# Patient Record
Sex: MALE | Race: WHITE | NOT HISPANIC OR LATINO
[De-identification: names, ages, dates, MRNs, and addresses within clinical notes are randomized per-mention and may not be internally consistent; named-entity substitution may affect disease eponyms.]

---

## 2017-03-08 ENCOUNTER — APPOINTMENT (OUTPATIENT)
Dept: COLORECTAL SURGERY | Facility: CLINIC | Age: 82
End: 2017-03-08

## 2017-03-08 VITALS
BODY MASS INDEX: 34.86 KG/M2 | SYSTOLIC BLOOD PRESSURE: 140 MMHG | HEART RATE: 68 BPM | HEIGHT: 68 IN | WEIGHT: 230 LBS | DIASTOLIC BLOOD PRESSURE: 80 MMHG | RESPIRATION RATE: 14 BRPM

## 2017-03-08 DIAGNOSIS — Z87.891 PERSONAL HISTORY OF NICOTINE DEPENDENCE: ICD-10-CM

## 2017-03-08 DIAGNOSIS — Z87.19 PERSONAL HISTORY OF OTHER DISEASES OF THE DIGESTIVE SYSTEM: ICD-10-CM

## 2017-03-08 DIAGNOSIS — Z86.79 PERSONAL HISTORY OF OTHER DISEASES OF THE CIRCULATORY SYSTEM: ICD-10-CM

## 2017-07-20 ENCOUNTER — APPOINTMENT (OUTPATIENT)
Dept: PULMONOLOGY | Facility: CLINIC | Age: 82
End: 2017-07-20

## 2017-07-20 VITALS
HEIGHT: 67 IN | SYSTOLIC BLOOD PRESSURE: 145 MMHG | BODY MASS INDEX: 36.1 KG/M2 | DIASTOLIC BLOOD PRESSURE: 80 MMHG | WEIGHT: 230 LBS | OXYGEN SATURATION: 97 % | HEART RATE: 65 BPM | RESPIRATION RATE: 16 BRPM

## 2017-07-20 DIAGNOSIS — N20.0 CALCULUS OF KIDNEY: ICD-10-CM

## 2017-07-20 DIAGNOSIS — Z63.4 DISAPPEARANCE AND DEATH OF FAMILY MEMBER: ICD-10-CM

## 2017-07-20 SDOH — SOCIAL STABILITY - SOCIAL INSECURITY: DISSAPEARANCE AND DEATH OF FAMILY MEMBER: Z63.4

## 2017-08-02 ENCOUNTER — MESSAGE (OUTPATIENT)
Age: 82
End: 2017-08-02

## 2017-08-03 ENCOUNTER — RX RENEWAL (OUTPATIENT)
Age: 82
End: 2017-08-03

## 2017-08-03 ENCOUNTER — RESULT REVIEW (OUTPATIENT)
Age: 82
End: 2017-08-03

## 2017-08-11 ENCOUNTER — APPOINTMENT (OUTPATIENT)
Dept: PULMONOLOGY | Facility: CLINIC | Age: 82
End: 2017-08-11
Payer: MEDICARE

## 2017-08-11 VITALS
HEIGHT: 67 IN | DIASTOLIC BLOOD PRESSURE: 80 MMHG | WEIGHT: 229 LBS | SYSTOLIC BLOOD PRESSURE: 150 MMHG | OXYGEN SATURATION: 98 % | HEART RATE: 68 BPM | BODY MASS INDEX: 35.94 KG/M2 | RESPIRATION RATE: 17 BRPM

## 2017-08-11 PROCEDURE — 99214 OFFICE O/P EST MOD 30 MIN: CPT | Mod: 25

## 2017-08-11 PROCEDURE — 94010 BREATHING CAPACITY TEST: CPT

## 2017-10-11 ENCOUNTER — APPOINTMENT (OUTPATIENT)
Dept: PULMONOLOGY | Facility: CLINIC | Age: 82
End: 2017-10-11

## 2017-12-26 ENCOUNTER — INPATIENT (INPATIENT)
Facility: HOSPITAL | Age: 82
LOS: 0 days | Discharge: HOME | DRG: 303 | End: 2017-12-27
Attending: INTERNAL MEDICINE | Admitting: INTERNAL MEDICINE
Payer: MEDICARE

## 2017-12-26 VITALS
OXYGEN SATURATION: 98 % | RESPIRATION RATE: 18 BRPM | HEART RATE: 68 BPM | TEMPERATURE: 98 F | SYSTOLIC BLOOD PRESSURE: 123 MMHG | DIASTOLIC BLOOD PRESSURE: 68 MMHG

## 2017-12-26 DIAGNOSIS — I20.8 OTHER FORMS OF ANGINA PECTORIS: ICD-10-CM

## 2017-12-26 DIAGNOSIS — J45.909 UNSPECIFIED ASTHMA, UNCOMPLICATED: ICD-10-CM

## 2017-12-26 DIAGNOSIS — I25.118 ATHEROSCLEROTIC HEART DISEASE OF NATIVE CORONARY ARTERY WITH OTHER FORMS OF ANGINA PECTORIS: ICD-10-CM

## 2017-12-26 DIAGNOSIS — R06.02 SHORTNESS OF BREATH: ICD-10-CM

## 2017-12-26 DIAGNOSIS — I48.0 PAROXYSMAL ATRIAL FIBRILLATION: ICD-10-CM

## 2017-12-26 DIAGNOSIS — I50.32 CHRONIC DIASTOLIC (CONGESTIVE) HEART FAILURE: ICD-10-CM

## 2017-12-26 DIAGNOSIS — N18.2 CHRONIC KIDNEY DISEASE, STAGE 2 (MILD): ICD-10-CM

## 2017-12-26 LAB
ALBUMIN SERPL ELPH-MCNC: 3.8 G/DL — SIGNIFICANT CHANGE UP (ref 3.3–5)
ALP SERPL-CCNC: 87 U/L — SIGNIFICANT CHANGE UP (ref 40–120)
ALT FLD-CCNC: 28 U/L RC — SIGNIFICANT CHANGE UP (ref 10–45)
ANION GAP SERPL CALC-SCNC: 12 MMOL/L — SIGNIFICANT CHANGE UP (ref 5–17)
APTT BLD: 29.4 SEC — SIGNIFICANT CHANGE UP (ref 27.5–37.4)
AST SERPL-CCNC: 27 U/L — SIGNIFICANT CHANGE UP (ref 10–40)
BASOPHILS # BLD AUTO: 0 K/UL — SIGNIFICANT CHANGE UP (ref 0–0.2)
BASOPHILS NFR BLD AUTO: 0.5 % — SIGNIFICANT CHANGE UP (ref 0–2)
BILIRUB SERPL-MCNC: 0.5 MG/DL — SIGNIFICANT CHANGE UP (ref 0.2–1.2)
BUN SERPL-MCNC: 29 MG/DL — HIGH (ref 7–23)
CALCIUM SERPL-MCNC: 9.7 MG/DL — SIGNIFICANT CHANGE UP (ref 8.4–10.5)
CHLORIDE SERPL-SCNC: 103 MMOL/L — SIGNIFICANT CHANGE UP (ref 96–108)
CK MB BLD-MCNC: 4.6 % — HIGH (ref 0–3.5)
CK MB CFR SERPL CALC: 7.3 NG/ML — HIGH (ref 0–6.7)
CK SERPL-CCNC: 158 U/L — SIGNIFICANT CHANGE UP (ref 30–200)
CO2 SERPL-SCNC: 26 MMOL/L — SIGNIFICANT CHANGE UP (ref 22–31)
CREAT SERPL-MCNC: 1.27 MG/DL — SIGNIFICANT CHANGE UP (ref 0.5–1.3)
EOSINOPHIL # BLD AUTO: 0.1 K/UL — SIGNIFICANT CHANGE UP (ref 0–0.5)
EOSINOPHIL NFR BLD AUTO: 1 % — SIGNIFICANT CHANGE UP (ref 0–6)
GLUCOSE BLDC GLUCOMTR-MCNC: 90 MG/DL — SIGNIFICANT CHANGE UP (ref 70–99)
GLUCOSE SERPL-MCNC: 105 MG/DL — HIGH (ref 70–99)
HCT VFR BLD CALC: 39.4 % — SIGNIFICANT CHANGE UP (ref 39–50)
HGB BLD-MCNC: 13.9 G/DL — SIGNIFICANT CHANGE UP (ref 13–17)
INR BLD: 1.01 RATIO — SIGNIFICANT CHANGE UP (ref 0.88–1.16)
LYMPHOCYTES # BLD AUTO: 1.3 K/UL — SIGNIFICANT CHANGE UP (ref 1–3.3)
LYMPHOCYTES # BLD AUTO: 14.6 % — SIGNIFICANT CHANGE UP (ref 13–44)
MCHC RBC-ENTMCNC: 32.7 PG — SIGNIFICANT CHANGE UP (ref 27–34)
MCHC RBC-ENTMCNC: 35.4 GM/DL — SIGNIFICANT CHANGE UP (ref 32–36)
MCV RBC AUTO: 92.5 FL — SIGNIFICANT CHANGE UP (ref 80–100)
MONOCYTES # BLD AUTO: 0.9 K/UL — SIGNIFICANT CHANGE UP (ref 0–0.9)
MONOCYTES NFR BLD AUTO: 10.7 % — SIGNIFICANT CHANGE UP (ref 2–14)
NEUTROPHILS # BLD AUTO: 6.3 K/UL — SIGNIFICANT CHANGE UP (ref 1.8–7.4)
NEUTROPHILS NFR BLD AUTO: 73.1 % — SIGNIFICANT CHANGE UP (ref 43–77)
NT-PROBNP SERPL-SCNC: 2422 PG/ML — HIGH (ref 0–300)
PLATELET # BLD AUTO: 150 K/UL — SIGNIFICANT CHANGE UP (ref 150–400)
POTASSIUM SERPL-MCNC: 4.1 MMOL/L — SIGNIFICANT CHANGE UP (ref 3.5–5.3)
POTASSIUM SERPL-SCNC: 4.1 MMOL/L — SIGNIFICANT CHANGE UP (ref 3.5–5.3)
PROT SERPL-MCNC: 6.4 G/DL — SIGNIFICANT CHANGE UP (ref 6–8.3)
PROTHROM AB SERPL-ACNC: 11 SEC — SIGNIFICANT CHANGE UP (ref 9.8–12.7)
RBC # BLD: 4.26 M/UL — SIGNIFICANT CHANGE UP (ref 4.2–5.8)
RBC # FLD: 12.9 % — SIGNIFICANT CHANGE UP (ref 10.3–14.5)
SODIUM SERPL-SCNC: 141 MMOL/L — SIGNIFICANT CHANGE UP (ref 135–145)
TROPONIN T SERPL-MCNC: 0.02 NG/ML — SIGNIFICANT CHANGE UP (ref 0–0.06)
WBC # BLD: 8.6 K/UL — SIGNIFICANT CHANGE UP (ref 3.8–10.5)
WBC # FLD AUTO: 8.6 K/UL — SIGNIFICANT CHANGE UP (ref 3.8–10.5)

## 2017-12-26 PROCEDURE — 99284 EMERGENCY DEPT VISIT MOD MDM: CPT | Mod: 25,GC

## 2017-12-26 PROCEDURE — 93010 ELECTROCARDIOGRAM REPORT: CPT

## 2017-12-26 PROCEDURE — 71020: CPT | Mod: 26

## 2017-12-26 PROCEDURE — 99223 1ST HOSP IP/OBS HIGH 75: CPT

## 2017-12-26 RX ORDER — FINASTERIDE 5 MG/1
5 TABLET, FILM COATED ORAL DAILY
Qty: 0 | Refills: 0 | Status: DISCONTINUED | OUTPATIENT
Start: 2017-12-26 | End: 2017-12-27

## 2017-12-26 RX ORDER — ALBUTEROL 90 UG/1
2 AEROSOL, METERED ORAL EVERY 6 HOURS
Qty: 0 | Refills: 0 | Status: DISCONTINUED | OUTPATIENT
Start: 2017-12-26 | End: 2017-12-27

## 2017-12-26 RX ORDER — PANTOPRAZOLE SODIUM 20 MG/1
40 TABLET, DELAYED RELEASE ORAL
Qty: 0 | Refills: 0 | Status: DISCONTINUED | OUTPATIENT
Start: 2017-12-26 | End: 2017-12-27

## 2017-12-26 RX ORDER — METOPROLOL TARTRATE 50 MG
50 TABLET ORAL DAILY
Qty: 0 | Refills: 0 | Status: DISCONTINUED | OUTPATIENT
Start: 2017-12-26 | End: 2017-12-27

## 2017-12-26 RX ORDER — ACETAMINOPHEN 500 MG
2 TABLET ORAL
Qty: 0 | Refills: 0 | COMMUNITY

## 2017-12-26 RX ORDER — ASPIRIN/CALCIUM CARB/MAGNESIUM 324 MG
81 TABLET ORAL DAILY
Qty: 0 | Refills: 0 | Status: DISCONTINUED | OUTPATIENT
Start: 2017-12-26 | End: 2017-12-27

## 2017-12-26 RX ORDER — IPRATROPIUM/ALBUTEROL SULFATE 18-103MCG
3 AEROSOL WITH ADAPTER (GRAM) INHALATION ONCE
Qty: 0 | Refills: 0 | Status: COMPLETED | OUTPATIENT
Start: 2017-12-26 | End: 2017-12-26

## 2017-12-26 RX ORDER — CLOPIDOGREL BISULFATE 75 MG/1
75 TABLET, FILM COATED ORAL DAILY
Qty: 0 | Refills: 0 | Status: DISCONTINUED | OUTPATIENT
Start: 2017-12-26 | End: 2017-12-27

## 2017-12-26 RX ORDER — AMIODARONE HYDROCHLORIDE 400 MG/1
200 TABLET ORAL
Qty: 0 | Refills: 0 | Status: DISCONTINUED | OUTPATIENT
Start: 2017-12-26 | End: 2017-12-27

## 2017-12-26 RX ORDER — SPIRONOLACTONE 25 MG/1
25 TABLET, FILM COATED ORAL DAILY
Qty: 0 | Refills: 0 | Status: DISCONTINUED | OUTPATIENT
Start: 2017-12-26 | End: 2017-12-27

## 2017-12-26 RX ORDER — VALSARTAN 80 MG/1
320 TABLET ORAL DAILY
Qty: 0 | Refills: 0 | Status: DISCONTINUED | OUTPATIENT
Start: 2017-12-26 | End: 2017-12-27

## 2017-12-26 RX ORDER — TAMSULOSIN HYDROCHLORIDE 0.4 MG/1
0.4 CAPSULE ORAL AT BEDTIME
Qty: 0 | Refills: 0 | Status: DISCONTINUED | OUTPATIENT
Start: 2017-12-26 | End: 2017-12-27

## 2017-12-26 RX ORDER — FUROSEMIDE 40 MG
20 TABLET ORAL ONCE
Qty: 0 | Refills: 0 | Status: COMPLETED | OUTPATIENT
Start: 2017-12-26 | End: 2017-12-26

## 2017-12-26 RX ORDER — METOPROLOL TARTRATE 50 MG
1 TABLET ORAL
Qty: 0 | Refills: 0 | COMMUNITY

## 2017-12-26 RX ORDER — VALSARTAN 80 MG/1
1 TABLET ORAL
Qty: 0 | Refills: 0 | COMMUNITY

## 2017-12-26 RX ORDER — AMIODARONE HYDROCHLORIDE 400 MG/1
1 TABLET ORAL
Qty: 0 | Refills: 0 | COMMUNITY

## 2017-12-26 RX ORDER — APIXABAN 2.5 MG/1
5 TABLET, FILM COATED ORAL EVERY 12 HOURS
Qty: 0 | Refills: 0 | Status: DISCONTINUED | OUTPATIENT
Start: 2017-12-26 | End: 2017-12-27

## 2017-12-26 RX ORDER — ATORVASTATIN CALCIUM 80 MG/1
40 TABLET, FILM COATED ORAL AT BEDTIME
Qty: 0 | Refills: 0 | Status: DISCONTINUED | OUTPATIENT
Start: 2017-12-26 | End: 2017-12-27

## 2017-12-26 RX ORDER — FUROSEMIDE 40 MG
40 TABLET ORAL DAILY
Qty: 0 | Refills: 0 | Status: DISCONTINUED | OUTPATIENT
Start: 2017-12-26 | End: 2017-12-27

## 2017-12-26 RX ORDER — VERAPAMIL HCL 240 MG
1 CAPSULE, EXTENDED RELEASE PELLETS 24 HR ORAL
Qty: 0 | Refills: 0 | COMMUNITY

## 2017-12-26 RX ADMIN — Medication 3 MILLILITER(S): at 17:30

## 2017-12-26 RX ADMIN — Medication 20 MILLIGRAM(S): at 19:03

## 2017-12-26 NOTE — H&P ADULT - HISTORY OF PRESENT ILLNESS
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 Pt reports he always has SOB but during the last three days it was more pronounced. This morning he had dyspnea at both rest and w/ exertion, prompting him to visit his cardiologist who referred him to hospital. At the cardiologist office, patient reports having an EKG that was reportedly normal. He never experienced chest pain/palpitation, back pain, syncope, URI sx or dizziness. He reports medication adherences with plavix, aspirin, Eliquis, and furosemide. He reports the shortness of breath can be best described as "just hard to breathe." He used his albuterol inhaler but it did not help. He cannot specifically say if there is chest tightness but no overt chest pain or pressure. Of note patient was hospitalized in [place] about 2-mo ago for shortness of breath but less severe. His breathing difficulty was attributed to "bad mitral valve that needed replacement." He had angiogram performed in anticipation for replacement but for unclear reasons it was never replaced. Instead he was told to undergo medical management for his valve problem. 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 Pt reports he always has SOB but during the last three days it was more pronounced. This morning he had dyspnea at both rest and w/ exertion, prompting him to visit his cardiologist who referred him to hospital. At the cardiologist office, patient reports having an EKG that was reportedly normal. He never experienced chest pain/palpitation, back pain, syncope, URI sx or dizziness, orthopnea, leg swelling, or PND. He reports medication adherences with plavix, aspirin, Eliquis, and furosemide. He reports the shortness of breath can be best described as "just hard to breathe." He used his albuterol inhaler but it did not help. He cannot specifically say if there is chest tightness but no overt chest pain or pressure. Of note patient was hospitalized in [place] about 2-mo ago for shortness of breath but less severe. His breathing difficulty was attributed to "bad mitral valve that needed replacement." He had angiogram performed in anticipation for replacement but for unclear reasons it was never replaced. Instead he was told to undergo medical management for his valve problem.

## 2017-12-26 NOTE — H&P ADULT - NSHPSOCIALHISTORY_GEN_ALL_CORE
Pt is a former smoker, stopped > 40 years ago.  Reports 1-2 shots of liquor 2-3x weekly but no alcohol abuse hx  No past or current use of recreational drugs  worked as a construction

## 2017-12-26 NOTE — PROCEDURE NOTE - ADDITIONAL PROCEDURE DETAILS
Called for PPM interrogation, pt presented with ARMSTRONG.  currently in Afib. on Eliquis for anticoagulation.  While pt's in Afib average ventricular rate is 100-110's bpm.  No setting change made.

## 2017-12-26 NOTE — H&P ADULT - PROBLEM SELECTOR PLAN 2
-Less likely this is pulmonary embolism given adherence to Eliquis therapy.  -Less likely this is asthma exacerbation given no wheezing.   -Less likely this is acute CHF given patient has no crackles, no JVD, etc...and was taking his diuretic. Will c/w home PO furosemide.  -Currently breathing improved but suspect this is more atypical angina with high risk features given recent stent placement.

## 2017-12-26 NOTE — H&P ADULT - NSHPLABSRESULTS_GEN_ALL_CORE
13.9   8.6   )-----------( 150      ( 26 Dec 2017 17:14 )             39.4       12-26    141  |  103  |  29<H>  ----------------------------<  105<H>  4.1   |  26  |  1.27    Ca    9.7      26 Dec 2017 17:14    TPro  6.4  /  Alb  3.8  /  TBili  0.5  /  DBili  x   /  AST  27  /  ALT  28  /  AlkPhos  87  12-26        PT/INR - ( 26 Dec 2017 17:14 )   PT: 11.0 sec;   INR: 1.01 ratio         PTT - ( 26 Dec 2017 17:14 )  PTT:29.4 sec    CARDIAC MARKERS ( 26 Dec 2017 17:14 )  x     / 0.02 ng/mL / 158 U/L / x     / 7.3 ng/mL    I personally reviewed & interpreted the lab findings above; CBC unremarkable and CMP c/w CKD II  I personally reviewed & interpreted the radiographic findings; CXR shows no focal consolidation   I personally reviewed & interpreted the EKG findings; Paced rhythm. No active ischemia

## 2017-12-26 NOTE — H&P ADULT - NSHPPHYSICALEXAM_GEN_ALL_CORE
PHYSICAL EXAM:  Vital Signs Last 24 Hrs  T(C): 36.8 (12-26-17 @ 21:42)  T(F): 98.2 (12-26-17 @ 21:42), Max: 98.7 (12-26-17 @ 17:21)  HR: 76 (12-26-17 @ 21:42) (68 - 76)  BP: 151/79 (12-26-17 @ 21:42)  BP(mean): --  RR: 18 (12-26-17 @ 21:42) (17 - 18)  SpO2: 96% (12-26-17 @ 21:42) (96% - 99%)  Wt(kg): --    Constitutional: NAD, awake and alert  EYES: EOMI  ENT:  Normal Hearing, no tonsillar exudates   Neck: Soft and supple , no thyromegaly   Respiratory: Breath sounds are clear bilaterally, No wheezing, rales or rhonchi  Cardiovascular: S1 and S2, regular rate and rhythm, 2/6 systolic apical murmur, gallops or rubs, no JVD,    Gastrointestinal: Bowel Sounds present, soft, nontender, nondistended, no guarding, no rebound  Extremities: No cyanosis or clubbing; warm to touch  Vascular: 2+ peripheral pulses lower ex  Neurological: No focal deficits, CN II-XII intact bilaterally, sensation to light touch intact in all extremities, gait deferred  Musculoskeletal: 5/5 strength b/l upper and lower extremities; no joint swelling.  Skin: No rashes  Psych: no depression or anhedonia, AAOx3  HEME: no bruises, no nose bleeds

## 2017-12-26 NOTE — ED ADULT NURSE NOTE - OBJECTIVE STATEMENT
86 yr old male with asthma and a fib with a pacemaker came in with sob going on and off for a few days. on assessment a and o x 3 lungs clear abd soft non tedner no swelling in extremities no n/v/d no fevers. no other complaints. vitals stable

## 2017-12-26 NOTE — H&P ADULT - PROBLEM SELECTOR PLAN 5
-c.w home Eliquis.   -c/w metoprolol. -Patient euvolemic on examination  -c/w furosemide and spironolactone  -c/w betablocker, ARB therapy, aspirin, and statin therapy -Patient euvolemic on examination  -c/w furosemide and spironolactone  -c/w betablocker, ARB therapy, aspirin, and statin therapy  -check TTE; evaluate mitral valve integrity

## 2017-12-26 NOTE — PROCEDURE NOTE - INTERROGATION NOTE: COMMENTS
Normal functioning dual chamber PPM. Pt's not PM dependent. PAFib 27.6 % since last interrogation (Oct, 2017).

## 2017-12-26 NOTE — H&P ADULT - ASSESSMENT
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

## 2017-12-26 NOTE — ED PROVIDER NOTE - ATTENDING CONTRIBUTION TO CARE
Patient presenting complaining of shortness of breath and dizziness which occurred on awakening this morning.  Reporting symptoms severe on awakening, without associated chest pains, was considering calling EMS at that time but dizziness began resolving - drove himself to his cardiologist's office where told to go to the ED.  History of CAD with stents.  Now reporting feeling back to baseline.    On exam patient vital signs within normal limits, non toxic appearing, RRR S1/S2, lungs clear, abdomen obese, non tender, strong and equal radial pulses, no pitting edema.    Patient with episode of resolve dizziness and dyspnea, given history of cardiac disease concerned for possible ischemic episode or pacemaker malfunction - plan for labs, CXR, CE, pacemaker interrogation, discussion with cardiologist, possible admission for further monitoring/workup.

## 2017-12-26 NOTE — H&P ADULT - NSHPREVIEWOFSYSTEMS_GEN_ALL_CORE
REVIEW OF SYSTEMS:    CONSTITUTIONAL: No weakness, fevers or chills  ENMT:  No ear pain, No vertigo or throat pain  EYES: No visual changes  or photophobia  NECK: No pain or stiffness  RESPIRATORY: see HPI; no coughs.   CARDIOVASCULAR: No chest pain or palpitations  GASTROINTESTINAL: No abdominal or epigastric pain. No nausea, vomiting, or hematemesis; No diarrhea or constipation. No melena or hematochezia.  MUSCULOSKELETAL: No joint swelling  or warmth.  GENITOURINARY: No dysuria, frequency or hematuria  NEUROLOGICAL: No numbness or weakness  PSYCHIATRIC: No depression or anhedonia.  ENDOCRINE: No sx hypoglycemic episodes.  SKIN: No itching, burning, rashes, or lesions

## 2017-12-26 NOTE — H&P ADULT - PROBLEM SELECTOR PROBLEM 6
Asthma, unspecified asthma severity, unspecified whether complicated, unspecified whether persistent Paroxysmal atrial fibrillation

## 2017-12-26 NOTE — H&P ADULT - PROBLEM SELECTOR PLAN 1
-Suspect shortness of breath is 2/2 to atypical angina given unclear if there is chest tightness and patient recently had stent placed about 6-mo ago in [place]. He reports medical adherence to plavix therapy and so less likely this is stent thrombosis. Will admit to telemetry, send CEx3, c/w beta-blocker, aspirin, plavix, ARB therapy.  -Would recommend cards consultation in AM.

## 2017-12-26 NOTE — ED PROVIDER NOTE - OBJECTIVE STATEMENT
86 M w CAD s/p stent, Thalassemia, s/p Pacemaker, HLD, Asthma, Paroxysmal Afib, previous smoker presents w dyspnea which has been intermittent for the past 2 days. He went to Dr Don Morgan office and reported the complaint and was sent to ED. He also reports an episode of dizziness this morning.

## 2017-12-27 ENCOUNTER — TRANSCRIPTION ENCOUNTER (OUTPATIENT)
Age: 82
End: 2017-12-27

## 2017-12-27 VITALS
HEART RATE: 64 BPM | OXYGEN SATURATION: 98 % | SYSTOLIC BLOOD PRESSURE: 117 MMHG | RESPIRATION RATE: 17 BRPM | DIASTOLIC BLOOD PRESSURE: 63 MMHG | TEMPERATURE: 98 F

## 2017-12-27 LAB
ANION GAP SERPL CALC-SCNC: 13 MMOL/L — SIGNIFICANT CHANGE UP (ref 5–17)
BUN SERPL-MCNC: 26 MG/DL — HIGH (ref 7–23)
CALCIUM SERPL-MCNC: 9.5 MG/DL — SIGNIFICANT CHANGE UP (ref 8.4–10.5)
CHLORIDE SERPL-SCNC: 105 MMOL/L — SIGNIFICANT CHANGE UP (ref 96–108)
CK MB BLD-MCNC: 4.8 % — HIGH (ref 0–3.5)
CK MB CFR SERPL CALC: 6 NG/ML — SIGNIFICANT CHANGE UP (ref 0–6.7)
CK MB CFR SERPL CALC: 6.2 NG/ML — SIGNIFICANT CHANGE UP (ref 0–6.7)
CK SERPL-CCNC: 125 U/L — SIGNIFICANT CHANGE UP (ref 30–200)
CK SERPL-CCNC: 129 U/L — SIGNIFICANT CHANGE UP (ref 30–200)
CO2 SERPL-SCNC: 26 MMOL/L — SIGNIFICANT CHANGE UP (ref 22–31)
CREAT SERPL-MCNC: 1.31 MG/DL — HIGH (ref 0.5–1.3)
GLUCOSE SERPL-MCNC: 94 MG/DL — SIGNIFICANT CHANGE UP (ref 70–99)
HCT VFR BLD CALC: 39.5 % — SIGNIFICANT CHANGE UP (ref 39–50)
HGB BLD-MCNC: 13 G/DL — SIGNIFICANT CHANGE UP (ref 13–17)
MAGNESIUM SERPL-MCNC: 2.1 MG/DL — SIGNIFICANT CHANGE UP (ref 1.6–2.6)
MCHC RBC-ENTMCNC: 29.3 PG — SIGNIFICANT CHANGE UP (ref 27–34)
MCHC RBC-ENTMCNC: 32.9 GM/DL — SIGNIFICANT CHANGE UP (ref 32–36)
MCV RBC AUTO: 89.2 FL — SIGNIFICANT CHANGE UP (ref 80–100)
PHOSPHATE SERPL-MCNC: 3.2 MG/DL — SIGNIFICANT CHANGE UP (ref 2.5–4.5)
PLATELET # BLD AUTO: 173 K/UL — SIGNIFICANT CHANGE UP (ref 150–400)
POTASSIUM SERPL-MCNC: 3.7 MMOL/L — SIGNIFICANT CHANGE UP (ref 3.5–5.3)
POTASSIUM SERPL-SCNC: 3.7 MMOL/L — SIGNIFICANT CHANGE UP (ref 3.5–5.3)
RBC # BLD: 4.43 M/UL — SIGNIFICANT CHANGE UP (ref 4.2–5.8)
RBC # FLD: 14.3 % — SIGNIFICANT CHANGE UP (ref 10.3–14.5)
SODIUM SERPL-SCNC: 144 MMOL/L — SIGNIFICANT CHANGE UP (ref 135–145)
TROPONIN T SERPL-MCNC: 0.02 NG/ML — SIGNIFICANT CHANGE UP (ref 0–0.06)
TROPONIN T SERPL-MCNC: 0.02 NG/ML — SIGNIFICANT CHANGE UP (ref 0–0.06)
WBC # BLD: 7.17 K/UL — SIGNIFICANT CHANGE UP (ref 3.8–10.5)
WBC # FLD AUTO: 7.17 K/UL — SIGNIFICANT CHANGE UP (ref 3.8–10.5)

## 2017-12-27 PROCEDURE — 85610 PROTHROMBIN TIME: CPT

## 2017-12-27 PROCEDURE — 99285 EMERGENCY DEPT VISIT HI MDM: CPT | Mod: 25

## 2017-12-27 PROCEDURE — 94640 AIRWAY INHALATION TREATMENT: CPT

## 2017-12-27 PROCEDURE — 84100 ASSAY OF PHOSPHORUS: CPT

## 2017-12-27 PROCEDURE — 82962 GLUCOSE BLOOD TEST: CPT

## 2017-12-27 PROCEDURE — 83880 ASSAY OF NATRIURETIC PEPTIDE: CPT

## 2017-12-27 PROCEDURE — 71046 X-RAY EXAM CHEST 2 VIEWS: CPT

## 2017-12-27 PROCEDURE — 85027 COMPLETE CBC AUTOMATED: CPT

## 2017-12-27 PROCEDURE — 82550 ASSAY OF CK (CPK): CPT

## 2017-12-27 PROCEDURE — 80048 BASIC METABOLIC PNL TOTAL CA: CPT

## 2017-12-27 PROCEDURE — 85730 THROMBOPLASTIN TIME PARTIAL: CPT

## 2017-12-27 PROCEDURE — 82553 CREATINE MB FRACTION: CPT

## 2017-12-27 PROCEDURE — 83735 ASSAY OF MAGNESIUM: CPT

## 2017-12-27 PROCEDURE — 93005 ELECTROCARDIOGRAM TRACING: CPT

## 2017-12-27 PROCEDURE — 84484 ASSAY OF TROPONIN QUANT: CPT

## 2017-12-27 PROCEDURE — 80053 COMPREHEN METABOLIC PANEL: CPT

## 2017-12-27 RX ORDER — METOPROLOL TARTRATE 50 MG
0.5 TABLET ORAL
Qty: 0 | Refills: 0 | COMMUNITY

## 2017-12-27 RX ORDER — APIXABAN 2.5 MG/1
1 TABLET, FILM COATED ORAL
Qty: 60 | Refills: 0 | OUTPATIENT
Start: 2017-12-27

## 2017-12-27 RX ORDER — BUDESONIDE, MICRONIZED 100 %
1 POWDER (GRAM) MISCELLANEOUS
Qty: 0 | Refills: 0 | COMMUNITY

## 2017-12-27 RX ORDER — ASPIRIN/CALCIUM CARB/MAGNESIUM 324 MG
1 TABLET ORAL
Qty: 30 | Refills: 0 | OUTPATIENT
Start: 2017-12-27 | End: 2018-01-25

## 2017-12-27 RX ORDER — VALSARTAN 80 MG/1
2 TABLET ORAL
Qty: 0 | Refills: 0 | COMMUNITY

## 2017-12-27 RX ORDER — PANTOPRAZOLE SODIUM 20 MG/1
1 TABLET, DELAYED RELEASE ORAL
Qty: 0 | Refills: 0 | COMMUNITY

## 2017-12-27 RX ORDER — ALBUTEROL 90 UG/1
2 AEROSOL, METERED ORAL
Qty: 0 | Refills: 0 | COMMUNITY

## 2017-12-27 RX ADMIN — FINASTERIDE 5 MILLIGRAM(S): 5 TABLET, FILM COATED ORAL at 12:30

## 2017-12-27 RX ADMIN — Medication 40 MILLIGRAM(S): at 06:59

## 2017-12-27 RX ADMIN — PANTOPRAZOLE SODIUM 40 MILLIGRAM(S): 20 TABLET, DELAYED RELEASE ORAL at 06:59

## 2017-12-27 RX ADMIN — AMIODARONE HYDROCHLORIDE 200 MILLIGRAM(S): 400 TABLET ORAL at 06:59

## 2017-12-27 RX ADMIN — SPIRONOLACTONE 25 MILLIGRAM(S): 25 TABLET, FILM COATED ORAL at 07:00

## 2017-12-27 RX ADMIN — Medication 50 MILLIGRAM(S): at 07:00

## 2017-12-27 RX ADMIN — APIXABAN 5 MILLIGRAM(S): 2.5 TABLET, FILM COATED ORAL at 06:59

## 2017-12-27 RX ADMIN — CLOPIDOGREL BISULFATE 75 MILLIGRAM(S): 75 TABLET, FILM COATED ORAL at 12:30

## 2017-12-27 NOTE — DISCHARGE NOTE ADULT - MEDICATION SUMMARY - MEDICATIONS TO STOP TAKING
I will STOP taking the medications listed below when I get home from the hospital:    Pulmicort Flexhaler 180 mcg/inh inhalation powder  -- 1 puff(s) inhaled once a day, As Needed

## 2017-12-27 NOTE — PROGRESS NOTE ADULT - SUBJECTIVE AND OBJECTIVE BOX
Patient is a 86y old  Male who presents with a chief complaint of shortness of breath (26 Dec 2017   Admitted overnight by hospitalist service   SUBJECTIVE / OVERNIGHT EVENTS:  Review of Systems  chest pain no  palpitations no  sob improving   nausea no  headache no    MEDICATIONS  (STANDING):  amiodarone    Tablet 200 milliGRAM(s) Oral two times a day  apixaban 5 milliGRAM(s) Oral every 12 hours  aspirin enteric coated 81 milliGRAM(s) Oral daily  atorvastatin 40 milliGRAM(s) Oral at bedtime  clopidogrel Tablet 75 milliGRAM(s) Oral daily  finasteride 5 milliGRAM(s) Oral daily  furosemide    Tablet 40 milliGRAM(s) Oral daily  metoprolol succinate ER 50 milliGRAM(s) Oral daily  pantoprazole    Tablet 40 milliGRAM(s) Oral before breakfast  spironolactone 25 milliGRAM(s) Oral daily  tamsulosin 0.4 milliGRAM(s) Oral at bedtime  valsartan 320 milliGRAM(s) Oral daily    MEDICATIONS  (PRN):  ALBUTerol    90 MICROgram(s) HFA Inhaler 2 Puff(s) Inhalation every 6 hours PRN Shortness of Breath and/or Wheezing          PHYSICAL EXAM:  GENERAL: NAD, well-developed  HEAD:  Atraumatic, Normocephalic  EYES: EOMI, PERRLA, conjunctiva and sclera clear  NECK: Supple, No JVD  CHEST/LUNG: Clear to auscultation bilaterally; No wheeze  HEART: Regular rate and rhythm; No murmurs, rubs, or gallops  ABDOMEN: Soft, Nontender, Nondistended; Bowel sounds present  EXTREMITIES:  2+ Peripheral Pulses, No clubbing, cyanosis, 1+ edema  PSYCH: AAOx3  NEUROLOGY: non-focal  SKIN: No rashes or lesions    LABS:                        13.0   7.17  )-----------( 173      ( 27 Dec 2017 07:24 )             39.5     12-27    144  |  105  |  26<H>  ----------------------------<  94  3.7   |  26  |  1.31<H>    Ca    9.5      27 Dec 2017 07:26  Phos  3.2     12-27  Mg     2.1     12-27    TPro  6.4  /  Alb  3.8  /  TBili  0.5  /  DBili  x   /  AST  27  /  ALT  28  /  AlkPhos  87  12-26    PT/INR - ( 26 Dec 2017 17:14 )   PT: 11.0 sec;   INR: 1.01 ratio         PTT - ( 26 Dec 2017 17:14 )  PTT:29.4 sec  CARDIAC MARKERS ( 27 Dec 2017 07:26 )  x     / x     / 125 U/L / x     / x      CARDIAC MARKERS ( 27 Dec 2017 05:48 )  x     / 0.02 ng/mL / x     / x     / 6.0 ng/mL  CARDIAC MARKERS ( 27 Dec 2017 02:30 )  x     / 0.02 ng/mL / 129 U/L / x     / 6.2 ng/mL  CARDIAC MARKERS ( 26 Dec 2017 17:14 )  x     / 0.02 ng/mL / 158 U/L / x     / 7.3 ng/mL          RADIOLOGY & ADDITIONAL TESTS:    Imaging Personally Reviewed:    Consultant(s) Notes Reviewed:      Care Discussed with Consultants/Other Providers:

## 2017-12-27 NOTE — DISCHARGE NOTE ADULT - CARE PLAN
Principal Discharge DX:	Shortness of breath  Goal:	patient signing out against medical advice  Instructions for follow-up, activity and diet:	follow up with primary care physician and cardiology as direacted  cardiac enzymes and EKG negative - needs echocardiogram  Secondary Diagnosis:	Asthma, unspecified asthma severity, unspecified whether complicated, unspecified whether persistent  Instructions for follow-up, activity and diet:	take inhaler as directed  Secondary Diagnosis:	Chronic diastolic congestive heart failure  Instructions for follow-up, activity and diet:	Weigh yourself daily.  If you gain 3lbs in 3 days, or 5lbs in a week call your Health Care Provider.  Do not eat or drink foods containing more than 2000mg of salt (sodium) in your diet every day.  Call your Health Care Provider if you have any swelling or increased swelling in your feet, ankles, and/or stomach.  Take all of your medication as directed.  If you become dizzy call your Health Care Provider.  Secondary Diagnosis:	Paroxysmal atrial fibrillation  Instructions for follow-up, activity and diet:	Atrial fibrillation is the most common heart rhythm problem & has the risk of stroke & heart attack  It helps if you control your blood pressure, not drink more than 1-2 alcohol drinks per day, cut down on caffeine, getting treatment for over active thyroid gland, & getting exercise  Call your doctor if you feel your heart racing or beating unusually, chest tightness or pain, lightheaded, faint, shortness of breath especially with exercise  It is important to take your heart medication as prescribed  You are on Eliquis for anticoagulation and stroke prevention  Eliquis/Apixaban is used to thin the blood so clots will not form and to keep existing ones from getting bigger.  Take this medication daily as prescribed by your health care provider.  Take this medication with food to prevent upset stomach.  If you miss a dose call your health care provider or pharmacist right away.  Tell your doctor you use this drug before you have a spinal or epidural procedure  Tell dentists, surgeon, and other doctors that you use this drug.  You may bleed more easily.  Be careful and avoid injury.  Use a soft toothbrush and an electric razor.  Secondary Diagnosis:	CKD (chronic kidney disease), stage II  Instructions for follow-up, activity and diet:	Avoid taking (NSAIDs) - (ex: Ibuprofen, Advil, Celebrex, Naprosyn)  Avoid taking any nephrotoxic agents (can harm kidneys) - Intravenous contrast for diagnostic testing, combination cold medications.  Have all medications adjusted for your renal function by your Health Care Provider.  Blood pressure control is important.  Take all medication as prescribed.

## 2017-12-27 NOTE — DISCHARGE NOTE ADULT - PATIENT PORTAL LINK FT
“You can access the [facility] Patient Portal, offered by [facility], by registering with the following website: [email or web address]”

## 2017-12-27 NOTE — DISCHARGE NOTE ADULT - PLAN OF CARE
patient signing out against medical advice follow up with primary care physician and cardiology as direacted  cardiac enzymes and EKG negative - needs echocardiogram take inhaler as directed Weigh yourself daily.  If you gain 3lbs in 3 days, or 5lbs in a week call your Health Care Provider.  Do not eat or drink foods containing more than 2000mg of salt (sodium) in your diet every day.  Call your Health Care Provider if you have any swelling or increased swelling in your feet, ankles, and/or stomach.  Take all of your medication as directed.  If you become dizzy call your Health Care Provider. Atrial fibrillation is the most common heart rhythm problem & has the risk of stroke & heart attack  It helps if you control your blood pressure, not drink more than 1-2 alcohol drinks per day, cut down on caffeine, getting treatment for over active thyroid gland, & getting exercise  Call your doctor if you feel your heart racing or beating unusually, chest tightness or pain, lightheaded, faint, shortness of breath especially with exercise  It is important to take your heart medication as prescribed  You are on Eliquis for anticoagulation and stroke prevention  Eliquis/Apixaban is used to thin the blood so clots will not form and to keep existing ones from getting bigger.  Take this medication daily as prescribed by your health care provider.  Take this medication with food to prevent upset stomach.  If you miss a dose call your health care provider or pharmacist right away.  Tell your doctor you use this drug before you have a spinal or epidural procedure  Tell dentists, surgeon, and other doctors that you use this drug.  You may bleed more easily.  Be careful and avoid injury.  Use a soft toothbrush and an electric razor. Avoid taking (NSAIDs) - (ex: Ibuprofen, Advil, Celebrex, Naprosyn)  Avoid taking any nephrotoxic agents (can harm kidneys) - Intravenous contrast for diagnostic testing, combination cold medications.  Have all medications adjusted for your renal function by your Health Care Provider.  Blood pressure control is important.  Take all medication as prescribed.

## 2017-12-27 NOTE — DISCHARGE NOTE ADULT - MEDICATION SUMMARY - MEDICATIONS TO TAKE
I will START or STAY ON the medications listed below when I get home from the hospital:    finasteride 5 mg oral tablet  -- 1 tab(s) by mouth once a day  -- Indication: For enlarged prostate    spironolactone 25 mg oral tablet  -- 1 tab(s) by mouth once a day  -- Indication: For Chronic diastolic congestive heart failure    aspirin 81 mg oral delayed release tablet  -- 1 tab(s) by mouth once a day  -- Indication: For Cardiac stent protection    valsartan 160 mg oral tablet  -- 2 tab(s) = 320 mg by mouth once a day  -- Indication: For Chronic diastolic congestive heart failure    Flomax 0.4 mg oral capsule  -- 1 cap(s) by mouth once a day  -- Indication: For enlarged prostate    amiodarone 200 mg oral tablet  -- 1 tab(s) by mouth 2 times a day  -- Indication: For Atrial fibrillation    apixaban 5 mg oral tablet  -- 1 tab(s) by mouth every 12 hours  -- Indication: For Atrial fibrillation & stroke prevention    Crestor 10 mg oral tablet  -- 1 tab(s) by mouth once a day (at bedtime)  -- Indication: For hyperlipidemia    Plavix 75 mg oral tablet  -- 1 tab(s) by mouth once a day  -- Indication: For Coronary stent protection    metoprolol succinate 100 mg oral tablet, extended release  -- 0.5 tab(s)= 50 mg by mouth once a day  -- Indication: For Coronary artery disease of native artery of native heart with stable angina pectoris    albuterol 90 mcg/inh inhalation aerosol  -- 2 puff(s) inhaled 4 times a day, As Needed - for shortness of breath and/or wheezing  -- Indication: For bronchodilator    Lasix 40 mg oral tablet  -- 1 tab(s) by mouth once a day  -- Indication: For Chronic diastolic congestive heart failure    Protonix 40 mg oral delayed release tablet  -- 1 tab(s) by mouth once a day 1/2 hour before breakfast  -- Indication: For Stomach protection

## 2017-12-27 NOTE — CONSULT NOTE ADULT - SUBJECTIVE AND OBJECTIVE BOX
CARDIOLOGY CONSULT - Dr. 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 Pt reports he always has SOB but during the last three days it was more pronounced. This morning he had dyspnea at both rest and w/ exertion, prompting him to visit his cardiologist who referred him to hospital. At the cardiologist office, patient reports having an EKG that was reportedly normal. Denies chest pain/palpitation, back pain, syncope, URI sx or dizziness, orthopnea, leg swelling, or PND. He reports medication adherences with plavix, aspirin, Eliquis, and furosemide.  He used his albuterol inhaler but it did not help.  Of note patient was hospitalized in [place] about 2-mo ago for shortness of breath but less severe. His breathing difficulty was attributed to "bad mitral valve that needed replacement." He had angiogram performed in anticipation for replacement but for unclear reasons it was never replaced. Instead he was told to undergo medical management for his valve problem.       PAST MEDICAL & SURGICAL HISTORY:  Pacemaker  Hypertension  H/O thalassemia  CAD (coronary artery disease): s/p stent 2010  S/P primary angioplasty with coronary stent          PREVIOUS DIAGNOSTIC TESTING:    [ ] Echocardiogram:    [ ]  Catheterization:    [ ] Stress Test:  	    Home Medications:   * Patient Currently Takes Medications as of 26-Dec-2017 23:17 documented in Structured Notes  Plavix 75 mg oral tablet: 1 tab(s) orally once a day, Last Dose Taken:    Lasix 40 mg oral tablet: 1 tab(s) orally once a day, Last Dose Taken:    spironolactone 25 mg oral tablet: 1 tab(s) orally once a day, Last Dose Taken:    Crestor 10 mg oral tablet: 1 tab(s) orally once a day (at bedtime), Last Dose Taken:    finasteride 5 mg oral tablet: 1 tab(s) orally once a day, Last Dose Taken:    Flomax 0.4 mg oral capsule: 1 cap(s) orally once a day, Last Dose Taken:    Protonix 40 mg oral delayed release tablet: 1 tab(s) orally once a day, Last Dose Taken:    Pulmicort Flexhaler 180 mcg/inh inhalation powder: 1 puff(s) inhaled once a day, As Needed, Last Dose Taken:    amiodarone 200 mg oral tablet: 1 tab(s) orally 2 times a day, Last Dose Taken:    metoprolol succinate 100 mg oral tablet, extended release: 0.5 tab(s) orally once a day, Last Dose Taken:    valsartan 160 mg oral tablet: 2 tab(s) orally once a day  albuterol 90 mcg/inh inhalation aerosol: 2 puff(s) inhaled 4 times a day, As Needed, Last Dose Taken:        MEDICATIONS:  MEDICATIONS  (STANDING):  amiodarone    Tablet 200 milliGRAM(s) Oral two times a day  apixaban 5 milliGRAM(s) Oral every 12 hours  aspirin enteric coated 81 milliGRAM(s) Oral daily  atorvastatin 40 milliGRAM(s) Oral at bedtime  clopidogrel Tablet 75 milliGRAM(s) Oral daily  finasteride 5 milliGRAM(s) Oral daily  furosemide    Tablet 40 milliGRAM(s) Oral daily  metoprolol succinate ER 50 milliGRAM(s) Oral daily  pantoprazole    Tablet 40 milliGRAM(s) Oral before breakfast  spironolactone 25 milliGRAM(s) Oral daily  tamsulosin 0.4 milliGRAM(s) Oral at bedtime  valsartan 320 milliGRAM(s) Oral daily      FAMILY HISTORY:  No pertinent family history in first degree relatives      SOCIAL HISTORY:    [ ] Non-smoker  [ ] Smoker: FORMER  [ ] Alcohol    Allergies    Keppra (Rash)  penicillin (Unknown)    Intolerances    	    REVIEW OF SYSTEMS:  CONSTITUTIONAL: No fever, weight loss, or fatigue  EYES: No eye pain, visual disturbances, or discharge  ENMT:  No difficulty hearing, tinnitus, vertigo; No sinus or throat pain  NECK: No pain or stiffness  RESPIRATORY: No cough, wheezing, chills or hemoptysis; No Shortness of Breath  CARDIOVASCULAR: No chest pain, palpitations, passing out, dizziness, or leg swelling  GASTROINTESTINAL: No abdominal or epigastric pain. No nausea, vomiting, or hematemesis; No diarrhea or constipation. No melena or hematochezia.  GENITOURINARY: No dysuria, frequency, hematuria, or incontinence  NEUROLOGICAL: No headaches, memory loss, loss of strength, numbness, or tremors  SKIN: No itching, burning, rashes, or lesions   	    [ ] All others negative	  [ ] Unable to obtain    PHYSICAL EXAM:  T(C): 36.8 (12-27-17 @ 04:43), Max: 37.1 (12-26-17 @ 17:21)  HR: 76 (12-27-17 @ 05:54) (56 - 76)  BP: 116/52 (12-27-17 @ 04:43) (110/66 - 151/79)  RR: 18 (12-27-17 @ 04:43) (17 - 18)  SpO2: 97% (12-27-17 @ 04:43) (96% - 99%)  Wt(kg): --  I&O's Summary    26 Dec 2017 07:01  -  27 Dec 2017 07:00  --------------------------------------------------------  IN: 0 mL / OUT: 450 mL / NET: -450 mL    27 Dec 2017 07:01  -  27 Dec 2017 09:21  --------------------------------------------------------  IN: 360 mL / OUT: 0 mL / NET: 360 mL        Appearance: Normal	  Psychiatry: A & O x 3, Mood & affect appropriate  HEENT:   Normal oral mucosa, PERRL, EOMI	  Lymphatic: No lymphadenopathy  Cardiovascular: Normal S1 S2,RRR, No JVD, No murmurs  Respiratory: Lungs clear to auscultation	  Gastrointestinal:  Soft, Non-tender, + BS	  Skin: No rashes, No ecchymoses, No cyanosis	  Neurologic: Non-focal  Extremities: Normal range of motion, No clubbing, cyanosis or edema  Vascular: Peripheral pulses palpable 2+ bilaterally    TELEMETRY: Afib/ Vpaced 	    ECG:  	V paced   RADIOLOGY:  OTHER: 	  < from: Xray Chest 2 Views PA/Lat (12.26.17 @ 18:11) >    FINDINGS:    The heart is mildly enlarged.  Interval placement of a left-sided biventricular pacemaker.  Calcifications of the aortic knob are present.  There are no focal lung consolidations.  There is no pleural effusion.  There is no pneumothorax.  Degenerative changes of the thoracic spine are present.    IMPRESSION:  Cardiomegaly, stable  No focal consolidations or edema          	  LABS:	 	    CARDIAC MARKERS:                                  13.0   7.17  )-----------( 173      ( 27 Dec 2017 07:24 )             39.5     12-27    144  |  105  |  26<H>  ----------------------------<  94  3.7   |  26  |  1.31<H>    Ca    9.5      27 Dec 2017 07:26  Phos  3.2     12-27  Mg     2.1     12-27    TPro  6.4  /  Alb  3.8  /  TBili  0.5  /  DBili  x   /  AST  27  /  ALT  28  /  AlkPhos  87  12-26    PT/INR - ( 26 Dec 2017 17:14 )   PT: 11.0 sec;   INR: 1.01 ratio         PTT - ( 26 Dec 2017 17:14 )  PTT:29.4 sec  proBNP: Serum Pro-Brain Natriuretic Peptide: 2422 pg/mL (12-26 @ 17:14)    Lipid Profile:   HgA1c:   TSH: CARDIOLOGY CONSULT - Dr. 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 Pt reports he always has SOB but during the last three days it was more pronounced. This morning he had dyspnea at both rest and w/ exertion, prompting him to visit his cardiologist who referred him to hospital. At the cardiologist office, patient reports having an EKG that was reportedly normal. Denies chest pain/palpitation, back pain, syncope, URI sx or dizziness, orthopnea, leg swelling, or PND. He reports medication adherences with plavix, aspirin, Eliquis, and furosemide.  He used his albuterol inhaler but it did not help.  Of note patient was hospitalized in [place] about 2-mo ago for shortness of breath but less severe. His breathing difficulty was attributed to "bad mitral valve that needed replacement." He was told to undergo medical management for his valve problem.    Reports symptoms are not similar to when he underwent stent placement. upon examination patient also reports chest pain, described as very minimal pain, twitching pain to left side, non- radiating. + cough. Reports overall feeling better  . ROS otherwise negative.        PAST MEDICAL & SURGICAL HISTORY:  Pacemaker  Hypertension  H/O thalassemia  CAD (coronary artery disease): s/p stent 2010/ 2017  S/P primary angioplasty with coronary stent          PREVIOUS DIAGNOSTIC TESTING:    [ ] Echocardiogram:    [ ]  Catheterization:    [ ] Stress Test:  	    Home Medications:   * Patient Currently Takes Medications as of 26-Dec-2017 23:17 documented in Structured Notes  Plavix 75 mg oral tablet: 1 tab(s) orally once a day, Last Dose Taken:    Lasix 40 mg oral tablet: 1 tab(s) orally once a day, Last Dose Taken:    spironolactone 25 mg oral tablet: 1 tab(s) orally once a day, Last Dose Taken:    Crestor 10 mg oral tablet: 1 tab(s) orally once a day (at bedtime), Last Dose Taken:    finasteride 5 mg oral tablet: 1 tab(s) orally once a day, Last Dose Taken:    Flomax 0.4 mg oral capsule: 1 cap(s) orally once a day, Last Dose Taken:    Protonix 40 mg oral delayed release tablet: 1 tab(s) orally once a day, Last Dose Taken:    Pulmicort Flexhaler 180 mcg/inh inhalation powder: 1 puff(s) inhaled once a day, As Needed, Last Dose Taken:    amiodarone 200 mg oral tablet: 1 tab(s) orally 2 times a day, Last Dose Taken:    metoprolol succinate 100 mg oral tablet, extended release: 0.5 tab(s) orally once a day, Last Dose Taken:    valsartan 160 mg oral tablet: 2 tab(s) orally once a day  albuterol 90 mcg/inh inhalation aerosol: 2 puff(s) inhaled 4 times a day, As Needed, Last Dose Taken:        MEDICATIONS:  MEDICATIONS  (STANDING):  amiodarone    Tablet 200 milliGRAM(s) Oral two times a day  apixaban 5 milliGRAM(s) Oral every 12 hours  aspirin enteric coated 81 milliGRAM(s) Oral daily  atorvastatin 40 milliGRAM(s) Oral at bedtime  clopidogrel Tablet 75 milliGRAM(s) Oral daily  finasteride 5 milliGRAM(s) Oral daily  furosemide    Tablet 40 milliGRAM(s) Oral daily  metoprolol succinate ER 50 milliGRAM(s) Oral daily  pantoprazole    Tablet 40 milliGRAM(s) Oral before breakfast  spironolactone 25 milliGRAM(s) Oral daily  tamsulosin 0.4 milliGRAM(s) Oral at bedtime  valsartan 320 milliGRAM(s) Oral daily      FAMILY HISTORY:  No pertinent family history in first degree relatives      SOCIAL HISTORY:    [ ] Non-smoker  [ ] Smoker: FORMER  [ ] Alcohol    Allergies    Keppra (Rash)  penicillin (Unknown)    Intolerances    	    REVIEW OF SYSTEMS:  CONSTITUTIONAL: No fever, weight loss, or fatigue  EYES: No eye pain, visual disturbances, or discharge  ENMT:  No difficulty hearing, tinnitus, vertigo; No sinus or throat pain  NECK: No pain or stiffness  RESPIRATORY: No , wheezing, chills or hemoptysis; Reports Shortness of Breath, cough  CARDIOVASCULAR: No palpitations, passing out, dizziness, or leg swelling. Reports   chest pain,  GASTROINTESTINAL: No abdominal or epigastric pain. No nausea, vomiting, or hematemesis; No diarrhea or constipation. No melena or hematochezia.  GENITOURINARY: No dysuria, frequency, hematuria, or incontinence  NEUROLOGICAL: No headaches, memory loss, loss of strength, numbness, or tremors  SKIN: No itching, burning, rashes, or lesions   	    [x ] All others negative	  [ ] Unable to obtain    PHYSICAL EXAM:  T(C): 36.8 (12-27-17 @ 04:43), Max: 37.1 (12-26-17 @ 17:21)  HR: 76 (12-27-17 @ 05:54) (56 - 76)  BP: 116/52 (12-27-17 @ 04:43) (110/66 - 151/79)  RR: 18 (12-27-17 @ 04:43) (17 - 18)  SpO2: 97% (12-27-17 @ 04:43) (96% - 99%)  Wt(kg): --  I&O's Summary    26 Dec 2017 07:01  -  27 Dec 2017 07:00  --------------------------------------------------------  IN: 0 mL / OUT: 450 mL / NET: -450 mL    27 Dec 2017 07:01  -  27 Dec 2017 09:21  --------------------------------------------------------  IN: 360 mL / OUT: 0 mL / NET: 360 mL        Appearance: Normal	  Psychiatry: A & O x 3, Mood & affect appropriate  HEENT:   Normal oral mucosa, PERRL, EOMI	  Lymphatic: No lymphadenopathy  Cardiovascular: Normal S1 S2,RR, + sys murmur   Respiratory: Lungs clear to auscultation	  Gastrointestinal:  Soft, Non-tender, + BS	  Skin: No rashes, No ecchymoses, No cyanosis	  Neurologic: Non-focal  Extremities: Normal range of motion, No clubbing, cyanosis or edema  Vascular: Peripheral pulses palpable 2+ bilaterally    TELEMETRY: Afib/ Vpaced 	    ECG:  	V paced   RADIOLOGY:  OTHER: 	  < from: Xray Chest 2 Views PA/Lat (12.26.17 @ 18:11) >    FINDINGS:    The heart is mildly enlarged.  Interval placement of a left-sided biventricular pacemaker.  Calcifications of the aortic knob are present.  There are no focal lung consolidations.  There is no pleural effusion.  There is no pneumothorax.  Degenerative changes of the thoracic spine are present.    IMPRESSION:  Cardiomegaly, stable  No focal consolidations or edema          	  LABS:	 	    CARDIAC MARKERS:                                  13.0   7.17  )-----------( 173      ( 27 Dec 2017 07:24 )             39.5     12-27    144  |  105  |  26<H>  ----------------------------<  94  3.7   |  26  |  1.31<H>    Ca    9.5      27 Dec 2017 07:26  Phos  3.2     12-27  Mg     2.1     12-27    TPro  6.4  /  Alb  3.8  /  TBili  0.5  /  DBili  x   /  AST  27  /  ALT  28  /  AlkPhos  87  12-26    PT/INR - ( 26 Dec 2017 17:14 )   PT: 11.0 sec;   INR: 1.01 ratio         PTT - ( 26 Dec 2017 17:14 )  PTT:29.4 sec  proBNP: Serum Pro-Brain Natriuretic Peptide: 2422 pg/mL (12-26 @ 17:14)    Lipid Profile:   HgA1c:   TSH:

## 2017-12-27 NOTE — DISCHARGE NOTE ADULT - HOSPITAL COURSE
·  Problem: Atypical angina.  Plan: -Suspect shortness of breath is 2/2 to atypical angina given unclear if there is chest tightness and patient recently had stent placed about 6-mo ago in [place]. He reports medical adherence to plavix therapy and so less likely this is stent thrombosis. Will admit to telemetry, send CEx3, c/w beta-blocker, aspirin, plavix, ARB therapy.  Cardiology evaluation noted.  ·  Problem: Shortness of breath.  Plan: -Less likely this is pulmonary embolism given adherence to Eliquis therapy.  -Less likely this is asthma exacerbation given no wheezing.   -Less likely this is acute CHF given patient has no crackles, no JVD, etc...and was taking his diuretic. Will c/w home PO furosemide.  -Currently breathing improved but suspect this is more atypical angina with high risk features given recent stent placement.     ·  Problem: Coronary artery disease of native artery of native heart with stable angina pectoris.  Plan: -c/w beta-blocker, aspirin, plavix  -C/w Valsartan therapy  -Trend CEx3  -cards consultation in AM.   ·  Problem: CKD (chronic kidney disease), stage II.  Plan: -Currently at baseline; will monitor renal function & electrolytes  -Avoid NSAIDs and nephrotoxins.     ·  Problem: Chronic diastolic congestive heart failure.  Plan: -Patient euvolemic on examination  -c/w furosemide and spironolactone  -c/w betablocker, ARB therapy, aspirin, and statin therapy  -check TTE; evaluate mitral valve integrity.Problem: Paroxysmal atrial fibrillation. Plan: -c.w home Eliquis.   -c/w metoprolol.  -c/w amiodarone.    ·  Problem: Asthma, unspecified asthma severity, unspecified whether complicated, unspecified whether persistent.  Plan: -No wheezing on exam; c/w albuterol prn.    Patient with son-in-law agree to sign out patient against medical advice

## 2017-12-27 NOTE — DISCHARGE NOTE ADULT - CARE PROVIDER_API CALL
Jose Nixon), Internal Medicine  [address] [place] [address]  Phone: [phone]  Fax: [phone]    Don Vivas), Cardiovascular Disease  [address] [place] [address]  Phone: [phone]  Fax: [phone]

## 2017-12-27 NOTE — CONSULT NOTE ADULT - ATTENDING COMMENTS
Patient seen and examined, agree with the above assessment and plan by SERAFIN Laws.  patient with prior history of CAD s/p PCI, PAF s/p PPM, CHF presenting with few days of worsening dyspnea and atypical chest discomfort now resolved after the administration of IV lasix in ED. Pt feels better claims to be at his baseline.  Labs negative for MI  Exam appears normal  ECG reveals V paced rhythm  Pt is currently euvolemic with no complaints  No objection to DC from CV perspective to follow up with Dr. Vivas as an outpt Patient seen and examined, agree with the above assessment and plan by SERAFIN Laws.  patient with prior history of CAD s/p PCI, PAF s/p PPM, CHF presenting with few days of worsening dyspnea and atypical chest discomfort now resolved after the administration of IV lasix in ED. Pt feels better claims to be at his baseline.  Labs negative for MI  Exam appears normal  ECG reveals V paced rhythm  Pt is currently euvolemic with no complaints  Obtain an ECHO priot to discharge  No objection to DC from CV perspective after ECHO to follow up with Dr. Vivas as an outpt

## 2017-12-27 NOTE — DISCHARGE NOTE ADULT - REASON FOR ADMISSION
shortness of breath - cardiac enzymes and EKG negative, son-in-law and patient want against medical advice discharge

## 2017-12-27 NOTE — CONSULT NOTE ADULT - ASSESSMENT
85 yo M w/ hx of CAD w/ stents x2 (last ~6mo ago & 2010), pAfb (on Eliquis), diastolic CHF, asthma, PPM (placed ~7r ago), former smoker presents with SOB 87 yo M w/ hx of CAD w/ stents x2 (last ~6mo ago & 2010), pAfb (on Eliquis), diastolic CHF, asthma, PPM (placed ~7r ago), former smoker presents with SOB     1. chest pain , with Atypical features   can likely be musculoskeletal however can not rule out cardiac etiology given recent stent placement and significant cardiac history   EKG revealing no evidence of ACS   chest xray clear   no events on tele  check Echo eval MR / lv function    2. SOB   symptoms improved   unclear etiology, pulm disease vs cardiac   check RVP  no evidence of decomp CHF on exam   f.u echo to eval lv fx      3. D CHF  euvolemic on exam   f/u echo   continue with current diuretics   continue with BB      3. CAD/PCI   continue with BB/  plavix/ statin     4. Afib/ PPM   chronic   continue with BB /amio   a/c with eliquis

## 2017-12-27 NOTE — PROGRESS NOTE ADULT - ASSESSMENT
·  Problem: Atypical angina.  Plan: -Suspect shortness of breath is 2/2 to atypical angina given unclear if there is chest tightness and patient recently had stent placed about 6-mo ago in [place]. He reports medical adherence to plavix therapy and so less likely this is stent thrombosis. Will admit to telemetry, send CEx3, c/w beta-blocker, aspirin, plavix, ARB therapy.  Cardiology evaluation noted.  ·  Problem: Shortness of breath.  Plan: -Less likely this is pulmonary embolism given adherence to Eliquis therapy.  -Less likely this is asthma exacerbation given no wheezing.   -Less likely this is acute CHF given patient has no crackles, no JVD, etc...and was taking his diuretic. Will c/w home PO furosemide.  -Currently breathing improved but suspect this is more atypical angina with high risk features given recent stent placement.     ·  Problem: Coronary artery disease of native artery of native heart with stable angina pectoris.  Plan: -c/w beta-blocker, aspirin, plavix  -C/w Valsartan therapy  -Trend CEx3  -cards consultation in AM.   ·  Problem: CKD (chronic kidney disease), stage II.  Plan: -Currently at baseline; will monitor renal function & electrolytes  -Avoid NSAIDs and nephrotoxins.     ·  Problem: Chronic diastolic congestive heart failure.  Plan: -Patient euvolemic on examination  -c/w furosemide and spironolactone  -c/w betablocker, ARB therapy, aspirin, and statin therapy  -check TTE; evaluate mitral valve integrity.Problem: Paroxysmal atrial fibrillation. Plan: -c.w home Eliquis.   -c/w metoprolol.  -c/w amiodarone.    ·  Problem: Asthma, unspecified asthma severity, unspecified whether complicated, unspecified whether persistent.  Plan: -No wheezing on exam; c/w albuterol prn.  Jose Nixon MD pager [phone]

## 2017-12-27 NOTE — DISCHARGE NOTE ADULT - SECONDARY DIAGNOSIS.
Asthma, unspecified asthma severity, unspecified whether complicated, unspecified whether persistent Chronic diastolic congestive heart failure Paroxysmal atrial fibrillation CKD (chronic kidney disease), stage II

## 2018-01-04 ENCOUNTER — EMERGENCY (EMERGENCY)
Facility: HOSPITAL | Age: 83
LOS: 1 days | Discharge: HOME | End: 2018-01-04
Attending: EMERGENCY MEDICINE | Admitting: PSYCHIATRY & NEUROLOGY
Payer: MEDICARE

## 2018-01-04 VITALS
OXYGEN SATURATION: 96 % | SYSTOLIC BLOOD PRESSURE: 180 MMHG | DIASTOLIC BLOOD PRESSURE: 113 MMHG | TEMPERATURE: 97 F | HEART RATE: 98 BPM | RESPIRATION RATE: 20 BRPM

## 2018-01-04 LAB — GAS PNL BLDV: SIGNIFICANT CHANGE UP

## 2018-01-04 PROCEDURE — 93010 ELECTROCARDIOGRAM REPORT: CPT

## 2018-01-04 PROCEDURE — 99291 CRITICAL CARE FIRST HOUR: CPT | Mod: 25

## 2018-01-04 NOTE — ED PROVIDER NOTE - CRITICAL CARE PROVIDED
consult w/ pt's family directly relating to pts condition/interpretation of diagnostic studies/documentation/direct patient care (not related to procedure)/consultation with other physicians/additional history taking

## 2018-01-04 NOTE — ED PROVIDER NOTE - OBJECTIVE STATEMENT
86M PMH   Patient on apixaban, aspirin, and plavix. 86M PMH CAD, HTN, pacemaker p/w 10 minute episode of dysarthria 15 minutes prior to arrival. No difficulty walking or imbalance, no syncope. Never had this before.   Patient on apixaban, aspirin, and plavix. 86M PMH CAD, HTN, pacemaker p/w 10 minute episode of dysarthria 15 minutes prior to arrival. No difficulty walking or imbalance, no syncope. Never had this before. Resolved spontaneously. No nausea or vomiting. No difficulty writing. No hx of strokes in the past. Patient is not fully compliant with his medications.   Patient on apixaban, aspirin, and plavix. 86M PMH CAD, HTN, pacemaker p/w 10 minute episode of dysarthria 15 minutes prior to arrival. No difficulty walking or imbalance, no syncope. Never had this before. Resolved spontaneously. No nausea or vomiting. No difficulty writing. No hx of strokes in the past. Patient is not fully compliant with his medications.   Patient on apixaban, aspirin, and plavix. Denies CP, SOB.

## 2018-01-04 NOTE — ED PROVIDER NOTE - NEUROLOGICAL, MLM
Alert and oriented, no focal deficits, no motor or sensory deficits. CN II-XII intact. Normal gait. Strength equal in BUE and BLE.

## 2018-01-04 NOTE — ED PROVIDER NOTE - CARE PLAN
Principal Discharge DX:	Dysarthria  Goal:	Concerns for TIA Principal Discharge DX:	Dysarthria  Secondary Diagnosis:	TIA (transient ischemic attack)

## 2018-01-04 NOTE — ED ADULT NURSE NOTE - OBJECTIVE STATEMENT
87 y/o M presents to the ED c/o stroke like symptoms.   Pt states prior to coming to ED he had a 10 minute span where he wasn't able to produce words.  Pt states he was babbling, but was unable to make words.  Grandson at bedside states the pt was babbling for about 10 minutes and took place at 2330.  Pt arrived in ED at 2247 and code stroke called at 2248.  fingerstick 87.  In Ed symptoms resolved, NIH 0.  Pt to CT at 2356.  Pt has no complaints in ED.  Patient is A&Ox4. Face is symmetrical. PERRL 3mmB. Speech is clear. Patient is moving all extremities with 5/5 strength.  No chest pain, shortness of breath, diaphoresis, palpitations, left arm pain, excessive fatigue, or nausea/ vomiting, f/c

## 2018-01-04 NOTE — ED PROVIDER NOTE - ATTENDING CONTRIBUTION TO CARE
------------ATTENDING NOTE------------  87 yo RHD male w/ family c/o difficulty speaking 20 min prior to arrival, describes having difficulty getting words out w/ slurred speech, was able to write w/o difficulty, no additional numbness or weakness or confusion or loss of function, speech slowly returning to baseline, CODE STROKE on ED arrival, awaiting labs/imaging and Neuro consult -->  - Ish Pang MD   ---------------------------------------------- ------------ATTENDING NOTE------------  87 yo RHD male w/ family c/o difficulty speaking 20 min prior to arrival, describes having difficulty getting words out w/ slurred speech, was able to write w/o difficulty, no additional numbness or weakness or confusion or loss of function, speech slowly returning to baseline, CODE STROKE on ED arrival, awaiting labs/imaging and Neuro consult --> resolving, cleared by Neuro for CDU, obtain CTA Head/Neck, optimize medical management, eval by Neuro in mid morning.  - Ish Pang MD   ----------------------------------------------

## 2018-01-04 NOTE — ED ADULT NURSE NOTE - NS ED NURSE DC INFO COMPLEXITY
Patient asked questions/Straightforward: Basic instructions, no meds, no home treatment/Simple: Patient demonstrates quick and easy understanding

## 2018-01-05 ENCOUNTER — TRANSCRIPTION ENCOUNTER (OUTPATIENT)
Age: 83
End: 2018-01-05

## 2018-01-05 VITALS
HEART RATE: 79 BPM | OXYGEN SATURATION: 95 % | DIASTOLIC BLOOD PRESSURE: 82 MMHG | SYSTOLIC BLOOD PRESSURE: 154 MMHG | RESPIRATION RATE: 20 BRPM | TEMPERATURE: 98 F

## 2018-01-05 DIAGNOSIS — I65.22 OCCLUSION AND STENOSIS OF LEFT CAROTID ARTERY: ICD-10-CM

## 2018-01-05 LAB
ALBUMIN SERPL ELPH-MCNC: 4.3 G/DL — SIGNIFICANT CHANGE UP (ref 3.3–5)
ALP SERPL-CCNC: 91 U/L — SIGNIFICANT CHANGE UP (ref 40–120)
ALT FLD-CCNC: 18 U/L RC — SIGNIFICANT CHANGE UP (ref 10–45)
ANION GAP SERPL CALC-SCNC: 14 MMOL/L — SIGNIFICANT CHANGE UP (ref 5–17)
APTT BLD: 33.3 SEC — SIGNIFICANT CHANGE UP (ref 27.5–37.4)
AST SERPL-CCNC: 18 U/L — SIGNIFICANT CHANGE UP (ref 10–40)
BASE EXCESS BLDV CALC-SCNC: 3.3 MMOL/L — HIGH (ref -2–2)
BASE EXCESS BLDV CALC-SCNC: 3.7 MMOL/L — HIGH (ref -2–2)
BASOPHILS # BLD AUTO: 0 K/UL — SIGNIFICANT CHANGE UP (ref 0–0.2)
BASOPHILS NFR BLD AUTO: 0 % — SIGNIFICANT CHANGE UP (ref 0–2)
BILIRUB SERPL-MCNC: 0.6 MG/DL — SIGNIFICANT CHANGE UP (ref 0.2–1.2)
BUN SERPL-MCNC: 25 MG/DL — HIGH (ref 7–23)
CA-I SERPL-SCNC: 1.25 MMOL/L — SIGNIFICANT CHANGE UP (ref 1.12–1.3)
CA-I SERPL-SCNC: 1.27 MMOL/L — SIGNIFICANT CHANGE UP (ref 1.12–1.3)
CALCIUM SERPL-MCNC: 10.1 MG/DL — SIGNIFICANT CHANGE UP (ref 8.4–10.5)
CHLORIDE BLDV-SCNC: 108 MMOL/L — SIGNIFICANT CHANGE UP (ref 96–108)
CHLORIDE BLDV-SCNC: 109 MMOL/L — HIGH (ref 96–108)
CHLORIDE SERPL-SCNC: 102 MMOL/L — SIGNIFICANT CHANGE UP (ref 96–108)
CHOLEST SERPL-MCNC: 115 MG/DL — SIGNIFICANT CHANGE UP (ref 10–199)
CK MB BLD-MCNC: 5 % — HIGH (ref 0–3.5)
CK MB CFR SERPL CALC: 6.3 NG/ML — SIGNIFICANT CHANGE UP (ref 0–6.7)
CK SERPL-CCNC: 126 U/L — SIGNIFICANT CHANGE UP (ref 30–200)
CO2 BLDV-SCNC: 28 MMOL/L — SIGNIFICANT CHANGE UP (ref 22–30)
CO2 BLDV-SCNC: 32 MMOL/L — HIGH (ref 22–30)
CO2 SERPL-SCNC: 26 MMOL/L — SIGNIFICANT CHANGE UP (ref 22–31)
CREAT SERPL-MCNC: 1.67 MG/DL — HIGH (ref 0.5–1.3)
EOSINOPHIL # BLD AUTO: 0.1 K/UL — SIGNIFICANT CHANGE UP (ref 0–0.5)
EOSINOPHIL NFR BLD AUTO: 1.4 % — SIGNIFICANT CHANGE UP (ref 0–6)
GAS PNL BLDV: 138 MMOL/L — SIGNIFICANT CHANGE UP (ref 136–145)
GAS PNL BLDV: 139 MMOL/L — SIGNIFICANT CHANGE UP (ref 136–145)
GAS PNL BLDV: SIGNIFICANT CHANGE UP
GLUCOSE BLDV-MCNC: 75 MG/DL — SIGNIFICANT CHANGE UP (ref 70–99)
GLUCOSE BLDV-MCNC: 95 MG/DL — SIGNIFICANT CHANGE UP (ref 70–99)
GLUCOSE SERPL-MCNC: 82 MG/DL — SIGNIFICANT CHANGE UP (ref 70–99)
HBA1C BLD-MCNC: 5.6 % — SIGNIFICANT CHANGE UP (ref 4–5.6)
HCO3 BLDV-SCNC: 27 MMOL/L — SIGNIFICANT CHANGE UP (ref 21–29)
HCO3 BLDV-SCNC: 30 MMOL/L — HIGH (ref 21–29)
HCT VFR BLD CALC: 45.3 % — SIGNIFICANT CHANGE UP (ref 39–50)
HCT VFR BLDA CALC: 42 % — SIGNIFICANT CHANGE UP (ref 39–50)
HCT VFR BLDA CALC: 46 % — SIGNIFICANT CHANGE UP (ref 39–50)
HDLC SERPL-MCNC: 44 MG/DL — SIGNIFICANT CHANGE UP (ref 40–125)
HGB BLD CALC-MCNC: 13.5 G/DL — SIGNIFICANT CHANGE UP (ref 13–17)
HGB BLD CALC-MCNC: 15.1 G/DL — SIGNIFICANT CHANGE UP (ref 13–17)
HGB BLD-MCNC: 15.8 G/DL — SIGNIFICANT CHANGE UP (ref 13–17)
HOROWITZ INDEX BLDV+IHG-RTO: SIGNIFICANT CHANGE UP
INR BLD: 1.07 RATIO — SIGNIFICANT CHANGE UP (ref 0.88–1.16)
LACTATE BLDV-MCNC: 1.3 MMOL/L — SIGNIFICANT CHANGE UP (ref 0.7–2)
LACTATE BLDV-MCNC: 2.6 MMOL/L — HIGH (ref 0.7–2)
LIPID PNL WITH DIRECT LDL SERPL: 54 MG/DL — SIGNIFICANT CHANGE UP
LYMPHOCYTES # BLD AUTO: 1.9 K/UL — SIGNIFICANT CHANGE UP (ref 1–3.3)
LYMPHOCYTES # BLD AUTO: 20.6 % — SIGNIFICANT CHANGE UP (ref 13–44)
MCHC RBC-ENTMCNC: 32.4 PG — SIGNIFICANT CHANGE UP (ref 27–34)
MCHC RBC-ENTMCNC: 34.8 GM/DL — SIGNIFICANT CHANGE UP (ref 32–36)
MCV RBC AUTO: 93 FL — SIGNIFICANT CHANGE UP (ref 80–100)
MONOCYTES # BLD AUTO: 1 K/UL — HIGH (ref 0–0.9)
MONOCYTES NFR BLD AUTO: 11.2 % — SIGNIFICANT CHANGE UP (ref 2–14)
NEUTROPHILS # BLD AUTO: 6.1 K/UL — SIGNIFICANT CHANGE UP (ref 1.8–7.4)
NEUTROPHILS NFR BLD AUTO: 66.8 % — SIGNIFICANT CHANGE UP (ref 43–77)
NT-PROBNP SERPL-SCNC: 2309 PG/ML — HIGH (ref 0–300)
PCO2 BLDV: 40 MMHG — SIGNIFICANT CHANGE UP (ref 35–50)
PCO2 BLDV: 53 MMHG — HIGH (ref 35–50)
PH BLDV: 7.37 — SIGNIFICANT CHANGE UP (ref 7.35–7.45)
PH BLDV: 7.44 — SIGNIFICANT CHANGE UP (ref 7.35–7.45)
PLATELET # BLD AUTO: 171 K/UL — SIGNIFICANT CHANGE UP (ref 150–400)
PO2 BLDV: 29 MMHG — SIGNIFICANT CHANGE UP (ref 25–45)
PO2 BLDV: 53 MMHG — HIGH (ref 25–45)
POTASSIUM BLDV-SCNC: 3.5 MMOL/L — SIGNIFICANT CHANGE UP (ref 3.5–5)
POTASSIUM BLDV-SCNC: 3.8 MMOL/L — SIGNIFICANT CHANGE UP (ref 3.5–5)
POTASSIUM SERPL-MCNC: 4 MMOL/L — SIGNIFICANT CHANGE UP (ref 3.5–5.3)
POTASSIUM SERPL-SCNC: 4 MMOL/L — SIGNIFICANT CHANGE UP (ref 3.5–5.3)
PROT SERPL-MCNC: 7.1 G/DL — SIGNIFICANT CHANGE UP (ref 6–8.3)
PROTHROM AB SERPL-ACNC: 11.7 SEC — SIGNIFICANT CHANGE UP (ref 9.8–12.7)
RBC # BLD: 4.87 M/UL — SIGNIFICANT CHANGE UP (ref 4.2–5.8)
RBC # FLD: 12.8 % — SIGNIFICANT CHANGE UP (ref 10.3–14.5)
SAO2 % BLDV: 46 % — LOW (ref 67–88)
SAO2 % BLDV: 88 % — SIGNIFICANT CHANGE UP (ref 67–88)
SODIUM SERPL-SCNC: 142 MMOL/L — SIGNIFICANT CHANGE UP (ref 135–145)
TOTAL CHOLESTEROL/HDL RATIO MEASUREMENT: 2.6 RATIO — LOW (ref 3.4–9.6)
TRIGL SERPL-MCNC: 86 MG/DL — SIGNIFICANT CHANGE UP (ref 10–149)
TROPONIN T SERPL-MCNC: 0.03 NG/ML — SIGNIFICANT CHANGE UP (ref 0–0.06)
TROPONIN T SERPL-MCNC: 0.03 NG/ML — SIGNIFICANT CHANGE UP (ref 0–0.06)
WBC # BLD: 9.2 K/UL — SIGNIFICANT CHANGE UP (ref 3.8–10.5)
WBC # FLD AUTO: 9.2 K/UL — SIGNIFICANT CHANGE UP (ref 3.8–10.5)

## 2018-01-05 PROCEDURE — 83605 ASSAY OF LACTIC ACID: CPT

## 2018-01-05 PROCEDURE — 80061 LIPID PANEL: CPT

## 2018-01-05 PROCEDURE — 99220: CPT

## 2018-01-05 PROCEDURE — 83880 ASSAY OF NATRIURETIC PEPTIDE: CPT

## 2018-01-05 PROCEDURE — 82553 CREATINE MB FRACTION: CPT

## 2018-01-05 PROCEDURE — 84132 ASSAY OF SERUM POTASSIUM: CPT

## 2018-01-05 PROCEDURE — 84484 ASSAY OF TROPONIN QUANT: CPT

## 2018-01-05 PROCEDURE — 85014 HEMATOCRIT: CPT

## 2018-01-05 PROCEDURE — G0378: CPT

## 2018-01-05 PROCEDURE — 82947 ASSAY GLUCOSE BLOOD QUANT: CPT

## 2018-01-05 PROCEDURE — 93880 EXTRACRANIAL BILAT STUDY: CPT

## 2018-01-05 PROCEDURE — 93005 ELECTROCARDIOGRAM TRACING: CPT

## 2018-01-05 PROCEDURE — 80048 BASIC METABOLIC PNL TOTAL CA: CPT

## 2018-01-05 PROCEDURE — 84295 ASSAY OF SERUM SODIUM: CPT

## 2018-01-05 PROCEDURE — 99285 EMERGENCY DEPT VISIT HI MDM: CPT | Mod: 25

## 2018-01-05 PROCEDURE — 82550 ASSAY OF CK (CPK): CPT

## 2018-01-05 PROCEDURE — 82330 ASSAY OF CALCIUM: CPT

## 2018-01-05 PROCEDURE — 83036 HEMOGLOBIN GLYCOSYLATED A1C: CPT

## 2018-01-05 PROCEDURE — 85027 COMPLETE CBC AUTOMATED: CPT

## 2018-01-05 PROCEDURE — 80053 COMPREHEN METABOLIC PANEL: CPT

## 2018-01-05 PROCEDURE — 70450 CT HEAD/BRAIN W/O DYE: CPT

## 2018-01-05 PROCEDURE — 82803 BLOOD GASES ANY COMBINATION: CPT

## 2018-01-05 PROCEDURE — 70498 CT ANGIOGRAPHY NECK: CPT

## 2018-01-05 PROCEDURE — 85610 PROTHROMBIN TIME: CPT

## 2018-01-05 PROCEDURE — 82962 GLUCOSE BLOOD TEST: CPT

## 2018-01-05 PROCEDURE — 82435 ASSAY OF BLOOD CHLORIDE: CPT

## 2018-01-05 PROCEDURE — 70496 CT ANGIOGRAPHY HEAD: CPT

## 2018-01-05 PROCEDURE — 85730 THROMBOPLASTIN TIME PARTIAL: CPT

## 2018-01-05 RX ORDER — SODIUM CHLORIDE 9 MG/ML
500 INJECTION INTRAMUSCULAR; INTRAVENOUS; SUBCUTANEOUS
Qty: 0 | Refills: 0 | Status: COMPLETED | OUTPATIENT
Start: 2018-01-05 | End: 2018-01-05

## 2018-01-05 RX ORDER — ASPIRIN/CALCIUM CARB/MAGNESIUM 324 MG
325 TABLET ORAL ONCE
Qty: 0 | Refills: 0 | Status: COMPLETED | OUTPATIENT
Start: 2018-01-05 | End: 2018-01-05

## 2018-01-05 RX ORDER — SODIUM CHLORIDE 9 MG/ML
3 INJECTION INTRAMUSCULAR; INTRAVENOUS; SUBCUTANEOUS EVERY 8 HOURS
Qty: 0 | Refills: 0 | Status: DISCONTINUED | OUTPATIENT
Start: 2018-01-05 | End: 2018-01-05

## 2018-01-05 RX ORDER — OXYCODONE HYDROCHLORIDE 5 MG/1
10 TABLET ORAL ONCE
Qty: 0 | Refills: 0 | Status: DISCONTINUED | OUTPATIENT
Start: 2018-01-05 | End: 2018-01-05

## 2018-01-05 RX ADMIN — SODIUM CHLORIDE 3 MILLILITER(S): 9 INJECTION INTRAMUSCULAR; INTRAVENOUS; SUBCUTANEOUS at 06:09

## 2018-01-05 RX ADMIN — SODIUM CHLORIDE 80 MILLILITER(S): 9 INJECTION INTRAMUSCULAR; INTRAVENOUS; SUBCUTANEOUS at 03:09

## 2018-01-05 NOTE — DISCHARGE NOTE ADULT - HOSPITAL COURSE
HPI:  Pt is an 85 y/o RH  man w/ PMH CAD w/ stents x2 (last ~6mo ago & 2010), pAfib (on Eliquis), diastolic CHF, PPM (placed ~7r ago), former smoker p/w inability to speak x 10 mins. Symptoms were witnessed by son and grandson, FATIMAH 11:30 PM. Pt was trying to speak, but was instead mumbling incoherently. Pt's grandson is a medical student, and tested his ability to write/follow commands during the episode (pt was able to do both). Pt also had a facial droop, possibly on the left though family is not clear which side. No weakness or numbness in any other extremities. Symptoms lasted 10 mins and resolved by the time stroke code was called. Pt's family noticed that he is not taking multiple of his medications regularly and may likely have missed doses of Eliquis. NIHSS 0 MRS 1 ABCD2 4    CT Head w/o exhibited no acute intracranial hemorrhage, mass effect or evidence for acute territorial infarct. Mildly increased moderate chronic microvascular change.    CTA Head/Neck exhibited no significant carotid or vertebral stenosis in the neck. Carotid and vertebral calcifications. No evidence of intraluminal thrombus.    TTE deferred for outpatient.    Labs show an HbA1c of 5.6 and an LDL of 54.    Pt discharged from CDU for outpatient follow up, for likely TIA likely 2/2 A-Fib after possible missed doses of Eliquis.

## 2018-01-05 NOTE — CONSULT NOTE ADULT - SUBJECTIVE AND OBJECTIVE BOX
Neurology Consult Note    Name  RUMA BANEGAS    HPI: 85 y/o RH  man w/ PMH CAD w/ stents x2 (last ~6mo ago & 2010), pAfb (on Eliquis), diastolic CHF, PPM (placed ~7r ago), former smoker p/w inability to speak x 10 mins. Symptoms were witnessed by son and grandson, FATIMAH 11:30 PM. Pt was trying to speak, but was instead mumbling incoherently. Pt's grandson is a medical student, and tested his ability to write/follow commands during the episode (pt was able to do both). Pt also had a facial droop, possibly on the left though family is not clear which side. No weakness or numbness in any other extremities. Symptoms lasted 10 mins and resolved by the time stroke code was called. Pt's family noticed that he is not taking multiple of his medications regularly and may likely have missed doses of eliquis.     NIHSS 0 MRS 1 ABCD2 4    Review of Systems:  Constitutional: No fever, chills, fatigue, weakness  Eyes: no eye pain, visual disturbances, or discharge  ENT:  No difficulty hearing, tinnitus, vertigo; No sinus or throat pain  Neck: No pain or stiffness  Respiratory: No cough, dyspnea, wheezing   Cardiovascular: No chest pain, palpitations  Gastrointestinal: No abdominal or epigastric pain. No nausea, vomiting  No diarrhea or constipation.   Genitourinary: No dysuria, frequency, hematuria or incontinence  Neurological: As above  Psychiatric: No depression, anxiety, mood swings or difficulty sleeping  Musculoskeletal: No joint pain or swelling; No muscle, back or extremity pain  Skin: No itching, burning, rashes or lesions   Allergy and Immunologic: No hives or eczema    MEDICATIONS  (STANDING):  · 	aspirin 81 mg oral delayed release tablet: 1 tab(s) orally once a day  · 	apixaban 5 mg oral tablet: 1 tab(s) orally every 12 hours  · 	metoprolol succinate 100 mg oral tablet, extended release: 0.5 tab(s)= 50 mg orally once a day  · 	Protonix 40 mg oral delayed release tablet: 1 tab(s) orally once a day 1/2 hour before breakfast  · 	albuterol 90 mcg/inh inhalation aerosol: 2 puff(s) inhaled 4 times a day, As Needed - for shortness of breath and/or wheezing  · 	Plavix 75 mg oral tablet: 1 tab(s) orally once a day  · 	Lasix 40 mg oral tablet: 1 tab(s) orally once a day  · 	spironolactone 25 mg oral tablet: 1 tab(s) orally once a day  · 	Crestor 10 mg oral tablet: 1 tab(s) orally once a day (at bedtime)  · 	finasteride 5 mg oral tablet: 1 tab(s) orally once a day  · 	Flomax 0.4 mg oral capsule: 1 cap(s) orally once a day  · 	amiodarone 200 mg oral tablet: 1 tab(s) orally 2 times a day  · 	valsartan 160 mg oral tablet: 2 tab(s) = 320 mg orally once a day    Allergies    Keppra (Rash)  penicillin (Unknown)    Intolerances    PAST MEDICAL & SURGICAL HISTORY:  Pacemaker  Hypertension  H/O thalassemia  CAD (coronary artery disease): s/p stent 2010  S/P primary angioplasty with coronary stent    FH: NC    SH:  Pt is a former smoker, stopped > 40 years ago.  	Reports 1-2 shots of liquor 2-3x weekly but no alcohol abuse hx  	No past or current use of recreational drugs  worked as a construction    Objective:   Vital Signs Last 24 Hrs  T(C): 36.4 (05 Jan 2018 00:09), Max: 36.4 (05 Jan 2018 00:09)  T(F): 97.6 (05 Jan 2018 00:09), Max: 97.6 (05 Jan 2018 00:09)  HR: 88 (05 Jan 2018 00:09) (88 - 98)  BP: 151/100 (05 Jan 2018 00:09) (151/100 - 180/113)  BP(mean): --  RR: 20 (05 Jan 2018 00:09) (20 - 20)  SpO2: 97% (05 Jan 2018 00:09) (96% - 97%)    General Adult Exam  GENERAL APPEARANCE: Well developed, well nourished, alert and cooperative, and appears to be in no acute distress.    Neurological Exam:  Mental Status: AAOx3, speech fluent, follows commands across midline, naming intact to high and low frequency objects, repetition intact    Cranial Nerves: PERRL, EOMI, VFF, no facial asymmetry. Tongue, uvula and palate midline.  Sternocleidomastoid and Trapezius intact bilaterally.    Motor:   Tone: normal.                  Strength:  No drift x 4 extremities  Tremor: No resting, postural or action tremor.  No myoclonus.    Sensation: intact to light touch x 4 extremities. No extinction on double simultaneous stimulation    Coord: No ataxia/dysmetria on FTN b/l      Other:    < from: CT Brain Stroke Protocol (01.05.18 @ 00:06) >  FINDINGS:    There is no acute intracranial hemorrhage or mass effect. There is no   evidence of acute territorial infarct. Moderate areas of decreased   attenuation within the white matter appear mild increased from the prior   study, nonspecific most likely represent chronic microvascular change.   There is a new from the prior study but chronic in appearance small right   cerebellar infarct.     The ventricles, sulci and cisternal spaces are mildly diffusely prominent   although in proportion compatible with cerebral volume loss.    There is no displaced calvarial fracture.     The visualized portions of the paranasal sinuses and mastoid air cells   are clear.    IMPRESSION:   No acute intracranial hemorrhage, mass effect or evidence for acute   territorial infarct. Mildly increased moderate chronic microvascular   change.    < end of copied text >

## 2018-01-05 NOTE — ED CDU PROVIDER INITIAL DAY NOTE - DETAILS
vital signs q4h, neuro checks q4h, CTA head/neck, neuro consult, frequent reevaluation  case d/w Dr. Pang

## 2018-01-05 NOTE — CONSULT NOTE ADULT - ASSESSMENT
85 y/o RH  man w/ PMH CAD w/ stents x2 (last ~6mo ago & 2010), pAfb (on Eliquis), diastolic CHF, PPM (placed ~7r ago), former smoker p/w likely anarthria vs expressive aphasia x 10 mins, now resolved. Symptoms occurred in the setting of possible AC noncompliance. Exam is non-focal- pt's speech is fluent, comprehension intact, and no dysarthria is present. CTH shows no acute pathology. Symptoms were most likely secondary to TIA, possibly related to missed dose(s) of AC, though etiology is unclear. No indication for tPA as symptoms have resolved.     Recommend:  - CTA head/neck w/ contrast if renal function is not significantly impaired, otherwise carotid dopplers  - Continue apixaban for A fib, ASA given hx of CAD (as well as plavix if medically necessary from cardiology standpoint)  - C/w crestor 10 mg qhs for now  - Check HbA1c and lipid profile  - BP goal gradual normotension  - TTE w/ bubble study if possible   - Telemetry monitoring

## 2018-01-05 NOTE — DISCHARGE NOTE ADULT - PLAN OF CARE
Secondary Stroke Prevention Please follow up with your primary medical doctor within one to two weeks of discharge from the hospital.  Please follow up with your heart doctor within one to two weeks of discharge from the hospital, you might need an echocardiogram.  Please follow up with your stroke neurologist (Dr. Walker) within one to two weeks of discharge from the hospital.  Please continue to take your medications as prescribed by your doctor.

## 2018-01-05 NOTE — DISCHARGE NOTE ADULT - MEDICATION SUMMARY - MEDICATIONS TO TAKE
I will START or STAY ON the medications listed below when I get home from the hospital:    finasteride 5 mg oral tablet  -- 1 tab(s) by mouth once a day  -- Indication: For BPH    spironolactone 25 mg oral tablet  -- 1 tab(s) by mouth once a day  -- Indication: For CHF    aspirin 81 mg oral delayed release tablet  -- 1 tab(s) by mouth once a day  -- Indication: For CAD    valsartan 160 mg oral tablet  -- 2 tab(s) = 320 mg by mouth once a day  -- Indication: For HTN    Flomax 0.4 mg oral capsule  -- 1 cap(s) by mouth once a day  -- Indication: For BPH    amiodarone 200 mg oral tablet  -- 1 tab(s) by mouth 2 times a day  -- Indication: For A-Fib    apixaban 5 mg oral tablet  -- 1 tab(s) by mouth every 12 hours  -- Indication: For A-Fib    Crestor 10 mg oral tablet  -- 1 tab(s) by mouth once a day (at bedtime)  -- Indication: For CAD    Plavix 75 mg oral tablet  -- 1 tab(s) by mouth once a day  -- Indication: For CAD    metoprolol succinate 100 mg oral tablet, extended release  -- 0.5 tab(s)= 50 mg by mouth once a day  -- Indication: For HTN    albuterol 90 mcg/inh inhalation aerosol  -- 2 puff(s) inhaled 4 times a day, As Needed - for shortness of breath and/or wheezing  -- Indication: For Asthma    Lasix 40 mg oral tablet  -- 1 tab(s) by mouth once a day  -- Indication: For CHF    Protonix 40 mg oral delayed release tablet  -- 1 tab(s) by mouth once a day 1/2 hour before breakfast  -- Indication: For GERD

## 2018-01-05 NOTE — DISCHARGE NOTE ADULT - CARE PROVIDER_API CALL
Tom Walker ([facility]), Neurology; Vascular Neurology  [address] [address]  Phone: [phone]  Fax: [phone]

## 2018-01-05 NOTE — ED CDU PROVIDER DISPOSITION NOTE - PLAN OF CARE
1. stay hydrated.  2. continue current home medications  3. Follow up with your PCP in1 -2 days.  4. Follow up with Neurology #[phone] in 2-3 days.  5. Stroke Education given to you.  6. Return if symptoms worsen, fever, weakness, numbness, dizziness, vision change, slurred speech and all other concerns

## 2018-01-05 NOTE — H&P ADULT - NSHPLABSRESULTS_GEN_ALL_CORE
Vital Signs Last 24 Hrs  T(C): 36.6 (05 Jan 2018 12:29), Max: 36.7 (05 Jan 2018 00:51)  T(F): 97.9 (05 Jan 2018 12:29), Max: 98 (05 Jan 2018 00:51)  HR: 60 (05 Jan 2018 12:29) (60 - 98)  BP: 129/80 (05 Jan 2018 12:29) (129/80 - 180/113)  BP(mean): --  RR: 20 (05 Jan 2018 12:29) (16 - 20)  SpO2: 97% (05 Jan 2018 12:29) (95% - 97%)    01-05    143  |  104  |  26<H>  ----------------------------<  107<H>  3.6   |  26  |  1.46<H>    Ca    9.8      05 Jan 2018 07:58    TPro  7.1  /  Alb  4.3  /  TBili  0.6  /  DBili  x   /  AST  18  /  ALT  18  /  AlkPhos  91  01-04 01-05    143  |  104  |  26<H>  ----------------------------<  107<H>  3.6   |  26  |  1.46<H>    Ca    9.8      05 Jan 2018 07:58    TPro  7.1  /  Alb  4.3  /  TBili  0.6  /  DBili  x   /  AST  18  /  ALT  18  /  AlkPhos  91  01-04    LIVER FUNCTIONS - ( 04 Jan 2018 23:59 )  Alb: 4.3 g/dL / Pro: 7.1 g/dL / ALK PHOS: 91 U/L / ALT: 18 U/L RC / AST: 18 U/L / GGT: x                                 15.8   9.2   )-----------( 171      ( 04 Jan 2018 23:59 )             45.3     Radiology:  < from: CT Angio Neck w/ IV Cont (01.05.18 @ 14:19) >    No significant carotid or vertebral stenosis in the neck.   Carotid and vertebral calcifications. No evidence of intraluminal   thrombus.    < from: CT Brain Stroke Protocol (01.05.18 @ 00:06) >    No acute intracranial hemorrhage, mass effect or evidence for acute   territorial infarct. Mildly increased moderate chronic microvascular   change.    < from: VA Duplex Carotid, Bilat (01.05.18 @ 10:48) >    Bilateral calcified plaques. Focal echogenic plaque or   thrombus is seen in the proximal left common carotid artery. This may be   a source of emboli.    There is no evidence of significant stenosis of either carotid artery.

## 2018-01-05 NOTE — ED CDU PROVIDER DISPOSITION NOTE - CLINICAL COURSE
86M PMH AFIB, CAD, HTN, pacemaker p/w 10 minute episode of dysarthria 15 minutes prior to arrival. No difficulty walking or imbalance, no syncope. Never had this before. Resolved spontaneously. No nausea or vomiting. No difficulty writing. No hx of strokes in the past. Patient is not fully compliant with his medications. Patient on apixaban, and plavix.  In the ED VSS.  Code stroke called.  Dysarthria rapidly resolved.  Neuro consult recommending CTA and neuro checks overnight.  Patient was placed in the CDU.  Creatinine found to be elevated.  CTA cancelled.  Patient went for BL carotid doppler................... Neuro consult recommending.......................... 86M PMH AFIB, CAD, HTN, pacemaker p/w 10 minute episode of dysarthria 15 minutes prior to arrival. No difficulty walking or imbalance, no syncope. Never had this before. Resolved spontaneously. No nausea or vomiting. No difficulty writing. No hx of strokes in the past. Patient is not fully compliant with his medications. Patient on apixaban, and plavix. carotid duplex showing thrombus as possible source of emboli. stroke reevaled patient and requesting cta head and neck. will contact patients cardiologist to decide on appropriate AC following cta. patient tba to neuro  In the ED VSS.  Code stroke called.  Dysarthria rapidly resolved.  Neuro consult recommending CTA and neuro checks overnight.  Patient was placed in the CDU.  Creatinine found to be elevated.  CTA cancelled.  Patient went for BL carotid doppler................... Neuro consult recommending.......................... 86M PMH AFIB, CAD, HTN, pacemaker p/w 10 minute episode of dysarthria 15 minutes prior to arrival. No difficulty walking or imbalance, no syncope. Never had this before. Resolved spontaneously. No nausea or vomiting. No difficulty writing. No hx of strokes in the past. Patient is not fully compliant with his medications. Patient on apixaban, and plavix. carotid duplex showing thrombus as possible source of emboli. stroke reevaled patient and requesting cta head and neck. will contact patients cardiologist to decide on appropriate AC following cta. patient tba to neuro  In the ED VSS.  Code stroke called.  Dysarthria rapidly resolved.  Neuro consult recommending CTA and neuro checks overnight.  Patient was placed in the CDU.  Creatinine found to be elevated.  CTA cancelled.  Patient went for BL carotid doppler with fidnings of focal echogenic plaque or   thrombus is seen in the proximal left common carotid artery. Neuro consult recommending admission at the time of results form doppler and patient admitted.

## 2018-01-05 NOTE — H&P ADULT - NSHPREVIEWOFSYSTEMS_GEN_ALL_CORE
Constitutional: No fever, chills, fatigue, weakness  Eyes: no eye pain, visual disturbances, or discharge  ENT:  No difficulty hearing, tinnitus, vertigo; No sinus or throat pain  Neck: No pain or stiffness  Respiratory: No cough, dyspnea, wheezing   Cardiovascular: No chest pain, palpitations  Gastrointestinal: No abdominal or epigastric pain. No nausea, vomiting  No diarrhea or constipation.   Genitourinary: No dysuria, frequency, hematuria or incontinence  Neurological: As above  Psychiatric: No depression, anxiety, mood swings or difficulty sleeping  Musculoskeletal: No joint pain or swelling; No muscle, back or extremity pain  Skin: No itching, burning, rashes or lesions   Allergy and Immunologic: No hives or eczema

## 2018-01-05 NOTE — H&P ADULT - ASSESSMENT
Pt is an 87 y/o RH  man w/ PMH CAD w/ stents x2 (last ~6mo ago & 2010), pAfb (on Eliquis), diastolic CHF, PPM (placed ~7r ago), former smoker p/w likely anarthria vs expressive aphasia x 10 mins, now resolved. Symptoms occurred in the setting of possible AC noncompliance. Exam is non-focal- pt's speech is fluent, comprehension intact, and no dysarthria is present. CTH shows no acute pathology. CTA shows only diffuse calcifications. Symptoms were most likely secondary to TIA, possibly related to missed dose(s) of AC, though etiology is unclear. No indication for tPA as symptoms have resolved.     Plan:  - Continue Eliquis and all other home medications  - F/U Vascular Neurology Dr. Walker at [address] [place] [address]  - Recommend f/u with outpatient cardiologist considering effort dependant SOB

## 2018-01-05 NOTE — ED CDU PROVIDER INITIAL DAY NOTE - ATTENDING CONTRIBUTION TO CARE
------------ATTENDING NOTE------------   see my ED note -- CDU for TIA, close neuro monitoring, CTA Head/Neck, neurology reevaluation in morning -- agree w/ PA's documentation, medical decision making, assessment and plans.  - Ish Pang MD   --------------------------------------------------------------------

## 2018-01-05 NOTE — ED CDU PROVIDER INITIAL DAY NOTE - OBJECTIVE STATEMENT
86M PMH AFIB, CAD, HTN, pacemaker p/w 10 minute episode of dysarthria 15 minutes prior to arrival. No difficulty walking or imbalance, no syncope. Never had this before. Resolved spontaneously. No nausea or vomiting. No difficulty writing. No hx of strokes in the past. Patient is not fully compliant with his medications. Patient on apixaban, and plavix.    In the ED VSS.  Code stroke called.  Dysarthria rapidly resolved.  Neuro consult recommending CTA and neuro checks overnight.

## 2018-01-05 NOTE — H&P ADULT - NSHPPHYSICALEXAM_GEN_ALL_CORE
GENERAL APPEARANCE: Well developed, well nourished, alert and cooperative, and appears to be in no acute distress.    Neurological Exam:  Mental Status: AAOx3, speech fluent, follows commands across midline, naming intact to high and low frequency objects, repetition intact    Cranial Nerves: PERRL, EOMI, VFF, no facial asymmetry. Tongue, uvula and palate midline.  Sternocleidomastoid and Trapezius intact bilaterally.    Motor:   Tone: normal.                  Strength:  No drift x 4 extremities  Tremor: No resting, postural or action tremor.  No myoclonus.    Sensation: intact to light touch x 4 extremities. No extinction on double simultaneous stimulation    Coord: No ataxia/dysmetria on FTN b/l

## 2018-01-05 NOTE — ED CDU PROVIDER INITIAL DAY NOTE - PROGRESS NOTE DETAILS
Patient seen at bedside in NAD.  VSS.  Patient resting comfortably without complaints. No focal neurological deficits on exam.  -Thoams Bean PA-C patient complaining of some SOB. no chest pain. reports this happens to him frequently. no different than usual. will obtain ekg, cardiac enzymes. patient was at no point hooked up to fluids. at this time will not start them as patient with SOB. - Renetta Vitale PA-C discussed doppler results with neuro team. will hold the heparin for now as patient on apixiban. still awaiting stroke team rosa Vitale PA-C stroke attending at bedside requesting cta head and neck and adm to neuro for possible bridge therapy they will contact patients cardiologist to discuss ac. - Renetta Vitale PA-C

## 2018-01-05 NOTE — DISCHARGE NOTE ADULT - CARE PLAN
Principal Discharge DX:	TIA (transient ischemic attack)  Goal:	Secondary Stroke Prevention  Instructions for follow-up, activity and diet:	Please follow up with your primary medical doctor within one to two weeks of discharge from the hospital.  Please follow up with your heart doctor within one to two weeks of discharge from the hospital, you might need an echocardiogram.  Please follow up with your stroke neurologist (Dr. Walker) within one to two weeks of discharge from the hospital.  Please continue to take your medications as prescribed by your doctor.

## 2018-01-05 NOTE — ED CDU PROVIDER DISPOSITION NOTE - ATTENDING CONTRIBUTION TO CARE
***Aren Cash MD***  I have personally performed a face to face diagnostic evaluation on this patient.  I have reviewed the ACP note and agree with the history, exam, and plan of care, except as noted. After discussion with neurology patient will be admitted to neurology.

## 2018-01-05 NOTE — DISCHARGE NOTE ADULT - PATIENT PORTAL LINK FT
“You can access the Follow[employer] Patient Portal, offered by [facility], by registering with the following website: [email or web address]”

## 2018-01-05 NOTE — H&P ADULT - HISTORY OF PRESENT ILLNESS
Pt is an 85 y/o RH  man w/ PMH CAD w/ stents x2 (last ~6mo ago & 2010), pAfib (on Eliquis), diastolic CHF, PPM (placed ~7r ago), former smoker p/w inability to speak x 10 mins. Symptoms were witnessed by son and grandson, FATIMAH 11:30 PM. Pt was trying to speak, but was instead mumbling incoherently. Pt's grandson is a medical student, and tested his ability to write/follow commands during the episode (pt was able to do both). Pt also had a facial droop, possibly on the left though family is not clear which side. No weakness or numbness in any other extremities. Symptoms lasted 10 mins and resolved by the time stroke code was called. Pt's family noticed that he is not taking multiple of his medications regularly and may likely have missed doses of Eliquis. NIHSS 0 MRS 1 ABCD2 4

## 2018-01-05 NOTE — CONSULT NOTE ADULT - ATTENDING COMMENTS
I spoke with the patient in primary team in detail regarding obtaining CT angiogram  of neck to rule out cardiac thrombus.  On further review of CT angiogram but neuroradiology it appears there is bilateral calcification and extracranial carotid, however no reported luminal thrombus. Hence I agree with continuing novel anticoagulation for stroke prevention.  patient is unable to obtain an MRI due to pacemaker.

## 2018-01-05 NOTE — DISCHARGE NOTE ADULT - NS AS DC STROKE ED MATERIALS
Prescribed Medications/Call 911 for Stroke/Risk Factors for Stroke/Stroke Warning Signs and Symptoms/Need for Followup After Discharge/Stroke Education Booklet

## 2018-01-05 NOTE — ED ADULT NURSE REASSESSMENT NOTE - NS ED NURSE REASSESS COMMENT FT1
Pt AO4. Neuro check intact. No deficits noted. Safety maintained. Will continue to monitor.
Pt received from LISA Cartagena. Pt oriented to CDU & plan of care was discussed. Pt AO4. Neuro check intact. No deficits noted. Pt states he feels much better. Pt and family agree that dysarthria has resolved. Safety & comfort measures maintained. Call bell in reach. Will continue to monitor.

## 2018-01-12 ENCOUNTER — APPOINTMENT (OUTPATIENT)
Dept: PULMONOLOGY | Facility: CLINIC | Age: 83
End: 2018-01-12
Payer: MEDICARE

## 2018-01-12 VITALS
WEIGHT: 229 LBS | HEIGHT: 67 IN | SYSTOLIC BLOOD PRESSURE: 110 MMHG | HEART RATE: 87 BPM | RESPIRATION RATE: 17 BRPM | BODY MASS INDEX: 35.94 KG/M2 | DIASTOLIC BLOOD PRESSURE: 80 MMHG | OXYGEN SATURATION: 98 %

## 2018-01-12 PROCEDURE — 94010 BREATHING CAPACITY TEST: CPT | Mod: 59

## 2018-01-12 PROCEDURE — 94618 PULMONARY STRESS TESTING: CPT

## 2018-01-12 PROCEDURE — 99214 OFFICE O/P EST MOD 30 MIN: CPT | Mod: 25

## 2018-01-12 RX ORDER — FLUTICASONE FUROATE AND VILANTEROL TRIFENATATE 100; 25 UG/1; UG/1
100-25 POWDER RESPIRATORY (INHALATION)
Qty: 60 | Refills: 0 | Status: DISCONTINUED | COMMUNITY
Start: 2017-07-07 | End: 2018-01-12

## 2018-01-17 ENCOUNTER — APPOINTMENT (OUTPATIENT)
Dept: NEUROLOGY | Facility: CLINIC | Age: 83
End: 2018-01-17
Payer: MEDICARE

## 2018-01-17 VITALS
WEIGHT: 230 LBS | HEIGHT: 67 IN | DIASTOLIC BLOOD PRESSURE: 68 MMHG | HEART RATE: 80 BPM | SYSTOLIC BLOOD PRESSURE: 119 MMHG | BODY MASS INDEX: 36.1 KG/M2

## 2018-01-17 DIAGNOSIS — Z79.01 LONG TERM (CURRENT) USE OF ANTICOAGULANTS: ICD-10-CM

## 2018-01-17 PROCEDURE — 99215 OFFICE O/P EST HI 40 MIN: CPT

## 2018-02-13 ENCOUNTER — APPOINTMENT (OUTPATIENT)
Dept: GERIATRICS | Facility: CLINIC | Age: 83
End: 2018-02-13
Payer: MEDICARE

## 2018-02-13 VITALS
RESPIRATION RATE: 17 BRPM | TEMPERATURE: 97.6 F | OXYGEN SATURATION: 97 % | WEIGHT: 233 LBS | HEART RATE: 65 BPM | SYSTOLIC BLOOD PRESSURE: 140 MMHG | DIASTOLIC BLOOD PRESSURE: 70 MMHG | HEIGHT: 67 IN | BODY MASS INDEX: 36.57 KG/M2

## 2018-02-13 DIAGNOSIS — Z86.59 PERSONAL HISTORY OF OTHER MENTAL AND BEHAVIORAL DISORDERS: ICD-10-CM

## 2018-02-13 DIAGNOSIS — Z81.1 FAMILY HISTORY OF ALCOHOL ABUSE AND DEPENDENCE: ICD-10-CM

## 2018-02-13 DIAGNOSIS — Z60.2 PROBLEMS RELATED TO LIVING ALONE: ICD-10-CM

## 2018-02-13 DIAGNOSIS — N43.3 HYDROCELE, UNSPECIFIED: ICD-10-CM

## 2018-02-13 DIAGNOSIS — Z78.9 OTHER SPECIFIED HEALTH STATUS: ICD-10-CM

## 2018-02-13 DIAGNOSIS — Z87.442 PERSONAL HISTORY OF URINARY CALCULI: ICD-10-CM

## 2018-02-13 DIAGNOSIS — K76.9 LIVER DISEASE, UNSPECIFIED: ICD-10-CM

## 2018-02-13 DIAGNOSIS — Z82.49 FAMILY HISTORY OF ISCHEMIC HEART DISEASE AND OTHER DISEASES OF THE CIRCULATORY SYSTEM: ICD-10-CM

## 2018-02-13 DIAGNOSIS — S92.001A UNSPECIFIED FRACTURE OF RIGHT CALCANEUS, INITIAL ENCOUNTER FOR CLOSED FRACTURE: ICD-10-CM

## 2018-02-13 PROCEDURE — 99204 OFFICE O/P NEW MOD 45 MIN: CPT

## 2018-02-13 RX ORDER — FLUTICASONE PROPIONATE AND SALMETEROL 50; 250 UG/1; UG/1
250-50 POWDER RESPIRATORY (INHALATION)
Qty: 1 | Refills: 3 | Status: DISCONTINUED | OUTPATIENT
Start: 2017-08-03 | End: 2018-02-13

## 2018-02-13 RX ORDER — FINASTERIDE 5 MG/1
5 TABLET, FILM COATED ORAL
Refills: 0 | Status: DISCONTINUED | COMMUNITY
End: 2018-02-13

## 2018-02-13 RX ORDER — AMIODARONE HYDROCHLORIDE 200 MG/1
200 TABLET ORAL
Qty: 30 | Refills: 0 | Status: DISCONTINUED | COMMUNITY
Start: 2017-10-05 | End: 2018-02-13

## 2018-02-13 SDOH — SOCIAL STABILITY - SOCIAL INSECURITY: PROBLEMS RELATED TO LIVING ALONE: Z60.2

## 2018-02-21 ENCOUNTER — APPOINTMENT (OUTPATIENT)
Dept: NEUROLOGY | Facility: CLINIC | Age: 83
End: 2018-02-21
Payer: MEDICARE

## 2018-02-21 PROCEDURE — 93888 INTRACRANIAL LIMITED STUDY: CPT

## 2018-02-21 PROCEDURE — 93880 EXTRACRANIAL BILAT STUDY: CPT

## 2018-02-21 RX ORDER — CHOLECALCIFEROL (VITAMIN D3) 50 MCG
50 MCG TABLET ORAL
Qty: 90 | Refills: 0 | Status: DISCONTINUED | COMMUNITY
Start: 2017-01-11 | End: 2018-02-21

## 2018-02-21 RX ORDER — CLOPIDOGREL BISULFATE 75 MG/1
75 TABLET, FILM COATED ORAL
Qty: 30 | Refills: 0 | Status: DISCONTINUED | COMMUNITY
Start: 2017-07-07 | End: 2018-02-21

## 2018-02-21 RX ORDER — FUROSEMIDE 40 MG/1
40 TABLET ORAL
Qty: 60 | Refills: 0 | Status: DISCONTINUED | COMMUNITY
Start: 2017-11-06 | End: 2018-02-21

## 2018-02-21 RX ORDER — AMIODARONE HYDROCHLORIDE 200 MG/1
200 TABLET ORAL DAILY
Refills: 0 | Status: ACTIVE | COMMUNITY
Start: 2018-02-21

## 2018-02-21 RX ORDER — SPIRONOLACTONE 25 MG/1
25 TABLET ORAL
Qty: 30 | Refills: 0 | Status: DISCONTINUED | COMMUNITY
Start: 2017-11-09 | End: 2018-02-21

## 2018-02-21 RX ORDER — LEVOFLOXACIN 500 MG/1
500 TABLET, FILM COATED ORAL
Qty: 7 | Refills: 0 | Status: DISCONTINUED | COMMUNITY
Start: 2017-05-25 | End: 2018-02-21

## 2018-02-21 RX ORDER — METOPROLOL SUCCINATE 50 MG/1
50 TABLET, EXTENDED RELEASE ORAL
Qty: 30 | Refills: 0 | Status: DISCONTINUED | COMMUNITY
Start: 2017-07-07 | End: 2018-02-21

## 2018-02-26 ENCOUNTER — APPOINTMENT (OUTPATIENT)
Dept: GERIATRICS | Facility: CLINIC | Age: 83
End: 2018-02-26
Payer: MEDICARE

## 2018-02-26 VITALS
WEIGHT: 238.38 LBS | RESPIRATION RATE: 17 BRPM | OXYGEN SATURATION: 97 % | BODY MASS INDEX: 37.42 KG/M2 | DIASTOLIC BLOOD PRESSURE: 60 MMHG | SYSTOLIC BLOOD PRESSURE: 100 MMHG | TEMPERATURE: 97.6 F | HEIGHT: 67 IN | HEART RATE: 69 BPM

## 2018-02-26 VITALS — SYSTOLIC BLOOD PRESSURE: 130 MMHG | HEART RATE: 68 BPM | DIASTOLIC BLOOD PRESSURE: 68 MMHG | OXYGEN SATURATION: 94 %

## 2018-02-26 VITALS — OXYGEN SATURATION: 97 % | DIASTOLIC BLOOD PRESSURE: 72 MMHG | SYSTOLIC BLOOD PRESSURE: 122 MMHG | HEART RATE: 60 BPM

## 2018-02-26 VITALS — OXYGEN SATURATION: 97 % | SYSTOLIC BLOOD PRESSURE: 130 MMHG | DIASTOLIC BLOOD PRESSURE: 72 MMHG | HEART RATE: 66 BPM

## 2018-02-26 PROCEDURE — 99214 OFFICE O/P EST MOD 30 MIN: CPT

## 2018-02-26 RX ORDER — PANTOPRAZOLE 40 MG/1
40 TABLET, DELAYED RELEASE ORAL
Refills: 0 | Status: DISCONTINUED | COMMUNITY
End: 2018-02-26

## 2018-02-26 RX ORDER — FOLIC ACID 1 MG/1
1 TABLET ORAL
Refills: 0 | Status: DISCONTINUED | COMMUNITY
End: 2018-02-26

## 2018-02-26 RX ORDER — DIGOXIN 125 UG/1
125 TABLET ORAL
Refills: 0 | Status: DISCONTINUED | COMMUNITY
End: 2018-02-26

## 2018-02-26 RX ORDER — RANITIDINE 150 MG/1
150 TABLET ORAL DAILY
Refills: 0 | Status: DISCONTINUED | COMMUNITY
Start: 2018-02-13 | End: 2018-02-26

## 2018-02-26 RX ORDER — ISOSORBIDE MONONITRATE 60 MG/1
60 TABLET, EXTENDED RELEASE ORAL
Refills: 0 | Status: DISCONTINUED | COMMUNITY
End: 2018-02-26

## 2018-02-26 RX ORDER — RANOLAZINE 500 MG/1
500 TABLET, FILM COATED, EXTENDED RELEASE ORAL
Refills: 0 | Status: DISCONTINUED | COMMUNITY
End: 2018-02-26

## 2018-02-26 RX ORDER — ASPIRIN 81 MG
81 TABLET, DELAYED RELEASE (ENTERIC COATED) ORAL
Refills: 0 | Status: DISCONTINUED | COMMUNITY
End: 2018-02-26

## 2018-02-26 RX ORDER — DOFETILIDE 0.12 MG/1
125 CAPSULE ORAL
Refills: 0 | Status: DISCONTINUED | COMMUNITY
End: 2018-02-26

## 2018-02-26 RX ORDER — VERAPAMIL HYDROCHLORIDE 180 MG/1
180 TABLET ORAL
Refills: 0 | Status: DISCONTINUED | COMMUNITY
End: 2018-02-26

## 2018-03-13 ENCOUNTER — APPOINTMENT (OUTPATIENT)
Dept: GERIATRICS | Facility: CLINIC | Age: 83
End: 2018-03-13
Payer: MEDICARE

## 2018-03-13 VITALS
TEMPERATURE: 97.4 F | HEIGHT: 67 IN | HEART RATE: 68 BPM | DIASTOLIC BLOOD PRESSURE: 80 MMHG | WEIGHT: 232 LBS | OXYGEN SATURATION: 98 % | SYSTOLIC BLOOD PRESSURE: 120 MMHG | RESPIRATION RATE: 16 BRPM | BODY MASS INDEX: 36.41 KG/M2

## 2018-03-13 DIAGNOSIS — N40.0 BENIGN PROSTATIC HYPERPLASIA WITHOUT LOWER URINARY TRACT SYMPMS: ICD-10-CM

## 2018-03-13 DIAGNOSIS — M19.90 UNSPECIFIED OSTEOARTHRITIS, UNSPECIFIED SITE: ICD-10-CM

## 2018-03-13 DIAGNOSIS — E78.00 PURE HYPERCHOLESTEROLEMIA, UNSPECIFIED: ICD-10-CM

## 2018-03-13 PROCEDURE — 99215 OFFICE O/P EST HI 40 MIN: CPT

## 2018-03-13 RX ORDER — ROSUVASTATIN CALCIUM 10 MG/1
10 TABLET, FILM COATED ORAL DAILY
Refills: 0 | Status: ACTIVE | COMMUNITY

## 2018-03-13 RX ORDER — TAMSULOSIN HYDROCHLORIDE 0.4 MG/1
0.4 CAPSULE ORAL DAILY
Refills: 0 | Status: ACTIVE | COMMUNITY

## 2018-03-13 RX ORDER — RANITIDINE 150 MG/1
150 TABLET ORAL DAILY
Refills: 0 | Status: DISCONTINUED | COMMUNITY
Start: 2018-03-13 | End: 2018-03-13

## 2018-03-13 RX ORDER — PANTOPRAZOLE 40 MG/1
40 TABLET, DELAYED RELEASE ORAL DAILY
Refills: 0 | Status: DISCONTINUED | COMMUNITY
Start: 2018-03-13 | End: 2018-03-13

## 2018-03-14 PROBLEM — E78.00 HYPERCHOLESTEROLEMIA: Status: ACTIVE | Noted: 2017-03-08

## 2018-03-14 PROBLEM — N40.0 BPH (BENIGN PROSTATIC HYPERPLASIA): Status: ACTIVE | Noted: 2018-02-26

## 2018-03-14 PROBLEM — M19.90 OSTEOARTHRITIS: Status: ACTIVE | Noted: 2018-02-13

## 2018-03-28 ENCOUNTER — APPOINTMENT (OUTPATIENT)
Dept: GERIATRICS | Facility: CLINIC | Age: 83
End: 2018-03-28
Payer: MEDICARE

## 2018-03-28 VITALS
RESPIRATION RATE: 16 BRPM | HEART RATE: 80 BPM | BODY MASS INDEX: 36.89 KG/M2 | SYSTOLIC BLOOD PRESSURE: 140 MMHG | HEIGHT: 67 IN | WEIGHT: 235.05 LBS | OXYGEN SATURATION: 97 % | TEMPERATURE: 97.4 F | DIASTOLIC BLOOD PRESSURE: 86 MMHG

## 2018-03-28 PROCEDURE — 99215 OFFICE O/P EST HI 40 MIN: CPT

## 2018-03-28 RX ORDER — UMECLIDINIUM 62.5 UG/1
62.5 AEROSOL, POWDER ORAL
Qty: 1 | Refills: 3 | Status: ACTIVE | COMMUNITY
Start: 2017-07-20 | End: 1900-01-01

## 2018-03-28 RX ORDER — ALBUTEROL SULFATE 90 UG/1
108 (90 BASE) AEROSOL, METERED RESPIRATORY (INHALATION) EVERY 6 HOURS
Qty: 3 | Refills: 1 | Status: ACTIVE | OUTPATIENT
Start: 2017-08-11

## 2018-03-28 RX ORDER — HYDROCHLOROTHIAZIDE 12.5 MG/1
12.5 TABLET ORAL DAILY
Refills: 0 | Status: DISCONTINUED | COMMUNITY
End: 2018-03-28

## 2018-03-28 RX ORDER — BUDESONIDE AND FORMOTEROL FUMARATE DIHYDRATE 160; 4.5 UG/1; UG/1
160-4.5 AEROSOL RESPIRATORY (INHALATION) TWICE DAILY
Qty: 1 | Refills: 3 | Status: ACTIVE | COMMUNITY
Start: 1900-01-01 | End: 1900-01-01

## 2018-03-28 RX ORDER — CLOPIDOGREL 75 MG/1
75 TABLET, FILM COATED ORAL DAILY
Refills: 0 | Status: ACTIVE | COMMUNITY
Start: 2018-02-13

## 2018-04-05 DIAGNOSIS — G40.909 EPILEPSY, UNSPECIFIED, NOT INTRACTABLE, W/OUT STATUS EPILEPTICUS: ICD-10-CM

## 2018-04-05 DIAGNOSIS — Z86.73 PERSONAL HISTORY OF TRANSIENT ISCHEMIC ATTACK (TIA), AND CEREBRAL INFARCTION W/OUT RESIDUAL DEFICITS: ICD-10-CM

## 2018-04-05 DIAGNOSIS — I49.5 SICK SINUS SYNDROME: ICD-10-CM

## 2018-04-10 RX ORDER — DILTIAZEM HYDROCHLORIDE 120 MG/1
120 CAPSULE, EXTENDED RELEASE ORAL
Refills: 0 | Status: DISCONTINUED | COMMUNITY
Start: 2018-03-13 | End: 2018-04-10

## 2018-04-19 ENCOUNTER — APPOINTMENT (OUTPATIENT)
Dept: NEUROLOGY | Facility: CLINIC | Age: 83
End: 2018-04-19
Payer: MEDICARE

## 2018-04-19 VITALS
WEIGHT: 230 LBS | BODY MASS INDEX: 36.1 KG/M2 | HEART RATE: 96 BPM | HEIGHT: 67 IN | DIASTOLIC BLOOD PRESSURE: 78 MMHG | SYSTOLIC BLOOD PRESSURE: 140 MMHG

## 2018-04-19 DIAGNOSIS — Z86.73 PERSONAL HISTORY OF TRANSIENT ISCHEMIC ATTACK (TIA), AND CEREBRAL INFARCTION W/OUT RESIDUAL DEFICITS: ICD-10-CM

## 2018-04-19 PROCEDURE — 99215 OFFICE O/P EST HI 40 MIN: CPT

## 2018-04-25 ENCOUNTER — APPOINTMENT (OUTPATIENT)
Dept: PULMONOLOGY | Facility: CLINIC | Age: 83
End: 2018-04-25
Payer: MEDICARE

## 2018-04-25 VITALS
OXYGEN SATURATION: 95 % | SYSTOLIC BLOOD PRESSURE: 150 MMHG | WEIGHT: 229 LBS | DIASTOLIC BLOOD PRESSURE: 80 MMHG | BODY MASS INDEX: 35.94 KG/M2 | HEART RATE: 121 BPM | HEIGHT: 67 IN | RESPIRATION RATE: 20 BRPM

## 2018-04-25 PROCEDURE — 94010 BREATHING CAPACITY TEST: CPT | Mod: 59

## 2018-04-25 PROCEDURE — 99214 OFFICE O/P EST MOD 30 MIN: CPT | Mod: 25

## 2018-04-25 PROCEDURE — 94727 GAS DIL/WSHOT DETER LNG VOL: CPT

## 2018-04-25 PROCEDURE — 94618 PULMONARY STRESS TESTING: CPT

## 2018-04-25 PROCEDURE — 94729 DIFFUSING CAPACITY: CPT

## 2018-04-26 ENCOUNTER — APPOINTMENT (OUTPATIENT)
Dept: GERIATRICS | Facility: CLINIC | Age: 83
End: 2018-04-26
Payer: MEDICARE

## 2018-04-26 VITALS
WEIGHT: 229 LBS | DIASTOLIC BLOOD PRESSURE: 80 MMHG | OXYGEN SATURATION: 97 % | TEMPERATURE: 97.7 F | SYSTOLIC BLOOD PRESSURE: 118 MMHG | HEIGHT: 67 IN | BODY MASS INDEX: 35.94 KG/M2 | HEART RATE: 96 BPM | RESPIRATION RATE: 20 BRPM

## 2018-04-26 PROCEDURE — 99215 OFFICE O/P EST HI 40 MIN: CPT

## 2018-04-26 RX ORDER — HYDROCHLOROTHIAZIDE 12.5 MG/1
12.5 TABLET ORAL DAILY
Refills: 0 | Status: DISCONTINUED | COMMUNITY
End: 2018-04-26

## 2018-04-26 RX ORDER — DILTIAZEM HYDROCHLORIDE 120 MG/1
120 CAPSULE, EXTENDED RELEASE ORAL DAILY
Refills: 0 | Status: DISCONTINUED | COMMUNITY
End: 2018-04-26

## 2018-05-04 ENCOUNTER — APPOINTMENT (OUTPATIENT)
Dept: PULMONOLOGY | Facility: CLINIC | Age: 83
End: 2018-05-04
Payer: MEDICARE

## 2018-05-04 VITALS
OXYGEN SATURATION: 98 % | RESPIRATION RATE: 17 BRPM | BODY MASS INDEX: 35.94 KG/M2 | HEIGHT: 67 IN | DIASTOLIC BLOOD PRESSURE: 80 MMHG | SYSTOLIC BLOOD PRESSURE: 120 MMHG | WEIGHT: 229 LBS | HEART RATE: 110 BPM

## 2018-05-04 PROCEDURE — 99214 OFFICE O/P EST MOD 30 MIN: CPT

## 2018-05-04 RX ORDER — VALSARTAN 320 MG/1
320 TABLET, COATED ORAL
Qty: 30 | Refills: 0 | Status: DISCONTINUED | COMMUNITY
Start: 2017-11-06

## 2018-05-04 RX ORDER — LIDOCAINE 5% 700 MG/1
5 PATCH TOPICAL
Qty: 30 | Refills: 0 | Status: DISCONTINUED | COMMUNITY
Start: 2018-04-20

## 2018-06-06 ENCOUNTER — APPOINTMENT (OUTPATIENT)
Dept: GERIATRICS | Facility: CLINIC | Age: 83
End: 2018-06-06
Payer: MEDICARE

## 2018-06-06 VITALS
BODY MASS INDEX: 36.31 KG/M2 | HEIGHT: 67 IN | DIASTOLIC BLOOD PRESSURE: 70 MMHG | RESPIRATION RATE: 17 BRPM | SYSTOLIC BLOOD PRESSURE: 110 MMHG | WEIGHT: 231.38 LBS | OXYGEN SATURATION: 97 % | TEMPERATURE: 97.8 F | HEART RATE: 87 BPM

## 2018-06-06 DIAGNOSIS — F51.04 PSYCHOPHYSIOLOGIC INSOMNIA: ICD-10-CM

## 2018-06-06 DIAGNOSIS — Z72.821 INADEQUATE SLEEP HYGIENE: ICD-10-CM

## 2018-06-06 DIAGNOSIS — Z86.73 PERSONAL HISTORY OF TRANSIENT ISCHEMIC ATTACK (TIA), AND CEREBRAL INFARCTION W/OUT RESIDUAL DEFICITS: ICD-10-CM

## 2018-06-06 DIAGNOSIS — Z87.19 PERSONAL HISTORY OF OTHER DISEASES OF THE DIGESTIVE SYSTEM: ICD-10-CM

## 2018-06-06 DIAGNOSIS — Z13.1 ENCOUNTER FOR SCREENING FOR DIABETES MELLITUS: ICD-10-CM

## 2018-06-06 PROCEDURE — 99215 OFFICE O/P EST HI 40 MIN: CPT

## 2018-06-06 RX ORDER — VALSARTAN 80 MG/1
80 TABLET, COATED ORAL
Qty: 30 | Refills: 0 | Status: DISCONTINUED | COMMUNITY
Start: 2018-02-15 | End: 2018-06-06

## 2018-06-06 RX ORDER — ALBUTEROL SULFATE 2.5 MG/3ML
(2.5 MG/3ML) SOLUTION RESPIRATORY (INHALATION)
Qty: 120 | Refills: 5 | Status: ACTIVE | OUTPATIENT
Start: 2018-05-01

## 2018-06-06 RX ORDER — DILTIAZEM HYDROCHLORIDE 120 MG/1
120 CAPSULE, EXTENDED RELEASE ORAL
Qty: 30 | Refills: 0 | Status: DISCONTINUED | COMMUNITY
Start: 2018-03-05 | End: 2018-06-06

## 2018-06-07 PROBLEM — F51.04 CHRONIC INSOMNIA: Status: ACTIVE | Noted: 2018-04-26

## 2018-06-07 PROBLEM — Z13.1 SCREENING FOR DIABETES MELLITUS: Status: ACTIVE | Noted: 2018-06-06

## 2018-06-07 LAB
25(OH)D3 SERPL-MCNC: 20.6 NG/ML
ANION GAP SERPL CALC-SCNC: 16 MMOL/L
BUN SERPL-MCNC: 20 MG/DL
CALCIUM SERPL-MCNC: 10.1 MG/DL
CHLORIDE SERPL-SCNC: 104 MMOL/L
CHOLEST SERPL-MCNC: 118 MG/DL
CHOLEST/HDLC SERPL: 2.7 RATIO
CO2 SERPL-SCNC: 22 MMOL/L
CREAT SERPL-MCNC: 1.22 MG/DL
FOLATE SERPL-MCNC: >20 NG/ML
GLUCOSE SERPL-MCNC: 99 MG/DL
HBA1C MFR BLD HPLC: 5.8 %
HDLC SERPL-MCNC: 44 MG/DL
LDLC SERPL CALC-MCNC: 55 MG/DL
POTASSIUM SERPL-SCNC: 4.2 MMOL/L
SODIUM SERPL-SCNC: 142 MMOL/L
TRIGL SERPL-MCNC: 93 MG/DL
VIT B12 SERPL-MCNC: 559 PG/ML

## 2018-06-07 RX ORDER — FOLIC ACID 1 MG/1
1 TABLET ORAL DAILY
Refills: 0 | Status: DISCONTINUED | COMMUNITY
Start: 2018-03-13 | End: 2018-06-07

## 2018-06-14 ENCOUNTER — APPOINTMENT (OUTPATIENT)
Dept: PULMONOLOGY | Facility: CLINIC | Age: 83
End: 2018-06-14
Payer: MEDICARE

## 2018-06-14 VITALS
OXYGEN SATURATION: 97 % | BODY MASS INDEX: 37.28 KG/M2 | HEIGHT: 66 IN | WEIGHT: 232 LBS | RESPIRATION RATE: 17 BRPM | SYSTOLIC BLOOD PRESSURE: 150 MMHG | DIASTOLIC BLOOD PRESSURE: 75 MMHG | HEART RATE: 87 BPM

## 2018-06-14 DIAGNOSIS — R93.8 ABNORMAL FINDINGS ON DIAGNOSTIC IMAGING OF OTHER SPECIFIED BODY STRUCTURES: ICD-10-CM

## 2018-06-14 DIAGNOSIS — J30.9 ALLERGIC RHINITIS, UNSPECIFIED: ICD-10-CM

## 2018-06-14 PROCEDURE — 99214 OFFICE O/P EST MOD 30 MIN: CPT | Mod: 25

## 2018-06-14 PROCEDURE — 71046 X-RAY EXAM CHEST 2 VIEWS: CPT

## 2018-06-20 ENCOUNTER — APPOINTMENT (OUTPATIENT)
Dept: CARDIOLOGY | Facility: CLINIC | Age: 83
End: 2018-06-20
Payer: MEDICARE

## 2018-06-20 ENCOUNTER — NON-APPOINTMENT (OUTPATIENT)
Age: 83
End: 2018-06-20

## 2018-06-20 VITALS
OXYGEN SATURATION: 95 % | SYSTOLIC BLOOD PRESSURE: 170 MMHG | HEART RATE: 88 BPM | DIASTOLIC BLOOD PRESSURE: 74 MMHG | RESPIRATION RATE: 16 BRPM | HEIGHT: 66 IN | BODY MASS INDEX: 36.96 KG/M2 | WEIGHT: 230 LBS

## 2018-06-20 PROCEDURE — 99205 OFFICE O/P NEW HI 60 MIN: CPT

## 2018-06-27 LAB
ANION GAP SERPL CALC-SCNC: 15 MMOL/L
BUN SERPL-MCNC: 28 MG/DL
CALCIUM SERPL-MCNC: 10.4 MG/DL
CHLORIDE SERPL-SCNC: 102 MMOL/L
CO2 SERPL-SCNC: 23 MMOL/L
CREAT SERPL-MCNC: 1.34 MG/DL
GLUCOSE SERPL-MCNC: 87 MG/DL
POTASSIUM SERPL-SCNC: 3.8 MMOL/L
SODIUM SERPL-SCNC: 140 MMOL/L

## 2018-07-03 ENCOUNTER — APPOINTMENT (OUTPATIENT)
Dept: CARDIOLOGY | Facility: CLINIC | Age: 83
End: 2018-07-03
Payer: MEDICARE

## 2018-07-03 VITALS
DIASTOLIC BLOOD PRESSURE: 77 MMHG | BODY MASS INDEX: 35.68 KG/M2 | WEIGHT: 222 LBS | SYSTOLIC BLOOD PRESSURE: 131 MMHG | HEIGHT: 66 IN | HEART RATE: 78 BPM | OXYGEN SATURATION: 95 % | RESPIRATION RATE: 16 BRPM

## 2018-07-03 PROCEDURE — 99214 OFFICE O/P EST MOD 30 MIN: CPT

## 2018-07-18 ENCOUNTER — APPOINTMENT (OUTPATIENT)
Dept: CARDIOLOGY | Facility: CLINIC | Age: 83
End: 2018-07-18
Payer: MEDICARE

## 2018-07-18 VITALS
HEIGHT: 66 IN | SYSTOLIC BLOOD PRESSURE: 138 MMHG | WEIGHT: 219 LBS | OXYGEN SATURATION: 98 % | HEART RATE: 96 BPM | BODY MASS INDEX: 35.2 KG/M2 | DIASTOLIC BLOOD PRESSURE: 85 MMHG

## 2018-07-18 PROCEDURE — 99214 OFFICE O/P EST MOD 30 MIN: CPT

## 2018-07-18 RX ORDER — APIXABAN 5 MG/1
5 TABLET, FILM COATED ORAL
Qty: 14 | Refills: 0 | Status: ACTIVE | COMMUNITY
Start: 1900-01-01 | End: 1900-01-01

## 2018-07-19 LAB
ANION GAP SERPL CALC-SCNC: 12 MMOL/L
BUN SERPL-MCNC: 24 MG/DL
CALCIUM SERPL-MCNC: 10.1 MG/DL
CHLORIDE SERPL-SCNC: 105 MMOL/L
CO2 SERPL-SCNC: 26 MMOL/L
CREAT SERPL-MCNC: 1.18 MG/DL
GLUCOSE SERPL-MCNC: 100 MG/DL
POTASSIUM SERPL-SCNC: 4.2 MMOL/L
SODIUM SERPL-SCNC: 143 MMOL/L

## 2018-07-20 LAB — NT-PROBNP SERPL-MCNC: 3116 PG/ML

## 2018-07-22 PROBLEM — Z78.9 ALCOHOL USE: Status: ACTIVE | Noted: 2017-03-08

## 2018-07-24 ENCOUNTER — OUTPATIENT (OUTPATIENT)
Dept: OUTPATIENT SERVICES | Facility: HOSPITAL | Age: 83
LOS: 1 days | End: 2018-07-24
Payer: MEDICARE

## 2018-07-24 ENCOUNTER — APPOINTMENT (OUTPATIENT)
Dept: SLEEP CENTER | Facility: CLINIC | Age: 83
End: 2018-07-24
Payer: MEDICARE

## 2018-07-24 PROCEDURE — 95811 POLYSOM 6/>YRS CPAP 4/> PARM: CPT

## 2018-07-24 PROCEDURE — 95811 POLYSOM 6/>YRS CPAP 4/> PARM: CPT | Mod: 26

## 2018-07-25 DIAGNOSIS — G47.33 OBSTRUCTIVE SLEEP APNEA (ADULT) (PEDIATRIC): ICD-10-CM

## 2018-07-26 ENCOUNTER — APPOINTMENT (OUTPATIENT)
Dept: VASCULAR SURGERY | Facility: CLINIC | Age: 83
End: 2018-07-26
Payer: MEDICARE

## 2018-07-26 PROCEDURE — 99203 OFFICE O/P NEW LOW 30 MIN: CPT

## 2018-07-30 DIAGNOSIS — L81.9 DISORDER OF PIGMENTATION, UNSPECIFIED: ICD-10-CM

## 2018-08-01 ENCOUNTER — NON-APPOINTMENT (OUTPATIENT)
Age: 83
End: 2018-08-01

## 2018-08-01 ENCOUNTER — APPOINTMENT (OUTPATIENT)
Dept: CARDIOLOGY | Facility: CLINIC | Age: 83
End: 2018-08-01
Payer: MEDICARE

## 2018-08-01 VITALS
RESPIRATION RATE: 16 BRPM | WEIGHT: 219 LBS | SYSTOLIC BLOOD PRESSURE: 115 MMHG | BODY MASS INDEX: 35.35 KG/M2 | DIASTOLIC BLOOD PRESSURE: 63 MMHG | HEART RATE: 83 BPM | OXYGEN SATURATION: 97 %

## 2018-08-01 DIAGNOSIS — I25.10 ATHEROSCLEROTIC HEART DISEASE OF NATIVE CORONARY ARTERY W/OUT ANGINA PECTORIS: ICD-10-CM

## 2018-08-01 PROCEDURE — 99214 OFFICE O/P EST MOD 30 MIN: CPT

## 2018-08-01 PROCEDURE — 93000 ELECTROCARDIOGRAM COMPLETE: CPT

## 2018-08-01 RX ORDER — METOPROLOL SUCCINATE 100 MG/1
100 TABLET, EXTENDED RELEASE ORAL
Refills: 0 | Status: DISCONTINUED | COMMUNITY
End: 2018-08-01

## 2018-08-09 ENCOUNTER — RESULT REVIEW (OUTPATIENT)
Age: 83
End: 2018-08-09

## 2018-08-10 ENCOUNTER — APPOINTMENT (OUTPATIENT)
Dept: PULMONOLOGY | Facility: CLINIC | Age: 83
End: 2018-08-10
Payer: MEDICARE

## 2018-08-10 VITALS
HEART RATE: 86 BPM | OXYGEN SATURATION: 97 % | WEIGHT: 220 LBS | DIASTOLIC BLOOD PRESSURE: 75 MMHG | RESPIRATION RATE: 15 BRPM | SYSTOLIC BLOOD PRESSURE: 120 MMHG | HEIGHT: 66 IN | BODY MASS INDEX: 35.36 KG/M2

## 2018-08-10 DIAGNOSIS — E66.9 OBESITY, UNSPECIFIED: ICD-10-CM

## 2018-08-10 DIAGNOSIS — J45.909 UNSPECIFIED ASTHMA, UNCOMPLICATED: ICD-10-CM

## 2018-08-10 DIAGNOSIS — E55.9 VITAMIN D DEFICIENCY, UNSPECIFIED: ICD-10-CM

## 2018-08-10 DIAGNOSIS — G47.33 OBSTRUCTIVE SLEEP APNEA (ADULT) (PEDIATRIC): ICD-10-CM

## 2018-08-10 DIAGNOSIS — R93.8 ABNORMAL FINDINGS ON DIAGNOSTIC IMAGING OF OTHER SPECIFIED BODY STRUCTURES: ICD-10-CM

## 2018-08-10 DIAGNOSIS — K21.9 GASTRO-ESOPHAGEAL REFLUX DISEASE W/OUT ESOPHAGITIS: ICD-10-CM

## 2018-08-10 PROCEDURE — 99214 OFFICE O/P EST MOD 30 MIN: CPT

## 2018-08-13 ENCOUNTER — APPOINTMENT (OUTPATIENT)
Dept: CARDIOLOGY | Facility: CLINIC | Age: 83
End: 2018-08-13
Payer: MEDICARE

## 2018-08-13 VITALS
HEIGHT: 66 IN | DIASTOLIC BLOOD PRESSURE: 72 MMHG | BODY MASS INDEX: 35.36 KG/M2 | SYSTOLIC BLOOD PRESSURE: 113 MMHG | HEART RATE: 73 BPM | OXYGEN SATURATION: 98 % | WEIGHT: 220 LBS

## 2018-08-13 DIAGNOSIS — I10 ESSENTIAL (PRIMARY) HYPERTENSION: ICD-10-CM

## 2018-08-13 PROCEDURE — 99214 OFFICE O/P EST MOD 30 MIN: CPT

## 2018-09-13 ENCOUNTER — APPOINTMENT (OUTPATIENT)
Dept: SLEEP CENTER | Facility: CLINIC | Age: 83
End: 2018-09-13

## 2018-10-22 ENCOUNTER — APPOINTMENT (OUTPATIENT)
Dept: NEUROLOGY | Facility: CLINIC | Age: 83
End: 2018-10-22

## 2018-11-06 ENCOUNTER — NON-APPOINTMENT (OUTPATIENT)
Age: 83
End: 2018-11-06

## 2018-11-06 ENCOUNTER — APPOINTMENT (OUTPATIENT)
Dept: GERIATRICS | Facility: CLINIC | Age: 83
End: 2018-11-06
Payer: MEDICARE

## 2018-11-06 ENCOUNTER — INPATIENT (INPATIENT)
Facility: HOSPITAL | Age: 83
LOS: 3 days | Discharge: HOME | DRG: 306 | End: 2018-11-10
Attending: INTERNAL MEDICINE | Admitting: INTERNAL MEDICINE
Payer: MEDICARE

## 2018-11-06 VITALS
TEMPERATURE: 97.7 F | RESPIRATION RATE: 17 BRPM | BODY MASS INDEX: 35.84 KG/M2 | SYSTOLIC BLOOD PRESSURE: 140 MMHG | OXYGEN SATURATION: 97 % | WEIGHT: 223 LBS | DIASTOLIC BLOOD PRESSURE: 80 MMHG | HEIGHT: 66 IN | HEART RATE: 68 BPM

## 2018-11-06 VITALS
RESPIRATION RATE: 16 BRPM | OXYGEN SATURATION: 98 % | HEART RATE: 78 BPM | DIASTOLIC BLOOD PRESSURE: 80 MMHG | SYSTOLIC BLOOD PRESSURE: 130 MMHG

## 2018-11-06 DIAGNOSIS — R06.00 DYSPNEA, UNSPECIFIED: ICD-10-CM

## 2018-11-06 DIAGNOSIS — R07.9 CHEST PAIN, UNSPECIFIED: ICD-10-CM

## 2018-11-06 DIAGNOSIS — G47.33 OBSTRUCTIVE SLEEP APNEA (ADULT) (PEDIATRIC): ICD-10-CM

## 2018-11-06 DIAGNOSIS — J44.9 CHRONIC OBSTRUCTIVE PULMONARY DISEASE, UNSPECIFIED: ICD-10-CM

## 2018-11-06 DIAGNOSIS — I50.9 HEART FAILURE, UNSPECIFIED: ICD-10-CM

## 2018-11-06 LAB
ALBUMIN SERPL ELPH-MCNC: 4.1 G/DL — SIGNIFICANT CHANGE UP (ref 3.3–5)
ALP SERPL-CCNC: 105 U/L — SIGNIFICANT CHANGE UP (ref 40–120)
ALT FLD-CCNC: 38 U/L — SIGNIFICANT CHANGE UP (ref 10–45)
ANION GAP SERPL CALC-SCNC: 11 MMOL/L — SIGNIFICANT CHANGE UP (ref 5–17)
AST SERPL-CCNC: 31 U/L — SIGNIFICANT CHANGE UP (ref 10–40)
BASE EXCESS BLDV CALC-SCNC: 2 MMOL/L — SIGNIFICANT CHANGE UP (ref -2–2)
BASOPHILS # BLD AUTO: 0.1 K/UL — SIGNIFICANT CHANGE UP (ref 0–0.2)
BASOPHILS NFR BLD AUTO: 0.8 % — SIGNIFICANT CHANGE UP (ref 0–2)
BILIRUB SERPL-MCNC: 0.7 MG/DL — SIGNIFICANT CHANGE UP (ref 0.2–1.2)
BUN SERPL-MCNC: 27 MG/DL — HIGH (ref 7–23)
CA-I SERPL-SCNC: 1.36 MMOL/L — HIGH (ref 1.12–1.3)
CALCIUM SERPL-MCNC: 10 MG/DL — SIGNIFICANT CHANGE UP (ref 8.4–10.5)
CHLORIDE BLDV-SCNC: 108 MMOL/L — SIGNIFICANT CHANGE UP (ref 96–108)
CHLORIDE SERPL-SCNC: 108 MMOL/L — SIGNIFICANT CHANGE UP (ref 96–108)
CK MB BLD-MCNC: 4.7 % — HIGH (ref 0–3.5)
CK MB CFR SERPL CALC: 5.3 NG/ML — SIGNIFICANT CHANGE UP (ref 0–6.7)
CK SERPL-CCNC: 112 U/L — SIGNIFICANT CHANGE UP (ref 30–200)
CO2 BLDV-SCNC: 28 MMOL/L — SIGNIFICANT CHANGE UP (ref 22–30)
CO2 SERPL-SCNC: 26 MMOL/L — SIGNIFICANT CHANGE UP (ref 22–31)
CREAT SERPL-MCNC: 1.26 MG/DL — SIGNIFICANT CHANGE UP (ref 0.5–1.3)
EOSINOPHIL # BLD AUTO: 0.1 K/UL — SIGNIFICANT CHANGE UP (ref 0–0.5)
EOSINOPHIL NFR BLD AUTO: 0.9 % — SIGNIFICANT CHANGE UP (ref 0–6)
GAS PNL BLDV: 143 MMOL/L — SIGNIFICANT CHANGE UP (ref 136–145)
GAS PNL BLDV: SIGNIFICANT CHANGE UP
GAS PNL BLDV: SIGNIFICANT CHANGE UP
GLUCOSE BLDV-MCNC: 94 MG/DL — SIGNIFICANT CHANGE UP (ref 70–99)
GLUCOSE SERPL-MCNC: 97 MG/DL — SIGNIFICANT CHANGE UP (ref 70–99)
HCO3 BLDV-SCNC: 27 MMOL/L — SIGNIFICANT CHANGE UP (ref 21–29)
HCT VFR BLD CALC: 38.6 % — LOW (ref 39–50)
HCT VFR BLDA CALC: 42 % — SIGNIFICANT CHANGE UP (ref 39–50)
HGB BLD CALC-MCNC: 13.6 G/DL — SIGNIFICANT CHANGE UP (ref 13–17)
HGB BLD-MCNC: 13.4 G/DL — SIGNIFICANT CHANGE UP (ref 13–17)
LACTATE BLDV-MCNC: 1.7 MMOL/L — SIGNIFICANT CHANGE UP (ref 0.7–2)
LYMPHOCYTES # BLD AUTO: 1.1 K/UL — SIGNIFICANT CHANGE UP (ref 1–3.3)
LYMPHOCYTES # BLD AUTO: 10.8 % — LOW (ref 13–44)
MCHC RBC-ENTMCNC: 31 PG — SIGNIFICANT CHANGE UP (ref 27–34)
MCHC RBC-ENTMCNC: 34.8 GM/DL — SIGNIFICANT CHANGE UP (ref 32–36)
MCV RBC AUTO: 89 FL — SIGNIFICANT CHANGE UP (ref 80–100)
MONOCYTES # BLD AUTO: 0.7 K/UL — SIGNIFICANT CHANGE UP (ref 0–0.9)
MONOCYTES NFR BLD AUTO: 7.2 % — SIGNIFICANT CHANGE UP (ref 2–14)
NEUTROPHILS # BLD AUTO: 8.3 K/UL — HIGH (ref 1.8–7.4)
NEUTROPHILS NFR BLD AUTO: 80.3 % — HIGH (ref 43–77)
NT-PROBNP SERPL-SCNC: 4129 PG/ML — HIGH (ref 0–300)
PCO2 BLDV: 46 MMHG — SIGNIFICANT CHANGE UP (ref 35–50)
PH BLDV: 7.39 — SIGNIFICANT CHANGE UP (ref 7.35–7.45)
PLATELET # BLD AUTO: 159 K/UL — SIGNIFICANT CHANGE UP (ref 150–400)
PO2 BLDV: <50 MMHG — SIGNIFICANT CHANGE UP (ref 25–45)
POTASSIUM BLDV-SCNC: 4.2 MMOL/L — SIGNIFICANT CHANGE UP (ref 3.5–5.3)
POTASSIUM SERPL-MCNC: 4.4 MMOL/L — SIGNIFICANT CHANGE UP (ref 3.5–5.3)
POTASSIUM SERPL-SCNC: 4.4 MMOL/L — SIGNIFICANT CHANGE UP (ref 3.5–5.3)
PROT SERPL-MCNC: 6.4 G/DL — SIGNIFICANT CHANGE UP (ref 6–8.3)
RAPID RVP RESULT: SIGNIFICANT CHANGE UP
RBC # BLD: 4.34 M/UL — SIGNIFICANT CHANGE UP (ref 4.2–5.8)
RBC # FLD: 13.5 % — SIGNIFICANT CHANGE UP (ref 10.3–14.5)
SAO2 % BLDV: 74 % — SIGNIFICANT CHANGE UP (ref 67–88)
SODIUM SERPL-SCNC: 145 MMOL/L — SIGNIFICANT CHANGE UP (ref 135–145)
TROPONIN T, HIGH SENSITIVITY RESULT: 68 NG/L — HIGH (ref 0–51)
TROPONIN T, HIGH SENSITIVITY RESULT: 70 NG/L — HIGH (ref 0–51)
TROPONIN T, HIGH SENSITIVITY RESULT: 70 NG/L — HIGH (ref 0–51)
WBC # BLD: 10.3 K/UL — SIGNIFICANT CHANGE UP (ref 3.8–10.5)
WBC # FLD AUTO: 10.3 K/UL — SIGNIFICANT CHANGE UP (ref 3.8–10.5)

## 2018-11-06 PROCEDURE — 99285 EMERGENCY DEPT VISIT HI MDM: CPT | Mod: GC

## 2018-11-06 PROCEDURE — 71045 X-RAY EXAM CHEST 1 VIEW: CPT | Mod: 26

## 2018-11-06 PROCEDURE — 99215 OFFICE O/P EST HI 40 MIN: CPT | Mod: PD

## 2018-11-06 PROCEDURE — 99223 1ST HOSP IP/OBS HIGH 75: CPT | Mod: GC

## 2018-11-06 RX ORDER — SPIRONOLACTONE 25 MG/1
1 TABLET, FILM COATED ORAL
Qty: 0 | Refills: 0 | COMMUNITY

## 2018-11-06 RX ORDER — ASPIRIN/CALCIUM CARB/MAGNESIUM 324 MG
162 TABLET ORAL ONCE
Qty: 0 | Refills: 0 | Status: COMPLETED | OUTPATIENT
Start: 2018-11-06 | End: 2018-11-06

## 2018-11-06 RX ORDER — METOPROLOL TARTRATE 50 MG
0.5 TABLET ORAL
Qty: 0 | Refills: 0 | COMMUNITY

## 2018-11-06 RX ORDER — TAMSULOSIN HYDROCHLORIDE 0.4 MG/1
1 CAPSULE ORAL
Qty: 0 | Refills: 0 | COMMUNITY

## 2018-11-06 RX ORDER — SPIRONOLACTONE 25 MG/1
25 TABLET, FILM COATED ORAL DAILY
Qty: 0 | Refills: 0 | Status: DISCONTINUED | OUTPATIENT
Start: 2018-11-06 | End: 2018-11-10

## 2018-11-06 RX ORDER — VALSARTAN 80 MG/1
2 TABLET ORAL
Qty: 0 | Refills: 0 | COMMUNITY

## 2018-11-06 RX ORDER — PANTOPRAZOLE SODIUM 20 MG/1
40 TABLET, DELAYED RELEASE ORAL
Qty: 0 | Refills: 0 | Status: DISCONTINUED | OUTPATIENT
Start: 2018-11-06 | End: 2018-11-10

## 2018-11-06 RX ORDER — TAMSULOSIN HYDROCHLORIDE 0.4 MG/1
0.4 CAPSULE ORAL AT BEDTIME
Qty: 0 | Refills: 0 | Status: DISCONTINUED | OUTPATIENT
Start: 2018-11-06 | End: 2018-11-10

## 2018-11-06 RX ORDER — ASPIRIN/CALCIUM CARB/MAGNESIUM 324 MG
81 TABLET ORAL DAILY
Qty: 0 | Refills: 0 | Status: DISCONTINUED | OUTPATIENT
Start: 2018-11-06 | End: 2018-11-08

## 2018-11-06 RX ORDER — APIXABAN 2.5 MG/1
5 TABLET, FILM COATED ORAL EVERY 12 HOURS
Qty: 0 | Refills: 0 | Status: DISCONTINUED | OUTPATIENT
Start: 2018-11-06 | End: 2018-11-10

## 2018-11-06 RX ORDER — FUROSEMIDE 40 MG
40 TABLET ORAL DAILY
Qty: 0 | Refills: 0 | Status: DISCONTINUED | OUTPATIENT
Start: 2018-11-06 | End: 2018-11-08

## 2018-11-06 RX ORDER — AMIODARONE HYDROCHLORIDE 400 MG/1
200 TABLET ORAL
Qty: 0 | Refills: 0 | Status: DISCONTINUED | OUTPATIENT
Start: 2018-11-06 | End: 2018-11-09

## 2018-11-06 RX ORDER — IPRATROPIUM/ALBUTEROL SULFATE 18-103MCG
3 AEROSOL WITH ADAPTER (GRAM) INHALATION EVERY 6 HOURS
Qty: 0 | Refills: 0 | Status: DISCONTINUED | OUTPATIENT
Start: 2018-11-06 | End: 2018-11-10

## 2018-11-06 RX ORDER — CLOPIDOGREL BISULFATE 75 MG/1
75 TABLET, FILM COATED ORAL DAILY
Qty: 0 | Refills: 0 | Status: DISCONTINUED | OUTPATIENT
Start: 2018-11-06 | End: 2018-11-10

## 2018-11-06 RX ORDER — ALBUTEROL 90 UG/1
2 AEROSOL, METERED ORAL
Qty: 0 | Refills: 0 | COMMUNITY

## 2018-11-06 RX ORDER — METOPROLOL TARTRATE 50 MG
50 TABLET ORAL DAILY
Qty: 0 | Refills: 0 | Status: DISCONTINUED | OUTPATIENT
Start: 2018-11-06 | End: 2018-11-10

## 2018-11-06 RX ORDER — FINASTERIDE 5 MG/1
5 TABLET, FILM COATED ORAL DAILY
Qty: 0 | Refills: 0 | Status: DISCONTINUED | OUTPATIENT
Start: 2018-11-06 | End: 2018-11-10

## 2018-11-06 RX ORDER — ATORVASTATIN CALCIUM 80 MG/1
40 TABLET, FILM COATED ORAL AT BEDTIME
Qty: 0 | Refills: 0 | Status: DISCONTINUED | OUTPATIENT
Start: 2018-11-06 | End: 2018-11-10

## 2018-11-06 RX ORDER — PANTOPRAZOLE SODIUM 20 MG/1
1 TABLET, DELAYED RELEASE ORAL
Qty: 0 | Refills: 0 | COMMUNITY

## 2018-11-06 RX ADMIN — Medication 3 MILLILITER(S): at 23:01

## 2018-11-06 RX ADMIN — Medication 3 MILLILITER(S): at 19:34

## 2018-11-06 RX ADMIN — TAMSULOSIN HYDROCHLORIDE 0.4 MILLIGRAM(S): 0.4 CAPSULE ORAL at 22:39

## 2018-11-06 RX ADMIN — AMIODARONE HYDROCHLORIDE 200 MILLIGRAM(S): 400 TABLET ORAL at 19:34

## 2018-11-06 RX ADMIN — APIXABAN 5 MILLIGRAM(S): 2.5 TABLET, FILM COATED ORAL at 19:34

## 2018-11-06 RX ADMIN — Medication 162 MILLIGRAM(S): at 14:51

## 2018-11-06 NOTE — CONSULT NOTE ADULT - ASSESSMENT
87 M with history of CAD s/p PCI (2018 - approx 6 months ago and 2010) on Eliquis, HFpEF, COPD (former smoker) on home O2, and DIEGO presented with chest pain and dyspnea starting this morning now symptoms resolved. From a respiratory standpoint his COPD is under control. Suspect symptoms are cardiac related. Mild CHF exacerbation given elevated pBNP / troponins. BP also elevated, particularly diastolic.

## 2018-11-06 NOTE — CONSULT NOTE ADULT - ASSESSMENT
SOB  acute CHF unspecified   rule out viral syndrome as trigger factor given cough with phlegm  obtain echo to eval LV function   CHF consult Dr Kendrick is called    HTN  cont current meds for now     afib  HR stable  intermittently paced  cont a/c   pt on amio  consider EP eval  check am tsh     cad  history of stent  DAPT with eliquis baed on recent trial may impose higher bleed risk   consider DC asa  cont statin   pt with recent stress test with primary cardiology dr devine   please obtain results

## 2018-11-06 NOTE — H&P ADULT - NSHPSOCIALHISTORY_GEN_ALL_CORE
Social History:    Marital Status:  (   )    (   ) Single    (   )    ( x )   Occupation:   Lives with: ( x ) alone  (  ) children   (  ) spouse   (  ) parents  (  ) other    Substance Use (street drugs): ( x ) never used  (  ) other:  Tobacco Usage:  ( x  ) never smoked   (   ) former smoker   (   ) current smoker  (     ) pack years  (        ) last cigarette date  Alcohol Usage: denies    (     ) Advanced Directives: (     ) None    (      ) DNR    (     ) DNI    (     ) Health Care Proxy:

## 2018-11-06 NOTE — H&P ADULT - ASSESSMENT
87 m with  Acute on Chronic Systolic Congestive Heart Failure - diurese, telemetry, cardiac enzymes,  Cardiology evaluation, Heart failure team evaluation  Echocardiogram  PPM interrogation  COPD- nebs, Pulmonary evaluation called Dr. Mack  CAD (coronary artery disease)  s/p stent 2010- stable  Hypertension - control  Further action as per clinical course  Jose Nixon MD pager [phone]

## 2018-11-06 NOTE — CONSULT NOTE ADULT - PROBLEM SELECTOR RECOMMENDATION 9
Symbicort BID  Can use Spiriva while inpatient (uses Incruse at home)  Duonebs PRN  Supplemental O2 to maintain SpO2 >88%

## 2018-11-06 NOTE — ED PROVIDER NOTE - MEDICAL DECISION MAKING DETAILS
87M CHF vs COPD exacerbation, assess trops given worsened dyspnea on exertion, cardiology w.u, dispo likely stay

## 2018-11-06 NOTE — ED PROVIDER NOTE - OBJECTIVE STATEMENT
Pt is an 87 y/o RH  man w/ PMH CAD w/ stents x2 (last ~6mo ago & 2010), pAfib (on Eliquis), diastolic CHF, PPM (placed ~7r ago), former smoker p/w chest pain s/p nitro 87M hx PMH CAD w/ stents x2 (last ~6mo ago & 2010), pAfib (on Eliquis), diastolic CHF, PPM (placed ~7r ago), former smoker w/ COPD (inhalers- w/ home oxygen) p/w chest pain s/p nitro, normally can ambulate a few blocks, now fatigues even with going down stairs, has been feeling well x 2 weeks, with asssociated light headedness, had normal nuclear stress test 3 months ago     PCP Kathleen Vivas   Cardiologist: Don Vivas  Pulmonologist Dr Matson 87M hx PMH CAD w/ stents x2 (last ~6mo ago & 2010), pAfib (on Eliquis), diastolic CHF, PPM (placed ~7r ago), former smoker w/ COPD (inhalers- w/ home oxygen) p/w chest pain s/p nitro, normally can ambulate a few blocks, now fatigues even with going down stairs, has been feeling well x 2 weeks, with asssociated light headedness, had normal nuclear stress test 3 months ago. endorses worsened diet w/ a lot of dining out     PCP Kathleen Vivas   Cardiologist: Don Vivas  Pulmonologist Dr Matson 87M hx PMH CAD w/ stents x2 (last ~6mo ago & 2010), pAfib (on Eliquis), diastolic CHF, PPM (placed ~7r ago), former smoker w/ COPD (inhalers- w/ home oxygen) p/w chest pain s/p nitro, normally can ambulate a few blocks, now fatigues even with going down stairs, has been feeling well x 2 weeks, with asssociated light headedness, had normal nuclear stress test 3 months ago. endorses worsened diet w/ a lot of dining out     PCP Kathleen Vivas   Cardiologist: Don Kendrick for HF   Santosh [phone]  Pulmonologist Dr Matson 87M hx PMH CAD w/ stents x2 (last ~6mo ago & 2010), pAfib (on Eliquis), diastolic CHF, PPM (placed ~7r ago), former smoker w/ COPD (inhalers- w/ home oxygen) p/w chest pain s/p nitro, normally can ambulate a few blocks, now fatigues even with going down stairs, has been feeling well x 2 weeks, with asssociated light headedness, had normal nuclear stress test 3 months ago. endorses worsened diet w/ a lot of dining out. Received ASA 162mg by EMS, Per pt was in such resp distress at PCP's office that it was deemed likely he may require intubation in the ED but now pt feels improved     PCP Kathleen Vivas   Cardiologist: Don Kendrick for HF   Santosh [phone]  Pulmonologist Dr Matson 87M hx PMH CAD w/ stents x2 (last ~6mo ago & 2010), pAfib (on Eliquis), diastolic CHF, PPM (placed ~7r ago), former smoker w/ COPD (inhalers- w/ home oxygen) p/w chest pain s/p nitro, normally can ambulate a few blocks, now fatigues even with going down stairs, has been feeling well x 2 weeks, with asssociated light headedness, had normal nuclear stress test 3 months ago. endorses worsened diet w/ a lot of dining out. Received ASA 162mg by EMS, Per pt was in such resp distress at PCP's office that it was deemed likely he may require intubation in the ED but now pt feels improved     PCP Kathleen Vivas / Dr Dickerson at [facility]   Cardiologist: Don Vivas / Dr Kendrick for HF     Santosh [phone]  Pulmonologist Dr Matson 87M hx PMH CAD w/ stents x2 (last ~6mo ago & 2010), pAfib (on Eliquis), diastolic CHF, AS, PPM (placed ~7r ago), former smoker w/ COPD (inhalers- w/ home oxygen) p/w chest pain s/p nitro, normally can ambulate a few blocks, now fatigues even with going down stairs, has been feeling well x 2 weeks, but now has significant sob with asssociated light headedness, had normal nuclear stress test 3 months ago. endorses worsened diet w/ a lot of dining out. Received ASA 162mg by EMS, Per pt, he was in such resp distress at PCP's office that it was deemed likely he may require intubation in the ED but now pt feels improved     PCP Kathleen Vivas / Dr Dickerson at [facility]   Cardiologist: Don Vivas / Dr Kendrick for HF     Santosh [phone]  Pulmonologist Dr Matson

## 2018-11-06 NOTE — CONSULT NOTE ADULT - PROBLEM SELECTOR RECOMMENDATION 2
Severe DIEGO does not use CPAP/BiPAP at home  If symptomatic can use BiPAP as needed given heart failure

## 2018-11-06 NOTE — H&P ADULT - NSHPPHYSICALEXAM_GEN_ALL_CORE
PHYSICAL EXAMINATION:  Vital Signs Last 24 Hrs  T(C): 36.7 (06 Nov 2018 15:03), Max: 37.2 (06 Nov 2018 12:02)  T(F): 98.1 (06 Nov 2018 15:03), Max: 98.9 (06 Nov 2018 12:02)  HR: 66 (06 Nov 2018 15:03) (66 - 78)  BP: 147/88 (06 Nov 2018 15:03) (130/80 - 147/88)  BP(mean): --  RR: 20 (06 Nov 2018 15:03) (16 - 20)  SpO2: 97% (06 Nov 2018 15:03) (97% - 99%)  CAPILLARY BLOOD GLUCOSE          GENERAL: NAD  HEAD:  atraumatic, normocephalic  EYES: sclera anicteric  ENMT: mucous membranes moist  NECK: supple, No JVD  CHEST/LUNG: few basilar rales to auscultation bilaterally;   HEART: normal S1, S2  ABDOMEN: BS+, soft, ND, NT   EXTREMITIES:  2+ edema b/l LEs  NEURO: awake, alert, interactive; moves all extremities  SKIN: no rashes or lesions

## 2018-11-06 NOTE — CONSULT NOTE ADULT - PROBLEM SELECTOR RECOMMENDATION 3
Cardiology following  Diuresis and blood pressure control per primary team Now resolved  Trend troponin levels

## 2018-11-06 NOTE — ED ADULT NURSE NOTE - OBJECTIVE STATEMENT
87 year old male presents to the ED complaining of worsening SOB and dyspnea on minimal exertion. As per patient for the last week he's having difficulty walking down steps, at baseline able to walk a few blocks. Pt has known COPD and diastolic CHF with home O2. Pt speaking in full sentences at rest, sleeps on 3 pillows (this is his baseline). Pt is A&O x 4, VSS, afebrile, ambulating independently. Pt denies fever, chills, NVD, SOB, or chest pain. As per EMS the Pt had CP while at his MDs office today and they gave him one does of sublingual Nitro enroute, Pt pain at 1 on arrival in ED. Pt has 2 stents, last one placed x 6months ago. Pt placed on CM, EKG completed and given to attending, rhythm shows cardiac pacemaker. son at bedside, Pt sating 99% at rest on RA. Pt is one 40mg oral lasix at home, states he's been eating out a lot more recently.

## 2018-11-06 NOTE — ED PROVIDER NOTE - PHYSICAL EXAMINATION
Gen: AAOx3, NAD, on RA   Head: NCAT  ENT: Airway patent, moist mucous membranes, nasal passageways clear  Cardiac: Normal rate, normal rhythm, + systolic murmur  Respiratory: left sided crackles   Gastrointestinal: +BS, Abdomen soft, nontender, nondistended, no rebound, no guarding, no organomegaly   MSK: No gross abnormalities, FROM of all four extremities, 1+ pitting edema b/l LE   HEME: Extremities warm, pulses intact and symmetrical in all four extremities  Skin: No rashes, no lesions  Neuro: No gross neurologic deficits,

## 2018-11-06 NOTE — H&P ADULT - HISTORY OF PRESENT ILLNESS
87M hx PMH CAD w/ stents x2 (last ~6mo ago & 2010), pAfib (on Eliquis), diastolic CHF, PPM (placed ~7r ago), former smoker w/ COPD (inhalers- w/ home oxygen) p/w chest pain s/p nitro, normally can ambulate a few blocks, now fatigues even with going down stairs, has been feeling well x 2 weeks, with asssociated light headedness, had normal nuclear stress test 3 months ago. endorses worsened diet w/ a lot of dining out. Received ASA 162mg by EMS, Per pt was in such resp distress at PCP's office that it was deemed likely he may require intubation in the ED but now pt feels improved

## 2018-11-06 NOTE — H&P ADULT - NSHPLABSRESULTS_GEN_ALL_CORE
13.4   10.3  )-----------( 159      ( 06 Nov 2018 12:11 )             38.6       11-06    145  |  108  |  27<H>  ----------------------------<  97  4.4   |  26  |  1.26    Ca    10.0      06 Nov 2018 12:11    TPro  6.4  /  Alb  4.1  /  TBili  0.7  /  DBili  x   /  AST  31  /  ALT  38  /  AlkPhos  105  11-06              < from: Xray Chest 1 View AP/PA (11.06.18 @ 14:07) >    Impression:    The heart is slightly enlarged. The lungs appear to be clear. No   radiographic evidence for congestive heart appear. A pacer is in good   position. No pneumothorax.    < end of copied text >            Lactate Trend            CAPILLARY BLOOD GLUCOSE

## 2018-11-06 NOTE — ED PROVIDER NOTE - NS ED ROS FT
CONST: no fevers, no chills, +weight carol, + gen weakness, no appetite changes  EYES: no pain, vision loss/dipoplia/decreased vision  ENT: no sore throat, no cough  CV: no chest pain, no palpitations  RESP: + shortness of breath  ABD: no abdominal pain, no nausea, no vomiting  : no dysuria, increased frequency, or hematuria  MSK: no back pain  NEURO: no headache or additional neurologic complaints  HEME: no easy bleeding  SKIN:  no rash

## 2018-11-07 ENCOUNTER — APPOINTMENT (OUTPATIENT)
Dept: CARDIOLOGY | Facility: CLINIC | Age: 83
End: 2018-11-07

## 2018-11-07 ENCOUNTER — TRANSCRIPTION ENCOUNTER (OUTPATIENT)
Age: 83
End: 2018-11-07

## 2018-11-07 DIAGNOSIS — Z29.9 ENCOUNTER FOR PROPHYLACTIC MEASURES, UNSPECIFIED: ICD-10-CM

## 2018-11-07 DIAGNOSIS — E66.9 OBESITY, UNSPECIFIED: ICD-10-CM

## 2018-11-07 DIAGNOSIS — K21.9 GASTRO-ESOPHAGEAL REFLUX DISEASE WITHOUT ESOPHAGITIS: ICD-10-CM

## 2018-11-07 LAB
ANION GAP SERPL CALC-SCNC: 15 MMOL/L — SIGNIFICANT CHANGE UP (ref 5–17)
BUN SERPL-MCNC: 26 MG/DL — HIGH (ref 7–23)
CALCIUM SERPL-MCNC: 10.3 MG/DL — SIGNIFICANT CHANGE UP (ref 8.4–10.5)
CHLORIDE SERPL-SCNC: 105 MMOL/L — SIGNIFICANT CHANGE UP (ref 96–108)
CO2 SERPL-SCNC: 24 MMOL/L — SIGNIFICANT CHANGE UP (ref 22–31)
CREAT SERPL-MCNC: 1.23 MG/DL — SIGNIFICANT CHANGE UP (ref 0.5–1.3)
GLUCOSE SERPL-MCNC: 96 MG/DL — SIGNIFICANT CHANGE UP (ref 70–99)
HCT VFR BLD CALC: 39.9 % — SIGNIFICANT CHANGE UP (ref 39–50)
HGB BLD-MCNC: 13.5 G/DL — SIGNIFICANT CHANGE UP (ref 13–17)
MCHC RBC-ENTMCNC: 30.3 PG — SIGNIFICANT CHANGE UP (ref 27–34)
MCHC RBC-ENTMCNC: 33.8 GM/DL — SIGNIFICANT CHANGE UP (ref 32–36)
MCV RBC AUTO: 89.5 FL — SIGNIFICANT CHANGE UP (ref 80–100)
PLATELET # BLD AUTO: 150 K/UL — SIGNIFICANT CHANGE UP (ref 150–400)
POTASSIUM SERPL-MCNC: 4.5 MMOL/L — SIGNIFICANT CHANGE UP (ref 3.5–5.3)
POTASSIUM SERPL-SCNC: 4.5 MMOL/L — SIGNIFICANT CHANGE UP (ref 3.5–5.3)
RBC # BLD: 4.46 M/UL — SIGNIFICANT CHANGE UP (ref 4.2–5.8)
RBC # FLD: 15.1 % — HIGH (ref 10.3–14.5)
SODIUM SERPL-SCNC: 144 MMOL/L — SIGNIFICANT CHANGE UP (ref 135–145)
TROPONIN T, HIGH SENSITIVITY RESULT: 68 NG/L — HIGH (ref 0–51)
TSH SERPL-MCNC: 2.51 UIU/ML — SIGNIFICANT CHANGE UP (ref 0.27–4.2)
WBC # BLD: 10.46 K/UL — SIGNIFICANT CHANGE UP (ref 3.8–10.5)
WBC # FLD AUTO: 10.46 K/UL — SIGNIFICANT CHANGE UP (ref 3.8–10.5)

## 2018-11-07 PROCEDURE — 99232 SBSQ HOSP IP/OBS MODERATE 35: CPT

## 2018-11-07 PROCEDURE — 93306 TTE W/DOPPLER COMPLETE: CPT | Mod: 26

## 2018-11-07 RX ORDER — TIOTROPIUM BROMIDE 18 UG/1
1 CAPSULE ORAL; RESPIRATORY (INHALATION) DAILY
Qty: 0 | Refills: 0 | Status: DISCONTINUED | OUTPATIENT
Start: 2018-11-07 | End: 2018-11-10

## 2018-11-07 RX ORDER — BUDESONIDE AND FORMOTEROL FUMARATE DIHYDRATE 160; 4.5 UG/1; UG/1
2 AEROSOL RESPIRATORY (INHALATION)
Qty: 0 | Refills: 0 | Status: DISCONTINUED | OUTPATIENT
Start: 2018-11-07 | End: 2018-11-10

## 2018-11-07 RX ORDER — DOCUSATE SODIUM 100 MG
100 CAPSULE ORAL DAILY
Qty: 0 | Refills: 0 | Status: DISCONTINUED | OUTPATIENT
Start: 2018-11-07 | End: 2018-11-10

## 2018-11-07 RX ORDER — BUDESONIDE AND FORMOTEROL FUMARATE DIHYDRATE 160; 4.5 UG/1; UG/1
2 AEROSOL RESPIRATORY (INHALATION)
Qty: 0 | Refills: 0 | Status: DISCONTINUED | OUTPATIENT
Start: 2018-11-07 | End: 2018-11-07

## 2018-11-07 RX ADMIN — AMIODARONE HYDROCHLORIDE 200 MILLIGRAM(S): 400 TABLET ORAL at 05:13

## 2018-11-07 RX ADMIN — TAMSULOSIN HYDROCHLORIDE 0.4 MILLIGRAM(S): 0.4 CAPSULE ORAL at 21:35

## 2018-11-07 RX ADMIN — FINASTERIDE 5 MILLIGRAM(S): 5 TABLET, FILM COATED ORAL at 11:32

## 2018-11-07 RX ADMIN — Medication 81 MILLIGRAM(S): at 11:33

## 2018-11-07 RX ADMIN — Medication 3 MILLILITER(S): at 11:32

## 2018-11-07 RX ADMIN — APIXABAN 5 MILLIGRAM(S): 2.5 TABLET, FILM COATED ORAL at 17:47

## 2018-11-07 RX ADMIN — Medication 40 MILLIGRAM(S): at 05:13

## 2018-11-07 RX ADMIN — BUDESONIDE AND FORMOTEROL FUMARATE DIHYDRATE 2 PUFF(S): 160; 4.5 AEROSOL RESPIRATORY (INHALATION) at 19:25

## 2018-11-07 RX ADMIN — Medication 100 MILLIGRAM(S): at 22:09

## 2018-11-07 RX ADMIN — Medication 3 MILLILITER(S): at 05:18

## 2018-11-07 RX ADMIN — Medication 50 MILLIGRAM(S): at 05:13

## 2018-11-07 RX ADMIN — SPIRONOLACTONE 25 MILLIGRAM(S): 25 TABLET, FILM COATED ORAL at 05:13

## 2018-11-07 RX ADMIN — Medication 3 MILLILITER(S): at 17:47

## 2018-11-07 RX ADMIN — TIOTROPIUM BROMIDE 1 CAPSULE(S): 18 CAPSULE ORAL; RESPIRATORY (INHALATION) at 11:33

## 2018-11-07 RX ADMIN — AMIODARONE HYDROCHLORIDE 200 MILLIGRAM(S): 400 TABLET ORAL at 17:47

## 2018-11-07 RX ADMIN — ATORVASTATIN CALCIUM 40 MILLIGRAM(S): 80 TABLET, FILM COATED ORAL at 21:35

## 2018-11-07 RX ADMIN — PANTOPRAZOLE SODIUM 40 MILLIGRAM(S): 20 TABLET, DELAYED RELEASE ORAL at 05:13

## 2018-11-07 RX ADMIN — CLOPIDOGREL BISULFATE 75 MILLIGRAM(S): 75 TABLET, FILM COATED ORAL at 11:33

## 2018-11-07 RX ADMIN — APIXABAN 5 MILLIGRAM(S): 2.5 TABLET, FILM COATED ORAL at 05:14

## 2018-11-07 NOTE — PROGRESS NOTE ADULT - SUBJECTIVE AND OBJECTIVE BOX
Patient is a 87y old  Male who presents with a chief complaint of sob (07 Nov 2018 17:16)      SUBJECTIVE / OVERNIGHT EVENTS: No new complaints.   Review of Systems  chest pain no  palpitations no  sob yes  nausea no  headache no    MEDICATIONS  (STANDING):  ALBUTerol/ipratropium for Nebulization 3 milliLiter(s) Nebulizer every 6 hours  amiodarone    Tablet 200 milliGRAM(s) Oral two times a day  apixaban 5 milliGRAM(s) Oral every 12 hours  aspirin enteric coated 81 milliGRAM(s) Oral daily  atorvastatin 40 milliGRAM(s) Oral at bedtime  buDESOnide 160 MICROgram(s)/formoterol 4.5 MICROgram(s) Inhaler 2 Puff(s) Inhalation two times a day  clopidogrel Tablet 75 milliGRAM(s) Oral daily  finasteride 5 milliGRAM(s) Oral daily  furosemide   Injectable 40 milliGRAM(s) IV Push daily  metoprolol succinate ER 50 milliGRAM(s) Oral daily  pantoprazole    Tablet 40 milliGRAM(s) Oral before breakfast  spironolactone 25 milliGRAM(s) Oral daily  tamsulosin 0.4 milliGRAM(s) Oral at bedtime  tiotropium 18 MICROgram(s) Capsule 1 Capsule(s) Inhalation daily    MEDICATIONS  (PRN):      Vital Signs Last 24 Hrs  T(C): 37.1 (07 Nov 2018 12:27), Max: 37.1 (07 Nov 2018 12:27)  T(F): 98.7 (07 Nov 2018 12:27), Max: 98.7 (07 Nov 2018 12:27)  HR: 71 (07 Nov 2018 12:27) (55 - 71)  BP: 135/81 (07 Nov 2018 12:27) (127/75 - 157/63)  BP(mean): --  RR: 18 (07 Nov 2018 12:27) (18 - 18)  SpO2: 95% (07 Nov 2018 12:27) (93% - 96%)    PHYSICAL EXAM:  GENERAL: NAD, well-developed  HEAD:  Atraumatic, Normocephalic  EYES: EOMI, PERRLA, conjunctiva and sclera clear  NECK: Supple, No JVD  CHEST/LUNG: Clear to auscultation bilaterally; No wheeze  HEART: Regular rate and rhythm; No murmurs, rubs, or gallops  ABDOMEN: Soft, Nontender, Nondistended; Bowel sounds present  EXTREMITIES:  2+ Peripheral Pulses, No clubbing, cyanosis, or edema  PSYCH: AAOx3  NEUROLOGY: non-focal  SKIN: No rashes or lesions    LABS:                        13.5   10.46 )-----------( 150      ( 07 Nov 2018 08:24 )             39.9     11-07    144  |  105  |  26<H>  ----------------------------<  96  4.5   |  24  |  1.23    Ca    10.3      07 Nov 2018 06:09    TPro  6.4  /  Alb  4.1  /  TBili  0.7  /  DBili  x   /  AST  31  /  ALT  38  /  AlkPhos  105  11-06      CARDIAC MARKERS ( 06 Nov 2018 19:45 )  x     / x     / 112 U/L / x     / 5.3 ng/mL      < from: Transthoracic Echocardiogram (11.07.18 @ 14:58) >  Conclusions:  1. Calcified aortic valve with decreased opening. Peak  transaortic valve gradient equals 55 mm Hg, mean  transaortic valve gradient equals 31 mm Hg, estimated  aortic valve area equals 0.7 sqcm (by continuity equation),  aortic valve velocity time integral equals 76 cm,  consistent with severe aortic stenosis.  2. Severely dilated left atrium.  LA volume index = 73  cc/m2.  3. Severe concentric left ventricular hypertrophy.  4. Moderate global left ventricular systolic dysfunction.  5. Estimated pulmonary artery systolic pressure equals 54  mm Hg, assuming right atrial pressure equals 8 mm Hg,  consistent with moderate pulmonary pressures.  *** No previous Echo exam.    < end of copied text >        RADIOLOGY & ADDITIONAL TESTS:    Imaging Personally Reviewed:    Consultant(s) Notes Reviewed:      Care Discussed with Consultants/Other Providers:

## 2018-11-07 NOTE — DISCHARGE NOTE ADULT - SECONDARY DIAGNOSIS.
Aortic stenosis, severe Hypertension, unspecified type Gastroesophageal reflux disease without esophagitis

## 2018-11-07 NOTE — PROGRESS NOTE ADULT - ASSESSMENT
87 m with  Severe AS- evaluation for TAVR. Hold diuretics.  Acute on Chronic Systolic Congestive Heart Failure - telemetry, Cardiology evaluation noted, Heart failure team evaluation  PPM interrogation  COPD- nebs, Pulmonary follow  CAD (coronary artery disease)  s/p stent 2010- stable  Hypertension - control  Jose Nixon MD pager [phone]

## 2018-11-07 NOTE — DISCHARGE NOTE ADULT - HOSPITAL COURSE
87 m with  Severe AS- evaluation for TAVR noted. diuretics.  Acute on Chronic Systolic Congestive Heart Failure - telemetry, Cardiology evaluation noted, Heart failure team evaluation  COPD- nebs, Pulmonary follow  Abnormal Cxr- CT chest to evaluate nodule.  CAD (coronary artery disease)  s/p stent 2010- stable  Hypertension - control  DC home. Follow with Structural Heart Disease team next week for TAVR. QA

## 2018-11-07 NOTE — DISCHARGE NOTE ADULT - CARE PLAN
Principal Discharge DX:	Acute on chronic right-sided congestive heart failure  Goal:	Symptoms improved  Assessment and plan of treatment:	Weigh yourself daily.  If you gain 3lbs in 3 days, or 5lbs in a week call your Health Care Provider.  Do not eat or drink foods containing more than 2000mg of salt (sodium) in your diet every day.  Call your Health Care Provider if you have any swelling or increased swelling in your feet, ankles, and/or stomach.  Take all of your medication as directed.  If you become dizzy call your Health Care Provider.  Secondary Diagnosis:	Aortic stenosis, severe  Assessment and plan of treatment:	Follow up with Interventional cardiology : call  to make appt on Monday  Secondary Diagnosis:	Hypertension, unspecified type  Assessment and plan of treatment:	Low salt diet  Activity as tolerated.  Take all medication as prescribed.  Follow up with your medical doctor for routine blood pressure monitoring at your next visit.  Notify your doctor if you have any of the following symptoms:   Dizziness, Lightheadedness, Blurry vision, Headache, Chest pain, Shortness of breath  Secondary Diagnosis:	Gastroesophageal reflux disease without esophagitis

## 2018-11-07 NOTE — DISCHARGE NOTE ADULT - CARE PROVIDER_API CALL
Timothy Paredes (DO), Cardiovascular Disease; Internal Medicine; Nuclear Cardiology  [address] [facility]  [address]  [place] [address]  Phone: [phone]  Fax: [phone]    Diego Merritt (MD), Cardiovascular Disease  [address] [place] [address]  Phone: [phone]  Fax: [phone]    Don Vivas (MD), Cardiovascular Disease  [address] [place] [address] [address]  [place] [address]  Phone: [phone]  Fax: [phone]    Arjun Mack (MD), Internal Medicine; Pulmonary Disease  [address] [facility] [address]  [place] [address]  Phone: [phone]  Fax: [phone]

## 2018-11-07 NOTE — PROGRESS NOTE ADULT - SUBJECTIVE AND OBJECTIVE BOX
Subjective: Patient seen and examined. No new events except as noted.     SUBJECTIVE/ROS:  still with SOB       MEDICATIONS:  MEDICATIONS  (STANDING):  ALBUTerol/ipratropium for Nebulization 3 milliLiter(s) Nebulizer every 6 hours  amiodarone    Tablet 200 milliGRAM(s) Oral two times a day  apixaban 5 milliGRAM(s) Oral every 12 hours  aspirin enteric coated 81 milliGRAM(s) Oral daily  atorvastatin 40 milliGRAM(s) Oral at bedtime  buDESOnide 160 MICROgram(s)/formoterol 4.5 MICROgram(s) Inhaler 2 Puff(s) Inhalation two times a day  clopidogrel Tablet 75 milliGRAM(s) Oral daily  finasteride 5 milliGRAM(s) Oral daily  furosemide   Injectable 40 milliGRAM(s) IV Push daily  metoprolol succinate ER 50 milliGRAM(s) Oral daily  pantoprazole    Tablet 40 milliGRAM(s) Oral before breakfast  spironolactone 25 milliGRAM(s) Oral daily  tamsulosin 0.4 milliGRAM(s) Oral at bedtime  tiotropium 18 MICROgram(s) Capsule 1 Capsule(s) Inhalation daily      PHYSICAL EXAM:  T(C): 37.1 (11-07-18 @ 12:27), Max: 37.1 (11-07-18 @ 12:27)  HR: 71 (11-07-18 @ 12:27) (55 - 71)  BP: 135/81 (11-07-18 @ 12:27) (127/75 - 157/81)  RR: 18 (11-07-18 @ 12:27) (18 - 18)  SpO2: 95% (11-07-18 @ 12:27) (93% - 96%)  Wt(kg): --  I&O's Summary    06 Nov 2018 07:01  -  07 Nov 2018 07:00  --------------------------------------------------------  IN: 300 mL / OUT: 350 mL / NET: -50 mL    07 Nov 2018 07:01  -  07 Nov 2018 17:27  --------------------------------------------------------  IN: 620 mL / OUT: 600 mL / NET: 20 mL      Height (cm): 170.18 (11-07 @ 04:39)  Weight (kg): 102.4 (11-07 @ 04:39)  BMI (kg/m2): 35.4 (11-07 @ 04:39)  BSA (m2): 2.13 (11-07 @ 04:39)    JVP: Normal  Neck: supple  Lung: clear   CV: S1 S2 , Murmur:  Abd: soft  Ext: No edema  neuro: Awake / alert  Psych: flat affect  Skin: normal        LABS/DATA:    CARDIAC MARKERS:  CARDIAC MARKERS ( 06 Nov 2018 19:45 )  x     / x     / 112 U/L / x     / 5.3 ng/mL                                13.5   10.46 )-----------( 150      ( 07 Nov 2018 08:24 )             39.9     11-07    144  |  105  |  26<H>  ----------------------------<  96  4.5   |  24  |  1.23    Ca    10.3      07 Nov 2018 06:09    TPro  6.4  /  Alb  4.1  /  TBili  0.7  /  DBili  x   /  AST  31  /  ALT  38  /  AlkPhos  105  11-06    proBNP:   Lipid Profile:   HgA1c:   TSH: Thyroid Stimulating Hormone, Serum: 2.51 uIU/mL (11-07 @ 08:28)      TELE:  EKG:      < from: Transthoracic Echocardiogram (11.07.18 @ 14:58) >  ------------------------------------------------------------------------  Conclusions:  1. Calcified aortic valve with decreased opening. Peak  transaortic valve gradient equals 55 mm Hg, mean  transaortic valve gradient equals 31 mm Hg, estimated  aortic valve area equals 0.7 sqcm (by continuity equation),  aortic valve velocity time integral equals 76 cm,  consistent with severe aortic stenosis.  2. Severely dilated left atrium.  LA volume index = 73  cc/m2.  3. Severe concentric left ventricular hypertrophy.  4. Moderate global left ventricular systolic dysfunction.  5. Estimated pulmonary artery systolic pressure equals 54  mm Hg, assuming right atrial pressure equals 8 mm Hg,  consistent with moderate pulmonary pressures.  *** No previous Echo exam.    < end of copied text >

## 2018-11-07 NOTE — PROGRESS NOTE ADULT - SUBJECTIVE AND OBJECTIVE BOX
CHIEF COMPLAINT: f/up for sob, copd, osas, obesity, CHF--    Interval Events:    REVIEW OF SYSTEMS:  Constitutional: No fevers or chills. No weight loss. No fatigue or generalized malaise.  Eyes: No itching or discharge from the eyes  ENT: No ear pain. No ear discharge. No nasal congestion. No post nasal drip. No epistaxis. No throat pain. No sore throat. No difficulty swallowing.   CV: No chest pain. No palpitations. No lightheadedness or dizziness.   Resp: No dyspnea at rest. No dyspnea on exertion. No orthopnea. No wheezing. No cough. No stridor. No sputum production. No chest pain with respiration.  GI: No nausea. No vomiting. No diarrhea.  MSK: No joint pain or pain in any extremities  Integumentary: No skin lesions. No pedal edema.  Neurological: No gross motor weakness. No sensory changes.  [ ] All other systems negative  [ ] Unable to assess ROS because ________    OBJECTIVE:  ICU Vital Signs Last 24 Hrs  T(C): 36.4 (07 Nov 2018 05:04), Max: 37.2 (06 Nov 2018 12:02)  T(F): 97.6 (07 Nov 2018 05:04), Max: 98.9 (06 Nov 2018 12:02)  HR: 55 (07 Nov 2018 05:04) (55 - 78)  BP: 147/71 (07 Nov 2018 05:04) (127/75 - 157/81)  BP(mean): --  ABP: --  ABP(mean): --  RR: 18 (07 Nov 2018 05:04) (16 - 20)  SpO2: 93% (07 Nov 2018 05:04) (93% - 99%)        11-06 @ 07:01  -  11-07 @ 06:03  --------------------------------------------------------  IN: 300 mL / OUT: 350 mL / NET: -50 mL      CAPILLARY BLOOD GLUCOSE          PHYSICAL EXAM:  General: Awake, alert, oriented X 3.   HEENT: Atraumatic, normocephalic.                 Mallampatti Grade                 No nasal congestion.                No tonsillar or pharyngeal exudates.  Lymph Nodes: No palpable lymphadenopathy  Neck: No JVD. No carotid bruit.   Respiratory: Normal chest expansion                         Normal percussion                         Normal and equal air entry                         No wheeze, rhonchi or rales.  Cardiovascular: S1 S2 normal. No murmurs, rubs or gallops.   Abdomen: Soft, non-tender, non-distended. No organomegaly.  Extremities: Warm to touch. Peripheral pulse palpable. No pedal edema.   Skin: No rashes or skin lesions  Neurological: Motor and sensory examination equal and normal in all four extremities.  Psychiatry: Appropriate mood and affect.    HOSPITAL MEDICATIONS:  MEDICATIONS  (STANDING):  ALBUTerol/ipratropium for Nebulization 3 milliLiter(s) Nebulizer every 6 hours  amiodarone    Tablet 200 milliGRAM(s) Oral two times a day  apixaban 5 milliGRAM(s) Oral every 12 hours  aspirin enteric coated 81 milliGRAM(s) Oral daily  atorvastatin 40 milliGRAM(s) Oral at bedtime  clopidogrel Tablet 75 milliGRAM(s) Oral daily  finasteride 5 milliGRAM(s) Oral daily  furosemide   Injectable 40 milliGRAM(s) IV Push daily  metoprolol succinate ER 50 milliGRAM(s) Oral daily  pantoprazole    Tablet 40 milliGRAM(s) Oral before breakfast  spironolactone 25 milliGRAM(s) Oral daily  tamsulosin 0.4 milliGRAM(s) Oral at bedtime    MEDICATIONS  (PRN):      LABS:                        13.4   10.3  )-----------( 159      ( 06 Nov 2018 12:11 )             38.6     11-06    145  |  108  |  27<H>  ----------------------------<  97  4.4   |  26  |  1.26    Ca    10.0      06 Nov 2018 12:11    TPro  6.4  /  Alb  4.1  /  TBili  0.7  /  DBili  x   /  AST  31  /  ALT  38  /  AlkPhos  105  11-06          Venous Blood Gas:  11-06 @ 12:11  7.39/46/<50/27/74  VBG Lactate: 1.7      MICROBIOLOGY:     RADIOLOGY:  [ ] Reviewed and interpreted by me    Point of Care Ultrasound Findings:    PFT:    EKG: CHIEF COMPLAINT: f/up for sob, copd, osas, obesity, CHF--better--rare cough--less sob    Interval Events: monitored bed    REVIEW OF SYSTEMS:  Constitutional: No fevers or chills. No weight loss. + fatigue or generalized malaise.  Eyes: No itching or discharge from the eyes  ENT: No ear pain. No ear discharge. No nasal congestion. No post nasal drip. No epistaxis. No throat pain. No sore throat. No difficulty swallowing.   CV: No chest pain. No palpitations. No lightheadedness or dizziness.   Resp: No dyspnea at rest. No dyspnea on exertion. No orthopnea. + wheezing. No cough. No stridor. No sputum production. No chest pain with respiration.  GI: No nausea. No vomiting. No diarrhea.  MSK: No joint pain or pain in any extremities  Integumentary: No skin lesions. No pedal edema.  Neurological: No gross motor weakness. No sensory changes.  [+ ] All other systems negative  [ ] Unable to assess ROS because ________    OBJECTIVE:  ICU Vital Signs Last 24 Hrs  T(C): 36.4 (07 Nov 2018 05:04), Max: 37.2 (06 Nov 2018 12:02)  T(F): 97.6 (07 Nov 2018 05:04), Max: 98.9 (06 Nov 2018 12:02)  HR: 55 (07 Nov 2018 05:04) (55 - 78)  BP: 147/71 (07 Nov 2018 05:04) (127/75 - 157/81)  BP(mean): --  ABP: --  ABP(mean): --  RR: 18 (07 Nov 2018 05:04) (16 - 20)  SpO2: 93% (07 Nov 2018 05:04) (93% - 99%)        11-06 @ 07:01  -  11-07 @ 06:03  --------------------------------------------------------  IN: 300 mL / OUT: 350 mL / NET: -50 mL      CAPILLARY BLOOD GLUCOSE          PHYSICAL EXAM: NAD in bed on RA  General: Awake, alert, oriented X 3.   HEENT: Atraumatic, normocephalic.                 Mallampatti Grade 3                No nasal congestion.                No tonsillar or pharyngeal exudates.  Lymph Nodes: No palpable lymphadenopathy  Neck: No JVD. No carotid bruit.   Respiratory: Normal chest expansion                         Normal percussion                         Normal and equal air entry                         No wheeze, rhonchi or rales.  Cardiovascular: S1 S2 normal. + murmurs, no rubs or gallops.   Abdomen: Soft, non-tender, non-distended. No organomegaly. NABS  Extremities: Warm to touch. Peripheral pulse palpable. + pedal edema.   Skin: No rashes or skin lesions  Neurological: Motor and sensory examination equal and normal in all four extremities.  Psychiatry: Appropriate mood and affect.    HOSPITAL MEDICATIONS:  MEDICATIONS  (STANDING):  ALBUTerol/ipratropium for Nebulization 3 milliLiter(s) Nebulizer every 6 hours  amiodarone    Tablet 200 milliGRAM(s) Oral two times a day  apixaban 5 milliGRAM(s) Oral every 12 hours  aspirin enteric coated 81 milliGRAM(s) Oral daily  atorvastatin 40 milliGRAM(s) Oral at bedtime  clopidogrel Tablet 75 milliGRAM(s) Oral daily  finasteride 5 milliGRAM(s) Oral daily  furosemide   Injectable 40 milliGRAM(s) IV Push daily  metoprolol succinate ER 50 milliGRAM(s) Oral daily  pantoprazole    Tablet 40 milliGRAM(s) Oral before breakfast  spironolactone 25 milliGRAM(s) Oral daily  tamsulosin 0.4 milliGRAM(s) Oral at bedtime    MEDICATIONS  (PRN):      LABS:                        13.4   10.3  )-----------( 159      ( 06 Nov 2018 12:11 )             38.6     11-06    145  |  108  |  27<H>  ----------------------------<  97  4.4   |  26  |  1.26    Ca    10.0      06 Nov 2018 12:11    TPro  6.4  /  Alb  4.1  /  TBili  0.7  /  DBili  x   /  AST  31  /  ALT  38  /  AlkPhos  105  11-06          Venous Blood Gas:  11-06 @ 12:11  7.39/46/<50/27/74  VBG Lactate: 1.7      MICROBIOLOGY:     RADIOLOGY:  < from: Xray Chest 1 View AP/PA (11.06.18 @ 14:07) >  INTERPRETATION:  A single chest x-ray was obtained on November 6, 2018.    Indication: Shortness of breath.    Impression:    The heart is slightly enlarged. The lungs appear to be clear. No   radiographic evidence for congestive heart appear. A pacer is in good   position. No pneumothorax.      < end of copied text >    [ ] Reviewed and interpreted by me    Point of Care Ultrasound Findings:    PFT:    EKG:

## 2018-11-07 NOTE — DISCHARGE NOTE ADULT - MEDICATION SUMMARY - MEDICATIONS TO TAKE
I will START or STAY ON the medications listed below when I get home from the hospital:    finasteride 5 mg oral tablet  -- 1 tab(s) by mouth once a day  -- Indication: For bph    spironolactone 25 mg oral tablet  -- 1 tab(s) by mouth once a day  -- Indication: For Cad    acetaminophen 500 mg oral tablet  -- 2 tab(s) by mouth once a day (in the morning)  -- Indication: For Pain    valsartan 160 mg oral tablet  -- 1 tab(s) by mouth once a day  -- Indication: For HTN    tamsulosin 0.4 mg oral capsule  -- 1 cap(s) by mouth once a day  -- Indication: For BPH    amiodarone 200 mg oral tablet  -- 1 tab(s) by mouth once a day  -- Indication: For CAD    apixaban 5 mg oral tablet  -- 1 tab(s) by mouth every 12 hours  -- Indication: For Afib     traZODone 50 mg oral tablet  -- 0.5 tab(s) by mouth once a day (at bedtime)  -- Indication: For Depression    Crestor 10 mg oral tablet  -- 1 tab(s) by mouth once a day (at bedtime)  -- Indication: For HLD    Plavix 75 mg oral tablet  -- 1 tab(s) by mouth once a day  -- Indication: For CAD    Metoprolol Succinate ER 50 mg oral tablet, extended release  -- 1 tab(s) by mouth once a day  -- Indication: For HTN    Incruse Ellipta 62.5 mcg/inh inhalation powder  -- 1 puff(s) inhaled once a day, As Needed  -- Indication: For COPD (chronic obstructive pulmonary disease)    albuterol 90 mcg/inh inhalation aerosol  -- 1 to 2 puff(s) inhaled every 6 hours, As Needed - for shortness of breath and/or wheezing  -- Indication: For COPD (chronic obstructive pulmonary disease)    albuterol 2.5 mg/3 mL (0.083%) inhalation solution  -- 3 milliliter(s) inhaled 3 times a day, As Needed  -- Indication: For COPD (chronic obstructive pulmonary disease)    Symbicort 80 mcg-4.5 mcg/inh inhalation aerosol  -- 2 puff(s) inhaled 2 times a day, As Needed  -- Indication: For COPD (chronic obstructive pulmonary disease)    furosemide 40 mg oral tablet  -- 1 tab(s) by mouth 2 times a day  -- Indication: For CHF exacerbation    raNITIdine 150 mg oral tablet  -- 1 tab(s) by mouth 2 times a day  -- Indication: For GERD (gastroesophageal reflux disease)    MiraLax oral powder for reconstitution  -- 17 gram(s) by mouth every other day  -- Indication: For CONSTIPATION    ocular lubricant ophthalmic solution  -- 1 drop(s) to each affected eye , As Needed  -- Indication: For DRY EYES     Vitamin D3 1000 intl units oral tablet  -- 1 tab(s) by mouth once a day  -- Indication: For SUPPLEMENTS

## 2018-11-07 NOTE — PROGRESS NOTE ADULT - ASSESSMENT
87 M with history of CAD s/p PCI (2018 - approx 6 months ago and 2010) on Eliquis, HFpEF, COPD (former smoker) on home O2, and DIEGO presented with chest pain and dyspnea starting this morning now symptoms resolved. From a respiratory standpoint his COPD is under control. Suspect symptoms are cardiac related. Mild CHF exacerbation given elevated pBNP / troponins. BP also elevated, particularly diastolic. 87 M with history of CAD s/p PCI (2018 - approx 6 months ago and 2010) on Eliquis, HFpEF, COPD (former smoker) on home O2, and DIEGO presented with chest pain and dyspnea starting this morning now symptoms resolved. From a respiratory standpoint his COPD is under control. Suspect symptoms are cardiac related. Mild CHF exacerbation given elevated pBNP / troponins. BP also elevated, particularly diastolic.    11/7 better overall-admitting to dietary indiscretion

## 2018-11-07 NOTE — DISCHARGE NOTE ADULT - PATIENT PORTAL LINK FT
You can access the [facility], offered by [facility], by registering with the following website: [email or web address]

## 2018-11-07 NOTE — PROGRESS NOTE ADULT - ATTENDING COMMENTS
as above--  multifactorial sob--cardiac #1 (CHF/dietary indiscretion), copd, obesity  Cards-CHF evaluation                            [facility]io--out pt PFTs/tfts/lfts  Obesity--nutrition evaluation  COPD--symbicort/spiriva/acapella/incentive spirometry  OSAS-out pt rx    Arjun Mack MD-Pulmonary   [phone]

## 2018-11-07 NOTE — DISCHARGE NOTE ADULT - ADDITIONAL INSTRUCTIONS
Follow up with Interventional cardiology : call  to make appt on Monday  office   Follow up with cardiology/ pmd / pulmonology in 1 week

## 2018-11-07 NOTE — DISCHARGE NOTE ADULT - MEDICATION SUMMARY - MEDICATIONS TO CHANGE
I will SWITCH the dose or number of times a day I take the medications listed below when I get home from the hospital:    amiodarone 200 mg oral tablet  -- 1 tab(s) by mouth 2 times a day    valsartan 160 mg oral tablet  -- 2 tab(s) = 320 mg by mouth once a day

## 2018-11-07 NOTE — DISCHARGE NOTE ADULT - HOME CARE AGENCY
[facility]- visiting nurse [phone]. They will call you to set up 1st appointment, requested for 11/10.

## 2018-11-07 NOTE — PROGRESS NOTE ADULT - ASSESSMENT
SOB  acute CHF   Echo shows moderately reduced LV function with Severe AS   DR Merritt is notified for evaluation of TAVR    HTN  cont current meds for now     afib  HR stable  intermittently paced  cont a/c   pt on amio  consider EP eval  check am tsh     cad  history of stent  DAPT with eliquis baed on recent trial may impose higher bleed risk   consider DC asa  cont statin

## 2018-11-07 NOTE — PROGRESS NOTE ADULT - PROBLEM SELECTOR PLAN 4
chf evaluation-- diurese as able, keep k above 4 and mg above 2  daily weights; keep input equal to or less than output

## 2018-11-07 NOTE — DISCHARGE NOTE ADULT - PLAN OF CARE
Symptoms improved Weigh yourself daily.  If you gain 3lbs in 3 days, or 5lbs in a week call your Health Care Provider.  Do not eat or drink foods containing more than 2000mg of salt (sodium) in your diet every day.  Call your Health Care Provider if you have any swelling or increased swelling in your feet, ankles, and/or stomach.  Take all of your medication as directed.  If you become dizzy call your Health Care Provider. Follow up with Interventional cardiology : call  to make appt on Monday Low salt diet  Activity as tolerated.  Take all medication as prescribed.  Follow up with your medical doctor for routine blood pressure monitoring at your next visit.  Notify your doctor if you have any of the following symptoms:   Dizziness, Lightheadedness, Blurry vision, Headache, Chest pain, Shortness of breath

## 2018-11-07 NOTE — DISCHARGE NOTE ADULT - CARE PROVIDERS DIRECT ADDRESSES
,DirectAddress_Unknown,[email or web address],DirectAddress_Unknown,[email or web address]

## 2018-11-08 DIAGNOSIS — I35.0 NONRHEUMATIC AORTIC (VALVE) STENOSIS: ICD-10-CM

## 2018-11-08 LAB
ANION GAP SERPL CALC-SCNC: 11 MMOL/L — SIGNIFICANT CHANGE UP (ref 5–17)
BUN SERPL-MCNC: 25 MG/DL — HIGH (ref 7–23)
CALCIUM SERPL-MCNC: 10 MG/DL — SIGNIFICANT CHANGE UP (ref 8.4–10.5)
CHLORIDE SERPL-SCNC: 102 MMOL/L — SIGNIFICANT CHANGE UP (ref 96–108)
CK MB CFR SERPL CALC: 4.1 NG/ML — SIGNIFICANT CHANGE UP (ref 0–6.7)
CO2 SERPL-SCNC: 26 MMOL/L — SIGNIFICANT CHANGE UP (ref 22–31)
CREAT SERPL-MCNC: 1.25 MG/DL — SIGNIFICANT CHANGE UP (ref 0.5–1.3)
GLUCOSE SERPL-MCNC: 117 MG/DL — HIGH (ref 70–99)
HCT VFR BLD CALC: 40.2 % — SIGNIFICANT CHANGE UP (ref 39–50)
HGB BLD-MCNC: 13.1 G/DL — SIGNIFICANT CHANGE UP (ref 13–17)
MCHC RBC-ENTMCNC: 29.5 PG — SIGNIFICANT CHANGE UP (ref 27–34)
MCHC RBC-ENTMCNC: 32.6 GM/DL — SIGNIFICANT CHANGE UP (ref 32–36)
MCV RBC AUTO: 90.5 FL — SIGNIFICANT CHANGE UP (ref 80–100)
NT-PROBNP SERPL-SCNC: 5648 PG/ML — HIGH (ref 0–300)
PLATELET # BLD AUTO: 178 K/UL — SIGNIFICANT CHANGE UP (ref 150–400)
POTASSIUM SERPL-MCNC: 4.1 MMOL/L — SIGNIFICANT CHANGE UP (ref 3.5–5.3)
POTASSIUM SERPL-SCNC: 4.1 MMOL/L — SIGNIFICANT CHANGE UP (ref 3.5–5.3)
RBC # BLD: 4.44 M/UL — SIGNIFICANT CHANGE UP (ref 4.2–5.8)
RBC # FLD: 14.7 % — HIGH (ref 10.3–14.5)
SODIUM SERPL-SCNC: 139 MMOL/L — SIGNIFICANT CHANGE UP (ref 135–145)
TROPONIN T, HIGH SENSITIVITY RESULT: 70 NG/L — HIGH (ref 0–51)
WBC # BLD: 12.38 K/UL — HIGH (ref 3.8–10.5)
WBC # FLD AUTO: 12.38 K/UL — HIGH (ref 3.8–10.5)

## 2018-11-08 PROCEDURE — 71045 X-RAY EXAM CHEST 1 VIEW: CPT | Mod: 26

## 2018-11-08 PROCEDURE — 93880 EXTRACRANIAL BILAT STUDY: CPT | Mod: 26

## 2018-11-08 PROCEDURE — 71250 CT THORAX DX C-: CPT | Mod: 26,59

## 2018-11-08 PROCEDURE — 99232 SBSQ HOSP IP/OBS MODERATE 35: CPT

## 2018-11-08 PROCEDURE — 93010 ELECTROCARDIOGRAM REPORT: CPT

## 2018-11-08 PROCEDURE — 75572 CT HRT W/3D IMAGE: CPT | Mod: 26

## 2018-11-08 RX ORDER — LOSARTAN POTASSIUM 100 MG/1
25 TABLET, FILM COATED ORAL DAILY
Qty: 0 | Refills: 0 | Status: DISCONTINUED | OUTPATIENT
Start: 2018-11-08 | End: 2018-11-10

## 2018-11-08 RX ORDER — FUROSEMIDE 40 MG
40 TABLET ORAL
Qty: 0 | Refills: 0 | Status: DISCONTINUED | OUTPATIENT
Start: 2018-11-09 | End: 2018-11-10

## 2018-11-08 RX ADMIN — Medication 40 MILLIGRAM(S): at 05:15

## 2018-11-08 RX ADMIN — LOSARTAN POTASSIUM 25 MILLIGRAM(S): 100 TABLET, FILM COATED ORAL at 18:31

## 2018-11-08 RX ADMIN — Medication 3 MILLILITER(S): at 05:19

## 2018-11-08 RX ADMIN — BUDESONIDE AND FORMOTEROL FUMARATE DIHYDRATE 2 PUFF(S): 160; 4.5 AEROSOL RESPIRATORY (INHALATION) at 05:21

## 2018-11-08 RX ADMIN — APIXABAN 5 MILLIGRAM(S): 2.5 TABLET, FILM COATED ORAL at 05:15

## 2018-11-08 RX ADMIN — SPIRONOLACTONE 25 MILLIGRAM(S): 25 TABLET, FILM COATED ORAL at 05:15

## 2018-11-08 RX ADMIN — TAMSULOSIN HYDROCHLORIDE 0.4 MILLIGRAM(S): 0.4 CAPSULE ORAL at 22:34

## 2018-11-08 RX ADMIN — BUDESONIDE AND FORMOTEROL FUMARATE DIHYDRATE 2 PUFF(S): 160; 4.5 AEROSOL RESPIRATORY (INHALATION) at 18:32

## 2018-11-08 RX ADMIN — Medication 100 MILLIGRAM(S): at 12:49

## 2018-11-08 RX ADMIN — TIOTROPIUM BROMIDE 1 CAPSULE(S): 18 CAPSULE ORAL; RESPIRATORY (INHALATION) at 12:48

## 2018-11-08 RX ADMIN — Medication 3 MILLILITER(S): at 12:48

## 2018-11-08 RX ADMIN — Medication 3 MILLILITER(S): at 22:34

## 2018-11-08 RX ADMIN — ATORVASTATIN CALCIUM 40 MILLIGRAM(S): 80 TABLET, FILM COATED ORAL at 22:33

## 2018-11-08 RX ADMIN — PANTOPRAZOLE SODIUM 40 MILLIGRAM(S): 20 TABLET, DELAYED RELEASE ORAL at 05:15

## 2018-11-08 RX ADMIN — APIXABAN 5 MILLIGRAM(S): 2.5 TABLET, FILM COATED ORAL at 18:32

## 2018-11-08 RX ADMIN — CLOPIDOGREL BISULFATE 75 MILLIGRAM(S): 75 TABLET, FILM COATED ORAL at 12:49

## 2018-11-08 RX ADMIN — Medication 3 MILLILITER(S): at 18:31

## 2018-11-08 RX ADMIN — AMIODARONE HYDROCHLORIDE 200 MILLIGRAM(S): 400 TABLET ORAL at 18:32

## 2018-11-08 RX ADMIN — FINASTERIDE 5 MILLIGRAM(S): 5 TABLET, FILM COATED ORAL at 12:48

## 2018-11-08 RX ADMIN — AMIODARONE HYDROCHLORIDE 200 MILLIGRAM(S): 400 TABLET ORAL at 05:15

## 2018-11-08 RX ADMIN — Medication 3 MILLILITER(S): at 01:52

## 2018-11-08 RX ADMIN — Medication 50 MILLIGRAM(S): at 05:15

## 2018-11-08 RX ADMIN — Medication 81 MILLIGRAM(S): at 07:43

## 2018-11-08 NOTE — CONSULT NOTE ADULT - SUBJECTIVE AND OBJECTIVE BOX
CHIEF COMPLAINT:    HISTORY OF PRESENT ILLNESS:      Allergies    Keppra (Rash)  penicillin (Unknown)    Intolerances    	    MEDICATIONS:    Outpatient Medications per [employer]:    Acetaminophen 500 MG Oral Tablet; TAKE 1 TABLET EVERY 4 TO 6 HOURS AS NEEDED  Albuterol Sulfate (2.5 MG/3ML) 0.083% Inhalation Nebulization Solution; ONE UNITDOSE-NEBULIZER-3TIMES DAILY  Amiodarone HCl - 200 MG Oral Tablet; TAKE 1 TABLET DAILY  Crestor 10 MG Oral Tablet; TAKE 1 TABLET DAILY  Eliquis 5 MG Oral Tablet; TAKE 1 TABLET  TWICE DAILY  Furosemide 40 MG Oral Tablet; TAKE 1 TABLET TWICE DAILY  Incruse Ellipta 62.5 MCG/INH Inhalation Aerosol Powder Breath Activated; USE 1 INHALATION ONCE DAILY. GARGLE AND RINSE AFTER USE  Metoprolol Succinate ER 25 MG Oral Tablet Extended Release 24 Hour; Take 3  tablets (75 MG) daily  Plavix 75 MG Oral Tablet; TAKE 1 TABLET DAILY  Symbicort 160-4.5 MCG/ACT Inhalation Aerosol; INHALE 2 PUFFS TWICE DAILY. RINSE MOUTH AFTER USE  Tamsulosin HCl - 0.4 MG Oral Capsule; TAKE 1 CAPSULE Daily  Valsartan 80 MG Oral Tablet; take 1 tablet BID  Ventolin  (90 Base) MCG/ACT Inhalation Aerosol Solution; INHALE 1 TO 2 PUFFS EVERY 6 HOURS AS NEEDED  Vitamin D3 1000 UNIT Oral Tablet; TAKE 1 TABLET DAILY        PAST MEDICAL & SURGICAL HISTORY:  Pacemaker  Hypertension  H/O thalassemia  CAD (coronary artery disease): s/p stent 2010  S/P primary angioplasty with coronary stent      FAMILY HISTORY:  No pertinent family history in first degree relatives      SOCIAL HISTORY:    [ ] Non-smoker  [ ] Smoker  [ ] Alcohol      REVIEW OF SYSTEMS:  General: no fatigue/malaise, weight loss/gain.  Skin: no rashes.  Ophthalmologic: no blurred vision, no loss of vision. 	  ENT: no sore throat, rhinorrhea, sinus congestion.  Respiratory: no SOB, cough or wheeze.  Gastrointestinal:  no N/V/D, no melena/hematemesis/hematochezia.  Genitourinary: no dysuria/hesitancy or hematuria.  Musculoskeletal: no myalgias or arthralgias.  Neurological: no changes in vision or hearing, no lightheadedness/dizziness, no syncope/near syncope	  Psychiatric: no unusual stress/anxiety.   Hematology/Lymphatics: no unusual bleeding, bruising and no lymphadenopathy.  Endocrine: no unusual thirst.   All others negative except as stated above and in HPI.    PHYSICAL EXAM:  T(C): 37.2 (11-06-18 @ 12:02), Max: 37.2 (11-06-18 @ 12:02)  HR: 67 (11-06-18 @ 12:02) (67 - 78)  BP: 138/79 (11-06-18 @ 12:02) (130/80 - 138/79)  RR: 16 (11-06-18 @ 12:02) (16 - 16)  SpO2: 99% (11-06-18 @ 12:02) (98% - 99%)  Wt(kg): --  I&O's Summary      Appearance: Normal	  HEENT:   Normal oral mucosa  Cardiovascular: normal rate, regular rhythm, audible S1 and S2, no murmurs, rubs, or gallops, no JVD, no edema  Respiratory: Lungs clear to auscultation	  Psychiatry: Mood & affect appropriate  Gastrointestinal:  Soft  Skin: No rashes, No ecchymoses, No cyanosis	  Neurologic: Non-focal  Extremities: No clubbing, cyanosis or edema  Vascular: Peripheral pulses palpable         LABS:	 	    CBC Full  -  ( 06 Nov 2018 12:11 )  WBC Count : 10.3 K/uL  Hemoglobin : 13.4 g/dL  Hematocrit : 38.6 %  Platelet Count - Automated : 159 K/uL  Mean Cell Volume : 89.0 fl  Mean Cell Hemoglobin : 31.0 pg  Mean Cell Hemoglobin Concentration : 34.8 gm/dL  Auto Neutrophil # : 8.3 K/uL  Auto Lymphocyte # : 1.1 K/uL  Auto Monocyte # : 0.7 K/uL  Auto Eosinophil # : 0.1 K/uL  Auto Basophil # : 0.1 K/uL  Auto Neutrophil % : 80.3 %  Auto Lymphocyte % : 10.8 %  Auto Monocyte % : 7.2 %  Auto Eosinophil % : 0.9 %  Auto Basophil % : 0.8 %    11-06    145  |  108  |  27<H>  ----------------------------<  97  4.4   |  26  |  1.26    Ca    10.0      06 Nov 2018 12:11    TPro  6.4  /  Alb  4.1  /  TBili  0.7  /  DBili  x   /  AST  31  /  ALT  38  /  AlkPhos  105  11-06      proBNP: Serum Pro-Brain Natriuretic Peptide: 4129 pg/mL (11-06 @ 12:11)      CARDIAC MARKERS:    Troponin T, High Sensitivity Result: 70 (11.06.18 @ 13:50)  Troponin T, High Sensitivity Result: 68 (11.06.18 @ 12:11)    TELEMETRY: 	    ECG:  	  RADIOLOGY:  OTHER: 	    PREVIOUS DIAGNOSTIC TESTING:      [ ] Echocardiogram:    OS TTE 12/26/2017:	  Mild LVH, EF 50 - 60%, RV Size and Function Normal, LA is mildly dilated, Mild AI, Moderate AS (peak/mean gradient 32/22 mm Hg, NABEEL by continuity 1.3 cm, maximum AV velocity 2.84 m/s, Mild MR, Mild TR, Trace PI     [ ]  Catheterization:      [ ] Stress Test:
CHIEF COMPLAINT:Patient is a 87y old  Male who presents with a chief complaint of sob (06 Nov 2018 16:04)      HISTORY OF PRESENT ILLNESS:    This is a 87M hx PMH CAD w/ stents x2  pAfib (on Eliquis), CHF, PPM (placed ~7r ago), former smoker w/ COPD (inhalers- w/ home oxygen) p/w chest pain s/p nitro, sob, normally can ambulate a few blocks, now fatigues even with going down stairs, has been feeling well x 2 weeks, with asssociated light headedness, had normal nuclear stress test 3 months ago. endorses worsened diet w/ a lot of dining out. Received ASA 162mg by EMS, Per pt was in such resp distress at PCP's office admitted now for further eval  ROS as above        PAST MEDICAL & SURGICAL HISTORY:  Pacemaker  Hypertension  H/O thalassemia  CAD (coronary artery disease): s/p stent 2010  S/P primary angioplasty with coronary stent          MEDICATIONS:  amiodarone    Tablet 200 milliGRAM(s) Oral two times a day  apixaban 5 milliGRAM(s) Oral every 12 hours  aspirin enteric coated 81 milliGRAM(s) Oral daily  clopidogrel Tablet 75 milliGRAM(s) Oral daily  furosemide   Injectable 40 milliGRAM(s) IV Push daily  metoprolol succinate ER 50 milliGRAM(s) Oral daily  spironolactone 25 milliGRAM(s) Oral daily  tamsulosin 0.4 milliGRAM(s) Oral at bedtime      ALBUTerol/ipratropium for Nebulization 3 milliLiter(s) Nebulizer every 6 hours      pantoprazole    Tablet 40 milliGRAM(s) Oral before breakfast    atorvastatin 40 milliGRAM(s) Oral at bedtime  finasteride 5 milliGRAM(s) Oral daily        FAMILY HISTORY:  No pertinent family history in first degree relatives      Non-contributory    SOCIAL HISTORY:    No tobacco, drugs or etoh    Allergies    Keppra (Rash)  penicillin (Unknown)    Intolerances    	    REVIEW OF SYSTEMS:  as above  The rest of the 14 points ROS reviewed and except above they are unremarkable.        PHYSICAL EXAM:  T(C): 36.7 (11-06-18 @ 15:03), Max: 37.2 (11-06-18 @ 12:02)  HR: 66 (11-06-18 @ 15:03) (66 - 78)  BP: 147/88 (11-06-18 @ 15:03) (130/80 - 147/88)  RR: 20 (11-06-18 @ 15:03) (16 - 20)  SpO2: 97% (11-06-18 @ 15:03) (97% - 99%)  Wt(kg): --  I&O's Summary      JVP: elevated   Neck: supple  Lung: crackles   CV: S1 S2 , Murmur:  Abd: soft  Ext: No edema  neuro: Awake / alert  Psych: flat affect  Skin: cool       LABS/DATA:    TELEMETRY: 	    ECG:  	   	  CARDIAC MARKERS:                        70 <<== 11-06-18 @ 13:50                68 <<== 11-06-18 @ 12:11                              13.4   10.3  )-----------( 159      ( 06 Nov 2018 12:11 )             38.6     11-06    145  |  108  |  27<H>  ----------------------------<  97  4.4   |  26  |  1.26    Ca    10.0      06 Nov 2018 12:11    TPro  6.4  /  Alb  4.1  /  TBili  0.7  /  DBili  x   /  AST  31  /  ALT  38  /  AlkPhos  105  11-06    proBNP: Serum Pro-Brain Natriuretic Peptide: 4129 pg/mL (11-06 @ 12:11)    Lipid Profile:   HgA1c:   TSH:
HPI:  87M hx PMH CAD w/ stents x2 (last ~6mo ago & 2010), pAfib (on Eliquis), diastolic CHF, PPM (placed ~7r ago), former smoker w/ COPD (inhalers- w/ home oxygen) p/w chest pain s/p nitro, normally can ambulate a few blocks, now fatigues even with going down stairs, has been feeling well x 2 weeks, with asssociated light headedness, had normal nuclear stress test 3 months ago. endorses worsened diet w/ a lot of dining out. Received ASA 162mg by EMS, Per pt was in such resp distress at PCP's office that it was deemed likely he may require intubation in the ED but now pt feels improved (06 Nov 2018 16:04)    Consult requested by Dr. Domingo for evaluation of severe symptomatic aortic stenosis. History reviewed with patient and son in law via phone. They report known history of aortic stenosis with several hospitalizations over the past year for CHF exacerbations. He was evaluated for TAVR at [place] several months ago, but per family the physician "painted a bad picture" and they opted for medical management. He is admitted now with several days of progressively worsening shortness of breath with minimal exertion, lightheadedness, and LE edema. He reports compliance with his medication regimen, but notes he is not adherent to a low salt diet and eats frequently at restaurants. Denies chest pain or pressure, PND, orthopnea, or syncope. Pt. and family are amenable to continuing with TAVR evaluation.      PAST MEDICAL & SURGICAL HISTORY:  Pacemaker  Hypertension  H/O thalassemia  CAD (coronary artery disease): s/p stent 2010  S/P primary angioplasty with coronary stent      REVIEW OF SYSTEMS:    CONSTITUTIONAL: No weakness, fevers or chills, (+) fatigue  EYES/ENT: No visual changes;  No vertigo or throat pain   NECK: No pain or stiffness  RESPIRATORY: No cough, wheezing, hemoptysis; No shortness of breath at rest  CARDIOVASCULAR: No chest pain or palpitations, PND, orthopnea, or syncope, (+) exertional dyspnea, (+) lightheadedness, (+) LE edema  GASTROINTESTINAL: No abdominal or epigastric pain. No nausea, vomiting, or hematemesis; No diarrhea or constipation. No melena or hematochezia.  GENITOURINARY: No dysuria, frequency or hematuria  NEUROLOGICAL: No numbness or weakness  SKIN: No itching, rashes      MEDICATIONS  (STANDING):  ALBUTerol/ipratropium for Nebulization 3 milliLiter(s) Nebulizer every 6 hours  amiodarone    Tablet 200 milliGRAM(s) Oral two times a day  apixaban 5 milliGRAM(s) Oral every 12 hours  aspirin enteric coated 81 milliGRAM(s) Oral daily  atorvastatin 40 milliGRAM(s) Oral at bedtime  buDESOnide 160 MICROgram(s)/formoterol 4.5 MICROgram(s) Inhaler 2 Puff(s) Inhalation two times a day  clopidogrel Tablet 75 milliGRAM(s) Oral daily  docusate sodium 100 milliGRAM(s) Oral daily  finasteride 5 milliGRAM(s) Oral daily  furosemide   Injectable 40 milliGRAM(s) IV Push daily  metoprolol succinate ER 50 milliGRAM(s) Oral daily  pantoprazole    Tablet 40 milliGRAM(s) Oral before breakfast  spironolactone 25 milliGRAM(s) Oral daily  tamsulosin 0.4 milliGRAM(s) Oral at bedtime  tiotropium 18 MICROgram(s) Capsule 1 Capsule(s) Inhalation daily    MEDICATIONS  (PRN):      Allergies    Keppra (Rash)  penicillin (Unknown)    Intolerances        SOCIAL HISTORY: , lives alone and is independent in all ADLs. Has two daughters that live locally and are involved in his care.    FAMILY HISTORY:  No pertinent family history in first degree relatives      Vital Signs Last 24 Hrs  T(C): 36.3 (08 Nov 2018 06:35), Max: 37.1 (07 Nov 2018 12:27)  T(F): 97.3 (08 Nov 2018 06:35), Max: 98.7 (07 Nov 2018 12:27)  HR: 90 (08 Nov 2018 06:35) (71 - 91)  BP: 136/76 (08 Nov 2018 06:35) (128/73 - 144/87)  BP(mean): --  RR: 18 (08 Nov 2018 06:35) (18 - 18)  SpO2: 94% (08 Nov 2018 06:35) (94% - 95%)    Physical Exam  General: A/ox 3, No acute Distress  Neck: Supple, NO JVD  Cardiac: S1 S2, II/VI RUSB murmur  Pulmonary: CTAB, Breathing unlabored,  fine bibasilar crackles  Abdomen: Soft, Non -tender, +BS x 4 quads  Extremities: No Rashes, (+) BLE edema  Neuro: A/o x 3, No focal deficits    LABS:                        13.1   12.38 )-----------( 178      ( 08 Nov 2018 08:15 )             40.2     11-08    139  |  102  |  25<H>  ----------------------------<  117<H>  4.1   |  26  |  1.25    Ca    10.0      08 Nov 2018 06:03    TPro  6.4  /  Alb  4.1  /  TBili  0.7  /  DBili  x   /  AST  31  /  ALT  38  /  AlkPhos  105  11-06          RADIOLOGY & ADDITIONAL STUDIES:  < from: Transthoracic Echocardiogram (11.07.18 @ 14:58) >  Patient name: RUMA BANEGAS  YOB: 1931   Age: 87 (M)   MR#: [id number]  Study Date: 11/7/2018  [facility]  Study quality: Technically good  Referring Physician: Jose Nixon MD  Blood Pressure: 135/81 mmHg  Height: 170 cm  Weight: 106 kg  BSA: 2.2 m2  ------------------------------------------------------------------------  PROCEDURE: Transthoracic echocardiogram with 2-D, M-Mode  and complete spectral and color flow Doppler.  INDICATION: Nonrheumatic aortic (valve) stenosis (I35.0),  Unspecified atrial fibrillation (I48.91)  ------------------------------------------------------------------------  Dimensions:    Normal Values:  LA:     6.0    2.0 - 4.0 cm  Ao:     4.0 2.0 - 3.8 cm  SEPTUM: 1.4    0.6 - 1.2 cm  PWT:    1.6    0.6 - 1.1 cm  LVIDd:  5.3    3.0 - 5.6 cm  LVIDs:  4.7    1.8 - 4.0 cm  Derived variables:  LVMI: 163 g/m2  RWT: 0.60  Fractional short: 11 %  EF (Johnson Method): 43 %Doppler Peak Velocity (m/sec):  AoV=3.7  ------------------------------------------------------------------------  Observations:  Mitral Valve: Normal mitral valve. Mild mitral  regurgitation.  Aortic Valve/Aorta: Calcified aortic valve with decreased  opening. Peak transaortic valve gradient equals 55 mm Hg,  mean transaortic valve gradient equals 31 mm Hg, estimated  aortic valve area equals 0.7 sqcm (by continuity equation),  aortic valve velocity time integral equals 76 cm,  consistent with severe aortic stenosis. Mild aortic  regurgitation.  Peak left ventricular outflow tract  gradient equals 1 mm Hg, mean gradient is equal to 1 mm Hg,  LVOT velocity time integral equals 14 cm.  Aortic Root: 4 cm.  LVOT diameter: 2.2 cm.  Left Atrium: Severely dilated left atrium.  LA volume index  = 73 cc/m2.  Left Ventricle: Moderate global left ventricular systolic  dysfunction. Severe concentric left ventricular  hypertrophy.  Right Heart: Mild right atrial enlargement.   A device wire  is noted in the right heart. Right ventricular enlargement  with normal right ventricular systolic function. Normal  tricuspid valve. Mild tricuspid regurgitation. Normal  pulmonic valve.  Pericardium/Pleura: Normal pericardium with no pericardial  effusion.  Hemodynamic: Estimated right atrial pressure is 8 mm Hg.  Estimated right ventricular systolic pressure equals 54 mm  Hg, assuming right atrial pressure equals 8 mm Hg,  consistent with moderate pulmonary hypertension.    < end of copied text >
PULM CONSULT ON BEHALF OF DR. AMY FONSECA    CHIEF COMPLAINT:  Chest pain, dyspnea    HPI:  87 M with history of CAD s/p PCI (2018 - approx 6 months ago and 2010) on Eliquis, HFpEF, COPD (former smoker) on home O2, and DIEGO presented with chest pain and dyspnea starting this morning. Has been feeling a bit more SOB over past week associated with fatigue. Denies fevers, syncope, palpitations. Has had this happen in past intermittently and symptoms usually resolve on its own. Son at bedside states he ate a salty meal the night before symptoms began. He was reportedly given nitro and asa and now feels better. He is sitting comfortably in bed saturating well on 2 L. Regarding his COPD he takes Incruse, Symbicort, and albuterol as needed.    PAST MEDICAL & SURGICAL HISTORY:  Pacemaker  Hypertension  H/O thalassemia  CAD (coronary artery disease): s/p stent 2010  S/P primary angioplasty with coronary stent      FAMILY HISTORY:  No pertinent family history in first degree relatives      SOCIAL HISTORY:  Smoking: [ ] Never Smoked [x] Former Smoker (__ packs x ___ years) [ ] Current Smoker  (__ packs x ___ years)  Substance Use: [x] Never Used [ ] Used ____  EtOH Use: Denies  Marital Status: [ ] Single [ ]  [ ]  [ ]   Sexual History:   Occupation:  Recent Travel:  Country of Birth:  Advance Directives:    Allergies    Keppra (Rash)  penicillin (Unknown)    Intolerances        HOME MEDICATIONS:    REVIEW OF SYSTEMS:  Constitutional: [ ] negative [-] fevers [-] chills [ ] weight loss [ ] weight gain  HEENT: [ ] negative [ ] dry eyes [ ] eye irritation [ ] postnasal drip [ ] nasal congestion  CV: [ ] negative  [+] chest pain [-] orthopnea [-] palpitations [ ] murmur  Resp: [ ] negative [-] cough [+] shortness of breath [-] wheezing [-] sputum [-] hemoptysis  GI: [ ] negative [-] nausea [-] vomiting [-] diarrhea [-] constipation [-] abd pain [ ] dysphagia   : [ ] negative [-] dysuria [ ] nocturia [ ] hematuria [ ] increased urinary frequency  Musculoskeletal: [ ] negative [ ] back pain [-] myalgias [-] arthralgias [ ] fracture  Skin: [ ] negative [-] rash [ ] itch  Neurological: [ ] negative [-] headache [-] dizziness [ ] syncope [ ] weakness [ ] numbness  Psychiatric: [ ] negative [ ] anxiety [ ] depression  Endocrine: [ ] negative [ ] diabetes [ ] thyroid problem  Hematologic/Lymphatic: [ ] negative [ ] anemia [ ] bleeding problem  Allergic/Immunologic: [ ] negative [ ] itchy eyes [ ] nasal discharge [ ] hives [ ] angioedema  [ ] All other systems negative  [ ] Unable to assess ROS because ________    OBJECTIVE:  ICU Vital Signs Last 24 Hrs  T(C): 36.7 (06 Nov 2018 15:03), Max: 37.2 (06 Nov 2018 12:02)  T(F): 98.1 (06 Nov 2018 15:03), Max: 98.9 (06 Nov 2018 12:02)  HR: 66 (06 Nov 2018 15:03) (66 - 78)  BP: 147/88 (06 Nov 2018 15:03) (130/80 - 147/88)  BP(mean): --  ABP: --  ABP(mean): --  RR: 20 (06 Nov 2018 15:03) (16 - 20)  SpO2: 97% (06 Nov 2018 15:03) (97% - 99%)        CAPILLARY BLOOD GLUCOSE          PHYSICAL EXAM:  General: No apparent distress  HEENT: NC/AT  Neck: Supple  Respiratory: CTAB, no wheezing or crackles, good air entry  Cardiovascular: RRR, no murmur, 1-2+ bilateral LE edema  Abdomen: Soft, nontender, nondistended  Extremities: Warm  Skin: Intact, LE chronic venous stasis changes  Neurological: A&Ox3, no focal deficits  Psychiatry: Normal mood and affect    HOSPITAL MEDICATIONS:  apixaban 5 milliGRAM(s) Oral every 12 hours  aspirin enteric coated 81 milliGRAM(s) Oral daily  clopidogrel Tablet 75 milliGRAM(s) Oral daily      amiodarone    Tablet 200 milliGRAM(s) Oral two times a day  furosemide   Injectable 40 milliGRAM(s) IV Push daily  metoprolol succinate ER 50 milliGRAM(s) Oral daily  spironolactone 25 milliGRAM(s) Oral daily  tamsulosin 0.4 milliGRAM(s) Oral at bedtime    atorvastatin 40 milliGRAM(s) Oral at bedtime  finasteride 5 milliGRAM(s) Oral daily    ALBUTerol/ipratropium for Nebulization 3 milliLiter(s) Nebulizer every 6 hours      pantoprazole    Tablet 40 milliGRAM(s) Oral before breakfast                  LABS:                        13.4   10.3  )-----------( 159      ( 06 Nov 2018 12:11 )             38.6     Hgb Trend: 13.4<--  11-06    145  |  108  |  27<H>  ----------------------------<  97  4.4   |  26  |  1.26    Ca    10.0      06 Nov 2018 12:11    TPro  6.4  /  Alb  4.1  /  TBili  0.7  /  DBili  x   /  AST  31  /  ALT  38  /  AlkPhos  105  11-06    Creatinine Trend: 1.26<--        Venous Blood Gas:  11-06 @ 12:11  7.39/46/<50/27/74  VBG Lactate: 1.7      MICROBIOLOGY:     RADIOLOGY:  [x] Reviewed and interpreted by me    Bedside US Findings  A-line predominant pattern with lung sliding anteriorly with few B-lines  Scattered B-lines posteriorly with no pleural effusions      ASSESSMENT AND RECOMMENDATION:

## 2018-11-08 NOTE — DIETITIAN INITIAL EVALUATION ADULT. - OTHER INFO
Pt. seen for nutrition consult for CHF nutrition therapy education. Pt. reports good appetite and PO intake, finishing % of meals as per flow sheet. Pt. states eating breakfast and dinner daily. Typically consumes coffee and muffin for breakfast and different kinds of pasta dishes for dinner. Pt. reports eating out 1x a week and with his sister the other days. No GI distress reported, last bowel movement (11/6). No chewing or swallowing difficulty reported. Pt. states UBW is 230 lbs. Reported desire to lose wt. down to recommended wt. Current pt. wt. is (11/8) 221.1 lbs. and (11/7) 225.7 lbs. Pt. reports no food allergies. Pt. seen for nutrition consult for CHF nutrition therapy education. Pt. reports good appetite and PO intake, finishing % of meals as per flow sheet. Pt. states eating breakfast and dinner daily. Typically consumes coffee and muffin for breakfast and different kinds of pasta dishes for dinner. Pt. says snacking has caused an increase in his sodium intake such as pretzels. Pt. reports eating out 1x a week and with his sister the other days. No GI distress reported, last bowel movement (11/6). No chewing or swallowing difficulty reported. Pt. states UBW is 230 lbs. Reported desire to lose wt. down to recommended wt. Current pt. wt. is (11/8) 221.1 lbs. and (11/7) 225.7 lbs. Pt. reports no food allergies.

## 2018-11-08 NOTE — DIETITIAN INITIAL EVALUATION ADULT. - ENERGY NEEDS
Ht: 67 inches Wt: 221.1 pounds BMI: 34 kg/m2 IBW: 148 (+/-10%) 149.3 %IBW  Pertinent information - 86 yo. male with a PMH of CAD w/ stents (6 months ago + 2010), pAfib, diastolic CHF, COPD, HTN. Admitted for SOB, GERD, noted as per chart pt. undergoing TAVR evaluation. Noted +1 edema in right and left leg as per flow sheet. No pressure ulcers noted as per flow sheet.

## 2018-11-08 NOTE — PROGRESS NOTE ADULT - SUBJECTIVE AND OBJECTIVE BOX
Subjective: Patient seen and examined. No new events except as noted.     SUBJECTIVE/ROS:        MEDICATIONS:  MEDICATIONS  (STANDING):  ALBUTerol/ipratropium for Nebulization 3 milliLiter(s) Nebulizer every 6 hours  amiodarone    Tablet 200 milliGRAM(s) Oral two times a day  apixaban 5 milliGRAM(s) Oral every 12 hours  atorvastatin 40 milliGRAM(s) Oral at bedtime  buDESOnide 160 MICROgram(s)/formoterol 4.5 MICROgram(s) Inhaler 2 Puff(s) Inhalation two times a day  clopidogrel Tablet 75 milliGRAM(s) Oral daily  docusate sodium 100 milliGRAM(s) Oral daily  finasteride 5 milliGRAM(s) Oral daily  losartan 25 milliGRAM(s) Oral daily  metoprolol succinate ER 50 milliGRAM(s) Oral daily  pantoprazole    Tablet 40 milliGRAM(s) Oral before breakfast  spironolactone 25 milliGRAM(s) Oral daily  tamsulosin 0.4 milliGRAM(s) Oral at bedtime  tiotropium 18 MICROgram(s) Capsule 1 Capsule(s) Inhalation daily      PHYSICAL EXAM:  T(C): 36.3 (11-08-18 @ 13:50), Max: 36.7 (11-08-18 @ 04:47)  HR: 83 (11-08-18 @ 13:50) (83 - 91)  BP: 121/73 (11-08-18 @ 13:50) (121/73 - 144/87)  RR: 18 (11-08-18 @ 13:50) (18 - 18)  SpO2: 98% (11-08-18 @ 13:50) (94% - 98%)  Wt(kg): --  I&O's Summary    07 Nov 2018 07:01  -  08 Nov 2018 07:00  --------------------------------------------------------  IN: 960 mL / OUT: 1750 mL / NET: -790 mL    08 Nov 2018 07:01  -  08 Nov 2018 17:15  --------------------------------------------------------  IN: 620 mL / OUT: 680 mL / NET: -60 mL          Appearance: Normal	  HEENT:   no gross abnormality   Cardiovascular: Normal S1 S2,    Murmur:   Neck: JVP normal  Respiratory: Lungs clear   Gastrointestinal:  Soft, Non-tender  Skin: normal   Neuro: No gross deficits.   Psychiatry:  Mood & affect flat  Ext: No edema      LABS/DATA:    CARDIAC MARKERS:  CARDIAC MARKERS ( 08 Nov 2018 06:03 )  x     / x     / x     / x     / 4.1 ng/mL  CARDIAC MARKERS ( 06 Nov 2018 19:45 )  x     / x     / 112 U/L / x     / 5.3 ng/mL                                13.1   12.38 )-----------( 178      ( 08 Nov 2018 08:15 )             40.2     11-08    139  |  102  |  25<H>  ----------------------------<  117<H>  4.1   |  26  |  1.25    Ca    10.0      08 Nov 2018 06:03      proBNP: Serum Pro-Brain Natriuretic Peptide: 5648 pg/mL (11-08 @ 06:03)    Lipid Profile:   HgA1c:   TSH:     TELE:  EKG:

## 2018-11-08 NOTE — PROGRESS NOTE ADULT - ASSESSMENT
87 m with  Severe AS- evaluation for TAVR. Hold diuretics.  Acute on Chronic Systolic Congestive Heart Failure - telemetry, Cardiology evaluation noted, Heart failure team evaluation  PPM interrogation  COPD- nebs, Pulmonary follow  Abnormal Cxr- CT chest to evaluate nodule.  CAD (coronary artery disease)  s/p stent 2010- stable  Hypertension - control  Jose Nixon MD pager [phone]

## 2018-11-08 NOTE — PROGRESS NOTE ADULT - SUBJECTIVE AND OBJECTIVE BOX
CHIEF COMPLAINT: f/up for sob- copd, chf, osas-    Interval Events:    REVIEW OF SYSTEMS:  Constitutional: No fevers or chills. No weight loss. No fatigue or generalized malaise.  Eyes: No itching or discharge from the eyes  ENT: No ear pain. No ear discharge. No nasal congestion. No post nasal drip. No epistaxis. No throat pain. No sore throat. No difficulty swallowing.   CV: No chest pain. No palpitations. No lightheadedness or dizziness.   Resp: No dyspnea at rest. No dyspnea on exertion. No orthopnea. No wheezing. No cough. No stridor. No sputum production. No chest pain with respiration.  GI: No nausea. No vomiting. No diarrhea.  MSK: No joint pain or pain in any extremities  Integumentary: No skin lesions. No pedal edema.  Neurological: No gross motor weakness. No sensory changes.  [ ] All other systems negative  [ ] Unable to assess ROS because ________    OBJECTIVE:  ICU Vital Signs Last 24 Hrs  T(C): 36.6 (07 Nov 2018 21:45), Max: 37.1 (07 Nov 2018 12:27)  T(F): 97.8 (07 Nov 2018 21:45), Max: 98.7 (07 Nov 2018 12:27)  HR: 89 (07 Nov 2018 21:45) (55 - 89)  BP: 144/87 (07 Nov 2018 21:45) (135/81 - 147/71)  BP(mean): --  ABP: --  ABP(mean): --  RR: 18 (07 Nov 2018 21:45) (18 - 18)  SpO2: 94% (07 Nov 2018 21:45) (93% - 95%)        11-06 @ 07:01 - 11-07 @ 07:00  --------------------------------------------------------  IN: 300 mL / OUT: 350 mL / NET: -50 mL    11-07 @ 07:01 - 11-08 @ 04:43  --------------------------------------------------------  IN: 960 mL / OUT: 900 mL / NET: 60 mL      CAPILLARY BLOOD GLUCOSE          PHYSICAL EXAM:  General: Awake, alert, oriented X 3.   HEENT: Atraumatic, normocephalic.                 Mallampatti Grade                 No nasal congestion.                No tonsillar or pharyngeal exudates.  Lymph Nodes: No palpable lymphadenopathy  Neck: No JVD. No carotid bruit.   Respiratory: Normal chest expansion                         Normal percussion                         Normal and equal air entry                         No wheeze, rhonchi or rales.  Cardiovascular: S1 S2 normal. No murmurs, rubs or gallops.   Abdomen: Soft, non-tender, non-distended. No organomegaly.  Extremities: Warm to touch. Peripheral pulse palpable. No pedal edema.   Skin: No rashes or skin lesions  Neurological: Motor and sensory examination equal and normal in all four extremities.  Psychiatry: Appropriate mood and affect.    HOSPITAL MEDICATIONS:  MEDICATIONS  (STANDING):  ALBUTerol/ipratropium for Nebulization 3 milliLiter(s) Nebulizer every 6 hours  amiodarone    Tablet 200 milliGRAM(s) Oral two times a day  apixaban 5 milliGRAM(s) Oral every 12 hours  aspirin enteric coated 81 milliGRAM(s) Oral daily  atorvastatin 40 milliGRAM(s) Oral at bedtime  buDESOnide 160 MICROgram(s)/formoterol 4.5 MICROgram(s) Inhaler 2 Puff(s) Inhalation two times a day  clopidogrel Tablet 75 milliGRAM(s) Oral daily  docusate sodium 100 milliGRAM(s) Oral daily  finasteride 5 milliGRAM(s) Oral daily  furosemide   Injectable 40 milliGRAM(s) IV Push daily  metoprolol succinate ER 50 milliGRAM(s) Oral daily  pantoprazole    Tablet 40 milliGRAM(s) Oral before breakfast  spironolactone 25 milliGRAM(s) Oral daily  tamsulosin 0.4 milliGRAM(s) Oral at bedtime  tiotropium 18 MICROgram(s) Capsule 1 Capsule(s) Inhalation daily    MEDICATIONS  (PRN):      LABS:                        13.5   10.46 )-----------( 150      ( 07 Nov 2018 08:24 )             39.9     11-07    144  |  105  |  26<H>  ----------------------------<  96  4.5   |  24  |  1.23    Ca    10.3      07 Nov 2018 06:09    TPro  6.4  /  Alb  4.1  /  TBili  0.7  /  DBili  x   /  AST  31  /  ALT  38  /  AlkPhos  105  11-06          Venous Blood Gas:  11-06 @ 12:11  7.39/46/<50/27/74  VBG Lactate: 1.7      MICROBIOLOGY:     RADIOLOGY:  [ ] Reviewed and interpreted by me    Point of Care Ultrasound Findings:    PFT:    EKG: CHIEF COMPLAINT: f/up for sob- copd, chf, osas- no better than days before--still     Interval Events: valve evaluation in progree    REVIEW OF SYSTEMS:  Constitutional: No fevers or chills. No weight loss. + fatigue or generalized malaise.  Eyes: No itching or discharge from the eyes  ENT: No ear pain. No ear discharge. No nasal congestion. No post nasal drip. No epistaxis. No throat pain. No sore throat. No difficulty swallowing.   CV: No chest pain. No palpitations. No lightheadedness or dizziness.   Resp: No dyspnea at rest. + dyspnea on exertion. No orthopnea. No wheezing. No cough. No stridor. No sputum production. No chest pain with respiration.  GI: No nausea. No vomiting. No diarrhea.  MSK: No joint pain or pain in any extremities  Integumentary: No skin lesions. + pedal edema.  Neurological: No gross motor weakness. No sensory changes.  [+ ] All other systems negative  [ ] Unable to assess ROS because ________    OBJECTIVE:  ICU Vital Signs Last 24 Hrs  T(C): 36.6 (07 Nov 2018 21:45), Max: 37.1 (07 Nov 2018 12:27)  T(F): 97.8 (07 Nov 2018 21:45), Max: 98.7 (07 Nov 2018 12:27)  HR: 89 (07 Nov 2018 21:45) (55 - 89)  BP: 144/87 (07 Nov 2018 21:45) (135/81 - 147/71)  BP(mean): --  ABP: --  ABP(mean): --  RR: 18 (07 Nov 2018 21:45) (18 - 18)  SpO2: 94% (07 Nov 2018 21:45) (93% - 95%)        11-06 @ 07:01  -  11-07 @ 07:00  --------------------------------------------------------  IN: 300 mL / OUT: 350 mL / NET: -50 mL    11-07 @ 07:01  -  11-08 @ 04:43  --------------------------------------------------------  IN: 960 mL / OUT: 900 mL / NET: 60 mL      CAPILLARY BLOOD GLUCOSE          PHYSICAL EXAM: NAD in bed  General: Awake, alert, oriented X 3.   HEENT: Atraumatic, normocephalic.                 Mallampatti Grade 3                No nasal congestion.                No tonsillar or pharyngeal exudates.  Lymph Nodes: No palpable lymphadenopathy  Neck: No JVD. No carotid bruit.   Respiratory: Normal chest expansion                         Normal percussion                         Normal and equal air entry                         No wheeze, rhonchi or rales.  Cardiovascular: S1 S2 normal. + murmurs, rubs or gallops.   Abdomen: Soft, non-tender, non-distended. No organomegaly. NABS  Extremities: Warm to touch. Peripheral pulse palpable. No pedal edema.   Skin: No rashes or skin lesions  Neurological: Motor and sensory examination equal and normal in all four extremities.  Psychiatry: Appropriate mood and affect.    HOSPITAL MEDICATIONS:  MEDICATIONS  (STANDING):  ALBUTerol/ipratropium for Nebulization 3 milliLiter(s) Nebulizer every 6 hours  amiodarone    Tablet 200 milliGRAM(s) Oral two times a day  apixaban 5 milliGRAM(s) Oral every 12 hours  aspirin enteric coated 81 milliGRAM(s) Oral daily  atorvastatin 40 milliGRAM(s) Oral at bedtime  buDESOnide 160 MICROgram(s)/formoterol 4.5 MICROgram(s) Inhaler 2 Puff(s) Inhalation two times a day  clopidogrel Tablet 75 milliGRAM(s) Oral daily  docusate sodium 100 milliGRAM(s) Oral daily  finasteride 5 milliGRAM(s) Oral daily  furosemide   Injectable 40 milliGRAM(s) IV Push daily  metoprolol succinate ER 50 milliGRAM(s) Oral daily  pantoprazole    Tablet 40 milliGRAM(s) Oral before breakfast  spironolactone 25 milliGRAM(s) Oral daily  tamsulosin 0.4 milliGRAM(s) Oral at bedtime  tiotropium 18 MICROgram(s) Capsule 1 Capsule(s) Inhalation daily    MEDICATIONS  (PRN):      LABS:                        13.5   10.46 )-----------( 150      ( 07 Nov 2018 08:24 )             39.9     11-07    144  |  105  |  26<H>  ----------------------------<  96  4.5   |  24  |  1.23    Ca    10.3      07 Nov 2018 06:09    TPro  6.4  /  Alb  4.1  /  TBili  0.7  /  DBili  x   /  AST  31  /  ALT  38  /  AlkPhos  105  11-06          Venous Blood Gas:  11-06 @ 12:11  7.39/46/<50/27/74  VBG Lactate: 1.7      MICROBIOLOGY:     RADIOLOGY:  [ ] Reviewed and interpreted by me    Point of Care Ultrasound Findings:    PFT:    EKG:

## 2018-11-08 NOTE — PROVIDER CONTACT NOTE (OTHER) - ASSESSMENT
Patient is now resting in bed comfortably. Vital signs stable: /76, HR 90, RR 18 spO2 94% on RA, Temp 97.3 F. Complains of SOB at times.

## 2018-11-08 NOTE — DIETITIAN INITIAL EVALUATION ADULT. - SIGNS/SYMPTOMS
Pt. stating knowledge of knowing what to do, but not applying recommendations regularly Pt. stating knowledge of knowing what to do, but disinterested in applying recommendations regularly

## 2018-11-08 NOTE — PROGRESS NOTE ADULT - PROBLEM SELECTOR PLAN 4
chf evaluation-- diurese as able, keep k above 4 and mg above 2  daily weights; keep input equal to or less than output chf evaluation-- diurese as able, keep k above 4 and mg above 2  daily weights; keep input equal to or less than output  valve surgery evaluation

## 2018-11-08 NOTE — PROVIDER CONTACT NOTE (OTHER) - SITUATION
Patient complained of chest pain post ambulation in his left upper chest. Patient states "pain is aching and non-radiating"

## 2018-11-08 NOTE — CONSULT NOTE ADULT - PROBLEM SELECTOR RECOMMENDATION 9
Pt. agreeable to proceed with TAVR evaluation  Will request records from [place], per patient he has had an angiogram within the past year. Will review with team.  TTE reviewed  Will order carotid dopplers and PFTs  Cardiac CT tomorrow to complete evaluation.  Likely discharge home when optimized, and return for TAVR as outpt.   Discussed with primary team NP and Dr. Domingo.    [id number]

## 2018-11-08 NOTE — DIETITIAN INITIAL EVALUATION ADULT. - NS AS NUTRI INTERV MEALS SNACK
Fluid - modified diet/[place] - modified diet/Recommend continue low sodium, heart healthy diet. Monitor PO intake./General/healthful diet

## 2018-11-08 NOTE — PROGRESS NOTE ADULT - ASSESSMENT
87 M with history of CAD s/p PCI (2018 - approx 6 months ago and 2010) on Eliquis, HFpEF, COPD (former smoker) on home O2, and DIEGO presented with chest pain and dyspnea starting this morning now symptoms resolved. From a respiratory standpoint his COPD is under control. Suspect symptoms are cardiac related. Mild CHF exacerbation given elevated pBNP / troponins. BP also elevated, particularly diastolic.    11/7 better overall-admitting to dietary indiscretion 87 M with history of CAD s/p PCI (2018 - approx 6 months ago and 2010) on Eliquis, HFpEF, COPD (former smoker) on home O2, and DIEGO presented with chest pain and dyspnea starting this morning now symptoms resolved. From a respiratory standpoint his COPD is under control. Suspect symptoms are cardiac related. Mild CHF exacerbation given elevated pBNP / troponins. BP also elevated, particularly diastolic.    11/7 better overall-admitting to dietary indiscretion  11/8-valve surgery being contemplated

## 2018-11-08 NOTE — PROGRESS NOTE ADULT - ATTENDING COMMENTS
as above--  multifactorial sob--cardiac #1 (CHF/dietary indiscretion), copd, obesity  Cards-CHF evaluation                            Amio--out pt PFTs/tfts/lfts  Obesity--nutrition evaluation  COPD--symbicort/spiriva/acapella/incentive spirometry  OSAS-out pt rx    Arjun Mack MD-Pulmonary   [phone] as above--valve surgery evalauation in progress  multifactorial sob--cardiac #1 (CHF/dietary indiscretion), copd, obesity  Cards-CHF evaluation                            Amio--out pt PFTs/tfts/lfts  Obesity--nutrition evaluation  COPD--symbicort/spiriva/acapella/incentive spirometry  OSAS-out pt rx    Arjun Mack MD-Pulmonary   [phone]

## 2018-11-08 NOTE — DIETITIAN INITIAL EVALUATION ADULT. - NS AS NUTRI INTERV ED CONTENT
Purpose of the nutrition education/Nutrition relationship to health/disease/Reviewed with pt. CHF nutrition therapy education. Reviewed importance of low sodium diet, and what foods are high in salt as well as alternative options. Encouraged pt. to consume more home cooked meals to decrease salt intake from being out. Reviewed importance of fluid restriction and checking wt. daily. Provided pt. handout on CHF nutrition therapy./Other (specify)

## 2018-11-08 NOTE — PHARMACOTHERAPY INTERVENTION NOTE - COMMENTS
Patient with heart failure, EF 43% with moderate global LV systolic dysfunction on TTE. Per outpatient medication list patient was on valsartan 160 mg daily, blood pressure stable. Recommended consider re-initiate ARB.

## 2018-11-08 NOTE — CONSULT NOTE ADULT - ASSESSMENT
87 year old male w/ PMH CAD with PCI, pAF on Eliquis, CHF, PPM, COPD with prn home O2 (former smoker) and severe aortic stenosis admitted with CHF exacerbation. Seen and evaluated for consideration of TAVR.

## 2018-11-08 NOTE — PROGRESS NOTE ADULT - ASSESSMENT
SOB  due to acute systolic chf and symptomatic severe AS  cont current meds  add low dose arb  Lasix 40 po bid  appreciate struct. heart eval plan for TAVR in near future     HTN  cont current meds for now     afib  HR stable  intermittently paced  cont a/c   pt on amio      cad  history of stent  DAPT with eliquis baed on recent trial may impose higher bleed risk   DC asa  cont statin

## 2018-11-08 NOTE — PROGRESS NOTE ADULT - SUBJECTIVE AND OBJECTIVE BOX
Patient is a 87y old  Male who presents with a chief complaint of sob (08 Nov 2018 10:35)      SUBJECTIVE / OVERNIGHT EVENTS: Comfortable without new complaints.   Review of Systems  chest pain no  palpitations no  sob no  nausea no  headache no    MEDICATIONS  (STANDING):  ALBUTerol/ipratropium for Nebulization 3 milliLiter(s) Nebulizer every 6 hours  amiodarone    Tablet 200 milliGRAM(s) Oral two times a day  apixaban 5 milliGRAM(s) Oral every 12 hours  atorvastatin 40 milliGRAM(s) Oral at bedtime  buDESOnide 160 MICROgram(s)/formoterol 4.5 MICROgram(s) Inhaler 2 Puff(s) Inhalation two times a day  clopidogrel Tablet 75 milliGRAM(s) Oral daily  docusate sodium 100 milliGRAM(s) Oral daily  finasteride 5 milliGRAM(s) Oral daily  furosemide   Injectable 40 milliGRAM(s) IV Push daily  losartan 25 milliGRAM(s) Oral daily  metoprolol succinate ER 50 milliGRAM(s) Oral daily  pantoprazole    Tablet 40 milliGRAM(s) Oral before breakfast  spironolactone 25 milliGRAM(s) Oral daily  tamsulosin 0.4 milliGRAM(s) Oral at bedtime  tiotropium 18 MICROgram(s) Capsule 1 Capsule(s) Inhalation daily    MEDICATIONS  (PRN):      Vital Signs Last 24 Hrs  T(C): 36.3 (08 Nov 2018 13:50), Max: 36.7 (08 Nov 2018 04:47)  T(F): 97.4 (08 Nov 2018 13:50), Max: 98.1 (08 Nov 2018 04:47)  HR: 83 (08 Nov 2018 13:50) (83 - 91)  BP: 121/73 (08 Nov 2018 13:50) (121/73 - 144/87)  BP(mean): --  RR: 18 (08 Nov 2018 13:50) (18 - 18)  SpO2: 98% (08 Nov 2018 13:50) (94% - 98%)    PHYSICAL EXAM:  GENERAL: NAD, well-developed  HEAD:  Atraumatic, Normocephalic  EYES: EOMI, PERRLA, conjunctiva and sclera clear  NECK: Supple, No JVD  CHEST/LUNG: Clear to auscultation bilaterally; No wheeze  HEART: Regular rate and rhythm; No murmurs, rubs, or gallops  ABDOMEN: Soft, Nontender, Nondistended; Bowel sounds present  EXTREMITIES:  2+ Peripheral Pulses, No clubbing, cyanosis, or edema  PSYCH: AAOx3  NEUROLOGY: non-focal  SKIN: No rashes or lesions    LABS:                        13.1   12.38 )-----------( 178      ( 08 Nov 2018 08:15 )             40.2     11-08    139  |  102  |  25<H>  ----------------------------<  117<H>  4.1   |  26  |  1.25    Ca    10.0      08 Nov 2018 06:03        CARDIAC MARKERS ( 08 Nov 2018 06:03 )  x     / x     / x     / x     / 4.1 ng/mL  CARDIAC MARKERS ( 06 Nov 2018 19:45 )  x     / x     / 112 U/L / x     / 5.3 ng/mL            RADIOLOGY & ADDITIONAL TESTS:    Imaging Personally Reviewed:    < from: Xray Chest 1 View- PORTABLE-Urgent (11.08.18 @ 09:11) >  IMPRESSION:   Indistinct 1.6 cm nodular density projecting over the posterior right   sixth rib. Recommend further evaluation with chest CT.      < end of copied text >  Consultant(s) Notes Reviewed:      Care Discussed with Consultants/Other Providers:

## 2018-11-09 LAB
ANION GAP SERPL CALC-SCNC: 14 MMOL/L — SIGNIFICANT CHANGE UP (ref 5–17)
BUN SERPL-MCNC: 27 MG/DL — HIGH (ref 7–23)
CALCIUM SERPL-MCNC: 9.4 MG/DL — SIGNIFICANT CHANGE UP (ref 8.4–10.5)
CHLORIDE SERPL-SCNC: 102 MMOL/L — SIGNIFICANT CHANGE UP (ref 96–108)
CO2 SERPL-SCNC: 24 MMOL/L — SIGNIFICANT CHANGE UP (ref 22–31)
CREAT SERPL-MCNC: 1.28 MG/DL — SIGNIFICANT CHANGE UP (ref 0.5–1.3)
GLUCOSE SERPL-MCNC: 146 MG/DL — HIGH (ref 70–99)
HCT VFR BLD CALC: 37.8 % — LOW (ref 39–50)
HGB BLD-MCNC: 12.3 G/DL — LOW (ref 13–17)
MAGNESIUM SERPL-MCNC: 2.1 MG/DL — SIGNIFICANT CHANGE UP (ref 1.6–2.6)
MCHC RBC-ENTMCNC: 29.4 PG — SIGNIFICANT CHANGE UP (ref 27–34)
MCHC RBC-ENTMCNC: 32.5 GM/DL — SIGNIFICANT CHANGE UP (ref 32–36)
MCV RBC AUTO: 90.4 FL — SIGNIFICANT CHANGE UP (ref 80–100)
PLATELET # BLD AUTO: 161 K/UL — SIGNIFICANT CHANGE UP (ref 150–400)
POTASSIUM SERPL-MCNC: 3.8 MMOL/L — SIGNIFICANT CHANGE UP (ref 3.5–5.3)
POTASSIUM SERPL-SCNC: 3.8 MMOL/L — SIGNIFICANT CHANGE UP (ref 3.5–5.3)
RBC # BLD: 4.18 M/UL — LOW (ref 4.2–5.8)
RBC # FLD: 14.9 % — HIGH (ref 10.3–14.5)
SODIUM SERPL-SCNC: 140 MMOL/L — SIGNIFICANT CHANGE UP (ref 135–145)
WBC # BLD: 11.07 K/UL — HIGH (ref 3.8–10.5)
WBC # FLD AUTO: 11.07 K/UL — HIGH (ref 3.8–10.5)

## 2018-11-09 PROCEDURE — 99232 SBSQ HOSP IP/OBS MODERATE 35: CPT

## 2018-11-09 RX ORDER — FUROSEMIDE 40 MG
1 TABLET ORAL
Qty: 60 | Refills: 0 | OUTPATIENT
Start: 2018-11-09 | End: 2018-12-08

## 2018-11-09 RX ORDER — AMIODARONE HYDROCHLORIDE 400 MG/1
200 TABLET ORAL DAILY
Qty: 0 | Refills: 0 | Status: DISCONTINUED | OUTPATIENT
Start: 2018-11-10 | End: 2018-11-10

## 2018-11-09 RX ORDER — FUROSEMIDE 40 MG
1 TABLET ORAL
Qty: 0 | Refills: 0 | COMMUNITY

## 2018-11-09 RX ORDER — POLYETHYLENE GLYCOL 3350 17 G/17G
17 POWDER, FOR SOLUTION ORAL DAILY
Qty: 0 | Refills: 0 | Status: DISCONTINUED | OUTPATIENT
Start: 2018-11-09 | End: 2018-11-10

## 2018-11-09 RX ORDER — FINASTERIDE 5 MG/1
1 TABLET, FILM COATED ORAL
Qty: 30 | Refills: 0 | OUTPATIENT
Start: 2018-11-09 | End: 2018-12-08

## 2018-11-09 RX ORDER — MUPIROCIN 20 MG/G
1 OINTMENT TOPICAL
Qty: 0 | Refills: 0 | COMMUNITY

## 2018-11-09 RX ORDER — AMIODARONE HYDROCHLORIDE 400 MG/1
1 TABLET ORAL
Qty: 0 | Refills: 0 | COMMUNITY

## 2018-11-09 RX ORDER — SPIRONOLACTONE 25 MG/1
1 TABLET, FILM COATED ORAL
Qty: 30 | Refills: 0 | OUTPATIENT
Start: 2018-11-09 | End: 2018-12-08

## 2018-11-09 RX ORDER — AMIODARONE HYDROCHLORIDE 400 MG/1
1 TABLET ORAL
Qty: 30 | Refills: 0 | OUTPATIENT
Start: 2018-11-09 | End: 2018-12-08

## 2018-11-09 RX ADMIN — BUDESONIDE AND FORMOTEROL FUMARATE DIHYDRATE 2 PUFF(S): 160; 4.5 AEROSOL RESPIRATORY (INHALATION) at 17:51

## 2018-11-09 RX ADMIN — BUDESONIDE AND FORMOTEROL FUMARATE DIHYDRATE 2 PUFF(S): 160; 4.5 AEROSOL RESPIRATORY (INHALATION) at 05:34

## 2018-11-09 RX ADMIN — APIXABAN 5 MILLIGRAM(S): 2.5 TABLET, FILM COATED ORAL at 05:33

## 2018-11-09 RX ADMIN — POLYETHYLENE GLYCOL 3350 17 GRAM(S): 17 POWDER, FOR SOLUTION ORAL at 12:31

## 2018-11-09 RX ADMIN — TAMSULOSIN HYDROCHLORIDE 0.4 MILLIGRAM(S): 0.4 CAPSULE ORAL at 21:56

## 2018-11-09 RX ADMIN — TIOTROPIUM BROMIDE 1 CAPSULE(S): 18 CAPSULE ORAL; RESPIRATORY (INHALATION) at 12:23

## 2018-11-09 RX ADMIN — LOSARTAN POTASSIUM 25 MILLIGRAM(S): 100 TABLET, FILM COATED ORAL at 05:33

## 2018-11-09 RX ADMIN — Medication 3 MILLILITER(S): at 21:56

## 2018-11-09 RX ADMIN — Medication 100 MILLIGRAM(S): at 12:22

## 2018-11-09 RX ADMIN — SPIRONOLACTONE 25 MILLIGRAM(S): 25 TABLET, FILM COATED ORAL at 05:33

## 2018-11-09 RX ADMIN — FINASTERIDE 5 MILLIGRAM(S): 5 TABLET, FILM COATED ORAL at 12:22

## 2018-11-09 RX ADMIN — Medication 3 MILLILITER(S): at 17:51

## 2018-11-09 RX ADMIN — APIXABAN 5 MILLIGRAM(S): 2.5 TABLET, FILM COATED ORAL at 17:51

## 2018-11-09 RX ADMIN — Medication 40 MILLIGRAM(S): at 17:51

## 2018-11-09 RX ADMIN — Medication 50 MILLIGRAM(S): at 05:33

## 2018-11-09 RX ADMIN — Medication 3 MILLILITER(S): at 05:34

## 2018-11-09 RX ADMIN — Medication 3 MILLILITER(S): at 12:21

## 2018-11-09 RX ADMIN — PANTOPRAZOLE SODIUM 40 MILLIGRAM(S): 20 TABLET, DELAYED RELEASE ORAL at 05:33

## 2018-11-09 RX ADMIN — ATORVASTATIN CALCIUM 40 MILLIGRAM(S): 80 TABLET, FILM COATED ORAL at 21:56

## 2018-11-09 RX ADMIN — CLOPIDOGREL BISULFATE 75 MILLIGRAM(S): 75 TABLET, FILM COATED ORAL at 12:22

## 2018-11-09 RX ADMIN — Medication 40 MILLIGRAM(S): at 05:33

## 2018-11-09 RX ADMIN — AMIODARONE HYDROCHLORIDE 200 MILLIGRAM(S): 400 TABLET ORAL at 05:34

## 2018-11-09 NOTE — PROGRESS NOTE ADULT - SUBJECTIVE AND OBJECTIVE BOX
Patient is a 87y old  Male who presents with a chief complaint of sob (09 Nov 2018 10:51)      SUBJECTIVE / OVERNIGHT EVENTS: No new complaints.   Review of Systems  chest pain no  palpitations no  sob no  nausea no  headache no    MEDICATIONS  (STANDING):  ALBUTerol/ipratropium for Nebulization 3 milliLiter(s) Nebulizer every 6 hours  apixaban 5 milliGRAM(s) Oral every 12 hours  atorvastatin 40 milliGRAM(s) Oral at bedtime  buDESOnide 160 MICROgram(s)/formoterol 4.5 MICROgram(s) Inhaler 2 Puff(s) Inhalation two times a day  clopidogrel Tablet 75 milliGRAM(s) Oral daily  docusate sodium 100 milliGRAM(s) Oral daily  finasteride 5 milliGRAM(s) Oral daily  furosemide    Tablet 40 milliGRAM(s) Oral two times a day  losartan 25 milliGRAM(s) Oral daily  metoprolol succinate ER 50 milliGRAM(s) Oral daily  pantoprazole    Tablet 40 milliGRAM(s) Oral before breakfast  polyethylene glycol 3350 17 Gram(s) Oral daily  spironolactone 25 milliGRAM(s) Oral daily  tamsulosin 0.4 milliGRAM(s) Oral at bedtime  tiotropium 18 MICROgram(s) Capsule 1 Capsule(s) Inhalation daily    MEDICATIONS  (PRN):      Vital Signs Last 24 Hrs  T(C): 36.7 (09 Nov 2018 04:57), Max: 36.7 (09 Nov 2018 04:57)  T(F): 98.1 (09 Nov 2018 04:57), Max: 98.1 (09 Nov 2018 04:57)  HR: 82 (09 Nov 2018 04:57) (74 - 85)  BP: 113/64 (09 Nov 2018 04:57) (112/65 - 149/79)  BP(mean): --  RR: 18 (09 Nov 2018 04:57) (18 - 18)  SpO2: 94% (09 Nov 2018 04:57) (94% - 98%)    PHYSICAL EXAM:  GENERAL: NAD, well-developed  HEAD:  Atraumatic, Normocephalic  EYES: EOMI, PERRLA, conjunctiva and sclera clear  NECK: Supple, No JVD  CHEST/LUNG: Clear to auscultation bilaterally; No wheeze  HEART: Regular rate and rhythm; No murmurs, rubs, or gallops  ABDOMEN: Soft, Nontender, Nondistended; Bowel sounds present  EXTREMITIES:  2+ Peripheral Pulses, No clubbing, cyanosis, or edema  PSYCH: AAOx3  NEUROLOGY: non-focal  SKIN: No rashes or lesions    LABS:                        12.3   11.07 )-----------( 161      ( 09 Nov 2018 09:25 )             37.8     11-09    140  |  102  |  27<H>  ----------------------------<  146<H>  3.8   |  24  |  1.28    Ca    9.4      09 Nov 2018 06:11  Mg     2.1     11-09        CARDIAC MARKERS ( 08 Nov 2018 06:03 )  x     / x     / x     / x     / 4.1 ng/mL            RADIOLOGY & ADDITIONAL TESTS:    Imaging Personally Reviewed:  < from: CT Chest No Cont (11.08.18 @ 17:57) >  IMPRESSION:     Perihilar predominant groundglass opacities, right greater than left most   likely represents fluid overload. Infection is less likely.        < end of copied text >  < from: VA Duplex Carotid, Bilat (11.08.18 @ 16:26) >    IMPRESSION: Bilateral carotid plaques.    No hemodynamically significant stenosis identified in either carotid   artery.    < end of copied text >    Consultant(s) Notes Reviewed:      Care Discussed with Consultants/Other Providers:

## 2018-11-09 NOTE — PROGRESS NOTE ADULT - ASSESSMENT
87 M with history of CAD s/p PCI (2018 - approx 6 months ago and 2010) on Eliquis, HFpEF, COPD (former smoker) on home O2, and DIEGO presented with chest pain and dyspnea starting this morning now symptoms resolved. From a respiratory standpoint his COPD is under control. Suspect symptoms are cardiac related. Mild CHF exacerbation given elevated pBNP / troponins. BP also elevated, particularly diastolic.    11/7 better overall-admitting to dietary indiscretion  11/8-valve surgery being contemplated 87 M with history of CAD s/p PCI (2018 - approx 6 months ago and 2010) on Eliquis, HFpEF, COPD (former smoker) on home O2, and DIEGO presented with chest pain and dyspnea starting this morning now symptoms resolved. From a respiratory standpoint his COPD is under control. Suspect symptoms are cardiac related. Mild CHF exacerbation given elevated pBNP / troponins. BP also elevated, particularly diastolic.    11/7 better overall-admitting to dietary indiscretion  11/8-valve surgery being contemplated  11/9-echo valve area .7

## 2018-11-09 NOTE — PROGRESS NOTE ADULT - PROBLEM SELECTOR PLAN 4
chf evaluation-- diurese as able, keep k above 4 and mg above 2  daily weights; keep input equal to or less than output  valve surgery evaluation

## 2018-11-09 NOTE — PROGRESS NOTE ADULT - ASSESSMENT
SOB  due to acute systolic chf and symptomatic severe AS  cont current meds  cont BB and Arb  Lasix 40 po bid  appreciate struct. heart eval plan for TAVR in near future     HTN  cont current meds for now     afib  HR stable  intermittently paced  cont a/c   pt on amio      cad  history of stent  DAPT with eliquis baed on recent trial may impose higher bleed risk   DC asa  cont statin

## 2018-11-09 NOTE — PROGRESS NOTE ADULT - SUBJECTIVE AND OBJECTIVE BOX
CHIEF COMPLAINT: f/up sob, copd, osas, chf--    Interval Events:    REVIEW OF SYSTEMS:  Constitutional: No fevers or chills. No weight loss. No fatigue or generalized malaise.  Eyes: No itching or discharge from the eyes  ENT: No ear pain. No ear discharge. No nasal congestion. No post nasal drip. No epistaxis. No throat pain. No sore throat. No difficulty swallowing.   CV: No chest pain. No palpitations. No lightheadedness or dizziness.   Resp: No dyspnea at rest. No dyspnea on exertion. No orthopnea. No wheezing. No cough. No stridor. No sputum production. No chest pain with respiration.  GI: No nausea. No vomiting. No diarrhea.  MSK: No joint pain or pain in any extremities  Integumentary: No skin lesions. No pedal edema.  Neurological: No gross motor weakness. No sensory changes.  [ ] All other systems negative  [ ] Unable to assess ROS because ________    OBJECTIVE:  ICU Vital Signs Last 24 Hrs  T(C): 36.7 (09 Nov 2018 04:57), Max: 36.7 (09 Nov 2018 04:57)  T(F): 98.1 (09 Nov 2018 04:57), Max: 98.1 (09 Nov 2018 04:57)  HR: 82 (09 Nov 2018 04:57) (74 - 90)  BP: 113/64 (09 Nov 2018 04:57) (112/65 - 149/79)  BP(mean): --  ABP: --  ABP(mean): --  RR: 18 (09 Nov 2018 04:57) (18 - 18)  SpO2: 94% (09 Nov 2018 04:57) (94% - 98%)        11-07 @ 07:01  -  11-08 @ 07:00  --------------------------------------------------------  IN: 960 mL / OUT: 1750 mL / NET: -790 mL    11-08 @ 07:01 - 11-09 @ 05:13  --------------------------------------------------------  IN: 980 mL / OUT: 1130 mL / NET: -150 mL      CAPILLARY BLOOD GLUCOSE          PHYSICAL EXAM:  General: Awake, alert, oriented X 3.   HEENT: Atraumatic, normocephalic.                 Mallampatti Grade                 No nasal congestion.                No tonsillar or pharyngeal exudates.  Lymph Nodes: No palpable lymphadenopathy  Neck: No JVD. No carotid bruit.   Respiratory: Normal chest expansion                         Normal percussion                         Normal and equal air entry                         No wheeze, rhonchi or rales.  Cardiovascular: S1 S2 normal. No murmurs, rubs or gallops.   Abdomen: Soft, non-tender, non-distended. No organomegaly.  Extremities: Warm to touch. Peripheral pulse palpable. No pedal edema.   Skin: No rashes or skin lesions  Neurological: Motor and sensory examination equal and normal in all four extremities.  Psychiatry: Appropriate mood and affect.    HOSPITAL MEDICATIONS:  MEDICATIONS  (STANDING):  ALBUTerol/ipratropium for Nebulization 3 milliLiter(s) Nebulizer every 6 hours  amiodarone    Tablet 200 milliGRAM(s) Oral two times a day  apixaban 5 milliGRAM(s) Oral every 12 hours  atorvastatin 40 milliGRAM(s) Oral at bedtime  buDESOnide 160 MICROgram(s)/formoterol 4.5 MICROgram(s) Inhaler 2 Puff(s) Inhalation two times a day  clopidogrel Tablet 75 milliGRAM(s) Oral daily  docusate sodium 100 milliGRAM(s) Oral daily  finasteride 5 milliGRAM(s) Oral daily  furosemide    Tablet 40 milliGRAM(s) Oral two times a day  losartan 25 milliGRAM(s) Oral daily  metoprolol succinate ER 50 milliGRAM(s) Oral daily  pantoprazole    Tablet 40 milliGRAM(s) Oral before breakfast  spironolactone 25 milliGRAM(s) Oral daily  tamsulosin 0.4 milliGRAM(s) Oral at bedtime  tiotropium 18 MICROgram(s) Capsule 1 Capsule(s) Inhalation daily    MEDICATIONS  (PRN):      LABS:                        13.1   12.38 )-----------( 178      ( 08 Nov 2018 08:15 )             40.2     11-08    139  |  102  |  25<H>  ----------------------------<  117<H>  4.1   |  26  |  1.25    Ca    10.0      08 Nov 2018 06:03                MICROBIOLOGY:     RADIOLOGY:  [ ] Reviewed and interpreted by me    Point of Care Ultrasound Findings:    PFT:    EKG: CHIEF COMPLAINT: f/up sob, copd, osas, chf--issues with dyspnea even to bathroom-? hemoptysis scant    Interval Events: cards    REVIEW OF SYSTEMS:  Constitutional: No fevers or chills. No weight loss. + fatigue or generalized malaise.  Eyes: No itching or discharge from the eyes  ENT: No ear pain. No ear discharge. No nasal congestion. No post nasal drip. No epistaxis. No throat pain. No sore throat. No difficulty swallowing.   CV: No chest pain. No palpitations. No lightheadedness or dizziness.   Resp: No dyspnea at rest. + dyspnea on exertion. No orthopnea. No wheezing. + cough. No stridor. + red sputum production. No chest pain with respiration.  GI: No nausea. No vomiting. No diarrhea.  MSK: No joint pain or pain in any extremities  Integumentary: No skin lesions. No pedal edema.  Neurological: No gross motor weakness. No sensory changes.  [+ ] All other systems negative  [ ] Unable to assess ROS because ________    OBJECTIVE:  ICU Vital Signs Last 24 Hrs  T(C): 36.7 (09 Nov 2018 04:57), Max: 36.7 (09 Nov 2018 04:57)  T(F): 98.1 (09 Nov 2018 04:57), Max: 98.1 (09 Nov 2018 04:57)  HR: 82 (09 Nov 2018 04:57) (74 - 90)  BP: 113/64 (09 Nov 2018 04:57) (112/65 - 149/79)  BP(mean): --  ABP: --  ABP(mean): --  RR: 18 (09 Nov 2018 04:57) (18 - 18)  SpO2: 94% (09 Nov 2018 04:57) (94% - 98%)        11-07 @ 07:01  -  11-08 @ 07:00  --------------------------------------------------------  IN: 960 mL / OUT: 1750 mL / NET: -790 mL    11-08 @ 07:01  -  11-09 @ 05:13  --------------------------------------------------------  IN: 980 mL / OUT: 1130 mL / NET: -150 mL      CAPILLARY BLOOD GLUCOSE          PHYSICAL EXAM: NAD in bed  General: Awake, alert, oriented X 3.   HEENT: Atraumatic, normocephalic.                 Mallampatti Grade 3                No nasal congestion.                No tonsillar or pharyngeal exudates.  Lymph Nodes: No palpable lymphadenopathy  Neck: No JVD. No carotid bruit.   Respiratory: Normal chest expansion                         Normal percussion                         Normal and equal air entry                         No wheeze, rhonchi or rales.  Cardiovascular: S1 S2 normal. + murmurs, no rubs or gallops.   Abdomen: Soft, non-tender, non-distended. No organomegaly. NABS  Extremities: Warm to touch. Peripheral pulse palpable. No pedal edema.   Skin: No rashes or skin lesions  Neurological: Motor and sensory examination equal and normal in all four extremities.  Psychiatry: Appropriate mood and affect.    HOSPITAL MEDICATIONS:  MEDICATIONS  (STANDING):  ALBUTerol/ipratropium for Nebulization 3 milliLiter(s) Nebulizer every 6 hours  amiodarone    Tablet 200 milliGRAM(s) Oral two times a day  apixaban 5 milliGRAM(s) Oral every 12 hours  atorvastatin 40 milliGRAM(s) Oral at bedtime  buDESOnide 160 MICROgram(s)/formoterol 4.5 MICROgram(s) Inhaler 2 Puff(s) Inhalation two times a day  clopidogrel Tablet 75 milliGRAM(s) Oral daily  docusate sodium 100 milliGRAM(s) Oral daily  finasteride 5 milliGRAM(s) Oral daily  furosemide    Tablet 40 milliGRAM(s) Oral two times a day  losartan 25 milliGRAM(s) Oral daily  metoprolol succinate ER 50 milliGRAM(s) Oral daily  pantoprazole    Tablet 40 milliGRAM(s) Oral before breakfast  spironolactone 25 milliGRAM(s) Oral daily  tamsulosin 0.4 milliGRAM(s) Oral at bedtime  tiotropium 18 MICROgram(s) Capsule 1 Capsule(s) Inhalation daily    MEDICATIONS  (PRN):      LABS:                        13.1   12.38 )-----------( 178      ( 08 Nov 2018 08:15 )             40.2     11-08    139  |  102  |  25<H>  ----------------------------<  117<H>  4.1   |  26  |  1.25    Ca    10.0      08 Nov 2018 06:03                MICROBIOLOGY:     RADIOLOGY:  < from: Transthoracic Echocardiogram (11.07.18 @ 14:58) >  ------------------------------------------------------------------------  Conclusions:  1. Calcified aortic valve with decreased opening. Peak  transaortic valve gradient equals 55 mm Hg, mean  transaortic valve gradient equals 31 mm Hg, estimated  aortic valve area equals 0.7 sqcm (by continuity equation),  aortic valve velocity time integral equals 76 cm,  consistent with severe aortic stenosis.  2. Severely dilated left atrium.  LA volume index = 73  cc/m2.  3. Severe concentric left ventricular hypertrophy.  4. Moderate global left ventricular systolic dysfunction.  5. Estimated pulmonary artery systolic pressure equals 54  mm Hg, assuming right atrial pressure equals 8 mm Hg,  consistent with moderate pulmonary pressures.  *** No previous Echo exam.  ------------------------------------------------------------------------    < end of copied text >    [ ] Reviewed and interpreted by me    Point of Care Ultrasound Findings:    PFT:    EKG:

## 2018-11-09 NOTE — PROGRESS NOTE ADULT - PROBLEM SELECTOR PLAN 7
PT evaluation  early ambulation  incentive spirometry  DVT prophlaxis:  subcutaneous lovenox or heparin as per primary team  sequential teds stockings  early ambulation

## 2018-11-09 NOTE — PROGRESS NOTE ADULT - ATTENDING COMMENTS
as above--valve surgery evalauation in progress  multifactorial sob--cardiac #1 (CHF/dietary indiscretion), copd, obesity  Cards-CHF evaluation                            Amio--out pt PFTs/tfts/lfts  Obesity--nutrition evaluation  COPD--symbicort/spiriva/acapella/incentive spirometry  OSAS-out pt rx    Arjun Mack MD-Pulmonary   [phone] as above--valve surgery evaluation in progress (s/p echo AV .7)  multifactorial sob--cardiac #1 (CHF/dietary indiscretion), copd, obesity  Cards-CHF evaluation                            Amio--out pt PFTs/tfts/lfts  Obesity--nutrition evaluation  COPD--symbicort/spiriva/acapella/incentive spirometry  OSAS-out pt rx    Arjun Mack MD-Pulmonary   [phone]

## 2018-11-09 NOTE — PHARMACOTHERAPY INTERVENTION NOTE - COMMENTS
Patient currently on amiodarone 200 mg BID, per admission medication reconciliation dose unable to be verified with pharmacy. However per [facility] records patient is on 200 mg daily and patient believes he takes once a day at home. Recommended reduce dose to once daily.

## 2018-11-09 NOTE — PROGRESS NOTE ADULT - PROBLEM SELECTOR PLAN 1
symbicort 160 2 bid  spiriva 1 q day  incentive spirometry/acapella

## 2018-11-09 NOTE — PROGRESS NOTE ADULT - ASSESSMENT
87 m with  Severe AS- evaluation for TAVR noted. Hold diuretics.  Acute on Chronic Systolic Congestive Heart Failure - telemetry, Cardiology evaluation noted, Heart failure team evaluation  COPD- nebs, Pulmonary follow  Abnormal Cxr- CT chest to evaluate nodule.  CAD (coronary artery disease)  s/p stent 2010- stable  Hypertension - control  DC home. Follow with Structural Heart Disease team next week for TAVR. QA  Jose Nixon MD pager [phone]

## 2018-11-09 NOTE — PROGRESS NOTE ADULT - SUBJECTIVE AND OBJECTIVE BOX
Subjective: Patient seen and examined. No new events except as noted.     SUBJECTIVE/ROS:  pt seen earlier this am  feels ok        MEDICATIONS:  MEDICATIONS  (STANDING):  ALBUTerol/ipratropium for Nebulization 3 milliLiter(s) Nebulizer every 6 hours  apixaban 5 milliGRAM(s) Oral every 12 hours  atorvastatin 40 milliGRAM(s) Oral at bedtime  buDESOnide 160 MICROgram(s)/formoterol 4.5 MICROgram(s) Inhaler 2 Puff(s) Inhalation two times a day  clopidogrel Tablet 75 milliGRAM(s) Oral daily  docusate sodium 100 milliGRAM(s) Oral daily  finasteride 5 milliGRAM(s) Oral daily  furosemide    Tablet 40 milliGRAM(s) Oral two times a day  losartan 25 milliGRAM(s) Oral daily  metoprolol succinate ER 50 milliGRAM(s) Oral daily  pantoprazole    Tablet 40 milliGRAM(s) Oral before breakfast  polyethylene glycol 3350 17 Gram(s) Oral daily  spironolactone 25 milliGRAM(s) Oral daily  tamsulosin 0.4 milliGRAM(s) Oral at bedtime  tiotropium 18 MICROgram(s) Capsule 1 Capsule(s) Inhalation daily      PHYSICAL EXAM:  T(C): 36.7 (11-09-18 @ 04:57), Max: 36.7 (11-09-18 @ 04:57)  HR: 82 (11-09-18 @ 04:57) (74 - 85)  BP: 113/64 (11-09-18 @ 04:57) (112/65 - 149/79)  RR: 18 (11-09-18 @ 04:57) (18 - 18)  SpO2: 94% (11-09-18 @ 04:57) (94% - 98%)  Wt(kg): --  I&O's Summary    08 Nov 2018 07:01  -  09 Nov 2018 07:00  --------------------------------------------------------  IN: 980 mL / OUT: 1405 mL / NET: -425 mL    09 Nov 2018 07:01  -  09 Nov 2018 10:51  --------------------------------------------------------  IN: 360 mL / OUT: 300 mL / NET: 60 mL          JVP: Normal  Neck: supple  Lung: clear   CV: S1 S2 , Murmur: razia   Abd: soft  Ext: No edema  neuro: Awake / alert  Psych: flat affect  Skin: normal        LABS/DATA:    CARDIAC MARKERS:  CARDIAC MARKERS ( 08 Nov 2018 06:03 )  x     / x     / x     / x     / 4.1 ng/mL  CARDIAC MARKERS ( 06 Nov 2018 19:45 )  x     / x     / 112 U/L / x     / 5.3 ng/mL                                13.1   12.38 )-----------( 178      ( 08 Nov 2018 08:15 )             40.2     11-09    140  |  102  |  27<H>  ----------------------------<  146<H>  3.8   |  24  |  1.28    Ca    9.4      09 Nov 2018 06:11  Mg     2.1     11-09      proBNP:   Lipid Profile:   HgA1c:   TSH:     TELE:  EKG:

## 2018-11-10 VITALS
DIASTOLIC BLOOD PRESSURE: 71 MMHG | TEMPERATURE: 98 F | HEART RATE: 79 BPM | RESPIRATION RATE: 18 BRPM | SYSTOLIC BLOOD PRESSURE: 122 MMHG | OXYGEN SATURATION: 95 %

## 2018-11-10 PROCEDURE — 93005 ELECTROCARDIOGRAM TRACING: CPT

## 2018-11-10 PROCEDURE — 87581 M.PNEUMON DNA AMP PROBE: CPT

## 2018-11-10 PROCEDURE — 85014 HEMATOCRIT: CPT

## 2018-11-10 PROCEDURE — 84295 ASSAY OF SERUM SODIUM: CPT

## 2018-11-10 PROCEDURE — 93880 EXTRACRANIAL BILAT STUDY: CPT

## 2018-11-10 PROCEDURE — 82330 ASSAY OF CALCIUM: CPT

## 2018-11-10 PROCEDURE — 82947 ASSAY GLUCOSE BLOOD QUANT: CPT

## 2018-11-10 PROCEDURE — 75574 CT ANGIO HRT W/3D IMAGE: CPT

## 2018-11-10 PROCEDURE — 83880 ASSAY OF NATRIURETIC PEPTIDE: CPT

## 2018-11-10 PROCEDURE — 80048 BASIC METABOLIC PNL TOTAL CA: CPT

## 2018-11-10 PROCEDURE — 71045 X-RAY EXAM CHEST 1 VIEW: CPT

## 2018-11-10 PROCEDURE — 87486 CHLMYD PNEUM DNA AMP PROBE: CPT

## 2018-11-10 PROCEDURE — 85027 COMPLETE CBC AUTOMATED: CPT

## 2018-11-10 PROCEDURE — 84484 ASSAY OF TROPONIN QUANT: CPT

## 2018-11-10 PROCEDURE — 87633 RESP VIRUS 12-25 TARGETS: CPT

## 2018-11-10 PROCEDURE — 94640 AIRWAY INHALATION TREATMENT: CPT

## 2018-11-10 PROCEDURE — 80053 COMPREHEN METABOLIC PANEL: CPT

## 2018-11-10 PROCEDURE — 94010 BREATHING CAPACITY TEST: CPT

## 2018-11-10 PROCEDURE — 82435 ASSAY OF BLOOD CHLORIDE: CPT

## 2018-11-10 PROCEDURE — 99285 EMERGENCY DEPT VISIT HI MDM: CPT | Mod: 25

## 2018-11-10 PROCEDURE — 82550 ASSAY OF CK (CPK): CPT

## 2018-11-10 PROCEDURE — 71250 CT THORAX DX C-: CPT

## 2018-11-10 PROCEDURE — 93306 TTE W/DOPPLER COMPLETE: CPT

## 2018-11-10 PROCEDURE — 84443 ASSAY THYROID STIM HORMONE: CPT

## 2018-11-10 PROCEDURE — 82803 BLOOD GASES ANY COMBINATION: CPT

## 2018-11-10 PROCEDURE — 75572 CT HRT W/3D IMAGE: CPT

## 2018-11-10 PROCEDURE — 83605 ASSAY OF LACTIC ACID: CPT

## 2018-11-10 PROCEDURE — 82553 CREATINE MB FRACTION: CPT

## 2018-11-10 PROCEDURE — 83735 ASSAY OF MAGNESIUM: CPT

## 2018-11-10 PROCEDURE — 87798 DETECT AGENT NOS DNA AMP: CPT

## 2018-11-10 PROCEDURE — 84132 ASSAY OF SERUM POTASSIUM: CPT

## 2018-11-10 RX ADMIN — Medication 40 MILLIGRAM(S): at 05:14

## 2018-11-10 RX ADMIN — AMIODARONE HYDROCHLORIDE 200 MILLIGRAM(S): 400 TABLET ORAL at 05:14

## 2018-11-10 RX ADMIN — CLOPIDOGREL BISULFATE 75 MILLIGRAM(S): 75 TABLET, FILM COATED ORAL at 11:13

## 2018-11-10 RX ADMIN — Medication 3 MILLILITER(S): at 05:15

## 2018-11-10 RX ADMIN — Medication 100 MILLIGRAM(S): at 11:13

## 2018-11-10 RX ADMIN — PANTOPRAZOLE SODIUM 40 MILLIGRAM(S): 20 TABLET, DELAYED RELEASE ORAL at 05:14

## 2018-11-10 RX ADMIN — Medication 50 MILLIGRAM(S): at 05:14

## 2018-11-10 RX ADMIN — SPIRONOLACTONE 25 MILLIGRAM(S): 25 TABLET, FILM COATED ORAL at 05:14

## 2018-11-10 RX ADMIN — FINASTERIDE 5 MILLIGRAM(S): 5 TABLET, FILM COATED ORAL at 11:23

## 2018-11-10 RX ADMIN — LOSARTAN POTASSIUM 25 MILLIGRAM(S): 100 TABLET, FILM COATED ORAL at 05:14

## 2018-11-10 RX ADMIN — Medication 3 MILLILITER(S): at 11:13

## 2018-11-10 RX ADMIN — APIXABAN 5 MILLIGRAM(S): 2.5 TABLET, FILM COATED ORAL at 05:14

## 2018-11-10 RX ADMIN — BUDESONIDE AND FORMOTEROL FUMARATE DIHYDRATE 2 PUFF(S): 160; 4.5 AEROSOL RESPIRATORY (INHALATION) at 05:14

## 2018-11-10 NOTE — PROGRESS NOTE ADULT - PROVIDER SPECIALTY LIST ADULT
Cardiology
Internal Medicine
Pulmonology

## 2018-11-10 NOTE — PROGRESS NOTE ADULT - ASSESSMENT
87 m with  Severe AS- evaluation for TAVR noted. Hold diuretics.  Acute on Chronic Systolic Congestive Heart Failure - telemetry, Cardiology evaluation noted, Heart failure team evaluation  COPD- nebs, Pulmonary follow noted  Abnormal Cxr- CT chest to evaluate nodule.  CAD (coronary artery disease)  s/p stent 2010- stable  Hypertension - control  DC home. Follow with Structural Heart Disease team next week for TAVR. QA  Jose Nixon MD pager [phone]

## 2018-11-10 NOTE — PROGRESS NOTE ADULT - SUBJECTIVE AND OBJECTIVE BOX
Subjective: Patient seen and examined. No new events except as noted.     SUBJECTIVE/ROS:  No chest pain, dyspnea, palpitation, or dizziness.       MEDICATIONS:  MEDICATIONS  (STANDING):  ALBUTerol/ipratropium for Nebulization 3 milliLiter(s) Nebulizer every 6 hours  amiodarone    Tablet 200 milliGRAM(s) Oral daily  apixaban 5 milliGRAM(s) Oral every 12 hours  atorvastatin 40 milliGRAM(s) Oral at bedtime  buDESOnide 160 MICROgram(s)/formoterol 4.5 MICROgram(s) Inhaler 2 Puff(s) Inhalation two times a day  clopidogrel Tablet 75 milliGRAM(s) Oral daily  docusate sodium 100 milliGRAM(s) Oral daily  finasteride 5 milliGRAM(s) Oral daily  furosemide    Tablet 40 milliGRAM(s) Oral two times a day  losartan 25 milliGRAM(s) Oral daily  metoprolol succinate ER 50 milliGRAM(s) Oral daily  pantoprazole    Tablet 40 milliGRAM(s) Oral before breakfast  polyethylene glycol 3350 17 Gram(s) Oral daily  spironolactone 25 milliGRAM(s) Oral daily  tamsulosin 0.4 milliGRAM(s) Oral at bedtime  tiotropium 18 MICROgram(s) Capsule 1 Capsule(s) Inhalation daily      PHYSICAL EXAM:  T(C): 36.6 (11-10-18 @ 05:03), Max: 36.8 (11-09-18 @ 20:00)  HR: 79 (11-10-18 @ 05:03) (78 - 89)  BP: 122/71 (11-10-18 @ 05:03) (118/73 - 129/80)  RR: 18 (11-10-18 @ 05:03) (18 - 18)  SpO2: 95% (11-10-18 @ 05:03) (95% - 99%)  Wt(kg): --  I&O's Summary    08 Nov 2018 07:01  -  09 Nov 2018 07:00  --------------------------------------------------------  IN: 980 mL / OUT: 1405 mL / NET: -425 mL    09 Nov 2018 07:01  -  10 Nov 2018 06:57  --------------------------------------------------------  IN: 660 mL / OUT: 1575 mL / NET: -915 mL      JVP: Normal  Neck: supple  Lung: clear   CV: S1 S2 , Murmur: pos razia   Abd: soft  Ext: No edema  neuro: Awake / alert  Psych: flat affect  Skin: normal      LABS/DATA:    CARDIAC MARKERS:  CARDIAC MARKERS ( 08 Nov 2018 06:03 )  x     / x     / x     / x     / 4.1 ng/mL                                12.3   11.07 )-----------( 161      ( 09 Nov 2018 09:25 )             37.8     11-09    140  |  102  |  27<H>  ----------------------------<  146<H>  3.8   |  24  |  1.28    Ca    9.4      09 Nov 2018 06:11  Mg     2.1     11-09      proBNP:   Lipid Profile:   HgA1c:   TSH:     TELE:  EKG:

## 2018-11-10 NOTE — PROVIDER CONTACT NOTE (OTHER) - ACTION/TREATMENT ORDERED:
No need for tele or IV access as per PA. Discharge is on hold. Possible discharge tomorrow
PA aware, continue to monitor pt
STAT cardiac enzymes drawn, EKG completed, 2L NC applied for SOB. Patient is stable at this time - will give an additional nebulizer treatment. Will follow up with any further chest pain events.

## 2018-11-10 NOTE — PROGRESS NOTE ADULT - SUBJECTIVE AND OBJECTIVE BOX
Patient is a 87y old  Male who presents with a chief complaint of sob (10 Nov 2018 06:57)      SUBJECTIVE / OVERNIGHT EVENTS: Comfortable without new complaints.   Review of Systems  chest pain no  palpitations no  sob no  nausea no  headache no    MEDICATIONS  (STANDING):  ALBUTerol/ipratropium for Nebulization 3 milliLiter(s) Nebulizer every 6 hours  amiodarone    Tablet 200 milliGRAM(s) Oral daily  apixaban 5 milliGRAM(s) Oral every 12 hours  atorvastatin 40 milliGRAM(s) Oral at bedtime  buDESOnide 160 MICROgram(s)/formoterol 4.5 MICROgram(s) Inhaler 2 Puff(s) Inhalation two times a day  clopidogrel Tablet 75 milliGRAM(s) Oral daily  docusate sodium 100 milliGRAM(s) Oral daily  finasteride 5 milliGRAM(s) Oral daily  furosemide    Tablet 40 milliGRAM(s) Oral two times a day  losartan 25 milliGRAM(s) Oral daily  metoprolol succinate ER 50 milliGRAM(s) Oral daily  pantoprazole    Tablet 40 milliGRAM(s) Oral before breakfast  polyethylene glycol 3350 17 Gram(s) Oral daily  spironolactone 25 milliGRAM(s) Oral daily  tamsulosin 0.4 milliGRAM(s) Oral at bedtime  tiotropium 18 MICROgram(s) Capsule 1 Capsule(s) Inhalation daily    MEDICATIONS  (PRN):      Vital Signs Last 24 Hrs  T(C): 36.6 (10 Nov 2018 05:03), Max: 36.8 (09 Nov 2018 20:00)  T(F): 97.9 (10 Nov 2018 05:03), Max: 98.3 (09 Nov 2018 20:00)  HR: 79 (10 Nov 2018 05:03) (78 - 89)  BP: 122/71 (10 Nov 2018 05:03) (118/73 - 129/80)  BP(mean): --  RR: 18 (10 Nov 2018 05:03) (18 - 18)  SpO2: 95% (10 Nov 2018 05:03) (95% - 99%)    PHYSICAL EXAM:  GENERAL: NAD, well-developed  HEAD:  Atraumatic, Normocephalic  EYES: EOMI, PERRLA, conjunctiva and sclera clear  NECK: Supple, No JVD  CHEST/LUNG: Clear to auscultation bilaterally; No wheeze  HEART: Regular rate and rhythm; No murmurs, rubs, or gallops  ABDOMEN: Soft, Nontender, Nondistended; Bowel sounds present  EXTREMITIES:  2+ Peripheral Pulses, No clubbing, cyanosis, or edema  PSYCH: AAOx3  NEUROLOGY: non-focal  SKIN: No rashes or lesions    LABS:                        12.3   11.07 )-----------( 161      ( 09 Nov 2018 09:25 )             37.8     11-09    140  |  102  |  27<H>  ----------------------------<  146<H>  3.8   |  24  |  1.28    Ca    9.4      09 Nov 2018 06:11  Mg     2.1     11-09                  RADIOLOGY & ADDITIONAL TESTS:    Imaging Personally Reviewed:    Consultant(s) Notes Reviewed:      Care Discussed with Consultants/Other Providers:

## 2018-11-10 NOTE — CHART NOTE - NSCHARTNOTEFT_GEN_A_CORE
Seen at bedside this morning. Issue with discharge yesterday. Question of whether he needs to go home on continuous oxygen. He has an oxygen concentrator at home, which is uses intermittently. This morning he was out of bed, sitting up, and eating breakfast. Appears comfortable, breathing well. Oxygen saturation on room air 95%. OK from pulmonary perspective to send home without continuous oxygen. Will follow up with Dr. Mack as outpatient.
patient family do not have the portable home oxygen to bring to hospital   - D/W  (discharge cancelled)   - D/w Covering MD from  office, ( md will update  in am ) she is unclear about patient need for o2  - Will cont to follow
MEDICINE PA     Event Summary: Notified by RN that patient complaining of chest pain. Pt examined at bedside by me. Endorses a 1/10 intermittent CP/soreness that started hours ago. Was laying in bed when it started. Endorses mild shortness of breath and mildly productive cough. Also has soreness in both arms. No identifiable aggravating/alleviating factors. No change in chest pain with position. Denies sharp/stabbing pain, radiation to back, arms/legs, diaphoresis, jaw pain, dizziness, vision changes.     Vital Signs Last 24 Hrs  T(C): 36.3 (08 Nov 2018 06:35), Max: 37.1 (07 Nov 2018 12:27)  T(F): 97.3 (08 Nov 2018 06:35), Max: 98.7 (07 Nov 2018 12:27)  HR: 90 (08 Nov 2018 06:35) (71 - 91)  BP: 136/76 (08 Nov 2018 06:35) (128/73 - 144/87)  RR: 18 (08 Nov 2018 06:35) (18 - 18)  SpO2: 94% (08 Nov 2018 06:35) (94% - 95%)    Physical Assessment:  General: Awake, No acute distress.   Neurology: A&Ox3, nonfocal  CV: +S1S2, irregular.  b/l LE edema   Respiratory: Even, unlabored.  +wheeze audible without auscultation   Abdomen:  +BS.  Soft, NT, ND.  No palpable mass. Mildly distended       A/P:  HPI: 87M hx PMH CAD w/ stents x2 (last ~6mo ago & 2010), pAfib (on Eliquis), diastolic CHF, PPM (placed ~7r ago), former smoker w/ COPD (inhalers- w/ home oxygen) who presented with CP. Acutely presenting with recurrent CP and dyspnea.     1. CP   - Pt does not want/morphine or Tylenol for pain    - Oxygen for comfort   - Duoneb x1 STAT   - troponin, ckmb, ck, probnp ordered STAT. Please follow up   - EKG with A fib at 86 bpm, T wave inversion in lead III not present on previous EKG. No acute YASH, or depressions   - Cardiology follow up      2. SOB likely 2/2- AS/COPD/CHF  - Duonebs x1   - IV lasix given   - Portable urgent CXR ordered. Please follow up official read.   - Cardio/pulm follow up     Endorsed to primary team in AM. Attending to follow.     Jesusita Ochoa PA-C   Department of Medicine   Spectra [phone]

## 2018-11-10 NOTE — PROGRESS NOTE ADULT - ASSESSMENT
SOB  due to acute systolic chf and symptomatic severe AS  cont current meds  cont BB and Arb  Lasix 40 po bid  appreciate struct. heart eval plan for TAVR in near future     HTN  cont current meds for now     afib  HR stable  intermittently paced  cont a/c   pt on amio      cad  history of stent  DAPT with eliquis baed on recent trial may impose higher bleed risk   DC asa  cont statin     DC planning as per primary team

## 2018-11-13 ENCOUNTER — OTHER (OUTPATIENT)
Age: 83
End: 2018-11-13

## 2018-11-13 ENCOUNTER — APPOINTMENT (OUTPATIENT)
Dept: PULMONOLOGY | Facility: CLINIC | Age: 83
End: 2018-11-13

## 2018-11-16 ENCOUNTER — OUTPATIENT (OUTPATIENT)
Dept: OUTPATIENT SERVICES | Facility: HOSPITAL | Age: 83
LOS: 1 days | End: 2018-11-16
Payer: MEDICARE

## 2018-11-16 VITALS
SYSTOLIC BLOOD PRESSURE: 149 MMHG | HEART RATE: 79 BPM | WEIGHT: 220.02 LBS | OXYGEN SATURATION: 97 % | TEMPERATURE: 98 F | DIASTOLIC BLOOD PRESSURE: 72 MMHG | HEIGHT: 67 IN | RESPIRATION RATE: 16 BRPM

## 2018-11-16 DIAGNOSIS — Z95.0 PRESENCE OF CARDIAC PACEMAKER: Chronic | ICD-10-CM

## 2018-11-16 DIAGNOSIS — I35.0 NONRHEUMATIC AORTIC (VALVE) STENOSIS: ICD-10-CM

## 2018-11-16 LAB
ALBUMIN SERPL ELPH-MCNC: 4.2 G/DL — SIGNIFICANT CHANGE UP (ref 3.3–5)
ALP SERPL-CCNC: 125 U/L — HIGH (ref 40–120)
ALT FLD-CCNC: 28 U/L — SIGNIFICANT CHANGE UP (ref 10–45)
ANION GAP SERPL CALC-SCNC: 15 MMOL/L — SIGNIFICANT CHANGE UP (ref 5–17)
APTT BLD: 32.4 SEC — SIGNIFICANT CHANGE UP (ref 27.5–36.3)
AST SERPL-CCNC: 25 U/L — SIGNIFICANT CHANGE UP (ref 10–40)
BILIRUB SERPL-MCNC: 0.5 MG/DL — SIGNIFICANT CHANGE UP (ref 0.2–1.2)
BUN SERPL-MCNC: 21 MG/DL — SIGNIFICANT CHANGE UP (ref 7–23)
CALCIUM SERPL-MCNC: 9 MG/DL — SIGNIFICANT CHANGE UP (ref 8.4–10.5)
CHLORIDE SERPL-SCNC: 104 MMOL/L — SIGNIFICANT CHANGE UP (ref 96–108)
CO2 SERPL-SCNC: 24 MMOL/L — SIGNIFICANT CHANGE UP (ref 22–31)
CREAT SERPL-MCNC: 1.19 MG/DL — SIGNIFICANT CHANGE UP (ref 0.5–1.3)
GLUCOSE SERPL-MCNC: 97 MG/DL — SIGNIFICANT CHANGE UP (ref 70–99)
HCT VFR BLD CALC: 38.2 % — LOW (ref 39–50)
HGB BLD-MCNC: 13.7 G/DL — SIGNIFICANT CHANGE UP (ref 13–17)
INR BLD: 1.1 RATIO — SIGNIFICANT CHANGE UP (ref 0.88–1.16)
MCHC RBC-ENTMCNC: 31.8 PG — SIGNIFICANT CHANGE UP (ref 27–34)
MCHC RBC-ENTMCNC: 36 GM/DL — SIGNIFICANT CHANGE UP (ref 32–36)
MCV RBC AUTO: 88.5 FL — SIGNIFICANT CHANGE UP (ref 80–100)
PLATELET # BLD AUTO: 247 K/UL — SIGNIFICANT CHANGE UP (ref 150–400)
POTASSIUM SERPL-MCNC: 4.5 MMOL/L — SIGNIFICANT CHANGE UP (ref 3.5–5.3)
POTASSIUM SERPL-SCNC: 4.5 MMOL/L — SIGNIFICANT CHANGE UP (ref 3.5–5.3)
PROT SERPL-MCNC: 6.9 G/DL — SIGNIFICANT CHANGE UP (ref 6–8.3)
PROTHROM AB SERPL-ACNC: 12.7 SEC — SIGNIFICANT CHANGE UP (ref 10–12.9)
RBC # BLD: 4.32 M/UL — SIGNIFICANT CHANGE UP (ref 4.2–5.8)
RBC # FLD: 12.7 % — SIGNIFICANT CHANGE UP (ref 10.3–14.5)
SODIUM SERPL-SCNC: 143 MMOL/L — SIGNIFICANT CHANGE UP (ref 135–145)
WBC # BLD: 6.8 K/UL — SIGNIFICANT CHANGE UP (ref 3.8–10.5)
WBC # FLD AUTO: 6.8 K/UL — SIGNIFICANT CHANGE UP (ref 3.8–10.5)

## 2018-11-16 PROCEDURE — 99152 MOD SED SAME PHYS/QHP 5/>YRS: CPT | Mod: GC

## 2018-11-16 PROCEDURE — 85610 PROTHROMBIN TIME: CPT

## 2018-11-16 PROCEDURE — 93010 ELECTROCARDIOGRAM REPORT: CPT

## 2018-11-16 PROCEDURE — 85730 THROMBOPLASTIN TIME PARTIAL: CPT

## 2018-11-16 PROCEDURE — 99223 1ST HOSP IP/OBS HIGH 75: CPT

## 2018-11-16 PROCEDURE — 80053 COMPREHEN METABOLIC PANEL: CPT

## 2018-11-16 PROCEDURE — 93456 R HRT CORONARY ARTERY ANGIO: CPT | Mod: 26,GC

## 2018-11-16 PROCEDURE — 85027 COMPLETE CBC AUTOMATED: CPT

## 2018-11-16 PROCEDURE — 76937 US GUIDE VASCULAR ACCESS: CPT

## 2018-11-16 PROCEDURE — C1887: CPT

## 2018-11-16 PROCEDURE — C1769: CPT

## 2018-11-16 PROCEDURE — 93456 R HRT CORONARY ARTERY ANGIO: CPT

## 2018-11-16 PROCEDURE — 99152 MOD SED SAME PHYS/QHP 5/>YRS: CPT

## 2018-11-16 PROCEDURE — 76937 US GUIDE VASCULAR ACCESS: CPT | Mod: 26,GC

## 2018-11-16 PROCEDURE — 93005 ELECTROCARDIOGRAM TRACING: CPT

## 2018-11-16 PROCEDURE — C1894: CPT

## 2018-11-16 PROCEDURE — 99153 MOD SED SAME PHYS/QHP EA: CPT

## 2018-11-16 RX ORDER — SODIUM CHLORIDE 9 MG/ML
3 INJECTION INTRAMUSCULAR; INTRAVENOUS; SUBCUTANEOUS EVERY 8 HOURS
Qty: 0 | Refills: 0 | Status: DISCONTINUED | OUTPATIENT
Start: 2018-11-16 | End: 2018-12-01

## 2018-11-16 NOTE — H&P CARDIOLOGY - PMH
Aortic stenosis    Asthma    Atrial fibrillation    BPH (benign prostatic hyperplasia)    CAD (coronary artery disease)  s/p stent 2010  CHF (congestive heart failure)    Chronic diastolic HF (heart failure)    COPD (chronic obstructive pulmonary disease)    CVA (cerebral vascular accident)    Gait instability    H/O thalassemia    Hepatitis B    Hypertension    DIEGO (obstructive sleep apnea)    Pacemaker    PTSD (post-traumatic stress disorder)    Seizure    Tachy-idris syndrome    TIA (transient ischemic attack) Aortic stenosis    Asthma    Atrial fibrillation    BPH (benign prostatic hyperplasia)    CAD (coronary artery disease)  s/p stent 2010  CHF (congestive heart failure)    Chronic diastolic HF (heart failure)    CKD (chronic kidney disease) stage 3, GFR 30-59 ml/min    COPD (chronic obstructive pulmonary disease)    CVA (cerebral vascular accident)    Gait instability    H/O thalassemia    Hepatitis B    Hypertension    DIEGO (obstructive sleep apnea)    Pacemaker    PTSD (post-traumatic stress disorder)    Seizure    Tachy-idris syndrome    TIA (transient ischemic attack)

## 2018-11-16 NOTE — H&P CARDIOLOGY - HISTORY OF PRESENT ILLNESS
87 year old  male with PMH of PTSD, CVA 7 years- no deficits, gait instability- uses cane, seizure-last 7 years ago, asthma, COPD, former smoker, DIEGO- no CPAP,  htn, hld, CAD with prior stent 2016 + 2012, AS, chronic diastolic HF, tachybrady syndrome, A Fib - on Eliquis (last dose Tuesday 11/13), PPM, kidney stones, Hepatitis B, BPH, thalassemia, presents today for cardiac catheterization. Patient reports ARMSTRONG and lightheadedness for past year. Evaluated by Dr Merritt and 87 year old  male with PMH of PTSD, CVA 7 years- no deficits, gait instability- uses cane, seizure-last 7 years ago, asthma, COPD, former smoker, DIEGO- no CPAP,  htn, hld, CAD with prior stent 2016 + 2012, AS, chronic diastolic HF, tachybrady syndrome, A Fib - on Eliquis (last dose Tuesday 11/13), PPM, kidney stones, stage 3 CKD,  Hepatitis B, BPH, thalassemia, presents today for cardiac catheterization. Patient reports ARMSTRONG and lightheadedness for past year. Evaluated by Dr Merritt and ECHO performed revealing   1. Calcified aortic valve with decreased opening. Peak  transaortic valve gradient equals 55 mm Hg, mean  transaortic valve gradient equals 31 mm Hg, estimated  aortic valve area equals 0.7 sqcm (by continuity equation),  aortic valve velocity time integral equals 76 cm,  consistent with severe aortic stenosis.  2. Severely dilated left atrium.  LA volume index = 73  cc/m2.  3. Severe concentric left ventricular hypertrophy.  4. Moderate global left ventricular systolic dysfunction.  5. Estimated pulmonary artery systolic pressure equals 54  mm Hg, assuming right atrial pressure equals 8 mm Hg,  consistent with moderate pulmonary pressures.  Referred today for angiogram. 87 year old  male with PMH of PTSD, CVA 7 years- no deficits, gait instability- uses cane, seizure-last 7 years ago, asthma, COPD, former smoker, DIEGO- no CPAP,  htn, hld, CAD with prior stent 2016 + 2012, AS, chronic systolic/diastolic HF Ef 43%, tachybrady syndrome, A Fib - on Eliquis (last dose Tuesday 11/13), PPM, kidney stones, stage 3 CKD,  Hepatitis B, BPH, thalassemia, presents today for cardiac catheterization. Patient reports ARMSTRONG and lightheadedness for past year. Evaluated by Dr Merritt and ECHO performed revealing   1. Calcified aortic valve with decreased opening. Peak  transaortic valve gradient equals 55 mm Hg, mean  transaortic valve gradient equals 31 mm Hg, estimated  aortic valve area equals 0.7 sqcm (by continuity equation),  aortic valve velocity time integral equals 76 cm,  consistent with severe aortic stenosis.  2. Severely dilated left atrium.  LA volume index = 73  cc/m2.  3. Severe concentric left ventricular hypertrophy.  4. Moderate global left ventricular systolic dysfunction.  5. Estimated pulmonary artery systolic pressure equals 54  mm Hg, assuming right atrial pressure equals 8 mm Hg,  consistent with moderate pulmonary pressures.  Referred today for angiogram.

## 2018-11-19 ENCOUNTER — OUTPATIENT (OUTPATIENT)
Dept: OUTPATIENT SERVICES | Facility: HOSPITAL | Age: 83
LOS: 1 days | End: 2018-11-19
Payer: MEDICARE

## 2018-11-19 VITALS
DIASTOLIC BLOOD PRESSURE: 73 MMHG | WEIGHT: 220.02 LBS | HEART RATE: 70 BPM | OXYGEN SATURATION: 99 % | RESPIRATION RATE: 18 BRPM | TEMPERATURE: 98 F | SYSTOLIC BLOOD PRESSURE: 116 MMHG | HEIGHT: 67 IN

## 2018-11-19 DIAGNOSIS — Z95.0 PRESENCE OF CARDIAC PACEMAKER: Chronic | ICD-10-CM

## 2018-11-19 DIAGNOSIS — I10 ESSENTIAL (PRIMARY) HYPERTENSION: ICD-10-CM

## 2018-11-19 DIAGNOSIS — Z29.9 ENCOUNTER FOR PROPHYLACTIC MEASURES, UNSPECIFIED: ICD-10-CM

## 2018-11-19 DIAGNOSIS — Z01.818 ENCOUNTER FOR OTHER PREPROCEDURAL EXAMINATION: ICD-10-CM

## 2018-11-19 DIAGNOSIS — I35.0 NONRHEUMATIC AORTIC (VALVE) STENOSIS: ICD-10-CM

## 2018-11-19 LAB
BLD GP AB SCN SERPL QL: NEGATIVE — SIGNIFICANT CHANGE UP
HBA1C BLD-MCNC: 5.5 % — SIGNIFICANT CHANGE UP (ref 4–5.6)
MRSA PCR RESULT.: SIGNIFICANT CHANGE UP
RH IG SCN BLD-IMP: POSITIVE — SIGNIFICANT CHANGE UP
S AUREUS DNA NOSE QL NAA+PROBE: SIGNIFICANT CHANGE UP

## 2018-11-19 PROCEDURE — 71046 X-RAY EXAM CHEST 2 VIEWS: CPT

## 2018-11-19 PROCEDURE — 86850 RBC ANTIBODY SCREEN: CPT

## 2018-11-19 PROCEDURE — 87640 STAPH A DNA AMP PROBE: CPT

## 2018-11-19 PROCEDURE — 86901 BLOOD TYPING SEROLOGIC RH(D): CPT

## 2018-11-19 PROCEDURE — 86900 BLOOD TYPING SEROLOGIC ABO: CPT

## 2018-11-19 PROCEDURE — G0463: CPT

## 2018-11-19 PROCEDURE — 87641 MR-STAPH DNA AMP PROBE: CPT

## 2018-11-19 PROCEDURE — 71046 X-RAY EXAM CHEST 2 VIEWS: CPT | Mod: 26

## 2018-11-19 PROCEDURE — 83036 HEMOGLOBIN GLYCOSYLATED A1C: CPT

## 2018-11-19 RX ORDER — TAMSULOSIN HYDROCHLORIDE 0.4 MG/1
1 CAPSULE ORAL
Qty: 0 | Refills: 0 | COMMUNITY

## 2018-11-19 RX ORDER — TRAZODONE HCL 50 MG
0.5 TABLET ORAL
Qty: 0 | Refills: 0 | COMMUNITY

## 2018-11-19 RX ORDER — SODIUM CHLORIDE 9 MG/ML
3 INJECTION INTRAMUSCULAR; INTRAVENOUS; SUBCUTANEOUS EVERY 8 HOURS
Qty: 0 | Refills: 0 | Status: DISCONTINUED | OUTPATIENT
Start: 2018-11-27 | End: 2018-11-29

## 2018-11-19 RX ORDER — LIDOCAINE HCL 20 MG/ML
0.2 VIAL (ML) INJECTION ONCE
Qty: 0 | Refills: 0 | Status: DISCONTINUED | OUTPATIENT
Start: 2018-11-27 | End: 2018-11-29

## 2018-11-19 NOTE — H&P PST ADULT - ASSESSMENT
CAPRINI SCORE [CLOT]    AGE RELATED RISK FACTORS                                                       MOBILITY RELATED FACTORS  [ ] Age 41-60 years                                            (1 Point)                  [ ] Bed rest                                                        (1 Point)  [ ] Age: 61-74 years                                           (2 Points)                 [ ] Plaster cast                                                   (2 Points)  [ x] Age= 75 years                                              (3 Points)                 [ ] Bed bound for more than 72 hours                 (2 Points)    DISEASE RELATED RISK FACTORS                                               GENDER SPECIFIC FACTORS  [ ] Edema in the lower extremities                       (1 Point)                  [ ] Pregnancy                                                     (1 Point)  [ ] Varicose veins                                               (1 Point)                  [ ] Post-partum < 6 weeks                                   (1 Point)             [ x] BMI > 25 Kg/m2                                            (1 Point)                  [ ] Hormonal therapy  or oral contraception          (1 Point)                 [ ] Sepsis (in the previous month)                        (1 Point)                  [ ] History of pregnancy complications                 (1 point)  [ ] Pneumonia or serious lung disease                                               [ ] Unexplained or recurrent                     (1 Point)           (in the previous month)                               (1 Point)  [ ] Abnormal pulmonary function test                     (1 Point)                 SURGERY RELATED RISK FACTORS  [ ] Acute myocardial infarction                              (1 Point)                 [ ]  Section                                             (1 Point)  [ ] Congestive heart failure (in the previous month)  (1 Point)               [ ] Minor surgery                                                  (1 Point)   [ ] Inflammatory bowel disease                             (1 Point)                 [ ] Arthroscopic surgery                                        (2 Points)  [ ] Central venous access                                      (2 Points)                [ x] General surgery lasting more than 45 minutes   (2 Points)       [ ] Stroke (in the previous month)                          (5 Points)               [ ] Elective arthroplasty                                         (5 Points)                                                                                                                                               HEMATOLOGY RELATED FACTORS                                                 TRAUMA RELATED RISK FACTORS  [ ] Prior episodes of VTE                                     (3 Points)                 [ ] Fracture of the hip, pelvis, or leg                       (5 Points)  [ ] Positive family history for VTE                         (3 Points)                 [ ] Acute spinal cord injury (in the previous month)  (5 Points)  [ ] Prothrombin 49260 A                                     (3 Points)                 [ ] Paralysis  (less than 1 month)                             (5 Points)  [ ] Factor V Leiden                                             (3 Points)                  [ ] Multiple Trauma within 1 month                        (5 Points)  [ ] Lupus anticoagulants                                     (3 Points)                                                           [ ] Anticardiolipin antibodies                               (3 Points)                                                       [ ] High homocysteine in the blood                      (3 Points)                                             [ ] Other congenital or acquired thrombophilia      (3 Points)                                                [ ] Heparin induced thrombocytopenia                  (3 Points)                                          Total Score [      6    ]

## 2018-11-19 NOTE — H&P PST ADULT - PROBLEM SELECTOR PLAN 1
TAVR  Transfemoral 11/26/18  Pt was instructed by Dr. Moreno office ([facility]) to stop Eliquis 11/22/18 and continue Plavix,  pt verbalized understanding

## 2018-11-19 NOTE — H&P PST ADULT - OTHER CARE PROVIDERS
Dr. Mack [phone] pulmonologist, Dr. Kendrick [phone] last appointment 10/18 Dr. Mack [phone] pulmonologist, Dr. Kendrick [phone] last appointment 10/18. Dr. Turk  Electrophysiologist

## 2018-11-19 NOTE — H&P PST ADULT - PMH
Aortic stenosis    Asthma    Atrial fibrillation    BPH (benign prostatic hyperplasia)    CAD (coronary artery disease)  s/p stent 2010  CHF (congestive heart failure)    Chronic diastolic HF (heart failure)    CKD (chronic kidney disease) stage 3, GFR 30-59 ml/min    COPD (chronic obstructive pulmonary disease)    CVA (cerebral vascular accident)    Gait instability    H/O thalassemia    Hepatitis B    Hypertension    DIEGO (obstructive sleep apnea)    Pacemaker  2015 Med[employer]onic [id number]  [id number]  PTSD (post-traumatic stress disorder)    Seizure    Tachy-idris syndrome    TIA (transient ischemic attack)  2010

## 2018-11-19 NOTE — H&P PST ADULT - PSH
Artificial pacemaker    S/P primary angioplasty with coronary stent  2012 AND 2018 Artificial pacemaker    S/P primary angioplasty with coronary stent  2012 AND 2017

## 2018-11-19 NOTE — H&P PST ADULT - HISTORY OF PRESENT ILLNESS
87 year old  male with PMH of PTSD, CVA 7 years- no deficits, gait instability- uses cane, seizure-last 7 years ago, asthma (well controlled, on meds), COPD, former smoker, DIEGO- no CPAP,  htn, hld, CAD with prior stent 2017 + 2012, AS, chronic systolic/diastolic HF Ef 43%, tachybrady syndrome, A Fib s/p Pacemaker (Eliquis and Plavix), PPM, kidney stones, stage 3 CKD,  Hepatitis B, BPH, thalassemia, presents today for cardiac catheterization. Patient reports ARMSTRONG and lightheadedness for past year. Evaluated by Dr Merritt and ECHO performed revealing       1. Calcified aortic valve with decreased opening. Peak  transaortic valve gradient equals 55 mm Hg, mean  transaortic valve gradient equals 31 mm Hg, estimated  aortic valve area equals 0.7 sqcm (by continuity equation),  aortic valve velocity time integral equals 76 cm,  consistent with severe aortic stenosis.  2. Severely dilated left atrium.  LA volume index = 73  cc/m2.  3. Severe concentric left ventricular hypertrophy.  4. Moderate global left ventricular systolic dysfunction.  5. Estimated pulmonary artery systolic pressure equals 54  mm Hg, assuming right atrial pressure equals 8 mm Hg,  consistent with moderate pulmonary pressures.  Referred today for angiogram. 87 year old  male with H/O PTSD, CVA 7 years- no deficits, gait instability- uses cane, seizure-last 7 years ago, asthma (well controlled on meds), COPD, former smoker, DIEGO- no CPAP,  HTN, HLD, CAD with prior stent 2012 and 2017, AS( severe) - EF 43%, tachybrady syndrome, A Fib (Eliquis and Plavix) s/p Pacemaker (last interrogated 3/2018), kidney stones, stage 3 CKD,  Hepatitis B, BPH, thalassemia, C/O Dyspnea on exertion and lightheadedness x 12 month  now scheduled for TAVR on 11/26/18. 87 year old  male with H/O PTSD, CVA 7 years- no deficits, gait instability- uses cane, seizure-last 7 years ago, asthma (well controlled on meds), COPD, former smoker, DIEGO- no CPAP,  HTN, HLD, CAD with prior stent 2012 and 2017, AS( severe) - EF 43%, tachybrady syndrome, A Fib (Eliquis and Plavix) s/p Pacemaker, kidney stones, stage 3 CKD,  Hepatitis B, BPH, thalassemia, C/O Dyspnea on exertion and lightheadedness x 12 month  now scheduled for TAVR on 11/26/18. 87 year old  male with H/O PTSD, CVA 7 years- no deficits, gait instability- uses cane, seizure-last 7 years ago, asthma (well controlled on meds), COPD, former smoker, DIEGO- no CPAP,  HTN, HLD, CAD with prior stent 2012 and 2017, AS( severe) - EF 43%, tachybrady syndrome, A Fib (Eliquis and Plavix) s/p Pacemaker, kidney stones, stage 3 CKD,  Hepatitis B, BPH, thalassemia, C/O Dyspnea on exertion and lightheadedness x 12 month  now scheduled for TAVR on 11/26/18.  Spoke with CTS office regarding patient's pacemaker interrogation they were informed that the pacemaker has not been interrogated since 2015. Spoke with Arnoldo CHAUDHRY from the TAVR team who stated that the patient was instructed to have the pacemaker interrogated prior to surgery. As of now we do not have the report. Arnoldo stated that it will be taken care of the morning of surgery if we do not have the report.

## 2018-11-19 NOTE — H&P PST ADULT - RS GEN PE MLT RESP DETAILS PC
no rhonchi/respirations non-labored/clear to auscultation bilaterally/no rales/no wheezes/airway patent/breath sounds equal/good air movement

## 2018-11-20 PROBLEM — Z95.0 PRESENCE OF CARDIAC PACEMAKER: Chronic | Status: ACTIVE | Noted: 2017-12-26

## 2018-11-20 PROBLEM — G45.9 TRANSIENT CEREBRAL ISCHEMIC ATTACK, UNSPECIFIED: Chronic | Status: ACTIVE | Noted: 2018-11-16

## 2018-11-23 PROBLEM — I63.9 CEREBRAL INFARCTION, UNSPECIFIED: Chronic | Status: ACTIVE | Noted: 2018-11-16

## 2018-11-23 PROBLEM — R56.9 UNSPECIFIED CONVULSIONS: Chronic | Status: ACTIVE | Noted: 2018-11-16

## 2018-11-23 PROBLEM — I50.9 HEART FAILURE, UNSPECIFIED: Chronic | Status: ACTIVE | Noted: 2018-11-16

## 2018-11-23 PROBLEM — I49.5 SICK SINUS SYNDROME: Chronic | Status: ACTIVE | Noted: 2018-11-16

## 2018-11-23 PROBLEM — I35.0 NONRHEUMATIC AORTIC (VALVE) STENOSIS: Chronic | Status: ACTIVE | Noted: 2018-11-16

## 2018-11-23 PROBLEM — N18.3 CHRONIC KIDNEY DISEASE, STAGE 3 (MODERATE): Chronic | Status: ACTIVE | Noted: 2018-11-16

## 2018-11-23 PROBLEM — R26.81 UNSTEADINESS ON FEET: Chronic | Status: ACTIVE | Noted: 2018-11-16

## 2018-11-23 PROBLEM — J45.909 UNSPECIFIED ASTHMA, UNCOMPLICATED: Chronic | Status: ACTIVE | Noted: 2018-11-16

## 2018-11-23 PROBLEM — N40.0 BENIGN PROSTATIC HYPERPLASIA WITHOUT LOWER URINARY TRACT SYMPTOMS: Chronic | Status: ACTIVE | Noted: 2018-11-16

## 2018-11-23 PROBLEM — G47.33 OBSTRUCTIVE SLEEP APNEA (ADULT) (PEDIATRIC): Chronic | Status: ACTIVE | Noted: 2018-11-16

## 2018-11-23 PROBLEM — F43.10 POST-TRAUMATIC STRESS DISORDER, UNSPECIFIED: Chronic | Status: ACTIVE | Noted: 2018-11-16

## 2018-11-23 PROBLEM — I48.91 UNSPECIFIED ATRIAL FIBRILLATION: Chronic | Status: ACTIVE | Noted: 2018-11-16

## 2018-11-23 PROBLEM — B19.10 UNSPECIFIED VIRAL HEPATITIS B WITHOUT HEPATIC COMA: Chronic | Status: ACTIVE | Noted: 2018-11-16

## 2018-11-23 PROBLEM — I50.32 CHRONIC DIASTOLIC (CONGESTIVE) HEART FAILURE: Chronic | Status: ACTIVE | Noted: 2018-11-16

## 2018-11-23 PROBLEM — J44.9 CHRONIC OBSTRUCTIVE PULMONARY DISEASE, UNSPECIFIED: Chronic | Status: ACTIVE | Noted: 2018-11-16

## 2018-11-25 ENCOUNTER — OUTPATIENT (OUTPATIENT)
Dept: OUTPATIENT SERVICES | Facility: HOSPITAL | Age: 83
LOS: 1 days | End: 2018-11-25

## 2018-11-25 DIAGNOSIS — Z95.0 PRESENCE OF CARDIAC PACEMAKER: Chronic | ICD-10-CM

## 2018-11-26 ENCOUNTER — OUTPATIENT (OUTPATIENT)
Dept: OUTPATIENT SERVICES | Facility: HOSPITAL | Age: 83
LOS: 1 days | End: 2018-11-26

## 2018-11-26 ENCOUNTER — APPOINTMENT (OUTPATIENT)
Dept: CARDIOTHORACIC SURGERY | Facility: HOSPITAL | Age: 83
End: 2018-11-26

## 2018-11-26 DIAGNOSIS — Z95.0 PRESENCE OF CARDIAC PACEMAKER: Chronic | ICD-10-CM

## 2018-11-27 ENCOUNTER — APPOINTMENT (OUTPATIENT)
Dept: CARDIOTHORACIC SURGERY | Facility: HOSPITAL | Age: 83
End: 2018-11-27

## 2018-11-27 ENCOUNTER — INPATIENT (INPATIENT)
Facility: HOSPITAL | Age: 83
LOS: 1 days | Discharge: HOME | DRG: 267 | End: 2018-11-29
Attending: THORACIC SURGERY (CARDIOTHORACIC VASCULAR SURGERY) | Admitting: THORACIC SURGERY (CARDIOTHORACIC VASCULAR SURGERY)
Payer: MEDICARE

## 2018-11-27 VITALS
WEIGHT: 218.26 LBS | RESPIRATION RATE: 20 BRPM | HEART RATE: 80 BPM | OXYGEN SATURATION: 99 % | DIASTOLIC BLOOD PRESSURE: 80 MMHG | TEMPERATURE: 98 F | SYSTOLIC BLOOD PRESSURE: 144 MMHG

## 2018-11-27 DIAGNOSIS — I35.0 NONRHEUMATIC AORTIC (VALVE) STENOSIS: ICD-10-CM

## 2018-11-27 DIAGNOSIS — Z95.0 PRESENCE OF CARDIAC PACEMAKER: Chronic | ICD-10-CM

## 2018-11-27 LAB
GAS PNL BLDA: SIGNIFICANT CHANGE UP
GLUCOSE BLDC GLUCOMTR-MCNC: 93 MG/DL — SIGNIFICANT CHANGE UP (ref 70–99)

## 2018-11-27 PROCEDURE — 33361 REPLACE AORTIC VALVE PERQ: CPT | Mod: 62,Q0

## 2018-11-27 PROCEDURE — 93010 ELECTROCARDIOGRAM REPORT: CPT

## 2018-11-27 PROCEDURE — 75716 ARTERY X-RAYS ARMS/LEGS: CPT | Mod: 26,XU

## 2018-11-27 RX ORDER — ONDANSETRON 8 MG/1
4 TABLET, FILM COATED ORAL ONCE
Qty: 0 | Refills: 0 | Status: DISCONTINUED | OUTPATIENT
Start: 2018-11-27 | End: 2018-11-27

## 2018-11-27 RX ORDER — ALBUTEROL 90 UG/1
0 AEROSOL, METERED ORAL
Qty: 0 | Refills: 0 | COMMUNITY

## 2018-11-27 RX ORDER — SPIRONOLACTONE 25 MG/1
25 TABLET, FILM COATED ORAL DAILY
Qty: 0 | Refills: 0 | Status: DISCONTINUED | OUTPATIENT
Start: 2018-11-27 | End: 2018-11-29

## 2018-11-27 RX ORDER — METOPROLOL TARTRATE 50 MG
50 TABLET ORAL DAILY
Qty: 0 | Refills: 0 | Status: DISCONTINUED | OUTPATIENT
Start: 2018-11-27 | End: 2018-11-29

## 2018-11-27 RX ORDER — FINASTERIDE 5 MG/1
1 TABLET, FILM COATED ORAL
Qty: 0 | Refills: 0 | COMMUNITY

## 2018-11-27 RX ORDER — FINASTERIDE 5 MG/1
5 TABLET, FILM COATED ORAL DAILY
Qty: 0 | Refills: 0 | Status: DISCONTINUED | OUTPATIENT
Start: 2018-11-27 | End: 2018-11-29

## 2018-11-27 RX ORDER — ASPIRIN/CALCIUM CARB/MAGNESIUM 324 MG
325 TABLET ORAL DAILY
Qty: 0 | Refills: 0 | Status: DISCONTINUED | OUTPATIENT
Start: 2018-11-28 | End: 2018-11-28

## 2018-11-27 RX ORDER — ALBUTEROL 90 UG/1
2 AEROSOL, METERED ORAL EVERY 6 HOURS
Qty: 0 | Refills: 0 | Status: DISCONTINUED | OUTPATIENT
Start: 2018-11-27 | End: 2018-11-29

## 2018-11-27 RX ORDER — APIXABAN 2.5 MG/1
5 TABLET, FILM COATED ORAL EVERY 12 HOURS
Qty: 0 | Refills: 0 | Status: DISCONTINUED | OUTPATIENT
Start: 2018-11-28 | End: 2018-11-29

## 2018-11-27 RX ORDER — VANCOMYCIN HCL 1 G
1000 VIAL (EA) INTRAVENOUS EVERY 12 HOURS
Qty: 0 | Refills: 0 | Status: COMPLETED | OUTPATIENT
Start: 2018-11-28 | End: 2018-11-28

## 2018-11-27 RX ORDER — AMIODARONE HYDROCHLORIDE 400 MG/1
200 TABLET ORAL DAILY
Qty: 0 | Refills: 0 | Status: DISCONTINUED | OUTPATIENT
Start: 2018-11-27 | End: 2018-11-29

## 2018-11-27 RX ORDER — DIGOXIN 250 MCG
1 TABLET ORAL
Qty: 0 | Refills: 0 | COMMUNITY

## 2018-11-27 RX ORDER — ATORVASTATIN CALCIUM 80 MG/1
40 TABLET, FILM COATED ORAL AT BEDTIME
Qty: 0 | Refills: 0 | Status: DISCONTINUED | OUTPATIENT
Start: 2018-11-27 | End: 2018-11-29

## 2018-11-27 RX ADMIN — SODIUM CHLORIDE 3 MILLILITER(S): 9 INJECTION INTRAMUSCULAR; INTRAVENOUS; SUBCUTANEOUS at 22:42

## 2018-11-27 RX ADMIN — ATORVASTATIN CALCIUM 40 MILLIGRAM(S): 80 TABLET, FILM COATED ORAL at 22:44

## 2018-11-27 NOTE — PROCEDURE NOTE - ADDITIONAL PROCEDURE DETAILS
1. Normal PPM function with pacing/sensing thresholds and impedances WNL  2. Battery longevity is 4.5 years  3. Patient is not pacemaker dependent  4. Mode changed from AAIR<-->DDDR 60bpm to VVI 60bpm. Please call cardiology fellow on call post TAVR to reprogram mode back to original settings 1. Normal PPM function with pacing/sensing thresholds and impedances WNL  2. Battery longevity is 4.5 years  3. Patient is not pacemaker dependent  4. Mode changed from AAIR<-->DDDR 60bpm to VVI 60bpm. Please call cardiology fellow on call post TAVR at [phone] to reprogram mode back to original settings

## 2018-11-27 NOTE — BRIEF OPERATIVE NOTE - PROCEDURE
<<-----Click on this checkbox to enter Procedure TAVR, femoral approach  11/27/2018  #34 CoreValve  Active  CHART1

## 2018-11-28 DIAGNOSIS — R06.02 SHORTNESS OF BREATH: ICD-10-CM

## 2018-11-28 DIAGNOSIS — Z29.9 ENCOUNTER FOR PROPHYLACTIC MEASURES, UNSPECIFIED: ICD-10-CM

## 2018-11-28 DIAGNOSIS — J44.9 CHRONIC OBSTRUCTIVE PULMONARY DISEASE, UNSPECIFIED: ICD-10-CM

## 2018-11-28 DIAGNOSIS — G47.33 OBSTRUCTIVE SLEEP APNEA (ADULT) (PEDIATRIC): ICD-10-CM

## 2018-11-28 DIAGNOSIS — Z77.090 CONTACT WITH AND (SUSPECTED) EXPOSURE TO ASBESTOS: ICD-10-CM

## 2018-11-28 DIAGNOSIS — Z95.2 PRESENCE OF PROSTHETIC HEART VALVE: ICD-10-CM

## 2018-11-28 LAB
ANION GAP SERPL CALC-SCNC: 12 MMOL/L — SIGNIFICANT CHANGE UP (ref 5–17)
BUN SERPL-MCNC: 22 MG/DL — SIGNIFICANT CHANGE UP (ref 7–23)
CALCIUM SERPL-MCNC: 9.2 MG/DL — SIGNIFICANT CHANGE UP (ref 8.4–10.5)
CHLORIDE SERPL-SCNC: 103 MMOL/L — SIGNIFICANT CHANGE UP (ref 96–108)
CO2 SERPL-SCNC: 24 MMOL/L — SIGNIFICANT CHANGE UP (ref 22–31)
CREAT SERPL-MCNC: 1.25 MG/DL — SIGNIFICANT CHANGE UP (ref 0.5–1.3)
GLUCOSE SERPL-MCNC: 88 MG/DL — SIGNIFICANT CHANGE UP (ref 70–99)
HCT VFR BLD CALC: 39.3 % — SIGNIFICANT CHANGE UP (ref 39–50)
HGB BLD-MCNC: 13.5 G/DL — SIGNIFICANT CHANGE UP (ref 13–17)
MCHC RBC-ENTMCNC: 30.5 PG — SIGNIFICANT CHANGE UP (ref 27–34)
MCHC RBC-ENTMCNC: 34.3 GM/DL — SIGNIFICANT CHANGE UP (ref 32–36)
MCV RBC AUTO: 88.9 FL — SIGNIFICANT CHANGE UP (ref 80–100)
PLATELET # BLD AUTO: 206 K/UL — SIGNIFICANT CHANGE UP (ref 150–400)
POTASSIUM SERPL-MCNC: 4.4 MMOL/L — SIGNIFICANT CHANGE UP (ref 3.5–5.3)
POTASSIUM SERPL-SCNC: 4.4 MMOL/L — SIGNIFICANT CHANGE UP (ref 3.5–5.3)
RBC # BLD: 4.42 M/UL — SIGNIFICANT CHANGE UP (ref 4.2–5.8)
RBC # FLD: 13.4 % — SIGNIFICANT CHANGE UP (ref 10.3–14.5)
SODIUM SERPL-SCNC: 139 MMOL/L — SIGNIFICANT CHANGE UP (ref 135–145)
WBC # BLD: 9.4 K/UL — SIGNIFICANT CHANGE UP (ref 3.8–10.5)
WBC # FLD AUTO: 9.4 K/UL — SIGNIFICANT CHANGE UP (ref 3.8–10.5)

## 2018-11-28 PROCEDURE — 99222 1ST HOSP IP/OBS MODERATE 55: CPT

## 2018-11-28 PROCEDURE — 93306 TTE W/DOPPLER COMPLETE: CPT | Mod: 26

## 2018-11-28 PROCEDURE — 93010 ELECTROCARDIOGRAM REPORT: CPT

## 2018-11-28 RX ORDER — ASPIRIN/CALCIUM CARB/MAGNESIUM 324 MG
325 TABLET ORAL DAILY
Qty: 0 | Refills: 0 | Status: DISCONTINUED | OUTPATIENT
Start: 2018-11-28 | End: 2018-11-29

## 2018-11-28 RX ADMIN — APIXABAN 5 MILLIGRAM(S): 2.5 TABLET, FILM COATED ORAL at 06:04

## 2018-11-28 RX ADMIN — SODIUM CHLORIDE 3 MILLILITER(S): 9 INJECTION INTRAMUSCULAR; INTRAVENOUS; SUBCUTANEOUS at 05:02

## 2018-11-28 RX ADMIN — FINASTERIDE 5 MILLIGRAM(S): 5 TABLET, FILM COATED ORAL at 13:15

## 2018-11-28 RX ADMIN — SPIRONOLACTONE 25 MILLIGRAM(S): 25 TABLET, FILM COATED ORAL at 06:03

## 2018-11-28 RX ADMIN — APIXABAN 5 MILLIGRAM(S): 2.5 TABLET, FILM COATED ORAL at 17:22

## 2018-11-28 RX ADMIN — ATORVASTATIN CALCIUM 40 MILLIGRAM(S): 80 TABLET, FILM COATED ORAL at 22:38

## 2018-11-28 RX ADMIN — Medication 250 MILLIGRAM(S): at 17:23

## 2018-11-28 RX ADMIN — Medication 250 MILLIGRAM(S): at 06:05

## 2018-11-28 RX ADMIN — Medication 325 MILLIGRAM(S): at 01:12

## 2018-11-28 RX ADMIN — AMIODARONE HYDROCHLORIDE 200 MILLIGRAM(S): 400 TABLET ORAL at 06:03

## 2018-11-28 RX ADMIN — SODIUM CHLORIDE 3 MILLILITER(S): 9 INJECTION INTRAMUSCULAR; INTRAVENOUS; SUBCUTANEOUS at 20:31

## 2018-11-28 RX ADMIN — Medication 50 MILLIGRAM(S): at 06:03

## 2018-11-28 RX ADMIN — SODIUM CHLORIDE 3 MILLILITER(S): 9 INJECTION INTRAMUSCULAR; INTRAVENOUS; SUBCUTANEOUS at 13:13

## 2018-11-28 NOTE — PROGRESS NOTE ADULT - ASSESSMENT
87 year old  male with H/O PTSD, CVA 7 years- no deficits, gait instability- uses cane, seizure-last 7 years ago, asthma (well controlled on meds), COPD, former smoker, DIEGO- no CPAP,  HTN, HLD, CAD with prior stent 2012 and 2017, AS( severe) - EF 43%, tachybrady syndrome, A Fib (Eliquis and Plavix) s/p Pacemaker, kidney stones, stage 3 CKD,  Hepatitis B, BPH, thalassemia.    11/27 S/P TAVR. Transferred to floor. No EKG changes. TTE in AM. ASA tonight; starting Eliquis tomorrow 11/28. D/C planning  11/28 HD stable.  Eliquis resumed.  TTE done -- to be reviewed by Dr Zuluaga.  DC planning for 11/29

## 2018-11-28 NOTE — PROGRESS NOTE ADULT - SUBJECTIVE AND OBJECTIVE BOX
S:  feels ok    Telemetry:  Afib/     Vital Signs Last 24 Hrs  T(C): 37.1 (18 @ 13:45), Max: 37.1 (18 @ 13:45)  T(F): 98.8 (18 @ 13:45), Max: 98.8 (18 @ 13:45)  HR: 69 (18 @ 13:45) (62 - 80)  BP: 137/79 (18 @ 13:45) (120/40 - 148/78)  RR: 18 (18 @ 13:45) (14 - 68)  SpO2: 98% (18 @ 13:45) (97% - 99%)                    @ 07:01  -   @ 07:00  --------------------------------------------------------  IN: 430 mL / OUT: 325 mL / NET: 105 mL     @ 07:01  -   @ 14:43  --------------------------------------------------------  IN: 440 mL / OUT: 300 mL / NET: 140 mL      Daily Weight in k.6 (2018 08:14)      POCT Blood Glucose.: 93 mg/dL (2018 15:42)          Drains:     MS         [  ] Drainage:                 L Pleural  [  ]  Drainage:                R Pleural  [  ]  Drainage:    Pacing Wires        [  ]   Settings:                                  Isolated  [  ]    Coumadin    [ ] YES          [ x ]      NO         Reason:                                 13.5   9.4   )-----------( 206      ( 2018 06:44 )             39.3           139  |  103  |  22  ----------------------------<  88  4.4   |  24  |  1.25    Ca    9.2      2018 06:42            PHYSICAL EXAM:    Neurology: alert and oriented x 3, nonfocal, no gross deficits    CV : Irreg irreg    Lungs:    Abdomen: soft, nontender, nondistended, positive bowel sounds,       Extremities: no edema    no hematoma pulses 2+          ALBUTerol    90 MICROgram(s) HFA Inhaler 2 Puff(s) Inhalation every 6 hours PRN  amiodarone    Tablet 200 milliGRAM(s) Oral daily  apixaban 5 milliGRAM(s) Oral every 12 hours  aspirin enteric coated 325 milliGRAM(s) Oral daily  atorvastatin 40 milliGRAM(s) Oral at bedtime  clindamycin IVPB 900 milliGRAM(s) IV Intermittent once  finasteride 5 milliGRAM(s) Oral daily  lidocaine 1% Injectable 0.2 milliLiter(s) Local Injection once  metoprolol succinate ER 50 milliGRAM(s) Oral daily  sodium chloride 0.9% lock flush 3 milliLiter(s) IV Push every 8 hours  spironolactone 25 milliGRAM(s) Oral daily  vancomycin  IVPB 1000 milliGRAM(s) IV Intermittent every 12 hours                              Physical Therapy Rec:   Home  [ x ]   Home w/ PT  [  ]  Rehab  [  ]    Discussed with Cardiothoracic Team at AM rounds.

## 2018-11-28 NOTE — PROGRESS NOTE ADULT - SUBJECTIVE AND OBJECTIVE BOX
*****Structural Heart Team*****    Subjective: Pt has no complaints this morning.  Ambulating.  Reports minor groin pain R>L.    T(C): 36.4 (11-28-18 @ 04:30), Max: 36.6 (11-27-18 @ 15:56)  HR: 66 (11-28-18 @ 04:30) (62 - 80)  BP: 138/82 (11-28-18 @ 04:30) (120/40 - 148/78)  RR: 68 (11-28-18 @ 04:30) (14 - 68)  SpO2: 99% (11-28-18 @ 04:30) (97% - 99%)  Wt(kg): --  11-27 @ 07:01  -  11-28 @ 07:00  --------------------------------------------------------  IN: 430 mL / OUT: 325 mL / NET: 105 mL      MEDICATIONS  (STANDING):  amiodarone    Tablet 200 milliGRAM(s) Oral daily  apixaban 5 milliGRAM(s) Oral every 12 hours  aspirin enteric coated 325 milliGRAM(s) Oral daily  atorvastatin 40 milliGRAM(s) Oral at bedtime  clindamycin IVPB 900 milliGRAM(s) IV Intermittent once  finasteride 5 milliGRAM(s) Oral daily  lidocaine 1% Injectable 0.2 milliLiter(s) Local Injection once  metoprolol succinate ER 50 milliGRAM(s) Oral daily  sodium chloride 0.9% lock flush 3 milliLiter(s) IV Push every 8 hours  spironolactone 25 milliGRAM(s) Oral daily  vancomycin  IVPB 1000 milliGRAM(s) IV Intermittent every 12 hours    MEDICATIONS  (PRN):  ALBUTerol    90 MICROgram(s) HFA Inhaler 2 Puff(s) Inhalation every 6 hours PRN Shortness of Breath and/or Wheezing      REVIEW OF SYSTEMS:    CONSTITUTIONAL: No weakness, fevers or chills  EYES/ENT: No visual changes;  No vertigo or throat pain   NECK: No pain or stiffness  RESPIRATORY: No cough, wheezing, hemoptysis; No shortness of breath  CARDIOVASCULAR: No chest pain or palpitations  GASTROINTESTINAL: No abdominal or epigastric pain. No nausea, vomiting, or hematemesis; No diarrhea or constipation. No melena or hematochezia.  GENITOURINARY: No dysuria, frequency or hematuria  NEUROLOGICAL: No numbness or weakness  SKIN: No itching, rashes      Exam:  General: NAD  Pulmonary: cta b/l  Cor: s1s2 no murmurs  Groin/Wound: dressings intact, dry, no tenderness, soft  Abdomen: soft, nt/nd, +bs  Neuro: A&Ox3, non focal  Extremeties: no edema, good pulses, skin discoloration from vascular insufficiency                          13.5   9.4   )-----------( 206      ( 28 Nov 2018 06:44 )             39.3   11-28    139  |  103  |  22  ----------------------------<  88  4.4   |  24  |  1.25    Ca    9.2      28 Nov 2018 06:42        Assesment/Plan:  88 y/o male POD 1 s/p tF TAVR (#34 CV)  - resume lopressor  - ASA, Eliquis  - cont to ambulate  - TTE  - Discharge Plan: Patient is to follow up with  in 1 week post discharge. HE/She should then follow up with structural heart team in 30 days, with a repeat Transthoracic Echo to be done at that visit.

## 2018-11-28 NOTE — CONSULT NOTE ADULT - SUBJECTIVE AND OBJECTIVE BOX
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 Pt reports he always has SOB but during the last three days it was more pronounced. This morning he had dyspnea at both rest and w/ exertion, prompting him to visit his cardiologist who referred him to hospital. At the cardiologist office, patient reports having an EKG that was reportedly normal. He never experienced chest pain/palpitation, back pain, syncope, URI sx or dizziness, orthopnea, leg swelling, or PND. He reports medication adherences with plavix, aspirin, Eliquis, and furosemide. He reports the shortness of breath can be best described as "just hard to breathe." He used his albuterol inhaler but it did not help. He cannot specifically say if there is chest tightness but no overt chest pain or pressure. Of note patient was hospitalized in [place] about 2-mo ago for shortness of breath but less severe. His breathing difficulty was attributed to "bad mitral valve that needed replacement." He had angiogram performed in anticipation for replacement but for unclear reasons it was never replaced. Instead he was told to undergo medical management for his valve problem.     Admit  for TAVR--tolerated well         PAST MEDICAL & SURGICAL HISTORY:  CKD (chronic kidney disease) stage 3, GFR 30-59 ml/min  Hepatitis B  PTSD (post-traumatic stress disorder)  Tachy-idris syndrome  TIA (transient ischemic attack):   Seizure  DIEGO (obstructive sleep apnea)  Chronic diastolic HF (heart failure)  Aortic stenosis  CVA (cerebral vascular accident)  Gait instability  COPD (chronic obstructive pulmonary disease)  BPH (benign prostatic hyperplasia)  Asthma  CHF (congestive heart failure)  Atrial fibrillation  Pacemaker:  Medtronic [id number]  [id number]  Hypertension  H/O thalassemia  CAD (coronary artery disease): s/p stent   Artificial pacemaker  S/P primary angioplasty with coronary stent:  AND       FAMILY HISTORY:  No pertinent family history in first degree relatives      SOCIAL HISTORY:  Smokin__ packs x 40___ years  EtOH Use: soc  Marital Status: w  Occupation: constuction  Exposures: neg  Recent Travel: neg    Allergies    Keppra (Rash)  penicillin (Unknown)    Intolerances        HOME MEDICATIONS:    REVIEW OF SYSTEMS:  Constitutional: No fevers or chills. No weight loss. No fatigue or generalised malaise.  Eyes: No itching or discharge from the eyes  ENT: No ear pain. No ear discharge. No nasal congestion. No post nasal drip. No epistaxis. No throat pain. No sore throat. No difficulty swallowing.   CV: No chest pain. No palpitations. No lightheadedness or dizziness.   Resp: No dyspnea at rest. No dyspnea on exertion. No orthopnea. No wheezing. No cough. No stridor. No sputum production. No chest pain with respiration.  GI: No nausea. No vomiting. No diarrhea.  MSK: No joint pain or pain in any extremities  Integumentary: No skin lesions. No pedal edema.  Neurological: No gross motor weakness. No sensory changes.    [ ] All other systems negative  [ ] Unable to assess ROS because ________    OBJECTIVE:  ICU Vital Signs Last 24 Hrs  T(C): 36.4 (2018 04:30), Max: 36.6 (2018 15:56)  T(F): 97.6 (2018 04:30), Max: 97.9 (2018 15:56)  HR: 66 (2018 04:30) (62 - 80)  BP: 138/82 (2018 04:30) (120/40 - 148/78)  BP(mean): 105 (2018 23:00) (92 - 105)  ABP: 154/68 (2018 23:00) (109/44 - 154/68)  ABP(mean): 99 (2018 23:00) (63 - 99)  RR: 68 (2018 04:30) (14 - 68)  SpO2: 99% (2018 04:30) (97% - 99%)         @ 07:  -   @ 05:49  --------------------------------------------------------  IN: 370 mL / OUT: 325 mL / NET: 45 mL      CAPILLARY BLOOD GLUCOSE      POCT Blood Glucose.: 93 mg/dL (2018 15:42)      PHYSICAL EXAM:  General: Awake, alert, oriented X 3.   HEENT: Atraumatic, normocephalic.                 Mallampatti Grade                 No nasal congestion.                No tonsillar or pharyngeal exudates.  Lymph Nodes: No palpable lymphadenopathy  Neck: No JVD. No carotid bruit.   Respiratory: Normal chest expansion                         Normal percussion                         Normal and equal air entry                         No wheeze, rhonchi or rales.  Cardiovascular: S1 S2 normal. No murmurs, rubs or gallops.   Abdomen: Soft, non-tender, non-distended. No organomegaly.  Extremities: Warm to touch. Peripheral pulse palpable. No pedal edema.   Skin: No rashes or skin lesions  Neurological: Motor and sensory examination equal and normal in all four extremities.  Psychiatry: Appropriate mood and affect.    HOSPITAL MEDICATIONS:  MEDICATIONS  (STANDING):  amiodarone    Tablet 200 milliGRAM(s) Oral daily  apixaban 5 milliGRAM(s) Oral every 12 hours  aspirin enteric coated 325 milliGRAM(s) Oral daily  atorvastatin 40 milliGRAM(s) Oral at bedtime  clindamycin IVPB 900 milliGRAM(s) IV Intermittent once  finasteride 5 milliGRAM(s) Oral daily  lidocaine 1% Injectable 0.2 milliLiter(s) Local Injection once  metoprolol succinate ER 50 milliGRAM(s) Oral daily  sodium chloride 0.9% lock flush 3 milliLiter(s) IV Push every 8 hours  spironolactone 25 milliGRAM(s) Oral daily  vancomycin  IVPB 1000 milliGRAM(s) IV Intermittent every 12 hours    MEDICATIONS  (PRN):  ALBUTerol    90 MICROgram(s) HFA Inhaler 2 Puff(s) Inhalation every 6 hours PRN Shortness of Breath and/or Wheezing      LABS:              Arterial Blood Gas:   @ 21:07  7.44/37/84/25/97/1.4  ABG lactate: --        MICROBIOLOGY:     RADIOLOGY:  [ ] Reviewed and interpreted by me    Point of Care Ultrasound Findings;    PFT:    EKG: HPIc86 yo M w/ hx of CAD w/ stents x2 (last ~6mo ago & 2010), pAfb (on Eliquis), diastolic CHF, asthma, PPM (placed ~7r ago), former smoker presents with shortness of breath for 3-days. Pt reports he always has SOB but during the last three days it was more pronounced. This morning he had dyspnea at both rest and w/ exertion, prompting him to visit his cardiologist who referred him to hospital. At the cardiologist office, patient reports having an EKG that was reportedly normal. He never experienced chest pain/palpitation, back pain, syncope, URI sx or dizziness, orthopnea, leg swelling, or PND. He reports medication adherences with plavix, aspirin, Eliquis, and furosemide. He reports the shortness of breath can be best described as "just hard to breathe." He used his albuterol inhaler but it did not help. He cannot specifically say if there is chest tightness but no overt chest pain or pressure. Of note patient was hospitalized in [place] about 2-mo ago for shortness of breath but less severe. His breathing difficulty was attributed to "bad mitral valve that needed replacement." He had angiogram performed in anticipation for replacement but for unclear reasons it was never replaced. Instead he was told to undergo medical management for his valve problem.     Admit  for TAVR--tolerated well --main sx now is dryness at present--no pain anywhere or current sob at rest.        PAST MEDICAL & SURGICAL HISTORY:  CKD (chronic kidney disease) stage 3, GFR 30-59 ml/min  Hepatitis B  PTSD (post-traumatic stress disorder)  Tachy-idris syndrome  TIA (transient ischemic attack):   Seizure  DIEGO (obstructive sleep apnea)  Chronic diastolic HF (heart failure)  Aortic stenosis  CVA (cerebral vascular accident)  Gait instability  COPD (chronic obstructive pulmonary disease)  BPH (benign prostatic hyperplasia)  Asthma  CHF (congestive heart failure)  Atrial fibrillation  Pacemaker:  Medtronic [id number]  [id number]  Hypertension  H/O thalassemia  CAD (coronary artery disease): s/p stent   Artificial pacemaker  S/P primary angioplasty with coronary stent:  AND       FAMILY HISTORY:  No pertinent family history in first degree relatives      SOCIAL HISTORY:  Smokin__ packs x 40___ years  EtOH Use: soc  Marital Status: w  Occupation: constuction  Exposures: neg  Recent Travel: neg    Allergies    Keppra (Rash)  penicillin (Unknown)    Intolerances        HOME MEDICATIONS:    REVIEW OF SYSTEMS:  Constitutional: No fevers or chills. No weight loss. No fatigue or generalised malaise.  Eyes: No itching or discharge from the eyes  ENT: No ear pain. No ear discharge. No nasal congestion. No post nasal drip. No epistaxis. No throat pain. No sore throat. No difficulty swallowing.   CV: No chest pain. No palpitations. No lightheadedness or dizziness.   Resp: No dyspnea at rest. No dyspnea on exertion. No orthopnea. No wheezing. No cough. No stridor. No sputum production. No chest pain with respiration.  GI: No nausea. No vomiting. No diarrhea.  MSK: No joint pain or pain in any extremities  Integumentary: No skin lesions. No pedal edema.  Neurological: No gross motor weakness. No sensory changes.    [+ ] All other systems negative  [ ] Unable to assess ROS because ________    OBJECTIVE:  ICU Vital Signs Last 24 Hrs  T(C): 36.4 (2018 04:30), Max: 36.6 (2018 15:56)  T(F): 97.6 (2018 04:30), Max: 97.9 (2018 15:56)  HR: 66 (2018 04:30) (62 - 80)  BP: 138/82 (2018 04:30) (120/40 - 148/78)  BP(mean): 105 (2018 23:00) (92 - 105)  ABP: 154/68 (2018 23:00) (109/44 - 154/68)  ABP(mean): 99 (2018 23:00) (63 - 99)  RR: 68 (2018 04:30) (14 - 68)  SpO2: 99% (2018 04:30) (97% - 99%)         @ 07:01  -   @ 05:49  --------------------------------------------------------  IN: 370 mL / OUT: 325 mL / NET: 45 mL      CAPILLARY BLOOD GLUCOSE      POCT Blood Glucose.: 93 mg/dL (2018 15:42)      PHYSICAL EXAM: NAD in bed  General: Awake, alert, oriented X 3.   HEENT: Atraumatic, normocephalic.                 Mallampatti Grade 2                No nasal congestion.                No tonsillar or pharyngeal exudates.  Lymph Nodes: No palpable lymphadenopathy  Neck: No JVD. No carotid bruit.   Respiratory: Normal chest expansion                         Normal percussion                         Normal and equal air entry                         No wheeze, rhonchi or rales.  Cardiovascular: S1 S2 normal. + murmurs, no rubs or gallops.   Abdomen: Soft, non-tender, non-distended. No organomegaly. NABS  Extremities: Warm to touch. Peripheral pulse palpable. No pedal edema.   Skin: No rashes or skin lesions  Neurological: Motor and sensory examination equal and normal in all four extremities.  Psychiatry: Appropriate mood and affect.    HOSPITAL MEDICATIONS:  MEDICATIONS  (STANDING):  amiodarone    Tablet 200 milliGRAM(s) Oral daily  apixaban 5 milliGRAM(s) Oral every 12 hours  aspirin enteric coated 325 milliGRAM(s) Oral daily  atorvastatin 40 milliGRAM(s) Oral at bedtime  clindamycin IVPB 900 milliGRAM(s) IV Intermittent once  finasteride 5 milliGRAM(s) Oral daily  lidocaine 1% Injectable 0.2 milliLiter(s) Local Injection once  metoprolol succinate ER 50 milliGRAM(s) Oral daily  sodium chloride 0.9% lock flush 3 milliLiter(s) IV Push every 8 hours  spironolactone 25 milliGRAM(s) Oral daily  vancomycin  IVPB 1000 milliGRAM(s) IV Intermittent every 12 hours    MEDICATIONS  (PRN):  ALBUTerol    90 MICROgram(s) HFA Inhaler 2 Puff(s) Inhalation every 6 hours PRN Shortness of Breath and/or Wheezing      LABS:              Arterial Blood Gas:  - @ 21:07  7.44/37/84/25/97/1.4  ABG lactate: --        MICROBIOLOGY:     RADIOLOGY:  [ ] Reviewed and interpreted by me    Point of Care Ultrasound Findings;    PFT:    EKG:

## 2018-11-28 NOTE — CHART NOTE - NSCHARTNOTEFT_GEN_A_CORE
Brief Cardiology Fellow Note    Called by primary post-TAVR to reprogram patient's PPM back to his home setting.     Medtronic Advisa DR ROJO [id number]    Mode for TAVR was VVI at 60 bpm. Mode was switched back to AAIR <->DDDR at 60 bpm.    Session summary printed and placed in paper chart.     SANTOS Pickering

## 2018-11-28 NOTE — PROGRESS NOTE ADULT - ASSESSMENT
87 year old  male with H/O PTSD, CVA 7 years- no deficits, gait instability- uses cane, seizure-last 7 years ago, asthma (well controlled on meds), COPD, former smoker, DIEGO- no CPAP,  HTN, HLD, CAD with prior stent 2012 and 2017, AS( severe) - EF 43%, tachybrady syndrome, A Fib (Eliquis and Plavix) s/p Pacemaker, kidney stones, stage 3 CKD,  Hepatitis B, BPH, thalassemia.    11/27 S/P TAVR. Transferred to floor. No EKG changes. TTE in AM. ASA tonight; starting Eliquis tomorrow 11/28. D/C planning

## 2018-11-28 NOTE — PROGRESS NOTE ADULT - SUBJECTIVE AND OBJECTIVE BOX
This patient has been implanted with a Transcatheter Aortic Valve Implant under the following NCT/HILARIA:    TAVR:    Commercial Implant NCT [id number], HILARIA N/A and will be placed into the TVT Registry.        RICHA Green  [id number]

## 2018-11-28 NOTE — PROGRESS NOTE ADULT - SUBJECTIVE AND OBJECTIVE BOX
VITAL SIGNS    Telemetry:  AF  60-70  Vital Signs Last 24 Hrs  T(C): 36.4 (11-27-18 @ 23:54), Max: 36.6 (11-27-18 @ 15:56)  T(F): 97.5 (11-27-18 @ 23:54), Max: 97.9 (11-27-18 @ 15:56)  HR: 62 (11-27-18 @ 23:54) (62 - 80)  BP: 143/72 (11-27-18 @ 23:54) (120/40 - 145/74)  RR: 18 (11-27-18 @ 23:54) (14 - 20)  SpO2: 97% (11-27-18 @ 23:54) (97% - 99%)            11-27 @ 07:01  -  11-28 @ 00:39  --------------------------------------------------------  IN: 250 mL / OUT: 0 mL / NET: 250 mL       Daily     Daily   Admit Wt: Drug Dosing Weight  Height (cm): 170.18 (19 Nov 2018 13:31)  Weight (kg): 99 (27 Nov 2018 19:40)  BMI (kg/m2): 34.2 (27 Nov 2018 19:40)  BSA (m2): 2.1 (27 Nov 2018 19:40)      CAPILLARY BLOOD GLUCOSE      POCT Blood Glucose.: 93 mg/dL (27 Nov 2018 15:42)          MEDICATIONS  ALBUTerol    90 MICROgram(s) HFA Inhaler 2 Puff(s) Inhalation every 6 hours PRN  amiodarone    Tablet 200 milliGRAM(s) Oral daily  apixaban 5 milliGRAM(s) Oral every 12 hours  aspirin enteric coated 325 milliGRAM(s) Oral daily  atorvastatin 40 milliGRAM(s) Oral at bedtime  clindamycin IVPB 900 milliGRAM(s) IV Intermittent once  finasteride 5 milliGRAM(s) Oral daily  lidocaine 1% Injectable 0.2 milliLiter(s) Local Injection once  metoprolol succinate ER 50 milliGRAM(s) Oral daily  sodium chloride 0.9% lock flush 3 milliLiter(s) IV Push every 8 hours  spironolactone 25 milliGRAM(s) Oral daily  vancomycin  IVPB 1000 milliGRAM(s) IV Intermittent every 12 hours      PHYSICAL EXAM  Subjective: Pt states he feels great  Neurology: alert and oriented x 3, nonfocal, no gross deficits  CV : s1, s2 irregular  Lungs: CTA B/L  Abdomen: soft, NT,ND, (+) Bowel sounds  :  voiding  Extremities:  Groins dressing C/D/I B/L    LABS                             PAST MEDICAL & SURGICAL HISTORY:  CKD (chronic kidney disease) stage 3, GFR 30-59 ml/min  Hepatitis B  PTSD (post-traumatic stress disorder)  Tachy-idris syndrome  TIA (transient ischemic attack): 2010  Seizure  DIEGO (obstructive sleep apnea)  Chronic diastolic HF (heart failure)  Aortic stenosis  CVA (cerebral vascular accident)  Gait instability  COPD (chronic obstructive pulmonary disease)  BPH (benign prostatic hyperplasia)  Asthma  CHF (congestive heart failure)  Atrial fibrillation  Pacemaker: 2015 Medtronic [id number]  [id number]  Hypertension  H/O thalassemia  CAD (coronary artery disease): s/p stent 2010  Artificial pacemaker  S/P primary angioplasty with coronary stent: 2012 AND 2017

## 2018-11-29 ENCOUNTER — TRANSCRIPTION ENCOUNTER (OUTPATIENT)
Age: 83
End: 2018-11-29

## 2018-11-29 VITALS — WEIGHT: 219.14 LBS

## 2018-11-29 LAB
ANION GAP SERPL CALC-SCNC: 10 MMOL/L — SIGNIFICANT CHANGE UP (ref 5–17)
BUN SERPL-MCNC: 21 MG/DL — SIGNIFICANT CHANGE UP (ref 7–23)
CALCIUM SERPL-MCNC: 9.1 MG/DL — SIGNIFICANT CHANGE UP (ref 8.4–10.5)
CHLORIDE SERPL-SCNC: 105 MMOL/L — SIGNIFICANT CHANGE UP (ref 96–108)
CO2 SERPL-SCNC: 23 MMOL/L — SIGNIFICANT CHANGE UP (ref 22–31)
CREAT SERPL-MCNC: 1.23 MG/DL — SIGNIFICANT CHANGE UP (ref 0.5–1.3)
GLUCOSE SERPL-MCNC: 94 MG/DL — SIGNIFICANT CHANGE UP (ref 70–99)
HCT VFR BLD CALC: 38.4 % — LOW (ref 39–50)
HGB BLD-MCNC: 12.9 G/DL — LOW (ref 13–17)
MCHC RBC-ENTMCNC: 30.2 PG — SIGNIFICANT CHANGE UP (ref 27–34)
MCHC RBC-ENTMCNC: 33.6 GM/DL — SIGNIFICANT CHANGE UP (ref 32–36)
MCV RBC AUTO: 89.9 FL — SIGNIFICANT CHANGE UP (ref 80–100)
PLATELET # BLD AUTO: 185 K/UL — SIGNIFICANT CHANGE UP (ref 150–400)
POTASSIUM SERPL-MCNC: 4.3 MMOL/L — SIGNIFICANT CHANGE UP (ref 3.5–5.3)
POTASSIUM SERPL-SCNC: 4.3 MMOL/L — SIGNIFICANT CHANGE UP (ref 3.5–5.3)
RBC # BLD: 4.27 M/UL — SIGNIFICANT CHANGE UP (ref 4.2–5.8)
RBC # FLD: 13.1 % — SIGNIFICANT CHANGE UP (ref 10.3–14.5)
SODIUM SERPL-SCNC: 138 MMOL/L — SIGNIFICANT CHANGE UP (ref 135–145)
WBC # BLD: 9.2 K/UL — SIGNIFICANT CHANGE UP (ref 3.8–10.5)
WBC # FLD AUTO: 9.2 K/UL — SIGNIFICANT CHANGE UP (ref 3.8–10.5)

## 2018-11-29 PROCEDURE — 93010 ELECTROCARDIOGRAM REPORT: CPT

## 2018-11-29 PROCEDURE — 99232 SBSQ HOSP IP/OBS MODERATE 35: CPT

## 2018-11-29 RX ORDER — VALSARTAN 80 MG/1
1 TABLET ORAL
Qty: 0 | Refills: 0 | COMMUNITY

## 2018-11-29 RX ORDER — FUROSEMIDE 40 MG
1 TABLET ORAL
Qty: 30 | Refills: 0 | OUTPATIENT
Start: 2018-11-29 | End: 2018-12-28

## 2018-11-29 RX ORDER — CLOPIDOGREL BISULFATE 75 MG/1
75 TABLET, FILM COATED ORAL DAILY
Qty: 0 | Refills: 0 | Status: DISCONTINUED | OUTPATIENT
Start: 2018-11-29 | End: 2018-11-29

## 2018-11-29 RX ORDER — FUROSEMIDE 40 MG
40 TABLET ORAL DAILY
Qty: 0 | Refills: 0 | Status: DISCONTINUED | OUTPATIENT
Start: 2018-11-29 | End: 2018-11-29

## 2018-11-29 RX ADMIN — FINASTERIDE 5 MILLIGRAM(S): 5 TABLET, FILM COATED ORAL at 09:48

## 2018-11-29 RX ADMIN — Medication 50 MILLIGRAM(S): at 05:55

## 2018-11-29 RX ADMIN — SODIUM CHLORIDE 3 MILLILITER(S): 9 INJECTION INTRAMUSCULAR; INTRAVENOUS; SUBCUTANEOUS at 05:54

## 2018-11-29 RX ADMIN — SPIRONOLACTONE 25 MILLIGRAM(S): 25 TABLET, FILM COATED ORAL at 05:55

## 2018-11-29 RX ADMIN — APIXABAN 5 MILLIGRAM(S): 2.5 TABLET, FILM COATED ORAL at 05:55

## 2018-11-29 RX ADMIN — CLOPIDOGREL BISULFATE 75 MILLIGRAM(S): 75 TABLET, FILM COATED ORAL at 09:55

## 2018-11-29 RX ADMIN — Medication 40 MILLIGRAM(S): at 09:47

## 2018-11-29 RX ADMIN — AMIODARONE HYDROCHLORIDE 200 MILLIGRAM(S): 400 TABLET ORAL at 05:55

## 2018-11-29 NOTE — DISCHARGE NOTE ADULT - CARE PLAN
Principal Discharge DX:	S/P TAVR (transcatheter aortic valve replacement)  Goal:	full recovery  Assessment and plan of treatment:	follow up appt with dR. Zuluaga, cardiac surgeon on Monday, December 10 @ 10:30am  follow up appt with your Primary Care MD and your Cardiologist in 2-3 weeks  ambulate 4-5 times a day

## 2018-11-29 NOTE — PROGRESS NOTE ADULT - ATTENDING COMMENTS
as above--  Multifactorial dyspnea--CAD, AV dz, COPD, asbestosis  CAD/ s/p TAVR--as per CTS et al  copd-symbicort 160/spiriva/ duoneb via hhn q 6  osas-out pt rx  asbestosis--yrly CT  DC as per CTS--ambulation    Arjun Mack MD-Pulmonary   [phone] as above--  Multifactorial dyspnea--CAD, AV dz, COPD, asbestosis  CAD/ s/p TAVR--as per CTS et al                                             ?PPM interrogation pre DC  copd-symbicort 160/spiriva/ duoneb via hhn q 6  osas-out pt rx  asbestosis--yrly CT  DC as per CTS--ambulation    Arjun Mack MD-Pulmonary   [phone]

## 2018-11-29 NOTE — DISCHARGE NOTE ADULT - CARE PROVIDERS DIRECT ADDRESSES
[email or web address]

## 2018-11-29 NOTE — DISCHARGE NOTE ADULT - MEDICATION SUMMARY - MEDICATIONS TO TAKE
I will START or STAY ON the medications listed below when I get home from the hospital:    finasteride 5 mg oral tablet  -- 1 tab(s) by mouth once a day  -- Indication: For Prostate    spironolactone 25 mg oral tablet  -- 1 tab(s) by mouth once a day  -- Indication: For Potassium sparing water pill    acetaminophen 500 mg oral tablet  -- 2 tab(s) by mouth once a day (in the morning)  -- Indication: For mild pain    tamsulosin 0.4 mg oral capsule  -- 1 cap(s) by mouth once a day (at bedtime)  -- Indication: For bph    amiodarone 200 mg oral tablet  -- 1 tab(s) by mouth once a day  -- Indication: For Arrythmia    apixaban 5 mg oral tablet  -- 1 tab(s) by mouth every 12 hours  -- Indication: For blood thinner    Crestor 10 mg oral tablet  -- 1 tab(s) by mouth once a day (at bedtime)  -- Indication: For hi cholesterol    Plavix 75 mg oral tablet  -- 1 tab(s) by mouth once a day  -- Indication: For Anti-platelet    Metoprolol Succinate ER 50 mg oral tablet, extended release  -- 1 tab(s) by mouth once a day  -- Indication: For Cardiac med    Incruse Ellipta 62.5 mcg/inh inhalation powder  -- 1 puff(s) inhaled once a day, As Needed  -- Indication: For lungs    albuterol 90 mcg/inh inhalation aerosol  -- 1 to 2 puff(s) inhaled every 6 hours, As Needed - for shortness of breath and/or wheezing  -- Indication: For lungs    albuterol 2.5 mg/3 mL (0.083%) inhalation solution  -- 3 milliliter(s) inhaled 3 times a day, As Needed  -- Indication: For lungs    Symbicort 80 mcg-4.5 mcg/inh inhalation aerosol  -- 2 puff(s) inhaled 2 times a day, As Needed  -- Indication: For lungs    furosemide 40 mg oral tablet  -- 1 tab(s) by mouth once a day   -- Indication: For water pill    raNITIdine 150 mg oral tablet  -- 1 tab(s) by mouth 2 times a day, As Needed  -- Indication: For Stomach    MiraLax oral powder for reconstitution  -- 17 gram(s) by mouth every other day  -- Indication: For laxative    ocular lubricant ophthalmic solution  -- 1 drop(s) to each affected eye , As Needed  -- Indication: For eyes    Vitamin D3 1000 intl units oral tablet  -- 1 tab(s) by mouth once a day  -- Indication: For vitamin deficiency

## 2018-11-29 NOTE — PROGRESS NOTE ADULT - ASSESSMENT
2 87 yo copd, osas, allergy, asbestosis, AF, CAD, AV dz s/p AVR--now comfortable 85 yo copd, osas, allergy, asbestosis, AF, CAD, AV dz s/p AVR--now comfortable    Ambulating well 11/29

## 2018-11-29 NOTE — PROGRESS NOTE ADULT - PROBLEM SELECTOR PLAN 1
ASA/Eliquis  Continue BB, aldactone, amio  TTE in AM  Daily EKG
ASA/Eliquis  Continue BB, aldactone, amio  Daily EKG  DC planning for 11/29
s/p TAVR

## 2018-11-29 NOTE — PROGRESS NOTE ADULT - SUBJECTIVE AND OBJECTIVE BOX
CHIEF COMPLAINT: f/up for sob, chf-TAVR, copd, asbestosis, osas--    Interval Events:    REVIEW OF SYSTEMS:  Constitutional: No fevers or chills. No weight loss. No fatigue or generalized malaise.  Eyes: No itching or discharge from the eyes  ENT: No ear pain. No ear discharge. No nasal congestion. No post nasal drip. No epistaxis. No throat pain. No sore throat. No difficulty swallowing.   CV: No chest pain. No palpitations. No lightheadedness or dizziness.   Resp: No dyspnea at rest. No dyspnea on exertion. No orthopnea. No wheezing. No cough. No stridor. No sputum production. No chest pain with respiration.  GI: No nausea. No vomiting. No diarrhea.  MSK: No joint pain or pain in any extremities  Integumentary: No skin lesions. No pedal edema.  Neurological: No gross motor weakness. No sensory changes.  [ ] All other systems negative  [ ] Unable to assess ROS because ________    OBJECTIVE:  ICU Vital Signs Last 24 Hrs  T(C): 36.7 (29 Nov 2018 04:36), Max: 37.1 (28 Nov 2018 13:45)  T(F): 98 (29 Nov 2018 04:36), Max: 98.8 (28 Nov 2018 13:45)  HR: 66 (29 Nov 2018 04:36) (66 - 70)  BP: 136/78 (29 Nov 2018 04:36) (136/73 - 137/79)  BP(mean): --  ABP: --  ABP(mean): --  RR: 16 (29 Nov 2018 04:36) (16 - 18)  SpO2: 95% (29 Nov 2018 04:36) (95% - 98%)        11-27 @ 07:01 - 11-28 @ 07:00  --------------------------------------------------------  IN: 430 mL / OUT: 325 mL / NET: 105 mL    11-28 @ 07:01 - 11-29 @ 05:18  --------------------------------------------------------  IN: 600 mL / OUT: 300 mL / NET: 300 mL      CAPILLARY BLOOD GLUCOSE      POCT Blood Glucose.: 93 mg/dL (27 Nov 2018 15:42)      PHYSICAL EXAM:  General: Awake, alert, oriented X 3.   HEENT: Atraumatic, normocephalic.                 Mallampatti Grade                 No nasal congestion.                No tonsillar or pharyngeal exudates.  Lymph Nodes: No palpable lymphadenopathy  Neck: No JVD. No carotid bruit.   Respiratory: Normal chest expansion                         Normal percussion                         Normal and equal air entry                         No wheeze, rhonchi or rales.  Cardiovascular: S1 S2 normal. No murmurs, rubs or gallops.   Abdomen: Soft, non-tender, non-distended. No organomegaly.  Extremities: Warm to touch. Peripheral pulse palpable. No pedal edema.   Skin: No rashes or skin lesions  Neurological: Motor and sensory examination equal and normal in all four extremities.  Psychiatry: Appropriate mood and affect.    HOSPITAL MEDICATIONS:  MEDICATIONS  (STANDING):  amiodarone    Tablet 200 milliGRAM(s) Oral daily  apixaban 5 milliGRAM(s) Oral every 12 hours  aspirin enteric coated 325 milliGRAM(s) Oral daily  atorvastatin 40 milliGRAM(s) Oral at bedtime  clindamycin IVPB 900 milliGRAM(s) IV Intermittent once  finasteride 5 milliGRAM(s) Oral daily  lidocaine 1% Injectable 0.2 milliLiter(s) Local Injection once  metoprolol succinate ER 50 milliGRAM(s) Oral daily  sodium chloride 0.9% lock flush 3 milliLiter(s) IV Push every 8 hours  spironolactone 25 milliGRAM(s) Oral daily    MEDICATIONS  (PRN):  ALBUTerol    90 MICROgram(s) HFA Inhaler 2 Puff(s) Inhalation every 6 hours PRN Shortness of Breath and/or Wheezing      LABS:                        13.5   9.4   )-----------( 206      ( 28 Nov 2018 06:44 )             39.3     11-28    139  |  103  |  22  ----------------------------<  88  4.4   |  24  |  1.25    Ca    9.2      28 Nov 2018 06:42          Arterial Blood Gas:  11-27 @ 21:07  7.44/37/84/25/97/1.4  ABG lactate: --        MICROBIOLOGY:     RADIOLOGY:  [ ] Reviewed and interpreted by me    Point of Care Ultrasound Findings:    PFT:    EKG: CHIEF COMPLAINT: f/up for sob, chf-TAVR, copd, asbestosis, osas--weak but better--some sob      Interval Events: ambulation    REVIEW OF SYSTEMS:  Constitutional: No fevers or chills. No weight loss. +fatigue or generalized malaise.  Eyes: No itching or discharge from the eyes  ENT: No ear pain. No ear discharge. No nasal congestion. No post nasal drip. No epistaxis. No throat pain. No sore throat. No difficulty swallowing.   CV: No chest pain. No palpitations. No lightheadedness or dizziness.   Resp: No dyspnea at rest. + dyspnea on exertion. No orthopnea. No wheezing. No cough. No stridor. No sputum production. No chest pain with respiration.  GI: No nausea. No vomiting. No diarrhea.  MSK: No joint pain or pain in any extremities  Integumentary: No skin lesions. No pedal edema.  Neurological: No gross motor weakness. No sensory changes.  [+ ] All other systems negative  [ ] Unable to assess ROS because ________    OBJECTIVE:  ICU Vital Signs Last 24 Hrs  T(C): 36.7 (29 Nov 2018 04:36), Max: 37.1 (28 Nov 2018 13:45)  T(F): 98 (29 Nov 2018 04:36), Max: 98.8 (28 Nov 2018 13:45)  HR: 66 (29 Nov 2018 04:36) (66 - 70)  BP: 136/78 (29 Nov 2018 04:36) (136/73 - 137/79)  BP(mean): --  ABP: --  ABP(mean): --  RR: 16 (29 Nov 2018 04:36) (16 - 18)  SpO2: 95% (29 Nov 2018 04:36) (95% - 98%)        11-27 @ 07:01  -  11-28 @ 07:00  --------------------------------------------------------  IN: 430 mL / OUT: 325 mL / NET: 105 mL    11-28 @ 07:01  -  11-29 @ 05:18  --------------------------------------------------------  IN: 600 mL / OUT: 300 mL / NET: 300 mL      CAPILLARY BLOOD GLUCOSE      POCT Blood Glucose.: 93 mg/dL (27 Nov 2018 15:42)      PHYSICAL EXAM: NAD  General: Awake, alert, oriented X 3.   HEENT: Atraumatic, normocephalic.                 Mallampatti Grade 2                No nasal congestion.                No tonsillar or pharyngeal exudates.  Lymph Nodes: No palpable lymphadenopathy  Neck: No JVD. No carotid bruit.   Respiratory: Normal chest expansion                         Normal percussion                         Normal and equal air entry                         No wheeze, rhonchi or rales.  Cardiovascular: S1 S2 normal. No murmurs, rubs or gallops.   Abdomen: Soft, non-tender, non-distended. No organomegaly. NABS  Extremities: Warm to touch. Peripheral pulse palpable. + pedal edema.   Skin: No rashes or skin lesions  Neurological: Motor and sensory examination equal and normal in all four extremities.  Psychiatry: Appropriate mood and affect.    HOSPITAL MEDICATIONS:  MEDICATIONS  (STANDING):  amiodarone    Tablet 200 milliGRAM(s) Oral daily  apixaban 5 milliGRAM(s) Oral every 12 hours  aspirin enteric coated 325 milliGRAM(s) Oral daily  atorvastatin 40 milliGRAM(s) Oral at bedtime  clindamycin IVPB 900 milliGRAM(s) IV Intermittent once  finasteride 5 milliGRAM(s) Oral daily  lidocaine 1% Injectable 0.2 milliLiter(s) Local Injection once  metoprolol succinate ER 50 milliGRAM(s) Oral daily  sodium chloride 0.9% lock flush 3 milliLiter(s) IV Push every 8 hours  spironolactone 25 milliGRAM(s) Oral daily    MEDICATIONS  (PRN):  ALBUTerol    90 MICROgram(s) HFA Inhaler 2 Puff(s) Inhalation every 6 hours PRN Shortness of Breath and/or Wheezing      LABS:                        13.5   9.4   )-----------( 206      ( 28 Nov 2018 06:44 )             39.3     11-28    139  |  103  |  22  ----------------------------<  88  4.4   |  24  |  1.25    Ca    9.2      28 Nov 2018 06:42          Arterial Blood Gas:  11-27 @ 21:07  7.44/37/84/25/97/1.4  ABG lactate: --        MICROBIOLOGY:     RADIOLOGY:  [ ] Reviewed and interpreted by me    Point of Care Ultrasound Findings:    PFT:    EKG:

## 2018-11-29 NOTE — DISCHARGE NOTE ADULT - HOSPITAL COURSE
87 year old  male with H/O PTSD, CVA 7 years- no deficits, gait instability- uses cane, seizure-last 7 years ago, asthma (well controlled on meds), COPD, former smoker, DIEGO- no CPAP,  HTN, HLD, CAD with prior stent 2012 and 2017, AS( severe) - EF 43%, tachybrady syndrome, A Fib (Eliquis and Plavix) s/p Pacemaker, kidney stones, stage 3 CKD,  Hepatitis B, BPH, thalassemia.    11/27 S/P TAVR. Transferred to floor. No EKG changes. TTE in AM. ASA tonight; starting Eliquis tomorrow 11/28. D/C planning  11/28 HD stable.  Eliquis resumed.  TTE done --reviewed by Dr Zuluaga.   11/29 VSS rounds made w/ Dr. Zuluaga - EKG reviewed by Dr. Moreno in am rounds  d/c home today on plavix & eliquis as before - will d/c asa- resume lasix 40 qd -f/u appt 12/10

## 2018-11-29 NOTE — PROGRESS NOTE ADULT - SUBJECTIVE AND OBJECTIVE BOX
*****Structural Heart Team*****    Subjective: Pt has complaints of some sob after ambulating.  Otherwise feels well.    T(C): 36.7 (11-29-18 @ 04:36), Max: 37.1 (11-28-18 @ 13:45)  HR: 66 (11-29-18 @ 04:36) (66 - 70)  BP: 136/78 (11-29-18 @ 04:36) (136/73 - 137/79)  RR: 16 (11-29-18 @ 04:36) (16 - 18)  SpO2: 95% (11-29-18 @ 04:36) (95% - 98%)  Wt(kg): --  11-28 @ 07:01  -  11-29 @ 07:00  --------------------------------------------------------  IN: 680 mL / OUT: 625 mL / NET: 55 mL      MEDICATIONS  (STANDING):  amiodarone    Tablet 200 milliGRAM(s) Oral daily  apixaban 5 milliGRAM(s) Oral every 12 hours  atorvastatin 40 milliGRAM(s) Oral at bedtime  clindamycin IVPB 900 milliGRAM(s) IV Intermittent once  clopidogrel Tablet 75 milliGRAM(s) Oral daily  finasteride 5 milliGRAM(s) Oral daily  furosemide    Tablet 40 milliGRAM(s) Oral daily  lidocaine 1% Injectable 0.2 milliLiter(s) Local Injection once  metoprolol succinate ER 50 milliGRAM(s) Oral daily  sodium chloride 0.9% lock flush 3 milliLiter(s) IV Push every 8 hours  spironolactone 25 milliGRAM(s) Oral daily    MEDICATIONS  (PRN):  ALBUTerol    90 MICROgram(s) HFA Inhaler 2 Puff(s) Inhalation every 6 hours PRN Shortness of Breath and/or Wheezing          Exam:  General: NAD  Pulmonary: clear b/l  Cor: s1s2 no murmur  Groin/Wound: dressings removed, groins clean and dry, not tender, soft  Abdomen: soft, nt/nd, +bs  Neuro: A&Ox3  Extremeties: le with some edema, skin discoloration due to vascular insufficiency                          12.9   9.2   )-----------( 185      ( 29 Nov 2018 06:48 )             38.4   11-29    138  |  105  |  21  ----------------------------<  94  4.3   |  23  |  1.23    Ca    9.1      29 Nov 2018 06:47        Assesment/Plan:    Doing well POD 2 s/p tF TAVR (#34 CV)  - resume preop meds incl lasix  - will cont Eliquis and Plavix  - D/C home today and f/u with Dr Zuluaga in 1 week and structural heart team in 1 month  - plan discussed with pt.

## 2018-11-29 NOTE — DISCHARGE NOTE ADULT - MEDICATION SUMMARY - MEDICATIONS TO STOP TAKING
I will STOP taking the medications listed below when I get home from the hospital:    valsartan 160 mg oral tablet  -- 1 tab(s) by mouth once a day    furosemide 40 mg oral tablet  -- 1 tab(s) by mouth 2 times a day

## 2018-11-29 NOTE — DISCHARGE NOTE ADULT - PLAN OF CARE
full recovery follow up appt with dR. Zuluaga, cardiac surgeon on Monday, December 10 @ 10:30am  follow up appt with your Primary Care MD and your Cardiologist in 2-3 weeks  ambulate 4-5 times a day

## 2018-11-29 NOTE — PROGRESS NOTE ADULT - PROBLEM SELECTOR PROBLEM 1
S/P TAVR (transcatheter aortic valve replacement)
Aortic stenosis
S/P TAVR (transcatheter aortic valve replacement)

## 2018-11-29 NOTE — DISCHARGE NOTE ADULT - CARE PROVIDER_API CALL
Dhara Zuluaga), Surgery; Thoracic and Cardiac Surgery  [address] [place] [address]  Phone: [phone]  Fax: [phone]    Arjun Mack), Internal Medicine; Pulmonary Disease  [address]  [place] [address]  Phone: [phone]  Fax: [phone]

## 2018-11-29 NOTE — DISCHARGE NOTE ADULT - OTHER SIGNIFICANT FINDINGS
> 30 minutes spent on this discharge  Tele Peace/  60-80 (PPM)    PHYSICAL EXAM:  Neurology: alert and oriented x 3, nonfocal, no gross deficits  CV : Irreg irreg  Lungs: CTA  Abdomen: soft, nontender, nondistended, positive bowel sounds,     Extremities: no edema    no hematoma pulses 2+ L Groin CDI - LE w/ hyperpigmentation & venous stasis

## 2018-11-29 NOTE — DISCHARGE NOTE ADULT - PATIENT PORTAL LINK FT
You can access the [email or web address], offered by [facility], by registering with the following website: [email or web address]

## 2018-11-29 NOTE — DISCHARGE NOTE ADULT - ADDITIONAL INSTRUCTIONS
refer to your TAVR post-op instructions sheet  weigh yourself daily  follow up appt with dr. kumar on dec. 10 @ 10:30am

## 2018-12-05 RX ORDER — METOPROLOL SUCCINATE 25 MG/1
25 TABLET, EXTENDED RELEASE ORAL
Qty: 9 | Refills: 3 | Status: DISCONTINUED | COMMUNITY
Start: 2018-08-01 | End: 2018-12-05

## 2018-12-05 RX ORDER — VALSARTAN 80 MG/1
80 TABLET, COATED ORAL
Qty: 90 | Refills: 3 | Status: DISCONTINUED | COMMUNITY
End: 2018-12-05

## 2018-12-10 ENCOUNTER — APPOINTMENT (OUTPATIENT)
Dept: CARDIOTHORACIC SURGERY | Facility: CLINIC | Age: 83
End: 2018-12-10
Payer: MEDICARE

## 2018-12-10 ENCOUNTER — NON-APPOINTMENT (OUTPATIENT)
Age: 83
End: 2018-12-10

## 2018-12-10 ENCOUNTER — CLINICAL ADVICE (OUTPATIENT)
Age: 83
End: 2018-12-10

## 2018-12-10 VITALS
TEMPERATURE: 98.4 F | HEART RATE: 77 BPM | WEIGHT: 215 LBS | HEIGHT: 66 IN | RESPIRATION RATE: 14 BRPM | DIASTOLIC BLOOD PRESSURE: 77 MMHG | BODY MASS INDEX: 34.55 KG/M2 | OXYGEN SATURATION: 98 % | SYSTOLIC BLOOD PRESSURE: 136 MMHG

## 2018-12-10 PROCEDURE — 99213 OFFICE O/P EST LOW 20 MIN: CPT

## 2018-12-19 ENCOUNTER — APPOINTMENT (OUTPATIENT)
Dept: GERIATRICS | Facility: CLINIC | Age: 83
End: 2018-12-19
Payer: MEDICARE

## 2018-12-19 VITALS
OXYGEN SATURATION: 98 % | DIASTOLIC BLOOD PRESSURE: 70 MMHG | TEMPERATURE: 97.4 F | SYSTOLIC BLOOD PRESSURE: 116 MMHG | WEIGHT: 215 LBS | HEART RATE: 87 BPM | BODY MASS INDEX: 34.7 KG/M2

## 2018-12-19 DIAGNOSIS — I48.0 PAROXYSMAL ATRIAL FIBRILLATION: ICD-10-CM

## 2018-12-19 DIAGNOSIS — Z87.898 PERSONAL HISTORY OF OTHER SPECIFIED CONDITIONS: ICD-10-CM

## 2018-12-19 DIAGNOSIS — R06.02 SHORTNESS OF BREATH: ICD-10-CM

## 2018-12-19 PROCEDURE — 99215 OFFICE O/P EST HI 40 MIN: CPT

## 2018-12-19 RX ORDER — FINASTERIDE 5 MG/1
5 TABLET, FILM COATED ORAL DAILY
Refills: 0 | Status: ACTIVE | COMMUNITY
Start: 2018-12-05

## 2018-12-19 RX ORDER — FUROSEMIDE 40 MG/1
40 TABLET ORAL DAILY
Qty: 90 | Refills: 3 | Status: ACTIVE | COMMUNITY
Start: 2018-03-13

## 2018-12-19 RX ORDER — SPIRONOLACTONE 25 MG/1
25 TABLET ORAL DAILY
Refills: 0 | Status: ACTIVE | COMMUNITY
Start: 2018-12-05

## 2019-01-01 ENCOUNTER — INPATIENT (INPATIENT)
Facility: HOSPITAL | Age: 84
LOS: 6 days | Discharge: HOME | DRG: 690 | End: 2019-01-08
Attending: THORACIC SURGERY (CARDIOTHORACIC VASCULAR SURGERY) | Admitting: THORACIC SURGERY (CARDIOTHORACIC VASCULAR SURGERY)
Payer: MEDICARE

## 2019-01-01 VITALS
SYSTOLIC BLOOD PRESSURE: 165 MMHG | OXYGEN SATURATION: 94 % | HEART RATE: 108 BPM | HEIGHT: 67 IN | DIASTOLIC BLOOD PRESSURE: 90 MMHG | RESPIRATION RATE: 18 BRPM | TEMPERATURE: 98 F | WEIGHT: 216.93 LBS

## 2019-01-01 DIAGNOSIS — N39.0 URINARY TRACT INFECTION, SITE NOT SPECIFIED: ICD-10-CM

## 2019-01-01 DIAGNOSIS — T82.897A OTHER SPECIFIED COMPLICATION OF CARDIAC PROSTHETIC DEVICES, IMPLANTS AND GRAFTS, INITIAL ENCOUNTER: ICD-10-CM

## 2019-01-01 DIAGNOSIS — Z95.2 PRESENCE OF PROSTHETIC HEART VALVE: Chronic | ICD-10-CM

## 2019-01-01 DIAGNOSIS — Z95.0 PRESENCE OF CARDIAC PACEMAKER: Chronic | ICD-10-CM

## 2019-01-01 LAB
ALBUMIN SERPL ELPH-MCNC: 3.8 G/DL — SIGNIFICANT CHANGE UP (ref 3.3–5)
ALP SERPL-CCNC: 124 U/L — HIGH (ref 40–120)
ALT FLD-CCNC: 18 U/L — SIGNIFICANT CHANGE UP (ref 10–45)
ANION GAP SERPL CALC-SCNC: 16 MMOL/L — SIGNIFICANT CHANGE UP (ref 5–17)
APPEARANCE UR: ABNORMAL
APTT BLD: 27.4 SEC — LOW (ref 27.5–36.3)
AST SERPL-CCNC: 26 U/L — SIGNIFICANT CHANGE UP (ref 10–40)
BACTERIA # UR AUTO: ABNORMAL
BILIRUB SERPL-MCNC: 0.7 MG/DL — SIGNIFICANT CHANGE UP (ref 0.2–1.2)
BILIRUB UR-MCNC: NEGATIVE — SIGNIFICANT CHANGE UP
BUN SERPL-MCNC: 17 MG/DL — SIGNIFICANT CHANGE UP (ref 7–23)
CALCIUM SERPL-MCNC: 9.6 MG/DL — SIGNIFICANT CHANGE UP (ref 8.4–10.5)
CHLORIDE SERPL-SCNC: 104 MMOL/L — SIGNIFICANT CHANGE UP (ref 96–108)
CO2 SERPL-SCNC: 22 MMOL/L — SIGNIFICANT CHANGE UP (ref 22–31)
COLOR SPEC: ABNORMAL
CREAT SERPL-MCNC: 1.07 MG/DL — SIGNIFICANT CHANGE UP (ref 0.5–1.3)
DIFF PNL FLD: ABNORMAL
EPI CELLS # UR: 2 — SIGNIFICANT CHANGE UP
GLUCOSE SERPL-MCNC: 100 MG/DL — HIGH (ref 70–99)
GLUCOSE UR QL: NEGATIVE — SIGNIFICANT CHANGE UP
HCT VFR BLD CALC: 42.8 % — SIGNIFICANT CHANGE UP (ref 39–50)
HGB BLD-MCNC: 14.6 G/DL — SIGNIFICANT CHANGE UP (ref 13–17)
HYALINE CASTS # UR AUTO: 4 /LPF — SIGNIFICANT CHANGE UP (ref 0–7)
INR BLD: 1.46 RATIO — HIGH (ref 0.88–1.16)
KETONES UR-MCNC: NEGATIVE — SIGNIFICANT CHANGE UP
LEUKOCYTE ESTERASE UR-ACNC: ABNORMAL
MCHC RBC-ENTMCNC: 30.4 PG — SIGNIFICANT CHANGE UP (ref 27–34)
MCHC RBC-ENTMCNC: 34 GM/DL — SIGNIFICANT CHANGE UP (ref 32–36)
MCV RBC AUTO: 89.2 FL — SIGNIFICANT CHANGE UP (ref 80–100)
NITRITE UR-MCNC: POSITIVE
NT-PROBNP SERPL-SCNC: 4073 PG/ML — HIGH (ref 0–300)
PH UR: 6 — SIGNIFICANT CHANGE UP (ref 5–8)
PLATELET # BLD AUTO: 174 K/UL — SIGNIFICANT CHANGE UP (ref 150–400)
POTASSIUM SERPL-MCNC: 4.2 MMOL/L — SIGNIFICANT CHANGE UP (ref 3.5–5.3)
POTASSIUM SERPL-SCNC: 4.2 MMOL/L — SIGNIFICANT CHANGE UP (ref 3.5–5.3)
PROT SERPL-MCNC: 6.9 G/DL — SIGNIFICANT CHANGE UP (ref 6–8.3)
PROT UR-MCNC: ABNORMAL
PROTHROM AB SERPL-ACNC: 16.8 SEC — HIGH (ref 10–12.9)
RBC # BLD: 4.8 M/UL — SIGNIFICANT CHANGE UP (ref 4.2–5.8)
RBC # FLD: 12.4 % — SIGNIFICANT CHANGE UP (ref 10.3–14.5)
RBC CASTS # UR COMP ASSIST: 199 /HPF — HIGH (ref 0–4)
SODIUM SERPL-SCNC: 142 MMOL/L — SIGNIFICANT CHANGE UP (ref 135–145)
SP GR SPEC: 1.02 — SIGNIFICANT CHANGE UP (ref 1.01–1.02)
TROPONIN T, HIGH SENSITIVITY RESULT: 80 NG/L — HIGH (ref 0–51)
UROBILINOGEN FLD QL: NEGATIVE — SIGNIFICANT CHANGE UP
WBC # BLD: 10.6 K/UL — HIGH (ref 3.8–10.5)
WBC # FLD AUTO: 10.6 K/UL — HIGH (ref 3.8–10.5)
WBC UR QL: 1051 /HPF — HIGH (ref 0–5)

## 2019-01-01 PROCEDURE — 99285 EMERGENCY DEPT VISIT HI MDM: CPT | Mod: 25

## 2019-01-01 PROCEDURE — 93306 TTE W/DOPPLER COMPLETE: CPT | Mod: 26

## 2019-01-01 PROCEDURE — 71046 X-RAY EXAM CHEST 2 VIEWS: CPT | Mod: 26

## 2019-01-01 PROCEDURE — 99222 1ST HOSP IP/OBS MODERATE 55: CPT | Mod: 24

## 2019-01-01 PROCEDURE — 93010 ELECTROCARDIOGRAM REPORT: CPT

## 2019-01-01 RX ORDER — CEFTRIAXONE 500 MG/1
1 INJECTION, POWDER, FOR SOLUTION INTRAMUSCULAR; INTRAVENOUS EVERY 24 HOURS
Qty: 0 | Refills: 0 | Status: DISCONTINUED | OUTPATIENT
Start: 2019-01-02 | End: 2019-01-03

## 2019-01-01 RX ORDER — PHENAZOPYRIDINE HCL 100 MG
100 TABLET ORAL EVERY 8 HOURS
Qty: 0 | Refills: 0 | Status: COMPLETED | OUTPATIENT
Start: 2019-01-01 | End: 2019-01-03

## 2019-01-01 RX ORDER — METOPROLOL TARTRATE 50 MG
50 TABLET ORAL DAILY
Qty: 0 | Refills: 0 | Status: DISCONTINUED | OUTPATIENT
Start: 2019-01-01 | End: 2019-01-08

## 2019-01-01 RX ORDER — APIXABAN 2.5 MG/1
5 TABLET, FILM COATED ORAL EVERY 12 HOURS
Qty: 0 | Refills: 0 | Status: DISCONTINUED | OUTPATIENT
Start: 2019-01-01 | End: 2019-01-04

## 2019-01-01 RX ORDER — ATORVASTATIN CALCIUM 80 MG/1
40 TABLET, FILM COATED ORAL AT BEDTIME
Qty: 0 | Refills: 0 | Status: DISCONTINUED | OUTPATIENT
Start: 2019-01-01 | End: 2019-01-08

## 2019-01-01 RX ORDER — ALBUTEROL 90 UG/1
2 AEROSOL, METERED ORAL EVERY 6 HOURS
Qty: 0 | Refills: 0 | Status: DISCONTINUED | OUTPATIENT
Start: 2019-01-01 | End: 2019-01-08

## 2019-01-01 RX ORDER — AMIODARONE HYDROCHLORIDE 400 MG/1
200 TABLET ORAL ONCE
Qty: 0 | Refills: 0 | Status: COMPLETED | OUTPATIENT
Start: 2019-01-01 | End: 2019-01-01

## 2019-01-01 RX ORDER — CLOPIDOGREL BISULFATE 75 MG/1
75 TABLET, FILM COATED ORAL DAILY
Qty: 0 | Refills: 0 | Status: DISCONTINUED | OUTPATIENT
Start: 2019-01-01 | End: 2019-01-08

## 2019-01-01 RX ORDER — TAMSULOSIN HYDROCHLORIDE 0.4 MG/1
0.4 CAPSULE ORAL AT BEDTIME
Qty: 0 | Refills: 0 | Status: DISCONTINUED | OUTPATIENT
Start: 2019-01-01 | End: 2019-01-08

## 2019-01-01 RX ORDER — ALBUTEROL 90 UG/1
2.5 AEROSOL, METERED ORAL ONCE
Qty: 0 | Refills: 0 | Status: COMPLETED | OUTPATIENT
Start: 2019-01-01 | End: 2019-01-01

## 2019-01-01 RX ORDER — AMIODARONE HYDROCHLORIDE 400 MG/1
200 TABLET ORAL DAILY
Qty: 0 | Refills: 0 | Status: DISCONTINUED | OUTPATIENT
Start: 2019-01-01 | End: 2019-01-08

## 2019-01-01 RX ORDER — FINASTERIDE 5 MG/1
5 TABLET, FILM COATED ORAL DAILY
Qty: 0 | Refills: 0 | Status: DISCONTINUED | OUTPATIENT
Start: 2019-01-01 | End: 2019-01-08

## 2019-01-01 RX ORDER — CLOPIDOGREL BISULFATE 75 MG/1
75 TABLET, FILM COATED ORAL ONCE
Qty: 0 | Refills: 0 | Status: COMPLETED | OUTPATIENT
Start: 2019-01-01 | End: 2019-01-01

## 2019-01-01 RX ORDER — BUDESONIDE AND FORMOTEROL FUMARATE DIHYDRATE 160; 4.5 UG/1; UG/1
2 AEROSOL RESPIRATORY (INHALATION)
Qty: 0 | Refills: 0 | Status: DISCONTINUED | OUTPATIENT
Start: 2019-01-01 | End: 2019-01-08

## 2019-01-01 RX ORDER — METOPROLOL TARTRATE 50 MG
50 TABLET ORAL ONCE
Qty: 0 | Refills: 0 | Status: COMPLETED | OUTPATIENT
Start: 2019-01-01 | End: 2019-01-01

## 2019-01-01 RX ORDER — POLYETHYLENE GLYCOL 3350 17 G/17G
17 POWDER, FOR SOLUTION ORAL DAILY
Qty: 0 | Refills: 0 | Status: DISCONTINUED | OUTPATIENT
Start: 2019-01-01 | End: 2019-01-08

## 2019-01-01 RX ORDER — ACETAMINOPHEN 500 MG
500 TABLET ORAL DAILY
Qty: 0 | Refills: 0 | Status: DISCONTINUED | OUTPATIENT
Start: 2019-01-01 | End: 2019-01-08

## 2019-01-01 RX ORDER — FAMOTIDINE 10 MG/ML
20 INJECTION INTRAVENOUS
Qty: 0 | Refills: 0 | Status: DISCONTINUED | OUTPATIENT
Start: 2019-01-01 | End: 2019-01-08

## 2019-01-01 RX ORDER — SPIRONOLACTONE 25 MG/1
25 TABLET, FILM COATED ORAL DAILY
Qty: 0 | Refills: 0 | Status: DISCONTINUED | OUTPATIENT
Start: 2019-01-01 | End: 2019-01-04

## 2019-01-01 RX ORDER — CHOLECALCIFEROL (VITAMIN D3) 125 MCG
1000 CAPSULE ORAL DAILY
Qty: 0 | Refills: 0 | Status: DISCONTINUED | OUTPATIENT
Start: 2019-01-01 | End: 2019-01-08

## 2019-01-01 RX ORDER — CEFTRIAXONE 500 MG/1
1 INJECTION, POWDER, FOR SOLUTION INTRAMUSCULAR; INTRAVENOUS ONCE
Qty: 0 | Refills: 0 | Status: COMPLETED | OUTPATIENT
Start: 2019-01-01 | End: 2019-01-01

## 2019-01-01 RX ORDER — FUROSEMIDE 40 MG
40 TABLET ORAL ONCE
Qty: 0 | Refills: 0 | Status: COMPLETED | OUTPATIENT
Start: 2019-01-01 | End: 2019-01-01

## 2019-01-01 RX ORDER — PHENAZOPYRIDINE HCL 100 MG
100 TABLET ORAL ONCE
Qty: 0 | Refills: 0 | Status: COMPLETED | OUTPATIENT
Start: 2019-01-01 | End: 2019-01-01

## 2019-01-01 RX ORDER — FUROSEMIDE 40 MG
40 TABLET ORAL
Qty: 0 | Refills: 0 | Status: DISCONTINUED | OUTPATIENT
Start: 2019-01-01 | End: 2019-01-04

## 2019-01-01 RX ORDER — SPIRONOLACTONE 25 MG/1
25 TABLET, FILM COATED ORAL ONCE
Qty: 0 | Refills: 0 | Status: COMPLETED | OUTPATIENT
Start: 2019-01-01 | End: 2019-01-01

## 2019-01-01 RX ADMIN — Medication 1000 UNIT(S): at 17:51

## 2019-01-01 RX ADMIN — ALBUTEROL 2.5 MILLIGRAM(S): 90 AEROSOL, METERED ORAL at 17:54

## 2019-01-01 RX ADMIN — Medication 50 MILLIGRAM(S): at 15:53

## 2019-01-01 RX ADMIN — CEFTRIAXONE 100 GRAM(S): 500 INJECTION, POWDER, FOR SOLUTION INTRAMUSCULAR; INTRAVENOUS at 11:28

## 2019-01-01 RX ADMIN — CEFTRIAXONE 1 GRAM(S): 500 INJECTION, POWDER, FOR SOLUTION INTRAMUSCULAR; INTRAVENOUS at 14:14

## 2019-01-01 RX ADMIN — Medication 100 MILLIGRAM(S): at 22:54

## 2019-01-01 RX ADMIN — FINASTERIDE 5 MILLIGRAM(S): 5 TABLET, FILM COATED ORAL at 17:51

## 2019-01-01 RX ADMIN — CLOPIDOGREL BISULFATE 75 MILLIGRAM(S): 75 TABLET, FILM COATED ORAL at 15:53

## 2019-01-01 RX ADMIN — ALBUTEROL 2 PUFF(S): 90 AEROSOL, METERED ORAL at 23:12

## 2019-01-01 RX ADMIN — FAMOTIDINE 20 MILLIGRAM(S): 10 INJECTION INTRAVENOUS at 17:51

## 2019-01-01 RX ADMIN — SPIRONOLACTONE 25 MILLIGRAM(S): 25 TABLET, FILM COATED ORAL at 15:53

## 2019-01-01 RX ADMIN — AMIODARONE HYDROCHLORIDE 200 MILLIGRAM(S): 400 TABLET ORAL at 15:53

## 2019-01-01 RX ADMIN — POLYETHYLENE GLYCOL 3350 17 GRAM(S): 17 POWDER, FOR SOLUTION ORAL at 17:52

## 2019-01-01 RX ADMIN — ATORVASTATIN CALCIUM 40 MILLIGRAM(S): 80 TABLET, FILM COATED ORAL at 22:54

## 2019-01-01 RX ADMIN — Medication 100 MILLIGRAM(S): at 15:39

## 2019-01-01 RX ADMIN — Medication 40 MILLIGRAM(S): at 15:53

## 2019-01-01 RX ADMIN — TAMSULOSIN HYDROCHLORIDE 0.4 MILLIGRAM(S): 0.4 CAPSULE ORAL at 22:54

## 2019-01-01 RX ADMIN — APIXABAN 5 MILLIGRAM(S): 2.5 TABLET, FILM COATED ORAL at 17:54

## 2019-01-01 NOTE — ED ADULT NURSE NOTE - NSIMPLEMENTINTERV_GEN_ALL_ED
Implemented All Fall with Harm Risk Interventions:  [place] to call system. Call bell, personal items and telephone within reach. Instruct patient to call for assistance. Room bathroom lighting operational. Non-slip footwear when patient is off stretcher. Physically safe environment: no spills, clutter or unnecessary equipment. Stretcher in lowest position, wheels locked, appropriate side rails in place. Provide visual cue, wrist band, yellow gown, etc. Monitor gait and stability. Monitor for mental status changes and reorient to person, place, and time. Review medications for side effects contributing to fall risk. Reinforce activity limits and safety measures with patient and family. Provide visual clues: red socks.

## 2019-01-01 NOTE — H&P ADULT - NSHPREVIEWOFSYSTEMS_GEN_ALL_CORE
GENERAL SYMPTOMS:  weakness, fevers or chills  ENT: No visual changes;  No vertigo or throat pain   SKIN/BREAST:  Negative  NECK: No pain or stiffness  RESPIRATORY/THORAX:  SOB cough,  CARDIOVASCULAR: No chest pain or palpitations  GASTROINTESTINAL: No abdominal or epigastric pain. No nausea, vomiting, or hematemesis; No diarrhea or constipation.   GENITOURINARY: dysuria, frequency malodorous   NEUROLOGICAL: No numbness or weakness  MUSCULOSKELETAL:  equal strength throughout   SKIN: No itching, burning, rashes, or lesions   All other review of systems is negative unless indicated above.  HEMATOLOGY/LYMPHATICS: Negative  ENDOCRINE: Negative GENERAL SYMPTOMS:   reports weakness,   ENT: No visual changes;  No vertigo or throat pain   SKIN/BREAST:  Negative  NECK: No pain or stiffness  RESPIRATORY/THORAX:   endorses SOB cough,  CARDIOVASCULAR: No chest pain or palpitations  GASTROINTESTINAL: No abdominal or epigastric pain. No nausea, vomiting, or hematemesis; No diarrhea or constipation.   GENITOURINARY: dysuria, frequency malodorous   NEUROLOGICAL: No numbness or weakness  MUSCULOSKELETAL:  equal strength throughout   SKIN: No itching, burning, rashes, or lesions   All other review of systems is negative unless indicated above.  HEMATOLOGY/LYMPHATICS: Negative  ENDOCRINE: Negative

## 2019-01-01 NOTE — ED PROVIDER NOTE - OBJECTIVE STATEMENT
87 year old male w/ pmhx, CVA 7 years,  COPD,  HTN, HLD, CAD,  CHF, A Fib s/p Pacemaker, Severe aortic stenosis with recent TAVR 11/27/18 presents to ED c/o sob and decreased urinary output with dysuria. Patient states that for the past few days he has noticed that when he urinate he feels like he is not having complete voiding and feels that he has to urinate more frequently. He reports that when he does urinate there is burning as well. Also reports increased sob. Denies fevers, chills, cough, chest pain, abdominal pain, diarrhea

## 2019-01-01 NOTE — H&P ADULT - HISTORY OF PRESENT ILLNESS
This is a an 88 y/o male PMH HTN, HLD, remote CVA, chronic lung disease, PCI with Stent, chronic afib on  eliquis chronic systolic heart failure s/p TAVR  - # 34  Medtronic Core Valve on 11/27/18.- post op course uneventful   On 1/1/18  Presents to [facility] ED with c/o increasing SOB and dysuria. TTE reveal mobile echodensity  Pan cultured  ceftriaxone initiated.  Admitted to Dr Downey for further management. This is a an 88 y/o male PMH HTN, HLD, remote CVA, chronic lung disease, PCI with Stent, chronic afib on  eliquis chronic systolic heart failure s/p TAVR  - # 34  Medtronic Core Valve on 11/27/18.-  with Dr Zuluaga- post op course uneventful   On 1/1/18  Presents to [facility] ED with c/o increasing SOB and dysuria. TTE reveal mobile echodensity  Pan cultured  ceftriaxone initiated.  Admitted to Dr Downey for further management.

## 2019-01-01 NOTE — H&P ADULT - NSHPLABSRESULTS_GEN_ALL_CORE
14.6   10.6  )-----------( 174      ( 01 Jan 2019 09:52 )               42.8   01-01    142  |  104  |  17  ----------------------------<  100<H>  4.2   |  22  |  1.07    Ca    9.6      01 Jan 2019 09:52    TPro  6.9  /  Alb  3.8  /  TBili  0.7  /  DBili  x   /  AST  26  /  ALT  18  /  AlkPhos  124<H>  01-01  PT/INR - ( 01 Jan 2019 09:52 )   PT: 16.8 sec;   INR: 1.46 ratio       PTT - ( 01 Jan 2019 09:52 )  PTT:27.4 sec  < from: Transthoracic Echocardiogram (01.01.19 @ 11:08) >    Conclusions:  1. Mitral annular calcification and calcified mitral  leaflets with normal diastolic opening.  2. Transcatheter aortic valve replacement.  Small  independently mobile echodensity noted in LVOT associated  with the ventricular aspect of the TAVR valve (image 11).  This may represent vegetation vs thrombus, clinical  correlation indicated. Peak transaortic valve gradient  equals 7 mm Hg, meantransaortic valve gradient equals 4 mm  Hg, which is probably normal in the presence of a  transcatheter aortic valve replacement. Mild paravalvular  aortic regurgitation. Consider MARIA LUISA for further evaluation.  3. Severely dilated left atrium.  LA volume index = 62  cc/m2.  4.  Endocardium not well visualized; Mild segmental left  ventricular systolic dysfunction. The inferolateral wall  appears  hypokinetic.  5. A device wire is noted in the right heart.  6. Right ventricular enlargement with borderline right  ventricular systolic function.  Discussed with  ER and CTS NP

## 2019-01-01 NOTE — ED ADULT NURSE NOTE - OBJECTIVE STATEMENT
88 y/o male pmhx, CVA 7 years,  COPD,  HTN, HLD, CAD,  CHF, A Fib s/p Pacemaker, Severe aortic stenosis with recent TAVR 11/27/18 presenting to ED c/o SOB and difficulties with urination. Pt. states he has difficulty passing urine, more frequent urination, and "it burns" when urinating. Pt denies headache, dizziness, chest pain, palpitations, cough, abdominal pain, n/v/d, fevers, chills, weakness at this time. A&Ox4 gross neuro intact, lungs cta bilaterally, no difficulty speaking in complete sentences, s1s2 heart sounds heard, pulses x 4, stinson x4, abdomen soft nontender nondistended, skin intact. Safety and comfort measures maintained. Continuous cardiac monitoring maintained. Patient undressed and placed into gown, call bell in hand and side rails up for safety. warm blanket provided, vital signs stable, pt in no acute distress.

## 2019-01-01 NOTE — ED PROVIDER NOTE - CARE PLAN
Principal Discharge DX:	UTI (urinary tract infection) Principal Discharge DX:	UTI (urinary tract infection)  Secondary Diagnosis:	Aortic valve prosthesis present

## 2019-01-01 NOTE — ED CLERICAL - NS ED CLERK NOTE PRE-ARRIVAL INFORMATION; ADDITIONAL PRE-ARRIVAL INFORMATION
This patient is enrolled in the BP-A and has undergone a cardiac surgery procedure within the last 30 days and has active care navigation. This patient can be followed up by the care navigation team within 24 hours. To arrange close follow-up or to obtain additional clinical information about this patient, please call the contact number above. Please call the cardiac surgery team once patient is registered at [phone] for consultation PRIOR to disposition decision.  The patient recently underwent a cardiac surgery procedure and the team can assist in acute medical management.

## 2019-01-01 NOTE — ED ADULT NURSE NOTE - PMH
Aortic stenosis    Asthma    Atrial fibrillation    BPH (benign prostatic hyperplasia)    CAD (coronary artery disease)  s/p stent 2010  CHF (congestive heart failure)    Chronic diastolic HF (heart failure)    CKD (chronic kidney disease) stage 3, GFR 30-59 ml/min    COPD (chronic obstructive pulmonary disease)    CVA (cerebral vascular accident)    Gait instability    H/O thalassemia    Hepatitis B    Hypertension    DIEGO (obstructive sleep apnea)    Pacemaker  2015 Medtronic [id number]  [id number]  PTSD (post-traumatic stress disorder)    Seizure    Tachy-idris syndrome    TIA (transient ischemic attack)  2010

## 2019-01-01 NOTE — ED PROVIDER NOTE - PHYSICAL EXAMINATION
CONSTITUTIONAL: Patient is awake, alert and oriented x 3. Patient is well appearing and in no acute distress.  HEAD: NCAT,   EYES: PERRL b/l, EOMI,   NECK: supple, FROM,  LUNGS: CTA B/L,  HEART: RRR.+S1S2   ABDOMEN: Soft nd/nt+bs no rebound or guarding.   EXTREMITY: no edema or calf tenderness b/l, FROM upper and lower ext b/l  SKIN: chronic vascular changes to le b/l    NEURO: No focal deficits

## 2019-01-01 NOTE — H&P ADULT - PSH
Artificial pacemaker    S/P primary angioplasty with coronary stent  2012 AND 2017  S/P TAVR (transcatheter aortic valve replacement)  11/27/18

## 2019-01-01 NOTE — H&P ADULT - ASSESSMENT
This is a an 88 y/o male PMH HTN, HLD, remote CVA, chronic lung disease, PCI with Stent, chronic afib on  eliquis chronic systolic heart failure s/p TAVR  - # 34  Medtronic Core Valve on 11/27/18.- post op course uneventful   On 1/1/18  Presents to [facility] ED with c/o increasing SOB and dysuria. TTE reveal mobile echodensity  Pan cultured  ceftriaxone initiated.  Admitted to Dr Downey for further management. This is a an 86 y/o male PMH HTN, HLD, remote CVA, chronic lung disease, PCI with Stent, chronic afib on  eliquis chronic systolic heart failure s/p TAVR  - # 34  Medtronic Core Valve on 11/27/18.- with Dr Zuluaga- post op course uneventful   On 1/1/18  Presents to [facility] ED with c/o increasing SOB and dysuria. TTE reveal mobile echodensity  Pan cultured  ceftriaxone initiated.  Admitted to Dr Downey for further management.

## 2019-01-01 NOTE — CONSULT NOTE ADULT - SUBJECTIVE AND OBJECTIVE BOX
CHIEF COMPLAINT:Patient is a 87y old  Male who presents with a chief complaint of SOB abd dysuria (01 Jan 2019 16:47)      HISTORY OF PRESENT ILLNESS:HPI:  This is a an 86 y/o male PMH HTN, HLD, remote CVA, chronic lung disease, PCI with Stent, chronic afib on  eliquis chronic systolic heart failure s/p TAVR  - # 34  Medtronic Core Valve on 11/27/18.-  with Dr Zuluaga- post op course uneventful   On 1/1/18  Presents to [facility] ED with c/o increasing SOB and dysuria. TTE reveal mobile echodensity  Pan cultured  ceftriaxone initiated.  Admitted to Dr Downey for further management. (01 Jan 2019 16:47)  I saw pt in office about 1-2 weeks ago post TAVR and he was doing well    PAST MEDICAL & SURGICAL HISTORY:  CKD (chronic kidney disease) stage 3, GFR 30-59 ml/min  Hepatitis B  PTSD (post-traumatic stress disorder)  Tachy-idris syndrome  TIA (transient ischemic attack): 2010  Seizure  DIEGO (obstructive sleep apnea)  Chronic diastolic HF (heart failure)  Aortic stenosis  CVA (cerebral vascular accident)  Gait instability  COPD (chronic obstructive pulmonary disease)  BPH (benign prostatic hyperplasia)  Asthma  CHF (congestive heart failure)  Atrial fibrillation  Pacemaker: 2015 Medtronic [id number]  A2DR01  Hypertension  H/O thalassemia  CAD (coronary artery disease): s/p stent 2010  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  Artificial pacemaker  S/P primary angioplasty with coronary stent: 2012 AND 2017          MEDICATIONS:  amiodarone    Tablet 200 milliGRAM(s) Oral daily  apixaban 5 milliGRAM(s) Oral every 12 hours  clopidogrel Tablet 75 milliGRAM(s) Oral daily  furosemide    Tablet 40 milliGRAM(s) Oral two times a day  metoprolol succinate ER 50 milliGRAM(s) Oral daily  spironolactone 25 milliGRAM(s) Oral daily  tamsulosin 0.4 milliGRAM(s) Oral at bedtime      ALBUTerol    90 MICROgram(s) HFA Inhaler 2 Puff(s) Inhalation every 6 hours  buDESOnide  80 MICROgram(s)/formoterol 4.5 MICROgram(s) Inhaler 2 Puff(s) Inhalation two times a day    acetaminophen   Tablet .. 500 milliGRAM(s) Oral daily PRN    famotidine    Tablet 20 milliGRAM(s) Oral two times a day  polyethylene glycol 3350 17 Gram(s) Oral daily    atorvastatin 40 milliGRAM(s) Oral at bedtime  finasteride 5 milliGRAM(s) Oral daily    artificial  tears Solution 1 Drop(s) Both EYES two times a day PRN  cholecalciferol 1000 Unit(s) Oral daily  phenazopyridine 100 milliGRAM(s) Oral every 8 hours      FAMILY HISTORY:  No pertinent family history in first degree relatives      Non-contributory    SOCIAL HISTORY:    former smoker    Allergies    Keppra (Rash)  penicillin (Unknown)    Intolerances    	    REVIEW OF SYSTEMS:  CONSTITUTIONAL: No fever  EYES: No eye pain, visual disturbances, or discharge  ENMT:  No difficulty hearing, tinnitus  NECK: No pain or stiffness  RESPIRATORY: No cough, wheezing,  CARDIOVASCULAR: No chest pain, palpitations, passing out, dizziness, + chronic leg swelling  GASTROINTESTINAL:  No nausea, vomiting, diarrhea or constipation. No melena.  GENITOURINARY: + dysuria, hematuria  NEUROLOGICAL: No stroke like symptoms  SKIN: No burning or lesions   LYMPH Nodes: No enlarged glands  ENDOCRINE: No heat or cold intolerance  MUSCULOSKELETAL: No joint pain or swelling  PSYCHIATRIC: No  anxiety, mood swings  HEME/LYMPH: No bleeding gums  ALLERY AND IMMUNOLOGIC: No hives or eczema	    All other ROS negative    PHYSICAL EXAM:  T(C): 36.7 (01-01-19 @ 19:57), Max: 37.1 (01-01-19 @ 16:42)  HR: 105 (01-01-19 @ 19:57) (92 - 108)  BP: 152/87 (01-01-19 @ 19:57) (133/93 - 165/90)  RR: 19 (01-01-19 @ 19:57) (18 - 20)  SpO2: 96% (01-01-19 @ 19:57) (94% - 98%)  Wt(kg): --  I&O's Summary    01 Jan 2019 07:01  -  01 Jan 2019 23:27  --------------------------------------------------------  IN: 240 mL / OUT: 550 mL / NET: -310 mL        Appearance: Normal	  HEENT:   Normal oral mucosa, EOMI	  Cardiovascular: Normal S1 S2, No JVD, No murmurs  Respiratory: Lungs clear to auscultation	  Psychiatry: Alert, Mood & affect appropriate  Gastrointestinal:  Soft, Non-tender, + BS	  Skin: No rashes, No ecchymoses, No cyanosis	  Neurologic: Non-focal  Extremities:  No clubbing, cyanosis or edema  Vascular: Peripheral pulses palpable  bilaterally  	    	  	  CARDIAC MARKERS:                                  14.6   10.6  )-----------( 174      ( 01 Jan 2019 09:52 )             42.8     01-01    142  |  104  |  17  ----------------------------<  100<H>  4.2   |  22  |  1.07    Ca    9.6      01 Jan 2019 09:52    TPro  6.9  /  Alb  3.8  /  TBili  0.7  /  DBili  x   /  AST  26  /  ALT  18  /  AlkPhos  124<H>  01-01          EKG: Afl with variable block  Radiology:  < from: Xray Chest 2 Views PA/Lat (01.01.19 @ 10:00) >  IMPRESSION:    The lungs are clear. There are no pleural effusions or pneumothorax.     The cardiomediastinal silhouette is within normal limits. The patient is   status post TAVR. Redemonstration of left-sided dual-lead cardiac pacer.     < end of copied text >    < from: Transthoracic Echocardiogram (01.01.19 @ 11:08) >  Conclusions:  1. Mitral annular calcification and calcified mitral  leaflets with normal diastolic opening.  2. Transcatheter aortic valve replacement.  Small  independently mobile echodensity noted in LVOT associated  with the ventricular aspect of the TAVR valve (image 11).  This may represent vegetation vs thrombus, clinical  correlation indicated. Peak transaortic valve gradient  equals 7 mm Hg, meantransaortic valve gradient equals 4 mm  Hg, which is probably normal in the presence of a  transcatheter aortic valve replacement. Mild paravalvular  aortic regurgitation. Consider MARIA LUISA for further evaluation.  3. Severely dilated left atrium.  LA volume index = 62  cc/m2.  4.  Endocardium not well visualized; Mild segmental left  ventricular systolic dysfunction. The inferolateral wall  appears  hypokinetic.  5. A device wire is noted in the right heart.  6. Right ventricular enlargement with borderline right  ventricular systolic function.    < end of copied text >      Assessment and Recommendation:   · Assessment		  1. Chronic systolic Heart Failure  TTE reviewed with EF 45%  no sign of acute decompensation but chronic ARMSTRONG/SOB which I am titrating for as outpt   Lasix 40mg daily alternating with 40mg BID as outpt    2. HTN  cont home meds    3. afib/afl  Rate controlled  cont a/c   pt on amio 200mg as outpt    4. cad, recent cath with patent stent  history of stent  On plavix  cont statin     5. TAVR valve echodensity - unlikley to be thrombus as on AC, ?veg, MARIA LUISA pending        Timothy Paredes DO [facility]  [phone]

## 2019-01-01 NOTE — ED PROVIDER NOTE - PROGRESS NOTE DETAILS
Patient evaluated by CT surgery team, will review echo results with cardiology, if no acute findings will recommend DC with antibiotics.  Benjamin Read M.D. Echo resulted with a small mobile mass on the artificial valve of unclear etiology per discussion with the cardiologist reading the study.  CT surgery to admit for further workup, they are requesting blood cultures at this time, understand rocephin already administered in Emergency Department.  Benjamin Read M.D.

## 2019-01-01 NOTE — ED PROVIDER NOTE - ATTENDING CONTRIBUTION TO CARE
Patient presenting with two complaints.  First complaint is shortness of breath - has been having some dyspnea after TAVR 3 weeks ago but last 2 days has significantly worsened.  Reporting compliance with medications and diet, denying chest pains, no significant weight gain or new extremity swelling.  No reported associated fevers/chills/cough/malaise.  Second complaint is burning with urination, suprapubic dyscomfort and difficulty urinating - reporting feels like he has to go but only a dribble comes out.  No history of prostate disease, does have prior history of renal stones which this does not feel like.    A 14 point review of systems is negative except as in HPI or otherwise documented.    Exam:  General: Patient well appearing, mildly tachycardic and hypoxic in triage otherwise vital signs within normal limits  HEENT: airway patent with moist mucous membranes  Cardiac: irregular rhythm, intermittently tachycardic in exam room, with strong peripheral pulses  Respiratory: lungs clear without respiratory distress  GI: abdomen soft, non tender, non distended  :  no CVA tenderness, no suprapubic fullness  Neuro: no gross neurologic deficits  Skin: warm, well perfused  Psych: normal mood and affect    Patient presenting with dyspnea after TAVR, ? slight fluid overload - no evidence clinically of infection/PNA - concerned for cardiac etiology - will obtain EKG, labs, CXR, transthoracic echo, consult CT surgery given recent procedure.  In regards to urinary complaints presentation seems most consistent with UTI, bedside bladder scan by nurse without evidence of retention, does not seem to be in significant dyscomfort to suggest renal colic - will obtain UA/UC.

## 2019-01-01 NOTE — H&P ADULT - PMH
Aortic stenosis    Asthma    Atrial fibrillation    BPH (benign prostatic hyperplasia)    CAD (coronary artery disease)  s/p stent 2010  CHF (congestive heart failure)    Chronic diastolic HF (heart failure)    CKD (chronic kidney disease) stage 3, GFR 30-59 ml/min    COPD (chronic obstructive pulmonary disease)    CVA (cerebral vascular accident)    Gait instability    H/O thalassemia    Hepatitis B    Hypertension    DIEGO (obstructive sleep apnea)    Pacemaker  2015 Medtronic [id number]  A2DR01  PTSD (post-traumatic stress disorder)    Seizure    Tachy-idris syndrome    TIA (transient ischemic attack)  2010

## 2019-01-02 DIAGNOSIS — Z95.2 PRESENCE OF PROSTHETIC HEART VALVE: ICD-10-CM

## 2019-01-02 LAB
ANION GAP SERPL CALC-SCNC: 13 MMOL/L — SIGNIFICANT CHANGE UP (ref 5–17)
BASOPHILS # BLD AUTO: 0 K/UL — SIGNIFICANT CHANGE UP (ref 0–0.2)
BASOPHILS NFR BLD AUTO: 0.3 % — SIGNIFICANT CHANGE UP (ref 0–2)
BUN SERPL-MCNC: 18 MG/DL — SIGNIFICANT CHANGE UP (ref 7–23)
CALCIUM SERPL-MCNC: 9.1 MG/DL — SIGNIFICANT CHANGE UP (ref 8.4–10.5)
CHLORIDE SERPL-SCNC: 102 MMOL/L — SIGNIFICANT CHANGE UP (ref 96–108)
CO2 SERPL-SCNC: 24 MMOL/L — SIGNIFICANT CHANGE UP (ref 22–31)
CREAT SERPL-MCNC: 1.12 MG/DL — SIGNIFICANT CHANGE UP (ref 0.5–1.3)
EOSINOPHIL # BLD AUTO: 0.1 K/UL — SIGNIFICANT CHANGE UP (ref 0–0.5)
EOSINOPHIL NFR BLD AUTO: 1.6 % — SIGNIFICANT CHANGE UP (ref 0–6)
GLUCOSE BLDC GLUCOMTR-MCNC: 251 MG/DL — HIGH (ref 70–99)
GLUCOSE BLDC GLUCOMTR-MCNC: 97 MG/DL — SIGNIFICANT CHANGE UP (ref 70–99)
GLUCOSE SERPL-MCNC: 101 MG/DL — HIGH (ref 70–99)
HCT VFR BLD CALC: 41.8 % — SIGNIFICANT CHANGE UP (ref 39–50)
HGB BLD-MCNC: 13.3 G/DL — SIGNIFICANT CHANGE UP (ref 13–17)
LYMPHOCYTES # BLD AUTO: 1.2 K/UL — SIGNIFICANT CHANGE UP (ref 1–3.3)
LYMPHOCYTES # BLD AUTO: 14 % — SIGNIFICANT CHANGE UP (ref 13–44)
MCHC RBC-ENTMCNC: 28.2 PG — SIGNIFICANT CHANGE UP (ref 27–34)
MCHC RBC-ENTMCNC: 31.9 GM/DL — LOW (ref 32–36)
MCV RBC AUTO: 88.3 FL — SIGNIFICANT CHANGE UP (ref 80–100)
MONOCYTES # BLD AUTO: 0.8 K/UL — SIGNIFICANT CHANGE UP (ref 0–0.9)
MONOCYTES NFR BLD AUTO: 10 % — SIGNIFICANT CHANGE UP (ref 2–14)
NEUTROPHILS # BLD AUTO: 6.2 K/UL — SIGNIFICANT CHANGE UP (ref 1.8–7.4)
NEUTROPHILS NFR BLD AUTO: 74.1 % — SIGNIFICANT CHANGE UP (ref 43–77)
PLATELET # BLD AUTO: 198 K/UL — SIGNIFICANT CHANGE UP (ref 150–400)
POTASSIUM SERPL-MCNC: 3.8 MMOL/L — SIGNIFICANT CHANGE UP (ref 3.5–5.3)
POTASSIUM SERPL-SCNC: 3.8 MMOL/L — SIGNIFICANT CHANGE UP (ref 3.5–5.3)
RBC # BLD: 4.73 M/UL — SIGNIFICANT CHANGE UP (ref 4.2–5.8)
RBC # FLD: 12.7 % — SIGNIFICANT CHANGE UP (ref 10.3–14.5)
SODIUM SERPL-SCNC: 139 MMOL/L — SIGNIFICANT CHANGE UP (ref 135–145)
WBC # BLD: 8.4 K/UL — SIGNIFICANT CHANGE UP (ref 3.8–10.5)
WBC # FLD AUTO: 8.4 K/UL — SIGNIFICANT CHANGE UP (ref 3.8–10.5)

## 2019-01-02 PROCEDURE — 93312 ECHO TRANSESOPHAGEAL: CPT | Mod: 26

## 2019-01-02 PROCEDURE — 93325 DOPPLER ECHO COLOR FLOW MAPG: CPT | Mod: 26

## 2019-01-02 PROCEDURE — 99231 SBSQ HOSP IP/OBS SF/LOW 25: CPT

## 2019-01-02 PROCEDURE — 93320 DOPPLER ECHO COMPLETE: CPT | Mod: 26

## 2019-01-02 RX ADMIN — Medication 1000 UNIT(S): at 19:21

## 2019-01-02 RX ADMIN — ALBUTEROL 2 PUFF(S): 90 AEROSOL, METERED ORAL at 19:22

## 2019-01-02 RX ADMIN — FAMOTIDINE 20 MILLIGRAM(S): 10 INJECTION INTRAVENOUS at 06:23

## 2019-01-02 RX ADMIN — Medication 100 MILLIGRAM(S): at 06:19

## 2019-01-02 RX ADMIN — APIXABAN 5 MILLIGRAM(S): 2.5 TABLET, FILM COATED ORAL at 06:18

## 2019-01-02 RX ADMIN — SPIRONOLACTONE 25 MILLIGRAM(S): 25 TABLET, FILM COATED ORAL at 06:23

## 2019-01-02 RX ADMIN — Medication 40 MILLIGRAM(S): at 19:21

## 2019-01-02 RX ADMIN — Medication 40 MILLIGRAM(S): at 06:19

## 2019-01-02 RX ADMIN — BUDESONIDE AND FORMOTEROL FUMARATE DIHYDRATE 2 PUFF(S): 160; 4.5 AEROSOL RESPIRATORY (INHALATION) at 06:20

## 2019-01-02 RX ADMIN — FAMOTIDINE 20 MILLIGRAM(S): 10 INJECTION INTRAVENOUS at 19:21

## 2019-01-02 RX ADMIN — BUDESONIDE AND FORMOTEROL FUMARATE DIHYDRATE 2 PUFF(S): 160; 4.5 AEROSOL RESPIRATORY (INHALATION) at 19:22

## 2019-01-02 RX ADMIN — TAMSULOSIN HYDROCHLORIDE 0.4 MILLIGRAM(S): 0.4 CAPSULE ORAL at 21:41

## 2019-01-02 RX ADMIN — ALBUTEROL 2 PUFF(S): 90 AEROSOL, METERED ORAL at 11:47

## 2019-01-02 RX ADMIN — ALBUTEROL 2 PUFF(S): 90 AEROSOL, METERED ORAL at 06:20

## 2019-01-02 RX ADMIN — FINASTERIDE 5 MILLIGRAM(S): 5 TABLET, FILM COATED ORAL at 19:21

## 2019-01-02 RX ADMIN — CEFTRIAXONE 100 GRAM(S): 500 INJECTION, POWDER, FOR SOLUTION INTRAMUSCULAR; INTRAVENOUS at 11:47

## 2019-01-02 RX ADMIN — APIXABAN 5 MILLIGRAM(S): 2.5 TABLET, FILM COATED ORAL at 19:21

## 2019-01-02 RX ADMIN — Medication 100 MILLIGRAM(S): at 19:21

## 2019-01-02 RX ADMIN — CLOPIDOGREL BISULFATE 75 MILLIGRAM(S): 75 TABLET, FILM COATED ORAL at 19:21

## 2019-01-02 RX ADMIN — Medication 50 MILLIGRAM(S): at 06:18

## 2019-01-02 RX ADMIN — ATORVASTATIN CALCIUM 40 MILLIGRAM(S): 80 TABLET, FILM COATED ORAL at 21:41

## 2019-01-02 RX ADMIN — AMIODARONE HYDROCHLORIDE 200 MILLIGRAM(S): 400 TABLET ORAL at 06:18

## 2019-01-02 NOTE — PROGRESS NOTE ADULT - ASSESSMENT
This is a an 86 y/o male PMH HTN, HLD, remote CVA, chronic lung disease, PCI with Stent, chronic afib on  eliquis chronic systolic heart failure s/p TAVR  - # 34  Medtronic Core Valve on 11/27/18.- with Dr Zuluaga- post op course uneventful   On 1/1/18  Presents to [facility] ED with c/o increasing SOB and dysuria. TTE reveal mobile echodensity  Pan cultured  ceftriaxone initiated.  Admitted to Dr Downey for further management. This is a an 88 y/o male PMH HTN, HLD, remote CVA, chronic lung disease, PCI with Stent, chronic afib on  eliquis chronic systolic heart failure s/p TAVR  - # 34  Medtronic Core Valve on 11/27/18.- with Dr Zuluaga- post op course uneventful   On 1/1/18  Presents to [facility] ED with c/o increasing SOB and dysuria. TTE reveal mobile echodensity in LVOT: may represent thrombus vs vegetation;   Pan cultured  ceftriaxone initiated.  Admitted to Dr Downey for further management.  1/2 VSS wbc 8 and pt afebrile; urine cx: + ecoli - continue rocephin and ck sensitivites; MARIA LUISA today as per Dr. Downey  Discharge planning- home This is a an 88 y/o male PMH HTN, HLD, remote CVA, chronic lung disease, PCI with Stent, chronic afib on  eliquis chronic systolic heart failure s/p TAVR  - # 34  Medtronic Core Valve on 11/27/18.- with Dr Zuluaga- post op course uneventful   On 1/1/18  Presents to [facility] ED with c/o increasing SOB and dysuria. TTE reveal mobile echodensity in LVOT: may represent thrombus vs vegetation;   Pan cultured  ceftriaxone initiated.  Admitted to Dr Downey for further management.  1/2 VSS wbc 8 and pt afebrile; urine cx: + ecoli - continue rocephin and ck sensitivites; MARIA LUISA today as per Dr. Downey;  ID consult pending   Discharge planning- home

## 2019-01-02 NOTE — PROGRESS NOTE ADULT - SUBJECTIVE AND OBJECTIVE BOX
VITAL SIGNS    Telemetry:    Vital Signs Last 24 Hrs  T(C): 36.3 (19 @ 05:35), Max: 37.1 (19 @ 16:42)  T(F): 97.4 (19 @ 05:35), Max: 98.7 (19 @ 16:42)  HR: 85 (19 @ 05:35) (85 - 105)  BP: 130/70 (19 @ 05:35) (130/70 - 152/87)  RR: 16 (19 @ 05:35) (16 - 20)  SpO2: 96% (19 @ 05:35) (96% - 98%)             @ 07:01  -   @ 07:00  --------------------------------------------------------  IN: 240 mL / OUT: 1300 mL / NET: -1060 mL     @ 07:01  -   @ 11:23  --------------------------------------------------------  IN: 0 mL / OUT: 300 mL / NET: -300 mL       Daily     Daily Weight in k.2 (2019 07:39)  Admit Wt: Drug Dosing Weight  Height (cm): 170.18 (2019 09:21)  Weight (kg): 98.4 (2019 09:21)  BMI (kg/m2): 34 (2019 09:21)  BSA (m2): 2.09 (2019 09:21)      CAPILLARY BLOOD GLUCOSE      POCT Blood Glucose.: 97 mg/dL (2019 07:12)          acetaminophen   Tablet .. 500 milliGRAM(s) Oral daily PRN  ALBUTerol    90 MICROgram(s) HFA Inhaler 2 Puff(s) Inhalation every 6 hours  amiodarone    Tablet 200 milliGRAM(s) Oral daily  apixaban 5 milliGRAM(s) Oral every 12 hours  artificial  tears Solution 1 Drop(s) Both EYES two times a day PRN  atorvastatin 40 milliGRAM(s) Oral at bedtime  buDESOnide  80 MICROgram(s)/formoterol 4.5 MICROgram(s) Inhaler 2 Puff(s) Inhalation two times a day  cefTRIAXone   IVPB 1 Gram(s) IV Intermittent every 24 hours  cholecalciferol 1000 Unit(s) Oral daily  clopidogrel Tablet 75 milliGRAM(s) Oral daily  famotidine    Tablet 20 milliGRAM(s) Oral two times a day  finasteride 5 milliGRAM(s) Oral daily  furosemide    Tablet 40 milliGRAM(s) Oral two times a day  metoprolol succinate ER 50 milliGRAM(s) Oral daily  phenazopyridine 100 milliGRAM(s) Oral every 8 hours  polyethylene glycol 3350 17 Gram(s) Oral daily  spironolactone 25 milliGRAM(s) Oral daily  tamsulosin 0.4 milliGRAM(s) Oral at bedtime      PHYSICAL EXAM    Subjective: "Hi.   Neurology: alert and oriented x 3, nonfocal, no gross deficits  CV : tele:  RSR  Sternal Wound :  CDI with dressing , Stable  Lungs: clear. RR easy, unlabored   Abdomen: soft, nontender, nondistended, positive bowel sounds, bowel movement   Neg N/V/D   :  pt voiding without difficulty   Extremities:   POTTS; edema, neg calf tenderness.   PPP bilaterally      PW:  Chest tubes: VITAL SIGNS    Telemetry:  Aflutter V. paced    Vital Signs Last 24 Hrs  T(C): 36.3 (19 @ 05:35), Max: 37.1 (19 @ 16:42)  T(F): 97.4 (19 @ 05:35), Max: 98.7 (19 @ 16:42)  HR: 85 (19 @ 05:35) (85 - 105)  BP: 130/70 (19 @ 05:35) (130/70 - 152/87)  RR: 16 (19 @ 05:35) (16 - 20)  SpO2: 96% (19 @ 05:35) (96% - 98%)             @ 07:01  -   @ 07:00  --------------------------------------------------------  IN: 240 mL / OUT: 1300 mL / NET: -1060 mL     @ 07:01  -   @ 11:23  --------------------------------------------------------  IN: 0 mL / OUT: 300 mL / NET: -300 mL       Daily     Daily Weight in k.2 (2019 07:39)  Admit Wt: Drug Dosing Weight  Height (cm): 170.18 (2019 09:21)  Weight (kg): 98.4 (2019 09:21)  BMI (kg/m2): 34 (2019 09:21)  BSA (m2): 2.09 (2019 09:21)      CAPILLARY BLOOD GLUCOSE      POCT Blood Glucose.: 97 mg/dL (2019 07:12)          acetaminophen   Tablet .. 500 milliGRAM(s) Oral daily PRN  ALBUTerol    90 MICROgram(s) HFA Inhaler 2 Puff(s) Inhalation every 6 hours  amiodarone    Tablet 200 milliGRAM(s) Oral daily  apixaban 5 milliGRAM(s) Oral every 12 hours  artificial  tears Solution 1 Drop(s) Both EYES two times a day PRN  atorvastatin 40 milliGRAM(s) Oral at bedtime  buDESOnide  80 MICROgram(s)/formoterol 4.5 MICROgram(s) Inhaler 2 Puff(s) Inhalation two times a day  cefTRIAXone   IVPB 1 Gram(s) IV Intermittent every 24 hours  cholecalciferol 1000 Unit(s) Oral daily  clopidogrel Tablet 75 milliGRAM(s) Oral daily  famotidine    Tablet 20 milliGRAM(s) Oral two times a day  finasteride 5 milliGRAM(s) Oral daily  furosemide    Tablet 40 milliGRAM(s) Oral two times a day  metoprolol succinate ER 50 milliGRAM(s) Oral daily  phenazopyridine 100 milliGRAM(s) Oral every 8 hours  polyethylene glycol 3350 17 Gram(s) Oral daily  spironolactone 25 milliGRAM(s) Oral daily  tamsulosin 0.4 milliGRAM(s) Oral at bedtime      PHYSICAL EXAM    Subjective: "When is my test?"   Neurology: alert and oriented x 3, nonfocal, no gross deficits  CV : tele:  aflutter v.paced    Lungs: clear. RR easy, unlabored   Abdomen: soft, nontender, nondistended, positive bowel sounds, + bowel movement   Neg N/V/D   :  pt voiding without difficulty   Extremities:   POTTS; +1  LE edema, neg calf tenderness.   PPP bilaterally; + chronic venous stasis discoloration

## 2019-01-02 NOTE — PROGRESS NOTE ADULT - PROBLEM SELECTOR PLAN 2
UA   f/u Urine culture  ABX per ID continue current medication regimen  diuresis  iv abx  MARIA LUISA today as per Dr. Downey   Discharge planning- home

## 2019-01-02 NOTE — PROGRESS NOTE ADULT - PROBLEM SELECTOR PLAN 1
Admit to Dr Downey  ID Consult  Vargas culture  MARIA LUISA  ABX  chest CT continue IV Rocephin   urine cx: + ecoli - await sensitivities  cbc daily continue IV Rocephin   urine cx: + ecoli - await sensitivities  cbc daily  ID consulted

## 2019-01-02 NOTE — PROGRESS NOTE ADULT - SUBJECTIVE AND OBJECTIVE BOX
Patient is a 87y old  Male who presents with a chief complaint of SOB abd dysuria (03 Jan 2019 22:40)    	  MEDICATIONS:  amiodarone    Tablet 200 milliGRAM(s) Oral daily  furosemide    Tablet 40 milliGRAM(s) Oral two times a day  metoprolol succinate ER 50 milliGRAM(s) Oral daily  spironolactone 25 milliGRAM(s) Oral daily  tamsulosin 0.4 milliGRAM(s) Oral at bedtime        PHYSICAL EXAM:  Vital Signs Last 24 Hrs  T(C): 36.3 (01-02-19 @ 05:35), Max: 37.1 (01-01-19 @ 16:42)  T(F): 97.4 (01-02-19 @ 05:35), Max: 98.7 (01-01-19 @ 16:42)  HR: 85 (01-02-19 @ 05:35) (85 - 105)  BP: 130/70 (01-02-19 @ 05:35) (130/70 - 152/87)  RR: 16 (01-02-19 @ 05:35) (16 - 20)  SpO2: 96% (01-02-19 @ 05:35) (96% - 98%)          Appearance: Normal	  HEENT:    PERRL, EOMI	  Cardiovascular:  S1 S2, No JVD  Respiratory: Lungs clear to auscultation	  Psychiatry: Alert  Gastrointestinal:  Soft, Non-tender, + BS	  Skin: No rashes, No cyanosis  Extremities:  No edema of LE                                14.1   7.3   )-----------( 204      ( 03 Jan 2019 06:47 )             40.9     01-03    139  |  100  |  21  ----------------------------<  97  3.5   |  25  |  1.20    Ca    9.3      03 Jan 2019 06:46          Labs personally reviewed      Assessment and Recommendation:   · Assessment		  1. Chronic systolic Heart Failure  TTE reviewed with EF 45%  no sign of acute decompensation but chronic ARMSTRONG/SOB which I am titrating for as outpt   Tolerating Lasix 40mg BID well    2. HTN  cont home meds    3. afib/afl  Rate controlled  cont a/c   pt on amio 200mg as outpt    4. cad, recent cath with patent stent  history of stent  On plavix  cont statin     5. TAVR valve echodensity - unlikely to be thrombus as on AC, MARIA LUISA pending        Timothy Paredes DO [facility]  [facility]  [phone]

## 2019-01-03 ENCOUNTER — APPOINTMENT (OUTPATIENT)
Dept: CV DIAGNOSITCS | Facility: HOSPITAL | Age: 84
End: 2019-01-03

## 2019-01-03 LAB
-  AMIKACIN: SIGNIFICANT CHANGE UP
-  AMOXICILLIN/CLAVULANIC ACID: SIGNIFICANT CHANGE UP
-  AMPICILLIN/SULBACTAM: SIGNIFICANT CHANGE UP
-  AMPICILLIN: SIGNIFICANT CHANGE UP
-  AZTREONAM: SIGNIFICANT CHANGE UP
-  CEFAZOLIN: SIGNIFICANT CHANGE UP
-  CEFEPIME: SIGNIFICANT CHANGE UP
-  CEFOXITIN: SIGNIFICANT CHANGE UP
-  CEFTRIAXONE: SIGNIFICANT CHANGE UP
-  CIPROFLOXACIN: SIGNIFICANT CHANGE UP
-  ERTAPENEM: SIGNIFICANT CHANGE UP
-  GENTAMICIN: SIGNIFICANT CHANGE UP
-  IMIPENEM: SIGNIFICANT CHANGE UP
-  LEVOFLOXACIN: SIGNIFICANT CHANGE UP
-  MEROPENEM: SIGNIFICANT CHANGE UP
-  NITROFURANTOIN: SIGNIFICANT CHANGE UP
-  PIPERACILLIN/TAZOBACTAM: SIGNIFICANT CHANGE UP
-  TIGECYCLINE: SIGNIFICANT CHANGE UP
-  TOBRAMYCIN: SIGNIFICANT CHANGE UP
-  TRIMETHOPRIM/SULFAMETHOXAZOLE: SIGNIFICANT CHANGE UP
ANION GAP SERPL CALC-SCNC: 14 MMOL/L — SIGNIFICANT CHANGE UP (ref 5–17)
BUN SERPL-MCNC: 21 MG/DL — SIGNIFICANT CHANGE UP (ref 7–23)
CALCIUM SERPL-MCNC: 9.3 MG/DL — SIGNIFICANT CHANGE UP (ref 8.4–10.5)
CHLORIDE SERPL-SCNC: 100 MMOL/L — SIGNIFICANT CHANGE UP (ref 96–108)
CO2 SERPL-SCNC: 25 MMOL/L — SIGNIFICANT CHANGE UP (ref 22–31)
CREAT SERPL-MCNC: 1.2 MG/DL — SIGNIFICANT CHANGE UP (ref 0.5–1.3)
CULTURE RESULTS: SIGNIFICANT CHANGE UP
GLUCOSE SERPL-MCNC: 97 MG/DL — SIGNIFICANT CHANGE UP (ref 70–99)
HCT VFR BLD CALC: 40.9 % — SIGNIFICANT CHANGE UP (ref 39–50)
HGB BLD-MCNC: 14.1 G/DL — SIGNIFICANT CHANGE UP (ref 13–17)
MCHC RBC-ENTMCNC: 30.4 PG — SIGNIFICANT CHANGE UP (ref 27–34)
MCHC RBC-ENTMCNC: 34.5 GM/DL — SIGNIFICANT CHANGE UP (ref 32–36)
MCV RBC AUTO: 88.3 FL — SIGNIFICANT CHANGE UP (ref 80–100)
METHOD TYPE: SIGNIFICANT CHANGE UP
ORGANISM # SPEC MICROSCOPIC CNT: SIGNIFICANT CHANGE UP
ORGANISM # SPEC MICROSCOPIC CNT: SIGNIFICANT CHANGE UP
PLATELET # BLD AUTO: 204 K/UL — SIGNIFICANT CHANGE UP (ref 150–400)
POTASSIUM SERPL-MCNC: 3.5 MMOL/L — SIGNIFICANT CHANGE UP (ref 3.5–5.3)
POTASSIUM SERPL-SCNC: 3.5 MMOL/L — SIGNIFICANT CHANGE UP (ref 3.5–5.3)
RBC # BLD: 4.63 M/UL — SIGNIFICANT CHANGE UP (ref 4.2–5.8)
RBC # FLD: 12.4 % — SIGNIFICANT CHANGE UP (ref 10.3–14.5)
SODIUM SERPL-SCNC: 139 MMOL/L — SIGNIFICANT CHANGE UP (ref 135–145)
SPECIMEN SOURCE: SIGNIFICANT CHANGE UP
WBC # BLD: 7.3 K/UL — SIGNIFICANT CHANGE UP (ref 3.8–10.5)
WBC # FLD AUTO: 7.3 K/UL — SIGNIFICANT CHANGE UP (ref 3.8–10.5)

## 2019-01-03 PROCEDURE — 99232 SBSQ HOSP IP/OBS MODERATE 35: CPT

## 2019-01-03 PROCEDURE — 99222 1ST HOSP IP/OBS MODERATE 55: CPT

## 2019-01-03 RX ORDER — ERTAPENEM SODIUM 1 G/1
INJECTION, POWDER, LYOPHILIZED, FOR SOLUTION INTRAMUSCULAR; INTRAVENOUS
Qty: 0 | Refills: 0 | Status: DISCONTINUED | OUTPATIENT
Start: 2019-01-03 | End: 2019-01-05

## 2019-01-03 RX ORDER — ERTAPENEM SODIUM 1 G/1
500 INJECTION, POWDER, LYOPHILIZED, FOR SOLUTION INTRAMUSCULAR; INTRAVENOUS ONCE
Qty: 0 | Refills: 0 | Status: DISCONTINUED | OUTPATIENT
Start: 2019-01-03 | End: 2019-01-03

## 2019-01-03 RX ORDER — ERTAPENEM SODIUM 1 G/1
1000 INJECTION, POWDER, LYOPHILIZED, FOR SOLUTION INTRAMUSCULAR; INTRAVENOUS ONCE
Qty: 0 | Refills: 0 | Status: COMPLETED | OUTPATIENT
Start: 2019-01-03 | End: 2019-01-03

## 2019-01-03 RX ORDER — ERTAPENEM SODIUM 1 G/1
1000 INJECTION, POWDER, LYOPHILIZED, FOR SOLUTION INTRAMUSCULAR; INTRAVENOUS EVERY 24 HOURS
Qty: 0 | Refills: 0 | Status: DISCONTINUED | OUTPATIENT
Start: 2019-01-04 | End: 2019-01-05

## 2019-01-03 RX ORDER — ERTAPENEM SODIUM 1 G/1
INJECTION, POWDER, LYOPHILIZED, FOR SOLUTION INTRAMUSCULAR; INTRAVENOUS
Qty: 0 | Refills: 0 | Status: DISCONTINUED | OUTPATIENT
Start: 2019-01-03 | End: 2019-01-03

## 2019-01-03 RX ADMIN — ATORVASTATIN CALCIUM 40 MILLIGRAM(S): 80 TABLET, FILM COATED ORAL at 21:27

## 2019-01-03 RX ADMIN — CLOPIDOGREL BISULFATE 75 MILLIGRAM(S): 75 TABLET, FILM COATED ORAL at 13:07

## 2019-01-03 RX ADMIN — Medication 1000 UNIT(S): at 13:08

## 2019-01-03 RX ADMIN — ALBUTEROL 2 PUFF(S): 90 AEROSOL, METERED ORAL at 17:31

## 2019-01-03 RX ADMIN — BUDESONIDE AND FORMOTEROL FUMARATE DIHYDRATE 2 PUFF(S): 160; 4.5 AEROSOL RESPIRATORY (INHALATION) at 17:31

## 2019-01-03 RX ADMIN — ERTAPENEM SODIUM 120 MILLIGRAM(S): 1 INJECTION, POWDER, LYOPHILIZED, FOR SOLUTION INTRAMUSCULAR; INTRAVENOUS at 12:02

## 2019-01-03 RX ADMIN — TAMSULOSIN HYDROCHLORIDE 0.4 MILLIGRAM(S): 0.4 CAPSULE ORAL at 21:27

## 2019-01-03 RX ADMIN — SPIRONOLACTONE 25 MILLIGRAM(S): 25 TABLET, FILM COATED ORAL at 06:03

## 2019-01-03 RX ADMIN — APIXABAN 5 MILLIGRAM(S): 2.5 TABLET, FILM COATED ORAL at 17:31

## 2019-01-03 RX ADMIN — FAMOTIDINE 20 MILLIGRAM(S): 10 INJECTION INTRAVENOUS at 06:03

## 2019-01-03 RX ADMIN — Medication 100 MILLIGRAM(S): at 13:07

## 2019-01-03 RX ADMIN — ALBUTEROL 2 PUFF(S): 90 AEROSOL, METERED ORAL at 23:51

## 2019-01-03 RX ADMIN — FAMOTIDINE 20 MILLIGRAM(S): 10 INJECTION INTRAVENOUS at 17:31

## 2019-01-03 RX ADMIN — ALBUTEROL 2 PUFF(S): 90 AEROSOL, METERED ORAL at 06:04

## 2019-01-03 RX ADMIN — Medication 50 MILLIGRAM(S): at 06:04

## 2019-01-03 RX ADMIN — Medication 40 MILLIGRAM(S): at 17:31

## 2019-01-03 RX ADMIN — APIXABAN 5 MILLIGRAM(S): 2.5 TABLET, FILM COATED ORAL at 06:04

## 2019-01-03 RX ADMIN — Medication 100 MILLIGRAM(S): at 06:03

## 2019-01-03 RX ADMIN — Medication 40 MILLIGRAM(S): at 06:04

## 2019-01-03 RX ADMIN — ALBUTEROL 2 PUFF(S): 90 AEROSOL, METERED ORAL at 13:09

## 2019-01-03 RX ADMIN — BUDESONIDE AND FORMOTEROL FUMARATE DIHYDRATE 2 PUFF(S): 160; 4.5 AEROSOL RESPIRATORY (INHALATION) at 06:05

## 2019-01-03 RX ADMIN — AMIODARONE HYDROCHLORIDE 200 MILLIGRAM(S): 400 TABLET ORAL at 06:03

## 2019-01-03 RX ADMIN — FINASTERIDE 5 MILLIGRAM(S): 5 TABLET, FILM COATED ORAL at 13:07

## 2019-01-03 RX ADMIN — POLYETHYLENE GLYCOL 3350 17 GRAM(S): 17 POWDER, FOR SOLUTION ORAL at 13:06

## 2019-01-03 NOTE — PROGRESS NOTE ADULT - ASSESSMENT
This is a an 88 y/o male PMH HTN, HLD, remote CVA, chronic lung disease, PCI with Stent, chronic afib on  eliquis chronic systolic heart failure s/p TAVR  - # 34  Medtronic Core Valve on 11/27/18.- with Dr Zuluaga- post op course uneventful   On 1/1/18  Presents to [facility] ED with c/o increasing SOB and dysuria. TTE reveal mobile echodensity in LVOT: may represent thrombus vs vegetation;   Pan cultured  ceftriaxone initiated.  Admitted to Dr Downey for further management.  1/2 VSS wbc 8 and pt afebrile; urine cx: + ecoli - continue rocephin and ck sensitivites; MARIA LUISA today as per Dr. Downey;  ID consult pending   Discharge planning- home   1/3 VSS.  Urine c/s ecoli not sens to rocephin--dc'd started on invanz for 3 days.  Trying to arrange homecare for 2 iv doses at home--if not, will need to stay in hospital til saturday

## 2019-01-03 NOTE — PROGRESS NOTE ADULT - SUBJECTIVE AND OBJECTIVE BOX
S:   feels well.  slight dysuria    Telemetry:  aflutter/    Vital Signs Last 24 Hrs  T(C): 36.9 (19 @ 05:14), Max: 36.9 (19 @ 05:14)  T(F): 98.4 (19 @ 05:14), Max: 98.4 (19 @ 05:14)  HR: 94 (19 05:14) (88 - 103)  BP: 134/80 (19 @ 05:14) (132/83 - 148/94)  RR: 18 (19 @ 05:14) (18 - 18)  SpO2: 96% (19 @ 05:14) (96% - 97%)                    @ 07:01  -   @ 07:00  --------------------------------------------------------  IN: 0 mL / OUT: 1450 mL / NET: -1450 mL     @ 07:01  -   @ 12:55  --------------------------------------------------------  IN: 220 mL / OUT: 300 mL / NET: -80 mL    Daily Weight in k.5 (2019 08:21)                                14.1   7.3   )-----------( 204      ( 2019 06:47 )             40.9           139  |  100  |  21  ----------------------------<  97  3.5   |  25  |  1.20    Ca    9.3      2019 06:46            PHYSICAL EXAM:    Neurology: alert and oriented x 3, nonfocal, no gross deficits    CV : S1S2 heard     Lungs: clear to ausc.  No w/r/r    Abdomen: soft, nontender, nondistended, positive bowel sounds,             Extremities:    no edema           acetaminophen   Tablet .. 500 milliGRAM(s) Oral daily PRN  ALBUTerol    90 MICROgram(s) HFA Inhaler 2 Puff(s) Inhalation every 6 hours  amiodarone    Tablet 200 milliGRAM(s) Oral daily  apixaban 5 milliGRAM(s) Oral every 12 hours  artificial  tears Solution 1 Drop(s) Both EYES two times a day PRN  atorvastatin 40 milliGRAM(s) Oral at bedtime  buDESOnide  80 MICROgram(s)/formoterol 4.5 MICROgram(s) Inhaler 2 Puff(s) Inhalation two times a day  cholecalciferol 1000 Unit(s) Oral daily  clopidogrel Tablet 75 milliGRAM(s) Oral daily  ertapenem  IVPB      famotidine    Tablet 20 milliGRAM(s) Oral two times a day  finasteride 5 milliGRAM(s) Oral daily  furosemide    Tablet 40 milliGRAM(s) Oral two times a day  metoprolol succinate ER 50 milliGRAM(s) Oral daily  phenazopyridine 100 milliGRAM(s) Oral every 8 hours  polyethylene glycol 3350 17 Gram(s) Oral daily  spironolactone 25 milliGRAM(s) Oral daily  tamsulosin 0.4 milliGRAM(s) Oral at bedtime                              Physical Therapy Rec:   Home  [ x ]   Home w/ PT  [  ]  Rehab  [  ]    Discussed with Cardiothoracic Team at AM rounds.

## 2019-01-03 NOTE — CONSULT NOTE ADULT - ASSESSMENT
87 yr old yr  TAVR readmitted  with dysuria and SOB  No fever , no chills no malaise   bc negative but uc with ecoli   sensitivities pending but clinically better  slime suggest thrombus vegetation .  No signs of endocarditis  Clinically, I do not think he has endocarditis   would like to treat the UTI and then stop and follow for any sings of recurrent inflection  Discussed with [facility] in detail.

## 2019-01-03 NOTE — PROGRESS NOTE ADULT - SUBJECTIVE AND OBJECTIVE BOX
Patient is a 87y old  Male who presents with a chief complaint of SOB abd dysuria (03 Jan 2019 12:55)      INTERVAL HISTORY: feels ok  	  MEDICATIONS:  amiodarone    Tablet 200 milliGRAM(s) Oral daily  furosemide    Tablet 40 milliGRAM(s) Oral two times a day  metoprolol succinate ER 50 milliGRAM(s) Oral daily  spironolactone 25 milliGRAM(s) Oral daily  tamsulosin 0.4 milliGRAM(s) Oral at bedtime        PHYSICAL EXAM:  T(C): 36.5 (01-03-19 @ 20:23), Max: 36.9 (01-03-19 @ 05:14)  HR: 102 (01-03-19 @ 20:23) (88 - 102)  BP: 126/72 (01-03-19 @ 20:23) (98/54 - 134/80)  RR: 18 (01-03-19 @ 20:23) (18 - 18)  SpO2: 96% (01-03-19 @ 20:23) (94% - 96%)  Wt(kg): --  I&O's Summary    02 Jan 2019 07:01  -  03 Jan 2019 07:00  --------------------------------------------------------  IN: 0 mL / OUT: 1450 mL / NET: -1450 mL    03 Jan 2019 07:01  -  03 Jan 2019 22:40  --------------------------------------------------------  IN: 820 mL / OUT: 1050 mL / NET: -230 mL          Appearance: Normal	  HEENT:    PERRL, EOMI	  Cardiovascular:  S1 S2, No JVD  Respiratory: Lungs clear to auscultation	  Psychiatry: Alert  Gastrointestinal:  Soft, Non-tender, + BS	  Skin: No rashes, No cyanosis  Extremities:  No edema of LE                                14.1   7.3   )-----------( 204      ( 03 Jan 2019 06:47 )             40.9     01-03    139  |  100  |  21  ----------------------------<  97  3.5   |  25  |  1.20    Ca    9.3      03 Jan 2019 06:46          Labs personally reviewed      < from: Transthoracic Echocardiogram (01.01.19 @ 11:08) >  Conclusions:  1. Mitral annular calcification and calcified mitral  leaflets with normal diastolic opening.  2. Transcatheter aortic valve replacement.  Small  independently mobile echodensity noted in LVOT associated  with the ventricular aspect of the TAVR valve (image 11).  This may represent vegetation vs thrombus, clinical  correlation indicated. Peak transaortic valve gradient  equals 7 mm Hg, meantransaortic valve gradient equals 4 mm  Hg, which is probably normal in the presence of a  transcatheter aortic valve replacement. Mild paravalvular  aortic regurgitation. Consider AMRIA LUISA for further evaluation.  3. Severely dilated left atrium.  LA volume index = 62  cc/m2.  4.  Endocardium not well visualized; Mild segmental left  ventricular systolic dysfunction. The inferolateral wall  appears  hypokinetic.  5. A device wire is noted in the right heart.  6. Right ventricular enlargement with borderline right  ventricular systolic function.    < end of copied text >      Assessment and Recommendation:   · Assessment		  1. Chronic systolic Heart Failure  TTE reviewed with EF 45%  no sign of acute decompensation but chronic ARMSTRONG/SOB which I am titrating for as outpt   Tolerating Lasix 40mg BID well    2. HTN  cont home meds    3. afib/afl  Rate controlled  cont a/c   pt on amio 200mg as outpt    4. cad, recent cath with patent stent  history of stent  On plavix  cont statin     5. TAVR valve echodensity - unlikely to be thrombus as on AC, MARIA LUISA noted, unlikely endocarditis per CTS/ID          Timothy Paredes DO [facility]  Cardiovascular Medicine  [phone]

## 2019-01-03 NOTE — PROGRESS NOTE ADULT - PROBLEM SELECTOR PLAN 2
continue current medication regimen  diuresis  iv abx changed to invanz x 3days--attempting home iv--if not DC Saturday  MARIA LUISA today as per Dr. Downey   Discharge planning- home

## 2019-01-03 NOTE — CONSULT NOTE ADULT - SUBJECTIVE AND OBJECTIVE BOX
Patient is a 87y old  Male who presents with a chief complaint of SOB abd dysuria (02 Jan 2019 11:22)    HPI:  This is a an 88 y/o male PMH HTN, HLD, remote CVA, chronic lung disease, PCI with Stent, chronic afib on  eliquis chronic systolic heart failure s/p TAVR  - # 34  Medtronic Core Valve on 11/27/18.-  with Dr Zuluaga- post op course uneventful   On 1/1/18  Presents to [facility] ED with c/o increasing SOB and dysuria. TTE reveal mobile echodensity  Pan cultured  ceftriaxone initiated.  Admitted to Dr Downey for further management. (01 Jan 2019 16:47)      PAST MEDICAL & SURGICAL HISTORY:  CKD (chronic kidney disease) stage 3, GFR 30-59 ml/min  Hepatitis B  PTSD (post-traumatic stress disorder)  Tachy-idris syndrome  TIA (transient ischemic attack): 2010  Seizure  DIEGO (obstructive sleep apnea)  Chronic diastolic HF (heart failure)  Aortic stenosis  CVA (cerebral vascular accident)  Gait instability  COPD (chronic obstructive pulmonary disease)  BPH (benign prostatic hyperplasia)  Asthma  CHF (congestive heart failure)  Atrial fibrillation  Pacemaker: 2015 Medtronic [id number]  [id number]  Hypertension  H/O thalassemia  CAD (coronary artery disease): s/p stent 2010  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 Fevers absent    Skin:No rash  	  Ophthalmologic:Denies any visual complaints,discharge redness or photophobia  	  ENMT:No nasal discharge,headache,sinus congestion or throat pain.No dental complaints    Respiratory and Thorax:No cough,sputum or chest pain.Denies shortness of breath  	  Cardiovascular:	No chest pain,palpitaions or dizziness    Gastrointestinal:	NO nausea,abdominal pain or diarrhea.    Genitourinary:	No dysuria,frequency. No flank pain    Musculoskeletal:	No joint swelling or pain.No weakness    Neurological:No confusion,diziness.No extremity weakness.No bladder or bowel incontinence	    Psychiatric:No delusions or hallucinations	    Hematology/Lymphatics:	No LN swelling.No gum bleeding     Endocrine:	No recent weight gain or loss.No abnormal heat/cold intolerance    Allergic/Immunologic:	No hives or rash   Allergies    Keppra (Rash)  penicillin (Unknown)    Intolerances        Antimicrobials:    cefTRIAXone   IVPB 1 Gram(s) IV Intermittent every 24 hours        Vital Signs Last 24 Hrs  T(C): 36.9 (03 Jan 2019 05:14), Max: 36.9 (03 Jan 2019 05:14)  T(F): 98.4 (03 Jan 2019 05:14), Max: 98.4 (03 Jan 2019 05:14)  HR: 94 (03 Jan 2019 05:14) (88 - 103)  BP: 134/80 (03 Jan 2019 05:14) (132/83 - 148/94)  BP(mean): --  RR: 18 (03 Jan 2019 05:14) (18 - 18)  SpO2: 96% (03 Jan 2019 05:14) (96% - 97%)    PHYSICAL EXAM:Pleasant patient in no acute distress.      Constitutional:Comfortable.Awake and alert  No cachexia     Eyes:PERRL EOMI.NO discharge or conjunctival injection    ENMT:No sinus tenderness.No thrush.No pharyngeal exudate or erythema.Fair dental hygiene    Neck:Supple,No LN,no JVD      Respiratory:Good air entry bilaterally,CTA    Cardiovascular:S1 S2 wnl, No murmurs,rub or gallops    Gastrointestinal:Soft BS(+) no tenderness no masses ,No rebound or guarding    Genitourinary:No CVA tendereness     Rectal:    Extremities:No cyanosis,clubbing or edema.    Vascular:peripheral pulses felt    Neurological:AAO X 3,No grossly focal deficits    Skin:No rash     Lymph Nodes:No palpable LNs    Musculoskeletal:No joint swelling or LOM    Psychiatric:Affect normal.                                13.3   8.4   )-----------( 198      ( 02 Jan 2019 07:12 )             41.8         01-02    139  |  102  |  18  ----------------------------<  101<H>  3.8   |  24  |  1.12    Ca    9.1      02 Jan 2019 07:11    TPro  6.9  /  Alb  3.8  /  TBili  0.7  /  DBili  x   /  AST  26  /  ALT  18  /  AlkPhos  124<H>  01-01      RECENT CULTURES:  01-01 @ 21:29  .Blood Blood  --  --  --    No growth to date.  --  01-01 @ 21:20  .Blood Blood  --  --  --    No growth to date.  --  01-01 @ 12:42  .Urine Clean Catch (Midstream)  --  --  --    >100,000 CFU/ml Escherichia coli  --      MICROBIOLOGY:  Culture Results:   No growth to date. (01-01 @ 21:29)  Culture Results:   No growth to date. (01-01 @ 21:20)  Culture Results:   No growth to date. (01-01 @ 21:20)  Culture Results:   >100,000 CFU/ml Escherichia coli (01-01 @ 12:42)          Radiology:      Assessment:        Recommendations and Plan:    Pager [phone]  After 5 pm/weekends or if no response :[phone]

## 2019-01-04 DIAGNOSIS — R07.9 CHEST PAIN, UNSPECIFIED: ICD-10-CM

## 2019-01-04 LAB
ANION GAP SERPL CALC-SCNC: 15 MMOL/L — SIGNIFICANT CHANGE UP (ref 5–17)
BUN SERPL-MCNC: 24 MG/DL — HIGH (ref 7–23)
CALCIUM SERPL-MCNC: 9.1 MG/DL — SIGNIFICANT CHANGE UP (ref 8.4–10.5)
CHLORIDE SERPL-SCNC: 100 MMOL/L — SIGNIFICANT CHANGE UP (ref 96–108)
CK MB BLD-MCNC: 4 % — HIGH (ref 0–3.5)
CK MB CFR SERPL CALC: 2.7 NG/ML — SIGNIFICANT CHANGE UP (ref 0–6.7)
CK SERPL-CCNC: 67 U/L — SIGNIFICANT CHANGE UP (ref 30–200)
CO2 SERPL-SCNC: 25 MMOL/L — SIGNIFICANT CHANGE UP (ref 22–31)
CREAT SERPL-MCNC: 1.32 MG/DL — HIGH (ref 0.5–1.3)
GLUCOSE SERPL-MCNC: 102 MG/DL — HIGH (ref 70–99)
HCT VFR BLD CALC: 41.8 % — SIGNIFICANT CHANGE UP (ref 39–50)
HGB BLD-MCNC: 14.2 G/DL — SIGNIFICANT CHANGE UP (ref 13–17)
MCHC RBC-ENTMCNC: 30.2 PG — SIGNIFICANT CHANGE UP (ref 27–34)
MCHC RBC-ENTMCNC: 34 GM/DL — SIGNIFICANT CHANGE UP (ref 32–36)
MCV RBC AUTO: 89 FL — SIGNIFICANT CHANGE UP (ref 80–100)
PLATELET # BLD AUTO: 204 K/UL — SIGNIFICANT CHANGE UP (ref 150–400)
POTASSIUM SERPL-MCNC: 3.5 MMOL/L — SIGNIFICANT CHANGE UP (ref 3.5–5.3)
POTASSIUM SERPL-SCNC: 3.5 MMOL/L — SIGNIFICANT CHANGE UP (ref 3.5–5.3)
RBC # BLD: 4.7 M/UL — SIGNIFICANT CHANGE UP (ref 4.2–5.8)
RBC # FLD: 12.7 % — SIGNIFICANT CHANGE UP (ref 10.3–14.5)
SODIUM SERPL-SCNC: 140 MMOL/L — SIGNIFICANT CHANGE UP (ref 135–145)
TROPONIN T, HIGH SENSITIVITY RESULT: 83 NG/L — HIGH (ref 0–51)
WBC # BLD: 6.9 K/UL — SIGNIFICANT CHANGE UP (ref 3.8–10.5)
WBC # FLD AUTO: 6.9 K/UL — SIGNIFICANT CHANGE UP (ref 3.8–10.5)

## 2019-01-04 PROCEDURE — 93010 ELECTROCARDIOGRAM REPORT: CPT

## 2019-01-04 PROCEDURE — 71045 X-RAY EXAM CHEST 1 VIEW: CPT | Mod: 26

## 2019-01-04 PROCEDURE — 99232 SBSQ HOSP IP/OBS MODERATE 35: CPT

## 2019-01-04 RX ORDER — HEPARIN SODIUM 5000 [USP'U]/ML
1800 INJECTION INTRAVENOUS; SUBCUTANEOUS
Qty: 25000 | Refills: 0 | Status: DISCONTINUED | OUTPATIENT
Start: 2019-01-04 | End: 2019-01-05

## 2019-01-04 RX ADMIN — ALBUTEROL 2 PUFF(S): 90 AEROSOL, METERED ORAL at 18:07

## 2019-01-04 RX ADMIN — Medication 40 MILLIGRAM(S): at 06:54

## 2019-01-04 RX ADMIN — FAMOTIDINE 20 MILLIGRAM(S): 10 INJECTION INTRAVENOUS at 18:07

## 2019-01-04 RX ADMIN — BUDESONIDE AND FORMOTEROL FUMARATE DIHYDRATE 2 PUFF(S): 160; 4.5 AEROSOL RESPIRATORY (INHALATION) at 06:09

## 2019-01-04 RX ADMIN — CLOPIDOGREL BISULFATE 75 MILLIGRAM(S): 75 TABLET, FILM COATED ORAL at 12:29

## 2019-01-04 RX ADMIN — Medication 50 MILLIGRAM(S): at 06:54

## 2019-01-04 RX ADMIN — HEPARIN SODIUM 18 UNIT(S)/HR: 5000 INJECTION INTRAVENOUS; SUBCUTANEOUS at 22:40

## 2019-01-04 RX ADMIN — Medication 1000 UNIT(S): at 12:29

## 2019-01-04 RX ADMIN — ERTAPENEM SODIUM 120 MILLIGRAM(S): 1 INJECTION, POWDER, LYOPHILIZED, FOR SOLUTION INTRAMUSCULAR; INTRAVENOUS at 12:19

## 2019-01-04 RX ADMIN — FAMOTIDINE 20 MILLIGRAM(S): 10 INJECTION INTRAVENOUS at 06:54

## 2019-01-04 RX ADMIN — SPIRONOLACTONE 25 MILLIGRAM(S): 25 TABLET, FILM COATED ORAL at 06:54

## 2019-01-04 RX ADMIN — ALBUTEROL 2 PUFF(S): 90 AEROSOL, METERED ORAL at 06:09

## 2019-01-04 RX ADMIN — APIXABAN 5 MILLIGRAM(S): 2.5 TABLET, FILM COATED ORAL at 18:07

## 2019-01-04 RX ADMIN — APIXABAN 5 MILLIGRAM(S): 2.5 TABLET, FILM COATED ORAL at 06:54

## 2019-01-04 RX ADMIN — FINASTERIDE 5 MILLIGRAM(S): 5 TABLET, FILM COATED ORAL at 12:29

## 2019-01-04 RX ADMIN — TAMSULOSIN HYDROCHLORIDE 0.4 MILLIGRAM(S): 0.4 CAPSULE ORAL at 21:33

## 2019-01-04 RX ADMIN — Medication 500 MILLIGRAM(S): at 08:45

## 2019-01-04 RX ADMIN — AMIODARONE HYDROCHLORIDE 200 MILLIGRAM(S): 400 TABLET ORAL at 06:54

## 2019-01-04 RX ADMIN — ALBUTEROL 2 PUFF(S): 90 AEROSOL, METERED ORAL at 12:20

## 2019-01-04 RX ADMIN — BUDESONIDE AND FORMOTEROL FUMARATE DIHYDRATE 2 PUFF(S): 160; 4.5 AEROSOL RESPIRATORY (INHALATION) at 18:08

## 2019-01-04 RX ADMIN — ATORVASTATIN CALCIUM 40 MILLIGRAM(S): 80 TABLET, FILM COATED ORAL at 21:33

## 2019-01-04 RX ADMIN — Medication 500 MILLIGRAM(S): at 07:50

## 2019-01-04 NOTE — PROGRESS NOTE ADULT - SUBJECTIVE AND OBJECTIVE BOX
Patient is a 87y old  Male who presents with a chief complaint of SOB and dysuria (04 Jan 2019 12:24)      INTERVAL HISTORY: atypical mild chest pain overnight    	  MEDICATIONS:  amiodarone    Tablet 200 milliGRAM(s) Oral daily  furosemide    Tablet 40 milliGRAM(s) Oral two times a day  metoprolol succinate ER 50 milliGRAM(s) Oral daily  spironolactone 25 milliGRAM(s) Oral daily  tamsulosin 0.4 milliGRAM(s) Oral at bedtime        PHYSICAL EXAM:  T(C): 36.5 (01-04-19 @ 12:32), Max: 36.5 (01-03-19 @ 14:14)  HR: 102 (01-04-19 @ 12:32) (88 - 102)  BP: 128/77 (01-04-19 @ 12:32) (98/54 - 134/92)  RR: 18 (01-04-19 @ 12:32) (18 - 18)  SpO2: 98% (01-04-19 @ 12:32) (94% - 98%)  Wt(kg): --  I&O's Summary    03 Jan 2019 07:01  -  04 Jan 2019 07:00  --------------------------------------------------------  IN: 820 mL / OUT: 1500 mL / NET: -680 mL    04 Jan 2019 07:01  -  04 Jan 2019 13:14  --------------------------------------------------------  IN: 120 mL / OUT: 600 mL / NET: -480 mL          Appearance: Normal	  HEENT:    PERRL, EOMI	  Cardiovascular:  S1 S2, No JVD  Respiratory: Lungs clear to auscultation	  Psychiatry: Alert  Gastrointestinal:  Soft, Non-tender, + BS	  Skin: No rashes, No cyanosis  Extremities:  No edema of LE                                14.2   6.9   )-----------( 204      ( 04 Jan 2019 05:25 )             41.8     01-04    140  |  100  |  24<H>  ----------------------------<  102<H>  3.5   |  25  |  1.32<H>    Ca    9.1      04 Jan 2019 05:25          Labs personally reviewed       from: Transthoracic Echocardiogram (01.01.19 @ 11:08) >  Conclusions:  1. Mitral annular calcification and calcified mitral  leaflets with normal diastolic opening.  2. Transcatheter aortic valve replacement.  Small  independently mobile echodensity noted in LVOT associated  with the ventricular aspect of the TAVR valve (image 11).  This may represent vegetation vs thrombus, clinical  correlation indicated. Peak transaortic valve gradient  equals 7 mm Hg, meantransaortic valve gradient equals 4 mm  Hg, which is probably normal in the presence of a  transcatheter aortic valve replacement. Mild paravalvular  aortic regurgitation. Consider MARIA LUISA for further evaluation.  3. Severely dilated left atrium.  LA volume index = 62  cc/m2.  4.  Endocardium not well visualized; Mild segmental left  ventricular systolic dysfunction. The inferolateral wall  appears  hypokinetic.  5. A device wire is noted in the right heart.  6. Right ventricular enlargement with borderline right  ventricular systolic function.    < end of copied text >      Assessment and Recommendation:   · Assessment		  1. Chronic systolic Heart Failure  TTE reviewed with EF 45%  no sign of acute decompensation but chronic ARMSTRONG/SOB which I am titrating for as outpt   Tolerating Lasix 40mg BID well    2. HTN  cont home meds    3. afib/afl  Rate controlled  cont a/c   pt on amio 200mg as outpt    4. cad, recent cath with patent stent  atypical chest pain - CTS arranged for cath today   history of stent  On plavix  cont statin     5. TAVR valve echodensity - unlikely to be thrombus as on AC, MARIA LUISA noted, unlikely endocarditis per CTS/ID          Timothy Paredes DO [facility]  Cardiovascular Medicine  [phone]

## 2019-01-04 NOTE — PROVIDER CONTACT NOTE (CRITICAL VALUE NOTIFICATION) - BACKGROUND
S/P TAVR Nov. 2018 Admitted 1/1 UTI, SOB, + E coli on IV Antibiotics. Echo shows mass on TAVR. 1/2 MARIA LUISA shows small vegetation thrombus VS native valvular apparatus 1/4 C/O chest discomfort EKG & enzymes done

## 2019-01-04 NOTE — PROGRESS NOTE ADULT - SUBJECTIVE AND OBJECTIVE BOX
***Structural Heart Team***      Notified this morning that Mr Thompson had chest pain and elevated Troponin (83).   On exam he was not in distress but he was somewhat uncomfortable.  He reported continuing mild chest pain, but denied sob.  Pain was in left chest, nonradiating.            REVIEW OF SYSTEMS:    CONSTITUTIONAL: No weakness, fevers or chills  EYES/ENT: No visual changes;  No vertigo or throat pain   NECK: No pain or stiffness  RESPIRATORY: No cough, wheezing, hemoptysis; No shortness of breath  CARDIOVASCULAR:  +chest pain  GASTROINTESTINAL: No abdominal or epigastric pain. No nausea, vomiting, or hematemesis; No diarrhea or constipation. No melena or hematochezia.  GENITOURINARY: No dysuria, frequency or hematuria  NEUROLOGICAL: No numbness or weakness  SKIN: No itching, rashes      Allergies    Keppra (Rash)  penicillin (Unknown)    Intolerances      Vital Signs Last 24 Hrs  T(C): 36.1 (04 Jan 2019 04:50), Max: 36.5 (03 Jan 2019 14:14)  T(F): 97 (04 Jan 2019 04:50), Max: 97.7 (03 Jan 2019 14:14)  HR: 98 (04 Jan 2019 06:58) (88 - 102)  BP: 134/92 (04 Jan 2019 06:58) (98/54 - 134/92)  BP(mean): --  RR: 18 (04 Jan 2019 06:58) (18 - 18)  SpO2: 97% (04 Jan 2019 06:58) (94% - 97%)    MEDICATIONS  (STANDING):  ALBUTerol    90 MICROgram(s) HFA Inhaler 2 Puff(s) Inhalation every 6 hours  amiodarone    Tablet 200 milliGRAM(s) Oral daily  apixaban 5 milliGRAM(s) Oral every 12 hours  atorvastatin 40 milliGRAM(s) Oral at bedtime  buDESOnide  80 MICROgram(s)/formoterol 4.5 MICROgram(s) Inhaler 2 Puff(s) Inhalation two times a day  cholecalciferol 1000 Unit(s) Oral daily  clopidogrel Tablet 75 milliGRAM(s) Oral daily  ertapenem  IVPB      ertapenem  IVPB 1000 milliGRAM(s) IV Intermittent every 24 hours  famotidine    Tablet 20 milliGRAM(s) Oral two times a day  finasteride 5 milliGRAM(s) Oral daily  furosemide    Tablet 40 milliGRAM(s) Oral two times a day  metoprolol succinate ER 50 milliGRAM(s) Oral daily  polyethylene glycol 3350 17 Gram(s) Oral daily  spironolactone 25 milliGRAM(s) Oral daily  tamsulosin 0.4 milliGRAM(s) Oral at bedtime      Exam-  General: sitting up in bed, in some apparent discomfort  Cor: s1s2, IRR, no murmurs, rubs, or gallops  Pulm: clear b/l  Gastointestinal: soft, nontender, nondistended, +bowel sounds  Extremities: no edema, good pulses  Neuro: A&Ox3, nonfocal                          14.2   6.9   )-----------( 204      ( 04 Jan 2019 05:25 )             41.8   01-04    140  |  100  |  24<H>  ----------------------------<  102<H>  3.5   |  25  |  1.32<H>    Ca    9.1      04 Jan 2019 05:25      I&O's Summary    03 Jan 2019 07:01  -  04 Jan 2019 07:00  --------------------------------------------------------  IN: 820 mL / OUT: 1500 mL / NET: -680 mL    04 Jan 2019 07:01  -  04 Jan 2019 12:30  --------------------------------------------------------  IN: 120 mL / OUT: 600 mL / NET: -480 mL              Assessment/Plan:  86 y/o male s/p TAVR last month admitted with UTI being treated by IV abx.  CP and +Troponin this am.  - pt had a cardiac cath in November that showed minor luminal irregularities (LM, LAD, LCX, RCA) and moderate atherosclerosis in a Ramus  - Dr Nato Medina will cath Mr. Thompson today (he performed the previous cath)  - I discussed this plan with the pt and his son in law Matute.  I answered all their questions and they understood the plan and agreed.  - cont IV abx for UTI    - O2 NC as needed  - plan discussed with Dr. Merritt and with CT Surgery team

## 2019-01-04 NOTE — PROGRESS NOTE ADULT - ASSESSMENT
This is a an 88 y/o male PMH HTN, HLD, remote CVA, chronic lung disease, PCI with Stent, chronic afib on  eliquis chronic systolic heart failure s/p TAVR  - # 34  Medtronic Core Valve on 11/27/18.- with Dr Zuluaga- post op course uneventful   On 1/1/18  Presents to [facility] ED with c/o increasing SOB and dysuria. TTE reveal mobile echodensity in LVOT: may represent thrombus vs vegetation;   Pan cultured  ceftriaxone initiated.  Admitted to Dr Downey for further management.  1/2 VSS wbc 8 and pt afebrile; urine cx: + ecoli - continue rocephin and ck sensitivites; MARIA LUISA today as per Dr. Downey;  ID consult pending   Discharge planning- home   1/3 VSS.  Urine c/s ecoli not sens to rocephin--dc'd started on invanz for 3 days.  Trying to arrange homecare for 2 iv doses at home--if not, will need to stay in hospital til saturday 1/4 HD stable.  Chest pain earlier.  Trop 83.  DW Dr Merritt --he will have cardiac cath this afternoon.  Continue Invanz for UTI last dose Saturday morning

## 2019-01-04 NOTE — PROGRESS NOTE ADULT - SUBJECTIVE AND OBJECTIVE BOX
S: co chest pain this am and dyspnea.  Troponin 83.      Telemetry:  aflutter/ 70 110    Vital Signs Last 24 Hrs  T(C): 36.5 (19 @ 12:32), Max: 36.5 (19 @ 14:14)  T(F): 97.7 (19 @ 12:32), Max: 97.7 (19 @ 14:14)  HR: 102 (19 12:32) (88 - 102)  BP: 128/77 (19 @ 12:32) (98/54 - 134/92)  RR: 18 (19 @ 12:32) (18 - 18)  SpO2: 98% (19 @ 12:32) (94% - 98%)                    @ 07:01  -   @ 07:00  --------------------------------------------------------  IN: 820 mL / OUT: 1500 mL / NET: -680 mL     @ 07:01  -   @ 13:30  --------------------------------------------------------  IN: 120 mL / OUT: 600 mL / NET: -480 mL        Daily Weight in k.1 (2019 08:17              Drains:     MS         [  ] Drainage:                 L Pleural  [  ]  Drainage:                R Pleural  [  ]  Drainage:    Pacing Wires        [  ]   Settings:                                  Isolated  [  ]    Coumadin    [ ] YES          [ x ]      NO         Reason: on eliquis                                14.2   6.9   )-----------( 204      ( 2019 05:25 )             41.8           140  |  100  |  24<H>  ----------------------------<  102<H>  3.5   |  25  |  1.32<H>    Ca    9.1      2019 05:25            PHYSICAL EXAM:    Neurology: alert and oriented x 3, nonfocal, no gross deficits    CV : S1S2 heard     Lungs: clear to ausc    Abdomen: soft, nontender, nondistended, positive bowel sounds, last bowel movement              Extremities:  no edema             acetaminophen   Tablet .. 500 milliGRAM(s) Oral daily PRN  ALBUTerol    90 MICROgram(s) HFA Inhaler 2 Puff(s) Inhalation every 6 hours  amiodarone    Tablet 200 milliGRAM(s) Oral daily  apixaban 5 milliGRAM(s) Oral every 12 hours  artificial  tears Solution 1 Drop(s) Both EYES two times a day PRN  atorvastatin 40 milliGRAM(s) Oral at bedtime  buDESOnide  80 MICROgram(s)/formoterol 4.5 MICROgram(s) Inhaler 2 Puff(s) Inhalation two times a day  cholecalciferol 1000 Unit(s) Oral daily  clopidogrel Tablet 75 milliGRAM(s) Oral daily  ertapenem  IVPB      ertapenem  IVPB 1000 milliGRAM(s) IV Intermittent every 24 hours  famotidine    Tablet 20 milliGRAM(s) Oral two times a day  finasteride 5 milliGRAM(s) Oral daily  furosemide    Tablet 40 milliGRAM(s) Oral two times a day  metoprolol succinate ER 50 milliGRAM(s) Oral daily  polyethylene glycol 3350 17 Gram(s) Oral daily  spironolactone 25 milliGRAM(s) Oral daily  tamsulosin 0.4 milliGRAM(s) Oral at bedtime                              Physical Therapy Rec:   Home  [ x ]   Home w/ PT  [  ]  Rehab  [  ]    Discussed with Cardiothoracic Team at AM rounds.

## 2019-01-04 NOTE — PROGRESS NOTE ADULT - PROBLEM SELECTOR PLAN 2
continue current medication regimen  diuresis  iv abx changed to invanz x 3days--attempting home iv--if not DC Saturday  Discharge planning- home if Cath nl and antibiotics complete

## 2019-01-05 LAB
ANION GAP SERPL CALC-SCNC: 14 MMOL/L — SIGNIFICANT CHANGE UP (ref 5–17)
APTT BLD: 107.6 SEC — HIGH (ref 27.5–36.3)
APTT BLD: 142.7 SEC — CRITICAL HIGH (ref 27.5–36.3)
APTT BLD: 88 SEC — HIGH (ref 27.5–36.3)
BUN SERPL-MCNC: 28 MG/DL — HIGH (ref 7–23)
CALCIUM SERPL-MCNC: 9.5 MG/DL — SIGNIFICANT CHANGE UP (ref 8.4–10.5)
CHLORIDE SERPL-SCNC: 102 MMOL/L — SIGNIFICANT CHANGE UP (ref 96–108)
CO2 SERPL-SCNC: 25 MMOL/L — SIGNIFICANT CHANGE UP (ref 22–31)
CREAT SERPL-MCNC: 1.25 MG/DL — SIGNIFICANT CHANGE UP (ref 0.5–1.3)
GLUCOSE SERPL-MCNC: 103 MG/DL — HIGH (ref 70–99)
HCT VFR BLD CALC: 42.3 % — SIGNIFICANT CHANGE UP (ref 39–50)
HGB BLD-MCNC: 14.5 G/DL — SIGNIFICANT CHANGE UP (ref 13–17)
MCHC RBC-ENTMCNC: 30.5 PG — SIGNIFICANT CHANGE UP (ref 27–34)
MCHC RBC-ENTMCNC: 34.4 GM/DL — SIGNIFICANT CHANGE UP (ref 32–36)
MCV RBC AUTO: 88.6 FL — SIGNIFICANT CHANGE UP (ref 80–100)
PLATELET # BLD AUTO: 196 K/UL — SIGNIFICANT CHANGE UP (ref 150–400)
POTASSIUM SERPL-MCNC: 3.7 MMOL/L — SIGNIFICANT CHANGE UP (ref 3.5–5.3)
POTASSIUM SERPL-SCNC: 3.7 MMOL/L — SIGNIFICANT CHANGE UP (ref 3.5–5.3)
RBC # BLD: 4.77 M/UL — SIGNIFICANT CHANGE UP (ref 4.2–5.8)
RBC # FLD: 12.1 % — SIGNIFICANT CHANGE UP (ref 10.3–14.5)
SODIUM SERPL-SCNC: 141 MMOL/L — SIGNIFICANT CHANGE UP (ref 135–145)
WBC # BLD: 7 K/UL — SIGNIFICANT CHANGE UP (ref 3.8–10.5)
WBC # FLD AUTO: 7 K/UL — SIGNIFICANT CHANGE UP (ref 3.8–10.5)

## 2019-01-05 PROCEDURE — 93010 ELECTROCARDIOGRAM REPORT: CPT

## 2019-01-05 PROCEDURE — 99232 SBSQ HOSP IP/OBS MODERATE 35: CPT

## 2019-01-05 RX ORDER — HEPARIN SODIUM 5000 [USP'U]/ML
1600 INJECTION INTRAVENOUS; SUBCUTANEOUS
Qty: 25000 | Refills: 0 | Status: DISCONTINUED | OUTPATIENT
Start: 2019-01-05 | End: 2019-01-06

## 2019-01-05 RX ADMIN — FAMOTIDINE 20 MILLIGRAM(S): 10 INJECTION INTRAVENOUS at 06:13

## 2019-01-05 RX ADMIN — BUDESONIDE AND FORMOTEROL FUMARATE DIHYDRATE 2 PUFF(S): 160; 4.5 AEROSOL RESPIRATORY (INHALATION) at 19:28

## 2019-01-05 RX ADMIN — BUDESONIDE AND FORMOTEROL FUMARATE DIHYDRATE 2 PUFF(S): 160; 4.5 AEROSOL RESPIRATORY (INHALATION) at 06:13

## 2019-01-05 RX ADMIN — BUDESONIDE AND FORMOTEROL FUMARATE DIHYDRATE 2 PUFF(S): 160; 4.5 AEROSOL RESPIRATORY (INHALATION) at 13:58

## 2019-01-05 RX ADMIN — ATORVASTATIN CALCIUM 40 MILLIGRAM(S): 80 TABLET, FILM COATED ORAL at 21:39

## 2019-01-05 RX ADMIN — AMIODARONE HYDROCHLORIDE 200 MILLIGRAM(S): 400 TABLET ORAL at 06:13

## 2019-01-05 RX ADMIN — TAMSULOSIN HYDROCHLORIDE 0.4 MILLIGRAM(S): 0.4 CAPSULE ORAL at 21:39

## 2019-01-05 RX ADMIN — ALBUTEROL 2 PUFF(S): 90 AEROSOL, METERED ORAL at 00:31

## 2019-01-05 RX ADMIN — Medication 50 MILLIGRAM(S): at 06:13

## 2019-01-05 RX ADMIN — ALBUTEROL 2 PUFF(S): 90 AEROSOL, METERED ORAL at 06:13

## 2019-01-05 RX ADMIN — ERTAPENEM SODIUM 120 MILLIGRAM(S): 1 INJECTION, POWDER, LYOPHILIZED, FOR SOLUTION INTRAMUSCULAR; INTRAVENOUS at 10:15

## 2019-01-05 RX ADMIN — FAMOTIDINE 20 MILLIGRAM(S): 10 INJECTION INTRAVENOUS at 19:28

## 2019-01-05 RX ADMIN — POLYETHYLENE GLYCOL 3350 17 GRAM(S): 17 POWDER, FOR SOLUTION ORAL at 13:58

## 2019-01-05 RX ADMIN — ALBUTEROL 2 PUFF(S): 90 AEROSOL, METERED ORAL at 13:59

## 2019-01-05 RX ADMIN — HEPARIN SODIUM 16 UNIT(S)/HR: 5000 INJECTION INTRAVENOUS; SUBCUTANEOUS at 14:45

## 2019-01-05 RX ADMIN — ALBUTEROL 2 PUFF(S): 90 AEROSOL, METERED ORAL at 19:28

## 2019-01-05 RX ADMIN — HEPARIN SODIUM 16 UNIT(S)/HR: 5000 INJECTION INTRAVENOUS; SUBCUTANEOUS at 06:53

## 2019-01-05 RX ADMIN — Medication 1000 UNIT(S): at 14:00

## 2019-01-05 RX ADMIN — FINASTERIDE 5 MILLIGRAM(S): 5 TABLET, FILM COATED ORAL at 14:00

## 2019-01-05 RX ADMIN — CLOPIDOGREL BISULFATE 75 MILLIGRAM(S): 75 TABLET, FILM COATED ORAL at 14:01

## 2019-01-05 NOTE — PROGRESS NOTE ADULT - PROBLEM SELECTOR PLAN 1
urine cx: + ecoli - not sensitive to Rocephin  cbc daily  ID recommends Invanz Q24 x 3 doses; day 3/3.

## 2019-01-05 NOTE — PROGRESS NOTE ADULT - PROBLEM SELECTOR PLAN 2
continue current medication regimen  diuresis  iv abx changed to Invanz x 3days--attempting home iv--if not D/C Saturday  Discharge planning- home if Cath nl and antibiotics complete

## 2019-01-05 NOTE — PROGRESS NOTE ADULT - ASSESSMENT
This is a an 86 y/o male PMH HTN, HLD, remote CVA, chronic lung disease, PCI with Stent, chronic afib on  eliquis chronic systolic heart failure s/p TAVR  - # 34  Medtronic Core Valve on 11/27/18.- with Dr Zuluaga- post op course uneventful   On 1/1/18  Presents to [facility] ED with c/o increasing SOB and dysuria. TTE reveal mobile echodensity in LVOT: may represent thrombus vs vegetation;   Pan cultured  ceftriaxone initiated.  Admitted to Dr Downey for further management.  1/2 VSS wbc 8 and pt afebrile; urine cx: + ecoli - continue rocephin and ck sensitivites; MARIA LUISA today as per Dr. Downey;  ID consult pending   Discharge planning- home   1/3 VSS.  Urine c/s ecoli not sens to rocephin--dc'd started on invanz for 3 days.  Trying to arrange homecare for 2 iv doses at home--if not, will need to stay in hospital til saturday 1/4 HD stable. Chest pain earlier. Trop 83.  DW Dr. Merritt --he will have cardiac cath this afternoon.  Continue Invanz for UTI last dose Saturday morning. Patient on Eliquis; Cardiac cath procedure cancelled.   1/5 VVS; On Heparin Gtt for AC therapy; Continue to Hold Eliquis.  Plan for cardiac cath on Monday.  Continue with current medication regimen.   Disposition: Home PT once medically cleared.

## 2019-01-05 NOTE — PROGRESS NOTE ADULT - SUBJECTIVE AND OBJECTIVE BOX
VITAL SIGNS    Subjective: "I want to go home." Denies CP, palpitation, SOB, ARMSTRONG, HA, dizziness, N/V/D, fever or chills.  No acute event noted overnight.     Telemetry:  NSR / V-paced      Vital Signs Last 24 Hrs  T(C): 36.4 (19 @ 12:24), Max: 36.6 (19 @ 20:06)  T(F): 97.5 (19 @ 12:24), Max: 97.9 (19 @ 20:06)  HR: 85 (19 @ 12:24) (84 - 102)  BP: 122/68 (19 @ 12:24) (122/68 - 140/83)  RR: 18 (19 12:24) (18 - 18)  SpO2: 97% (19 12:24) (94% - 97%)            @ 07:  -   @ 07:00  --------------------------------------------------------  IN: 504 mL / OUT: 1150 mL / NET: -646 mL     @ 07:01  -   @ 17:36  --------------------------------------------------------  IN: 1074 mL / OUT: 750 mL / NET: 324 mL    Daily     Daily Weight in k.8 (2019 10:25)       PHYSICAL EXAM    Neurology: alert and oriented x 3, nonfocal, no gross deficits    CV: (+) S1 and S2, No murmurs, rubs, gallops or clicks     Lungs: CTA B/L     Abdomen: soft, nontender, nondistended, positive bowel sounds, (+) Flatus; (+) BM     :  Voiding               Extremities:  B/L LE (+) edema; negative calf tenderness; (+) 2 DP palpable        acetaminophen Tablet .. 500 milliGRAM(s) Oral daily PRN  ALBUTerol 90 MICROgram(s) HFA Inhaler 2 Puff(s) Inhalation every 6 hours  amiodarone Tablet 200 milliGRAM(s) Oral daily  artificial  tears Solution 1 Drop(s) Both EYES two times a day PRN  atorvastatin 40 milliGRAM(s) Oral at bedtime  buDESOnide  80 MICROgram(s)/formoterol 4.5 MICROgram(s) Inhaler 2 Puff(s) Inhalation two times a day  cholecalciferol 1000 Unit(s) Oral daily  clopidogrel Tablet 75 milliGRAM(s) Oral daily  ertapenem IVPB      ertapenem IVPB 1000 milliGRAM(s) IV Intermittent every 24 hours  famotidine Tablet 20 milliGRAM(s) Oral two times a day  finasteride 5 milliGRAM(s) Oral daily  heparin  Infusion 1600 Unit(s)/Hr IV Continuous <Continuous>  metoprolol succinate ER 50 milliGRAM(s) Oral daily  polyethylene glycol 3350 17 Gram(s) Oral daily  tamsulosin 0.4 milliGRAM(s) Oral at bedtime    Physical Therapy Rec:   Home  [  ]   Home w/ PT  [ X ]  Rehab  [  ]    Discussed with Cardiothoracic Team at AM rounds.

## 2019-01-06 LAB
ANION GAP SERPL CALC-SCNC: 14 MMOL/L — SIGNIFICANT CHANGE UP (ref 5–17)
APTT BLD: 82.6 SEC — HIGH (ref 27.5–36.3)
APTT BLD: 96.7 SEC — HIGH (ref 27.5–36.3)
BUN SERPL-MCNC: 24 MG/DL — HIGH (ref 7–23)
CALCIUM SERPL-MCNC: 9.3 MG/DL — SIGNIFICANT CHANGE UP (ref 8.4–10.5)
CHLORIDE SERPL-SCNC: 100 MMOL/L — SIGNIFICANT CHANGE UP (ref 96–108)
CO2 SERPL-SCNC: 24 MMOL/L — SIGNIFICANT CHANGE UP (ref 22–31)
CREAT SERPL-MCNC: 1.15 MG/DL — SIGNIFICANT CHANGE UP (ref 0.5–1.3)
CULTURE RESULTS: SIGNIFICANT CHANGE UP
GLUCOSE SERPL-MCNC: 99 MG/DL — SIGNIFICANT CHANGE UP (ref 70–99)
HCT VFR BLD CALC: 41.6 % — SIGNIFICANT CHANGE UP (ref 39–50)
HGB BLD-MCNC: 14.1 G/DL — SIGNIFICANT CHANGE UP (ref 13–17)
MCHC RBC-ENTMCNC: 30.4 PG — SIGNIFICANT CHANGE UP (ref 27–34)
MCHC RBC-ENTMCNC: 33.9 GM/DL — SIGNIFICANT CHANGE UP (ref 32–36)
MCV RBC AUTO: 89.5 FL — SIGNIFICANT CHANGE UP (ref 80–100)
PLATELET # BLD AUTO: 219 K/UL — SIGNIFICANT CHANGE UP (ref 150–400)
POTASSIUM SERPL-MCNC: 3.7 MMOL/L — SIGNIFICANT CHANGE UP (ref 3.5–5.3)
POTASSIUM SERPL-SCNC: 3.7 MMOL/L — SIGNIFICANT CHANGE UP (ref 3.5–5.3)
RBC # BLD: 4.65 M/UL — SIGNIFICANT CHANGE UP (ref 4.2–5.8)
RBC # FLD: 12.4 % — SIGNIFICANT CHANGE UP (ref 10.3–14.5)
SODIUM SERPL-SCNC: 138 MMOL/L — SIGNIFICANT CHANGE UP (ref 135–145)
SPECIMEN SOURCE: SIGNIFICANT CHANGE UP
WBC # BLD: 7.4 K/UL — SIGNIFICANT CHANGE UP (ref 3.8–10.5)
WBC # FLD AUTO: 7.4 K/UL — SIGNIFICANT CHANGE UP (ref 3.8–10.5)

## 2019-01-06 PROCEDURE — 99232 SBSQ HOSP IP/OBS MODERATE 35: CPT | Mod: 24

## 2019-01-06 RX ORDER — HEPARIN SODIUM 5000 [USP'U]/ML
1400 INJECTION INTRAVENOUS; SUBCUTANEOUS
Qty: 25000 | Refills: 0 | Status: DISCONTINUED | OUTPATIENT
Start: 2019-01-06 | End: 2019-01-08

## 2019-01-06 RX ORDER — POTASSIUM CHLORIDE 20 MEQ
20 PACKET (EA) ORAL ONCE
Qty: 0 | Refills: 0 | Status: COMPLETED | OUTPATIENT
Start: 2019-01-06 | End: 2019-01-06

## 2019-01-06 RX ADMIN — Medication 500 MILLIGRAM(S): at 00:00

## 2019-01-06 RX ADMIN — AMIODARONE HYDROCHLORIDE 200 MILLIGRAM(S): 400 TABLET ORAL at 05:48

## 2019-01-06 RX ADMIN — HEPARIN SODIUM 14 UNIT(S)/HR: 5000 INJECTION INTRAVENOUS; SUBCUTANEOUS at 23:00

## 2019-01-06 RX ADMIN — Medication 500 MILLIGRAM(S): at 08:36

## 2019-01-06 RX ADMIN — POLYETHYLENE GLYCOL 3350 17 GRAM(S): 17 POWDER, FOR SOLUTION ORAL at 14:22

## 2019-01-06 RX ADMIN — HEPARIN SODIUM 14 UNIT(S)/HR: 5000 INJECTION INTRAVENOUS; SUBCUTANEOUS at 05:51

## 2019-01-06 RX ADMIN — CLOPIDOGREL BISULFATE 75 MILLIGRAM(S): 75 TABLET, FILM COATED ORAL at 14:22

## 2019-01-06 RX ADMIN — FAMOTIDINE 20 MILLIGRAM(S): 10 INJECTION INTRAVENOUS at 05:48

## 2019-01-06 RX ADMIN — Medication 50 MILLIGRAM(S): at 05:48

## 2019-01-06 RX ADMIN — ALBUTEROL 2 PUFF(S): 90 AEROSOL, METERED ORAL at 17:56

## 2019-01-06 RX ADMIN — ALBUTEROL 2 PUFF(S): 90 AEROSOL, METERED ORAL at 05:48

## 2019-01-06 RX ADMIN — HEPARIN SODIUM 13 UNIT(S)/HR: 5000 INJECTION INTRAVENOUS; SUBCUTANEOUS at 17:57

## 2019-01-06 RX ADMIN — HEPARIN SODIUM 13 UNIT(S)/HR: 5000 INJECTION INTRAVENOUS; SUBCUTANEOUS at 10:57

## 2019-01-06 RX ADMIN — TAMSULOSIN HYDROCHLORIDE 0.4 MILLIGRAM(S): 0.4 CAPSULE ORAL at 22:02

## 2019-01-06 RX ADMIN — ALBUTEROL 2 PUFF(S): 90 AEROSOL, METERED ORAL at 00:47

## 2019-01-06 RX ADMIN — BUDESONIDE AND FORMOTEROL FUMARATE DIHYDRATE 2 PUFF(S): 160; 4.5 AEROSOL RESPIRATORY (INHALATION) at 17:57

## 2019-01-06 RX ADMIN — FAMOTIDINE 20 MILLIGRAM(S): 10 INJECTION INTRAVENOUS at 17:57

## 2019-01-06 RX ADMIN — FINASTERIDE 5 MILLIGRAM(S): 5 TABLET, FILM COATED ORAL at 14:22

## 2019-01-06 RX ADMIN — ALBUTEROL 2 PUFF(S): 90 AEROSOL, METERED ORAL at 14:23

## 2019-01-06 RX ADMIN — BUDESONIDE AND FORMOTEROL FUMARATE DIHYDRATE 2 PUFF(S): 160; 4.5 AEROSOL RESPIRATORY (INHALATION) at 05:47

## 2019-01-06 RX ADMIN — Medication 20 MILLIEQUIVALENT(S): at 08:35

## 2019-01-06 RX ADMIN — ATORVASTATIN CALCIUM 40 MILLIGRAM(S): 80 TABLET, FILM COATED ORAL at 22:02

## 2019-01-06 RX ADMIN — Medication 1000 UNIT(S): at 14:22

## 2019-01-06 NOTE — PROGRESS NOTE ADULT - SUBJECTIVE AND OBJECTIVE BOX
Subjective Hello up in room no acute distress      tele AFlutter  70-90     Vital Signs Last 24 Hrs  T(C): 36.3 (19 @ 04:47), Max: 36.7 (19 @ 20:08)  T(F): 97.3 (19 @ 04:47), Max: 98.1 (19 @ 20:08)  HR: 76 (19 @ 04:47) (76 - 85)  BP: 148/85 (19 @ 04:47) (122/68 - 148/88)  RR: 18 (19 @ 04:47) (18 - 18)  SpO2: 95% (19 @ 04:47) (95% - 97%)           01-05 @ 07:01  -   @ 07:00  --------------------------------------------------------  IN: 1116 mL / OUT: 1550 mL / NET: -434   mLMEDICATIONS  (STANDING):  ALBUTerol    90 MICROgram(s) HFA Inhaler 2 Puff(s) Inhalation every 6 hours  amiodarone    Tablet 200 milliGRAM(s) Oral daily  atorvastatin 40 milliGRAM(s) Oral at bedtime  buDESOnide  80 MICROgram(s)/formoterol 4.5 MICROgram(s) Inhaler 2 Puff(s) Inhalation two times a day  cholecalciferol 1000 Unit(s) Oral daily  clopidogrel Tablet 75 milliGRAM(s) Oral daily  famotidine    Tablet 20 milliGRAM(s) Oral two times a day  finasteride 5 milliGRAM(s) Oral daily  heparin  Infusion 1400 Unit(s)/Hr (13 mL/Hr) IV Continuous <Continuous>  metoprolol succinate ER 50 milliGRAM(s) Oral daily  polyethylene glycol 3350 17 Gram(s) Oral daily  tamsulosin 0.4 milliGRAM(s) Oral at bedtime    MEDICATIONS  (PRN):  acetaminophen   Tablet .. 500 milliGRAM(s) Oral daily PRN Mild Pain (1 - 3)  artificial  tears Solution 1 Drop(s) Both EYES two times a day PRN Dry Eyes      Daily Weight in k.8 (2019 10:25)                  PHYSICAL EXAM  Neurology: alert and oriented x 3, nonfocal, no gross deficits    CV :S1 S2 RRR  Lungs:B/l CTA on room air  Abdomen: soft, nontender, nondistended, positive bowel sounds, last bowel movement   :  Voids         Extremities:  B/l le hyperpigmentation + DP left shin wound                                           Home PT  Discussed with Cardiothoracic Team at AM rounds.

## 2019-01-06 NOTE — PROGRESS NOTE ADULT - SUBJECTIVE AND OBJECTIVE BOX
Patient is a 87y old  Male who presents with a chief complaint of SOB abd dysuria (06 Jan 2019 10:03)      	  MEDICATIONS:  amiodarone    Tablet 200 milliGRAM(s) Oral daily  metoprolol succinate ER 50 milliGRAM(s) Oral daily  tamsulosin 0.4 milliGRAM(s) Oral at bedtime        PHYSICAL EXAM:  T(C): 36.4 (01-06-19 @ 13:20), Max: 36.7 (01-05-19 @ 20:08)  HR: 93 (01-06-19 @ 13:20) (76 - 93)  BP: 148/82 (01-06-19 @ 13:20) (148/82 - 148/88)  RR: 18 (01-06-19 @ 13:20) (18 - 18)  SpO2: 97% (01-06-19 @ 13:20) (95% - 97%)  Wt(kg): --  I&O's Summary    05 Jan 2019 07:01  -  06 Jan 2019 07:00  --------------------------------------------------------  IN: 1116 mL / OUT: 1550 mL / NET: -434 mL    06 Jan 2019 07:01  -  06 Jan 2019 20:06  --------------------------------------------------------  IN: 680 mL / OUT: 500 mL / NET: 180 mL          Appearance: Normal	  HEENT:    PERRL, EOMI	  Cardiovascular:  S1 S2, No JVD  Respiratory: Lungs clear to auscultation	  Psychiatry: Alert  Gastrointestinal:  Soft, Non-tender, + BS	  Skin: No rashes, No cyanosis  Extremities:  No edema of LE                                14.1   7.4   )-----------( 219      ( 06 Jan 2019 05:54 )             41.6     01-06    138  |  100  |  24<H>  ----------------------------<  99  3.7   |  24  |  1.15    Ca    9.3      06 Jan 2019 05:54          Labs personally reviewed    from: Transthoracic Echocardiogram (01.01.19 @ 11:08) >  Conclusions:  1. Mitral annular calcification and calcified mitral  leaflets with normal diastolic opening.  2. Transcatheter aortic valve replacement.  Small  independently mobile echodensity noted in LVOT associated  with the ventricular aspect of the TAVR valve (image 11).  This may represent vegetation vs thrombus, clinical  correlation indicated. Peak transaortic valve gradient  equals 7 mm Hg, meantransaortic valve gradient equals 4 mm  Hg, which is probably normal in the presence of a  transcatheter aortic valve replacement. Mild paravalvular  aortic regurgitation. Consider MARIA LUISA for further evaluation.  3. Severely dilated left atrium.  LA volume index = 62  cc/m2.  4.  Endocardium not well visualized; Mild segmental left  ventricular systolic dysfunction. The inferolateral wall  appears  hypokinetic.  5. A device wire is noted in the right heart.  6. Right ventricular enlargement with borderline right  ventricular systolic function.    < end of copied text >      Assessment and Recommendation:   · Assessment		  1. Chronic systolic Heart Failure  TTE reviewed with EF 45%  no sign of acute decompensation but chronic ARMSTRONG/SOB which I am titrating for as outpt   holdding Lasix 40mg BID as mild isabell, resume prior to disharge    2. HTN  cont home meds    3. afib/afl  Rate controlled  cont a/c   pt on amio 200mg as outpt    4. cad, recent cath with patent stent  atypical chest pain - CTS arranged for cath today   history of stent  On plavix  cont statin     5. TAVR valve echodensity - unlikely to be thrombus as on AC, MARIA LUISA noted, unlikely endocarditis per CTS/ID            Timothy Paredes DO [facility]  Cardiovascular Medicine  [phone] Patient is a 87y old  Male who presents with a chief complaint of SOB abd dysuria (06 Jan 2019 10:03)      	  MEDICATIONS:  amiodarone    Tablet 200 milliGRAM(s) Oral daily  metoprolol succinate ER 50 milliGRAM(s) Oral daily  tamsulosin 0.4 milliGRAM(s) Oral at bedtime        PHYSICAL EXAM:  T(C): 36.4 (01-06-19 @ 13:20), Max: 36.7 (01-05-19 @ 20:08)  HR: 93 (01-06-19 @ 13:20) (76 - 93)  BP: 148/82 (01-06-19 @ 13:20) (148/82 - 148/88)  RR: 18 (01-06-19 @ 13:20) (18 - 18)  SpO2: 97% (01-06-19 @ 13:20) (95% - 97%)  Wt(kg): --  I&O's Summary    05 Jan 2019 07:01  -  06 Jan 2019 07:00  --------------------------------------------------------  IN: 1116 mL / OUT: 1550 mL / NET: -434 mL    06 Jan 2019 07:01  -  06 Jan 2019 20:06  --------------------------------------------------------  IN: 680 mL / OUT: 500 mL / NET: 180 mL          Appearance: Normal	  HEENT:    PERRL, EOMI	  Cardiovascular:  S1 S2, No JVD  Respiratory: Lungs clear to auscultation	  Psychiatry: Alert  Gastrointestinal:  Soft, Non-tender, + BS	  Skin: No rashes, No cyanosis  Extremities:  No edema of LE                                14.1   7.4   )-----------( 219      ( 06 Jan 2019 05:54 )             41.6     01-06    138  |  100  |  24<H>  ----------------------------<  99  3.7   |  24  |  1.15    Ca    9.3      06 Jan 2019 05:54          Labs personally reviewed    from: Transthoracic Echocardiogram (01.01.19 @ 11:08) >  Conclusions:  1. Mitral annular calcification and calcified mitral  leaflets with normal diastolic opening.  2. Transcatheter aortic valve replacement.  Small  independently mobile echodensity noted in LVOT associated  with the ventricular aspect of the TAVR valve (image 11).  This may represent vegetation vs thrombus, clinical  correlation indicated. Peak transaortic valve gradient  equals 7 mm Hg, meantransaortic valve gradient equals 4 mm  Hg, which is probably normal in the presence of a  transcatheter aortic valve replacement. Mild paravalvular  aortic regurgitation. Consider MARIA LUISA for further evaluation.  3. Severely dilated left atrium.  LA volume index = 62  cc/m2.  4.  Endocardium not well visualized; Mild segmental left  ventricular systolic dysfunction. The inferolateral wall  appears  hypokinetic.  5. A device wire is noted in the right heart.  6. Right ventricular enlargement with borderline right  ventricular systolic function.    < end of copied text >      Assessment and Recommendation:   · Assessment		  1. Chronic systolic Heart Failure  TTE reviewed with EF 45%  no sign of acute decompensation but chronic ARMSTRONG/SOB which I am titrating for as outpt   holdding Lasix 40mg BID as mild isabell, resume prior to disharge    2. HTN  cont home meds    3. afib/afl  Rate controlled  cont a/c   pt on amio 200mg as outpt    4. cad, recent cath with patent stent  atypical chest pain - CTS arranged for cath Monday   history of stent  On plavix  cont statin     5. TAVR valve echodensity - unlikely to be thrombus as on AC, MARIA LUISA noted, unlikely endocarditis per CTS/ID            Timothy Paredes DO [facility]  Cardiovascular Medicine  [phone]

## 2019-01-06 NOTE — PROGRESS NOTE ADULT - ASSESSMENT
This is a an 88 y/o male PMH HTN, HLD, remote CVA, chronic lung disease, PCI with Stent, chronic afib on  eliquis chronic systolic heart failure s/p TAVR  - # 34  Medtronic Core Valve on 11/27/18.- with Dr Zuluaga- post op course uneventful   On 1/1/18  Presents to [facility] ED with c/o increasing SOB and dysuria. TTE reveal mobile echodensity in LVOT: may represent thrombus vs vegetation;   Pan cultured  ceftriaxone initiated.  Admitted to Dr Downey for further management.  1/2 VSS wbc 8 and pt afebrile; urine cx: + ecoli - continue rocephin and ck sensitivites; MARIA LUISA today as per Dr. Downey;  ID consult pending   Discharge planning- home   1/3 VSS.  Urine c/s ecoli not sens to rocephin--dc'd started on invanz for 3 days.  Trying to arrange homecare for 2 iv doses at home--if not, will need to stay in hospital til saturday 1/4 HD stable. Chest pain earlier. Trop 83.  DW Dr. Merritt --he will have cardiac cath this afternoon.  Continue Invanz for UTI last dose Saturday morning. Patient on Eliquis; Cardiac cath procedure cancelled.   1/5 VVS; On Heparin Gtt for AC therapy; Continue to Hold Eliquis.  Plan for cardiac cath on Monday.  Continue with current medication regimen  1/6 VSS cardiac cath Monday.  eliquis on hold.  Disposition: Home PT once medically cleared.

## 2019-01-07 DIAGNOSIS — I48.91 UNSPECIFIED ATRIAL FIBRILLATION: ICD-10-CM

## 2019-01-07 DIAGNOSIS — I25.10 ATHEROSCLEROTIC HEART DISEASE OF NATIVE CORONARY ARTERY WITHOUT ANGINA PECTORIS: ICD-10-CM

## 2019-01-07 DIAGNOSIS — I10 ESSENTIAL (PRIMARY) HYPERTENSION: ICD-10-CM

## 2019-01-07 DIAGNOSIS — G45.9 TRANSIENT CEREBRAL ISCHEMIC ATTACK, UNSPECIFIED: ICD-10-CM

## 2019-01-07 DIAGNOSIS — J44.9 CHRONIC OBSTRUCTIVE PULMONARY DISEASE, UNSPECIFIED: ICD-10-CM

## 2019-01-07 DIAGNOSIS — I50.9 HEART FAILURE, UNSPECIFIED: ICD-10-CM

## 2019-01-07 LAB
ANION GAP SERPL CALC-SCNC: 13 MMOL/L — SIGNIFICANT CHANGE UP (ref 5–17)
APTT BLD: 89 SEC — HIGH (ref 27.5–36.3)
APTT BLD: 96.6 SEC — HIGH (ref 27.5–36.3)
BUN SERPL-MCNC: 22 MG/DL — SIGNIFICANT CHANGE UP (ref 7–23)
CALCIUM SERPL-MCNC: 9.7 MG/DL — SIGNIFICANT CHANGE UP (ref 8.4–10.5)
CHLORIDE SERPL-SCNC: 103 MMOL/L — SIGNIFICANT CHANGE UP (ref 96–108)
CO2 SERPL-SCNC: 24 MMOL/L — SIGNIFICANT CHANGE UP (ref 22–31)
CREAT SERPL-MCNC: 1.13 MG/DL — SIGNIFICANT CHANGE UP (ref 0.5–1.3)
GLUCOSE SERPL-MCNC: 98 MG/DL — SIGNIFICANT CHANGE UP (ref 70–99)
HCT VFR BLD CALC: 44.4 % — SIGNIFICANT CHANGE UP (ref 39–50)
HGB BLD-MCNC: 15 G/DL — SIGNIFICANT CHANGE UP (ref 13–17)
INR BLD: 1.17 RATIO — HIGH (ref 0.88–1.16)
MCHC RBC-ENTMCNC: 29.9 PG — SIGNIFICANT CHANGE UP (ref 27–34)
MCHC RBC-ENTMCNC: 33.7 GM/DL — SIGNIFICANT CHANGE UP (ref 32–36)
MCV RBC AUTO: 88.8 FL — SIGNIFICANT CHANGE UP (ref 80–100)
PLATELET # BLD AUTO: 211 K/UL — SIGNIFICANT CHANGE UP (ref 150–400)
POTASSIUM SERPL-MCNC: 4.1 MMOL/L — SIGNIFICANT CHANGE UP (ref 3.5–5.3)
POTASSIUM SERPL-SCNC: 4.1 MMOL/L — SIGNIFICANT CHANGE UP (ref 3.5–5.3)
PROTHROM AB SERPL-ACNC: 13.5 SEC — HIGH (ref 10–12.9)
RBC # BLD: 5 M/UL — SIGNIFICANT CHANGE UP (ref 4.2–5.8)
RBC # FLD: 12.7 % — SIGNIFICANT CHANGE UP (ref 10.3–14.5)
SODIUM SERPL-SCNC: 140 MMOL/L — SIGNIFICANT CHANGE UP (ref 135–145)
WBC # BLD: 8.2 K/UL — SIGNIFICANT CHANGE UP (ref 3.8–10.5)
WBC # FLD AUTO: 8.2 K/UL — SIGNIFICANT CHANGE UP (ref 3.8–10.5)

## 2019-01-07 PROCEDURE — 99232 SBSQ HOSP IP/OBS MODERATE 35: CPT

## 2019-01-07 PROCEDURE — 93010 ELECTROCARDIOGRAM REPORT: CPT

## 2019-01-07 PROCEDURE — 99152 MOD SED SAME PHYS/QHP 5/>YRS: CPT | Mod: GC

## 2019-01-07 PROCEDURE — 76937 US GUIDE VASCULAR ACCESS: CPT | Mod: 26,GC

## 2019-01-07 PROCEDURE — 93460 R&L HRT ART/VENTRICLE ANGIO: CPT | Mod: 26,GC

## 2019-01-07 RX ADMIN — HEPARIN SODIUM 11 UNIT(S)/HR: 5000 INJECTION INTRAVENOUS; SUBCUTANEOUS at 10:29

## 2019-01-07 RX ADMIN — Medication 1000 UNIT(S): at 12:25

## 2019-01-07 RX ADMIN — Medication 50 MILLIGRAM(S): at 05:22

## 2019-01-07 RX ADMIN — TAMSULOSIN HYDROCHLORIDE 0.4 MILLIGRAM(S): 0.4 CAPSULE ORAL at 21:58

## 2019-01-07 RX ADMIN — ATORVASTATIN CALCIUM 40 MILLIGRAM(S): 80 TABLET, FILM COATED ORAL at 21:58

## 2019-01-07 RX ADMIN — FAMOTIDINE 20 MILLIGRAM(S): 10 INJECTION INTRAVENOUS at 18:10

## 2019-01-07 RX ADMIN — BUDESONIDE AND FORMOTEROL FUMARATE DIHYDRATE 2 PUFF(S): 160; 4.5 AEROSOL RESPIRATORY (INHALATION) at 05:23

## 2019-01-07 RX ADMIN — AMIODARONE HYDROCHLORIDE 200 MILLIGRAM(S): 400 TABLET ORAL at 05:22

## 2019-01-07 RX ADMIN — FAMOTIDINE 20 MILLIGRAM(S): 10 INJECTION INTRAVENOUS at 05:22

## 2019-01-07 RX ADMIN — ALBUTEROL 2 PUFF(S): 90 AEROSOL, METERED ORAL at 18:09

## 2019-01-07 RX ADMIN — HEPARIN SODIUM 12 UNIT(S)/HR: 5000 INJECTION INTRAVENOUS; SUBCUTANEOUS at 05:20

## 2019-01-07 RX ADMIN — ALBUTEROL 2 PUFF(S): 90 AEROSOL, METERED ORAL at 23:44

## 2019-01-07 RX ADMIN — CLOPIDOGREL BISULFATE 75 MILLIGRAM(S): 75 TABLET, FILM COATED ORAL at 12:25

## 2019-01-07 RX ADMIN — HEPARIN SODIUM 13 UNIT(S)/HR: 5000 INJECTION INTRAVENOUS; SUBCUTANEOUS at 01:35

## 2019-01-07 RX ADMIN — ALBUTEROL 2 PUFF(S): 90 AEROSOL, METERED ORAL at 05:23

## 2019-01-07 RX ADMIN — HEPARIN SODIUM 11 UNIT(S)/HR: 5000 INJECTION INTRAVENOUS; SUBCUTANEOUS at 23:41

## 2019-01-07 RX ADMIN — ALBUTEROL 2 PUFF(S): 90 AEROSOL, METERED ORAL at 00:12

## 2019-01-07 RX ADMIN — BUDESONIDE AND FORMOTEROL FUMARATE DIHYDRATE 2 PUFF(S): 160; 4.5 AEROSOL RESPIRATORY (INHALATION) at 18:09

## 2019-01-07 RX ADMIN — ALBUTEROL 2 PUFF(S): 90 AEROSOL, METERED ORAL at 12:26

## 2019-01-07 RX ADMIN — FINASTERIDE 5 MILLIGRAM(S): 5 TABLET, FILM COATED ORAL at 12:25

## 2019-01-07 NOTE — CONSULT NOTE ADULT - SUBJECTIVE AND OBJECTIVE BOX
This is a an 86 y/o male PMH HTN, HLD, remote CVA, chronic lung disease, PCI with Stent, chronic afib on  eliquis chronic systolic heart failure s/p TAVR  - # 34  Medtronic Core Valve on 11/27/18.-  with Dr Zuluaga- post op course uneventful On 1/1/18  Presents to [facility] ED with c/o increasing SOB and dysuria. TTE reveal mobile echodensity  Pan cultured  ceftriaxone initiated.  Admitted to Dr Downey for further management and ischemic W/U     PAST MEDICAL & SURGICAL HISTORY:  CKD (chronic kidney disease) stage 3, GFR 30-59 ml/min  Hepatitis B  PTSD (post-traumatic stress disorder)  Tachy-idris syndrome  TIA (transient ischemic attack): 2010  Seizure  DIEGO (obstructive sleep apnea)  Chronic diastolic HF (heart failure)  Aortic stenosis  CVA (cerebral vascular accident)  Gait instability  COPD (chronic obstructive pulmonary disease)  BPH (benign prostatic hyperplasia)  Asthma  CHF (congestive heart failure)  Atrial fibrillation  Pacemaker: 2015 Medtronic [id number]  [id number]  Hypertension  H/O thalassemia  CAD (coronary artery disease): s/p stent 2010  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  Artificial pacemaker  S/P primary angioplasty with coronary stent: 2012 AND 2017    FAMILY HISTORY:  No pertinent family history in first degree relatives      Non-contributory    SOCIAL HISTORY:    former smoker    Allergies    Keppra (Rash)  penicillin (Unknown)    Intolerances    	    REVIEW OF SYSTEMS:  CONSTITUTIONAL: No fever  EYES: No eye pain, visual disturbances, or discharge  ENMT:  No difficulty hearing, tinnitus  NECK: No pain or stiffness  RESPIRATORY: No cough, wheezing,  CARDIOVASCULAR: No chest pain, palpitations, passing out, dizziness, + chronic leg swelling  GASTROINTESTINAL:  No nausea, vomiting, diarrhea or constipation. No melena.  GENITOURINARY: + dysuria, hematuria  NEUROLOGICAL: No stroke like symptoms  SKIN: No burning or lesions   LYMPH Nodes: No enlarged glands  ENDOCRINE: No heat or cold intolerance  MUSCULOSKELETAL: No joint pain or swelling  PSYCHIATRIC: No  anxiety, mood swings  HEME/LYMPH: No bleeding gums  ALLERY AND IMMUNOLOGIC: No hives or eczema	  MEDICATIONS:  MEDICATIONS  (STANDING):  ALBUTerol    90 MICROgram(s) HFA Inhaler 2 Puff(s) Inhalation every 6 hours  amiodarone    Tablet 200 milliGRAM(s) Oral daily  atorvastatin 40 milliGRAM(s) Oral at bedtime  buDESOnide  80 MICROgram(s)/formoterol 4.5 MICROgram(s) Inhaler 2 Puff(s) Inhalation two times a day  cholecalciferol 1000 Unit(s) Oral daily  clopidogrel Tablet 75 milliGRAM(s) Oral daily  famotidine    Tablet 20 milliGRAM(s) Oral two times a day  finasteride 5 milliGRAM(s) Oral daily  heparin  Infusion 1400 Unit(s)/Hr (11 mL/Hr) IV Continuous <Continuous>  metoprolol succinate ER 50 milliGRAM(s) Oral daily  polyethylene glycol 3350 17 Gram(s) Oral daily  tamsulosin 0.4 milliGRAM(s) Oral at bedtime      PHYSICAL EXAM:  T(C): 36.2 (01-07-19 @ 12:22), Max: 36.4 (01-06-19 @ 20:07)  HR: 69 (01-07-19 @ 12:22) (69 - 93)  BP: 134/77 (01-07-19 @ 12:22) (116/64 - 138/80)  RR: 18 (01-07-19 @ 12:22) (16 - 18)  SpO2: 97% (01-07-19 @ 12:22) (96% - 97%)  Wt(kg): --  I&O's Summary    06 Jan 2019 07:01  -  07 Jan 2019 07:00  --------------------------------------------------------  IN: 984 mL / OUT: 900 mL / NET: 84 mL    07 Jan 2019 07:01  -  07 Jan 2019 17:07  --------------------------------------------------------  IN: 317 mL / OUT: 0 mL / NET: 317 mL          Appearance: Normal	  HEENT:   Normal oral mucosa, PERRL, EOMI	  Lymphatic: No lymphadenopathy , no edema  Cardiovascular: Normal S1 S2, No JVD, No murmurs , Peripheral pulses palpable 2+ bilaterally  Respiratory: Lungs clear to auscultation, normal effort 	  Gastrointestinal:  Soft, Non-tender, + BS	  Skin: No rashes, No ecchymoses, No cyanosis, warm to touch  Musculoskeletal: Normal range of motion, normal strength  Psychiatry:  Mood & affect appropriate  Ext: No edema      All labs, Imaging and EKGs personally reviewed                           15.0   8.2   )-----------( 211      ( 07 Jan 2019 06:53 )             44.4               01-07    140  |  103  |  22  ----------------------------<  98  4.1   |  24  |  1.13    Ca    9.7      07 Jan 2019 06:53      PT/INR - ( 07 Jan 2019 06:53 )   PT: 13.5 sec;   INR: 1.17 ratio         PTT - ( 07 Jan 2019 06:53 )  PTT:96.6 sec                   < from: Xray Chest 1 View- PORTABLE-Urgent (01.04.19 @ 07:48) >  IMPRESSION:     Dual lead pacemaker in the left chest.  The lungs are clear. There is no pneumothorax and no pleural effusions.   Cardiac size is enlarged.    < end of copied text >

## 2019-01-07 NOTE — PROGRESS NOTE ADULT - SUBJECTIVE AND OBJECTIVE BOX
VITAL SIGNS    Telemetry:    Vital Signs Last 24 Hrs  T(C): 36.3 (01-07-19 @ 05:28), Max: 36.4 (01-06-19 @ 13:20)  T(F): 97.3 (01-07-19 @ 05:28), Max: 97.5 (01-06-19 @ 13:20)  HR: 76 (01-07-19 @ 05:28) (76 - 93)  BP: 138/80 (01-07-19 @ 05:28) (116/64 - 148/82)  RR: 16 (01-07-19 @ 05:28) (16 - 18)  SpO2: 96% (01-07-19 @ 05:28) (96% - 97%)            01-06 @ 07:01  -  01-07 @ 07:00  --------------------------------------------------------  IN: 984 mL / OUT: 900 mL / NET: 84 mL       Daily     Daily   Admit Wt: Drug Dosing Weight  Height (cm): 170.18 (01 Jan 2019 09:21)  Weight (kg): 98.4 (01 Jan 2019 09:21)  BMI (kg/m2): 34 (01 Jan 2019 09:21)  BSA (m2): 2.09 (01 Jan 2019 09:21)      CAPILLARY BLOOD GLUCOSE              acetaminophen   Tablet .. 500 milliGRAM(s) Oral daily PRN  ALBUTerol    90 MICROgram(s) HFA Inhaler 2 Puff(s) Inhalation every 6 hours  amiodarone    Tablet 200 milliGRAM(s) Oral daily  artificial  tears Solution 1 Drop(s) Both EYES two times a day PRN  atorvastatin 40 milliGRAM(s) Oral at bedtime  buDESOnide  80 MICROgram(s)/formoterol 4.5 MICROgram(s) Inhaler 2 Puff(s) Inhalation two times a day  cholecalciferol 1000 Unit(s) Oral daily  clopidogrel Tablet 75 milliGRAM(s) Oral daily  famotidine    Tablet 20 milliGRAM(s) Oral two times a day  finasteride 5 milliGRAM(s) Oral daily  heparin  Infusion 1400 Unit(s)/Hr IV Continuous <Continuous>  metoprolol succinate ER 50 milliGRAM(s) Oral daily  polyethylene glycol 3350 17 Gram(s) Oral daily  tamsulosin 0.4 milliGRAM(s) Oral at bedtime      PHYSICAL EXAM    Subjective: "Hi.   Neurology: alert and oriented x 3, nonfocal, no gross deficits  CV : tele:  RSR  Sternal Wound :  CDI with dressing , Stable  Lungs: clear. RR easy, unlabored   Abdomen: soft, nontender, nondistended, positive bowel sounds, bowel movement   Neg N/V/D   :  pt voiding without difficulty   Extremities:   PTOTS; edema, neg calf tenderness.   PPP bilaterally      PW:  Chest tubes: VITAL SIGNS    Telemetry:  aflutter v. paced    Vital Signs Last 24 Hrs  T(C): 36.3 (01-07-19 @ 05:28), Max: 36.4 (01-06-19 @ 13:20)  T(F): 97.3 (01-07-19 @ 05:28), Max: 97.5 (01-06-19 @ 13:20)  HR: 76 (01-07-19 @ 05:28) (76 - 93)  BP: 138/80 (01-07-19 @ 05:28) (116/64 - 148/82)  RR: 16 (01-07-19 @ 05:28) (16 - 18)  SpO2: 96% (01-07-19 @ 05:28) (96% - 97%)            01-06 @ 07:01  -  01-07 @ 07:00  --------------------------------------------------------  IN: 984 mL / OUT: 900 mL / NET: 84 mL       Daily     Daily   Admit Wt: Drug Dosing Weight  Height (cm): 170.18 (01 Jan 2019 09:21)  Weight (kg): 98.4 (01 Jan 2019 09:21)  BMI (kg/m2): 34 (01 Jan 2019 09:21)  BSA (m2): 2.09 (01 Jan 2019 09:21)      CAPILLARY BLOOD GLUCOSE              acetaminophen   Tablet .. 500 milliGRAM(s) Oral daily PRN  ALBUTerol    90 MICROgram(s) HFA Inhaler 2 Puff(s) Inhalation every 6 hours  amiodarone    Tablet 200 milliGRAM(s) Oral daily  artificial  tears Solution 1 Drop(s) Both EYES two times a day PRN  atorvastatin 40 milliGRAM(s) Oral at bedtime  buDESOnide  80 MICROgram(s)/formoterol 4.5 MICROgram(s) Inhaler 2 Puff(s) Inhalation two times a day  cholecalciferol 1000 Unit(s) Oral daily  clopidogrel Tablet 75 milliGRAM(s) Oral daily  famotidine    Tablet 20 milliGRAM(s) Oral two times a day  finasteride 5 milliGRAM(s) Oral daily  heparin  Infusion 1400 Unit(s)/Hr IV Continuous <Continuous>  metoprolol succinate ER 50 milliGRAM(s) Oral daily  polyethylene glycol 3350 17 Gram(s) Oral daily  tamsulosin 0.4 milliGRAM(s) Oral at bedtime      PHYSICAL EXAM    Subjective: "When is my test already."   Neurology: alert and oriented x 3, nonfocal, no gross deficits  CV : tele: aflutter v. paced    Lungs: clear. RR easy, unlabored   Abdomen: soft, nontender, nondistended, positive bowel sounds, bowel movement; Neg N/V/D   :  pt voiding without difficulty   Extremities:   POTTS; neg LE edema, neg calf tenderness.   PPP bilaterally

## 2019-01-07 NOTE — DIETITIAN INITIAL EVALUATION ADULT. - OTHER INFO
Pt seen for consult: nutrition education. Pt seen for LOS admission. Currently off unit at this time.

## 2019-01-07 NOTE — PROGRESS NOTE ADULT - ASSESSMENT
This is a an 88 y/o male PMH HTN, HLD, remote CVA, chronic lung disease, PCI with Stent, chronic afib on  eliquis chronic systolic heart failure s/p TAVR  - # 34  Medtronic Core Valve on 11/27/18.- with Dr Zuluaga- post op course uneventful   On 1/1/18  Presents to [facility] ED with c/o increasing SOB and dysuria. TTE reveal mobile echodensity in LVOT: may represent thrombus vs vegetation;   Pan cultured  ceftriaxone initiated.  Admitted to Dr Downey for further management.  1/2 VSS wbc 8 and pt afebrile; urine cx: + ecoli - continue rocephin and ck sensitivites; MARIA LUISA today as per Dr. Downey;  ID consult pending   Discharge planning- home   1/3 VSS.  Urine c/s ecoli not sens to rocephin--dc'd started on invanz for 3 days.  Trying to arrange homecare for 2 iv doses at home--if not, will need to stay in hospital til saturday 1/4 HD stable. Chest pain earlier. Trop 83.  DW Dr. Merritt --he will have cardiac cath this afternoon.  Continue Invanz for UTI last dose Saturday morning. Patient on Eliquis; Cardiac cath procedure cancelled.   1/5 VVS; On Heparin Gtt for AC therapy; Continue to Hold Eliquis.  Plan for cardiac cath on Monday.  Continue with current medication regimen  1/6 VSS cardiac cath Monday.  eliquis on hold.  Disposition: Home PT once medically cleared. This is a an 88 y/o male PMH HTN, HLD, remote CVA, chronic lung disease, PCI with Stent, chronic afib on  eliquis chronic systolic heart failure s/p TAVR  - # 34  Medtronic Core Valve on 11/27/18.- with Dr Zuluaga- post op course uneventful   On 1/1/18  Presents to [facility] ED with c/o increasing SOB and dysuria. TTE reveal mobile echodensity in LVOT: may represent thrombus vs vegetation;   Pan cultured  ceftriaxone initiated.  Admitted to Dr Downey for further management.  1/2 VSS wbc 8 and pt afebrile; urine cx: + ecoli - continue rocephin and ck sensitivites; MARIA LUISA today as per Dr. Downey;  ID consult pending   Discharge planning- home   1/3 VSS.  Urine c/s ecoli not sens to rocephin--dc'd started on invanz for 3 days.  Trying to arrange homecare for 2 iv doses at home--if not, will need to stay in hospital til saturday 1/4 HD stable. Chest pain earlier. Trop 83.  DW Dr. Merritt --he will have cardiac cath this afternoon.  Continue Invanz for UTI last dose Saturday morning. Patient on Eliquis; Cardiac cath procedure cancelled.   1/5 VVS; On Heparin Gtt for AC therapy; Continue to Hold Eliquis.  Plan for cardiac cath on Monday.  Continue with current medication regimen  1/6 VSS cardiac cath Monday.  eliquis on hold.  1/7 pt denies cp/sob; cath today with Dr. Medina; heparin gttp therapeutic PTT 96; abx for uti complete   Disposition: Home PT once medically cleared.

## 2019-01-07 NOTE — DIETITIAN INITIAL EVALUATION ADULT. - NS AS NUTRI INTERV ED CONTENT
Upon return to unit plan to assess pt's typical diet and need for diet education. RD remains available as needed: April Newton MS, RDN, CDN, CDE. #[phone]

## 2019-01-07 NOTE — PROGRESS NOTE ADULT - PROBLEM SELECTOR PLAN 2
continue current medication regimen  diuresis  iv abx changed to Invanz x 3days--attempting home iv--if not D/C Saturday  Discharge planning- home if Cath nl and antibiotics complete continue current medication regimen  diuresis  cath today 1/8 with Dr. Medina  discharge planning- home

## 2019-01-07 NOTE — PROGRESS NOTE ADULT - PROBLEM SELECTOR PLAN 1
urine cx: + ecoli - not sensitive to Rocephin  cbc daily  ID recommends Invanz Q24 x 3 doses; day 3/3. abx course complete

## 2019-01-07 NOTE — PROGRESS NOTE ADULT - SUBJECTIVE AND OBJECTIVE BOX
Patient is a 87y old  Male who presents with a chief complaint of SOB abd dysuria (07 Jan 2019 17:06)      INTERVAL HISTORY: feels ok, s/p cath  	  MEDICATIONS:  amiodarone    Tablet 200 milliGRAM(s) Oral daily  metoprolol succinate ER 50 milliGRAM(s) Oral daily  tamsulosin 0.4 milliGRAM(s) Oral at bedtime        PHYSICAL EXAM:  T(C): 36.3 (01-07-19 @ 17:30), Max: 36.3 (01-07-19 @ 05:28)  HR: 62 (01-07-19 @ 19:15) (62 - 76)  BP: 158/89 (01-07-19 @ 19:15) (134/77 - 158/89)  RR: 18 (01-07-19 @ 19:15) (16 - 18)  SpO2: 98% (01-07-19 @ 19:15) (95% - 98%)  Wt(kg): --  I&O's Summary    06 Jan 2019 07:01  -  07 Jan 2019 07:00  --------------------------------------------------------  IN: 984 mL / OUT: 900 mL / NET: 84 mL    07 Jan 2019 07:01  -  07 Jan 2019 22:59  --------------------------------------------------------  IN: 517 mL / OUT: 400 mL / NET: 117 mL          Appearance: Normal	  HEENT:    PERRL, EOMI	  Cardiovascular:  S1 S2, No JVD  Respiratory: Lungs clear to auscultation	  Psychiatry: Alert  Gastrointestinal:  Soft, Non-tender, + BS	  Skin: No rashes, No cyanosis  Extremities:  No edema of LE                                15.0   8.2   )-----------( 211      ( 07 Jan 2019 06:53 )             44.4     01-07    140  |  103  |  22  ----------------------------<  98  4.1   |  24  |  1.13    Ca    9.7      07 Jan 2019 06:53          Labs personally reviewed      from: Transthoracic Echocardiogram (01.01.19 @ 11:08) >  Conclusions:  1. Mitral annular calcification and calcified mitral  leaflets with normal diastolic opening.  2. Transcatheter aortic valve replacement.  Small  independently mobile echodensity noted in LVOT associated  with the ventricular aspect of the TAVR valve (image 11).  This may represent vegetation vs thrombus, clinical  correlation indicated. Peak transaortic valve gradient  equals 7 mm Hg, meantransaortic valve gradient equals 4 mm  Hg, which is probably normal in the presence of a  transcatheter aortic valve replacement. Mild paravalvular  aortic regurgitation. Consider MARIA LUISA for further evaluation.  3. Severely dilated left atrium.  LA volume index = 62  cc/m2.  4.  Endocardium not well visualized; Mild segmental left  ventricular systolic dysfunction. The inferolateral wall  appears  hypokinetic.  5. A device wire is noted in the right heart.  6. Right ventricular enlargement with borderline right  ventricular systolic function.    < end of copied text >      Assessment and Recommendation:   · Assessment		  1. Chronic systolic Heart Failure  TTE reviewed with EF 45%  no sign of acute decompensation but chronic ARMSTRONG/SOB which I am titrating for as outpt   holdding Lasix 40mg BID as mild isabell, resume prior to disharge    2. HTN  cont home meds    3. afib/afl  Rate controlled  cont a/c   pt on amio 200mg as outpt    4. cad, recent cath with patent stent  atypical chest pain - CTS arranged for cath, unchanged  history of stent  On plavix  cont statin     5. TAVR valve echodensity - unlikely to be thrombus as on AC, MARIA LUISA noted, unlikely endocarditis per CTS/ID          Timothy Paredes DO [facility]  Cardiovascular Medicine  [phone]

## 2019-01-07 NOTE — PROGRESS NOTE ADULT - PROBLEM SELECTOR PLAN 3
trop 83  heparin gtt - pain free  cardiac cath Monday 1/7 pt denies cp/ sob at this time  cath today 1/8   continue to monitor on tele  heparin gttp while eliquis on hold for procedure

## 2019-01-07 NOTE — DIETITIAN INITIAL EVALUATION ADULT. - ENERGY NEEDS
ht: 67 inches. wt: 211.1 pounds (current as of 1/5, standing, +1 B/L leg edema noted). BMI: 33.0 kG/m2. UBW: 230 pounds x ~6 months ago. IBW: 148 pounds +/- 10%. %IBW: 143%  Other pertinent objective information: 87 year old male pt with PMH PMH HTN, HLD, remote CVA, chronic lung disease, PCI with Stent, chronic afib on  eliquis chronic systolic heart failure s/p TAVR on 11/27/18. Now presenting with c/o increasing SOB and dysuria. Pending cardiac cath planned for today. Also noted with UTI urine cx: + ecoli, s/p antibiotic therapy.

## 2019-01-08 ENCOUNTER — APPOINTMENT (OUTPATIENT)
Dept: CARDIOTHORACIC SURGERY | Facility: CLINIC | Age: 84
End: 2019-01-08

## 2019-01-08 ENCOUNTER — APPOINTMENT (OUTPATIENT)
Dept: CV DIAGNOSITCS | Facility: HOSPITAL | Age: 84
End: 2019-01-08

## 2019-01-08 ENCOUNTER — TRANSCRIPTION ENCOUNTER (OUTPATIENT)
Age: 84
End: 2019-01-08

## 2019-01-08 VITALS — WEIGHT: 211.2 LBS

## 2019-01-08 LAB
ANION GAP SERPL CALC-SCNC: 11 MMOL/L — SIGNIFICANT CHANGE UP (ref 5–17)
APTT BLD: 65.6 SEC — HIGH (ref 27.5–36.3)
BUN SERPL-MCNC: 20 MG/DL — SIGNIFICANT CHANGE UP (ref 7–23)
CALCIUM SERPL-MCNC: 9.4 MG/DL — SIGNIFICANT CHANGE UP (ref 8.4–10.5)
CHLORIDE SERPL-SCNC: 101 MMOL/L — SIGNIFICANT CHANGE UP (ref 96–108)
CO2 SERPL-SCNC: 26 MMOL/L — SIGNIFICANT CHANGE UP (ref 22–31)
CREAT SERPL-MCNC: 1.18 MG/DL — SIGNIFICANT CHANGE UP (ref 0.5–1.3)
GLUCOSE SERPL-MCNC: 94 MG/DL — SIGNIFICANT CHANGE UP (ref 70–99)
HCT VFR BLD CALC: 43.3 % — SIGNIFICANT CHANGE UP (ref 39–50)
HGB BLD-MCNC: 14.7 G/DL — SIGNIFICANT CHANGE UP (ref 13–17)
INR BLD: 1.12 RATIO — SIGNIFICANT CHANGE UP (ref 0.88–1.16)
MCHC RBC-ENTMCNC: 30.2 PG — SIGNIFICANT CHANGE UP (ref 27–34)
MCHC RBC-ENTMCNC: 33.9 GM/DL — SIGNIFICANT CHANGE UP (ref 32–36)
MCV RBC AUTO: 89.2 FL — SIGNIFICANT CHANGE UP (ref 80–100)
PLATELET # BLD AUTO: 195 K/UL — SIGNIFICANT CHANGE UP (ref 150–400)
POTASSIUM SERPL-MCNC: 4.4 MMOL/L — SIGNIFICANT CHANGE UP (ref 3.5–5.3)
POTASSIUM SERPL-SCNC: 4.4 MMOL/L — SIGNIFICANT CHANGE UP (ref 3.5–5.3)
PROTHROM AB SERPL-ACNC: 12.8 SEC — SIGNIFICANT CHANGE UP (ref 10–12.9)
RBC # BLD: 4.85 M/UL — SIGNIFICANT CHANGE UP (ref 4.2–5.8)
RBC # FLD: 12.6 % — SIGNIFICANT CHANGE UP (ref 10.3–14.5)
SODIUM SERPL-SCNC: 138 MMOL/L — SIGNIFICANT CHANGE UP (ref 135–145)
WBC # BLD: 7.5 K/UL — SIGNIFICANT CHANGE UP (ref 3.8–10.5)
WBC # FLD AUTO: 7.5 K/UL — SIGNIFICANT CHANGE UP (ref 3.8–10.5)

## 2019-01-08 PROCEDURE — 80053 COMPREHEN METABOLIC PANEL: CPT

## 2019-01-08 PROCEDURE — 82553 CREATINE MB FRACTION: CPT

## 2019-01-08 PROCEDURE — 85610 PROTHROMBIN TIME: CPT

## 2019-01-08 PROCEDURE — 99232 SBSQ HOSP IP/OBS MODERATE 35: CPT

## 2019-01-08 PROCEDURE — 71045 X-RAY EXAM CHEST 1 VIEW: CPT

## 2019-01-08 PROCEDURE — 93306 TTE W/DOPPLER COMPLETE: CPT

## 2019-01-08 PROCEDURE — 85730 THROMBOPLASTIN TIME PARTIAL: CPT

## 2019-01-08 PROCEDURE — 99285 EMERGENCY DEPT VISIT HI MDM: CPT | Mod: 25

## 2019-01-08 PROCEDURE — 99152 MOD SED SAME PHYS/QHP 5/>YRS: CPT

## 2019-01-08 PROCEDURE — 82962 GLUCOSE BLOOD TEST: CPT

## 2019-01-08 PROCEDURE — 76937 US GUIDE VASCULAR ACCESS: CPT

## 2019-01-08 PROCEDURE — 80048 BASIC METABOLIC PNL TOTAL CA: CPT

## 2019-01-08 PROCEDURE — 96365 THER/PROPH/DIAG IV INF INIT: CPT

## 2019-01-08 PROCEDURE — C1887: CPT

## 2019-01-08 PROCEDURE — 71046 X-RAY EXAM CHEST 2 VIEWS: CPT

## 2019-01-08 PROCEDURE — 81001 URINALYSIS AUTO W/SCOPE: CPT

## 2019-01-08 PROCEDURE — 87086 URINE CULTURE/COLONY COUNT: CPT

## 2019-01-08 PROCEDURE — 85027 COMPLETE CBC AUTOMATED: CPT

## 2019-01-08 PROCEDURE — 94640 AIRWAY INHALATION TREATMENT: CPT

## 2019-01-08 PROCEDURE — 93005 ELECTROCARDIOGRAM TRACING: CPT

## 2019-01-08 PROCEDURE — 87040 BLOOD CULTURE FOR BACTERIA: CPT

## 2019-01-08 PROCEDURE — C1894: CPT

## 2019-01-08 PROCEDURE — 83880 ASSAY OF NATRIURETIC PEPTIDE: CPT

## 2019-01-08 PROCEDURE — 93312 ECHO TRANSESOPHAGEAL: CPT

## 2019-01-08 PROCEDURE — C1769: CPT

## 2019-01-08 PROCEDURE — 84484 ASSAY OF TROPONIN QUANT: CPT

## 2019-01-08 PROCEDURE — 93325 DOPPLER ECHO COLOR FLOW MAPG: CPT

## 2019-01-08 PROCEDURE — 82550 ASSAY OF CK (CPK): CPT

## 2019-01-08 PROCEDURE — 87186 SC STD MICRODIL/AGAR DIL: CPT

## 2019-01-08 PROCEDURE — 93320 DOPPLER ECHO COMPLETE: CPT

## 2019-01-08 PROCEDURE — 96361 HYDRATE IV INFUSION ADD-ON: CPT

## 2019-01-08 PROCEDURE — 93460 R&L HRT ART/VENTRICLE ANGIO: CPT

## 2019-01-08 RX ORDER — FUROSEMIDE 40 MG
1 TABLET ORAL
Qty: 30 | Refills: 0 | OUTPATIENT
Start: 2019-01-08 | End: 2019-02-06

## 2019-01-08 RX ORDER — RANOLAZINE 500 MG/1
500 TABLET, FILM COATED, EXTENDED RELEASE ORAL
Qty: 0 | Refills: 0 | Status: DISCONTINUED | OUTPATIENT
Start: 2019-01-08 | End: 2019-01-08

## 2019-01-08 RX ORDER — UMECLIDINIUM 62.5 UG/1
1 AEROSOL, POWDER ORAL
Qty: 0 | Refills: 0 | COMMUNITY

## 2019-01-08 RX ORDER — RANOLAZINE 500 MG/1
1 TABLET, FILM COATED, EXTENDED RELEASE ORAL
Qty: 60 | Refills: 0 | OUTPATIENT
Start: 2019-01-08 | End: 2019-02-06

## 2019-01-08 RX ADMIN — Medication 1000 UNIT(S): at 12:18

## 2019-01-08 RX ADMIN — ALBUTEROL 2 PUFF(S): 90 AEROSOL, METERED ORAL at 06:21

## 2019-01-08 RX ADMIN — AMIODARONE HYDROCHLORIDE 200 MILLIGRAM(S): 400 TABLET ORAL at 06:21

## 2019-01-08 RX ADMIN — FINASTERIDE 5 MILLIGRAM(S): 5 TABLET, FILM COATED ORAL at 12:18

## 2019-01-08 RX ADMIN — BUDESONIDE AND FORMOTEROL FUMARATE DIHYDRATE 2 PUFF(S): 160; 4.5 AEROSOL RESPIRATORY (INHALATION) at 06:21

## 2019-01-08 RX ADMIN — CLOPIDOGREL BISULFATE 75 MILLIGRAM(S): 75 TABLET, FILM COATED ORAL at 12:18

## 2019-01-08 RX ADMIN — FAMOTIDINE 20 MILLIGRAM(S): 10 INJECTION INTRAVENOUS at 06:21

## 2019-01-08 RX ADMIN — HEPARIN SODIUM 11 UNIT(S)/HR: 5000 INJECTION INTRAVENOUS; SUBCUTANEOUS at 08:26

## 2019-01-08 RX ADMIN — ALBUTEROL 2 PUFF(S): 90 AEROSOL, METERED ORAL at 12:17

## 2019-01-08 RX ADMIN — POLYETHYLENE GLYCOL 3350 17 GRAM(S): 17 POWDER, FOR SOLUTION ORAL at 12:18

## 2019-01-08 RX ADMIN — Medication 50 MILLIGRAM(S): at 06:21

## 2019-01-08 NOTE — PROGRESS NOTE ADULT - PROVIDER SPECIALTY LIST ADULT
CT Surgery
Cardiology
Internal Medicine
Structural Heart
Cardiology
Structural Heart

## 2019-01-08 NOTE — DISCHARGE NOTE ADULT - PLAN OF CARE
full recovery antibiotics complete continue hydration  follow up with your Cardiologist Dr Paredes  Follow up with your internal medicine MD with in 2 weeks of discharge

## 2019-01-08 NOTE — DISCHARGE NOTE ADULT - HOSPITAL COURSE
This is a an 88 y/o male PMH HTN, HLD, remote CVA, chronic lung disease, PCI with Stent, chronic afib on  eliquis chronic systolic heart failure s/p TAVR  - # 34  [employer] Core Valve on 11/27/18.- with Dr Zuluaga- post op course uneventful   On 1/1/18  Presents to [facility] ED with c/o increasing SOB and dysuria. TTE reveal mobile echodensity in LVOT: may represent thrombus vs vegetation;   Pan cultured  ceftriaxone initiated.  Admitted to Dr Downey for further management.  1/2 VSS wbc 8 and pt afebrile; urine cx: + ecoli - continue rocephin and ck sensitivites; MARIA LUISA today as per Dr. Downey;  ID consult pending   Discharge planning- home   1/3 VSS.  Urine c/s ecoli not sens to rocephin--dc'd started on invanz for 3 days.  Trying to arrange homecare for 2 iv doses at home--if not, will need to stay in hospital til saturday 1/4 HD stable. Chest pain earlier. Trop 83.  DW Dr. Merritt --he will have cardiac cath this afternoon.  Continue Invanz for UTI last dose Saturday morning. Patient on Eliquis; Cardiac cath procedure cancelled.   1/5 VVS; On Heparin Gtt for AC therapy; Continue to Hold Eliquis.  Plan for cardiac cath on Monday.  Continue with current medication regimen  1/6 VSS cardiac cath Monday.  eliquis on hold.  1/7 pt denies cp/sob; cath today with Dr. Medina; heparin gttp therapeutic PTT 96; abx for uti complete   Disposition: Home PT once medically cleared.   1/8 Pt is eager and ready to be discharged today.  Dr Zuluaga aware of plan, Dr Merritt aware of discharge plan.

## 2019-01-08 NOTE — DISCHARGE NOTE ADULT - NS AS ACTIVITY OBS
Walking-Indoors allowed/No Heavy lifting/straining/Sex allowed/Stairs allowed/Showering allowed/Do not make important decisions/Do not drive or operate machinery

## 2019-01-08 NOTE — DISCHARGE NOTE ADULT - OTHER SIGNIFICANT FINDINGS
"I feel good"      VITAL SIGNS    Telemetry:  Afib 70-90     Vital Signs Last 24 Hrs  T(C): 36.4 (19 @ 04:48), Max: 36.4 (19 @ 04:48)  T(F): 97.5 (19 @ 04:48), Max: 97.5 (19 @ 04:48)  HR: 70 (19 @ 04:48) (62 - 70)  BP: 130/79 (19 @ 04:48) (130/79 - 158/89)  RR: 18 (19 @ 04:48) (18 - 18)  SpO2: 97% (19 @ 04:48) (95% - 98%)              @ 07:01  -   @ 07:00  --------------------------------------------------------  IN: 594 mL / OUT: 700 mL / NET: -106 mL     @ 07:01  -   @ 11:26  --------------------------------------------------------  IN: 400 mL / OUT: 0 mL / NET: 400 mL          Daily     Daily Weight in k.8 (2019 07:15)      CAPILLARY BLOOD GLUCOSE           Drains:     MS         [  ] Drainage:                 L Pleural  [  ]  Drainage:                R Pleural  [  ]  Drainage:    Pacing Wires        [  ]   Settings:                                  Isolated  [  ]    Coumadin    [ ] YES          [X  ]      NO         Reason:     Eliquis                      PHYSICAL EXAM    Neurology: alert and oriented x 3, nonfocal, no gross deficits  CV :  IRIR  no audible murmurs  Lungs:  CTA B/L  Abdomen: soft, nontender, nondistended, positive bowel sounds, last bowel movement   :     + Urination         Extremities:     FROM X 4 NO C/E/E          acetaminophen   Tablet .. 500 milliGRAM(s) Oral daily PRN  ALBUTerol    90 MICROgram(s) HFA Inhaler 2 Puff(s) Inhalation every 6 hours  amiodarone    Tablet 200 milliGRAM(s) Oral daily  artificial  tears Solution 1 Drop(s) Both EYES two times a day PRN  atorvastatin 40 milliGRAM(s) Oral at bedtime  buDESOnide  80 MICROgram(s)/formoterol 4.5 MICROgram(s) Inhaler 2 Puff(s) Inhalation two times a day  cholecalciferol 1000 Unit(s) Oral daily  clopidogrel Tablet 75 milliGRAM(s) Oral daily  famotidine    Tablet 20 milliGRAM(s) Oral two times a day  finasteride 5 milliGRAM(s) Oral daily  heparin  Infusion 1400 Unit(s)/Hr IV Continuous <Continuous>  metoprolol succinate ER 50 milliGRAM(s) Oral daily  polyethylene glycol 3350 17 Gram(s) Oral daily  tamsulosin 0.4 milliGRAM(s) Oral at bedtime          Physical Therapy Rec:   Home  [x  ]   Home w/ PT  [  ]  Rehab  [  ]  Discussed with Cardiothoracic Team at AM rounds.

## 2019-01-08 NOTE — PROGRESS NOTE ADULT - SUBJECTIVE AND OBJECTIVE BOX
Subjective: Patient seen and examined. No new events except as noted.   S/P Cath     REVIEW OF SYSTEMS:    CONSTITUTIONAL: No weakness, fevers or chills  EYES/ENT: No visual changes;  No vertigo or throat pain   NECK: No pain or stiffness  RESPIRATORY: No cough, wheezing, hemoptysis; No shortness of breath  CARDIOVASCULAR: No chest pain or palpitations  GASTROINTESTINAL: No abdominal or epigastric pain. No nausea, vomiting, or hematemesis; No diarrhea or constipation. No melena or hematochezia.  GENITOURINARY: No dysuria, frequency or hematuria  NEUROLOGICAL: No numbness or weakness  SKIN: No itching, burning, rashes, or lesions   All other review of systems is negative unless indicated above.    MEDICATIONS:  MEDICATIONS  (STANDING):  ALBUTerol    90 MICROgram(s) HFA Inhaler 2 Puff(s) Inhalation every 6 hours  amiodarone    Tablet 200 milliGRAM(s) Oral daily  atorvastatin 40 milliGRAM(s) Oral at bedtime  buDESOnide  80 MICROgram(s)/formoterol 4.5 MICROgram(s) Inhaler 2 Puff(s) Inhalation two times a day  cholecalciferol 1000 Unit(s) Oral daily  clopidogrel Tablet 75 milliGRAM(s) Oral daily  famotidine    Tablet 20 milliGRAM(s) Oral two times a day  finasteride 5 milliGRAM(s) Oral daily  metoprolol succinate ER 50 milliGRAM(s) Oral daily  polyethylene glycol 3350 17 Gram(s) Oral daily  tamsulosin 0.4 milliGRAM(s) Oral at bedtime      PHYSICAL EXAM:  T(C): 36.4 (01-08-19 @ 04:48), Max: 36.4 (01-08-19 @ 04:48)  HR: 70 (01-08-19 @ 04:48) (62 - 70)  BP: 130/79 (01-08-19 @ 04:48) (130/79 - 158/89)  RR: 18 (01-08-19 @ 04:48) (18 - 18)  SpO2: 97% (01-08-19 @ 04:48) (95% - 98%)  Wt(kg): --  I&O's Summary    07 Jan 2019 07:01  -  08 Jan 2019 07:00  --------------------------------------------------------  IN: 594 mL / OUT: 700 mL / NET: -106 mL    08 Jan 2019 07:01  -  08 Jan 2019 15:47  --------------------------------------------------------  IN: 433 mL / OUT: 0 mL / NET: 433 mL          Appearance: Normal	  HEENT:   Normal oral mucosa, PERRL, EOMI	  Lymphatic: No lymphadenopathy , no edema  Cardiovascular: Normal S1 S2, No JVD, No murmurs , Peripheral pulses palpable 2+ bilaterally  Respiratory: Lungs clear to auscultation, normal effort 	  Gastrointestinal:  Soft, Non-tender, + BS	  Skin: No rashes, No ecchymoses, No cyanosis, warm to touch  Musculoskeletal: Normal range of motion, normal strength  Psychiatry:  Mood & affect appropriate  Ext: No edema      All labs, Imaging and EKGs personally reviewed                           14.7   7.5   )-----------( 195      ( 08 Jan 2019 05:57 )             43.3               01-08    138  |  101  |  20  ----------------------------<  94  4.4   |  26  |  1.18    Ca    9.4      08 Jan 2019 05:57      PT/INR - ( 08 Jan 2019 05:57 )   PT: 12.8 sec;   INR: 1.12 ratio         PTT - ( 08 Jan 2019 05:57 )  PTT:65.6 sec                 < from: Cardiac Cath Lab - Adult (01.07.19 @ 15:54) >  CORONARY VESSELS: The coronary circulation is right dominant.  LM:   --  LM: Normal.  LAD:   --  Proximal LAD: Angiography showed mild atherosclerosis with no  flow limiting lesions.  CX:   --  Distal circumflex: There was a 40 % stenosis.  RI:   --  Ramus intermedius: Angiography showed severe atherosclerosis.  RCA:   --  Mid RCA: There was a 60 % stenosis.  COMPLICATIONS: There were nocomplications.  DIAGNOSTIC RECOMMENDATIONS: The patient should continue with the present  medications.    < end of copied text >

## 2019-01-08 NOTE — DISCHARGE NOTE ADULT - MEDICATION SUMMARY - MEDICATIONS TO TAKE
I will START or STAY ON the medications listed below when I get home from the hospital:    finasteride 5 mg oral tablet  -- 1 tab(s) by mouth once a day  -- Indication: For BPH    spironolactone 25 mg oral tablet  -- 1 tab(s) by mouth once a day  -- Indication: For water pill    acetaminophen 500 mg oral tablet  -- 2 tab(s) by mouth once a day (in the morning)  -- Indication: For Pain    tamsulosin 0.4 mg oral capsule  -- 1 cap(s) by mouth once a day (at bedtime)  -- Indication: For BPH    amiodarone 200 mg oral tablet  -- 1 tab(s) by mouth once a day  -- Indication: For Rate Control     apixaban 5 mg oral tablet  -- 1 tab(s) by mouth every 12 hours  -- Indication: For Blood thinner    Crestor 10 mg oral tablet  -- 1 tab(s) by mouth once a day (at bedtime)  -- Indication: For Lower cholesterol     Plavix 75 mg oral tablet  -- 1 tab(s) by mouth once a day  -- Indication: For blood thinner    Metoprolol Succinate ER 50 mg oral tablet, extended release  -- 1 tab(s) by mouth once a day  -- Indication: For Heart rate    albuterol 90 mcg/inh inhalation aerosol  -- 1 to 2 puff(s) inhaled every 6 hours, As Needed - for shortness of breath and/or wheezing  -- Indication: For Asthma    albuterol 2.5 mg/3 mL (0.083%) inhalation solution  -- 3 milliliter(s) inhaled 3 times a day, As Needed  -- Indication: For Asthma     Symbicort 80 mcg-4.5 mcg/inh inhalation aerosol  -- 2 puff(s) inhaled 2 times a day, As Needed  -- Indication: For Asthma    furosemide 40 mg oral tablet  -- 1 tab(s) by mouth once a day   -- Indication: For Water Pill    raNITIdine 150 mg oral tablet  -- 1 tab(s) by mouth 2 times a day, As Needed  -- Indication: For GERD    MiraLax oral powder for reconstitution  -- 17 gram(s) by mouth every other day  -- Indication: For COnstipation     ocular lubricant ophthalmic solution  -- 1 drop(s) to each affected eye , As Needed  -- Indication: For eyes    Vitamin D3 1000 intl units oral tablet  -- 1 tab(s) by mouth once a day  -- Indication: For bone I will START or STAY ON the medications listed below when I get home from the hospital:    finasteride 5 mg oral tablet  -- 1 tab(s) by mouth once a day  -- Indication: For BPH    spironolactone 25 mg oral tablet  -- 1 tab(s) by mouth once a day  -- Indication: For water pill    acetaminophen 500 mg oral tablet  -- 2 tab(s) by mouth once a day (in the morning)  -- Indication: For Pain    tamsulosin 0.4 mg oral capsule  -- 1 cap(s) by mouth once a day (at bedtime)  -- Indication: For BPH    amiodarone 200 mg oral tablet  -- 1 tab(s) by mouth once a day  -- Indication: For Rate Control     apixaban 5 mg oral tablet  -- 1 tab(s) by mouth every 12 hours  -- Indication: For Blood thinner    Crestor 10 mg oral tablet  -- 1 tab(s) by mouth once a day (at bedtime)  -- Indication: For Lower cholesterol     Plavix 75 mg oral tablet  -- 1 tab(s) by mouth once a day  -- Indication: For blood thinner    Metoprolol Succinate ER 50 mg oral tablet, extended release  -- 1 tab(s) by mouth once a day  -- Indication: For Heart rate    albuterol 90 mcg/inh inhalation aerosol  -- 1 to 2 puff(s) inhaled every 6 hours, As Needed - for shortness of breath and/or wheezing  -- Indication: For Asthma    albuterol 2.5 mg/3 mL (0.083%) inhalation solution  -- 3 milliliter(s) inhaled 3 times a day, As Needed  -- Indication: For Asthma     Symbicort 80 mcg-4.5 mcg/inh inhalation aerosol  -- 2 puff(s) inhaled 2 times a day, As Needed  -- Indication: For Asthma    furosemide 40 mg oral tablet  -- 1 tab(s) by mouth once a day alternating with 40mg twice per day as per Dr. Paredes  -- Indication: For Take once alternating with twice daily    raNITIdine 150 mg oral tablet  -- 1 tab(s) by mouth 2 times a day, As Needed  -- Indication: For GERD    MiraLax oral powder for reconstitution  -- 17 gram(s) by mouth every other day  -- Indication: For COnstipation     ocular lubricant ophthalmic solution  -- 1 drop(s) to each affected eye , As Needed  -- Indication: For eyes    Vitamin D3 1000 intl units oral tablet  -- 1 tab(s) by mouth once a day  -- Indication: For bone

## 2019-01-08 NOTE — PROGRESS NOTE ADULT - ASSESSMENT
Assessment and Recommendation:   Problem/Recommendation - 1:  Problem: S/P TAVR (transcatheter aortic valve replacement). Recommendation: Care as per Cardiothoracic   S/P Cath  F/U with outpatient card     Problem/Recommendation - 2:  ·  Problem: CHF (congestive heart failure).  Recommendation: HFrEF   Echo noted   I and O2  avoid fluid overlaod.   outpatient Card F/U     Problem/Recommendation - 3:  ·  Problem: CAD (coronary artery disease).  Recommendation: Card F/U.     Problem/Recommendation - 4:  ·  Problem: Hypertension.  Recommendation: Monitor abd adjust meds accordingly.     Problem/Recommendation - 5:  ·  Problem: Atrial fibrillation.  Recommendation: Rate control   Amio   On heparin and plavix.     Problem/Recommendation - 6:  Problem: TIA (transient ischemic attack). Recommendation: on plavix and statin.    Problem/Recommendation - 7:  Problem: COPD (chronic obstructive pulmonary disease). Recommendation: nebs and O2 supplement PRN.

## 2019-01-08 NOTE — DISCHARGE NOTE ADULT - CARE PROVIDER_API CALL
Diego Merritt (MD), Cardiovascular Disease  [address] [place] [address]  Phone: [phone]  Fax: [phone]

## 2019-01-08 NOTE — DISCHARGE NOTE ADULT - MEDICATION SUMMARY - MEDICATIONS TO CHANGE
I will SWITCH the dose or number of times a day I take the medications listed below when I get home from the hospital:  None I will SWITCH the dose or number of times a day I take the medications listed below when I get home from the hospital:    Plavix 75 mg oral tablet  -- 1 tab(s) by mouth once a day    Crestor 10 mg oral tablet  -- 1 tab(s) by mouth once a day (at bedtime)    apixaban 5 mg oral tablet  -- 1 tab(s) by mouth every 12 hours    Metoprolol Succinate ER 50 mg oral tablet, extended release  -- 1 tab(s) by mouth once a day    Vitamin D3 1000 intl units oral tablet  -- 1 tab(s) by mouth once a day    Incruse Ellipta 62.5 mcg/inh inhalation powder  -- 1 puff(s) inhaled once a day, As Needed    acetaminophen 500 mg oral tablet  -- 2 tab(s) by mouth once a day (in the morning)    ocular lubricant ophthalmic solution  -- 1 drop(s) to each affected eye , As Needed    MiraLax oral powder for reconstitution  -- 17 gram(s) by mouth every other day    raNITIdine 150 mg oral tablet  -- 1 tab(s) by mouth 2 times a day, As Needed    albuterol 90 mcg/inh inhalation aerosol  -- 1 to 2 puff(s) inhaled every 6 hours, As Needed - for shortness of breath and/or wheezing    albuterol 2.5 mg/3 mL (0.083%) inhalation solution  -- 3 milliliter(s) inhaled 3 times a day, As Needed    Symbicort 80 mcg-4.5 mcg/inh inhalation aerosol  -- 2 puff(s) inhaled 2 times a day, As Needed    finasteride 5 mg oral tablet  -- 1 tab(s) by mouth once a day    amiodarone 200 mg oral tablet  -- 1 tab(s) by mouth once a day    spironolactone 25 mg oral tablet  -- 1 tab(s) by mouth once a day    tamsulosin 0.4 mg oral capsule  -- 1 cap(s) by mouth once a day (at bedtime)    furosemide 40 mg oral tablet  -- 1 tab(s) by mouth once a day

## 2019-01-08 NOTE — PROGRESS NOTE ADULT - SUBJECTIVE AND OBJECTIVE BOX
Patient is a 87y old  Male who presents with a chief complaint of SOB and dysuria (08 Jan 2019 12:49)    	  MEDICATIONS:  amiodarone    Tablet 200 milliGRAM(s) Oral daily  metoprolol succinate ER 50 milliGRAM(s) Oral daily  tamsulosin 0.4 milliGRAM(s) Oral at bedtime        PHYSICAL EXAM:  T(C): 36.4 (01-08-19 @ 04:48), Max: 36.4 (01-08-19 @ 04:48)  HR: 70 (01-08-19 @ 04:48) (62 - 70)  BP: 130/79 (01-08-19 @ 04:48) (130/79 - 158/89)  RR: 18 (01-08-19 @ 04:48) (18 - 18)  SpO2: 97% (01-08-19 @ 04:48) (95% - 98%)  Wt(kg): --  I&O's Summary    07 Jan 2019 07:01  -  08 Jan 2019 07:00  --------------------------------------------------------  IN: 594 mL / OUT: 700 mL / NET: -106 mL    08 Jan 2019 07:01  -  08 Jan 2019 16:04  --------------------------------------------------------  IN: 433 mL / OUT: 0 mL / NET: 433 mL                                       14.7   7.5   )-----------( 195      ( 08 Jan 2019 05:57 )             43.3     01-08    138  |  101  |  20  ----------------------------<  94  4.4   |  26  |  1.18    Ca    9.4      08 Jan 2019 05:57          Labs personally reviewed      from: Transthoracic Echocardiogram (01.01.19 @ 11:08) >  Conclusions:  1. Mitral annular calcification and calcified mitral  leaflets with normal diastolic opening.  2. Transcatheter aortic valve replacement.  Small  independently mobile echodensity noted in LVOT associated  with the ventricular aspect of the TAVR valve (image 11).  This may represent vegetation vs thrombus, clinical  correlation indicated. Peak transaortic valve gradient  equals 7 mm Hg, meantransaortic valve gradient equals 4 mm  Hg, which is probably normal in the presence of a  transcatheter aortic valve replacement. Mild paravalvular  aortic regurgitation. Consider MARIA LUISA for further evaluation.  3. Severely dilated left atrium.  LA volume index = 62  cc/m2.  4.  Endocardium not well visualized; Mild segmental left  ventricular systolic dysfunction. The inferolateral wall  appears  hypokinetic.  5. A device wire is noted in the right heart.  6. Right ventricular enlargement with borderline right  ventricular systolic function.    < end of copied text >      Assessment and Recommendation:   · Assessment		  1. Chronic systolic Heart Failure  TTE reviewed with EF 45%  no sign of acute decompensation but chronic ARMSTRONG/SOB which I am titrating for as outpt   resume lasix 40mg daily alternating with 40mg BID 3x/week, fu in one week as outpt for repeat BMP    2. HTN  cont home meds    3. afib/afl  Rate controlled  cont a/c   pt on amio 200mg as outpt    4. cad, recent cath with patent stent  atypical chest pain - CTS arranged for cath, unchanged  history of stent  On plavix  cont statin     5. TAVR valve echodensity - unlikely to be thrombus as on AC, MARIA LUISA noted, unlikely endocarditis per CTS/ID            Timothy Paredes DO [facility]  Cardiovascular Medicine  [phone]

## 2019-01-08 NOTE — DISCHARGE NOTE ADULT - CARE PLAN
Principal Discharge DX:	Urinary tract infection with hematuria, site unspecified  Goal:	full recovery  Assessment and plan of treatment:	antibiotics complete continue hydration  follow up with your Cardiologist Dr Paredes  Follow up with your internal medicine MD with in 2 weeks of discharge

## 2019-01-08 NOTE — DISCHARGE NOTE ADULT - PATIENT PORTAL LINK FT
You can access the [facility] Patient Portal, offered by [facility], by registering with the following website: [email or web address]

## 2019-01-08 NOTE — PROGRESS NOTE ADULT - SUBJECTIVE AND OBJECTIVE BOX
*** Structural Heart Team ***    Mr Thompson has no complaints this morning, no events overnight.  He has not had CP since Friday and is looking forward to going home.      REVIEW OF SYSTEMS:    CONSTITUTIONAL: No weakness, fevers or chills  EYES/ENT: No visual changes;  No vertigo or throat pain   NECK: No pain or stiffness  RESPIRATORY: No cough, wheezing, hemoptysis; No shortness of breath  CARDIOVASCULAR: No chest pain or palpitations  GASTROINTESTINAL: No abdominal or epigastric pain. No nausea, vomiting, or hematemesis; No diarrhea or constipation. No melena or hematochezia.  GENITOURINARY: No dysuria, frequency or hematuria  NEUROLOGICAL: No numbness or weakness  SKIN: No itching, rashes      Allergies    Keppra (Rash)  penicillin (Unknown)    Intolerances      Vital Signs Last 24 Hrs  T(C): 36.4 (08 Jan 2019 04:48), Max: 36.4 (08 Jan 2019 04:48)  T(F): 97.5 (08 Jan 2019 04:48), Max: 97.5 (08 Jan 2019 04:48)  HR: 70 (08 Jan 2019 04:48) (62 - 70)  BP: 130/79 (08 Jan 2019 04:48) (130/79 - 158/89)  BP(mean): --  RR: 18 (08 Jan 2019 04:48) (18 - 18)  SpO2: 97% (08 Jan 2019 04:48) (95% - 98%)    MEDICATIONS  (STANDING):  ALBUTerol    90 MICROgram(s) HFA Inhaler 2 Puff(s) Inhalation every 6 hours  amiodarone    Tablet 200 milliGRAM(s) Oral daily  atorvastatin 40 milliGRAM(s) Oral at bedtime  buDESOnide  80 MICROgram(s)/formoterol 4.5 MICROgram(s) Inhaler 2 Puff(s) Inhalation two times a day  cholecalciferol 1000 Unit(s) Oral daily  clopidogrel Tablet 75 milliGRAM(s) Oral daily  famotidine    Tablet 20 milliGRAM(s) Oral two times a day  finasteride 5 milliGRAM(s) Oral daily  heparin  Infusion 1400 Unit(s)/Hr (11 mL/Hr) IV Continuous <Continuous>  metoprolol succinate ER 50 milliGRAM(s) Oral daily  polyethylene glycol 3350 17 Gram(s) Oral daily  ranolazine 500 milliGRAM(s) Oral two times a day  tamsulosin 0.4 milliGRAM(s) Oral at bedtime      Exam-  General: NAD  Cor: s1s2, no murmur  Pulm: Clear b/l, no wheezes, rales, rhonchi  Gastointestinal: soft, nontender, nondistended, +bowel sounds  Extremities: min edema b/l le  Neuro: A&Ox3, nonfocal                          14.7   7.5   )-----------( 195      ( 08 Jan 2019 05:57 )             43.3   01-08    138  |  101  |  20  ----------------------------<  94  4.4   |  26  |  1.18    Ca    9.4      08 Jan 2019 05:57    PT/INR - ( 08 Jan 2019 05:57 )   PT: 12.8 sec;   INR: 1.12 ratio         PTT - ( 08 Jan 2019 05:57 )  PTT:65.6 sec  I&O's Summary    07 Jan 2019 07:01  -  08 Jan 2019 07:00  --------------------------------------------------------  IN: 594 mL / OUT: 700 mL / NET: -106 mL    08 Jan 2019 07:01  -  08 Jan 2019 12:52  --------------------------------------------------------  IN: 433 mL / OUT: 0 mL / NET: 433 mL      < from: Cardiac Cath Lab - Adult (01.07.19 @ 15:54) >  CORONARY VESSELS: The coronary circulation is right dominant.  LM:   --  LM: Normal.  LAD:   --  Proximal LAD: Angiography showed mild atherosclerosis with no  flow limiting lesions.  CX:   --  Distal circumflex: There was a 40 % stenosis.  RI:   --  Ramus intermedius: Angiography showed severe atherosclerosis.  RCA:   --  Mid RCA: There was a 60 % stenosis.  COMPLICATIONS: There were nocomplications.  DIAGNOSTIC RECOMMENDATIONS: The patient should continue with the present  medications.  Prepared and signed by  Jarod Matthews M.D.    < end of copied text >          Assessment/Plan:  Mr. Thompson is an 86y/o male s/p TAVR last month, admitted last week with a UTI which was treated with IV abx  - echo on this admission had echodensity on ventricular side of the TAVR valve  - pt had CP and elevated troponin last Friday. Cath yesterday demonstrated CAD with recommended medical management  - on this visit, pt was examined by structural heart team and echo and KCCQ12 was done so this will count as 30 day TAVR follow up  - pt will follow up with Dr. Paredes as outpt cardiology

## 2019-01-08 NOTE — PROGRESS NOTE ADULT - REASON FOR ADMISSION
SOB abd dysuria
SOB and dysuria
SOB abd dysuria
SOB and dysuria
SOB abd dysuria

## 2019-01-17 ENCOUNTER — APPOINTMENT (OUTPATIENT)
Dept: GERIATRICS | Facility: CLINIC | Age: 84
End: 2019-01-17
Payer: MEDICARE

## 2019-01-17 VITALS
WEIGHT: 218 LBS | OXYGEN SATURATION: 98 % | TEMPERATURE: 97.3 F | BODY MASS INDEX: 35.19 KG/M2 | SYSTOLIC BLOOD PRESSURE: 122 MMHG | DIASTOLIC BLOOD PRESSURE: 62 MMHG | HEART RATE: 87 BPM

## 2019-01-17 DIAGNOSIS — Z95.2 PRESENCE OF PROSTHETIC HEART VALVE: ICD-10-CM

## 2019-01-17 DIAGNOSIS — S81.802A UNSPECIFIED OPEN WOUND, LEFT LOWER LEG, INITIAL ENCOUNTER: ICD-10-CM

## 2019-01-17 DIAGNOSIS — Z71.89 OTHER SPECIFIED COUNSELING: ICD-10-CM

## 2019-01-17 DIAGNOSIS — Z79.899 OTHER LONG TERM (CURRENT) DRUG THERAPY: ICD-10-CM

## 2019-01-17 DIAGNOSIS — I35.0 NONRHEUMATIC AORTIC (VALVE) STENOSIS: ICD-10-CM

## 2019-01-17 DIAGNOSIS — I50.22 CHRONIC SYSTOLIC (CONGESTIVE) HEART FAILURE: ICD-10-CM

## 2019-01-17 DIAGNOSIS — R26.81 UNSTEADINESS ON FEET: ICD-10-CM

## 2019-01-17 DIAGNOSIS — Z09 ENCOUNTER FOR FOLLOW-UP EXAMINATION AFTER COMPLETED TREATMENT FOR CONDITIONS OTHER THAN MALIGNANT NEOPLASM: ICD-10-CM

## 2019-01-17 PROCEDURE — 90670 PCV13 VACCINE IM: CPT

## 2019-01-17 PROCEDURE — 99215 OFFICE O/P EST HI 40 MIN: CPT | Mod: 25

## 2019-01-17 PROCEDURE — G0009: CPT

## 2019-01-17 NOTE — PHYSICAL EXAM
[General Appearance - Alert] : alert [General Appearance - In No Acute Distress] : in no acute distress [Sclera] : the sclera and conjunctiva were normal [PERRL With Normal Accommodation] : pupils were equal in size, round, and reactive to light [Normal Oral Mucosa] : normal oral mucosa [Outer Ear] : the ears and nose were normal in appearance [Neck Appearance] : the appearance of the neck was normal [Jugular Venous Distention Increased] : there was no jugular-venous distention [] : no respiratory distress [Respiration, Rhythm And Depth] : normal respiratory rhythm and effort [Exaggerated Use Of Accessory Muscles For Inspiration] : no accessory muscle use [Auscultation Breath Sounds / Voice Sounds] : lungs were clear to auscultation bilaterally [Bowel Sounds] : normal bowel sounds [Abdomen Soft] : soft [Abdomen Tenderness] : non-tender [No CVA Tenderness] : no ~M costovertebral angle tenderness [No Spinal Tenderness] : no spinal tenderness [Involuntary Movements] : no involuntary movements were seen [Motor Tone] : muscle strength and tone were normal [No Focal Deficits] : no focal deficits [Oriented To Time, Place, And Person] : oriented to person, place, and time [Affect] : the affect was normal [Mood] : the mood was normal [General Appearance - Well-Appearing] : healthy appearing [FreeTextEntry1] : memory loss

## 2019-01-17 NOTE — ASSESSMENT
[FreeTextEntry1] : f/u in 3 mo for annual wellness visit , unless earlier PRN\par \par MMSE\par Depression scrn\par CCM\par ACP

## 2019-01-17 NOTE — DATA REVIEWED
[FreeTextEntry1] : Inpatient EMR records, including labs, imaging, d/c note. \par \par 1/1/19: TTE: EF 45 %

## 2019-01-17 NOTE — HISTORY OF PRESENT ILLNESS
[0] : 2) Feeling down, depressed, or hopeless: Not at all [PHQ-2 Score ___] : PHQ-2 Score [unfilled] [FreeTextEntry1] : 88 y/o Man, , who presents for f/u visit of polypharmacy, Severe AS s/p recent TAVR. \par \par Today denies having any specific complaints. Hospitalized for UTI recently. States had "problem with the heart" had repeat cath and everythign turned out fine per pt. Urinary symptoms (hematuria) resolved s/p course of abx. States going to [place] tomorrow for a few weeks. \par \par POLYPHARMACY\par - Take medications on his own, fills his own pill box  - advised to have son help will filling pill box\par - [facility] Ish Poon [phone] / [phone] at no additional cost \par - No new meds, was rec to inc lasix to alternate 40mg daily with 40mg BID 3x/week\par \par Severe AS \par - s/p TAVR on 11/17/18. \par - Feels better in terms of SOB but still occasional SOB on exertion. \par - Seen for f/u with Dr. Zuluaga on 12/10\par \par AFIB / HFrEF 45%\par - On Lasix daily (despite rec to alternate Lasix with 40mg BID 3x/week), Metoprolol, Spirinolactone\par - Also takes Amiodarone, and Eliquis \par - Going to see cards Timothy Paredes [phone] later today for f/u. \par \par b/l Leg wounds\par - chronic, improved, now closed and as previous baseline per pt. \par - Referred to vascular surgery/wound care in Dec '18 - pending scheduling appointment \par \par COPD\par - follows w/ pulm Dr. Mack\par \par DIEGO\par - States he had a sleep study but was incomplete was requested to return for another test - states he's not sure if it will help. \par \par GAIT INSTABILITY\par - Ambulates with cane at baseline. No recent falls. \par \par HCM\par - Flu Nov '18\par - Prevnar - discussed r/b/a and pt agrees to get today \par - Shingrix - discussed briefly, pt wishes to hold off for now, will consider at next visit \par \par ACP\par - HCP: daughter Carolina Crespo (), Rafia Abram is alternate (dentist) \par - FULL CODE

## 2019-01-17 NOTE — REVIEW OF SYSTEMS
[Recent Weight Gain (___ Lbs)] : recent [unfilled] ~Ulb weight gain [Loss Of Hearing] : hearing loss [Chest Pain] : chest pain [Lower Ext Edema] : lower extremity edema [Nocturia] : nocturia [Arthralgias] : arthralgias [As Noted in HPI] : as noted in HPI [Skin Lesions] : skin lesion [Skin Wound] : skin wound [Difficulty Walking] : difficulty walking [Sleep Disturbances] : sleep disturbances [Negative] : Heme/Lymph [Fever] : no fever [Chills] : no chills [Eyesight Problems] : no eyesight problems [Palpitations] : no palpitations [Cough] : no cough [Abdominal Pain] : no abdominal pain [Vomiting] : no vomiting [Constipation] : no constipation [Diarrhea] : no diarrhea [Melena] : no melena [Dysuria] : no dysuria [Fainting] : no fainting [Depression] : no depression [FreeTextEntry4] : tinnitus, chronic x70 years, rarely uses his hearing aids  [de-identified] : has home visiting RN after recent hospitalization [de-identified] : ambulates with cane

## 2019-01-17 NOTE — REASON FOR VISIT
[Post Hospitalization] : a post hospitalization visit [FreeTextEntry1] : s/p HOspitalization for UTI

## 2019-01-24 ENCOUNTER — INPATIENT (INPATIENT)
Facility: HOSPITAL | Age: 84
LOS: 1 days | Discharge: HOME | DRG: 191 | End: 2019-01-26
Attending: INTERNAL MEDICINE | Admitting: INTERNAL MEDICINE
Payer: MEDICARE

## 2019-01-24 VITALS
RESPIRATION RATE: 26 BRPM | WEIGHT: 216.05 LBS | OXYGEN SATURATION: 97 % | TEMPERATURE: 98 F | DIASTOLIC BLOOD PRESSURE: 75 MMHG | SYSTOLIC BLOOD PRESSURE: 139 MMHG | HEART RATE: 110 BPM | HEIGHT: 67 IN

## 2019-01-24 DIAGNOSIS — I48.91 UNSPECIFIED ATRIAL FIBRILLATION: ICD-10-CM

## 2019-01-24 DIAGNOSIS — Z01.818 ENCOUNTER FOR OTHER PREPROCEDURAL EXAMINATION: ICD-10-CM

## 2019-01-24 DIAGNOSIS — Z95.0 PRESENCE OF CARDIAC PACEMAKER: Chronic | ICD-10-CM

## 2019-01-24 DIAGNOSIS — J44.1 CHRONIC OBSTRUCTIVE PULMONARY DISEASE WITH (ACUTE) EXACERBATION: ICD-10-CM

## 2019-01-24 DIAGNOSIS — I25.10 ATHEROSCLEROTIC HEART DISEASE OF NATIVE CORONARY ARTERY WITHOUT ANGINA PECTORIS: ICD-10-CM

## 2019-01-24 DIAGNOSIS — I35.0 NONRHEUMATIC AORTIC (VALVE) STENOSIS: ICD-10-CM

## 2019-01-24 DIAGNOSIS — I50.32 CHRONIC DIASTOLIC (CONGESTIVE) HEART FAILURE: ICD-10-CM

## 2019-01-24 DIAGNOSIS — I63.9 CEREBRAL INFARCTION, UNSPECIFIED: ICD-10-CM

## 2019-01-24 DIAGNOSIS — M54.5 LOW BACK PAIN: ICD-10-CM

## 2019-01-24 DIAGNOSIS — Z29.9 ENCOUNTER FOR PROPHYLACTIC MEASURES, UNSPECIFIED: ICD-10-CM

## 2019-01-24 DIAGNOSIS — Z95.2 PRESENCE OF PROSTHETIC HEART VALVE: Chronic | ICD-10-CM

## 2019-01-24 DIAGNOSIS — G47.33 OBSTRUCTIVE SLEEP APNEA (ADULT) (PEDIATRIC): ICD-10-CM

## 2019-01-24 LAB
ALBUMIN SERPL ELPH-MCNC: 3.5 G/DL — SIGNIFICANT CHANGE UP (ref 3.3–5)
ALP SERPL-CCNC: 128 U/L — HIGH (ref 40–120)
ALT FLD-CCNC: 24 U/L — SIGNIFICANT CHANGE UP (ref 10–45)
ANION GAP SERPL CALC-SCNC: 11 MMOL/L — SIGNIFICANT CHANGE UP (ref 5–17)
APTT BLD: 32 SEC — SIGNIFICANT CHANGE UP (ref 27.5–36.3)
AST SERPL-CCNC: 22 U/L — SIGNIFICANT CHANGE UP (ref 10–40)
BASE EXCESS BLDV CALC-SCNC: 0.2 MMOL/L — SIGNIFICANT CHANGE UP (ref -2–2)
BASOPHILS # BLD AUTO: 0 K/UL — SIGNIFICANT CHANGE UP (ref 0–0.2)
BASOPHILS NFR BLD AUTO: 0.2 % — SIGNIFICANT CHANGE UP (ref 0–2)
BILIRUB SERPL-MCNC: 0.5 MG/DL — SIGNIFICANT CHANGE UP (ref 0.2–1.2)
BUN SERPL-MCNC: 17 MG/DL — SIGNIFICANT CHANGE UP (ref 7–23)
CA-I SERPL-SCNC: 1.26 MMOL/L — SIGNIFICANT CHANGE UP (ref 1.12–1.3)
CALCIUM SERPL-MCNC: 9.3 MG/DL — SIGNIFICANT CHANGE UP (ref 8.4–10.5)
CHLORIDE BLDV-SCNC: 109 MMOL/L — HIGH (ref 96–108)
CHLORIDE SERPL-SCNC: 106 MMOL/L — SIGNIFICANT CHANGE UP (ref 96–108)
CO2 BLDV-SCNC: 27 MMOL/L — SIGNIFICANT CHANGE UP (ref 22–30)
CO2 SERPL-SCNC: 23 MMOL/L — SIGNIFICANT CHANGE UP (ref 22–31)
CREAT SERPL-MCNC: 0.95 MG/DL — SIGNIFICANT CHANGE UP (ref 0.5–1.3)
EOSINOPHIL # BLD AUTO: 0.1 K/UL — SIGNIFICANT CHANGE UP (ref 0–0.5)
EOSINOPHIL NFR BLD AUTO: 0.8 % — SIGNIFICANT CHANGE UP (ref 0–6)
GAS PNL BLDV: 138 MMOL/L — SIGNIFICANT CHANGE UP (ref 136–145)
GAS PNL BLDV: SIGNIFICANT CHANGE UP
GLUCOSE BLDV-MCNC: 130 MG/DL — HIGH (ref 70–99)
GLUCOSE SERPL-MCNC: 139 MG/DL — HIGH (ref 70–99)
HCO3 BLDV-SCNC: 25 MMOL/L — SIGNIFICANT CHANGE UP (ref 21–29)
HCT VFR BLD CALC: 39.4 % — SIGNIFICANT CHANGE UP (ref 39–50)
HCT VFR BLDA CALC: 40 % — SIGNIFICANT CHANGE UP (ref 39–50)
HGB BLD CALC-MCNC: 12.9 G/DL — LOW (ref 13–17)
HGB BLD-MCNC: 13.3 G/DL — SIGNIFICANT CHANGE UP (ref 13–17)
INR BLD: 1.46 RATIO — HIGH (ref 0.88–1.16)
LACTATE BLDV-MCNC: 2.2 MMOL/L — HIGH (ref 0.7–2)
LYMPHOCYTES # BLD AUTO: 1 K/UL — SIGNIFICANT CHANGE UP (ref 1–3.3)
LYMPHOCYTES # BLD AUTO: 11.2 % — LOW (ref 13–44)
MCHC RBC-ENTMCNC: 29.8 PG — SIGNIFICANT CHANGE UP (ref 27–34)
MCHC RBC-ENTMCNC: 33.7 GM/DL — SIGNIFICANT CHANGE UP (ref 32–36)
MCV RBC AUTO: 88.6 FL — SIGNIFICANT CHANGE UP (ref 80–100)
MONOCYTES # BLD AUTO: 0.8 K/UL — SIGNIFICANT CHANGE UP (ref 0–0.9)
MONOCYTES NFR BLD AUTO: 9.4 % — SIGNIFICANT CHANGE UP (ref 2–14)
NEUTROPHILS # BLD AUTO: 7 K/UL — SIGNIFICANT CHANGE UP (ref 1.8–7.4)
NEUTROPHILS NFR BLD AUTO: 78.4 % — HIGH (ref 43–77)
NT-PROBNP SERPL-SCNC: 3724 PG/ML — HIGH (ref 0–300)
OTHER CELLS CSF MANUAL: 9 ML/DL — LOW (ref 18–22)
PCO2 BLDV: 46 MMHG — SIGNIFICANT CHANGE UP (ref 35–50)
PH BLDV: 7.36 — SIGNIFICANT CHANGE UP (ref 7.35–7.45)
PLATELET # BLD AUTO: 177 K/UL — SIGNIFICANT CHANGE UP (ref 150–400)
PO2 BLDV: 30 MMHG — SIGNIFICANT CHANGE UP (ref 25–45)
POTASSIUM BLDV-SCNC: 4.1 MMOL/L — SIGNIFICANT CHANGE UP (ref 3.5–5.3)
POTASSIUM SERPL-MCNC: 4.3 MMOL/L — SIGNIFICANT CHANGE UP (ref 3.5–5.3)
POTASSIUM SERPL-SCNC: 4.3 MMOL/L — SIGNIFICANT CHANGE UP (ref 3.5–5.3)
PROT SERPL-MCNC: 6.6 G/DL — SIGNIFICANT CHANGE UP (ref 6–8.3)
PROTHROM AB SERPL-ACNC: 17 SEC — HIGH (ref 10–12.9)
RBC # BLD: 4.45 M/UL — SIGNIFICANT CHANGE UP (ref 4.2–5.8)
RBC # FLD: 12.5 % — SIGNIFICANT CHANGE UP (ref 10.3–14.5)
SAO2 % BLDV: 52 % — LOW (ref 67–88)
SODIUM SERPL-SCNC: 140 MMOL/L — SIGNIFICANT CHANGE UP (ref 135–145)
TROPONIN T, HIGH SENSITIVITY RESULT: 68 NG/L — HIGH (ref 0–51)
WBC # BLD: 8.9 K/UL — SIGNIFICANT CHANGE UP (ref 3.8–10.5)
WBC # FLD AUTO: 8.9 K/UL — SIGNIFICANT CHANGE UP (ref 3.8–10.5)

## 2019-01-24 PROCEDURE — 99285 EMERGENCY DEPT VISIT HI MDM: CPT | Mod: GC,25

## 2019-01-24 PROCEDURE — 71045 X-RAY EXAM CHEST 1 VIEW: CPT | Mod: 26

## 2019-01-24 PROCEDURE — 93010 ELECTROCARDIOGRAM REPORT: CPT

## 2019-01-24 PROCEDURE — 93971 EXTREMITY STUDY: CPT | Mod: 26

## 2019-01-24 RX ORDER — METOPROLOL TARTRATE 50 MG
50 TABLET ORAL DAILY
Qty: 0 | Refills: 0 | Status: DISCONTINUED | OUTPATIENT
Start: 2019-01-24 | End: 2019-01-26

## 2019-01-24 RX ORDER — FINASTERIDE 5 MG/1
5 TABLET, FILM COATED ORAL DAILY
Qty: 0 | Refills: 0 | Status: DISCONTINUED | OUTPATIENT
Start: 2019-01-24 | End: 2019-01-26

## 2019-01-24 RX ORDER — ALBUTEROL 90 UG/1
2.5 AEROSOL, METERED ORAL EVERY 6 HOURS
Qty: 0 | Refills: 0 | Status: DISCONTINUED | OUTPATIENT
Start: 2019-01-24 | End: 2019-01-26

## 2019-01-24 RX ORDER — POTASSIUM CHLORIDE 20 MEQ
40 PACKET (EA) ORAL ONCE
Qty: 0 | Refills: 0 | Status: DISCONTINUED | OUTPATIENT
Start: 2019-01-24 | End: 2019-01-24

## 2019-01-24 RX ORDER — SPIRONOLACTONE 25 MG/1
25 TABLET, FILM COATED ORAL DAILY
Qty: 0 | Refills: 0 | Status: DISCONTINUED | OUTPATIENT
Start: 2019-01-24 | End: 2019-01-26

## 2019-01-24 RX ORDER — CLOPIDOGREL BISULFATE 75 MG/1
75 TABLET, FILM COATED ORAL DAILY
Qty: 0 | Refills: 0 | Status: DISCONTINUED | OUTPATIENT
Start: 2019-01-24 | End: 2019-01-26

## 2019-01-24 RX ORDER — APIXABAN 2.5 MG/1
5 TABLET, FILM COATED ORAL EVERY 12 HOURS
Qty: 0 | Refills: 0 | Status: DISCONTINUED | OUTPATIENT
Start: 2019-01-24 | End: 2019-01-26

## 2019-01-24 RX ORDER — IPRATROPIUM/ALBUTEROL SULFATE 18-103MCG
3 AEROSOL WITH ADAPTER (GRAM) INHALATION ONCE
Qty: 0 | Refills: 0 | Status: COMPLETED | OUTPATIENT
Start: 2019-01-24 | End: 2019-01-24

## 2019-01-24 RX ORDER — FUROSEMIDE 40 MG
40 TABLET ORAL
Qty: 0 | Refills: 0 | Status: DISCONTINUED | OUTPATIENT
Start: 2019-01-24 | End: 2019-01-26

## 2019-01-24 RX ORDER — POTASSIUM CHLORIDE 20 MEQ
10 PACKET (EA) ORAL
Qty: 0 | Refills: 0 | Status: DISCONTINUED | OUTPATIENT
Start: 2019-01-24 | End: 2019-01-24

## 2019-01-24 RX ORDER — TAMSULOSIN HYDROCHLORIDE 0.4 MG/1
0.4 CAPSULE ORAL AT BEDTIME
Qty: 0 | Refills: 0 | Status: DISCONTINUED | OUTPATIENT
Start: 2019-01-24 | End: 2019-01-26

## 2019-01-24 RX ORDER — AZITHROMYCIN 500 MG/1
500 TABLET, FILM COATED ORAL ONCE
Qty: 0 | Refills: 0 | Status: COMPLETED | OUTPATIENT
Start: 2019-01-24 | End: 2019-01-24

## 2019-01-24 RX ORDER — ALBUTEROL 90 UG/1
2.5 AEROSOL, METERED ORAL EVERY 6 HOURS
Qty: 0 | Refills: 0 | Status: DISCONTINUED | OUTPATIENT
Start: 2019-01-24 | End: 2019-01-24

## 2019-01-24 RX ORDER — BUDESONIDE AND FORMOTEROL FUMARATE DIHYDRATE 160; 4.5 UG/1; UG/1
2 AEROSOL RESPIRATORY (INHALATION)
Qty: 0 | Refills: 0 | Status: DISCONTINUED | OUTPATIENT
Start: 2019-01-24 | End: 2019-01-26

## 2019-01-24 RX ORDER — ACETAMINOPHEN 500 MG
975 TABLET ORAL ONCE
Qty: 0 | Refills: 0 | Status: COMPLETED | OUTPATIENT
Start: 2019-01-24 | End: 2019-01-24

## 2019-01-24 RX ORDER — AMIODARONE HYDROCHLORIDE 400 MG/1
200 TABLET ORAL DAILY
Qty: 0 | Refills: 0 | Status: DISCONTINUED | OUTPATIENT
Start: 2019-01-24 | End: 2019-01-26

## 2019-01-24 RX ADMIN — APIXABAN 5 MILLIGRAM(S): 2.5 TABLET, FILM COATED ORAL at 22:48

## 2019-01-24 RX ADMIN — AZITHROMYCIN 250 MILLIGRAM(S): 500 TABLET, FILM COATED ORAL at 14:59

## 2019-01-24 RX ADMIN — Medication 975 MILLIGRAM(S): at 14:58

## 2019-01-24 RX ADMIN — Medication 40 MILLIGRAM(S): at 22:48

## 2019-01-24 RX ADMIN — TAMSULOSIN HYDROCHLORIDE 0.4 MILLIGRAM(S): 0.4 CAPSULE ORAL at 22:48

## 2019-01-24 RX ADMIN — ALBUTEROL 2.5 MILLIGRAM(S): 90 AEROSOL, METERED ORAL at 23:37

## 2019-01-24 RX ADMIN — Medication 3 MILLILITER(S): at 12:42

## 2019-01-24 RX ADMIN — Medication 3 MILLILITER(S): at 14:58

## 2019-01-24 RX ADMIN — Medication 50 MILLIGRAM(S): at 14:59

## 2019-01-24 NOTE — H&P ADULT - HISTORY OF PRESENT ILLNESS
Pt is a 86 yo M, accompanied by son in law, w/ PMH of HTN, HLD, remote CVA, COPD, CAD with Stents, CHF, chronic afib on eliquis, chronic systolic heart failure s/p TAVR  - # 34  Medtronic Core Valve on 11/27/18.- with Dr Zuluaga, recent admission to [facility] from 01/01/19 to 01/08/19 d/t SOB and UTI, presenting to [facility] ED today d/t SOB and upper back pain x 2 days. Patient had cardiac cath during last admission - CX:   --  Distal circumflex: There was a 40 % stenosis.  	   	RI:   --  Ramus intermedius: Angiography showed severe atherosclerosis.  	   	RCA:   --  Mid RCA: There was a 60 % stenosis. Patient states that he has never had to be intubated d/t shortness of breath; also states that he has O2 at home as needed, but does not use his home O2 or his home inhalers. Patient denies other complaints. Denies headache, neck stiffness, fever/chills, vision change, chest pain, palpitations, lightheadedness, numbness, tingling, abdominal pain, nausea, vomiting, diarrhea, dysuria, hematuria, syncope. Pt is a 86 yo M, accompanied by son in law, w/ PMH of HTN, HLD, remote CVA, COPD, CAD with Stents, CHF, chronic afib on eliquis, chronic systolic heart failure s/p TAVR  - # 34  Medtronic Core Valve on 11/27/18.- with Dr Zuluaga, recent admission to [facility] from 01/01/19 to 01/08/19 d/t SOB and UTI, presenting to [facility] ED today d/t SOB and lower back pain x 2 days. Patient had cardiac cath during last admission - CX:   --  Distal circumflex: There was a 40 % stenosis.  	   	RI:   --  Ramus intermedius: Angiography showed severe atherosclerosis.  	   	RCA:   --  Mid RCA: There was a 60 % stenosis. Patient states that he has never had to be intubated d/t shortness of breath; also states that he has O2 at home as needed, but does not use his home O2 or his home inhalers. Patient denies other complaints. Denies headache, neck stiffness, fever/chills, vision change, chest pain, palpitations, lightheadedness, numbness, tingling, abdominal pain, nausea, vomiting, diarrhea, dysuria, hematuria, syncope.

## 2019-01-24 NOTE — H&P ADULT - NSHPREVIEWOFSYSTEMS_GEN_ALL_CORE
REVIEW OF SYSTEMS:    CONSTITUTIONAL: No weakness, fevers or chills  EYES/ENT: No visual changes;  No vertigo or throat pain   NECK: No pain or stiffness  RESPIRATORY: + SOB  CARDIOVASCULAR: No chest pain or palpitations  GASTROINTESTINAL: No abdominal or epigastric pain. No nausea, vomiting, or hematemesis; No diarrhea or constipation. No melena or hematochezia.  GENITOURINARY: No dysuria, frequency or hematuria  NEUROLOGICAL: No numbness or weakness, + back pain   SKIN: No itching, burning, rashes, or lesions   All other review of systems is negative unless indicated above.

## 2019-01-24 NOTE — H&P ADULT - PMH
Aortic stenosis    Asthma    Atrial fibrillation    BPH (benign prostatic hyperplasia)    CAD (coronary artery disease)  s/p stent 2010  CHF (congestive heart failure)    Chronic diastolic HF (heart failure)    CKD (chronic kidney disease) stage 3, GFR 30-59 ml/min    COPD (chronic obstructive pulmonary disease)    CVA (cerebral vascular accident)    Gait instability    H/O thalassemia    Hepatitis B    Hypertension    DIEGO (obstructive sleep apnea)    Pacemaker  2015 Medtronic [id number]  A2DR01  PTSD (post-traumatic stress disorder)    Seizure    Tachy-idris syndrome    TIA (transient ischemic attack)  2010

## 2019-01-24 NOTE — H&P ADULT - NSHPPHYSICALEXAM_GEN_ALL_CORE
Vital Signs Last 24 Hrs  T(C): 36.8 (24 Jan 2019 18:26), Max: 36.8 (24 Jan 2019 11:25)  T(F): 98.3 (24 Jan 2019 18:26), Max: 98.3 (24 Jan 2019 12:22)  HR: 92 (24 Jan 2019 18:26) (84 - 110)  BP: 131/82 (24 Jan 2019 18:26) (131/82 - 139/75)  BP(mean): --  RR: 20 (24 Jan 2019 18:26) (20 - 26)  SpO2: 97% (24 Jan 2019 18:26) (97% - 98%) Vital Signs Last 24 Hrs  T(C): 36.8 (24 Jan 2019 18:26), Max: 36.8 (24 Jan 2019 11:25)  T(F): 98.3 (24 Jan 2019 18:26), Max: 98.3 (24 Jan 2019 12:22)  HR: 92 (24 Jan 2019 18:26) (84 - 110)  BP: 131/82 (24 Jan 2019 18:26) (131/82 - 139/75)  BP(mean): --  RR: 20 (24 Jan 2019 18:26) (20 - 26)  SpO2: 97% (24 Jan 2019 18:26) (97% - 98%)    Appearance: Normal	  HEENT:   Normal oral mucosa, PERRL, EOMI	  Lymphatic: No lymphadenopathy , no edema  Cardiovascular: Normal S1 S2, No JVD, No murmurs , Peripheral pulses palpable 2+ bilaterally  Respiratory: Lungs clear to auscultation, normal effort 	  Gastrointestinal:  Soft, Non-tender, + BS	  Skin: No rashes, No ecchymoses, No cyanosis, warm to touch  Musculoskeletal: Normal range of motion, normal strength, CVA tenderness   Psychiatry:  Mood & affect appropriate  Ext: B/L LE venous insufficiency, +1 pitting edema

## 2019-01-24 NOTE — ED ADULT NURSE NOTE - OBJECTIVE STATEMENT
88 yo M presents to ED A+Ox3 accompanied by his son in law c/o shortness of breath and upper back pain. 86 yo M presents to ED A+Ox3 accompanied by his son in law c/o shortness of breath and upper back pain. Pt. reports chronic shortness of breath, states I've had shortness of breath for years." Pt. reports x2 88 yo M presents to ED A+Ox3 accompanied by his son in law c/o shortness of breath and upper back pain. Pt. reports chronic shortness of breath, states I've had shortness of breath for years." Pt. reports x2days of upper back pain. Pt. denies chest pain, palpitations, fever, chills, cough, abdominal pain. Lungs clear and diminished in all fields. Skin warm pink and dry. Abdomen soft, nondistended. EKG completed, pt. on cardiac monitor-atrial fibrillation, rate in 70s. Pt. reports having TAVR procedure done in December 2018. Family at bedside. Bed in lowest position, side rails up. Comfort and safety measures in place.

## 2019-01-24 NOTE — ED PROVIDER NOTE - CONSTITUTIONAL, MLM
normal... Well appearing, well nourished, awake, alert, oriented to person, place, time/situation and appears mildly distressed.

## 2019-01-24 NOTE — PATIENT PROFILE ADULT - ABILITY TO HEAR (WITH HEARING AID OR HEARING APPLIANCE IF NORMALLY USED):
Has hearing aides at home/Mildly to Moderately Impaired: difficulty hearing in some environments or speaker may need to increase volume or speak distinctly

## 2019-01-24 NOTE — ED PROVIDER NOTE - OBJECTIVE STATEMENT
86 yo M, accompanied by son in law, w/ PMH of HTN, HLD, remote CVA, COPD, CAD with Stents, CHF, chronic afib on eliquis, chronic systolic heart failure s/p TAVR  - # 34  Medtronic Core Valve on 11/27/18.- with Dr Zuluaga, recent admission to [facility] from 01/01/19 to 01/08/19 d/t SOB and UTI, presenting to [facility] ED today d/t SOB and upper back pain x 2 days. Patient had cardiac cath during last admission - CX:   --  Distal circumflex: There was a 40 % stenosis.     RI:   --  Ramus intermedius: Angiography showed severe atherosclerosis.     RCA:   --  Mid RCA: There was a 60 % stenosis. Patient states that he has never had to be intubated d/t shortness of breath; also states that he has O2 at home as needed, but does not use his home O2 or his home inhalers. Patient denies other complaints. Denies headache, neck stiffness, fever/chills, vision change, chest pain, palpitations, lightheadedness, numbness, tingling, abdominal pain, nausea, vomiting, diarrhea, dysuria, hematuria, syncope.     PMD: Dr. Kathleen Joseph: Dr. Mack  Card: Dr. Manley  Geriatrician: Dr. Valentin  Pharmacy: [place], [place]

## 2019-01-24 NOTE — H&P ADULT - ATTENDING COMMENTS
Differential diagnosis and plan of care discussed with patient after the evaluation.   Advanced care planning options discussed.   Pain assessed and judicious use of narcotics when appropriate was discussed.  Importance of Fall prevention discussed.  Counseling on Smoking and Alcohol cessation was offered when appropriate.  Counseling on Diet, exercise, and medication compliance was done.

## 2019-01-24 NOTE — ED ADULT NURSE NOTE - PMH
Aortic stenosis    Asthma    Atrial fibrillation    BPH (benign prostatic hyperplasia)    CAD (coronary artery disease)  s/p stent 2010  CHF (congestive heart failure)    Chronic diastolic HF (heart failure)    CKD (chronic kidney disease) stage 3, GFR 30-59 ml/min    COPD (chronic obstructive pulmonary disease)    CVA (cerebral vascular accident)    Gait instability    H/O thalassemia    Hepatitis B    Hypertension    DIEGO (obstructive sleep apnea)    Pacemaker  2015 Medtronic [id number]  A2DR01  PTSD (post-traumatic stress disorder)    Seizure    Tachy-idris syndrome    TIA (transient ischemic attack)  2010

## 2019-01-24 NOTE — H&P ADULT - PROBLEM SELECTOR PLAN 1
SOB with worsening with exertion   S/P TAVR   Nebs and steroids   O2 supplement SOB with worsening with exertion   S/P TAVR   Nebs and steroids   O2 supplement  trend trops

## 2019-01-24 NOTE — H&P ADULT - ASSESSMENT
Pt is a 88 yo M, accompanied by son in law, w/ PMH of HTN, HLD, remote CVA, COPD, CAD with Stents, CHF, chronic afib on eliquis, chronic systolic heart failure s/p TAVR  - # 34  Medtronic Core Valve on 11/27/18.- with Dr Zuluaga, recent admission to [facility] from 01/01/19 to 01/08/19 d/t SOB and UTI, presenting to [facility] ED today d/t SOB and lower back pain x 2 days.

## 2019-01-24 NOTE — ED PROVIDER NOTE - MEDICAL DECISION MAKING DETAILS
Richard: SOB with hx of both copd and chf, Will evaluate for cause of SOB. no fever, afib by ekg. patient states he has had irregular rhythm before.

## 2019-01-24 NOTE — ED PROVIDER NOTE - ATTENDING CONTRIBUTION TO CARE
I performed a history and physical exam of the patient and discussed their management with the resident and /or advanced care provider. I reviewed the resident and /or ACP's note and agree with the documented findings and plan of care. My medical decision making and observations are found above.  Abd soft, lungs decreased sounds throughout.

## 2019-01-24 NOTE — H&P ADULT - PROBLEM SELECTOR PLAN 2
pain started acutely two days ago  R/O kidney stone Vs pyelonephritis   mild lactic acidosis   Ct A/P for evaluation pain started acutely two days ago  R/O kidney stone Vs pyelonephritis   mild lactic acidosis, trend  Ct A/P for evaluation

## 2019-01-24 NOTE — H&P ADULT - NSHPLABSRESULTS_GEN_ALL_CORE
13.3   8.9   )-----------( 177      ( 24 Jan 2019 12:43 )             39.4               01-24    140  |  106  |  17  ----------------------------<  139<H>  4.3   |  23  |  0.95    Ca    9.3      24 Jan 2019 12:43    TPro  6.6  /  Alb  3.5  /  TBili  0.5  /  DBili  x   /  AST  22  /  ALT  24  /  AlkPhos  128<H>  01-24    PT/INR - ( 24 Jan 2019 12:43 )   PT: 17.0 sec;   INR: 1.46 ratio         PTT - ( 24 Jan 2019 12:43 )  PTT:32.0 sec

## 2019-01-25 ENCOUNTER — TRANSCRIPTION ENCOUNTER (OUTPATIENT)
Age: 84
End: 2019-01-25

## 2019-01-25 DIAGNOSIS — I35.0 NONRHEUMATIC AORTIC (VALVE) STENOSIS: ICD-10-CM

## 2019-01-25 DIAGNOSIS — Z01.818 ENCOUNTER FOR OTHER PREPROCEDURAL EXAMINATION: ICD-10-CM

## 2019-01-25 LAB
ALBUMIN SERPL ELPH-MCNC: 3.7 G/DL — SIGNIFICANT CHANGE UP (ref 3.3–5)
ALP SERPL-CCNC: 119 U/L — SIGNIFICANT CHANGE UP (ref 40–120)
ALT FLD-CCNC: 22 U/L — SIGNIFICANT CHANGE UP (ref 10–45)
ANION GAP SERPL CALC-SCNC: 12 MMOL/L — SIGNIFICANT CHANGE UP (ref 5–17)
APPEARANCE UR: CLEAR — SIGNIFICANT CHANGE UP
AST SERPL-CCNC: 15 U/L — SIGNIFICANT CHANGE UP (ref 10–40)
BILIRUB SERPL-MCNC: 0.5 MG/DL — SIGNIFICANT CHANGE UP (ref 0.2–1.2)
BILIRUB UR-MCNC: NEGATIVE — SIGNIFICANT CHANGE UP
BUN SERPL-MCNC: 16 MG/DL — SIGNIFICANT CHANGE UP (ref 7–23)
CALCIUM SERPL-MCNC: 9.3 MG/DL — SIGNIFICANT CHANGE UP (ref 8.4–10.5)
CHLORIDE SERPL-SCNC: 108 MMOL/L — SIGNIFICANT CHANGE UP (ref 96–108)
CO2 SERPL-SCNC: 22 MMOL/L — SIGNIFICANT CHANGE UP (ref 22–31)
COLOR SPEC: COLORLESS — SIGNIFICANT CHANGE UP
CREAT SERPL-MCNC: 0.87 MG/DL — SIGNIFICANT CHANGE UP (ref 0.5–1.3)
DIFF PNL FLD: NEGATIVE — SIGNIFICANT CHANGE UP
GLUCOSE SERPL-MCNC: 102 MG/DL — HIGH (ref 70–99)
GLUCOSE UR QL: NEGATIVE — SIGNIFICANT CHANGE UP
HCT VFR BLD CALC: 39.1 % — SIGNIFICANT CHANGE UP (ref 39–50)
HGB BLD-MCNC: 12.7 G/DL — LOW (ref 13–17)
KETONES UR-MCNC: NEGATIVE — SIGNIFICANT CHANGE UP
LEUKOCYTE ESTERASE UR-ACNC: NEGATIVE — SIGNIFICANT CHANGE UP
MCHC RBC-ENTMCNC: 28.9 PG — SIGNIFICANT CHANGE UP (ref 27–34)
MCHC RBC-ENTMCNC: 32.5 GM/DL — SIGNIFICANT CHANGE UP (ref 32–36)
MCV RBC AUTO: 89.1 FL — SIGNIFICANT CHANGE UP (ref 80–100)
NITRITE UR-MCNC: NEGATIVE — SIGNIFICANT CHANGE UP
PH UR: 6 — SIGNIFICANT CHANGE UP (ref 5–8)
PLATELET # BLD AUTO: 216 K/UL — SIGNIFICANT CHANGE UP (ref 150–400)
POTASSIUM SERPL-MCNC: 4.3 MMOL/L — SIGNIFICANT CHANGE UP (ref 3.5–5.3)
POTASSIUM SERPL-SCNC: 4.3 MMOL/L — SIGNIFICANT CHANGE UP (ref 3.5–5.3)
PROT SERPL-MCNC: 6.6 G/DL — SIGNIFICANT CHANGE UP (ref 6–8.3)
PROT UR-MCNC: NEGATIVE — SIGNIFICANT CHANGE UP
RBC # BLD: 4.39 M/UL — SIGNIFICANT CHANGE UP (ref 4.2–5.8)
RBC # FLD: 13.6 % — SIGNIFICANT CHANGE UP (ref 10.3–14.5)
SODIUM SERPL-SCNC: 142 MMOL/L — SIGNIFICANT CHANGE UP (ref 135–145)
SP GR SPEC: 1.02 — SIGNIFICANT CHANGE UP (ref 1.01–1.02)
TROPONIN T, HIGH SENSITIVITY RESULT: 54 NG/L — HIGH (ref 0–51)
UROBILINOGEN FLD QL: NEGATIVE — SIGNIFICANT CHANGE UP
WBC # BLD: 8.62 K/UL — SIGNIFICANT CHANGE UP (ref 3.8–10.5)
WBC # FLD AUTO: 8.62 K/UL — SIGNIFICANT CHANGE UP (ref 3.8–10.5)

## 2019-01-25 PROCEDURE — C1889: CPT

## 2019-01-25 PROCEDURE — C1769: CPT

## 2019-01-25 PROCEDURE — C1894: CPT

## 2019-01-25 PROCEDURE — 82435 ASSAY OF BLOOD CHLORIDE: CPT

## 2019-01-25 PROCEDURE — 85027 COMPLETE CBC AUTOMATED: CPT

## 2019-01-25 PROCEDURE — 82803 BLOOD GASES ANY COMBINATION: CPT

## 2019-01-25 PROCEDURE — 93005 ELECTROCARDIOGRAM TRACING: CPT

## 2019-01-25 PROCEDURE — 82962 GLUCOSE BLOOD TEST: CPT

## 2019-01-25 PROCEDURE — 82947 ASSAY GLUCOSE BLOOD QUANT: CPT

## 2019-01-25 PROCEDURE — 84132 ASSAY OF SERUM POTASSIUM: CPT

## 2019-01-25 PROCEDURE — C1887: CPT

## 2019-01-25 PROCEDURE — 74177 CT ABD & PELVIS W/CONTRAST: CPT | Mod: 26

## 2019-01-25 PROCEDURE — 83605 ASSAY OF LACTIC ACID: CPT

## 2019-01-25 PROCEDURE — 82330 ASSAY OF CALCIUM: CPT

## 2019-01-25 PROCEDURE — C1760: CPT

## 2019-01-25 PROCEDURE — 85014 HEMATOCRIT: CPT

## 2019-01-25 PROCEDURE — 86923 COMPATIBILITY TEST ELECTRIC: CPT

## 2019-01-25 PROCEDURE — 84295 ASSAY OF SERUM SODIUM: CPT

## 2019-01-25 PROCEDURE — 80048 BASIC METABOLIC PNL TOTAL CA: CPT

## 2019-01-25 PROCEDURE — 93306 TTE W/DOPPLER COMPLETE: CPT

## 2019-01-25 PROCEDURE — 76000 FLUOROSCOPY <1 HR PHYS/QHP: CPT

## 2019-01-25 PROCEDURE — G0463: CPT

## 2019-01-25 RX ORDER — POLYETHYLENE GLYCOL 3350 17 G/17G
17 POWDER, FOR SOLUTION ORAL ONCE
Qty: 0 | Refills: 0 | Status: COMPLETED | OUTPATIENT
Start: 2019-01-25 | End: 2019-01-25

## 2019-01-25 RX ORDER — ACETAMINOPHEN 500 MG
650 TABLET ORAL ONCE
Qty: 0 | Refills: 0 | Status: COMPLETED | OUTPATIENT
Start: 2019-01-25 | End: 2019-01-25

## 2019-01-25 RX ADMIN — BUDESONIDE AND FORMOTEROL FUMARATE DIHYDRATE 2 PUFF(S): 160; 4.5 AEROSOL RESPIRATORY (INHALATION) at 17:42

## 2019-01-25 RX ADMIN — Medication 40 MILLIGRAM(S): at 05:11

## 2019-01-25 RX ADMIN — FINASTERIDE 5 MILLIGRAM(S): 5 TABLET, FILM COATED ORAL at 11:23

## 2019-01-25 RX ADMIN — BUDESONIDE AND FORMOTEROL FUMARATE DIHYDRATE 2 PUFF(S): 160; 4.5 AEROSOL RESPIRATORY (INHALATION) at 05:10

## 2019-01-25 RX ADMIN — ALBUTEROL 2.5 MILLIGRAM(S): 90 AEROSOL, METERED ORAL at 05:10

## 2019-01-25 RX ADMIN — Medication 50 MILLIGRAM(S): at 05:11

## 2019-01-25 RX ADMIN — ALBUTEROL 2.5 MILLIGRAM(S): 90 AEROSOL, METERED ORAL at 11:23

## 2019-01-25 RX ADMIN — AMIODARONE HYDROCHLORIDE 200 MILLIGRAM(S): 400 TABLET ORAL at 05:11

## 2019-01-25 RX ADMIN — ALBUTEROL 2.5 MILLIGRAM(S): 90 AEROSOL, METERED ORAL at 17:42

## 2019-01-25 RX ADMIN — SPIRONOLACTONE 25 MILLIGRAM(S): 25 TABLET, FILM COATED ORAL at 05:11

## 2019-01-25 RX ADMIN — CLOPIDOGREL BISULFATE 75 MILLIGRAM(S): 75 TABLET, FILM COATED ORAL at 11:23

## 2019-01-25 RX ADMIN — Medication 650 MILLIGRAM(S): at 03:19

## 2019-01-25 RX ADMIN — POLYETHYLENE GLYCOL 3350 17 GRAM(S): 17 POWDER, FOR SOLUTION ORAL at 15:57

## 2019-01-25 RX ADMIN — Medication 40 MILLIGRAM(S): at 17:42

## 2019-01-25 RX ADMIN — APIXABAN 5 MILLIGRAM(S): 2.5 TABLET, FILM COATED ORAL at 17:43

## 2019-01-25 RX ADMIN — Medication 650 MILLIGRAM(S): at 03:49

## 2019-01-25 RX ADMIN — APIXABAN 5 MILLIGRAM(S): 2.5 TABLET, FILM COATED ORAL at 05:11

## 2019-01-25 RX ADMIN — TAMSULOSIN HYDROCHLORIDE 0.4 MILLIGRAM(S): 0.4 CAPSULE ORAL at 21:39

## 2019-01-25 NOTE — DISCHARGE NOTE ADULT - HOSPITAL COURSE
88 yo M, accompanied by son in law, w/ PMH of HTN, HLD, remote CVA, COPD, CAD with Stents, CHF, chronic afib on eliquis, chronic systolic heart failure TTE EF 45% s/p TAVR  11/27/18.- with Dr Zuluaga, recent admission to [facility] from 01/01/19 to 01/08/19 d/t SOB and UTI, presenting to [facility] ED today with SOB and lower back pain x 2 days. Recent cath with patent stent  s/p prednisone 50mg PO x 1 on 1/24/18, SOB with worsening with exertion   Improved today with nebs and steroids  CT abdomen and Pelvis performed to r/o pyelonephritis in the setting of recent UTI shows no acute pathology. Noted with mild lactic acidosis, trending down  Now with improved SOB  Resume lasix 40mg daily alternating with 40mg BID 3x/week, follow up with cardiologist in one week for repeat BMP as outpatient 86 yo M, accompanied by son in law, w/ PMH of HTN, HLD, remote CVA, COPD, CAD with Stents, CHF, chronic afib on eliquis, chronic systolic heart failure TTE EF 45% s/p TAVR  11/27/18.- with Dr Zuluaga, recent admission to [facility] from 01/01/19 to 01/08/19 d/t SOB and UTI, presenting to [facility] ED today with SOB and lower back pain x 2 days. Recent cath with patent stent  s/p prednisone 50mg PO x 1 on 1/24/18, SOB with worsening with exertion   CT abdomen and Pelvis performed to r/o pyelonephritis in the setting of recent UTI shows no acute pathology. Noted with mild lactic acidosis, trending down  Now with improved SOB with nebs and steroids. tolerated ambulation   Resume lasix 40mg daily alternating with 40mg BID 3x/week, follow up with cardiologist in one week for repeat BMP as outpatient

## 2019-01-25 NOTE — DISCHARGE NOTE ADULT - MEDICATION SUMMARY - MEDICATIONS TO TAKE
I will START or STAY ON the medications listed below when I get home from the hospital:    finasteride 5 mg oral tablet  -- 1 tab(s) by mouth once a day  -- Indication: For Prostate    spironolactone 25 mg oral tablet  -- 1 tab(s) by mouth once a day  -- Indication: For Chronic diastolic HF (heart failure)    acetaminophen 500 mg oral tablet  -- 2 tab(s) by mouth once a day (in the morning)  -- Indication: For Pain    tamsulosin 0.4 mg oral capsule  -- 1 cap(s) by mouth once a day (at bedtime)  -- Indication: For Prostate    amiodarone 200 mg oral tablet  -- 1 tab(s) by mouth once a day  -- Indication: For Atrial fibrillation    apixaban 5 mg oral tablet  -- 1 tab(s) by mouth every 12 hours  -- Indication: For Blood thinner to prevent clots due to irregular heart rate    Crestor 10 mg oral tablet  -- 1 tab(s) by mouth once a day (at bedtime)  -- Indication: For Cholesterol control    Plavix 75 mg oral tablet  -- 1 tab(s) by mouth once a day  -- Indication: For Heart stents    Metoprolol Succinate ER 50 mg oral tablet, extended release  -- 1 tab(s) by mouth once a day  -- Indication: For Blood pressure control, heart stents    albuterol 90 mcg/inh inhalation aerosol  -- 1 to 2 puff(s) inhaled every 6 hours, As Needed - for shortness of breath and/or wheezing  -- Indication: For COPD    albuterol 2.5 mg/3 mL (0.083%) inhalation solution  -- 3 milliliter(s) inhaled 3 times a day, As Needed  -- Indication: For COPD    Symbicort 80 mcg-4.5 mcg/inh inhalation aerosol  -- 2 puff(s) inhaled 2 times a day  -- Indication: For COPD    furosemide 40 mg oral tablet  -- 1 tab(s) by mouth once a day alternating with 40mg two times a day 3 x/week per Dr. Paredes  -- Indication: For heart failure    raNITIdine 150 mg oral tablet  -- 1 tab(s) by mouth 2 times a day, As Needed  -- Indication: For heart burn    MiraLax oral powder for reconstitution  -- 17 gram(s) by mouth every other day  -- Indication: For COnstipation    ocular lubricant ophthalmic solution  -- 1 drop(s) to each affected eye , As Needed  -- Indication: For Dry eyes    Vitamin D3 1000 intl units oral tablet  -- 1 tab(s) by mouth once a day  -- Indication: For supplement

## 2019-01-25 NOTE — PROGRESS NOTE ADULT - ASSESSMENT
Pt is a 88 yo M, accompanied by son in law, w/ PMH of HTN, HLD, remote CVA, COPD, CAD with Stents, CHF, chronic afib on eliquis, chronic systolic heart failure s/p TAVR  - # 34  Medtronic Core Valve on 11/27/18.- with Dr Zuluaga, recent admission to [facility] from 01/01/19 to 01/08/19 d/t SOB and UTI, presenting to [facility] ED today d/t SOB and lower back pain x 2 days.

## 2019-01-25 NOTE — DISCHARGE NOTE ADULT - PLAN OF CARE
remain stable Call your Health Care provider upon arrival home to make a follow up appointment within one week.  Take all inhalers as prescribed by your Health Care Provider.  If your cough increases infrequency and severity and/or you have shortness of breath or increased shortness of breath call your Health Care Provider.  If you develop fever, chills, night sweats, malaise, and/or change in mental status call your Health care Provider.  Nutrition is very important.  Eat small frequent meals.  Increase your activity as tolerated.  Do not stay in bed all day Weigh yourself daily.  If you gain 3lbs in 3 days, or 5lbs in a week call your Health Care Provider.  Do not eat or drink foods containing more than 2000mg of salt (sodium) in your diet every day.  Call your Health Care Provider if you have any swelling or increased swelling in your feet, ankles, and/or stomach.  Take all of your medication as directed.  If you become dizzy call your Health Care Provider. resolved Atrial fibrillation is the most common heart rhythm problem.  The condition puts you at risk for has stroke and heart attack  You were started on blood thinners to prevent possible clot and stroke complications.   Monitor for any signs of bleeding and avoid injury while on this medication  If any bleeding persistent and symptomatic stop the medication and notify your doctor immediately.  It helps if you control your blood pressure, not drink more than 1-2 alcohol drinks per day, cut down on caffeine, getting treatment for over active thyroid gland, and get regular exercise  Call your doctor if you feel your heart racing or beating unusually, chest tightness or pain, lightheaded, faint, shortness of breath especially with exercise  It is important to take your heart medication as prescribed  Take Eliquis as directed Coronary artery disease is a condition where the arteries the supply the heart muscle get clogges with fatty deposits & puts you at risk for a heart attack  Call your doctor if you have any new pain, pressure, or discomfort in the center of your chest, pain, tingling or discomfort in arms, back, neck, jaw, or stomach, shortness of breath, nausea, vomiting, burping or heartburn, sweating, cold and clammy skin, racing or abnormal heartbeat for more than 10 minutes or if they keep coming & going.  Call 911 and do not tr to get to hospital by care  You can help yourself with lefestyle changes (quitting smoking if you smoke), eat lots of fruits & vegetables & low fat dairy products, not a lot of meat & fatty foods, walk or some form of physical activity most days of the week, lose weight if you are overweight  Take your cardiac medication as prescribed to lower cholesterol, to lower blood pressure, aspirin to prevent blood clots, and diabetes control  Make sure to keep appointments with doctor for cardiac follow up care

## 2019-01-25 NOTE — DISCHARGE NOTE ADULT - CARE PROVIDER_API CALL
Timothy Paredes (DO), Cardiovascular Disease; Internal Medicine; Nuclear Cardiology  [address] [address]  Phone: [phone]  Fax: [phone]

## 2019-01-25 NOTE — PROGRESS NOTE ADULT - PROBLEM SELECTOR PLAN 1
SOB with worsening with exertion   improved today   S/P TAVR   Nebs and steroids   O2 supplement  trend trops

## 2019-01-25 NOTE — DISCHARGE NOTE ADULT - MEDICATION SUMMARY - MEDICATIONS TO CHANGE
I will SWITCH the dose or number of times a day I take the medications listed below when I get home from the hospital:    Symbicort 80 mcg-4.5 mcg/inh inhalation aerosol  -- 2 puff(s) inhaled 2 times a day, As Needed    furosemide 40 mg oral tablet  -- 1 tab(s) by mouth once a day alternating with 40mg twice per day as per Dr. Paredes

## 2019-01-25 NOTE — DISCHARGE NOTE ADULT - FINDINGS/TREATMENT
LOWER CHEST: Mild cardiomegaly, partially visualized ICD leads and thoracic aortic repair. Mitral valvular calcifications.  LIVER: No focal lesion.    BILE DUCTS: Normal caliber.GALLBLADDER: Gallstones. SPLEEN: Unremarkable.  PANCREAS: Unremarkable.  ADRENALS: Unremarkable.  KIDNEYS/URETERS: No hydronephrosis.  Right renal cyst.    BLADDER: Within normal limits.  REPRODUCTIVE ORGANS: Prior TURP defect.    BOWEL: Normal in course and caliber without obstruction. Appendix is   normal. Colonic diverticulosis without acute diverticulitis.  PERITONEUM/RETROPERITONEUM: No pneumoperitoneum, ascites, or   lymphadenopathy.  VESSELS:  No evidence of aortic aneurysm. Moderate vascular   calcifications.  BONES/SOFT TISSUES: Osteopenia. Mild to moderate degenerative changes of   the axial spine with mild stenosis at L2-L3, L3-L4 and L4-L5.

## 2019-01-25 NOTE — DISCHARGE NOTE ADULT - SECONDARY DIAGNOSIS.
Chronic diastolic HF (heart failure) Acute low back pain without sciatica, unspecified back pain laterality Chronic atrial fibrillation Coronary artery disease involving native coronary artery of native heart without angina pectoris

## 2019-01-25 NOTE — PROGRESS NOTE ADULT - SUBJECTIVE AND OBJECTIVE BOX
Subjective: Patient seen and examined. No new events except as noted.   improved pain/ resolved     REVIEW OF SYSTEMS:    CONSTITUTIONAL: No weakness, fevers or chills  EYES/ENT: No visual changes;  No vertigo or throat pain   NECK: No pain or stiffness  RESPIRATORY: No cough, wheezing, hemoptysis; No shortness of breath  CARDIOVASCULAR: No chest pain or palpitations  GASTROINTESTINAL: No abdominal or epigastric pain. No nausea, vomiting, or hematemesis; No diarrhea or constipation. No melena or hematochezia.  GENITOURINARY: No dysuria, frequency or hematuria  NEUROLOGICAL: No numbness or weakness  SKIN: No itching, burning, rashes, or lesions   All other review of systems is negative unless indicated above.    MEDICATIONS:  MEDICATIONS  (STANDING):  ALBUTerol    0.083% 2.5 milliGRAM(s) Nebulizer every 6 hours  amiodarone    Tablet 200 milliGRAM(s) Oral daily  apixaban 5 milliGRAM(s) Oral every 12 hours  buDESOnide  80 MICROgram(s)/formoterol 4.5 MICROgram(s) Inhaler 2 Puff(s) Inhalation two times a day  clopidogrel Tablet 75 milliGRAM(s) Oral daily  finasteride 5 milliGRAM(s) Oral daily  furosemide    Tablet 40 milliGRAM(s) Oral two times a day  metoprolol succinate ER 50 milliGRAM(s) Oral daily  spironolactone 25 milliGRAM(s) Oral daily  tamsulosin 0.4 milliGRAM(s) Oral at bedtime      PHYSICAL EXAM:  T(C): 36.6 (19 @ 11:25), Max: 36.6 (19 @ 11:25)  HR: 91 (19 @ 17:41) (80 - 104)  BP: 160/81 (19 @ 17:41) (115/66 - 160/81)  RR: 17 (19 @ 11:25) (17 - 20)  SpO2: 97% (19 @ 11:25) (96% - 98%)  Wt(kg): --  I&O's Summary    2019 07:01  -  2019 07:00  --------------------------------------------------------  IN: 300 mL / OUT: 575 mL / NET: -275 mL    2019 07:  -  2019 19:49  --------------------------------------------------------  IN: 780 mL / OUT: 0 mL / NET: 780 mL        Weight (kg): 97 ( @ 09:48)    Appearance: Normal	  HEENT:   Normal oral mucosa, PERRL, EOMI	  Lymphatic: No lymphadenopathy , no edema  Cardiovascular: Normal S1 S2, No JVD, No murmurs , Peripheral pulses palpable 2+ bilaterally  Respiratory: Lungs clear to auscultation, normal effort 	  Gastrointestinal:  Soft, Non-tender, + BS	  Skin: No rashes, No ecchymoses, No cyanosis, warm to touch  Musculoskeletal: Normal range of motion, normal strength  Psychiatry:  Mood & affect appropriate  Ext: No edema      All labs, Imaging and EKGs personally reviewed                           12.7   8.62  )-----------( 216      ( 2019 07:46 )             39.1                   142  |  108  |  16  ----------------------------<  102<H>  4.3   |  22  |  0.87    Ca    9.3      2019 06:33    TPro  6.6  /  Alb  3.7  /  TBili  0.5  /  DBili  x   /  AST  15  /  ALT  22  /  AlkPhos  119      PT/INR - ( 2019 12:43 )   PT: 17.0 sec;   INR: 1.46 ratio         PTT - ( 2019 12:43 )  PTT:32.0 sec                   Urinalysis Basic - ( 2019 09:51 )    Color: Colorless / Appearance: Clear / S.017 / pH: x  Gluc: x / Ketone: Negative  / Bili: Negative / Urobili: Negative   Blood: x / Protein: Negative / Nitrite: Negative   Leuk Esterase: Negative / RBC: x / WBC x   Sq Epi: x / Non Sq Epi: x / Bacteria: x    < from: CT Abdomen and Pelvis w/ IV Cont (19 @ 08:48) >    IMPRESSION:  No acute abnormality in the abdomen and pelvis.    < end of copied text >

## 2019-01-25 NOTE — DISCHARGE NOTE ADULT - CARE PLAN
Principal Discharge DX:	COPD exacerbation  Secondary Diagnosis:	Chronic diastolic HF (heart failure)  Secondary Diagnosis:	Acute low back pain without sciatica, unspecified back pain laterality  Secondary Diagnosis:	Chronic atrial fibrillation  Secondary Diagnosis:	Coronary artery disease involving native coronary artery of native heart without angina pectoris Principal Discharge DX:	COPD exacerbation  Goal:	remain stable  Assessment and plan of treatment:	Call your Health Care provider upon arrival home to make a follow up appointment within one week.  Take all inhalers as prescribed by your Health Care Provider.  If your cough increases infrequency and severity and/or you have shortness of breath or increased shortness of breath call your Health Care Provider.  If you develop fever, chills, night sweats, malaise, and/or change in mental status call your Health care Provider.  Nutrition is very important.  Eat small frequent meals.  Increase your activity as tolerated.  Do not stay in bed all day  Secondary Diagnosis:	Chronic diastolic HF (heart failure)  Assessment and plan of treatment:	Weigh yourself daily.  If you gain 3lbs in 3 days, or 5lbs in a week call your Health Care Provider.  Do not eat or drink foods containing more than 2000mg of salt (sodium) in your diet every day.  Call your Health Care Provider if you have any swelling or increased swelling in your feet, ankles, and/or stomach.  Take all of your medication as directed.  If you become dizzy call your Health Care Provider.  Secondary Diagnosis:	Acute low back pain without sciatica, unspecified back pain laterality  Assessment and plan of treatment:	resolved  Secondary Diagnosis:	Chronic atrial fibrillation  Assessment and plan of treatment:	Atrial fibrillation is the most common heart rhythm problem.  The condition puts you at risk for has stroke and heart attack  You were started on blood thinners to prevent possible clot and stroke complications.   Monitor for any signs of bleeding and avoid injury while on this medication  If any bleeding persistent and symptomatic stop the medication and notify your doctor immediately.  It helps if you control your blood pressure, not drink more than 1-2 alcohol drinks per day, cut down on caffeine, getting treatment for over active thyroid gland, and get regular exercise  Call your doctor if you feel your heart racing or beating unusually, chest tightness or pain, lightheaded, faint, shortness of breath especially with exercise  It is important to take your heart medication as prescribed  Take Eliquis as directed  Secondary Diagnosis:	Coronary artery disease involving native coronary artery of native heart without angina pectoris  Assessment and plan of treatment:	Coronary artery disease is a condition where the arteries the supply the heart muscle get clogges with fatty deposits & puts you at risk for a heart attack  Call your doctor if you have any new pain, pressure, or discomfort in the center of your chest, pain, tingling or discomfort in arms, back, neck, jaw, or stomach, shortness of breath, nausea, vomiting, burping or heartburn, sweating, cold and clammy skin, racing or abnormal heartbeat for more than 10 minutes or if they keep coming & going.  Call 911 and do not tr to get to hospital by care  You can help yourself with lefestyle changes (quitting smoking if you smoke), eat lots of fruits & vegetables & low fat dairy products, not a lot of meat & fatty foods, walk or some form of physical activity most days of the week, lose weight if you are overweight  Take your cardiac medication as prescribed to lower cholesterol, to lower blood pressure, aspirin to prevent blood clots, and diabetes control  Make sure to keep appointments with doctor for cardiac follow up care

## 2019-01-25 NOTE — CONSULT NOTE ADULT - SUBJECTIVE AND OBJECTIVE BOX
CHIEF COMPLAINT:Patient is a 87y old  Male who presents with a chief complaint of SOB, Back pain (24 Jan 2019 19:18)      HISTORY OF PRESENT ILLNESS:HPI:  Pt is a 88 yo M, accompanied by son in law, w/ PMH of HTN, HLD, remote CVA, COPD, CAD with Stents, CHF, chronic afib on eliquis, chronic systolic heart failure s/p TAVR  - # 34  Medtronic Core Valve on 11/27/18.- with Dr Zuluaga, recent admission to [facility] from 01/01/19 to 01/08/19 d/t SOB and UTI, presenting to [facility] ED today d/t SOB and lower back pain x 2 days. Patient had cardiac cath during last admission - CX:   --  Distal circumflex: There was a 40 % stenosis.  	   	RI:   --  Ramus intermedius: Angiography showed severe atherosclerosis.  	   	RCA:   --  Mid RCA: There was a 60 % stenosis. Patient states that he has never had to be intubated d/t shortness of breath; also states that he has O2 at home as needed, but does not use his home O2 or his home inhalers. Patient denies other complaints. Denies headache, neck stiffness, fever/chills, vision change, chest pain, palpitations, lightheadedness, numbness, tingling, abdominal pain, nausea, vomiting, diarrhea, dysuria, hematuria, syncope. (24 Jan 2019 19:18)      PAST MEDICAL & SURGICAL HISTORY:  CKD (chronic kidney disease) stage 3, GFR 30-59 ml/min  Hepatitis B  PTSD (post-traumatic stress disorder)  Tachy-idris syndrome  TIA (transient ischemic attack): 2010  Seizure  DIEGO (obstructive sleep apnea)  Chronic diastolic HF (heart failure)  Aortic stenosis  CVA (cerebral vascular accident)  Gait instability  COPD (chronic obstructive pulmonary disease)  BPH (benign prostatic hyperplasia)  Asthma  CHF (congestive heart failure)  Atrial fibrillation  Pacemaker: 2015 Medtronic [id number]  A2DR01  Hypertension  H/O thalassemia  CAD (coronary artery disease): s/p stent 2010  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  Artificial pacemaker  S/P primary angioplasty with coronary stent: 2012 AND 2017          MEDICATIONS:  amiodarone    Tablet 200 milliGRAM(s) Oral daily  apixaban 5 milliGRAM(s) Oral every 12 hours  clopidogrel Tablet 75 milliGRAM(s) Oral daily  furosemide    Tablet 40 milliGRAM(s) Oral two times a day  metoprolol succinate ER 50 milliGRAM(s) Oral daily  spironolactone 25 milliGRAM(s) Oral daily  tamsulosin 0.4 milliGRAM(s) Oral at bedtime      ALBUTerol    0.083% 2.5 milliGRAM(s) Nebulizer every 6 hours  buDESOnide  80 MICROgram(s)/formoterol 4.5 MICROgram(s) Inhaler 2 Puff(s) Inhalation two times a day        finasteride 5 milliGRAM(s) Oral daily        FAMILY HISTORY:  No pertinent family history in first degree relatives      Non-contributory    SOCIAL HISTORY:    former smoker    Allergies    Keppra (Rash)  penicillin (Unknown)    Intolerances    	    REVIEW OF SYSTEMS:  CONSTITUTIONAL: No fever  EYES: No eye pain, visual disturbances, or discharge  ENMT:  No difficulty hearing, tinnitus  NECK: No pain or stiffness  RESPIRATORY: No cough, wheezing, +SOB  CARDIOVASCULAR: No chest pain, palpitations, passing out, dizziness, or leg swelling  GASTROINTESTINAL:  No nausea, vomiting, diarrhea or constipation. No melena.  GENITOURINARY: No dysuria, hematuria  NEUROLOGICAL: No stroke like symptoms  SKIN: No burning or lesions   LYMPH Nodes: No enlarged glands  ENDOCRINE: No heat or cold intolerance  MUSCULOSKELETAL:+ lower back pain  PSYCHIATRIC: No  anxiety, mood swings  HEME/LYMPH: No bleeding gums  ALLERY AND IMMUNOLOGIC: No hives or eczema	    All other ROS negative    PHYSICAL EXAM:  T(C): 36.6 (01-25-19 @ 11:25), Max: 36.8 (01-24-19 @ 18:26)  HR: 83 (01-25-19 @ 11:25) (80 - 104)  BP: 151/71 (01-25-19 @ 11:25) (115/66 - 156/89)  RR: 17 (01-25-19 @ 11:25) (17 - 20)  SpO2: 97% (01-25-19 @ 11:25) (96% - 98%)  Wt(kg): --  I&O's Summary    24 Jan 2019 07:01  -  25 Jan 2019 07:00  --------------------------------------------------------  IN: 300 mL / OUT: 575 mL / NET: -275 mL    25 Jan 2019 07:01  -  25 Jan 2019 16:46  --------------------------------------------------------  IN: 540 mL / OUT: 0 mL / NET: 540 mL        Appearance: Normal	  HEENT:   Normal oral mucosa, EOMI	  Cardiovascular: Normal S1 S2, No JVD, No murmurs  Respiratory: Lungs clear to auscultation	  Psychiatry: Alert, Mood & affect appropriate  Gastrointestinal:  Soft, Non-tender, + BS	  Skin: No rashes, No ecchymoses, No cyanosis	  Neurologic: Non-focal  Extremities:  No clubbing, cyanosis or edema  Vascular: Peripheral pulses palpable  bilaterally  	    	  	  CARDIAC MARKERS:                                  12.7   8.62  )-----------( 216      ( 25 Jan 2019 07:46 )             39.1     01-25    142  |  108  |  16  ----------------------------<  102<H>  4.3   |  22  |  0.87    Ca    9.3      25 Jan 2019 06:33    TPro  6.6  /  Alb  3.7  /  TBili  0.5  /  DBili  x   /  AST  15  /  ALT  22  /  AlkPhos  119  01-25          EKG: af at 81bpm  Radiology: no pulm edema       rom: Transthoracic Echocardiogram (01.01.19 @ 11:08) >  Conclusions:  1. Mitral annular calcification and calcified mitral  leaflets with normal diastolic opening.  2. Transcatheter aortic valve replacement.  Small  independently mobile echodensity noted in LVOT associated  with the ventricular aspect of the TAVR valve (image 11).  This may represent vegetation vs thrombus, clinical  correlation indicated. Peak transaortic valve gradient  equals 7 mm Hg, meantransaortic valve gradient equals 4 mm  Hg, which is probably normal in the presence of a  transcatheter aortic valve replacement. Mild paravalvular  aortic regurgitation. Consider MARIA LUISA for further evaluation.  3. Severely dilated left atrium.  LA volume index = 62  cc/m2.  4.  Endocardium not well visualized; Mild segmental left  ventricular systolic dysfunction. The inferolateral wall  appears  hypokinetic.  5. A device wire is noted in the right heart.  6. Right ventricular enlargement with borderline right  ventricular systolic function.    < end of copied text >      Assessment and Recommendation:   · Assessment		  1. Chronic systolic Heart Failure  TTE EF 45%  Improved SOB  Resume lasix 40mg daily alternating with 40mg BID 3x/week, fu in one week as outpt for repeat BMP for discharge     2. HTN  cont home meds    3. afib/afl  Rate controlled  cont a/c   pt on amio 200mg as outpt    4. cad, recent cath with patent stent  atypical chest pain - CTS arranged for cath, unchanged  history of stent  On plavix  cont statin     Consider d/c home Saturday if back pain resolved and breathing comfortably             Timothy Paredes DO [facility]  [facility]  [phone]

## 2019-01-26 LAB
ANION GAP SERPL CALC-SCNC: 14 MMOL/L — SIGNIFICANT CHANGE UP (ref 5–17)
BUN SERPL-MCNC: 19 MG/DL — SIGNIFICANT CHANGE UP (ref 7–23)
CALCIUM SERPL-MCNC: 9.2 MG/DL — SIGNIFICANT CHANGE UP (ref 8.4–10.5)
CHLORIDE SERPL-SCNC: 102 MMOL/L — SIGNIFICANT CHANGE UP (ref 96–108)
CO2 SERPL-SCNC: 25 MMOL/L — SIGNIFICANT CHANGE UP (ref 22–31)
CREAT SERPL-MCNC: 1.04 MG/DL — SIGNIFICANT CHANGE UP (ref 0.5–1.3)
GLUCOSE SERPL-MCNC: 93 MG/DL — SIGNIFICANT CHANGE UP (ref 70–99)
HCT VFR BLD CALC: 41 % — SIGNIFICANT CHANGE UP (ref 39–50)
HGB BLD-MCNC: 13.3 G/DL — SIGNIFICANT CHANGE UP (ref 13–17)
MCHC RBC-ENTMCNC: 28.7 PG — SIGNIFICANT CHANGE UP (ref 27–34)
MCHC RBC-ENTMCNC: 32.4 GM/DL — SIGNIFICANT CHANGE UP (ref 32–36)
MCV RBC AUTO: 88.6 FL — SIGNIFICANT CHANGE UP (ref 80–100)
PLATELET # BLD AUTO: 208 K/UL — SIGNIFICANT CHANGE UP (ref 150–400)
POTASSIUM SERPL-MCNC: 4 MMOL/L — SIGNIFICANT CHANGE UP (ref 3.5–5.3)
POTASSIUM SERPL-SCNC: 4 MMOL/L — SIGNIFICANT CHANGE UP (ref 3.5–5.3)
RBC # BLD: 4.63 M/UL — SIGNIFICANT CHANGE UP (ref 4.2–5.8)
RBC # FLD: 13.6 % — SIGNIFICANT CHANGE UP (ref 10.3–14.5)
SODIUM SERPL-SCNC: 141 MMOL/L — SIGNIFICANT CHANGE UP (ref 135–145)
WBC # BLD: 10.18 K/UL — SIGNIFICANT CHANGE UP (ref 3.8–10.5)
WBC # FLD AUTO: 10.18 K/UL — SIGNIFICANT CHANGE UP (ref 3.8–10.5)

## 2019-01-26 PROCEDURE — 85730 THROMBOPLASTIN TIME PARTIAL: CPT

## 2019-01-26 PROCEDURE — 85610 PROTHROMBIN TIME: CPT

## 2019-01-26 PROCEDURE — 80048 BASIC METABOLIC PNL TOTAL CA: CPT

## 2019-01-26 PROCEDURE — G0463: CPT

## 2019-01-26 PROCEDURE — 94640 AIRWAY INHALATION TREATMENT: CPT

## 2019-01-26 PROCEDURE — 74177 CT ABD & PELVIS W/CONTRAST: CPT

## 2019-01-26 PROCEDURE — 82435 ASSAY OF BLOOD CHLORIDE: CPT

## 2019-01-26 PROCEDURE — 82330 ASSAY OF CALCIUM: CPT

## 2019-01-26 PROCEDURE — 93005 ELECTROCARDIOGRAM TRACING: CPT

## 2019-01-26 PROCEDURE — 96374 THER/PROPH/DIAG INJ IV PUSH: CPT

## 2019-01-26 PROCEDURE — 81003 URINALYSIS AUTO W/O SCOPE: CPT

## 2019-01-26 PROCEDURE — 93971 EXTREMITY STUDY: CPT

## 2019-01-26 PROCEDURE — 84132 ASSAY OF SERUM POTASSIUM: CPT

## 2019-01-26 PROCEDURE — 80053 COMPREHEN METABOLIC PANEL: CPT

## 2019-01-26 PROCEDURE — 83880 ASSAY OF NATRIURETIC PEPTIDE: CPT

## 2019-01-26 PROCEDURE — 85027 COMPLETE CBC AUTOMATED: CPT

## 2019-01-26 PROCEDURE — 85014 HEMATOCRIT: CPT

## 2019-01-26 PROCEDURE — 82947 ASSAY GLUCOSE BLOOD QUANT: CPT

## 2019-01-26 PROCEDURE — 82803 BLOOD GASES ANY COMBINATION: CPT

## 2019-01-26 PROCEDURE — 84484 ASSAY OF TROPONIN QUANT: CPT

## 2019-01-26 PROCEDURE — 99285 EMERGENCY DEPT VISIT HI MDM: CPT | Mod: 25

## 2019-01-26 PROCEDURE — 83605 ASSAY OF LACTIC ACID: CPT

## 2019-01-26 PROCEDURE — 84295 ASSAY OF SERUM SODIUM: CPT

## 2019-01-26 PROCEDURE — 71045 X-RAY EXAM CHEST 1 VIEW: CPT

## 2019-01-26 RX ORDER — ACETAMINOPHEN 500 MG
650 TABLET ORAL EVERY 6 HOURS
Qty: 0 | Refills: 0 | Status: DISCONTINUED | OUTPATIENT
Start: 2019-01-26 | End: 2019-01-26

## 2019-01-26 RX ORDER — FUROSEMIDE 40 MG
1 TABLET ORAL
Qty: 40 | Refills: 0 | OUTPATIENT
Start: 2019-01-26 | End: 2019-02-24

## 2019-01-26 RX ORDER — BUDESONIDE AND FORMOTEROL FUMARATE DIHYDRATE 160; 4.5 UG/1; UG/1
2 AEROSOL RESPIRATORY (INHALATION)
Qty: 0 | Refills: 0 | COMMUNITY

## 2019-01-26 RX ADMIN — Medication 650 MILLIGRAM(S): at 05:40

## 2019-01-26 RX ADMIN — Medication 50 MILLIGRAM(S): at 05:25

## 2019-01-26 RX ADMIN — BUDESONIDE AND FORMOTEROL FUMARATE DIHYDRATE 2 PUFF(S): 160; 4.5 AEROSOL RESPIRATORY (INHALATION) at 05:26

## 2019-01-26 RX ADMIN — CLOPIDOGREL BISULFATE 75 MILLIGRAM(S): 75 TABLET, FILM COATED ORAL at 11:05

## 2019-01-26 RX ADMIN — AMIODARONE HYDROCHLORIDE 200 MILLIGRAM(S): 400 TABLET ORAL at 05:25

## 2019-01-26 RX ADMIN — Medication 40 MILLIGRAM(S): at 05:25

## 2019-01-26 RX ADMIN — ALBUTEROL 2.5 MILLIGRAM(S): 90 AEROSOL, METERED ORAL at 05:26

## 2019-01-26 RX ADMIN — ALBUTEROL 2.5 MILLIGRAM(S): 90 AEROSOL, METERED ORAL at 00:22

## 2019-01-26 RX ADMIN — SPIRONOLACTONE 25 MILLIGRAM(S): 25 TABLET, FILM COATED ORAL at 05:26

## 2019-01-26 RX ADMIN — APIXABAN 5 MILLIGRAM(S): 2.5 TABLET, FILM COATED ORAL at 05:26

## 2019-01-26 RX ADMIN — FINASTERIDE 5 MILLIGRAM(S): 5 TABLET, FILM COATED ORAL at 11:05

## 2019-01-26 RX ADMIN — ALBUTEROL 2.5 MILLIGRAM(S): 90 AEROSOL, METERED ORAL at 11:05

## 2019-01-26 NOTE — CHART NOTE - NSCHARTNOTEFT_GEN_A_CORE
Patient tolerated ambulation in hallway, Orthostatic mildly positive but pt reports that he able to tolerate activity now and resolution of back pain. Dr. Coronado made aware and medically cleared for discharge with follow up with cardiologist in 1 week    [id number]

## 2019-01-27 VITALS — WEIGHT: 130.73 LBS

## 2019-02-06 NOTE — H&P ADULT - LV FUNCTION ASSESSMENT
yes Itching improved with meds, no longer numb or dizzy with IVF. Labs reviewed, Hb 8.1 compared to last visit s/p transfusion is the same. Pt no longer bleeding. Her numbness is possible due to MSK strain of neck as she says now that she has right and left paraspinal neck pain, no trauma reported however. Will discharge home with lab results to f/u with OBGYN outpt and PMD and neuro for her paresthesias. Return precautions explained and understood.

## 2019-03-19 ENCOUNTER — INPATIENT (INPATIENT)
Facility: HOSPITAL | Age: 84
LOS: 27 days | Discharge: HOME | DRG: 261 | End: 2019-04-16
Attending: INTERNAL MEDICINE | Admitting: INTERNAL MEDICINE
Payer: MEDICARE

## 2019-03-19 VITALS
WEIGHT: 207.9 LBS | HEART RATE: 88 BPM | SYSTOLIC BLOOD PRESSURE: 100 MMHG | HEIGHT: 67 IN | OXYGEN SATURATION: 96 % | TEMPERATURE: 98 F | RESPIRATION RATE: 20 BRPM | DIASTOLIC BLOOD PRESSURE: 64 MMHG

## 2019-03-19 DIAGNOSIS — R06.02 SHORTNESS OF BREATH: ICD-10-CM

## 2019-03-19 DIAGNOSIS — Z95.0 PRESENCE OF CARDIAC PACEMAKER: Chronic | ICD-10-CM

## 2019-03-19 DIAGNOSIS — Z95.2 PRESENCE OF PROSTHETIC HEART VALVE: Chronic | ICD-10-CM

## 2019-03-19 LAB
ALBUMIN SERPL ELPH-MCNC: 3.5 G/DL — SIGNIFICANT CHANGE UP (ref 3.3–5)
ALP SERPL-CCNC: 130 U/L — HIGH (ref 40–120)
ALT FLD-CCNC: 20 U/L — SIGNIFICANT CHANGE UP (ref 10–45)
ANION GAP SERPL CALC-SCNC: 13 MMOL/L — SIGNIFICANT CHANGE UP (ref 5–17)
APTT BLD: 35.5 SEC — SIGNIFICANT CHANGE UP (ref 27.5–36.3)
AST SERPL-CCNC: 20 U/L — SIGNIFICANT CHANGE UP (ref 10–40)
BASE EXCESS BLDV CALC-SCNC: 1.9 MMOL/L — SIGNIFICANT CHANGE UP (ref -2–2)
BASOPHILS # BLD AUTO: 0 K/UL — SIGNIFICANT CHANGE UP (ref 0–0.2)
BASOPHILS NFR BLD AUTO: 0.1 % — SIGNIFICANT CHANGE UP (ref 0–2)
BILIRUB SERPL-MCNC: 0.5 MG/DL — SIGNIFICANT CHANGE UP (ref 0.2–1.2)
BUN SERPL-MCNC: 27 MG/DL — HIGH (ref 7–23)
CA-I SERPL-SCNC: 1.18 MMOL/L — SIGNIFICANT CHANGE UP (ref 1.12–1.3)
CALCIUM SERPL-MCNC: 9.7 MG/DL — SIGNIFICANT CHANGE UP (ref 8.4–10.5)
CHLORIDE BLDV-SCNC: 106 MMOL/L — SIGNIFICANT CHANGE UP (ref 96–108)
CHLORIDE SERPL-SCNC: 100 MMOL/L — SIGNIFICANT CHANGE UP (ref 96–108)
CO2 BLDV-SCNC: 26 MMOL/L — SIGNIFICANT CHANGE UP (ref 22–30)
CO2 SERPL-SCNC: 22 MMOL/L — SIGNIFICANT CHANGE UP (ref 22–31)
CREAT SERPL-MCNC: 1.28 MG/DL — SIGNIFICANT CHANGE UP (ref 0.5–1.3)
EOSINOPHIL # BLD AUTO: 0.1 K/UL — SIGNIFICANT CHANGE UP (ref 0–0.5)
EOSINOPHIL NFR BLD AUTO: 0.6 % — SIGNIFICANT CHANGE UP (ref 0–6)
FLU A RESULT: SIGNIFICANT CHANGE UP
FLU A RESULT: SIGNIFICANT CHANGE UP
FLUAV AG NPH QL: SIGNIFICANT CHANGE UP
FLUBV AG NPH QL: SIGNIFICANT CHANGE UP
GAS PNL BLDV: 130 MMOL/L — LOW (ref 136–145)
GAS PNL BLDV: SIGNIFICANT CHANGE UP
GAS PNL BLDV: SIGNIFICANT CHANGE UP
GLUCOSE BLDV-MCNC: 137 MG/DL — HIGH (ref 70–99)
GLUCOSE SERPL-MCNC: 140 MG/DL — HIGH (ref 70–99)
HCO3 BLDV-SCNC: 25 MMOL/L — SIGNIFICANT CHANGE UP (ref 21–29)
HCT VFR BLD CALC: 38.6 % — LOW (ref 39–50)
HCT VFR BLDA CALC: 40 % — SIGNIFICANT CHANGE UP (ref 39–50)
HGB BLD CALC-MCNC: 13 G/DL — SIGNIFICANT CHANGE UP (ref 13–17)
HGB BLD-MCNC: 13.2 G/DL — SIGNIFICANT CHANGE UP (ref 13–17)
INR BLD: 2 RATIO — HIGH (ref 0.88–1.16)
LACTATE BLDV-MCNC: 1.2 MMOL/L — SIGNIFICANT CHANGE UP (ref 0.7–2)
LYMPHOCYTES # BLD AUTO: 0.7 K/UL — LOW (ref 1–3.3)
LYMPHOCYTES # BLD AUTO: 6.2 % — LOW (ref 13–44)
MCHC RBC-ENTMCNC: 29.7 PG — SIGNIFICANT CHANGE UP (ref 27–34)
MCHC RBC-ENTMCNC: 34.2 GM/DL — SIGNIFICANT CHANGE UP (ref 32–36)
MCV RBC AUTO: 86.7 FL — SIGNIFICANT CHANGE UP (ref 80–100)
MONOCYTES # BLD AUTO: 1.3 K/UL — HIGH (ref 0–0.9)
MONOCYTES NFR BLD AUTO: 12.4 % — SIGNIFICANT CHANGE UP (ref 2–14)
NEUTROPHILS # BLD AUTO: 8.7 K/UL — HIGH (ref 1.8–7.4)
NEUTROPHILS NFR BLD AUTO: 80.6 % — HIGH (ref 43–77)
NT-PROBNP SERPL-SCNC: 4948 PG/ML — HIGH (ref 0–300)
PCO2 BLDV: 38 MMHG — SIGNIFICANT CHANGE UP (ref 35–50)
PH BLDV: 7.44 — SIGNIFICANT CHANGE UP (ref 7.35–7.45)
PLATELET # BLD AUTO: 242 K/UL — SIGNIFICANT CHANGE UP (ref 150–400)
PO2 BLDV: 50 MMHG — HIGH (ref 25–45)
POTASSIUM BLDV-SCNC: 4.1 MMOL/L — SIGNIFICANT CHANGE UP (ref 3.5–5.3)
POTASSIUM SERPL-MCNC: 4.3 MMOL/L — SIGNIFICANT CHANGE UP (ref 3.5–5.3)
POTASSIUM SERPL-SCNC: 4.3 MMOL/L — SIGNIFICANT CHANGE UP (ref 3.5–5.3)
PROT SERPL-MCNC: 7 G/DL — SIGNIFICANT CHANGE UP (ref 6–8.3)
PROTHROM AB SERPL-ACNC: 23.3 SEC — HIGH (ref 10–12.9)
RBC # BLD: 4.45 M/UL — SIGNIFICANT CHANGE UP (ref 4.2–5.8)
RBC # FLD: 13.3 % — SIGNIFICANT CHANGE UP (ref 10.3–14.5)
RSV RESULT: SIGNIFICANT CHANGE UP
RSV RNA RESP QL NAA+PROBE: SIGNIFICANT CHANGE UP
SAO2 % BLDV: 84 % — SIGNIFICANT CHANGE UP (ref 67–88)
SODIUM SERPL-SCNC: 135 MMOL/L — SIGNIFICANT CHANGE UP (ref 135–145)
TROPONIN T, HIGH SENSITIVITY RESULT: 132 NG/L — HIGH (ref 0–51)
TROPONIN T, HIGH SENSITIVITY RESULT: 134 NG/L — HIGH (ref 0–51)
WBC # BLD: 10.8 K/UL — HIGH (ref 3.8–10.5)
WBC # FLD AUTO: 10.8 K/UL — HIGH (ref 3.8–10.5)

## 2019-03-19 PROCEDURE — 99285 EMERGENCY DEPT VISIT HI MDM: CPT | Mod: GC,25

## 2019-03-19 PROCEDURE — 71045 X-RAY EXAM CHEST 1 VIEW: CPT | Mod: 26

## 2019-03-19 PROCEDURE — 93010 ELECTROCARDIOGRAM REPORT: CPT | Mod: GC

## 2019-03-19 RX ORDER — SPIRONOLACTONE 25 MG/1
25 TABLET, FILM COATED ORAL DAILY
Qty: 0 | Refills: 0 | Status: DISCONTINUED | OUTPATIENT
Start: 2019-03-19 | End: 2019-03-22

## 2019-03-19 RX ORDER — TAMSULOSIN HYDROCHLORIDE 0.4 MG/1
0.4 CAPSULE ORAL AT BEDTIME
Qty: 0 | Refills: 0 | Status: DISCONTINUED | OUTPATIENT
Start: 2019-03-19 | End: 2019-03-22

## 2019-03-19 RX ORDER — CLOPIDOGREL BISULFATE 75 MG/1
75 TABLET, FILM COATED ORAL DAILY
Qty: 0 | Refills: 0 | Status: DISCONTINUED | OUTPATIENT
Start: 2019-03-19 | End: 2019-03-22

## 2019-03-19 RX ORDER — IPRATROPIUM/ALBUTEROL SULFATE 18-103MCG
3 AEROSOL WITH ADAPTER (GRAM) INHALATION ONCE
Qty: 0 | Refills: 0 | Status: COMPLETED | OUTPATIENT
Start: 2019-03-19 | End: 2019-03-19

## 2019-03-19 RX ORDER — PANTOPRAZOLE SODIUM 20 MG/1
40 TABLET, DELAYED RELEASE ORAL
Qty: 0 | Refills: 0 | Status: DISCONTINUED | OUTPATIENT
Start: 2019-03-19 | End: 2019-03-22

## 2019-03-19 RX ORDER — AMIODARONE HYDROCHLORIDE 400 MG/1
200 TABLET ORAL DAILY
Qty: 0 | Refills: 0 | Status: DISCONTINUED | OUTPATIENT
Start: 2019-03-19 | End: 2019-03-22

## 2019-03-19 RX ORDER — METOPROLOL TARTRATE 50 MG
50 TABLET ORAL DAILY
Qty: 0 | Refills: 0 | Status: DISCONTINUED | OUTPATIENT
Start: 2019-03-19 | End: 2019-03-22

## 2019-03-19 RX ORDER — ATORVASTATIN CALCIUM 80 MG/1
40 TABLET, FILM COATED ORAL AT BEDTIME
Qty: 0 | Refills: 0 | Status: DISCONTINUED | OUTPATIENT
Start: 2019-03-19 | End: 2019-03-22

## 2019-03-19 RX ORDER — FUROSEMIDE 40 MG
40 TABLET ORAL DAILY
Qty: 0 | Refills: 0 | Status: DISCONTINUED | OUTPATIENT
Start: 2019-03-19 | End: 2019-03-20

## 2019-03-19 RX ORDER — APIXABAN 2.5 MG/1
5 TABLET, FILM COATED ORAL EVERY 12 HOURS
Qty: 0 | Refills: 0 | Status: DISCONTINUED | OUTPATIENT
Start: 2019-03-19 | End: 2019-03-22

## 2019-03-19 RX ORDER — FINASTERIDE 5 MG/1
5 TABLET, FILM COATED ORAL DAILY
Qty: 0 | Refills: 0 | Status: DISCONTINUED | OUTPATIENT
Start: 2019-03-19 | End: 2019-03-22

## 2019-03-19 RX ORDER — ALBUTEROL 90 UG/1
2 AEROSOL, METERED ORAL EVERY 4 HOURS
Qty: 0 | Refills: 0 | Status: DISCONTINUED | OUTPATIENT
Start: 2019-03-19 | End: 2019-03-22

## 2019-03-19 RX ADMIN — Medication 40 MILLIGRAM(S): at 23:17

## 2019-03-19 RX ADMIN — Medication 3 MILLILITER(S): at 23:16

## 2019-03-19 RX ADMIN — Medication 3 MILLILITER(S): at 23:17

## 2019-03-19 NOTE — ED PROVIDER NOTE - PROGRESS NOTE DETAILS
Attending Cheri Echeverria: d/w pt's cardioologist  will admit. pocus shows no bl rené suspect likely copd exacerabtion. does have troponin leak. no chest pain currently. will continue to trend

## 2019-03-19 NOTE — H&P ADULT - NSHPPHYSICALEXAM_GEN_ALL_CORE
Vital Signs Last 24 Hrs  T(C): 36.4 (19 Mar 2019 22:01), Max: 36.9 (19 Mar 2019 20:16)  T(F): 97.6 (19 Mar 2019 22:01), Max: 98.4 (19 Mar 2019 20:16)  HR: 87 (19 Mar 2019 22:01) (87 - 88)  BP: 103/79 (19 Mar 2019 22:01) (100/64 - 103/79)  BP(mean): --  RR: 18 (19 Mar 2019 22:01) (18 - 20)  SpO2: 100% (19 Mar 2019 22:01) (96% - 100%)    Appearance: Normal	  HEENT:   Normal oral mucosa, PERRL, EOMI	  Lymphatic: No lymphadenopathy , no edema  Cardiovascular: Normal S1 S2, No JVD, No murmurs , Peripheral pulses palpable 2+ bilaterally  Respiratory: Lungs clear to auscultation, normal effort 	  Gastrointestinal:  Soft, Non-tender, + BS	  Skin: No rashes, No ecchymoses, No cyanosis, warm to touch  Musculoskeletal: Normal range of motion, normal strength  Psychiatry:  Mood & affect appropriate  Ext: No edema

## 2019-03-19 NOTE — ED ADULT NURSE NOTE - PMH
Aortic stenosis    Asthma    Atrial fibrillation    BPH (benign prostatic hyperplasia)    CAD (coronary artery disease)  s/p stent 2010  CHF (congestive heart failure)    Chronic diastolic HF (heart failure)    CKD (chronic kidney disease) stage 3, GFR 30-59 ml/min    COPD (chronic obstructive pulmonary disease)    CVA (cerebral vascular accident)    Gait instability    H/O thalassemia    Hepatitis B    Hypertension    DIEGO (obstructive sleep apnea)    Pacemaker  2015 Medtronic [id number]  A2DR01  PTSD (post-traumatic stress disorder)    Seizure    Tachy-idris syndrome    TIA (transient ischemic attack)  2010

## 2019-03-19 NOTE — ED ADULT NURSE NOTE - OBJECTIVE STATEMENT
88 YO male PMHx HTN, HLD, remote CVA, COPD, CAD with Stents, CHF, chronic afib on eliquis, chronic systolic heart failure TTE EF 45% s/p TAVR  11/27/18 c/o lightheadedness and SOB X4 days. Patient states "For about 4 days now, it seems like I am feeling very lightheaded and SOB, I can barely walk up the stairs without getting SOB. I also havent really been eating that well, I have no appetite. I also had chills yesterday". Patient is A&OX3, airway patent, breathing spontaneous. Equal and symmetric chest rise and fall. Rhonci auscultated b/l in upper and lower lobes. Skin warm, dry and pink. denies chest pain, HA, N/V/D, abdominal pain, fever/chills, urinary symptoms, hematuria, weakness, dizziness, numbness, tinging. Peripheral pulses present b/l. Skin warm, dry and pink. Pt placed in position of comfort. Pt educated on call bell system and provided call bell. Bed in lowest position, wheels locked, appropriate side rails raised. Pt denies needs at this time.

## 2019-03-19 NOTE — ED PROVIDER NOTE - OBJECTIVE STATEMENT
Attending Cheri Echeverria: 88 y/o male h/o TAVR in the past presenting with weakness. pt states has been feeling lightheaded, having difficulty breathing for last few days. h/o HTN. diastolic heart failure, AS, s/p tavr. for last week increased cough and yesterday reprots some chills. reports some yellow phlegm as well. was started on abx a few hours ago by cardiologist for possible pna, did have xray o/p. also reports right shoulder pain for last week. no falls. has been losing weight. reports some mild sob with walking. no fevers. sob is worseing Attending Cheri Echeverria: 88 y/o male h/o TAVR in the past presenting with weakness. pt states has been feeling lightheaded, having difficulty breathing for last few days. h/o HTN. diastolic heart failure, AS, s/p tavr. for last week increased cough and yesterday reports some chills. reports some yellow phlegm as well. was started on abx a few hours ago by cardiologist for possible pna, did have xray o/p. also reports right shoulder pain for last week. no falls. has been losing weight. reports some mild sob with walking. no fevers. sob is worseing

## 2019-03-19 NOTE — ED ADULT NURSE NOTE - NSIMPLEMENTINTERV_GEN_ALL_ED
Implemented All Fall with Harm Risk Interventions:  [place] to call system. Call bell, personal items and telephone within reach. Instruct patient to call for assistance. Room bathroom lighting operational. Non-slip footwear when patient is off stretcher. Physically safe environment: no spills, clutter or unnecessary equipment. Stretcher in lowest position, wheels locked, appropriate side rails in place. Provide visual cue, wrist band, yellow gown, etc. Monitor gait and stability. Monitor for mental status changes and reorient to person, place, and time. Review medications for side effects contributing to fall risk. Reinforce activity limits and safety measures with patient and family. Provide visual clues: red socks.

## 2019-03-19 NOTE — ED PROVIDER NOTE - PMH
Aortic stenosis    Asthma    Atrial fibrillation    BPH (benign prostatic hyperplasia)    CAD (coronary artery disease)  s/p stent 2010  CHF (congestive heart failure)    Chronic diastolic HF (heart failure)    CKD (chronic kidney disease) stage 3, GFR 30-59 ml/min    COPD (chronic obstructive pulmonary disease)    CVA (cerebral vascular accident)    Gait instability    H/O thalassemia    Hepatitis B    Hypertension    DIEGO (obstructive sleep apnea)    Pacemaker  2015 Medtronic [id number]  A2DR01  PTSD (post-traumatic stress disorder)    Seizure    Tachy-idris syndrome    TIA (transient ischemic attack)  2010

## 2019-03-19 NOTE — H&P ADULT - NSHPREVIEWOFSYSTEMS_GEN_ALL_CORE
REVIEW OF SYSTEMS:    CONSTITUTIONAL: +weakness and chills   EYES/ENT: No visual changes;  No vertigo or throat pain   NECK: No pain or stiffness  RESPIRATORY: + SOB, cough, productive   CARDIOVASCULAR: No chest pain or palpitations  GASTROINTESTINAL: No abdominal or epigastric pain. No nausea, vomiting, or hematemesis; No diarrhea or constipation. No melena or hematochezia.  GENITOURINARY: No dysuria, frequency or hematuria  NEUROLOGICAL: No numbness or weakness  SKIN: No itching, burning, rashes, or lesions   All other review of systems is negative unless indicated above.

## 2019-03-19 NOTE — H&P ADULT - NSHPLABSRESULTS_GEN_ALL_CORE
13.2   10.8  )-----------( 242      ( 19 Mar 2019 20:57 )             38.6               03-19    135  |  100  |  27<H>  ----------------------------<  140<H>  4.3   |  22  |  1.28    Ca    9.7      19 Mar 2019 20:57    TPro  7.0  /  Alb  3.5  /  TBili  0.5  /  DBili  x   /  AST  20  /  ALT  20  /  AlkPhos  130<H>  03-19    PT/INR - ( 19 Mar 2019 20:57 )   PT: 23.3 sec;   INR: 2.00 ratio         PTT - ( 19 Mar 2019 20:57 )  PTT:35.5 sec                   < from: Xray Chest 1 View AP/PA (03.19.19 @ 21:37) >    INTERPRETATION:  no emergent findings

## 2019-03-19 NOTE — ED PROVIDER NOTE - ATTENDING CONTRIBUTION TO CARE
Attending MD Cheri Echeverria:  I personally have seen and examined this patient.  Resident note reviewed and agree on plan of care and except where noted.  See HPI, PE, and MDM for details.

## 2019-03-19 NOTE — H&P ADULT - NSICDXPASTMEDICALHX_GEN_ALL_CORE_FT
PAST MEDICAL HISTORY:  Aortic stenosis     Asthma     Atrial fibrillation     BPH (benign prostatic hyperplasia)     CAD (coronary artery disease) s/p stent 2010    CHF (congestive heart failure)     Chronic diastolic HF (heart failure)     CKD (chronic kidney disease) stage 3, GFR 30-59 ml/min     COPD (chronic obstructive pulmonary disease)     CVA (cerebral vascular accident)     Gait instability     H/O thalassemia     Hepatitis B     Hypertension     DIEGO (obstructive sleep apnea)     Pacemaker 2015 Medtronic [id number]  [id number]    PTSD (post-traumatic stress disorder)     Seizure     Tachy-idris syndrome     TIA (transient ischemic attack) 2010

## 2019-03-19 NOTE — ED PROVIDER NOTE - NS ED ROS FT
REVIEW OF SYSTEMS:    CONSTITUTIONAL: No weakness, fevers, + chills   EYES/ENT: No visual changes;  No vertigo or throat pain   NECK: No pain or stiffness  RESPIRATORY: as in HPI  CARDIOVASCULAR: No chest pain or palpitations  GASTROINTESTINAL: No abdominal or epigastric pain. No nausea, vomiting, or hematemesis; No diarrhea or constipation. No melena or hematochezia.  GENITOURINARY: No dysuria, frequency or hematuria  NEUROLOGICAL: No numbness or weakness  SKIN: No itching, rashes

## 2019-03-19 NOTE — H&P ADULT - HISTORY OF PRESENT ILLNESS
Pt is a 86 y/o male w/ PMH of HTN, HLD, remote CVA, COPD, CAD with Stents, CHF, chronic afib on eliquis, chronic systolic heart failure s/p TAVR 11/27/18.- with Dr Zuluaga presenting to [facility] ED today d/t weakness. pt states he has been feeling lightheaded and having difficulty breathing for last few days. Patient had cardiac cath during previous admission which showed Distal circumflex: There was a 40 % stenosis. Ramus intermedius: Angiography showed severe atherosclerosis., Mid RCA: There was a 60 % stenosis. Patient states that he has never had to be intubated d/t shortness of breath; also states that he has O2 at home as needed, but does not use his home O2 or his home inhalers. patient reports some chills and productive cough with yellow sputum. patient was seen by his cardiologist today which he was prescribed doxy for possible PNA however he was told to come to ER for worsening symptoms

## 2019-03-19 NOTE — H&P ADULT - ASSESSMENT
Pt is a 86 y/o male w/ PMH of HTN, HLD, remote CVA, COPD, CAD with Stents, CHF, chronic afib on eliquis, chronic systolic heart failure s/p TAVR 11/27/18.- with Dr Zuluaga presenting to [facility] ED today d/t weakness. pt states he has been feeling lightheaded and having difficulty breathing for last few days. Patient had cardiac cath during previous admission which showed Distal circumflex: There was a 40 % stenosis. Ramus intermedius: Angiography showed severe atherosclerosis., Mid RCA: There was a 60 % stenosis. Patient states that he has never had to be intubated d/t shortness of breath; also states that he has O2 at home as needed, but does not use his home O2 or his home inhalers. patient reports some chills and productive cough with yellow sputum. patient was seen by his cardiologist today which he was prescribed doxy for possible PNA however he was told to come to ER for worsening symptoms       # SOB and productive cough, R/P PNA VS viral URI   # COPD exacerbation   # HX of HFrEF, R/O acute on chronic exacerbation   # CAD S/P PCI   # Afib on AC  # S/P TAVR  # HTN  # CVA  #BPH  # HLD     CXR clear, no PNA seen  will check Procalcitonin   CT Chest PRN   solumedrol 40 IV daily   ProBNP mildly elevated however he is not in any fluid overlaod   Nebs and inhalers   less likley PE, in view of chronic use of eliquis  COnt eliquis, lasix and spironolactone   Card and pulm eval   serial CE and EKG   MOnitor vitals, restart home BP medications and statin and AP with plavix   rate control, cont amiodorone   DVT and GI PPX Pt is a 86 y/o male w/ PMH of HTN, HLD, remote CVA, COPD, CAD with Stents, CHF, chronic afib on eliquis, chronic systolic heart failure s/p TAVR 11/27/18.- with Dr Zuluaga presenting to [facility] ED today d/t weakness. pt states he has been feeling lightheaded and having difficulty breathing for last few days. Patient had cardiac cath during previous admission which showed Distal circumflex: There was a 40 % stenosis. Ramus intermedius: Angiography showed severe atherosclerosis., Mid RCA: There was a 60 % stenosis. Patient states that he has never had to be intubated d/t shortness of breath; also states that he has O2 at home as needed, but does not use his home O2 or his home inhalers. patient reports some chills and productive cough with yellow sputum. patient was seen by his cardiologist today which he was prescribed doxy for possible PNA however he was told to come to ER for worsening symptoms       # SOB and productive cough, R/P PNA VS viral URI   # COPD exacerbation   # HX of HFrEF, R/O acute on chronic exacerbation   # CAD S/P PCI   # Afib on AC  # S/P TAVR  # HTN  # CVA  #BPH  # HLD     CXR clear, no PNA seen  will check Procalcitonin   CT Chest PRN   solumedrol 40 IV daily   ProBNP mildly elevated however he is not in any fluid overlaod   check Echo  I andOs, FLuid restriction   Nebs and inhalers   less likley PE, in view of chronic use of eliquis  COnt eliquis, lasix and spironolactone   Card and pulm eval   serial CE and EKG, mild Trop elevated, F/U Trend   MOnitor vitals, restart home BP medications and statin and AP with plavix   rate control, cont amiodorone   DVT and GI PPX

## 2019-03-19 NOTE — ED PROVIDER NOTE - PHYSICAL EXAMINATION
PHYSICAL EXAM:  GENERAL: NAD, well-developed/nourished and groomed, afebrile  HEAD:  Atraumatic, Normocephalic  EYES: EOMI, PERRLA, conjunctiva and sclera clear  NECK: Supple, No JVD, no thyroid enlargement  CHEST/LUNG: wheezing expiratory + b/l   HEART: Regular rate and rhythm; No murmurs, rubs, or gallops  ABDOMEN: Soft, Nontender, Nondistended; Bowel sounds present  EXTREMITIES:  2+ Peripheral Pulses, No clubbing, cyanosis, or edema  PSYCH: appropriate mood and affect for age and condition  NEUROLOGY: AAO*3, non-focal  SKIN: No rashes or lesions PHYSICAL EXAM:  GENERAL: NAD, well-developed/nourished and groomed, afebrile  HEAD:  Atraumatic, Normocephalic  EYES: EOMI, PERRLA, conjunctiva and sclera clear  NECK: Supple, No JVD, no thyroid enlargement  CHEST/LUNG: wheezing expiratory + b/l   HEART: Regular rate and rhythm; No murmurs, rubs, or gallops  ABDOMEN: Soft, Nontender, Nondistended; Bowel sounds present  EXTREMITIES:  2+ Peripheral Pulses, No clubbing, cyanosis, or edema  PSYCH: appropriate mood and affect for age and condition  NEUROLOGY: AAO*3, non-focal  SKIN: No rashes or lesions  Attending Cheri Echeverria: Gen: NAD, heent: atrauamtic, eomi, perrla, mmm, op pink, uvula midline, neck; nttp, no nuchal rigidity, chest: nttp, no crepitus, cv: +[id number], , lungs: diffuse rhonchi, abd: soft, nontender, nondistended, no peritoneal signs, +BS, no guarding, ext: wwp, neg homans, skin: no rash, neuro: awake and alert, following commands, speech clear, sensation and strength intact, no focal deficits

## 2019-03-19 NOTE — ED ADULT NURSE NOTE - ED STAT RN HANDOFF DETAILS
Bedside report given to holding RNYolie. Understands pmh, medications given and plan of care for patient. Patient in stable condition, vital signs updated, has no complaints at this time and has been updated on care plan. Explained to patient that he will be in holding and new nurse is taking over, pt verbalized understanding.

## 2019-03-19 NOTE — ED PROVIDER NOTE - CLINICAL SUMMARY MEDICAL DECISION MAKING FREE TEXT BOX
Attending Cheri Echeverria: 88 y/o male presenting with sob. h/o COPD, and CHF. upon arrival pt with wheezing present. pocus shows no sig B lines. pt afebrile currently. with generalized weakness and h/o tavr will pan culture, check RVP. low threshold to start abx if pt develops fever, was started on azithromycin by cardiolgost. duonebs given pt TBA for further work up and monitoring or respirtory status

## 2019-03-19 NOTE — H&P ADULT - NSICDXPASTSURGICALHX_GEN_ALL_CORE_FT
PAST SURGICAL HISTORY:  Artificial pacemaker     S/P primary angioplasty with coronary stent 2012 AND 2017    S/P TAVR (transcatheter aortic valve replacement) 11/27/18

## 2019-03-20 LAB
ALBUMIN SERPL ELPH-MCNC: 3.3 G/DL — SIGNIFICANT CHANGE UP (ref 3.3–5)
ALP SERPL-CCNC: 123 U/L — HIGH (ref 40–120)
ALT FLD-CCNC: 20 U/L — SIGNIFICANT CHANGE UP (ref 10–45)
ANION GAP SERPL CALC-SCNC: 15 MMOL/L — SIGNIFICANT CHANGE UP (ref 5–17)
APPEARANCE UR: CLEAR — SIGNIFICANT CHANGE UP
AST SERPL-CCNC: 19 U/L — SIGNIFICANT CHANGE UP (ref 10–40)
BACTERIA # UR AUTO: NEGATIVE — SIGNIFICANT CHANGE UP
BILIRUB SERPL-MCNC: 0.4 MG/DL — SIGNIFICANT CHANGE UP (ref 0.2–1.2)
BILIRUB UR-MCNC: NEGATIVE — SIGNIFICANT CHANGE UP
BUN SERPL-MCNC: 28 MG/DL — HIGH (ref 7–23)
CALCIUM SERPL-MCNC: 9.6 MG/DL — SIGNIFICANT CHANGE UP (ref 8.4–10.5)
CHLORIDE SERPL-SCNC: 100 MMOL/L — SIGNIFICANT CHANGE UP (ref 96–108)
CK SERPL-CCNC: 70 U/L — SIGNIFICANT CHANGE UP (ref 30–200)
CO2 SERPL-SCNC: 21 MMOL/L — LOW (ref 22–31)
COLOR SPEC: YELLOW — SIGNIFICANT CHANGE UP
CREAT SERPL-MCNC: 1.16 MG/DL — SIGNIFICANT CHANGE UP (ref 0.5–1.3)
DIFF PNL FLD: NEGATIVE — SIGNIFICANT CHANGE UP
ENTEROCOC DNA BLD POS QL NAA+NON-PROBE: SIGNIFICANT CHANGE UP
EPI CELLS # UR: 2 /HPF — SIGNIFICANT CHANGE UP
GLUCOSE SERPL-MCNC: 143 MG/DL — HIGH (ref 70–99)
GLUCOSE UR QL: NEGATIVE — SIGNIFICANT CHANGE UP
GRAM STN FLD: SIGNIFICANT CHANGE UP
HCT VFR BLD CALC: 39.4 % — SIGNIFICANT CHANGE UP (ref 39–50)
HGB BLD-MCNC: 12.6 G/DL — LOW (ref 13–17)
HMPV RNA SPEC QL NAA+PROBE: DETECTED
HYALINE CASTS # UR AUTO: 11 /LPF — HIGH (ref 0–2)
KETONES UR-MCNC: NEGATIVE — SIGNIFICANT CHANGE UP
LEUKOCYTE ESTERASE UR-ACNC: NEGATIVE — SIGNIFICANT CHANGE UP
MCHC RBC-ENTMCNC: 27.9 PG — SIGNIFICANT CHANGE UP (ref 27–34)
MCHC RBC-ENTMCNC: 32 GM/DL — SIGNIFICANT CHANGE UP (ref 32–36)
MCV RBC AUTO: 87.2 FL — SIGNIFICANT CHANGE UP (ref 80–100)
METHOD TYPE: SIGNIFICANT CHANGE UP
NITRITE UR-MCNC: NEGATIVE — SIGNIFICANT CHANGE UP
PH UR: 5.5 — SIGNIFICANT CHANGE UP (ref 5–8)
PLATELET # BLD AUTO: 227 K/UL — SIGNIFICANT CHANGE UP (ref 150–400)
POTASSIUM SERPL-MCNC: 4.3 MMOL/L — SIGNIFICANT CHANGE UP (ref 3.5–5.3)
POTASSIUM SERPL-SCNC: 4.3 MMOL/L — SIGNIFICANT CHANGE UP (ref 3.5–5.3)
PROCALCITONIN SERPL-MCNC: 0.09 NG/ML — SIGNIFICANT CHANGE UP (ref 0.02–0.1)
PROT SERPL-MCNC: 7.1 G/DL — SIGNIFICANT CHANGE UP (ref 6–8.3)
PROT UR-MCNC: ABNORMAL
RAPID RVP RESULT: DETECTED
RBC # BLD: 4.52 M/UL — SIGNIFICANT CHANGE UP (ref 4.2–5.8)
RBC # FLD: 13.9 % — SIGNIFICANT CHANGE UP (ref 10.3–14.5)
RBC CASTS # UR COMP ASSIST: 4 /HPF — SIGNIFICANT CHANGE UP (ref 0–4)
SODIUM SERPL-SCNC: 136 MMOL/L — SIGNIFICANT CHANGE UP (ref 135–145)
SP GR SPEC: 1.03 — HIGH (ref 1.01–1.02)
SPECIMEN SOURCE: SIGNIFICANT CHANGE UP
TROPONIN T, HIGH SENSITIVITY RESULT: 114 NG/L — HIGH (ref 0–51)
TROPONIN T, HIGH SENSITIVITY RESULT: 94 NG/L — HIGH (ref 0–51)
TSH SERPL-MCNC: 0.75 UIU/ML — SIGNIFICANT CHANGE UP (ref 0.27–4.2)
UROBILINOGEN FLD QL: NEGATIVE — SIGNIFICANT CHANGE UP
WBC # BLD: 10.19 K/UL — SIGNIFICANT CHANGE UP (ref 3.8–10.5)
WBC # FLD AUTO: 10.19 K/UL — SIGNIFICANT CHANGE UP (ref 3.8–10.5)
WBC UR QL: 4 /HPF — SIGNIFICANT CHANGE UP (ref 0–5)

## 2019-03-20 PROCEDURE — 71250 CT THORAX DX C-: CPT | Mod: 26

## 2019-03-20 RX ORDER — VANCOMYCIN HCL 1 G
1000 VIAL (EA) INTRAVENOUS EVERY 12 HOURS
Qty: 0 | Refills: 0 | Status: DISCONTINUED | OUTPATIENT
Start: 2019-03-20 | End: 2019-03-21

## 2019-03-20 RX ORDER — VANCOMYCIN HCL 1 G
1000 VIAL (EA) INTRAVENOUS ONCE
Qty: 0 | Refills: 0 | Status: COMPLETED | OUTPATIENT
Start: 2019-03-20 | End: 2019-03-20

## 2019-03-20 RX ORDER — ACETAMINOPHEN 500 MG
650 TABLET ORAL ONCE
Qty: 0 | Refills: 0 | Status: COMPLETED | OUTPATIENT
Start: 2019-03-20 | End: 2019-03-20

## 2019-03-20 RX ORDER — METOCLOPRAMIDE HCL 10 MG
10 TABLET ORAL ONCE
Qty: 0 | Refills: 0 | Status: DISCONTINUED | OUTPATIENT
Start: 2019-03-20 | End: 2019-03-20

## 2019-03-20 RX ADMIN — FINASTERIDE 5 MILLIGRAM(S): 5 TABLET, FILM COATED ORAL at 13:01

## 2019-03-20 RX ADMIN — ALBUTEROL 2 PUFF(S): 90 AEROSOL, METERED ORAL at 09:40

## 2019-03-20 RX ADMIN — Medication 650 MILLIGRAM(S): at 18:15

## 2019-03-20 RX ADMIN — ATORVASTATIN CALCIUM 40 MILLIGRAM(S): 80 TABLET, FILM COATED ORAL at 21:03

## 2019-03-20 RX ADMIN — TAMSULOSIN HYDROCHLORIDE 0.4 MILLIGRAM(S): 0.4 CAPSULE ORAL at 21:03

## 2019-03-20 RX ADMIN — Medication 20 MILLIGRAM(S): at 13:01

## 2019-03-20 RX ADMIN — AMIODARONE HYDROCHLORIDE 200 MILLIGRAM(S): 400 TABLET ORAL at 06:02

## 2019-03-20 RX ADMIN — ALBUTEROL 2 PUFF(S): 90 AEROSOL, METERED ORAL at 21:03

## 2019-03-20 RX ADMIN — Medication 250 MILLIGRAM(S): at 18:28

## 2019-03-20 RX ADMIN — SPIRONOLACTONE 25 MILLIGRAM(S): 25 TABLET, FILM COATED ORAL at 09:41

## 2019-03-20 RX ADMIN — Medication 50 MILLIGRAM(S): at 09:41

## 2019-03-20 RX ADMIN — APIXABAN 5 MILLIGRAM(S): 2.5 TABLET, FILM COATED ORAL at 17:18

## 2019-03-20 RX ADMIN — APIXABAN 5 MILLIGRAM(S): 2.5 TABLET, FILM COATED ORAL at 06:02

## 2019-03-20 RX ADMIN — ALBUTEROL 2 PUFF(S): 90 AEROSOL, METERED ORAL at 17:18

## 2019-03-20 RX ADMIN — ALBUTEROL 2 PUFF(S): 90 AEROSOL, METERED ORAL at 06:02

## 2019-03-20 RX ADMIN — ALBUTEROL 2 PUFF(S): 90 AEROSOL, METERED ORAL at 13:01

## 2019-03-20 RX ADMIN — Medication 20 MILLIGRAM(S): at 21:03

## 2019-03-20 RX ADMIN — CLOPIDOGREL BISULFATE 75 MILLIGRAM(S): 75 TABLET, FILM COATED ORAL at 13:01

## 2019-03-20 RX ADMIN — PANTOPRAZOLE SODIUM 40 MILLIGRAM(S): 20 TABLET, DELAYED RELEASE ORAL at 09:41

## 2019-03-20 NOTE — CONSULT NOTE ADULT - SUBJECTIVE AND OBJECTIVE BOX
CHIEF COMPLAINT:Patient is a 87y old  Male who presents with a chief complaint of SOB, cough (20 Mar 2019 13:10)      HISTORY OF PRESENT ILLNESS:HPI:  Pt is a 88 y/o male w/ PMH of HTN, HLD, remote CVA, COPD, CAD with Stents, CHF, chronic afib on eliquis, chronic systolic heart failure s/p TAVR 11/27/18.- with Dr Zuluaga presenting to [facility] ED today d/t weakness. pt states he has been feeling lightheaded and having difficulty breathing for last few days. Patient had cardiac cath during previous admission which showed Distal circumflex: There was a 40 % stenosis. Ramus intermedius: Angiography showed severe atherosclerosis., Mid RCA: There was a 60 % stenosis. Patient states that he has never had to be intubated d/t shortness of breath; also states that he has O2 at home as needed, but does not use his home O2 or his home inhalers. patient reports some chills and productive cough with yellow sputum. patient was seen by his cardiologist today which he was prescribed doxy for possible PNA however he was told to come to ER for worsening symptoms (19 Mar 2019 23:15)      PAST MEDICAL & SURGICAL HISTORY:  CKD (chronic kidney disease) stage 3, GFR 30-59 ml/min  Hepatitis B  PTSD (post-traumatic stress disorder)  Tachy-idris syndrome  TIA (transient ischemic attack): 2010  Seizure  DIEGO (obstructive sleep apnea)  Chronic diastolic HF (heart failure)  Aortic stenosis  CVA (cerebral vascular accident)  Gait instability  COPD (chronic obstructive pulmonary disease)  BPH (benign prostatic hyperplasia)  Asthma  CHF (congestive heart failure)  Atrial fibrillation  Pacemaker: 2015 Medtronic [id number]  [id number]  Hypertension  H/O thalassemia  CAD (coronary artery disease): s/p stent 2010  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  Artificial pacemaker  S/P primary angioplasty with coronary stent: 2012 AND 2017          MEDICATIONS:  amiodarone    Tablet 200 milliGRAM(s) Oral daily  apixaban 5 milliGRAM(s) Oral every 12 hours  clopidogrel Tablet 75 milliGRAM(s) Oral daily  metoprolol succinate ER 50 milliGRAM(s) Oral daily  spironolactone 25 milliGRAM(s) Oral daily  tamsulosin 0.4 milliGRAM(s) Oral at bedtime      ALBUTerol    90 MICROgram(s) HFA Inhaler 2 Puff(s) Inhalation every 4 hours      pantoprazole    Tablet 40 milliGRAM(s) Oral before breakfast    atorvastatin 40 milliGRAM(s) Oral at bedtime  finasteride 5 milliGRAM(s) Oral daily  methylPREDNISolone sodium succinate Injectable 20 milliGRAM(s) IV Push every 8 hours        FAMILY HISTORY:  No pertinent family history in first degree relatives      Non-contributory    SOCIAL HISTORY:    former smoker    Allergies    Keppra (Rash)  penicillin (Unknown)    Intolerances    	    REVIEW OF SYSTEMS:  CONSTITUTIONAL: No fever, +rigors  EYES: No eye pain, visual disturbances, or discharge  ENMT:  No difficulty hearing, tinnitus  NECK: No pain or stiffness  RESPIRATORY: +cough, wheezing,  CARDIOVASCULAR: + chest pain, no palpitations, passing out, dizziness, or leg swelling  GASTROINTESTINAL:  No nausea, vomiting, diarrhea or constipation. No melena.  GENITOURINARY: No dysuria, hematuria  NEUROLOGICAL: No stroke like symptoms  SKIN: No burning or lesions   ENDOCRINE: No heat or cold intolerance  MUSCULOSKELETAL: No joint pain or swelling  PSYCHIATRIC: No  anxiety, mood swings  HEME/LYMPH: No bleeding gums  ALLERY AND IMMUNOLOGIC: No hives or eczema	    All other ROS negative    PHYSICAL EXAM:  T(C): 36.3 (03-20-19 @ 11:33), Max: 36.9 (03-19-19 @ 20:16)  HR: 86 (03-20-19 @ 11:33) (76 - 91)  BP: 113/60 (03-20-19 @ 11:33) (98/65 - 118/99)  RR: 18 (03-20-19 @ 11:33) (18 - 20)  SpO2: 98% (03-20-19 @ 11:33) (96% - 100%)  Wt(kg): --  I&O's Summary      Appearance: Normal	  HEENT:   Normal oral mucosa, EOMI	  Cardiovascular: Normal S1 S2, No JVD, No murmurs  Respiratory: Lungs clear to auscultation	  Psychiatry: Alert, Mood & affect appropriate  Gastrointestinal:  Soft, Non-tender, + BS	  Skin: No rashes, No ecchymoses, No cyanosis	  Neurologic: Non-focal  Extremities:  No clubbing, cyanosis or edema  Vascular: Peripheral pulses palpable  bilaterally  	    	  	  CARDIAC MARKERS:                                  12.6   10.19 )-----------( 227      ( 20 Mar 2019 09:12 )             39.4     03-20    136  |  100  |  28<H>  ----------------------------<  143<H>  4.3   |  21<L>  |  1.16    Ca    9.6      20 Mar 2019 05:28    TPro  7.1  /  Alb  3.3  /  TBili  0.4  /  DBili  x   /  AST  19  /  ALT  20  /  AlkPhos  123<H>  03-20          EKG: AF @85bpm  Radiology:  CXR clear lungs    Assessment /Plan:   Assessment and Plan:    Assessment:  · Assessment		  Pt is a 88 y/o male w/ PMH of HTN, HLD, remote CVA, COPD, CAD with Stents, CHF, chronic afib on eliquis, chronic systolic heart failure s/p TAVR 11/27/18.presenting to [facility] ED today d/t weakness/SOB/cough. patient reports some chills and productive cough with yellow sputum. patient was seen by his cardiologist today which he was prescribed doxy for possible PNA however he was told to come to ER for worsening symptoms       # SOB and productive cough, likely 2/2 viral URI with superimposed COPD exacerbation   # HX of HFpEF,   # CAD S/P PCI   # Afib on AC  # S/P TAVR  # HTN  # CVA  #BPH  # HLD     Presentation likely 2/2 infectious process, viral > bacterial  unlikely cardiac  Trop elevation unlikely ACS  TTE  Pulm eval  Continue with Eliquis  Resume diuretic for discharge  discussed with Dr Coronado/Dr Echeverria in ER and  pt        Timothy Paredes DO [facility]  [facility]  [phone]

## 2019-03-20 NOTE — CONSULT NOTE ADULT - ASSESSMENT
86 yo M, COPD, CAD, Afib, PPM, TAVR Nov '18, here with shaking chills, SOB, weakness, dry cough, and found hMPV+.  hMPV could account for acute presentation, typical URI; although generally would expect more in way of cough and resp Sxs- can also lead to viral pneumonia, but no pneumonia seen on CXR/Chest CT.  More concerning in this pt s/p recent TAVR is endocarditis.  Back pain could signal seeding from bacteremia as well.  If Bld isolate found to be Alpha Strep or Enterococcus, would be even more suggestive of endovascular focus.    Plan:  Await further Micro- PCR   Agree with Omid for now  Repeat Bld Cxs in AM  Await ECHO, may require MARIA LUISA pending further data  D/w pt at length- concern for endocarditis reviewed  D/w NP

## 2019-03-20 NOTE — CONSULT NOTE ADULT - SUBJECTIVE AND OBJECTIVE BOX
HPI:   Patient is a 87y male with COPD, Afib, CAD- stents, PPM, TIA, s/p TAVR 18, admitted last night with 24 hrs of shaking chills, dry cough, weakness, SOB.  No pleuritic CP.  No increased sputum.  Afebrile since arrival, WBC minimally elevated.  RVP +hMPV; CXR clear- viral URI, ?COPD exacerbation- no abx given.  Bld Cxs now reported GPC prs and chns 4 of 4 bottles.    REVIEW OF SYSTEMS:  All other review of systems negative (Comprehensive ROS), except for low back pain as of today    PAST MEDICAL & SURGICAL HISTORY:  CKD (chronic kidney disease) stage 3, GFR 30-59 ml/min  Hepatitis B  PTSD (post-traumatic stress disorder)  Tachy-idris syndrome  TIA (transient ischemic attack):   Seizure  DIEGO (obstructive sleep apnea)  Chronic diastolic HF (heart failure)  Aortic stenosis  CVA (cerebral vascular accident)  Gait instability  COPD (chronic obstructive pulmonary disease)  BPH (benign prostatic hyperplasia)  Asthma  CHF (congestive heart failure)  Atrial fibrillation  Pacemaker:  O[employer]ronic [id number]  [id number]  Hypertension  H/O thalassemia  CAD (coronary artery disease): s/p stent   S/P TAVR (transcatheter aortic valve replacement): 18  Artificial pacemaker  S/P primary angioplasty with coronary stent:  AND       Allergies  Keppra (Rash)  penicillin (Unknown)    Antimicrobials Day # 1  vancomycin  IVPB 1000 milliGRAM(s) IV Intermittent every 12 hours    Other Medications:  ALBUTerol    90 MICROgram(s) HFA Inhaler 2 Puff(s) Inhalation every 4 hours  amiodarone    Tablet 200 milliGRAM(s) Oral daily  apixaban 5 milliGRAM(s) Oral every 12 hours  atorvastatin 40 milliGRAM(s) Oral at bedtime  clopidogrel Tablet 75 milliGRAM(s) Oral daily  finasteride 5 milliGRAM(s) Oral daily  methylPREDNISolone sodium succinate Injectable 20 milliGRAM(s) IV Push every 8 hours  metoprolol succinate ER 50 milliGRAM(s) Oral daily  pantoprazole    Tablet 40 milliGRAM(s) Oral before breakfast  spironolactone 25 milliGRAM(s) Oral daily  tamsulosin 0.4 milliGRAM(s) Oral at bedtime      FAMILY HISTORY:  No pertinent family history in first degree relatives      SOCIAL HISTORY:  Smoking: remote    ETOH: no         T(F): 97.7 (19 @ 18:11), Max: 98.4 (19 @ 20:16)  HR: 106 (19 @ 18:11)  BP: 115/60 (19 @ 18:11)  RR: 18 (19 @ 18:11)  SpO2: 98% (19 @ 18:11)  Wt(kg): --    PHYSICAL EXAM:  General: alert, no acute distress  Eyes:  anicteric, no conjunctival injection, no discharge  Oropharynx: no lesions or injection 	  Neck: supple, without adenopathy  Lungs: diminished BSs, few rales bases  Heart: irregular rate and rhythm; systolic murmur  Abdomen: soft, nondistended, nontender, without mass or organomegaly  Skin: no lesions  Extremities: leg edema  Neurologic: alert, oriented, moves all extremities    LAB RESULTS:                        12.6   10.19 )-----------( 227      ( 20 Mar 2019 09:12 )             39.4         136  |  100  |  28<H>  ----------------------------<  143<H>  4.3   |  21<L>  |  1.16    Ca    9.6      20 Mar 2019 05:28    TPro  7.1  /  Alb  3.3  /  TBili  0.4  /  DBili  x   /  AST  19  /  ALT  20  /  AlkPhos  123<H>      Urinalysis Basic - ( 20 Mar 2019 15:13 )    Color: Yellow / Appearance: Clear / S.032 / pH: x  Gluc: x / Ketone: Negative  / Bili: Negative / Urobili: Negative   Blood: x / Protein: 30 mg/dL / Nitrite: Negative   Leuk Esterase: Negative / RBC: 4 /hpf / WBC 4 /HPF   Sq Epi: x / Non Sq Epi: 2 /hpf / Bacteria: Negative    MICROBIOLOGY:  RECENT CULTURES:   @ 02:46 .Blood Blood     Growth in anaerobic bottle: Gram Positive Cocci in Pairs and Chains  Growth in anaerobic bottle: Gram Positive Cocci in Pairs and Chains     @ 02:44 .Blood Blood     Growth in aerobic and anaerobic bottles: Gram Positive Cocci in Pairs and  Chains  Growth in aerobic and anaerobic bottles: Gram Positive Cocci in Pairs and  Chains    RADIOLOGY REVIEWED:  CT Chest No Cont (19 @ 08:14) >  Few tiny scattered calcified granulomas. Lungs otherwise   clear.

## 2019-03-21 ENCOUNTER — TRANSCRIPTION ENCOUNTER (OUTPATIENT)
Age: 84
End: 2019-03-21

## 2019-03-21 LAB
ALBUMIN SERPL ELPH-MCNC: 3.2 G/DL — LOW (ref 3.3–5)
ALP SERPL-CCNC: 130 U/L — HIGH (ref 40–120)
ALT FLD-CCNC: 26 U/L — SIGNIFICANT CHANGE UP (ref 10–45)
ANION GAP SERPL CALC-SCNC: 13 MMOL/L — SIGNIFICANT CHANGE UP (ref 5–17)
AST SERPL-CCNC: 29 U/L — SIGNIFICANT CHANGE UP (ref 10–40)
BILIRUB SERPL-MCNC: 0.3 MG/DL — SIGNIFICANT CHANGE UP (ref 0.2–1.2)
BUN SERPL-MCNC: 42 MG/DL — HIGH (ref 7–23)
CALCIUM SERPL-MCNC: 9.7 MG/DL — SIGNIFICANT CHANGE UP (ref 8.4–10.5)
CHLORIDE SERPL-SCNC: 101 MMOL/L — SIGNIFICANT CHANGE UP (ref 96–108)
CO2 SERPL-SCNC: 22 MMOL/L — SIGNIFICANT CHANGE UP (ref 22–31)
CREAT SERPL-MCNC: 1.13 MG/DL — SIGNIFICANT CHANGE UP (ref 0.5–1.3)
GLUCOSE SERPL-MCNC: 135 MG/DL — HIGH (ref 70–99)
HCT VFR BLD CALC: 37.5 % — LOW (ref 39–50)
HGB BLD-MCNC: 12.5 G/DL — LOW (ref 13–17)
MCHC RBC-ENTMCNC: 29 PG — SIGNIFICANT CHANGE UP (ref 27–34)
MCHC RBC-ENTMCNC: 33.4 GM/DL — SIGNIFICANT CHANGE UP (ref 32–36)
MCV RBC AUTO: 87 FL — SIGNIFICANT CHANGE UP (ref 80–100)
PLATELET # BLD AUTO: 257 K/UL — SIGNIFICANT CHANGE UP (ref 150–400)
POTASSIUM SERPL-MCNC: 4.7 MMOL/L — SIGNIFICANT CHANGE UP (ref 3.5–5.3)
POTASSIUM SERPL-SCNC: 4.7 MMOL/L — SIGNIFICANT CHANGE UP (ref 3.5–5.3)
PROT SERPL-MCNC: 6.5 G/DL — SIGNIFICANT CHANGE UP (ref 6–8.3)
RBC # BLD: 4.31 M/UL — SIGNIFICANT CHANGE UP (ref 4.2–5.8)
RBC # FLD: 13.3 % — SIGNIFICANT CHANGE UP (ref 10.3–14.5)
SODIUM SERPL-SCNC: 136 MMOL/L — SIGNIFICANT CHANGE UP (ref 135–145)
WBC # BLD: 19.4 K/UL — HIGH (ref 3.8–10.5)
WBC # FLD AUTO: 19.4 K/UL — HIGH (ref 3.8–10.5)

## 2019-03-21 PROCEDURE — 99222 1ST HOSP IP/OBS MODERATE 55: CPT

## 2019-03-21 RX ORDER — DOCUSATE SODIUM 100 MG
100 CAPSULE ORAL
Qty: 0 | Refills: 0 | Status: DISCONTINUED | OUTPATIENT
Start: 2019-03-21 | End: 2019-03-22

## 2019-03-21 RX ORDER — DAPTOMYCIN 500 MG/10ML
INJECTION, POWDER, LYOPHILIZED, FOR SOLUTION INTRAVENOUS
Qty: 0 | Refills: 0 | Status: DISCONTINUED | OUTPATIENT
Start: 2019-03-21 | End: 2019-03-22

## 2019-03-21 RX ORDER — DAPTOMYCIN 500 MG/10ML
750 INJECTION, POWDER, LYOPHILIZED, FOR SOLUTION INTRAVENOUS ONCE
Qty: 0 | Refills: 0 | Status: COMPLETED | OUTPATIENT
Start: 2019-03-21 | End: 2019-03-21

## 2019-03-21 RX ORDER — ACETAMINOPHEN 500 MG
650 TABLET ORAL ONCE
Qty: 0 | Refills: 0 | Status: COMPLETED | OUTPATIENT
Start: 2019-03-21 | End: 2019-03-21

## 2019-03-21 RX ORDER — ACETAMINOPHEN 500 MG
1000 TABLET ORAL ONCE
Qty: 0 | Refills: 0 | Status: COMPLETED | OUTPATIENT
Start: 2019-03-21 | End: 2019-03-21

## 2019-03-21 RX ORDER — SENNA PLUS 8.6 MG/1
2 TABLET ORAL AT BEDTIME
Qty: 0 | Refills: 0 | Status: DISCONTINUED | OUTPATIENT
Start: 2019-03-21 | End: 2019-03-22

## 2019-03-21 RX ORDER — DAPTOMYCIN 500 MG/10ML
750 INJECTION, POWDER, LYOPHILIZED, FOR SOLUTION INTRAVENOUS EVERY 24 HOURS
Qty: 0 | Refills: 0 | Status: DISCONTINUED | OUTPATIENT
Start: 2019-03-22 | End: 2019-03-22

## 2019-03-21 RX ORDER — LANOLIN ALCOHOL/MO/W.PET/CERES
3 CREAM (GRAM) TOPICAL AT BEDTIME
Qty: 0 | Refills: 0 | Status: DISCONTINUED | OUTPATIENT
Start: 2019-03-21 | End: 2019-03-22

## 2019-03-21 RX ADMIN — Medication 20 MILLIGRAM(S): at 14:30

## 2019-03-21 RX ADMIN — TAMSULOSIN HYDROCHLORIDE 0.4 MILLIGRAM(S): 0.4 CAPSULE ORAL at 21:22

## 2019-03-21 RX ADMIN — ATORVASTATIN CALCIUM 40 MILLIGRAM(S): 80 TABLET, FILM COATED ORAL at 21:22

## 2019-03-21 RX ADMIN — Medication 650 MILLIGRAM(S): at 02:24

## 2019-03-21 RX ADMIN — APIXABAN 5 MILLIGRAM(S): 2.5 TABLET, FILM COATED ORAL at 17:27

## 2019-03-21 RX ADMIN — Medication 50 MILLIGRAM(S): at 05:00

## 2019-03-21 RX ADMIN — ALBUTEROL 2 PUFF(S): 90 AEROSOL, METERED ORAL at 17:27

## 2019-03-21 RX ADMIN — Medication 100 MILLIGRAM(S): at 22:51

## 2019-03-21 RX ADMIN — ALBUTEROL 2 PUFF(S): 90 AEROSOL, METERED ORAL at 14:30

## 2019-03-21 RX ADMIN — ALBUTEROL 2 PUFF(S): 90 AEROSOL, METERED ORAL at 09:54

## 2019-03-21 RX ADMIN — CLOPIDOGREL BISULFATE 75 MILLIGRAM(S): 75 TABLET, FILM COATED ORAL at 14:29

## 2019-03-21 RX ADMIN — Medication 250 MILLIGRAM(S): at 05:01

## 2019-03-21 RX ADMIN — APIXABAN 5 MILLIGRAM(S): 2.5 TABLET, FILM COATED ORAL at 05:01

## 2019-03-21 RX ADMIN — SENNA PLUS 2 TABLET(S): 8.6 TABLET ORAL at 22:51

## 2019-03-21 RX ADMIN — Medication 400 MILLIGRAM(S): at 09:55

## 2019-03-21 RX ADMIN — Medication 3 MILLIGRAM(S): at 22:52

## 2019-03-21 RX ADMIN — ALBUTEROL 2 PUFF(S): 90 AEROSOL, METERED ORAL at 05:01

## 2019-03-21 RX ADMIN — PANTOPRAZOLE SODIUM 40 MILLIGRAM(S): 20 TABLET, DELAYED RELEASE ORAL at 05:00

## 2019-03-21 RX ADMIN — Medication 650 MILLIGRAM(S): at 03:00

## 2019-03-21 RX ADMIN — FINASTERIDE 5 MILLIGRAM(S): 5 TABLET, FILM COATED ORAL at 14:29

## 2019-03-21 RX ADMIN — Medication 20 MILLIGRAM(S): at 05:01

## 2019-03-21 RX ADMIN — AMIODARONE HYDROCHLORIDE 200 MILLIGRAM(S): 400 TABLET ORAL at 05:00

## 2019-03-21 RX ADMIN — DAPTOMYCIN 130 MILLIGRAM(S): 500 INJECTION, POWDER, LYOPHILIZED, FOR SOLUTION INTRAVENOUS at 10:20

## 2019-03-21 RX ADMIN — ALBUTEROL 2 PUFF(S): 90 AEROSOL, METERED ORAL at 21:57

## 2019-03-21 RX ADMIN — SPIRONOLACTONE 25 MILLIGRAM(S): 25 TABLET, FILM COATED ORAL at 05:00

## 2019-03-21 NOTE — PROGRESS NOTE ADULT - ASSESSMENT
Pt is a 86 y/o male w/ PMH of HTN, HLD, remote CVA, COPD, CAD with Stents, CHF, chronic afib on eliquis, chronic systolic heart failure s/p TAVR 11/27/18.- with Dr Zuluaga presenting to [facility] ED today d/t weakness. pt states he has been feeling lightheaded and having difficulty breathing for last few days. Patient had cardiac cath during previous admission which showed Distal circumflex: There was a 40 % stenosis. Ramus intermedius: Angiography showed severe atherosclerosis., Mid RCA: There was a 60 % stenosis. Patient states that he has never had to be intubated d/t shortness of breath; also states that he has O2 at home as needed, but does not use his home O2 or his home inhalers. patient reports some chills and productive cough with yellow sputum. patient was seen by his cardiologist today which he was prescribed doxy for possible PNA however he was told to come to ER for worsening symptoms     # Bacteremia   # SOB and productive cough, R/P PNA VS viral URI   # COPD exacerbation   # HX of HFrEF, R/O acute on chronic exacerbation   # CAD S/P PCI   # Afib on AC  # S/P TAVR  # HTN  # CVA  #BPH  # HLD     6/6 G + , VRE  started on vanc and changed to daptomycin in view of sensitivity   ID eval appreciated  Card for MARIA LUISA   CTS eval after MARIA LUISA  repeat Blood Cx till negative   hols solumedrol, leukocytosis secondary to steroid use   has back apin, ? osteo/ seeding. CT VS bone scan if cont to have pain   CXR clear, no PNA seen  procalcitonin normal   CT Chest noted  solumedrol 40 IV daily   ProBNP mildly elevated however he is not in any fluid overlaod   Echo pending  I andOs, FLuid restriction   Nebs and inhalers   COnt eliquis and spironolactone   Hold lasix for now in view of + RVP and chills, restart prior to discharge   serial CE and EKG, mild Trop elevated, F/U Trend   MOnitor vitals, restart home BP medications and statin and AP with plavix   rate control, cont amiodorone   DVT and GI PPX

## 2019-03-21 NOTE — PHYSICAL THERAPY INITIAL EVALUATION ADULT - ADDITIONAL COMMENTS
Pt states he lives alone in a private home with 1 step to enter and a flight of steps to bedroom. Pt states prior to admission being independent with all functional mobility and ADLs. pt states he was driving prior to admission. Pt states he was using a straight cane for ambulation the last few weeks 2/2 not feeling well. Pt states he owns a RW. Pt states upon discharge he plans on staying with daughter.

## 2019-03-21 NOTE — CHART NOTE - NSCHARTNOTEFT_GEN_A_CORE
[id number]  RUMA BANEGAS    Notified by RN patient with lower back pain     PT seen and examined. Reports intermittent RT buttock pain ongoing for few days without associated numbness, tingling, urinary or bowel incontinence, or injury/trauma.  Vitals stable. Receiving IV Antibiotic for bacteremia with new concerns of Possible Endocarditis awaiting Echocardiogram.  PE: Awake, Oriented x 4, in NAD. Lungs    Plan:  Rt buttock pain          - IV Tylenol x 1 dose now, re-evaluated and pt with improvement           -Continue to monitor closely          -if pain persists will consider CT imaging of pelvis          -Attending paged awaiting callback     Binta Meyers PA-C   Dept of Medicine    [id number] spectra

## 2019-03-21 NOTE — PROGRESS NOTE ADULT - ASSESSMENT
87y male with COPD, Afib, CAD- stents, PPM, TIA, s/p TAVR 11/27/18, admitted with 24 hrs of shaking chills, dry cough, weakness, SOB.  RVP +hMPV  No pneumonia  Bld Cxs 6 of 6 GPC prs and chns- PCR now reports VRE  Remains afebrile, but now with leukocytosis  He still c/o LBP  In review of record, Dr Owens saw pt in Jan, when MARIA LUISA revealed 0.6 cm echodensity on AV; bld cxs negative at that time, CT ABD unrevealing- treated for E coli UTI  Now high grade bacteremia more readily correlates for PVE  Back pain could signal hematogenous seeding of spine  hMPV only requires supportive measures    Plan:  With PCR VRE, d/c Vanco, start Dapto 8 mg/kg daily  Await final micro data- full id/sensi  Await ECHO, consider MARIA LUISA to c/w Jan study  Would ask Dr Downey to see  Consider CT LS spine (PPM may preclude MRI)  Will need to follow bld cxs until sterile  Follow temps and CBC/diff   Concern for PVE and possible spine infection d/w pt at length  D/w NP  D/w Dr Owens- he will follow pt going forward

## 2019-03-21 NOTE — PHYSICAL THERAPY INITIAL EVALUATION ADULT - GAIT TRAINING, PT EVAL
GOAL: Pt will ambulate 300 feet with least restrictive device as appropriate independently within 2 weeks

## 2019-03-21 NOTE — PHYSICAL THERAPY INITIAL EVALUATION ADULT - PLANNED THERAPY INTERVENTIONS, PT EVAL
GOAL: Pt will negotiate 9 steps with unilateral handrail in a step-to step pattern independently within 2 weeks/gait training

## 2019-03-21 NOTE — PHYSICAL THERAPY INITIAL EVALUATION ADULT - PERTINENT HX OF CURRENT PROBLEM, REHAB EVAL
86 y/o M with PMH of remote CVA, COPD, h/o spinal stenosis, CAD with stents, CHF, chronic Afib on Eliquis, chronic systolic heart failure s/p TAVR 11/27/18 presents with c/o weakness. Pt endorses lightheadedness and difficulty breathing. Pt was seen by his cardiologist today which he was prescribed doxy for possible PNA, however he was told to come to ER for worsening symptoms. Pt with RVP +hMPV. CT Chest: few tiny scattered calcified granulomas. Lungs otherwise clear.

## 2019-03-21 NOTE — PHYSICAL THERAPY INITIAL EVALUATION ADULT - GENERAL OBSERVATIONS, REHAB EVAL
Pt leyda 40 min eval well. Pt with h/o back pain, p/w hMPV. Pt agreed to session. Pt rec'd in bed, peripheral IV lock, tele, and NAD. Pt with some back pain during session, appeared to occur after performing at transfer (ie: supine to sit, sit to stand),

## 2019-03-22 LAB
-  AMPICILLIN: SIGNIFICANT CHANGE UP
-  DAPTOMYCIN: SIGNIFICANT CHANGE UP
-  GENTAMICIN SYNERGY: SIGNIFICANT CHANGE UP
-  LINEZOLID: SIGNIFICANT CHANGE UP
-  VANCOMYCIN: SIGNIFICANT CHANGE UP
ALBUMIN SERPL ELPH-MCNC: 3 G/DL — LOW (ref 3.3–5)
ALP SERPL-CCNC: 130 U/L — HIGH (ref 40–120)
ALT FLD-CCNC: 43 U/L — SIGNIFICANT CHANGE UP (ref 10–45)
ANION GAP SERPL CALC-SCNC: 12 MMOL/L — SIGNIFICANT CHANGE UP (ref 5–17)
AST SERPL-CCNC: 50 U/L — HIGH (ref 10–40)
BASOPHILS # BLD AUTO: 0 K/UL — SIGNIFICANT CHANGE UP (ref 0–0.2)
BASOPHILS NFR BLD AUTO: 0 % — SIGNIFICANT CHANGE UP (ref 0–2)
BILIRUB SERPL-MCNC: 0.2 MG/DL — SIGNIFICANT CHANGE UP (ref 0.2–1.2)
BLD GP AB SCN SERPL QL: NEGATIVE — SIGNIFICANT CHANGE UP
BUN SERPL-MCNC: 43 MG/DL — HIGH (ref 7–23)
CALCIUM SERPL-MCNC: 9.6 MG/DL — SIGNIFICANT CHANGE UP (ref 8.4–10.5)
CHLORIDE SERPL-SCNC: 104 MMOL/L — SIGNIFICANT CHANGE UP (ref 96–108)
CO2 SERPL-SCNC: 22 MMOL/L — SIGNIFICANT CHANGE UP (ref 22–31)
CREAT SERPL-MCNC: 1.1 MG/DL — SIGNIFICANT CHANGE UP (ref 0.5–1.3)
CULTURE RESULTS: SIGNIFICANT CHANGE UP
EOSINOPHIL # BLD AUTO: 0 K/UL — SIGNIFICANT CHANGE UP (ref 0–0.5)
EOSINOPHIL NFR BLD AUTO: 0.2 % — SIGNIFICANT CHANGE UP (ref 0–6)
GLUCOSE SERPL-MCNC: 107 MG/DL — HIGH (ref 70–99)
GRAM STN FLD: SIGNIFICANT CHANGE UP
HCT VFR BLD CALC: 38.8 % — LOW (ref 39–50)
HGB BLD-MCNC: 12.7 G/DL — LOW (ref 13–17)
LACTATE SERPL-SCNC: 1.6 MMOL/L — SIGNIFICANT CHANGE UP (ref 0.7–2)
LYMPHOCYTES # BLD AUTO: 0.9 K/UL — LOW (ref 1–3.3)
LYMPHOCYTES # BLD AUTO: 3.9 % — LOW (ref 13–44)
MCHC RBC-ENTMCNC: 28.7 PG — SIGNIFICANT CHANGE UP (ref 27–34)
MCHC RBC-ENTMCNC: 32.6 GM/DL — SIGNIFICANT CHANGE UP (ref 32–36)
MCV RBC AUTO: 88.1 FL — SIGNIFICANT CHANGE UP (ref 80–100)
METHOD TYPE: SIGNIFICANT CHANGE UP
MONOCYTES # BLD AUTO: 1.3 K/UL — HIGH (ref 0–0.9)
MONOCYTES NFR BLD AUTO: 5.7 % — SIGNIFICANT CHANGE UP (ref 2–14)
NEUTROPHILS # BLD AUTO: 21.2 K/UL — HIGH (ref 1.8–7.4)
NEUTROPHILS NFR BLD AUTO: 90.2 % — HIGH (ref 43–77)
ORGANISM # SPEC MICROSCOPIC CNT: SIGNIFICANT CHANGE UP
PLAT MORPH BLD: NORMAL — SIGNIFICANT CHANGE UP
PLATELET # BLD AUTO: 218 K/UL — SIGNIFICANT CHANGE UP (ref 150–400)
POTASSIUM SERPL-MCNC: 4.9 MMOL/L — SIGNIFICANT CHANGE UP (ref 3.5–5.3)
POTASSIUM SERPL-SCNC: 4.9 MMOL/L — SIGNIFICANT CHANGE UP (ref 3.5–5.3)
PROT SERPL-MCNC: 6.5 G/DL — SIGNIFICANT CHANGE UP (ref 6–8.3)
RBC # BLD: 4.41 M/UL — SIGNIFICANT CHANGE UP (ref 4.2–5.8)
RBC # FLD: 13.3 % — SIGNIFICANT CHANGE UP (ref 10.3–14.5)
RBC BLD AUTO: SIGNIFICANT CHANGE UP
RH IG SCN BLD-IMP: POSITIVE — SIGNIFICANT CHANGE UP
SODIUM SERPL-SCNC: 138 MMOL/L — SIGNIFICANT CHANGE UP (ref 135–145)
SPECIMEN SOURCE: SIGNIFICANT CHANGE UP
WBC # BLD: 23.5 K/UL — HIGH (ref 3.8–10.5)
WBC # FLD AUTO: 23.5 K/UL — HIGH (ref 3.8–10.5)

## 2019-03-22 PROCEDURE — 33235 REMOVAL PACEMAKER ELECTRODE: CPT | Mod: 59

## 2019-03-22 PROCEDURE — 93308 TTE F-UP OR LMTD: CPT | Mod: 26

## 2019-03-22 PROCEDURE — 33233 REMOVAL OF PM GENERATOR: CPT

## 2019-03-22 PROCEDURE — 93010 ELECTROCARDIOGRAM REPORT: CPT

## 2019-03-22 PROCEDURE — 93306 TTE W/DOPPLER COMPLETE: CPT | Mod: 26

## 2019-03-22 PROCEDURE — 99232 SBSQ HOSP IP/OBS MODERATE 35: CPT

## 2019-03-22 RX ORDER — SPIRONOLACTONE 25 MG/1
25 TABLET, FILM COATED ORAL DAILY
Qty: 0 | Refills: 0 | Status: DISCONTINUED | OUTPATIENT
Start: 2019-03-22 | End: 2019-04-16

## 2019-03-22 RX ORDER — PANTOPRAZOLE SODIUM 20 MG/1
40 TABLET, DELAYED RELEASE ORAL
Qty: 0 | Refills: 0 | Status: DISCONTINUED | OUTPATIENT
Start: 2019-03-22 | End: 2019-04-16

## 2019-03-22 RX ORDER — ATORVASTATIN CALCIUM 80 MG/1
40 TABLET, FILM COATED ORAL AT BEDTIME
Qty: 0 | Refills: 0 | Status: DISCONTINUED | OUTPATIENT
Start: 2019-03-22 | End: 2019-04-16

## 2019-03-22 RX ORDER — LIDOCAINE 4 G/100G
1 CREAM TOPICAL DAILY
Qty: 0 | Refills: 0 | Status: DISCONTINUED | OUTPATIENT
Start: 2019-03-22 | End: 2019-03-22

## 2019-03-22 RX ORDER — FINASTERIDE 5 MG/1
5 TABLET, FILM COATED ORAL DAILY
Qty: 0 | Refills: 0 | Status: DISCONTINUED | OUTPATIENT
Start: 2019-03-22 | End: 2019-04-16

## 2019-03-22 RX ORDER — ALBUTEROL 90 UG/1
2 AEROSOL, METERED ORAL EVERY 4 HOURS
Qty: 0 | Refills: 0 | Status: DISCONTINUED | OUTPATIENT
Start: 2019-03-22 | End: 2019-04-13

## 2019-03-22 RX ORDER — APIXABAN 2.5 MG/1
5 TABLET, FILM COATED ORAL EVERY 12 HOURS
Qty: 0 | Refills: 0 | Status: CANCELLED | OUTPATIENT
Start: 2019-03-24 | End: 2019-03-22

## 2019-03-22 RX ORDER — LIDOCAINE 4 G/100G
1 CREAM TOPICAL DAILY
Qty: 0 | Refills: 0 | Status: DISCONTINUED | OUTPATIENT
Start: 2019-03-22 | End: 2019-04-16

## 2019-03-22 RX ORDER — LANOLIN ALCOHOL/MO/W.PET/CERES
3 CREAM (GRAM) TOPICAL AT BEDTIME
Qty: 0 | Refills: 0 | Status: DISCONTINUED | OUTPATIENT
Start: 2019-03-22 | End: 2019-04-16

## 2019-03-22 RX ORDER — SENNA PLUS 8.6 MG/1
2 TABLET ORAL AT BEDTIME
Qty: 0 | Refills: 0 | Status: DISCONTINUED | OUTPATIENT
Start: 2019-03-22 | End: 2019-04-16

## 2019-03-22 RX ORDER — MENTHOL AND METHYL SALICYLATE 10; 30 G/100G; G/100G
1 STICK TOPICAL THREE TIMES A DAY
Qty: 0 | Refills: 0 | Status: DISCONTINUED | OUTPATIENT
Start: 2019-03-22 | End: 2019-04-16

## 2019-03-22 RX ORDER — BUDESONIDE AND FORMOTEROL FUMARATE DIHYDRATE 160; 4.5 UG/1; UG/1
2 AEROSOL RESPIRATORY (INHALATION)
Qty: 0 | Refills: 0 | Status: DISCONTINUED | OUTPATIENT
Start: 2019-03-22 | End: 2019-04-16

## 2019-03-22 RX ORDER — AMIODARONE HYDROCHLORIDE 400 MG/1
200 TABLET ORAL DAILY
Qty: 0 | Refills: 0 | Status: DISCONTINUED | OUTPATIENT
Start: 2019-03-22 | End: 2019-04-16

## 2019-03-22 RX ORDER — DAPTOMYCIN 500 MG/10ML
750 INJECTION, POWDER, LYOPHILIZED, FOR SOLUTION INTRAVENOUS EVERY 24 HOURS
Qty: 0 | Refills: 0 | Status: DISCONTINUED | OUTPATIENT
Start: 2019-03-22 | End: 2019-04-16

## 2019-03-22 RX ORDER — DOCUSATE SODIUM 100 MG
100 CAPSULE ORAL
Qty: 0 | Refills: 0 | Status: DISCONTINUED | OUTPATIENT
Start: 2019-03-22 | End: 2019-04-01

## 2019-03-22 RX ORDER — TAMSULOSIN HYDROCHLORIDE 0.4 MG/1
0.4 CAPSULE ORAL AT BEDTIME
Qty: 0 | Refills: 0 | Status: DISCONTINUED | OUTPATIENT
Start: 2019-03-22 | End: 2019-04-16

## 2019-03-22 RX ORDER — METOCLOPRAMIDE HCL 10 MG
10 TABLET ORAL ONCE
Qty: 0 | Refills: 0 | Status: DISCONTINUED | OUTPATIENT
Start: 2019-03-22 | End: 2019-04-16

## 2019-03-22 RX ORDER — BUDESONIDE AND FORMOTEROL FUMARATE DIHYDRATE 160; 4.5 UG/1; UG/1
2 AEROSOL RESPIRATORY (INHALATION)
Qty: 0 | Refills: 0 | Status: DISCONTINUED | OUTPATIENT
Start: 2019-03-22 | End: 2019-03-22

## 2019-03-22 RX ORDER — MENTHOL AND METHYL SALICYLATE 10; 30 G/100G; G/100G
1 STICK TOPICAL THREE TIMES A DAY
Qty: 0 | Refills: 0 | Status: DISCONTINUED | OUTPATIENT
Start: 2019-03-22 | End: 2019-03-22

## 2019-03-22 RX ADMIN — Medication 50 MILLIGRAM(S): at 06:26

## 2019-03-22 RX ADMIN — FINASTERIDE 5 MILLIGRAM(S): 5 TABLET, FILM COATED ORAL at 11:00

## 2019-03-22 RX ADMIN — LIDOCAINE 1 PATCH: 4 CREAM TOPICAL at 08:26

## 2019-03-22 RX ADMIN — MENTHOL AND METHYL SALICYLATE 1 APPLICATION(S): 10; 30 STICK TOPICAL at 22:26

## 2019-03-22 RX ADMIN — ALBUTEROL 2 PUFF(S): 90 AEROSOL, METERED ORAL at 06:27

## 2019-03-22 RX ADMIN — ATORVASTATIN CALCIUM 40 MILLIGRAM(S): 80 TABLET, FILM COATED ORAL at 22:26

## 2019-03-22 RX ADMIN — LIDOCAINE 1 PATCH: 4 CREAM TOPICAL at 19:16

## 2019-03-22 RX ADMIN — AMIODARONE HYDROCHLORIDE 200 MILLIGRAM(S): 400 TABLET ORAL at 06:27

## 2019-03-22 RX ADMIN — DAPTOMYCIN 130 MILLIGRAM(S): 500 INJECTION, POWDER, LYOPHILIZED, FOR SOLUTION INTRAVENOUS at 11:01

## 2019-03-22 RX ADMIN — APIXABAN 5 MILLIGRAM(S): 2.5 TABLET, FILM COATED ORAL at 06:26

## 2019-03-22 RX ADMIN — PANTOPRAZOLE SODIUM 40 MILLIGRAM(S): 20 TABLET, DELAYED RELEASE ORAL at 06:26

## 2019-03-22 RX ADMIN — ALBUTEROL 2 PUFF(S): 90 AEROSOL, METERED ORAL at 11:01

## 2019-03-22 RX ADMIN — SENNA PLUS 2 TABLET(S): 8.6 TABLET ORAL at 22:25

## 2019-03-22 RX ADMIN — CLOPIDOGREL BISULFATE 75 MILLIGRAM(S): 75 TABLET, FILM COATED ORAL at 11:01

## 2019-03-22 RX ADMIN — ALBUTEROL 2 PUFF(S): 90 AEROSOL, METERED ORAL at 22:25

## 2019-03-22 RX ADMIN — SPIRONOLACTONE 25 MILLIGRAM(S): 25 TABLET, FILM COATED ORAL at 06:26

## 2019-03-22 RX ADMIN — Medication 100 MILLIGRAM(S): at 06:29

## 2019-03-22 RX ADMIN — TAMSULOSIN HYDROCHLORIDE 0.4 MILLIGRAM(S): 0.4 CAPSULE ORAL at 22:26

## 2019-03-22 RX ADMIN — Medication 3 MILLIGRAM(S): at 22:26

## 2019-03-22 NOTE — PROGRESS NOTE ADULT - ASSESSMENT
Pt is a 88 y/o male w/ PMH of HTN, HLD, remote CVA, COPD, CAD with Stents, CHF, chronic afib on eliquis, chronic systolic heart failure s/p TAVR 11/27/18.- with Dr Zuluaga presenting to [facility] ED today d/t weakness. pt states he has been feeling lightheaded and having difficulty breathing for last few days. Patient had cardiac cath during previous admission which showed Distal circumflex: There was a 40 % stenosis. Ramus intermedius: Angiography showed severe atherosclerosis., Mid RCA: There was a 60 % stenosis. Patient states that he has never had to be intubated d/t shortness of breath; also states that he has O2 at home as needed, but does not use his home O2 or his home inhalers. patient reports some chills and productive cough with yellow sputum. patient was seen by his cardiologist today which he was prescribed doxy for possible PNA however he was told to come to ER for worsening symptoms     # Bacteremia, VRE   # SOB and productive cough, R/P PNA VS viral URI   # LE R Hip pain   # COPD exacerbation   # HX of HFrEF, R/O acute on chronic exacerbation   # CAD S/P PCI   # Afib on AC  # S/P TAVR  # HTN  # CVA  #BPH  # HLD     6/6 G + , VRE  started on vanc and changed to daptomycin in view of sensitivity   ID eval appreciated  plan for removal of PPM   Card for MARIA LUISA   CTS eval after MARIA LUISA  repeat Blood Cx till negative   hols solumedrol, leukocytosis secondary to steroid use   has back apin, ? osteo/ seeding. MRI of hip after PPM removal   CXR clear, no PNA seen  procalcitonin normal   CT Chest noted  S/P Solumedrol 40 IV daily   ProBNP mildly elevated however he is not in any fluid overlaod   Echo pending  I andOs, FLuid restriction   Nebs and inhalers   COnt eliquis and spironolactone   Hold lasix for now in view of + RVP and chills, restart prior to discharge   serial CE and EKG, mild Trop elevated, F/U Trend   MOnitor vitals, restart home BP medications and statin and AP with plavix   rate control, cont amiodorone   DVT and GI PPX

## 2019-03-22 NOTE — CHART NOTE - NSCHARTNOTEFT_GEN_A_CORE
BRIEF PROCEDURE NOTE    RUMA BANEGAS  [id number]    Pre-op Diagnosis: endocarditis    Post-op Diagnosis: same    Procedure: Pacemaker/lead extraction    Electrophysiologist: Lacy Lindsay MD    Assistant: none    Anesthesia: GA    Access: none    Description:  Left sided pacemaker pocket opened  Pacemaker removed  Leads freed in pocket, anchors removed, active fix retracted  Leads removed with traction  venous entry site oversewn with 2-0 Vicryl sutures  pocket irrigated and closed    lead tips sent for culture    Complications: none    EBL: 20cc    Disposition: to PACU/Stable    Plan:  continue Abs

## 2019-03-22 NOTE — CONSULT NOTE ADULT - SUBJECTIVE AND OBJECTIVE BOX
CHIEF COMPLAINT: weakness    HISTORY OF PRESENT ILLNESS: 88 YO male w/ PMHx of HTN, HLD, remote CVA, COPD, CAD with Stents, CHF, chronic afib on eliquis, chronic systolic heart failure, s/p Medtronic dual chamber PPM 2015, s/p TAVR 11/27/18 a/w COPD exacerbation 2/2 +hMPV. Found to have + blood culture for VRE bacteremia, on daptomycin. ID following and recommended PPM extraction. Based on last interrogation on 11/27/2018, patient is not pacemaker dependent. On telemetry, pt is afib 70-100s w/ occasional pacing.     Allergies    Keppra (Rash)  penicillin (Unknown)    Intolerances    	    MEDICATIONS:  amiodarone    Tablet 200 milliGRAM(s) Oral daily  apixaban 5 milliGRAM(s) Oral every 12 hours  clopidogrel Tablet 75 milliGRAM(s) Oral daily  metoprolol succinate ER 50 milliGRAM(s) Oral daily  spironolactone 25 milliGRAM(s) Oral daily  tamsulosin 0.4 milliGRAM(s) Oral at bedtime    DAPTOmycin IVPB      DAPTOmycin IVPB 750 milliGRAM(s) IV Intermittent every 24 hours    ALBUTerol    90 MICROgram(s) HFA Inhaler 2 Puff(s) Inhalation every 4 hours  buDESOnide  80 MICROgram(s)/formoterol 4.5 MICROgram(s) Inhaler 2 Puff(s) Inhalation two times a day  HYDROcodone/homatropine Syrup 5 milliLiter(s) Oral every 6 hours PRN    melatonin 3 milliGRAM(s) Oral at bedtime    docusate sodium 100 milliGRAM(s) Oral two times a day  pantoprazole    Tablet 40 milliGRAM(s) Oral before breakfast  senna 2 Tablet(s) Oral at bedtime    atorvastatin 40 milliGRAM(s) Oral at bedtime  finasteride 5 milliGRAM(s) Oral daily    lidocaine   Patch 1 Patch Transdermal daily  methyl salicylate 14%/menthol 6% Topical Ointment 1 Application(s) Topical three times a day PRN      PAST MEDICAL & SURGICAL HISTORY:  CKD (chronic kidney disease) stage 3, GFR 30-59 ml/min  Hepatitis B  PTSD (post-traumatic stress disorder)  Tachy-idris syndrome  TIA (transient ischemic attack): 2010  Seizure  DIEGO (obstructive sleep apnea)  Chronic diastolic HF (heart failure)  Aortic stenosis  CVA (cerebral vascular accident)  Gait instability  COPD (chronic obstructive pulmonary disease)  BPH (benign prostatic hyperplasia)  Asthma  CHF (congestive heart failure)  Atrial fibrillation  Pacemaker: 2015 Medtronic [id number]  [id number]  Hypertension  H/O thalassemia  CAD (coronary artery disease): s/p stent 2010  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  Artificial pacemaker  S/P primary angioplasty with coronary stent: 2012 AND 2017      FAMILY HISTORY:  No pertinent family history in first degree relatives      SOCIAL HISTORY:    [ ]Non-smoker  [ ] Smoker  [ ] Alcohol      REVIEW OF SYSTEMS:  See HPI. Otherwise, 10 point ROS done and otherwise negative.    PHYSICAL EXAM:  T(C): 36.4 (03-22-19 @ 11:58), Max: 36.7 (03-21-19 @ 21:22)  HR: 85 (03-22-19 @ 11:58) (82 - 107)  BP: 90/57 (03-22-19 @ 11:58) (90/57 - 150/80)  RR: 18 (03-22-19 @ 11:58) (18 - 22)  SpO2: 99% (03-22-19 @ 11:58) (92% - 99%)  Wt(kg): --  I&O's Summary      Appearance: Alert. NAD	  HEENT:   NC/AT	  Cardiovascular: +S1S2 RRR no m/g/r  Respiratory: CTA B/L	  Psychiatry: A & O x 3, Mood & affect appropriate  Gastrointestinal:  Soft, NT.ND., + BS	  Skin: No rashes	  Neurologic: Non-focal  Extremities: No edema BLE  Vascular: Peripheral pulses palpable 2+ bilaterally      LABS:	 	    CBC Full  -  ( 22 Mar 2019 07:01 )  WBC Count : 23.5 K/uL  Hemoglobin : 12.7 g/dL  Hematocrit : 38.8 %  Platelet Count - Automated : 218 K/uL  Mean Cell Volume : 88.1 fl  Mean Cell Hemoglobin : 28.7 pg  Mean Cell Hemoglobin Concentration : 32.6 gm/dL  Auto Neutrophil # : 21.2 K/uL  Auto Lymphocyte # : 0.9 K/uL  Auto Monocyte # : 1.3 K/uL  Auto Eosinophil # : 0.0 K/uL  Auto Basophil # : 0.0 K/uL  Auto Neutrophil % : 90.2 %  Auto Lymphocyte % : 3.9 %  Auto Monocyte % : 5.7 %  Auto Eosinophil % : 0.2 %  Auto Basophil % : 0.0 %    03-22    138  |  104  |  43<H>  ----------------------------<  107<H>  4.9   |  22  |  1.10  03-21    136  |  101  |  42<H>  ----------------------------<  135<H>  4.7   |  22  |  1.13    Ca    9.6      22 Mar 2019 06:59  Ca    9.7      21 Mar 2019 06:02    TPro  6.5  /  Alb  3.0<L>  /  TBili  0.2  /  DBili  x   /  AST  50<H>  /  ALT  43  /  AlkPhos  130<H>  03-22  TPro  6.5  /  Alb  3.2<L>  /  TBili  0.3  /  DBili  x   /  AST  29  /  ALT  26  /  AlkPhos  130<H>  03-21      proBNP:   Lipid Profile:   HgA1c:   TSH:       CARDIAC MARKERS:                TELEMETRY: 	    ECG:  	  RADIOLOGY:  OTHER: 	    PREVIOUS DIAGNOSTIC TESTING:    Echocardiogram:    Catheterization:    Stress Test:  	  	  ASSESSMENT/PLAN: CHIEF COMPLAINT: weakness    HISTORY OF PRESENT ILLNESS: 86 YO male w/ PMHx of HTN, HLD, remote CVA, COPD, CAD with Stents, CHF, chronic afib on eliquis, chronic systolic heart failure, s/p Medtronic dual chamber PPM 2015, s/p TAVR 11/27/18 a/w COPD exacerbation 2/2 +hMPV. Found to have + blood cultures for VRE bacteremia, on daptomycin. ID following and recommended PPM extraction. Based on last interrogation on 11/27/2018, patient is not pacemaker dependent. On telemetry, pt is afib 70-100s w/ occasional pacing. Seen patient at bedside, NAD. Denies dizziness, SOB, chest pain. or palpitations.     Allergies  Keppra (Rash)  penicillin (Unknown)    Intolerances    MEDICATIONS:  amiodarone    Tablet 200 milliGRAM(s) Oral daily  apixaban 5 milliGRAM(s) Oral every 12 hours  clopidogrel Tablet 75 milliGRAM(s) Oral daily  metoprolol succinate ER 50 milliGRAM(s) Oral daily  spironolactone 25 milliGRAM(s) Oral daily  tamsulosin 0.4 milliGRAM(s) Oral at bedtime  DAPTOmycin IVPB      DAPTOmycin IVPB 750 milliGRAM(s) IV Intermittent every 24 hours  ALBUTerol    90 MICROgram(s) HFA Inhaler 2 Puff(s) Inhalation every 4 hours  buDESOnide  80 MICROgram(s)/formoterol 4.5 MICROgram(s) Inhaler 2 Puff(s) Inhalation two times a day  HYDROcodone/homatropine Syrup 5 milliLiter(s) Oral every 6 hours PRN  melatonin 3 milliGRAM(s) Oral at bedtime  docusate sodium 100 milliGRAM(s) Oral two times a day  pantoprazole    Tablet 40 milliGRAM(s) Oral before breakfast  senna 2 Tablet(s) Oral at bedtime  atorvastatin 40 milliGRAM(s) Oral at bedtime  finasteride 5 milliGRAM(s) Oral daily  lidocaine   Patch 1 Patch Transdermal daily  methyl salicylate 14%/menthol 6% Topical Ointment 1 Application(s) Topical three times a day PRN    PAST MEDICAL & SURGICAL HISTORY:  CKD (chronic kidney disease) stage 3, GFR 30-59 ml/min  Hepatitis B  PTSD (post-traumatic stress disorder)  Tachy-idris syndrome  TIA (transient ischemic attack): 2010  Seizure  DIEGO (obstructive sleep apnea)  Chronic diastolic HF (heart failure)  Aortic stenosis  CVA (cerebral vascular accident)  Gait instability  COPD (chronic obstructive pulmonary disease)  BPH (benign prostatic hyperplasia)  Asthma  CHF (congestive heart failure)  Atrial fibrillation  Pacemaker: 2015 Medtronic [id number]  [id number]  Hypertension  H/O thalassemia  CAD (coronary artery disease): s/p stent 2010  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  Artificial pacemaker  S/P primary angioplasty with coronary stent: 2012 AND 2017    FAMILY HISTORY:  No pertinent family history in first degree relatives    SOCIAL HISTORY:    [x ]Non-smoker  [ ] Smoker  [ ] Alcohol      REVIEW OF SYSTEMS:  See HPI. Otherwise, 10 point ROS done and otherwise negative.    PHYSICAL EXAM:  T(C): 36.4 (03-22-19 @ 11:58), Max: 36.7 (03-21-19 @ 21:22)  HR: 85 (03-22-19 @ 11:58) (82 - 107)  BP: 90/57 (03-22-19 @ 11:58) (90/57 - 150/80)  RR: 18 (03-22-19 @ 11:58) (18 - 22)  SpO2: 99% (03-22-19 @ 11:58) (92% - 99%)  Wt(kg): --  I&O's Summary    Appearance: Alert. NAD	  HEENT:   NC/AT	  Cardiovascular: +S1S2 RRR no m/g/r  Respiratory: CTA B/L	  Psychiatry: A & O x 3, Mood & affect appropriate  Gastrointestinal:  Soft, NT.ND., + BS	  Skin: No rashes	  Neurologic: Non-focal  Extremities: No edema BLE  Vascular: Peripheral pulses palpable 2+ bilaterally    LABS:	 	  CBC Full  -  ( 22 Mar 2019 07:01 )  WBC Count : 23.5 K/uL  Hemoglobin : 12.7 g/dL  Hematocrit : 38.8 %  Platelet Count - Automated : 218 K/uL  Mean Cell Volume : 88.1 fl  Mean Cell Hemoglobin : 28.7 pg  Mean Cell Hemoglobin Concentration : 32.6 gm/dL  Auto Neutrophil # : 21.2 K/uL  Auto Lymphocyte # : 0.9 K/uL  Auto Monocyte # : 1.3 K/uL  Auto Eosinophil # : 0.0 K/uL  Auto Basophil # : 0.0 K/uL  Auto Neutrophil % : 90.2 %  Auto Lymphocyte % : 3.9 %  Auto Monocyte % : 5.7 %  Auto Eosinophil % : 0.2 %  Auto Basophil % : 0.0 %    03-22    138  |  104  |  43<H>  ----------------------------<  107<H>  4.9   |  22  |  1.10  03-21    136  |  101  |  42<H>  ----------------------------<  135<H>  4.7   |  22  |  1.13    Ca    9.6      22 Mar 2019 06:59  Ca    9.7      21 Mar 2019 06:02    TPro  6.5  /  Alb  3.0<L>  /  TBili  0.2  /  DBili  x   /  AST  50<H>  /  ALT  43  /  AlkPhos  130<H>  03-22  TPro  6.5  /  Alb  3.2<L>  /  TBili  0.3  /  DBili  x   /  AST  29  /  ALT  26  /  AlkPhos  130<H>  03-21    TELEMETRY: afib 70-100s, occasional pacing at 60	      ECG:  afib 85  	  RADIOLOGY: 3/20/19 CT chest: IMPRESSION: Few tiny scattered calcified granulomas. Lungs otherwise   clear.     PREVIOUS DIAGNOSTIC TESTING:    Echocardiogram: 1/1/19 MARIA LUISA  Conclusions:  1.Mitral annular calcification and calcified mitral  leaflets with normal diastolic opening. Minimal mitral  regurgitation.  2. Transcatheter aortic valve replacement (TAVR). Valve  leaflets appear to open normally. An independently mobile  echodensitymeasuring approximately 0.6 cm x 0.4 cm is seen  on the LVOT side of the TAVR, attached to the anterior  aspect of the valve. This echodensity could represent a  small vegetation, thrombus, or may be part of the native  valvular apparatus. Mild paravalvular aortic regurgitation.  3. Left atrial enlargement. Dense spontaneous echo contrast  seen throughout the left atrium. No left atrial or left  atrial appendage thrombus. Decreased (about 20 cm/sec) left  atrial appendage velocities noted.  4. Overall preserved left ventricular systolic function.  The basal inferior and basal inferoseptum appear  hypokinetic.  5. Normal right ventricular size and function. Device wires  are noted in the right heart. Small, linear, mobile  echodensities are seen attached to the RV lead within the  right atirum. These mobile echodensities could represent  fibrous stranding but cannot exlude that they represent  small vegetations depending on clinical scenario.  *** Compared with intra-operative transesophageal  echocardiogram of 11/27/2018, images post-TAVR placement  were limited. Mobile echodensity was not see on these  images but may have been present but not visualized.    Catheterization: 1/7/19  CORONARY VESSELS: The coronary circulation is right dominant.  LM:   --  LM: Normal.  LAD:   --  Proximal LAD: Angiography showed mild atherosclerosis with no  flow limiting lesions.  CX:   --  Distal circumflex: There was a 40 % stenosis.  RI:   --  Ramus intermedius: Angiography showed severe atherosclerosis.  RCA:   --  Mid RCA: There was a 60 % stenosis.  COMPLICATIONS: There were nocomplications.  DIAGNOSTIC RECOMMENDATIONS: The patient should continue with the present  medications.

## 2019-03-22 NOTE — CONSULT NOTE ADULT - ASSESSMENT
87 yr old with CKD, COPD, BPH, PPM, Y AVR due to AS, CHF; pt has a UTI several months ago and in the fall and had questionable thrombus or veg on aortic valve   bc were negative at the time     currently admitted lightheadedness and found to have multiple positive blood cultures for vrec.    He also had pain in his right lower flank /back.   pt has an intravascular infection on either the T avr or the ppm    Needs MARIA LUISA but the PPM will need to come out regardless  Not sure if he is a candidate for a redo AVR because  vrec endocarditis normally is an indication for avr   Will discuss 2witala h Dr. martin  also discussed cal h Dr. Lindsay the need to remove the PPM.  Currently on the equivalent  of 10mg/kg based on age and creat  also need images of his back and maybe hip.  would start with MRI after the PPm is removed.

## 2019-03-22 NOTE — CONSULT NOTE ADULT - SUBJECTIVE AND OBJECTIVE BOX
Patient is a 87y old  Male who presents with a chief complaint of SOB, cough (21 Mar 2019 18:24)    HPI:  Pt is a 86 y/o male w/ PMH of HTN, HLD, remote CVA, COPD, CAD with Stents, CHF, chronic afib on eliquis, chronic systolic heart failure s/p TAVR 11/27/18.- with Dr Zuluaga presenting to [facility] ED today d/t weakness. pt states he has been feeling lightheaded and having difficulty breathing for last few days. Patient had cardiac cath during previous admission which showed Distal circumflex: There was a 40 % stenosis. Ramus intermedius: Angiography showed severe atherosclerosis., Mid RCA: There was a 60 % stenosis. Patient states that he has never had to be intubated d/t shortness of breath; also states that he has O2 at home as needed, but does not use his home O2 or his home inhalers. patient reports some chills and productive cough with yellow sputum. patient was seen by his cardiologist today which he was prescribed doxy for possible PNA however he was told to come to ER for worsening symptoms (19 Mar 2019 23:15)      PAST MEDICAL & SURGICAL HISTORY:  CKD (chronic kidney disease) stage 3, GFR 30-59 ml/min  Hepatitis B  PTSD (post-traumatic stress disorder)  Tachy-idris syndrome  TIA (transient ischemic attack): 2010  Seizure  DIEGO (obstructive sleep apnea)  Chronic diastolic HF (heart failure)  Aortic stenosis  CVA (cerebral vascular accident)  Gait instability  COPD (chronic obstructive pulmonary disease)  BPH (benign prostatic hyperplasia)  Asthma  CHF (congestive heart failure)  Atrial fibrillation  Pacemaker: 2015 Medtronic [id number]  A2DR01  Hypertension  H/O thalassemia  CAD (coronary artery disease): s/p stent 2010  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 Fevers absent    Skin:No rash  	  Ophthalmologic:Denies any visual complaints,discharge redness or photophobia  	  ENMT:No nasal discharge,headache,sinus congestion or throat pain.No dental complaints    Respiratory and Thorax:No cough,sputum or chest pain.Denies shortness of breath  	  Cardiovascular:	No chest pain,palpitaions or dizziness    Gastrointestinal:	NO nausea,abdominal pain or diarrhea.    Genitourinary:	No dysuria,frequency.      Musculoskeletal:	 pain in Right lower post flank     Neurological:No confusion,diziness.No extremity weakness.No bladder or bowel incontinence	    Psychiatric:No delusions or hallucinations	    Hematology/Lymphatics:	No LN swelling.No gum bleeding     Endocrine:	No recent weight gain or loss.No abnormal heat/cold intolerance    Allergic/Immunologic:	No hives or rash   Allergies    Keppra (Rash)  penicillin (Unknown)    Intolerances        Antimicrobials:    DAPTOmycin IVPB      DAPTOmycin IVPB 750 milliGRAM(s) IV Intermittent every 24 hours        Vital Signs Last 24 Hrs  T(C): 36.2 (22 Mar 2019 05:11), Max: 36.7 (21 Mar 2019 21:22)  T(F): 97.2 (22 Mar 2019 05:11), Max: 98.1 (21 Mar 2019 21:22)  HR: 107 (22 Mar 2019 05:11) (82 - 107)  BP: 138/94 (22 Mar 2019 05:11) (125/90 - 150/80)  BP(mean): --  RR: 18 (22 Mar 2019 05:11) (18 - 22)  SpO2: 96% (22 Mar 2019 05:11) (92% - 98%)    PHYSICAL EXAM:Pleasant patient in no acute distress.      Constitutional:Comfortable.Awake and alert  No cachexia     Eyes:PERRL EOMI.NO discharge or conjunctival injection    ENMT:No sinus tenderness.No thrush.No pharyngeal exudate or erythema.Fair dental hygiene    Neck:Supple,No LN,no JVD      Respiratory:Good air entry bilaterally,CTA    Cardiovascular:S1 S2 wnl, No murmurs,rub or gallops    Gastrointestinal:Soft BS(+) no tenderness no masses ,No rebound or guarding    Genitourinary:No CVA tendereness     Rectal:    Extremities:No cyanosis,clubbing or edema.    Vascular:peripheral pulses felt    Neurological:AAO X 3,No grossly focal deficits    Skin:No rash     Lymph Nodes:No palpable LNs    Musculoskeletal:No joint swelling or LOM    Psychiatric:Affect normal.                                12.7   23.5  )-----------( 218      ( 22 Mar 2019 07:01 )             38.8         03-22    138  |  104  |  43<H>  ----------------------------<  107<H>  4.9   |  22  |  1.10    Ca    9.6      22 Mar 2019 06:59    TPro  6.5  /  Alb  3.0<L>  /  TBili  0.2  /  DBili  x   /  AST  50<H>  /  ALT  43  /  AlkPhos  130<H>  03-22      RECENT CULTURES:  03-21 @ 10:02  .Blood Blood-Peripheral  --  --  --    Growth in aerobic bottle: Gram Positive Cocci in Pairs and Chains  Growth in anaerobic bottle: Gram Positive Cocci in Pairs and Chains  --  03-20 @ 02:46  .Blood Blood  --  --  --    Growth in aerobic and anaerobic bottles: Enterococcus species  See previous culture [id number]  --  03-20 @ 02:44  .Blood Blood  Blood Culture PCR  Blood Culture PCR  PCR    Growth in aerobic and anaerobic bottles: Enterococcus species  See previous culture [id number]  --      MICROBIOLOGY:  Culture Results:   Growth in aerobic bottle: Gram Positive Cocci in Pairs and Chains  Growth in anaerobic bottle: Gram Positive Cocci in Pairs and Chains (03-21 @ 10:02)  Culture Results:   Growth in aerobic bottle: Gram Positive Cocci in Pairs and Chains  Growth in anaerobic bottle: Gram Positive Cocci in Pairs and Chains (03-21 @ 10:02)  Culture Results:   Growth in aerobic and anaerobic bottles: Enterococcus species  See previous culture [id number] (03-20 @ 02:46)  Culture Results:   Growth in aerobic and anaerobic bottles: Enterococcus species  See previous culture [id number] (03-20 @ 02:44)  Culture Results:   Growth in aerobic and anaerobic bottles: Enterococcus species  Identification and susceptibility to follow.  "Due to technical problems, Proteus sp. will Not be reported as part of  the BCID panel until further notice"  ***Blood Panel PCR results on this specimen are available  approximately 3 hours after the Gram stain result.***  Gram stain, PCR, and/or culture results may not always  correspond due to difference in methodologies.  ************************************************************  This PCR assay was performed using [employer].  The following targets are tested for: Enterococcus,  vancomycin resistant enterococci, Listeria monocytogenes,  coagulase negative staphylococci, S. aureus,  methicillin resistant S. aureus, Streptococcus agalactiae  (Group B), S. pneumoniae, S. pyogenes (Group A),  Acinetobacter baumannii, Enterobacter cloacae, E. coli,  Klebsiella oxytoca, K. pneumoniae, Proteus sp.,  Serratia marcescens, Haemophilus influenzae,  Neisseria meningitidis, Pseudomonas aeruginosa, Candida  albicans, C. glabrata, C krusei, C parapsilosis,  C. tropicalis and the KPC resistance gene. (03-20 @ 02:44)          Radiology:      Assessment:        Recommendations and Plan:    Pager [phone]  After 5 pm/weekends or if no response :[phone]

## 2019-03-22 NOTE — CONSULT NOTE ADULT - ASSESSMENT
88 YO male w/ PMHx of HTN, HLD, remote CVA, COPD, CAD with Stents, CHF, chronic afib on eliquis, chronic systolic heart failure, s/p Medtronic dual chamber PPM 2015, s/p TAVR 11/27/18 a/w COPD exacerbation 2/2 +hMPV. Found to have + blood cultures for VRE bacteremia, on daptomycin. ID following and recommended PPM extraction. Based on last interrogation on 11/27/2018, patient is not pacemaker dependent. On telemetry, pt is afib 70-100s w/ occasional pacing. Patient to be extracted later today.    Medtronic 2015 Advisa  A2DR01   A lead 2015  V lead 2015    --device to be interrogated  --keep NPO  --no Eliquis tonight  --type and screen ordered     Discussed w/ Dr. Lindsay.    Letty Chowdhury, [facility]  [id number] 86 YO male w/ PMHx of HTN, HLD, remote CVA, COPD, CAD with Stents, CHF, chronic afib on eliquis, chronic systolic heart failure, s/p Medtronic dual chamber PPM 2015, s/p TAVR 11/27/18 a/w COPD exacerbation 2/2 +hMPV. Found to have + blood cultures for VRE bacteremia, on daptomycin. ID following and recommended PPM extraction. Based on last interrogation on 11/27/2018, patient is not pacemaker dependent. On telemetry, pt is afib 70-100s w/ occasional pacing. Patient to be extracted later today.    Medtronic 2015 Advisa  [id number]   A lead 2015  V lead 2015    --device to be interrogated  --keep NPO  --no Eliquis tonight  --d/c Toprol XL  --type and screen ordered     Discussed w/ Dr. Lindsay.    Letty Chowdhury, [facility]  [phone]

## 2019-03-23 LAB
ANION GAP SERPL CALC-SCNC: 11 MMOL/L — SIGNIFICANT CHANGE UP (ref 5–17)
BUN SERPL-MCNC: 39 MG/DL — HIGH (ref 7–23)
CALCIUM SERPL-MCNC: 9.4 MG/DL — SIGNIFICANT CHANGE UP (ref 8.4–10.5)
CHLORIDE SERPL-SCNC: 103 MMOL/L — SIGNIFICANT CHANGE UP (ref 96–108)
CO2 SERPL-SCNC: 24 MMOL/L — SIGNIFICANT CHANGE UP (ref 22–31)
CREAT SERPL-MCNC: 0.96 MG/DL — SIGNIFICANT CHANGE UP (ref 0.5–1.3)
CULTURE RESULTS: SIGNIFICANT CHANGE UP
GLUCOSE SERPL-MCNC: 109 MG/DL — HIGH (ref 70–99)
GRAM STN FLD: SIGNIFICANT CHANGE UP
HCT VFR BLD CALC: 38 % — LOW (ref 39–50)
HGB BLD-MCNC: 12.6 G/DL — LOW (ref 13–17)
MCHC RBC-ENTMCNC: 29.3 PG — SIGNIFICANT CHANGE UP (ref 27–34)
MCHC RBC-ENTMCNC: 33.3 GM/DL — SIGNIFICANT CHANGE UP (ref 32–36)
MCV RBC AUTO: 88 FL — SIGNIFICANT CHANGE UP (ref 80–100)
NIGHT BLUE STAIN TISS: SIGNIFICANT CHANGE UP
PLATELET # BLD AUTO: 248 K/UL — SIGNIFICANT CHANGE UP (ref 150–400)
POTASSIUM SERPL-MCNC: 5.3 MMOL/L — SIGNIFICANT CHANGE UP (ref 3.5–5.3)
POTASSIUM SERPL-SCNC: 5.3 MMOL/L — SIGNIFICANT CHANGE UP (ref 3.5–5.3)
RBC # BLD: 4.32 M/UL — SIGNIFICANT CHANGE UP (ref 4.2–5.8)
RBC # FLD: 13.2 % — SIGNIFICANT CHANGE UP (ref 10.3–14.5)
SODIUM SERPL-SCNC: 138 MMOL/L — SIGNIFICANT CHANGE UP (ref 135–145)
SPECIMEN SOURCE: SIGNIFICANT CHANGE UP
WBC # BLD: 15.8 K/UL — HIGH (ref 3.8–10.5)
WBC # FLD AUTO: 15.8 K/UL — HIGH (ref 3.8–10.5)

## 2019-03-23 PROCEDURE — 99232 SBSQ HOSP IP/OBS MODERATE 35: CPT

## 2019-03-23 PROCEDURE — 93010 ELECTROCARDIOGRAM REPORT: CPT

## 2019-03-23 PROCEDURE — 71045 X-RAY EXAM CHEST 1 VIEW: CPT | Mod: 26

## 2019-03-23 RX ORDER — APIXABAN 2.5 MG/1
5 TABLET, FILM COATED ORAL EVERY 12 HOURS
Qty: 0 | Refills: 0 | Status: DISCONTINUED | OUTPATIENT
Start: 2019-03-23 | End: 2019-04-09

## 2019-03-23 RX ORDER — CLOPIDOGREL BISULFATE 75 MG/1
75 TABLET, FILM COATED ORAL DAILY
Qty: 0 | Refills: 0 | Status: DISCONTINUED | OUTPATIENT
Start: 2019-03-23 | End: 2019-04-16

## 2019-03-23 RX ORDER — METOPROLOL TARTRATE 50 MG
25 TABLET ORAL
Qty: 0 | Refills: 0 | Status: DISCONTINUED | OUTPATIENT
Start: 2019-03-23 | End: 2019-03-24

## 2019-03-23 RX ORDER — ACETAMINOPHEN WITH CODEINE 300MG-30MG
1 TABLET ORAL ONCE
Qty: 0 | Refills: 0 | Status: DISCONTINUED | OUTPATIENT
Start: 2019-03-23 | End: 2019-03-23

## 2019-03-23 RX ORDER — FUROSEMIDE 40 MG
40 TABLET ORAL DAILY
Qty: 0 | Refills: 0 | Status: DISCONTINUED | OUTPATIENT
Start: 2019-03-23 | End: 2019-03-27

## 2019-03-23 RX ORDER — ACETAMINOPHEN 500 MG
650 TABLET ORAL ONCE
Qty: 0 | Refills: 0 | Status: COMPLETED | OUTPATIENT
Start: 2019-03-23 | End: 2019-03-23

## 2019-03-23 RX ADMIN — TAMSULOSIN HYDROCHLORIDE 0.4 MILLIGRAM(S): 0.4 CAPSULE ORAL at 21:13

## 2019-03-23 RX ADMIN — Medication 650 MILLIGRAM(S): at 22:00

## 2019-03-23 RX ADMIN — Medication 100 MILLIGRAM(S): at 17:19

## 2019-03-23 RX ADMIN — ATORVASTATIN CALCIUM 40 MILLIGRAM(S): 80 TABLET, FILM COATED ORAL at 21:13

## 2019-03-23 RX ADMIN — APIXABAN 5 MILLIGRAM(S): 2.5 TABLET, FILM COATED ORAL at 06:16

## 2019-03-23 RX ADMIN — Medication 40 MILLIGRAM(S): at 12:27

## 2019-03-23 RX ADMIN — Medication 1 TABLET(S): at 13:27

## 2019-03-23 RX ADMIN — Medication 100 MILLIGRAM(S): at 06:16

## 2019-03-23 RX ADMIN — SPIRONOLACTONE 25 MILLIGRAM(S): 25 TABLET, FILM COATED ORAL at 06:13

## 2019-03-23 RX ADMIN — AMIODARONE HYDROCHLORIDE 200 MILLIGRAM(S): 400 TABLET ORAL at 06:13

## 2019-03-23 RX ADMIN — MENTHOL AND METHYL SALICYLATE 1 APPLICATION(S): 10; 30 STICK TOPICAL at 10:23

## 2019-03-23 RX ADMIN — SENNA PLUS 2 TABLET(S): 8.6 TABLET ORAL at 21:13

## 2019-03-23 RX ADMIN — ALBUTEROL 2 PUFF(S): 90 AEROSOL, METERED ORAL at 21:15

## 2019-03-23 RX ADMIN — DAPTOMYCIN 130 MILLIGRAM(S): 500 INJECTION, POWDER, LYOPHILIZED, FOR SOLUTION INTRAVENOUS at 10:22

## 2019-03-23 RX ADMIN — CLOPIDOGREL BISULFATE 75 MILLIGRAM(S): 75 TABLET, FILM COATED ORAL at 10:22

## 2019-03-23 RX ADMIN — Medication 1 TABLET(S): at 12:27

## 2019-03-23 RX ADMIN — PANTOPRAZOLE SODIUM 40 MILLIGRAM(S): 20 TABLET, DELAYED RELEASE ORAL at 06:13

## 2019-03-23 RX ADMIN — Medication 1 ENEMA: at 15:47

## 2019-03-23 RX ADMIN — BUDESONIDE AND FORMOTEROL FUMARATE DIHYDRATE 2 PUFF(S): 160; 4.5 AEROSOL RESPIRATORY (INHALATION) at 17:19

## 2019-03-23 RX ADMIN — Medication 25 MILLIGRAM(S): at 17:19

## 2019-03-23 RX ADMIN — LIDOCAINE 1 PATCH: 4 CREAM TOPICAL at 21:20

## 2019-03-23 RX ADMIN — APIXABAN 5 MILLIGRAM(S): 2.5 TABLET, FILM COATED ORAL at 17:19

## 2019-03-23 RX ADMIN — ALBUTEROL 2 PUFF(S): 90 AEROSOL, METERED ORAL at 17:19

## 2019-03-23 RX ADMIN — ALBUTEROL 2 PUFF(S): 90 AEROSOL, METERED ORAL at 06:14

## 2019-03-23 RX ADMIN — LIDOCAINE 1 PATCH: 4 CREAM TOPICAL at 10:23

## 2019-03-23 RX ADMIN — FINASTERIDE 5 MILLIGRAM(S): 5 TABLET, FILM COATED ORAL at 10:22

## 2019-03-23 RX ADMIN — ALBUTEROL 2 PUFF(S): 90 AEROSOL, METERED ORAL at 10:24

## 2019-03-23 RX ADMIN — BUDESONIDE AND FORMOTEROL FUMARATE DIHYDRATE 2 PUFF(S): 160; 4.5 AEROSOL RESPIRATORY (INHALATION) at 06:13

## 2019-03-23 RX ADMIN — Medication 650 MILLIGRAM(S): at 21:13

## 2019-03-23 RX ADMIN — Medication 3 MILLIGRAM(S): at 21:13

## 2019-03-23 NOTE — PROGRESS NOTE ADULT - ASSESSMENT
Pt is a 86 y/o male w/ PMH of HTN, HLD, remote CVA, COPD, CAD with Stents, CHF, chronic afib on eliquis, chronic systolic heart failure s/p TAVR 11/27/18.- with Dr Zuluaga presenting to [facility] ED today d/t weakness. pt states he has been feeling lightheaded and having difficulty breathing for last few days. Patient had cardiac cath during previous admission which showed Distal circumflex: There was a 40 % stenosis. Ramus intermedius: Angiography showed severe atherosclerosis., Mid RCA: There was a 60 % stenosis. Patient states that he has never had to be intubated d/t shortness of breath; also states that he has O2 at home as needed, but does not use his home O2 or his home inhalers. patient reports some chills and productive cough with yellow sputum. patient was seen by his cardiologist today which he was prescribed doxy for possible PNA however he was told to come to ER for worsening symptoms     # Bacteremia, VRE   # SOB and productive cough, R/P PNA VS viral URI   # LE R Hip pain   # COPD exacerbation   # HX of HFrEF, R/O acute on chronic exacerbation   # CAD S/P PCI   # Afib on AC  # S/P TAVR  # HTN  # CVA  #BPH  # HLD     6/6 G + , VRE  repeat Blood Cx positive, cont repeat Blood Cx till negative growth   started on vanc and changed to daptomycin in view of sensitivity   ID eval appreciated  S/P PPM removal, awaiting for Cx   Card for MARIA LUISA   CTS eval after MARIA LUISA  hols solumedrol, leukocytosis secondary to steroid use   has back apin, ? osteo/ seeding. MRI of hip after PPM removal   CXR clear, no PNA seen  procalcitonin normal   CT Chest noted  S/P Solumedrol 40 IV daily   ProBNP mildly elevated however he is not in any fluid overlaod   Echo noted  diuresis with lasix   lopressor and eliquis restarted   I andOs, FLuid restriction   Nebs and inhalers   COnt spironolactone   serial CE and EKG, mild Trop elevated, F/U Trend   MOnitor vitals, restart home BP medications and statin and AP with plavix   rate control, cont amiodorone   DVT and GI PPX

## 2019-03-23 NOTE — PROGRESS NOTE ADULT - ASSESSMENT
88 YO male w/ PMHx of HTN, HLD, remote CVA, COPD, CAD with Stents, CHF, chronic afib on eliquis, chronic systolic heart failure, s/p Medtronic dual chamber PPM 2015, s/p TAVR 11/27/18 a/w COPD exacerbation 2/2 +hMPV. Found to have + blood cultures for VRE bacteremia, on daptomycin. ID following and recommended PPM extraction. Based on last interrogation on 11/27/2018, patient is not pacemaker dependent. S/P PPM extraction yesterday      # Bacteremia  - Tele: AFIB HR 80- 100's no overnight events  -S/P PPM extraction yesterday on 3/22, pt tolerated procedure well  - Left infraclavicular site no bleeding or swelling noted. Dermabond tape in place  (dried blood noted)   - May continue with Eliquis regimen   - Continue with antibiotic regimen      [phone] 88 YO male w/ PMHx of HTN, HLD, remote CVA, COPD, CAD with Stents, CHF, chronic afib on eliquis, chronic systolic heart failure, s/p Medtronic dual chamber PPM 2015, s/p TAVR 11/27/18 a/w COPD exacerbation 2/2 +hMPV. Found to have + blood cultures for VRE bacteremia, on daptomycin. ID following and recommended PPM extraction. Based on last interrogation on 11/27/2018, patient is not pacemaker dependent. S/P PPM extraction yesterday      # Bacteremia  - Tele: AFIB HR 80- 100's no overnight events  -S/P PPM extraction yesterday on 3/22, pt tolerated procedure well  - Left infraclavicular site no bleeding or swelling noted. Dermabond tape in place  (dried blood noted)   - May continue with Eliquis regimen   - Continue with antibiotic regimen  - EP signing, recall if needed. Follow up with EP clinic on 4/4/19 @ 3pm for wound check.       [phone]

## 2019-03-23 NOTE — PROGRESS NOTE ADULT - ASSESSMENT
87 yr old with CKD, COPD, BPH, PPM, Y AVR due to AS, CHF; pt has a UTI several months ago and in the fall and had questionable thrombus or veg on aortic valve   bc were negative at the time     currently admitted lightheadedness and found to have multiple positive blood cultures for vrec.    He also had pain in his right lower flank /back.   pt has an intravascular infection on either the T avr or the ppm    pacemaker was removed yesterday-  Blood cultures from 3/22 still growing VRE faecium- Vanco resistant but sensitive to daptomycin  repeat blood cultures sent this AM  Needs MARIA LUISA - to better assess valvesTAVr  Would obtain MRI of lumbar sacral spine- his pain is lower left back near his sacroiliac area  Continue Daptomycin in the 10/mg/kg equivalent dose  check CPK level    Mateo Simon MD  [phone]  After 5pm/weekends [phone]

## 2019-03-23 NOTE — PROGRESS NOTE ADULT - ASSESSMENT
Pt is a 86 y/o male w/ PMH of HTN, HLD, remote CVA, COPD, CAD with Stents, CHF, chronic afib on eliquis, chronic systolic heart failure s/p TAVR 11/27/18.presenting to [facility] ED today d/t weakness/SOB/cough. patient reports some chills and productive cough with yellow sputum. patient was seen by his cardiologist    # Bacteremia   # HX of HFpEF,   # CAD S/P PCI   # Afib on AC  # S/P TAVR  # HTN  # CVA  #BPH  # HLD     Plan:   Bacteremia: ID on board , TTE with no vegetations, s/p PPM removal , pending MARIA LUISA to assess TAVR valve , ABX as per ID , c/o back pain MRI Spine r/o epidural abscess     Afib on eliquis at home, currently on hold for PPM removal, c/w amiodarone and metoprolol     HFpEF: compensated

## 2019-03-24 LAB
ANION GAP SERPL CALC-SCNC: 13 MMOL/L — SIGNIFICANT CHANGE UP (ref 5–17)
BUN SERPL-MCNC: 38 MG/DL — HIGH (ref 7–23)
CALCIUM SERPL-MCNC: 9.5 MG/DL — SIGNIFICANT CHANGE UP (ref 8.4–10.5)
CHLORIDE SERPL-SCNC: 102 MMOL/L — SIGNIFICANT CHANGE UP (ref 96–108)
CO2 SERPL-SCNC: 26 MMOL/L — SIGNIFICANT CHANGE UP (ref 22–31)
CREAT SERPL-MCNC: 1.12 MG/DL — SIGNIFICANT CHANGE UP (ref 0.5–1.3)
CULTURE RESULTS: SIGNIFICANT CHANGE UP
CULTURE RESULTS: SIGNIFICANT CHANGE UP
GLUCOSE SERPL-MCNC: 84 MG/DL — SIGNIFICANT CHANGE UP (ref 70–99)
GRAM STN FLD: SIGNIFICANT CHANGE UP
GRAM STN FLD: SIGNIFICANT CHANGE UP
HCT VFR BLD CALC: 40.9 % — SIGNIFICANT CHANGE UP (ref 39–50)
HGB BLD-MCNC: 12.8 G/DL — LOW (ref 13–17)
MCHC RBC-ENTMCNC: 27.6 PG — SIGNIFICANT CHANGE UP (ref 27–34)
MCHC RBC-ENTMCNC: 31.3 GM/DL — LOW (ref 32–36)
MCV RBC AUTO: 88.2 FL — SIGNIFICANT CHANGE UP (ref 80–100)
PLATELET # BLD AUTO: 253 K/UL — SIGNIFICANT CHANGE UP (ref 150–400)
POTASSIUM SERPL-MCNC: 4.6 MMOL/L — SIGNIFICANT CHANGE UP (ref 3.5–5.3)
POTASSIUM SERPL-SCNC: 4.6 MMOL/L — SIGNIFICANT CHANGE UP (ref 3.5–5.3)
RBC # BLD: 4.64 M/UL — SIGNIFICANT CHANGE UP (ref 4.2–5.8)
RBC # FLD: 13.3 % — SIGNIFICANT CHANGE UP (ref 10.3–14.5)
SODIUM SERPL-SCNC: 141 MMOL/L — SIGNIFICANT CHANGE UP (ref 135–145)
SPECIMEN SOURCE: SIGNIFICANT CHANGE UP
SPECIMEN SOURCE: SIGNIFICANT CHANGE UP
WBC # BLD: 12.7 K/UL — HIGH (ref 3.8–10.5)
WBC # FLD AUTO: 12.7 K/UL — HIGH (ref 3.8–10.5)

## 2019-03-24 RX ORDER — ACETAMINOPHEN 500 MG
1000 TABLET ORAL ONCE
Qty: 0 | Refills: 0 | Status: COMPLETED | OUTPATIENT
Start: 2019-03-24 | End: 2019-03-24

## 2019-03-24 RX ORDER — ACETAMINOPHEN 500 MG
650 TABLET ORAL ONCE
Qty: 0 | Refills: 0 | Status: COMPLETED | OUTPATIENT
Start: 2019-03-24 | End: 2019-03-24

## 2019-03-24 RX ORDER — METOPROLOL TARTRATE 50 MG
50 TABLET ORAL
Qty: 0 | Refills: 0 | Status: DISCONTINUED | OUTPATIENT
Start: 2019-03-24 | End: 2019-03-24

## 2019-03-24 RX ORDER — POLYETHYLENE GLYCOL 3350 17 G/17G
17 POWDER, FOR SOLUTION ORAL DAILY
Qty: 0 | Refills: 0 | Status: DISCONTINUED | OUTPATIENT
Start: 2019-03-24 | End: 2019-04-16

## 2019-03-24 RX ORDER — METOPROLOL TARTRATE 50 MG
50 TABLET ORAL EVERY 12 HOURS
Qty: 0 | Refills: 0 | Status: DISCONTINUED | OUTPATIENT
Start: 2019-03-25 | End: 2019-04-16

## 2019-03-24 RX ADMIN — Medication 3 MILLIGRAM(S): at 21:46

## 2019-03-24 RX ADMIN — ATORVASTATIN CALCIUM 40 MILLIGRAM(S): 80 TABLET, FILM COATED ORAL at 21:46

## 2019-03-24 RX ADMIN — SPIRONOLACTONE 25 MILLIGRAM(S): 25 TABLET, FILM COATED ORAL at 05:00

## 2019-03-24 RX ADMIN — LIDOCAINE 1 PATCH: 4 CREAM TOPICAL at 18:46

## 2019-03-24 RX ADMIN — ALBUTEROL 2 PUFF(S): 90 AEROSOL, METERED ORAL at 01:37

## 2019-03-24 RX ADMIN — CLOPIDOGREL BISULFATE 75 MILLIGRAM(S): 75 TABLET, FILM COATED ORAL at 10:06

## 2019-03-24 RX ADMIN — DAPTOMYCIN 130 MILLIGRAM(S): 500 INJECTION, POWDER, LYOPHILIZED, FOR SOLUTION INTRAVENOUS at 10:44

## 2019-03-24 RX ADMIN — MENTHOL AND METHYL SALICYLATE 1 APPLICATION(S): 10; 30 STICK TOPICAL at 21:46

## 2019-03-24 RX ADMIN — AMIODARONE HYDROCHLORIDE 200 MILLIGRAM(S): 400 TABLET ORAL at 05:00

## 2019-03-24 RX ADMIN — Medication 25 MILLIGRAM(S): at 17:45

## 2019-03-24 RX ADMIN — Medication 650 MILLIGRAM(S): at 06:23

## 2019-03-24 RX ADMIN — TAMSULOSIN HYDROCHLORIDE 0.4 MILLIGRAM(S): 0.4 CAPSULE ORAL at 21:46

## 2019-03-24 RX ADMIN — ALBUTEROL 2 PUFF(S): 90 AEROSOL, METERED ORAL at 21:46

## 2019-03-24 RX ADMIN — LIDOCAINE 1 PATCH: 4 CREAM TOPICAL at 21:57

## 2019-03-24 RX ADMIN — Medication 1000 MILLIGRAM(S): at 11:15

## 2019-03-24 RX ADMIN — Medication 400 MILLIGRAM(S): at 10:44

## 2019-03-24 RX ADMIN — ALBUTEROL 2 PUFF(S): 90 AEROSOL, METERED ORAL at 10:07

## 2019-03-24 RX ADMIN — PANTOPRAZOLE SODIUM 40 MILLIGRAM(S): 20 TABLET, DELAYED RELEASE ORAL at 05:00

## 2019-03-24 RX ADMIN — Medication 1000 MILLIGRAM(S): at 18:00

## 2019-03-24 RX ADMIN — APIXABAN 5 MILLIGRAM(S): 2.5 TABLET, FILM COATED ORAL at 17:45

## 2019-03-24 RX ADMIN — POLYETHYLENE GLYCOL 3350 17 GRAM(S): 17 POWDER, FOR SOLUTION ORAL at 10:13

## 2019-03-24 RX ADMIN — Medication 400 MILLIGRAM(S): at 17:45

## 2019-03-24 RX ADMIN — Medication 650 MILLIGRAM(S): at 05:14

## 2019-03-24 RX ADMIN — APIXABAN 5 MILLIGRAM(S): 2.5 TABLET, FILM COATED ORAL at 05:00

## 2019-03-24 RX ADMIN — BUDESONIDE AND FORMOTEROL FUMARATE DIHYDRATE 2 PUFF(S): 160; 4.5 AEROSOL RESPIRATORY (INHALATION) at 17:46

## 2019-03-24 RX ADMIN — ALBUTEROL 2 PUFF(S): 90 AEROSOL, METERED ORAL at 17:47

## 2019-03-24 RX ADMIN — Medication 100 MILLIGRAM(S): at 05:00

## 2019-03-24 RX ADMIN — SENNA PLUS 2 TABLET(S): 8.6 TABLET ORAL at 21:46

## 2019-03-24 RX ADMIN — FINASTERIDE 5 MILLIGRAM(S): 5 TABLET, FILM COATED ORAL at 10:06

## 2019-03-24 RX ADMIN — Medication 100 MILLIGRAM(S): at 17:45

## 2019-03-24 RX ADMIN — Medication 25 MILLIGRAM(S): at 05:00

## 2019-03-24 RX ADMIN — LIDOCAINE 1 PATCH: 4 CREAM TOPICAL at 10:06

## 2019-03-24 RX ADMIN — BUDESONIDE AND FORMOTEROL FUMARATE DIHYDRATE 2 PUFF(S): 160; 4.5 AEROSOL RESPIRATORY (INHALATION) at 05:02

## 2019-03-24 RX ADMIN — ALBUTEROL 2 PUFF(S): 90 AEROSOL, METERED ORAL at 05:01

## 2019-03-24 RX ADMIN — Medication 40 MILLIGRAM(S): at 05:00

## 2019-03-24 NOTE — PROGRESS NOTE ADULT - ASSESSMENT
Pt is a 88 y/o male w/ PMH of HTN, HLD, remote CVA, COPD, CAD with Stents, CHF, chronic afib on eliquis, chronic systolic heart failure s/p TAVR 11/27/18.presenting to [facility] ED today d/t weakness/SOB/cough. patient reports some chills and productive cough with yellow sputum. patient was seen by his cardiologist    # Bacteremia   # HX of HFpEF,   # CAD S/P PCI   # Afib on AC  # S/P TAVR  # HTN  # CVA  #BPH  # HLD     Plan:   Bacteremia: ID on board , TTE with no vegetations, s/p PPM removal , pending MARIA LUISA to assess TAVR valve , ABX as per ID , c/o back pain MRI Spine r/o epidural abscess     Afib: c/w eliquis 5 mg BID, s/p  PPM removal, c/w amiodarone and metoprolol     HFpEF: compensated

## 2019-03-24 NOTE — PROGRESS NOTE ADULT - ASSESSMENT
Pt is a 88 y/o male w/ PMH of HTN, HLD, remote CVA, COPD, CAD with Stents, CHF, chronic afib on eliquis, chronic systolic heart failure s/p TAVR 11/27/18.- with Dr Zuluaga presenting to [facility] ED today d/t weakness. pt states he has been feeling lightheaded and having difficulty breathing for last few days. Patient had cardiac cath during previous admission which showed Distal circumflex: There was a 40 % stenosis. Ramus intermedius: Angiography showed severe atherosclerosis., Mid RCA: There was a 60 % stenosis. Patient states that he has never had to be intubated d/t shortness of breath; also states that he has O2 at home as needed, but does not use his home O2 or his home inhalers. patient reports some chills and productive cough with yellow sputum. patient was seen by his cardiologist today which he was prescribed doxy for possible PNA however he was told to come to ER for worsening symptoms     # Bacteremia, VRE   # SOB and productive cough, R/P PNA VS viral URI   # LE R Hip pain   # COPD exacerbation   # HX of HFrEF, R/O acute on chronic exacerbation   # CAD S/P PCI   # Afib on AC  # S/P TAVR  # HTN  # CVA  #BPH  # HLD     6/6 G + , VRE  repeat Blood Cx positive, cont repeat Blood Cx till negative growth   started on vanc and changed to daptomycin in view of sensitivity   ID eval appreciated  S/P PPM removal, Cx grew g+  Card for MARIA LUISA   CTS eval after MARIA LUISA  hold solumedrol, leukocytosis secondary to steroid use   has back apin, ? osteo/ seeding. MRI of hip ordered. pain control    CXR clear, no PNA seen  procalcitonin normal   CT Chest noted  S/P Solumedrol 40 IV daily   ProBNP mildly elevated however he is not in any fluid overlaod   Echo noted  diuresis with lasix   lopressor increased to 50 for better rate control   Eliquis restarted   I andOs, FLuid restriction   Nebs and inhalers   COnt spironolactone   serial CE and EKG, mild Trop elevated, F/U Trend   MOnitor vitals, restart home BP medications and statin and AP with plavix   rate control, cont amiodorone   DVT and GI PPX

## 2019-03-25 LAB
-  AMPICILLIN: SIGNIFICANT CHANGE UP
-  LEVOFLOXACIN: SIGNIFICANT CHANGE UP
-  LINEZOLID: SIGNIFICANT CHANGE UP
-  TETRACYCLINE: SIGNIFICANT CHANGE UP
-  VANCOMYCIN: SIGNIFICANT CHANGE UP
ANION GAP SERPL CALC-SCNC: 14 MMOL/L — SIGNIFICANT CHANGE UP (ref 5–17)
BUN SERPL-MCNC: 37 MG/DL — HIGH (ref 7–23)
CALCIUM SERPL-MCNC: 9.4 MG/DL — SIGNIFICANT CHANGE UP (ref 8.4–10.5)
CHLORIDE SERPL-SCNC: 96 MMOL/L — SIGNIFICANT CHANGE UP (ref 96–108)
CO2 SERPL-SCNC: 26 MMOL/L — SIGNIFICANT CHANGE UP (ref 22–31)
CREAT SERPL-MCNC: 1.18 MG/DL — SIGNIFICANT CHANGE UP (ref 0.5–1.3)
GLUCOSE SERPL-MCNC: 176 MG/DL — HIGH (ref 70–99)
GRAM STN FLD: SIGNIFICANT CHANGE UP
HCT VFR BLD CALC: 39.2 % — SIGNIFICANT CHANGE UP (ref 39–50)
HGB BLD-MCNC: 14 G/DL — SIGNIFICANT CHANGE UP (ref 13–17)
MCHC RBC-ENTMCNC: 31.1 PG — SIGNIFICANT CHANGE UP (ref 27–34)
MCHC RBC-ENTMCNC: 35.6 GM/DL — SIGNIFICANT CHANGE UP (ref 32–36)
MCV RBC AUTO: 87.2 FL — SIGNIFICANT CHANGE UP (ref 80–100)
METHOD TYPE: SIGNIFICANT CHANGE UP
PLATELET # BLD AUTO: 269 K/UL — SIGNIFICANT CHANGE UP (ref 150–400)
POTASSIUM SERPL-MCNC: 4.4 MMOL/L — SIGNIFICANT CHANGE UP (ref 3.5–5.3)
POTASSIUM SERPL-SCNC: 4.4 MMOL/L — SIGNIFICANT CHANGE UP (ref 3.5–5.3)
RBC # BLD: 4.49 M/UL — SIGNIFICANT CHANGE UP (ref 4.2–5.8)
RBC # FLD: 13.3 % — SIGNIFICANT CHANGE UP (ref 10.3–14.5)
SODIUM SERPL-SCNC: 136 MMOL/L — SIGNIFICANT CHANGE UP (ref 135–145)
SPECIMEN SOURCE: SIGNIFICANT CHANGE UP
SPECIMEN SOURCE: SIGNIFICANT CHANGE UP
WBC # BLD: 15 K/UL — HIGH (ref 3.8–10.5)
WBC # FLD AUTO: 15 K/UL — HIGH (ref 3.8–10.5)

## 2019-03-25 PROCEDURE — 72195 MRI PELVIS W/O DYE: CPT | Mod: 26

## 2019-03-25 PROCEDURE — 99232 SBSQ HOSP IP/OBS MODERATE 35: CPT

## 2019-03-25 RX ORDER — DIAZEPAM 5 MG
5 TABLET ORAL ONCE
Qty: 0 | Refills: 0 | Status: DISCONTINUED | OUTPATIENT
Start: 2019-03-25 | End: 2019-03-25

## 2019-03-25 RX ORDER — ACETAMINOPHEN 500 MG
1000 TABLET ORAL ONCE
Qty: 0 | Refills: 0 | Status: COMPLETED | OUTPATIENT
Start: 2019-03-25 | End: 2019-03-25

## 2019-03-25 RX ADMIN — TAMSULOSIN HYDROCHLORIDE 0.4 MILLIGRAM(S): 0.4 CAPSULE ORAL at 20:59

## 2019-03-25 RX ADMIN — MENTHOL AND METHYL SALICYLATE 1 APPLICATION(S): 10; 30 STICK TOPICAL at 14:19

## 2019-03-25 RX ADMIN — Medication 40 MILLIGRAM(S): at 05:17

## 2019-03-25 RX ADMIN — Medication 400 MILLIGRAM(S): at 05:17

## 2019-03-25 RX ADMIN — Medication 3 MILLIGRAM(S): at 20:59

## 2019-03-25 RX ADMIN — Medication 50 MILLIGRAM(S): at 05:17

## 2019-03-25 RX ADMIN — Medication 5 MILLIGRAM(S): at 09:51

## 2019-03-25 RX ADMIN — ALBUTEROL 2 PUFF(S): 90 AEROSOL, METERED ORAL at 21:01

## 2019-03-25 RX ADMIN — ALBUTEROL 2 PUFF(S): 90 AEROSOL, METERED ORAL at 10:12

## 2019-03-25 RX ADMIN — Medication 400 MILLIGRAM(S): at 10:36

## 2019-03-25 RX ADMIN — SPIRONOLACTONE 25 MILLIGRAM(S): 25 TABLET, FILM COATED ORAL at 05:17

## 2019-03-25 RX ADMIN — APIXABAN 5 MILLIGRAM(S): 2.5 TABLET, FILM COATED ORAL at 05:17

## 2019-03-25 RX ADMIN — Medication 1000 MILLIGRAM(S): at 06:30

## 2019-03-25 RX ADMIN — AMIODARONE HYDROCHLORIDE 200 MILLIGRAM(S): 400 TABLET ORAL at 05:17

## 2019-03-25 RX ADMIN — Medication 1000 MILLIGRAM(S): at 17:00

## 2019-03-25 RX ADMIN — CLOPIDOGREL BISULFATE 75 MILLIGRAM(S): 75 TABLET, FILM COATED ORAL at 09:51

## 2019-03-25 RX ADMIN — FINASTERIDE 5 MILLIGRAM(S): 5 TABLET, FILM COATED ORAL at 09:51

## 2019-03-25 RX ADMIN — ALBUTEROL 2 PUFF(S): 90 AEROSOL, METERED ORAL at 05:18

## 2019-03-25 RX ADMIN — ALBUTEROL 2 PUFF(S): 90 AEROSOL, METERED ORAL at 14:23

## 2019-03-25 RX ADMIN — DAPTOMYCIN 130 MILLIGRAM(S): 500 INJECTION, POWDER, LYOPHILIZED, FOR SOLUTION INTRAVENOUS at 09:51

## 2019-03-25 RX ADMIN — BUDESONIDE AND FORMOTEROL FUMARATE DIHYDRATE 2 PUFF(S): 160; 4.5 AEROSOL RESPIRATORY (INHALATION) at 17:33

## 2019-03-25 RX ADMIN — SENNA PLUS 2 TABLET(S): 8.6 TABLET ORAL at 20:59

## 2019-03-25 RX ADMIN — Medication 50 MILLIGRAM(S): at 17:33

## 2019-03-25 RX ADMIN — LIDOCAINE 1 PATCH: 4 CREAM TOPICAL at 09:51

## 2019-03-25 RX ADMIN — PANTOPRAZOLE SODIUM 40 MILLIGRAM(S): 20 TABLET, DELAYED RELEASE ORAL at 05:18

## 2019-03-25 RX ADMIN — Medication 100 MILLIGRAM(S): at 16:37

## 2019-03-25 RX ADMIN — ATORVASTATIN CALCIUM 40 MILLIGRAM(S): 80 TABLET, FILM COATED ORAL at 20:59

## 2019-03-25 RX ADMIN — ALBUTEROL 2 PUFF(S): 90 AEROSOL, METERED ORAL at 17:33

## 2019-03-25 RX ADMIN — APIXABAN 5 MILLIGRAM(S): 2.5 TABLET, FILM COATED ORAL at 17:33

## 2019-03-25 RX ADMIN — LIDOCAINE 1 PATCH: 4 CREAM TOPICAL at 22:52

## 2019-03-25 RX ADMIN — BUDESONIDE AND FORMOTEROL FUMARATE DIHYDRATE 2 PUFF(S): 160; 4.5 AEROSOL RESPIRATORY (INHALATION) at 05:18

## 2019-03-25 RX ADMIN — Medication 1000 MILLIGRAM(S): at 11:00

## 2019-03-25 RX ADMIN — Medication 1 ENEMA: at 05:17

## 2019-03-25 RX ADMIN — Medication 100 MILLIGRAM(S): at 05:17

## 2019-03-25 RX ADMIN — Medication 400 MILLIGRAM(S): at 16:37

## 2019-03-25 NOTE — PROGRESS NOTE ADULT - ASSESSMENT
87 yr old with CKD, COPD, BPH, PPM, Y AVR due to AS, CHF; pt has a UTI several months ago and in the fall and had questionable thrombus or veg on aortic valve   bc were negative at the time     currently admitted lightheadedness and found to have multiple positive blood cultures for vrec.    He also had pain in his right lower flank /back.   pt has an intravascular infection on either the T avr or the ppm    pacemaker was removed yesterday-  Blood cultures from 3/22 still growing VRE faecium- Vanco resistant but sensitive to daptomycin  repeat blood cultures sent this AM  Needs SLIME - to better assess valvesTAVr  Would obtain MRI of lumbar sacral spine- his pain is lower left back near his sacroiliac area  Continue Daptomycin in the 10/mg/kg equivalent dose  check CPK level     culture of ppm is positve for vrec too  await mri and slime

## 2019-03-25 NOTE — PROGRESS NOTE ADULT - ASSESSMENT
Pt is a 86 y/o male w/ PMH of HTN, HLD, remote CVA, COPD, CAD with Stents, CHF, chronic afib on eliquis, chronic systolic heart failure s/p TAVR 11/27/18.- with Dr Zuluaga presenting to [facility] ED today d/t weakness. pt states he has been feeling lightheaded and having difficulty breathing for last few days. Patient had cardiac cath during previous admission which showed Distal circumflex: There was a 40 % stenosis. Ramus intermedius: Angiography showed severe atherosclerosis., Mid RCA: There was a 60 % stenosis. Patient states that he has never had to be intubated d/t shortness of breath; also states that he has O2 at home as needed, but does not use his home O2 or his home inhalers. patient reports some chills and productive cough with yellow sputum. patient was seen by his cardiologist today which he was prescribed doxy for possible PNA however he was told to come to ER for worsening symptoms     # Bacteremia, VRE   # SOB and productive cough, R/P PNA VS viral URI   # LE R Hip pain   # COPD exacerbation   # HX of HFrEF, R/O acute on chronic exacerbation   # CAD S/P PCI   # Afib on AC  # S/P TAVR  # HTN  # CVA  #BPH  # HLD     6/6 G + , VRE  repeat Blood Cx positive, cont repeat Blood Cx till negative growth   started on vanc and changed to daptomycin in view of sensitivity   ID eval appreciated  S/P PPM removal, Cx grew g+  Card for MARIA LUISA   CTS eval after MARIA LUISA  hold solumedrol, leukocytosis secondary to steroid use   has back apin, ? osteo/ seeding. MRI of hip noted, no Osteo  CXR clear, no PNA seen  procalcitonin normal   CT Chest noted  S/P Solumedrol 40 IV daily   ProBNP mildly elevated however he is not in any fluid overlaod   Echo noted  diuresis with lasix   lopressor 50 BID   Eliquis for AC   I andOs, FLuid restriction   Nebs and inhalers   COnt spironolactone   serial CE and EKG, mild Trop elevated, F/U Trend   MOnitor vitals, restart home BP medications and statin and AP with plavix   rate control, cont amiodorone   DVT and GI PPX

## 2019-03-26 LAB
-  AMPICILLIN: SIGNIFICANT CHANGE UP
-  DAPTOMYCIN: SIGNIFICANT CHANGE UP
-  DAPTOMYCIN: SIGNIFICANT CHANGE UP
-  GENTAMICIN SYNERGY: SIGNIFICANT CHANGE UP
-  LINEZOLID: SIGNIFICANT CHANGE UP
-  VANCOMYCIN: SIGNIFICANT CHANGE UP
CK SERPL-CCNC: 38 U/L — SIGNIFICANT CHANGE UP (ref 30–200)
CULTURE RESULTS: SIGNIFICANT CHANGE UP
CULTURE RESULTS: SIGNIFICANT CHANGE UP
GRAM STN FLD: SIGNIFICANT CHANGE UP
GRAM STN FLD: SIGNIFICANT CHANGE UP
HCT VFR BLD CALC: 39.9 % — SIGNIFICANT CHANGE UP (ref 39–50)
HGB BLD-MCNC: 13.2 G/DL — SIGNIFICANT CHANGE UP (ref 13–17)
MCHC RBC-ENTMCNC: 28.7 PG — SIGNIFICANT CHANGE UP (ref 27–34)
MCHC RBC-ENTMCNC: 33.1 GM/DL — SIGNIFICANT CHANGE UP (ref 32–36)
MCV RBC AUTO: 86.7 FL — SIGNIFICANT CHANGE UP (ref 80–100)
METHOD TYPE: SIGNIFICANT CHANGE UP
METHOD TYPE: SIGNIFICANT CHANGE UP
ORGANISM # SPEC MICROSCOPIC CNT: SIGNIFICANT CHANGE UP
ORGANISM # SPEC MICROSCOPIC CNT: SIGNIFICANT CHANGE UP
PLATELET # BLD AUTO: 259 K/UL — SIGNIFICANT CHANGE UP (ref 150–400)
RBC # BLD: 4.6 M/UL — SIGNIFICANT CHANGE UP (ref 4.2–5.8)
RBC # FLD: 13.4 % — SIGNIFICANT CHANGE UP (ref 10.3–14.5)
SPECIMEN SOURCE: SIGNIFICANT CHANGE UP
SPECIMEN SOURCE: SIGNIFICANT CHANGE UP
WBC # BLD: 14.9 K/UL — HIGH (ref 3.8–10.5)
WBC # FLD AUTO: 14.9 K/UL — HIGH (ref 3.8–10.5)

## 2019-03-26 PROCEDURE — 99232 SBSQ HOSP IP/OBS MODERATE 35: CPT

## 2019-03-26 PROCEDURE — 93975 VASCULAR STUDY: CPT | Mod: 26

## 2019-03-26 PROCEDURE — 76870 US EXAM SCROTUM: CPT | Mod: 26

## 2019-03-26 PROCEDURE — 93312 ECHO TRANSESOPHAGEAL: CPT | Mod: 26

## 2019-03-26 PROCEDURE — 93320 DOPPLER ECHO COMPLETE: CPT | Mod: 26

## 2019-03-26 PROCEDURE — 93325 DOPPLER ECHO COLOR FLOW MAPG: CPT | Mod: 26

## 2019-03-26 RX ORDER — MORPHINE SULFATE 50 MG/1
2 CAPSULE, EXTENDED RELEASE ORAL EVERY 4 HOURS
Qty: 0 | Refills: 0 | Status: DISCONTINUED | OUTPATIENT
Start: 2019-03-26 | End: 2019-03-26

## 2019-03-26 RX ORDER — HYDROMORPHONE HYDROCHLORIDE 2 MG/ML
2 INJECTION INTRAMUSCULAR; INTRAVENOUS; SUBCUTANEOUS EVERY 4 HOURS
Qty: 0 | Refills: 0 | Status: DISCONTINUED | OUTPATIENT
Start: 2019-03-26 | End: 2019-03-26

## 2019-03-26 RX ORDER — ACETAMINOPHEN 500 MG
650 TABLET ORAL ONCE
Qty: 0 | Refills: 0 | Status: DISCONTINUED | OUTPATIENT
Start: 2019-03-26 | End: 2019-03-26

## 2019-03-26 RX ORDER — ACETAMINOPHEN 500 MG
1000 TABLET ORAL ONCE
Qty: 0 | Refills: 0 | Status: COMPLETED | OUTPATIENT
Start: 2019-03-26 | End: 2019-03-26

## 2019-03-26 RX ORDER — HYDROMORPHONE HYDROCHLORIDE 2 MG/ML
1 INJECTION INTRAMUSCULAR; INTRAVENOUS; SUBCUTANEOUS EVERY 4 HOURS
Qty: 0 | Refills: 0 | Status: DISCONTINUED | OUTPATIENT
Start: 2019-03-26 | End: 2019-03-29

## 2019-03-26 RX ADMIN — SPIRONOLACTONE 25 MILLIGRAM(S): 25 TABLET, FILM COATED ORAL at 05:09

## 2019-03-26 RX ADMIN — FINASTERIDE 5 MILLIGRAM(S): 5 TABLET, FILM COATED ORAL at 15:25

## 2019-03-26 RX ADMIN — BUDESONIDE AND FORMOTEROL FUMARATE DIHYDRATE 2 PUFF(S): 160; 4.5 AEROSOL RESPIRATORY (INHALATION) at 15:26

## 2019-03-26 RX ADMIN — Medication 40 MILLIGRAM(S): at 05:09

## 2019-03-26 RX ADMIN — APIXABAN 5 MILLIGRAM(S): 2.5 TABLET, FILM COATED ORAL at 05:09

## 2019-03-26 RX ADMIN — ALBUTEROL 2 PUFF(S): 90 AEROSOL, METERED ORAL at 05:10

## 2019-03-26 RX ADMIN — LIDOCAINE 1 PATCH: 4 CREAM TOPICAL at 20:23

## 2019-03-26 RX ADMIN — HYDROMORPHONE HYDROCHLORIDE 2 MILLIGRAM(S): 2 INJECTION INTRAMUSCULAR; INTRAVENOUS; SUBCUTANEOUS at 15:25

## 2019-03-26 RX ADMIN — MORPHINE SULFATE 2 MILLIGRAM(S): 50 CAPSULE, EXTENDED RELEASE ORAL at 11:38

## 2019-03-26 RX ADMIN — Medication 400 MILLIGRAM(S): at 00:49

## 2019-03-26 RX ADMIN — Medication 50 MILLIGRAM(S): at 05:08

## 2019-03-26 RX ADMIN — Medication 100 MILLIGRAM(S): at 05:09

## 2019-03-26 RX ADMIN — POLYETHYLENE GLYCOL 3350 17 GRAM(S): 17 POWDER, FOR SOLUTION ORAL at 15:28

## 2019-03-26 RX ADMIN — SENNA PLUS 2 TABLET(S): 8.6 TABLET ORAL at 21:06

## 2019-03-26 RX ADMIN — APIXABAN 5 MILLIGRAM(S): 2.5 TABLET, FILM COATED ORAL at 17:15

## 2019-03-26 RX ADMIN — LIDOCAINE 1 PATCH: 4 CREAM TOPICAL at 15:28

## 2019-03-26 RX ADMIN — DAPTOMYCIN 130 MILLIGRAM(S): 500 INJECTION, POWDER, LYOPHILIZED, FOR SOLUTION INTRAVENOUS at 08:32

## 2019-03-26 RX ADMIN — CLOPIDOGREL BISULFATE 75 MILLIGRAM(S): 75 TABLET, FILM COATED ORAL at 15:25

## 2019-03-26 RX ADMIN — ALBUTEROL 2 PUFF(S): 90 AEROSOL, METERED ORAL at 08:33

## 2019-03-26 RX ADMIN — MORPHINE SULFATE 2 MILLIGRAM(S): 50 CAPSULE, EXTENDED RELEASE ORAL at 11:08

## 2019-03-26 RX ADMIN — HYDROMORPHONE HYDROCHLORIDE 2 MILLIGRAM(S): 2 INJECTION INTRAMUSCULAR; INTRAVENOUS; SUBCUTANEOUS at 16:25

## 2019-03-26 RX ADMIN — ALBUTEROL 2 PUFF(S): 90 AEROSOL, METERED ORAL at 15:27

## 2019-03-26 RX ADMIN — PANTOPRAZOLE SODIUM 40 MILLIGRAM(S): 20 TABLET, DELAYED RELEASE ORAL at 05:09

## 2019-03-26 RX ADMIN — ATORVASTATIN CALCIUM 40 MILLIGRAM(S): 80 TABLET, FILM COATED ORAL at 21:06

## 2019-03-26 RX ADMIN — ALBUTEROL 2 PUFF(S): 90 AEROSOL, METERED ORAL at 17:15

## 2019-03-26 RX ADMIN — TAMSULOSIN HYDROCHLORIDE 0.4 MILLIGRAM(S): 0.4 CAPSULE ORAL at 21:06

## 2019-03-26 RX ADMIN — BUDESONIDE AND FORMOTEROL FUMARATE DIHYDRATE 2 PUFF(S): 160; 4.5 AEROSOL RESPIRATORY (INHALATION) at 17:15

## 2019-03-26 RX ADMIN — Medication 1000 MILLIGRAM(S): at 01:20

## 2019-03-26 RX ADMIN — BUDESONIDE AND FORMOTEROL FUMARATE DIHYDRATE 2 PUFF(S): 160; 4.5 AEROSOL RESPIRATORY (INHALATION) at 05:10

## 2019-03-26 RX ADMIN — AMIODARONE HYDROCHLORIDE 200 MILLIGRAM(S): 400 TABLET ORAL at 05:09

## 2019-03-26 RX ADMIN — ALBUTEROL 2 PUFF(S): 90 AEROSOL, METERED ORAL at 21:07

## 2019-03-26 RX ADMIN — Medication 100 MILLIGRAM(S): at 17:15

## 2019-03-26 RX ADMIN — Medication 50 MILLIGRAM(S): at 17:15

## 2019-03-26 NOTE — CHART NOTE - NSCHARTNOTEFT_GEN_A_CORE
Called by RN for critical lab value positive BC  ID aware and notified   Pending MARIA LUISA   C/w abx   cont to monitor vs   discussed fidings with Dr Owens and Dr Coronado   Medicine Team   MERYL Glasgow  [facility] [id number]

## 2019-03-26 NOTE — PROGRESS NOTE ADULT - ASSESSMENT
87 yr old with CKD, COPD, BPH, PPM, Y AVR due to AS, CHF; pt has a UTI several months ago and in the fall and had questionable thrombus or veg on aortic valve   bc were negative at the time     currently admitted lightheadedness and found to have multiple positive blood cultures for vrec.    He also had pain in his right lower flank /back.   pt has an intravascular infection on either the T avr or the ppm    pacemaker was removed yesterday-  Blood cultures from 3/22 still growing VRE faecium- Vanco resistant but sensitive to daptomycin  repeat blood cultures sent this AM  Needs SLIME - to better assess valvesTAVr  Would obtain MRI of lumbar sacral spine- his pain is lower left back near his sacroiliac area  Continue Daptomycin in the 10/mg/kg equivalent dose  check CPK level     culture of ppm is positve for vrec too  await mri and slime and ultrasound    there re some report suggesting the benefits of adding ampicillin to dapto despite in vitro resistance  will consider if he has vrec endocarditis I dont think he can survive without avr which willl be very risky

## 2019-03-26 NOTE — PROGRESS NOTE ADULT - ASSESSMENT
Pt is a 88 y/o male w/ PMH of HTN, HLD, remote CVA, COPD, CAD with Stents, CHF, chronic afib on eliquis, chronic systolic heart failure s/p TAVR 11/27/18.- with Dr Zuluaga presenting to [facility] ED today d/t weakness. pt states he has been feeling lightheaded and having difficulty breathing for last few days. Patient had cardiac cath during previous admission which showed Distal circumflex: There was a 40 % stenosis. Ramus intermedius: Angiography showed severe atherosclerosis., Mid RCA: There was a 60 % stenosis. Patient states that he has never had to be intubated d/t shortness of breath; also states that he has O2 at home as needed, but does not use his home O2 or his home inhalers. patient reports some chills and productive cough with yellow sputum. patient was seen by his cardiologist today which he was prescribed doxy for possible PNA however he was told to come to ER for worsening symptoms     # Bacteremia, VRE   # SOB and productive cough, R/P PNA VS viral URI   # LE R Hip pain   # COPD exacerbation   # HX of HFrEF, R/O acute on chronic exacerbation   # CAD S/P PCI   # Afib on AC  # S/P TAVR  # HTN  # CVA  #BPH  # HLD     6/6 G + , VRE  repeat Blood Cx positive, cont repeat Blood Cx till negative growth. dapto resistance in some cultures   started on vanc and changed to daptomycin in view of sensitivity   ID eval appreciated  S/P PPM removal, Cx grew g+  MARIA LUISA noted   hold solumedrol, leukocytosis secondary to steroid use   has back apin, ? osteo/ seeding. MRI of hip noted, no Osteo  CXR clear, no PNA seen  procalcitonin normal   CT Chest noted  pain control with Dilaudid   S/P Solumedrol 40 IV daily   ProBNP mildly elevated however he is not in any fluid overlaod   Echo noted  diuresis with lasix   lopressor 50 BID   Eliquis for AC   I andOs, FLuid restriction   Nebs and inhalers   COnt spironolactone   serial CE and EKG, mild Trop elevated, F/U Trend   MOnitor vitals, restart home BP medications and statin and AP with plavix   rate control, cont amiodorone   Palliative eval   DVT and GI PPX

## 2019-03-26 NOTE — CHART NOTE - NSCHARTNOTEFT_GEN_A_CORE
S: Called by RN, pt c/o testicular pain. Pt seen and examined at bedside. pt reports chronic intermittent testicular pain. However tonight, unable to alleviate groin and testicular pain with heat packs. Pt reports history of testicular pain over 20 years ago, having also been diagnosed with a hydrocele with repair. pt is unable to recall pt's urologist (affiliated with [facility]) who recommended to remove his enlarged right testicle. Pt reports no pain at rest, however pain with movement. Denies fevers, chills, rash, acute change in size of scrotum     Physical Exam:  Pt unable to stand at this time.   No palpable inguinal hernia.   +Scrotal enlargement. Unable to palpate masses. Right testicular enlargement.   No erythema to area  No pitting edema to scrotum   +voiding clear yellow urine, No dysuria    A/P: 88 y/o male with PMHx HTN, HLD, DVA, COPD, CAD w/stents, CHF, A-fib on Eliquis, TAVR 11/18. Admit for SOB, lightheadedness. Found to have strep bacteremia on IV Dapto, COPD exac S: Called by RN, pt c/o testicular pain. Pt seen and examined at bedside. pt reports chronic intermittent testicular pain. However tonight, unable to alleviate groin and testicular pain with heat packs. Pt reports history of testicular pain over 20 years ago, having also been diagnosed with a hydrocele with repair. pt is unable to recall pt's urologist (affiliated with [facility]) who recommended to remove his enlarged right testicle. Pt reports no pain at rest, however pain with movement. Denies fevers, chills, rash, acute change in size of scrotum. Afebrile    Radiology:  < from: MR Pelvis No Cont (03.25.19 @ 11:57) >    FINDINGS:    Femoral Heads: No fracture or osteonecrosis. Chondral loss with   subchondral marrow edema in the right femoral head adjacent to the fovea   capitis. Smalllow-grade chondroid lesion in the intertrochanteric region   of the right proximal femur. 2 small chondroid lesions are identified in   the proximal left femur.    Hip Joints: Bilateral hip chondral loss with marginal osteophyte   formation. No hip joint effusions.    Bursa: Mild amount of fluid in the left greater trochanteric bursa. No   iliopsoas or ischiogluteal bursitis.    Marrow: No focal STIR signal or loss of T1 hyperintense signal to suggest   acute osteomyelitis. No acute fracture or osteonecrosis.    Tendons: Mild tendinosis of the right gluteus medius tendon. Remaining   tendons are intact.    Muscles: Fatty infiltration of the left gluteus minimus muscle.   Otherwise, no focal muscle edema, atrophy, or fatty infiltration.    Pubic Symphysis: Mild degenerative change of the pubic symphysis.    Sacroiliac Joints: Mild degenerative changes of the bilateral SI joints.    Lower Lumbar Spine: Multilevel spondylosis and facet arthropathy in the   lower lumbar spine.    Intraabdominal/Intrapelvic Structures: Diverticular disease of the colon   without evidence of acute diverticulitis. TURP defect in the prostate. No   pelvic free fluid or lymphadenopathy.    < end of copied text >      Physical Exam:  Pt unable to stand at this time.   No palpable inguinal hernia.   +Scrotal enlargement. Unable to palpate masses. Right testicular enlargement.   No erythema to area  No pitting edema to scrotum   +voiding clear yellow urine, No dysuria    A/P: 86 y/o male with PMHx HTN, HLD, DVA, COPD, CAD w/stents, CHF, A-fib on Eliquis, TAVR 11/18. Admit for SOB, lightheadedness. Found to have strep bacteremia on IV Dapto, COPD exac. s/p removal of PPM  1) Testicular enlargment  - Urgent U/S Scotum  - tylenol 1000mg IV x 1  - Scrotal elevation  - Scotal support  - please call urology consult in AM  - f/u with primary team in AM    ~Dasia Hart ANP-C   [id number]

## 2019-03-27 LAB
ANION GAP SERPL CALC-SCNC: 15 MMOL/L — SIGNIFICANT CHANGE UP (ref 5–17)
BUN SERPL-MCNC: 33 MG/DL — HIGH (ref 7–23)
CALCIUM SERPL-MCNC: 9.4 MG/DL — SIGNIFICANT CHANGE UP (ref 8.4–10.5)
CHLORIDE SERPL-SCNC: 100 MMOL/L — SIGNIFICANT CHANGE UP (ref 96–108)
CK SERPL-CCNC: 32 U/L — SIGNIFICANT CHANGE UP (ref 30–200)
CO2 SERPL-SCNC: 25 MMOL/L — SIGNIFICANT CHANGE UP (ref 22–31)
CREAT SERPL-MCNC: 0.96 MG/DL — SIGNIFICANT CHANGE UP (ref 0.5–1.3)
CULTURE RESULTS: SIGNIFICANT CHANGE UP
GLUCOSE SERPL-MCNC: 93 MG/DL — SIGNIFICANT CHANGE UP (ref 70–99)
HCT VFR BLD CALC: 42.4 % — SIGNIFICANT CHANGE UP (ref 39–50)
HGB BLD-MCNC: 14.2 G/DL — SIGNIFICANT CHANGE UP (ref 13–17)
MCHC RBC-ENTMCNC: 29.5 PG — SIGNIFICANT CHANGE UP (ref 27–34)
MCHC RBC-ENTMCNC: 33.5 GM/DL — SIGNIFICANT CHANGE UP (ref 32–36)
MCV RBC AUTO: 88 FL — SIGNIFICANT CHANGE UP (ref 80–100)
ORGANISM # SPEC MICROSCOPIC CNT: SIGNIFICANT CHANGE UP
PLATELET # BLD AUTO: 268 K/UL — SIGNIFICANT CHANGE UP (ref 150–400)
POTASSIUM SERPL-MCNC: 4.6 MMOL/L — SIGNIFICANT CHANGE UP (ref 3.5–5.3)
POTASSIUM SERPL-SCNC: 4.6 MMOL/L — SIGNIFICANT CHANGE UP (ref 3.5–5.3)
RBC # BLD: 4.82 M/UL — SIGNIFICANT CHANGE UP (ref 4.2–5.8)
RBC # FLD: 13.4 % — SIGNIFICANT CHANGE UP (ref 10.3–14.5)
SODIUM SERPL-SCNC: 140 MMOL/L — SIGNIFICANT CHANGE UP (ref 135–145)
SPECIMEN SOURCE: SIGNIFICANT CHANGE UP
WBC # BLD: 12.7 K/UL — HIGH (ref 3.8–10.5)
WBC # FLD AUTO: 12.7 K/UL — HIGH (ref 3.8–10.5)

## 2019-03-27 PROCEDURE — 99232 SBSQ HOSP IP/OBS MODERATE 35: CPT

## 2019-03-27 PROCEDURE — 72148 MRI LUMBAR SPINE W/O DYE: CPT | Mod: 26

## 2019-03-27 PROCEDURE — 99231 SBSQ HOSP IP/OBS SF/LOW 25: CPT

## 2019-03-27 RX ORDER — FUROSEMIDE 40 MG
40 TABLET ORAL DAILY
Qty: 0 | Refills: 0 | Status: DISCONTINUED | OUTPATIENT
Start: 2019-03-28 | End: 2019-03-31

## 2019-03-27 RX ADMIN — ALBUTEROL 2 PUFF(S): 90 AEROSOL, METERED ORAL at 13:30

## 2019-03-27 RX ADMIN — DAPTOMYCIN 130 MILLIGRAM(S): 500 INJECTION, POWDER, LYOPHILIZED, FOR SOLUTION INTRAVENOUS at 10:45

## 2019-03-27 RX ADMIN — BUDESONIDE AND FORMOTEROL FUMARATE DIHYDRATE 2 PUFF(S): 160; 4.5 AEROSOL RESPIRATORY (INHALATION) at 17:51

## 2019-03-27 RX ADMIN — BUDESONIDE AND FORMOTEROL FUMARATE DIHYDRATE 2 PUFF(S): 160; 4.5 AEROSOL RESPIRATORY (INHALATION) at 06:09

## 2019-03-27 RX ADMIN — AMIODARONE HYDROCHLORIDE 200 MILLIGRAM(S): 400 TABLET ORAL at 06:09

## 2019-03-27 RX ADMIN — LIDOCAINE 1 PATCH: 4 CREAM TOPICAL at 19:47

## 2019-03-27 RX ADMIN — Medication 3 MILLIGRAM(S): at 22:45

## 2019-03-27 RX ADMIN — ALBUTEROL 2 PUFF(S): 90 AEROSOL, METERED ORAL at 09:24

## 2019-03-27 RX ADMIN — Medication 1 MILLIGRAM(S): at 20:40

## 2019-03-27 RX ADMIN — LIDOCAINE 1 PATCH: 4 CREAM TOPICAL at 12:58

## 2019-03-27 RX ADMIN — CLOPIDOGREL BISULFATE 75 MILLIGRAM(S): 75 TABLET, FILM COATED ORAL at 12:57

## 2019-03-27 RX ADMIN — PANTOPRAZOLE SODIUM 40 MILLIGRAM(S): 20 TABLET, DELAYED RELEASE ORAL at 06:12

## 2019-03-27 RX ADMIN — ATORVASTATIN CALCIUM 40 MILLIGRAM(S): 80 TABLET, FILM COATED ORAL at 20:40

## 2019-03-27 RX ADMIN — FINASTERIDE 5 MILLIGRAM(S): 5 TABLET, FILM COATED ORAL at 12:58

## 2019-03-27 RX ADMIN — HYDROMORPHONE HYDROCHLORIDE 1 MILLIGRAM(S): 2 INJECTION INTRAMUSCULAR; INTRAVENOUS; SUBCUTANEOUS at 21:00

## 2019-03-27 RX ADMIN — Medication 50 MILLIGRAM(S): at 06:09

## 2019-03-27 RX ADMIN — Medication 50 MILLIGRAM(S): at 17:49

## 2019-03-27 RX ADMIN — Medication 100 MILLIGRAM(S): at 17:49

## 2019-03-27 RX ADMIN — ALBUTEROL 2 PUFF(S): 90 AEROSOL, METERED ORAL at 17:49

## 2019-03-27 RX ADMIN — APIXABAN 5 MILLIGRAM(S): 2.5 TABLET, FILM COATED ORAL at 17:49

## 2019-03-27 RX ADMIN — POLYETHYLENE GLYCOL 3350 17 GRAM(S): 17 POWDER, FOR SOLUTION ORAL at 12:59

## 2019-03-27 RX ADMIN — SPIRONOLACTONE 25 MILLIGRAM(S): 25 TABLET, FILM COATED ORAL at 06:09

## 2019-03-27 RX ADMIN — SENNA PLUS 2 TABLET(S): 8.6 TABLET ORAL at 20:40

## 2019-03-27 RX ADMIN — HYDROMORPHONE HYDROCHLORIDE 1 MILLIGRAM(S): 2 INJECTION INTRAMUSCULAR; INTRAVENOUS; SUBCUTANEOUS at 20:41

## 2019-03-27 RX ADMIN — ALBUTEROL 2 PUFF(S): 90 AEROSOL, METERED ORAL at 20:42

## 2019-03-27 RX ADMIN — ALBUTEROL 2 PUFF(S): 90 AEROSOL, METERED ORAL at 06:09

## 2019-03-27 RX ADMIN — HYDROMORPHONE HYDROCHLORIDE 1 MILLIGRAM(S): 2 INJECTION INTRAMUSCULAR; INTRAVENOUS; SUBCUTANEOUS at 09:33

## 2019-03-27 RX ADMIN — Medication 100 MILLIGRAM(S): at 06:08

## 2019-03-27 RX ADMIN — APIXABAN 5 MILLIGRAM(S): 2.5 TABLET, FILM COATED ORAL at 06:12

## 2019-03-27 RX ADMIN — HYDROMORPHONE HYDROCHLORIDE 1 MILLIGRAM(S): 2 INJECTION INTRAMUSCULAR; INTRAVENOUS; SUBCUTANEOUS at 09:18

## 2019-03-27 RX ADMIN — Medication 40 MILLIGRAM(S): at 06:08

## 2019-03-27 RX ADMIN — TAMSULOSIN HYDROCHLORIDE 0.4 MILLIGRAM(S): 0.4 CAPSULE ORAL at 20:41

## 2019-03-27 NOTE — CHART NOTE - NSCHARTNOTEFT_GEN_A_CORE
MEDICINE PA    CHIEF COMPLAINT  Patient is a 87y old  Male who presents with a chief complaint of SOB, cough (26 Mar 2019 22:38)    EVENT SUMMARY   Culture - Blood (03.25.19 @ 17:18)    Gram Stain:   Growth in aerobic bottle: Gram Positive Cocci in Pairs and Chains    Specimen Source: .Blood Blood    Culture Results:   Growth in aerobic bottle: Gram Positive Cocci in Pairs and Chains  Culture - Blood (03.25.19 @ 17:18)    Gram Stain:   Growth in anaerobic bottle: Gram Positive Cocci in Pairs and Chains    Specimen Source: .Blood Blood    Culture Results:   Growth in anaerobic bottle: Gram Positive Cocci in Pairs and Chains    Notified by RN for pre hawkins positive blood cultures as above sent on 3/25. Pt afebrile with persistent VRE bacteremia and elevated white count. C/w daptomycin. ID f/u in AM; f/u with repeat blood cultures sent on 3/26. Will continue to monitor.    Sanjana PASCAL  #[id number]

## 2019-03-27 NOTE — PROGRESS NOTE ADULT - ASSESSMENT
87 yr old with CKD, COPD, BPH, PPM, Y AVR due to AS, CHF; pt has a UTI several months ago and in the fall and had questionable thrombus or veg on aortic valve   bc were negative at the time     currently admitted lightheadedness and found to have multiple positive blood cultures for vrec.    He also had pain in his right lower flank /back.   pt has an intravascular infection on either the T avr or the ppm    pacemaker was removed    Blood cultures f till growing VRE faecium- Vanco resistant but sensitive to daptomycin  repeat blood cultures sent this AM  Needs MARIA LUISA - was not helpful and does not demonstrate obv vegetation   Would obtain MRI of lumbar sacral spine- his pain is lower left back near his sacroiliac area  Continue Daptomycin in the 10/mg/kg equivalent dose  check CPK level  ultrasound of testes is negative      culture of ppm is positve for vrec too  await mri      there re some report suggesting the benefits of adding ampicillin to dapto despite in vitro resistance  will consider if he has vrec endocarditis I dont think he can survive without avr which willl be very risky    allergic to pcn so the addition of ampicillin is not an option   Discussed with Dr. Downey  If no other course is found, we will need to repeat MARIA LUISA next week to see if changes evolve.

## 2019-03-27 NOTE — PROGRESS NOTE ADULT - ASSESSMENT
Pt is a 88 y/o male w/ PMH of HTN, HLD, remote CVA, COPD, CAD with Stents, CHF, chronic afib on eliquis, chronic systolic heart failure s/p TAVR 11/27/18.- with Dr Zuluaga presenting to [facility] ED today d/t weakness. pt states he has been feeling lightheaded and having difficulty breathing for last few days. Patient had cardiac cath during previous admission which showed Distal circumflex: There was a 40 % stenosis. Ramus intermedius: Angiography showed severe atherosclerosis., Mid RCA: There was a 60 % stenosis. Patient states that he has never had to be intubated d/t shortness of breath; also states that he has O2 at home as needed, but does not use his home O2 or his home inhalers. patient reports some chills and productive cough with yellow sputum. patient was seen by his cardiologist today which he was prescribed doxy for possible PNA however he was told to come to ER for worsening symptoms     # Bacteremia, VRE   # SOB and productive cough, R/P PNA VS viral URI   # LE R Hip pain   # COPD exacerbation   # HX of HFrEF, R/O acute on chronic exacerbation   # CAD S/P PCI   # Afib on AC  # S/P TAVR  # HTN  # CVA  #BPH  # HLD     6/6 G + , VRE  repeat Blood Cx positive, cont repeat Blood Cx till negative growth. dapto resistance in some cultures   started on vanc and changed to daptomycin in view of sensitivity   ID F/U regarding ABx management, possible adding second agent in view of high resistance   S/P PPM removal, Cx grew g+  MARIA LUISA noted   hold solumedrol, leukocytosis secondary to steroid use   has back apin, ? osteo/ seeding. MRI of hip noted, no Osteo  CXR clear, no PNA seen  procalcitonin normal   CT Chest noted  pain control with Dilaudid   S/P Solumedrol 40 IV daily   ProBNP mildly elevated however he is not in any fluid overlaod   Echo noted  diuresis with lasix   lopressor 50 BID   Eliquis for AC   I andOs, FLuid restriction   Nebs and inhalers   COnt spironolactone   serial CE and EKG, mild Trop elevated, F/U Trend   MOnitor vitals, restart home BP medications and statin and AP with plavix   rate control, cont amiodorone   Palliative eval   DVT and GI PPX

## 2019-03-27 NOTE — CHART NOTE - NSCHARTNOTEFT_GEN_A_CORE
Called by RN for Pt repeated positive blood cultures   87 yr old with CKD, COPD, BPH, PPM, Y TAVR due to AS, CHF; pt has a UTI several months ago and in the fall and had questionable thrombus or veg on aortic valve   bc were negative at the time     currently admitted lightheadedness and found to have multiple positive blood cultures for vrec.    He also had pain in his right lower flank /back.   pt has an intravascular infection on either the Tavr or the ppm    pacemaker extracted   Blood cultures f till growing VRE faecium- Vanco resistant but sensitive to daptomycin  repeat blood cultures sent this AM  ultrasound of testes is negative   Pending  MRI of lumbar sacral spine- his pain is lower left back near his sacroiliac area  Continue Daptomycin  Medicine Team   MERYL PRUITTP-c [id number]

## 2019-03-28 LAB
ANION GAP SERPL CALC-SCNC: 13 MMOL/L — SIGNIFICANT CHANGE UP (ref 5–17)
BUN SERPL-MCNC: 40 MG/DL — HIGH (ref 7–23)
CALCIUM SERPL-MCNC: 9.6 MG/DL — SIGNIFICANT CHANGE UP (ref 8.4–10.5)
CHLORIDE SERPL-SCNC: 99 MMOL/L — SIGNIFICANT CHANGE UP (ref 96–108)
CO2 SERPL-SCNC: 26 MMOL/L — SIGNIFICANT CHANGE UP (ref 22–31)
CREAT SERPL-MCNC: 1.27 MG/DL — SIGNIFICANT CHANGE UP (ref 0.5–1.3)
GLUCOSE SERPL-MCNC: 120 MG/DL — HIGH (ref 70–99)
GRAM STN FLD: SIGNIFICANT CHANGE UP
GRAM STN FLD: SIGNIFICANT CHANGE UP
HCT VFR BLD CALC: 42 % — SIGNIFICANT CHANGE UP (ref 39–50)
HGB BLD-MCNC: 13.9 G/DL — SIGNIFICANT CHANGE UP (ref 13–17)
MCHC RBC-ENTMCNC: 29.1 PG — SIGNIFICANT CHANGE UP (ref 27–34)
MCHC RBC-ENTMCNC: 33.2 GM/DL — SIGNIFICANT CHANGE UP (ref 32–36)
MCV RBC AUTO: 87.8 FL — SIGNIFICANT CHANGE UP (ref 80–100)
PLATELET # BLD AUTO: 262 K/UL — SIGNIFICANT CHANGE UP (ref 150–400)
POTASSIUM SERPL-MCNC: 4.3 MMOL/L — SIGNIFICANT CHANGE UP (ref 3.5–5.3)
POTASSIUM SERPL-SCNC: 4.3 MMOL/L — SIGNIFICANT CHANGE UP (ref 3.5–5.3)
RBC # BLD: 4.78 M/UL — SIGNIFICANT CHANGE UP (ref 4.2–5.8)
RBC # FLD: 13.5 % — SIGNIFICANT CHANGE UP (ref 10.3–14.5)
SODIUM SERPL-SCNC: 138 MMOL/L — SIGNIFICANT CHANGE UP (ref 135–145)
SPECIMEN SOURCE: SIGNIFICANT CHANGE UP
WBC # BLD: 13.8 K/UL — HIGH (ref 3.8–10.5)
WBC # FLD AUTO: 13.8 K/UL — HIGH (ref 3.8–10.5)

## 2019-03-28 PROCEDURE — 99232 SBSQ HOSP IP/OBS MODERATE 35: CPT

## 2019-03-28 RX ADMIN — CLOPIDOGREL BISULFATE 75 MILLIGRAM(S): 75 TABLET, FILM COATED ORAL at 13:41

## 2019-03-28 RX ADMIN — FINASTERIDE 5 MILLIGRAM(S): 5 TABLET, FILM COATED ORAL at 13:41

## 2019-03-28 RX ADMIN — HYDROMORPHONE HYDROCHLORIDE 1 MILLIGRAM(S): 2 INJECTION INTRAMUSCULAR; INTRAVENOUS; SUBCUTANEOUS at 09:28

## 2019-03-28 RX ADMIN — ALBUTEROL 2 PUFF(S): 90 AEROSOL, METERED ORAL at 18:24

## 2019-03-28 RX ADMIN — ALBUTEROL 2 PUFF(S): 90 AEROSOL, METERED ORAL at 05:04

## 2019-03-28 RX ADMIN — PANTOPRAZOLE SODIUM 40 MILLIGRAM(S): 20 TABLET, DELAYED RELEASE ORAL at 05:04

## 2019-03-28 RX ADMIN — SPIRONOLACTONE 25 MILLIGRAM(S): 25 TABLET, FILM COATED ORAL at 05:04

## 2019-03-28 RX ADMIN — POLYETHYLENE GLYCOL 3350 17 GRAM(S): 17 POWDER, FOR SOLUTION ORAL at 13:40

## 2019-03-28 RX ADMIN — Medication 100 MILLIGRAM(S): at 18:23

## 2019-03-28 RX ADMIN — AMIODARONE HYDROCHLORIDE 200 MILLIGRAM(S): 400 TABLET ORAL at 05:04

## 2019-03-28 RX ADMIN — BUDESONIDE AND FORMOTEROL FUMARATE DIHYDRATE 2 PUFF(S): 160; 4.5 AEROSOL RESPIRATORY (INHALATION) at 05:05

## 2019-03-28 RX ADMIN — ALBUTEROL 2 PUFF(S): 90 AEROSOL, METERED ORAL at 22:17

## 2019-03-28 RX ADMIN — HYDROMORPHONE HYDROCHLORIDE 1 MILLIGRAM(S): 2 INJECTION INTRAMUSCULAR; INTRAVENOUS; SUBCUTANEOUS at 20:29

## 2019-03-28 RX ADMIN — TAMSULOSIN HYDROCHLORIDE 0.4 MILLIGRAM(S): 0.4 CAPSULE ORAL at 22:10

## 2019-03-28 RX ADMIN — Medication 50 MILLIGRAM(S): at 18:23

## 2019-03-28 RX ADMIN — HYDROMORPHONE HYDROCHLORIDE 1 MILLIGRAM(S): 2 INJECTION INTRAMUSCULAR; INTRAVENOUS; SUBCUTANEOUS at 20:59

## 2019-03-28 RX ADMIN — Medication 50 MILLIGRAM(S): at 05:04

## 2019-03-28 RX ADMIN — BUDESONIDE AND FORMOTEROL FUMARATE DIHYDRATE 2 PUFF(S): 160; 4.5 AEROSOL RESPIRATORY (INHALATION) at 18:24

## 2019-03-28 RX ADMIN — SENNA PLUS 2 TABLET(S): 8.6 TABLET ORAL at 22:10

## 2019-03-28 RX ADMIN — DAPTOMYCIN 130 MILLIGRAM(S): 500 INJECTION, POWDER, LYOPHILIZED, FOR SOLUTION INTRAVENOUS at 11:34

## 2019-03-28 RX ADMIN — Medication 100 MILLIGRAM(S): at 05:04

## 2019-03-28 RX ADMIN — Medication 40 MILLIGRAM(S): at 05:04

## 2019-03-28 RX ADMIN — ATORVASTATIN CALCIUM 40 MILLIGRAM(S): 80 TABLET, FILM COATED ORAL at 22:12

## 2019-03-28 RX ADMIN — APIXABAN 5 MILLIGRAM(S): 2.5 TABLET, FILM COATED ORAL at 18:23

## 2019-03-28 RX ADMIN — ALBUTEROL 2 PUFF(S): 90 AEROSOL, METERED ORAL at 11:34

## 2019-03-28 RX ADMIN — LIDOCAINE 1 PATCH: 4 CREAM TOPICAL at 00:12

## 2019-03-28 RX ADMIN — APIXABAN 5 MILLIGRAM(S): 2.5 TABLET, FILM COATED ORAL at 05:04

## 2019-03-28 RX ADMIN — HYDROMORPHONE HYDROCHLORIDE 1 MILLIGRAM(S): 2 INJECTION INTRAMUSCULAR; INTRAVENOUS; SUBCUTANEOUS at 09:13

## 2019-03-28 NOTE — PROGRESS NOTE ADULT - ASSESSMENT
Pt is a 86 y/o male w/ PMH of HTN, HLD, remote CVA, COPD, CAD with Stents, CHF, chronic afib on eliquis, chronic systolic heart failure s/p TAVR 11/27/18.- with Dr Zuluaga presenting to [facility] ED today d/t weakness. pt states he has been feeling lightheaded and having difficulty breathing for last few days. Patient had cardiac cath during previous admission which showed Distal circumflex: There was a 40 % stenosis. Ramus intermedius: Angiography showed severe atherosclerosis., Mid RCA: There was a 60 % stenosis. Patient states that he has never had to be intubated d/t shortness of breath; also states that he has O2 at home as needed, but does not use his home O2 or his home inhalers. patient reports some chills and productive cough with yellow sputum. patient was seen by his cardiologist today which he was prescribed doxy for possible PNA however he was told to come to ER for worsening symptoms     # Bacteremia, VRE   # SOB and productive cough, R/P PNA VS viral URI   # LE R Hip pain   # COPD exacerbation   # HX of HFrEF, R/O acute on chronic exacerbation   # CAD S/P PCI   # Afib on AC  # S/P TAVR  # HTN  # CVA  #BPH  # HLD     6/6 G + , VRE  repeat Blood Cx positive, cont repeat Blood Cx till negative growth. dapto resistance in some cultures   started on vanc and changed to daptomycin in view of sensitivity   ID F/U regarding ABx management, possible adding second agent in view of high resistance   S/P PPM removal, Cx grew g+  MARIA LUISA noted   hold solumedrol, leukocytosis secondary to steroid use   has back pain, ? osteo/ seeding. MRI of hip noted, no Osteo  MRI lumbar noted, no acute OM   CXR clear, no PNA seen  Plan for CT ABd and Pelv after MRI Venogram of LE for possible abdominal source   repeat MARIA LUISA next week   procalcitonin normal   CT Chest noted  pain control with morphin   S/P Solumedrol 40 IV daily   ProBNP mildly elevated however he is not in any fluid overlaod   Echo noted  diuresis with lasix   lopressor 50 BID   Eliquis for AC   I andOs, FLuid restriction   Nebs and inhalers   COnt spironolactone   serial CE and EKG, mild Trop elevated, F/U Trend   MOnitor vitals, restart home BP medications and statin and AP with plavix   rate control, cont amiodorone   Palliative eval   DVT and GI PPX

## 2019-03-28 NOTE — PROGRESS NOTE ADULT - ASSESSMENT
87 yr old with CKD, COPD, BPH, PPM, Y AVR due to AS, CHF; pt has a UTI several months ago and in the fall and had questionable thrombus or veg on aortic valve   bc were negative at the time     currently admitted lightheadedness and found to have multiple positive blood cultures for vrec.    He also had pain in his right lower flank /back.   pt has an intravascular infection on either the T avr or the ppm    pacemaker was removed    Blood cultures f till growing VRE faecium- Vanco resistant but sensitive to daptomycin  repeat blood cultures sent this AM  Needs MARIA LUISA - was not helpful and does not demonstrate obv vegetation   Would obtain MRI of lumbar sacral spine- his pain is lower left back near his sacroiliac area  Continue Daptomycin in the 10/mg/kg equivalent dose  check CPK level  ultrasound of testes is negative      culture of ppm is positve for vrec too  await mri      there re some report suggesting the benefits of adding ampicillin to dapto despite in vitro resistance  will consider if he has vrec endocarditis I dont think he can survive without avr which willl be very risky    allergic to pcn so the addition of ampicillin is not an option   Discussed with Dr. Downey  If no other course is found, we will need to repeat MARIA LUISA next week to see if changes evolve.   discussed  with team  await mri of back  we may want to consider an MRA of femoral  vessels if mri of back is normal    perhaps he developed an endarteritis after the TAV a /  follow up bc

## 2019-03-29 LAB
ANION GAP SERPL CALC-SCNC: 13 MMOL/L — SIGNIFICANT CHANGE UP (ref 5–17)
BUN SERPL-MCNC: 34 MG/DL — HIGH (ref 7–23)
CALCIUM SERPL-MCNC: 9.3 MG/DL — SIGNIFICANT CHANGE UP (ref 8.4–10.5)
CHLORIDE SERPL-SCNC: 97 MMOL/L — SIGNIFICANT CHANGE UP (ref 96–108)
CO2 SERPL-SCNC: 26 MMOL/L — SIGNIFICANT CHANGE UP (ref 22–31)
CREAT SERPL-MCNC: 1.22 MG/DL — SIGNIFICANT CHANGE UP (ref 0.5–1.3)
GLUCOSE SERPL-MCNC: 146 MG/DL — HIGH (ref 70–99)
GRAM STN FLD: SIGNIFICANT CHANGE UP
HCT VFR BLD CALC: 40 % — SIGNIFICANT CHANGE UP (ref 39–50)
HGB BLD-MCNC: 13.6 G/DL — SIGNIFICANT CHANGE UP (ref 13–17)
MCHC RBC-ENTMCNC: 29.6 PG — SIGNIFICANT CHANGE UP (ref 27–34)
MCHC RBC-ENTMCNC: 34 GM/DL — SIGNIFICANT CHANGE UP (ref 32–36)
MCV RBC AUTO: 87.1 FL — SIGNIFICANT CHANGE UP (ref 80–100)
PLATELET # BLD AUTO: 252 K/UL — SIGNIFICANT CHANGE UP (ref 150–400)
POTASSIUM SERPL-MCNC: 4.1 MMOL/L — SIGNIFICANT CHANGE UP (ref 3.5–5.3)
POTASSIUM SERPL-SCNC: 4.1 MMOL/L — SIGNIFICANT CHANGE UP (ref 3.5–5.3)
RBC # BLD: 4.59 M/UL — SIGNIFICANT CHANGE UP (ref 4.2–5.8)
RBC # FLD: 13.5 % — SIGNIFICANT CHANGE UP (ref 10.3–14.5)
SODIUM SERPL-SCNC: 136 MMOL/L — SIGNIFICANT CHANGE UP (ref 135–145)
WBC # BLD: 16.7 K/UL — HIGH (ref 3.8–10.5)
WBC # FLD AUTO: 16.7 K/UL — HIGH (ref 3.8–10.5)

## 2019-03-29 PROCEDURE — 78807: CPT | Mod: 26

## 2019-03-29 PROCEDURE — 93925 LOWER EXTREMITY STUDY: CPT | Mod: 26

## 2019-03-29 PROCEDURE — 74177 CT ABD & PELVIS W/CONTRAST: CPT | Mod: 26

## 2019-03-29 PROCEDURE — 99232 SBSQ HOSP IP/OBS MODERATE 35: CPT

## 2019-03-29 PROCEDURE — 78806: CPT | Mod: 26

## 2019-03-29 RX ORDER — MORPHINE SULFATE 50 MG/1
2 CAPSULE, EXTENDED RELEASE ORAL EVERY 6 HOURS
Qty: 0 | Refills: 0 | Status: DISCONTINUED | OUTPATIENT
Start: 2019-03-29 | End: 2019-04-05

## 2019-03-29 RX ORDER — CEFTAROLINE FOSAMIL 600 MG/20ML
600 POWDER, FOR SOLUTION INTRAVENOUS EVERY 8 HOURS
Qty: 0 | Refills: 0 | Status: DISCONTINUED | OUTPATIENT
Start: 2019-03-29 | End: 2019-04-16

## 2019-03-29 RX ADMIN — Medication 50 MILLIGRAM(S): at 07:00

## 2019-03-29 RX ADMIN — LIDOCAINE 1 PATCH: 4 CREAM TOPICAL at 21:04

## 2019-03-29 RX ADMIN — ALBUTEROL 2 PUFF(S): 90 AEROSOL, METERED ORAL at 07:03

## 2019-03-29 RX ADMIN — TAMSULOSIN HYDROCHLORIDE 0.4 MILLIGRAM(S): 0.4 CAPSULE ORAL at 21:36

## 2019-03-29 RX ADMIN — HYDROMORPHONE HYDROCHLORIDE 1 MILLIGRAM(S): 2 INJECTION INTRAMUSCULAR; INTRAVENOUS; SUBCUTANEOUS at 13:47

## 2019-03-29 RX ADMIN — MORPHINE SULFATE 2 MILLIGRAM(S): 50 CAPSULE, EXTENDED RELEASE ORAL at 21:40

## 2019-03-29 RX ADMIN — Medication 50 MILLIGRAM(S): at 18:43

## 2019-03-29 RX ADMIN — Medication 100 MILLIGRAM(S): at 18:43

## 2019-03-29 RX ADMIN — BUDESONIDE AND FORMOTEROL FUMARATE DIHYDRATE 2 PUFF(S): 160; 4.5 AEROSOL RESPIRATORY (INHALATION) at 07:03

## 2019-03-29 RX ADMIN — CEFTAROLINE FOSAMIL 50 MILLIGRAM(S): 600 POWDER, FOR SOLUTION INTRAVENOUS at 16:19

## 2019-03-29 RX ADMIN — ALBUTEROL 2 PUFF(S): 90 AEROSOL, METERED ORAL at 16:17

## 2019-03-29 RX ADMIN — ALBUTEROL 2 PUFF(S): 90 AEROSOL, METERED ORAL at 20:43

## 2019-03-29 RX ADMIN — Medication 3 MILLIGRAM(S): at 21:36

## 2019-03-29 RX ADMIN — AMIODARONE HYDROCHLORIDE 200 MILLIGRAM(S): 400 TABLET ORAL at 06:59

## 2019-03-29 RX ADMIN — POLYETHYLENE GLYCOL 3350 17 GRAM(S): 17 POWDER, FOR SOLUTION ORAL at 16:17

## 2019-03-29 RX ADMIN — Medication 40 MILLIGRAM(S): at 07:02

## 2019-03-29 RX ADMIN — SPIRONOLACTONE 25 MILLIGRAM(S): 25 TABLET, FILM COATED ORAL at 06:59

## 2019-03-29 RX ADMIN — CEFTAROLINE FOSAMIL 50 MILLIGRAM(S): 600 POWDER, FOR SOLUTION INTRAVENOUS at 21:36

## 2019-03-29 RX ADMIN — BUDESONIDE AND FORMOTEROL FUMARATE DIHYDRATE 2 PUFF(S): 160; 4.5 AEROSOL RESPIRATORY (INHALATION) at 18:43

## 2019-03-29 RX ADMIN — FINASTERIDE 5 MILLIGRAM(S): 5 TABLET, FILM COATED ORAL at 16:16

## 2019-03-29 RX ADMIN — Medication 100 MILLIGRAM(S): at 07:00

## 2019-03-29 RX ADMIN — DAPTOMYCIN 130 MILLIGRAM(S): 500 INJECTION, POWDER, LYOPHILIZED, FOR SOLUTION INTRAVENOUS at 16:02

## 2019-03-29 RX ADMIN — SENNA PLUS 2 TABLET(S): 8.6 TABLET ORAL at 21:36

## 2019-03-29 RX ADMIN — APIXABAN 5 MILLIGRAM(S): 2.5 TABLET, FILM COATED ORAL at 07:00

## 2019-03-29 RX ADMIN — MORPHINE SULFATE 2 MILLIGRAM(S): 50 CAPSULE, EXTENDED RELEASE ORAL at 20:47

## 2019-03-29 RX ADMIN — APIXABAN 5 MILLIGRAM(S): 2.5 TABLET, FILM COATED ORAL at 18:43

## 2019-03-29 RX ADMIN — CLOPIDOGREL BISULFATE 75 MILLIGRAM(S): 75 TABLET, FILM COATED ORAL at 16:17

## 2019-03-29 RX ADMIN — ALBUTEROL 2 PUFF(S): 90 AEROSOL, METERED ORAL at 13:48

## 2019-03-29 RX ADMIN — HYDROMORPHONE HYDROCHLORIDE 1 MILLIGRAM(S): 2 INJECTION INTRAMUSCULAR; INTRAVENOUS; SUBCUTANEOUS at 04:22

## 2019-03-29 RX ADMIN — PANTOPRAZOLE SODIUM 40 MILLIGRAM(S): 20 TABLET, DELAYED RELEASE ORAL at 06:59

## 2019-03-29 RX ADMIN — ATORVASTATIN CALCIUM 40 MILLIGRAM(S): 80 TABLET, FILM COATED ORAL at 21:36

## 2019-03-29 RX ADMIN — LIDOCAINE 1 PATCH: 4 CREAM TOPICAL at 16:18

## 2019-03-29 RX ADMIN — HYDROMORPHONE HYDROCHLORIDE 1 MILLIGRAM(S): 2 INJECTION INTRAMUSCULAR; INTRAVENOUS; SUBCUTANEOUS at 14:10

## 2019-03-29 RX ADMIN — Medication 1 MILLIGRAM(S): at 11:30

## 2019-03-29 NOTE — CONSULT NOTE ADULT - ASSESSMENT
will check faye. Elliot Coronado for ct.  all studies negative, will discuss case with dr. thrasher.  risk:beneift egd/colon will need to be seriously weighed

## 2019-03-29 NOTE — PROGRESS NOTE ADULT - ASSESSMENT
Pt is a 86 y/o male w/ PMH of HTN, HLD, remote CVA, COPD, CAD with Stents, CHF, chronic afib on eliquis, chronic systolic heart failure s/p TAVR 11/27/18.- with Dr Zuluaga presenting to [facility] ED today d/t weakness. pt states he has been feeling lightheaded and having difficulty breathing for last few days. Patient had cardiac cath during previous admission which showed Distal circumflex: There was a 40 % stenosis. Ramus intermedius: Angiography showed severe atherosclerosis., Mid RCA: There was a 60 % stenosis. Patient states that he has never had to be intubated d/t shortness of breath; also states that he has O2 at home as needed, but does not use his home O2 or his home inhalers. patient reports some chills and productive cough with yellow sputum. patient was seen by his cardiologist today which he was prescribed doxy for possible PNA however he was told to come to ER for worsening symptoms     # Bacteremia, VRE   # SOB and productive cough, R/P PNA VS viral URI   # LE R Hip pain   # COPD exacerbation   # HX of HFrEF, R/O acute on chronic exacerbation   # CAD S/P PCI   # Afib on AC  # S/P TAVR  # HTN  # CVA  #BPH  # HLD     6/6 G + , VRE  repeat Blood Cx positive, cont repeat Blood Cx till negative growth. dapto resistance in some cultures   recent blood Cx negative growth, cont to monitor   started on vanc and changed to daptomycin in view of sensitivity   ID F/U regarding ABx management, possible adding second agent in view of high resistance   S/P PPM removal, Cx grew g+  MARIA LUISA noted   hold solumedrol, leukocytosis secondary to steroid use   has back pain, ? osteo/ seeding. MRI of hip noted, no Osteo  MRI lumbar noted, no acute OM   CXR clear, no PNA seen  Plan for CT ABd and Pelv  repeat MARIA LUISA next week   WBC scan negative   plan for GI eval for possible Colonoscopy EGD   CT Chest noted  pain control with morphin   S/P Solumedrol 40 IV daily   Echo noted  diuresis with lasix   lopressor 50 BID   Eliquis for AC   I andOs, FLuid restriction   Nebs and inhalers   COnt spironolactone   serial CE and EKG, mild Trop elevated, F/U Trend   MOnitor vitals, restart home BP medications and statin and AP with plavix   rate control, cont amiodorone   Palliative eval   DVT and GI PPX

## 2019-03-29 NOTE — PROGRESS NOTE ADULT - ASSESSMENT
87 yr old with CKD, COPD, BPH, PPM, Y AVR due to AS, CHF; pt has a UTI several months ago and in the fall and had questionable thrombus or veg on aortic valve   bc were negative at the time     currently admitted lightheadedness and found to have multiple positive blood cultures for vrec.    He also had pain in his right lower flank /back.   pt has an intravascular infection on either the T avr or the ppm    pacemaker was removed    Blood cultures f till growing VRE faecium- Vanco resistant but sensitive to daptomycin  repeat blood cultures sent this AM    MARIA LUISA - was not helpful and does not demonstrate obv vegetation    MRI of lumbar sacral spine- his pain is lower left back near his sacroiliac area non diagnostic   Continue Daptomycin in the 10/mg/kg equivalent dose  check CPK level  ultrasound of testes is negative      culture of ppm is positve for vrec too  await mri      there re some report suggesting the benefits of adding ampicillin to dapto despite in vitro resistance  will consider if he has vrec endocarditis I dont think he can survive without avr which willl be very risky    allergic to pcn so the addition of ampicillin is not an option   Discussed with Dr. Downey  If no other course is found, we will need to repeat MARIA LUISA next week to see if changes evolve.   discussed  with team   mri of back negative  we may want to consider an MRA of femoral  vessels if mri of back is normal    perhaps he developed an endarteritis after the TAV a /  follow up bc from yesterday are negative so far   WBC scan, is negative    ultrasound of grain is pendng   to add ceftaroline empirically instead of ampicillin

## 2019-03-29 NOTE — CONSULT NOTE ADULT - SUBJECTIVE AND OBJECTIVE BOX
Patient is a 87y old  Male who presents with a chief complaint of SOB, cough (29 Mar 2019 14:25)      HPI:  Pt is a 86 y/o male w/ PMH of HTN, HLD, remote CVA, COPD, CAD with Stents, CHF, chronic afib on eliquis, chronic systolic heart failure s/p TAVR 11/27/18.- with Dr Zuluaga presenting to [facility] ED today d/t weakness. pt states he has been feeling lightheaded and having difficulty breathing for last few days. Patient had cardiac cath during previous admission which showed Distal circumflex: There was a 40 % stenosis. Ramus intermedius: Angiography showed severe atherosclerosis., Mid RCA: There was a 60 % stenosis. Patient states that he has never had to be intubated d/t shortness of breath; also states that he has O2 at home as needed, but does not use his home O2 or his home inhalers. patient reports some chills and productive cough with yellow sputum. patient was seen by his cardiologist today which he was prescribed doxy for possible PNA however he was told to come to ER for worsening symptoms (19 Mar 2019 23:15)      PAST MEDICAL & SURGICAL HISTORY:  CKD (chronic kidney disease) stage 3, GFR 30-59 ml/min  Hepatitis B  PTSD (post-traumatic stress disorder)  Tachy-idris syndrome  TIA (transient ischemic attack): 2010  Seizure  DIEGO (obstructive sleep apnea)  Chronic diastolic HF (heart failure)  Aortic stenosis  CVA (cerebral vascular accident)  Gait instability  COPD (chronic obstructive pulmonary disease)  BPH (benign prostatic hyperplasia)  Asthma  CHF (congestive heart failure)  Atrial fibrillation  Pacemaker: 2015 Medtronic [id number]  [id number]  Hypertension  H/O thalassemia  CAD (coronary artery disease): s/p stent 2010  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  Artificial pacemaker  S/P primary angioplasty with coronary stent: 2012 AND 2017      MEDICATIONS  (STANDING):  ALBUTerol    90 MICROgram(s) HFA Inhaler 2 Puff(s) Inhalation every 4 hours  amiodarone    Tablet 200 milliGRAM(s) Oral daily  apixaban 5 milliGRAM(s) Oral every 12 hours  atorvastatin 40 milliGRAM(s) Oral at bedtime  buDESOnide  80 MICROgram(s)/formoterol 4.5 MICROgram(s) Inhaler 2 Puff(s) Inhalation two times a day  ceftaroline fosamil IVPB 600 milliGRAM(s) IV Intermittent every 8 hours  clopidogrel Tablet 75 milliGRAM(s) Oral daily  DAPTOmycin IVPB 750 milliGRAM(s) IV Intermittent every 24 hours  docusate sodium 100 milliGRAM(s) Oral two times a day  finasteride 5 milliGRAM(s) Oral daily  furosemide    Tablet 40 milliGRAM(s) Oral daily  lidocaine   Patch 1 Patch Transdermal daily  melatonin 3 milliGRAM(s) Oral at bedtime  metoclopramide Injectable 10 milliGRAM(s) IV Push once  metoprolol tartrate 50 milliGRAM(s) Oral every 12 hours  pantoprazole    Tablet 40 milliGRAM(s) Oral before breakfast  polyethylene glycol 3350 17 Gram(s) Oral daily  senna 2 Tablet(s) Oral at bedtime  spironolactone 25 milliGRAM(s) Oral daily  tamsulosin 0.4 milliGRAM(s) Oral at bedtime      Allergies    Keppra (Rash)  penicillin (Unknown)    Intolerances        SOCIAL HISTORY:  Denies ETOh,Smoking,     FAMILY HISTORY:  No pertinent family history in first degree relatives      REVIEW OF SYSTEMS:    CONSTITUTIONAL: No weakness, fevers or chills  EYES/ENT: No visual changes;  No vertigo or throat pain   NECK: No pain or stiffness  RESPIRATORY: No cough, wheezing, hemoptysis; No shortness of breath  CARDIOVASCULAR: No chest pain or palpitations  GASTROINTESTINAL: No abdominal or epigastric pain. No nausea, vomiting, or hematemesis; No diarrhea or constipation. No melena or hematochezia.  GENITOURINARY: No dysuria, frequency or hematuria  NEUROLOGICAL: No numbness or weakness  SKIN: No itching, burning, rashes, or lesions   All other review of systems is negative unless indicated above.    VITAL:  T(C): , Max: 36.8 (03-29-19 @ 04:15)  T(F): , Max: 98.3 (03-29-19 @ 14:14)  HR: 93 (03-29-19 @ 14:14)  BP: 113/72 (03-29-19 @ 14:14)  BP(mean): --  RR: 18 (03-29-19 @ 14:14)  SpO2: 94% (03-29-19 @ 14:14)  Wt(kg): --    I and O's:    03-27 @ 07:01 - 03-28 @ 07:00  --------------------------------------------------------  IN: 1560 mL / OUT: 400 mL / NET: 1160 mL    03-28 @ 07:01 - 03-29 @ 07:00  --------------------------------------------------------  IN: 0 mL / OUT: 0 mL / NET: 0 mL    03-29 @ 07:01 - 03-29 @ 16:52  --------------------------------------------------------  IN: 140 mL / OUT: 0 mL / NET: 140 mL          PHYSICAL EXAM:    Constitutional: NAD  HEENT: PERRLA,   Neck: No JVD  Respiratory: CTA B/L  Cardiovascular: S1 and S2  Gastrointestinal: BS+, soft, NT/ND  Extremities: No peripheral edema  Neurological: A/O x 3, no focal deficits  Psychiatric: Normal mood, normal affect  : No Lynch  Skin: No rashes  Access: Not applicable  Back: No CVA tenderness    LABS:                        13.9   13.8  )-----------( 262      ( 28 Mar 2019 06:59 )             42.0     03-28    138  |  99  |  40<H>  ----------------------------<  120<H>  4.3   |  26  |  1.27    Ca    9.6      28 Mar 2019 06:59            RADIOLOGY & ADDITIONAL STUDIES:

## 2019-03-30 LAB
-  AMPICILLIN: SIGNIFICANT CHANGE UP
-  DAPTOMYCIN: SIGNIFICANT CHANGE UP
-  GENTAMICIN SYNERGY: SIGNIFICANT CHANGE UP
-  LINEZOLID: SIGNIFICANT CHANGE UP
-  VANCOMYCIN: SIGNIFICANT CHANGE UP
ANION GAP SERPL CALC-SCNC: 16 MMOL/L — SIGNIFICANT CHANGE UP (ref 5–17)
BASOPHILS # BLD AUTO: 0 K/UL — SIGNIFICANT CHANGE UP (ref 0–0.2)
BASOPHILS NFR BLD AUTO: 0 % — SIGNIFICANT CHANGE UP (ref 0–2)
BUN SERPL-MCNC: 35 MG/DL — HIGH (ref 7–23)
CALCIUM SERPL-MCNC: 9.9 MG/DL — SIGNIFICANT CHANGE UP (ref 8.4–10.5)
CHLORIDE SERPL-SCNC: 98 MMOL/L — SIGNIFICANT CHANGE UP (ref 96–108)
CO2 SERPL-SCNC: 26 MMOL/L — SIGNIFICANT CHANGE UP (ref 22–31)
CREAT SERPL-MCNC: 1.33 MG/DL — HIGH (ref 0.5–1.3)
CULTURE RESULTS: SIGNIFICANT CHANGE UP
EOSINOPHIL # BLD AUTO: 0.1 K/UL — SIGNIFICANT CHANGE UP (ref 0–0.5)
EOSINOPHIL NFR BLD AUTO: 0.7 % — SIGNIFICANT CHANGE UP (ref 0–6)
GLUCOSE SERPL-MCNC: 131 MG/DL — HIGH (ref 70–99)
HCT VFR BLD CALC: 43.3 % — SIGNIFICANT CHANGE UP (ref 39–50)
HGB BLD-MCNC: 14.8 G/DL — SIGNIFICANT CHANGE UP (ref 13–17)
LYMPHOCYTES # BLD AUTO: 1.2 K/UL — SIGNIFICANT CHANGE UP (ref 1–3.3)
LYMPHOCYTES # BLD AUTO: 8.2 % — LOW (ref 13–44)
MCHC RBC-ENTMCNC: 30 PG — SIGNIFICANT CHANGE UP (ref 27–34)
MCHC RBC-ENTMCNC: 34.3 GM/DL — SIGNIFICANT CHANGE UP (ref 32–36)
MCV RBC AUTO: 87.5 FL — SIGNIFICANT CHANGE UP (ref 80–100)
METHOD TYPE: SIGNIFICANT CHANGE UP
MONOCYTES # BLD AUTO: 1 K/UL — HIGH (ref 0–0.9)
MONOCYTES NFR BLD AUTO: 6.4 % — SIGNIFICANT CHANGE UP (ref 2–14)
NEUTROPHILS # BLD AUTO: 12.6 K/UL — HIGH (ref 1.8–7.4)
NEUTROPHILS NFR BLD AUTO: 84.8 % — HIGH (ref 43–77)
ORGANISM # SPEC MICROSCOPIC CNT: SIGNIFICANT CHANGE UP
ORGANISM # SPEC MICROSCOPIC CNT: SIGNIFICANT CHANGE UP
PLATELET # BLD AUTO: 294 K/UL — SIGNIFICANT CHANGE UP (ref 150–400)
POTASSIUM SERPL-MCNC: 4.4 MMOL/L — SIGNIFICANT CHANGE UP (ref 3.5–5.3)
POTASSIUM SERPL-SCNC: 4.4 MMOL/L — SIGNIFICANT CHANGE UP (ref 3.5–5.3)
RBC # BLD: 4.95 M/UL — SIGNIFICANT CHANGE UP (ref 4.2–5.8)
RBC # FLD: 13.4 % — SIGNIFICANT CHANGE UP (ref 10.3–14.5)
SODIUM SERPL-SCNC: 140 MMOL/L — SIGNIFICANT CHANGE UP (ref 135–145)
SPECIMEN SOURCE: SIGNIFICANT CHANGE UP
WBC # BLD: 14.9 K/UL — HIGH (ref 3.8–10.5)
WBC # FLD AUTO: 14.9 K/UL — HIGH (ref 3.8–10.5)

## 2019-03-30 PROCEDURE — 99232 SBSQ HOSP IP/OBS MODERATE 35: CPT

## 2019-03-30 RX ADMIN — ALBUTEROL 2 PUFF(S): 90 AEROSOL, METERED ORAL at 21:23

## 2019-03-30 RX ADMIN — LIDOCAINE 1 PATCH: 4 CREAM TOPICAL at 05:25

## 2019-03-30 RX ADMIN — CEFTAROLINE FOSAMIL 50 MILLIGRAM(S): 600 POWDER, FOR SOLUTION INTRAVENOUS at 05:24

## 2019-03-30 RX ADMIN — FINASTERIDE 5 MILLIGRAM(S): 5 TABLET, FILM COATED ORAL at 11:22

## 2019-03-30 RX ADMIN — MORPHINE SULFATE 2 MILLIGRAM(S): 50 CAPSULE, EXTENDED RELEASE ORAL at 17:42

## 2019-03-30 RX ADMIN — DAPTOMYCIN 130 MILLIGRAM(S): 500 INJECTION, POWDER, LYOPHILIZED, FOR SOLUTION INTRAVENOUS at 11:22

## 2019-03-30 RX ADMIN — MORPHINE SULFATE 2 MILLIGRAM(S): 50 CAPSULE, EXTENDED RELEASE ORAL at 11:21

## 2019-03-30 RX ADMIN — Medication 50 MILLIGRAM(S): at 17:43

## 2019-03-30 RX ADMIN — TAMSULOSIN HYDROCHLORIDE 0.4 MILLIGRAM(S): 0.4 CAPSULE ORAL at 21:22

## 2019-03-30 RX ADMIN — MORPHINE SULFATE 2 MILLIGRAM(S): 50 CAPSULE, EXTENDED RELEASE ORAL at 11:36

## 2019-03-30 RX ADMIN — ALBUTEROL 2 PUFF(S): 90 AEROSOL, METERED ORAL at 17:44

## 2019-03-30 RX ADMIN — Medication 40 MILLIGRAM(S): at 05:18

## 2019-03-30 RX ADMIN — MORPHINE SULFATE 2 MILLIGRAM(S): 50 CAPSULE, EXTENDED RELEASE ORAL at 06:15

## 2019-03-30 RX ADMIN — APIXABAN 5 MILLIGRAM(S): 2.5 TABLET, FILM COATED ORAL at 17:43

## 2019-03-30 RX ADMIN — CEFTAROLINE FOSAMIL 50 MILLIGRAM(S): 600 POWDER, FOR SOLUTION INTRAVENOUS at 21:18

## 2019-03-30 RX ADMIN — ATORVASTATIN CALCIUM 40 MILLIGRAM(S): 80 TABLET, FILM COATED ORAL at 21:22

## 2019-03-30 RX ADMIN — SENNA PLUS 2 TABLET(S): 8.6 TABLET ORAL at 21:22

## 2019-03-30 RX ADMIN — ALBUTEROL 2 PUFF(S): 90 AEROSOL, METERED ORAL at 10:30

## 2019-03-30 RX ADMIN — PANTOPRAZOLE SODIUM 40 MILLIGRAM(S): 20 TABLET, DELAYED RELEASE ORAL at 05:19

## 2019-03-30 RX ADMIN — ALBUTEROL 2 PUFF(S): 90 AEROSOL, METERED ORAL at 05:22

## 2019-03-30 RX ADMIN — Medication 100 MILLIGRAM(S): at 17:43

## 2019-03-30 RX ADMIN — MORPHINE SULFATE 2 MILLIGRAM(S): 50 CAPSULE, EXTENDED RELEASE ORAL at 17:57

## 2019-03-30 RX ADMIN — APIXABAN 5 MILLIGRAM(S): 2.5 TABLET, FILM COATED ORAL at 05:19

## 2019-03-30 RX ADMIN — Medication 100 MILLIGRAM(S): at 05:19

## 2019-03-30 RX ADMIN — CLOPIDOGREL BISULFATE 75 MILLIGRAM(S): 75 TABLET, FILM COATED ORAL at 11:22

## 2019-03-30 RX ADMIN — POLYETHYLENE GLYCOL 3350 17 GRAM(S): 17 POWDER, FOR SOLUTION ORAL at 11:22

## 2019-03-30 RX ADMIN — SPIRONOLACTONE 25 MILLIGRAM(S): 25 TABLET, FILM COATED ORAL at 05:18

## 2019-03-30 RX ADMIN — ALBUTEROL 2 PUFF(S): 90 AEROSOL, METERED ORAL at 14:30

## 2019-03-30 RX ADMIN — MORPHINE SULFATE 2 MILLIGRAM(S): 50 CAPSULE, EXTENDED RELEASE ORAL at 05:18

## 2019-03-30 RX ADMIN — BUDESONIDE AND FORMOTEROL FUMARATE DIHYDRATE 2 PUFF(S): 160; 4.5 AEROSOL RESPIRATORY (INHALATION) at 17:43

## 2019-03-30 RX ADMIN — AMIODARONE HYDROCHLORIDE 200 MILLIGRAM(S): 400 TABLET ORAL at 05:18

## 2019-03-30 RX ADMIN — BUDESONIDE AND FORMOTEROL FUMARATE DIHYDRATE 2 PUFF(S): 160; 4.5 AEROSOL RESPIRATORY (INHALATION) at 05:21

## 2019-03-30 RX ADMIN — MORPHINE SULFATE 2 MILLIGRAM(S): 50 CAPSULE, EXTENDED RELEASE ORAL at 23:46

## 2019-03-30 RX ADMIN — Medication 3 MILLIGRAM(S): at 21:24

## 2019-03-30 RX ADMIN — CEFTAROLINE FOSAMIL 50 MILLIGRAM(S): 600 POWDER, FOR SOLUTION INTRAVENOUS at 16:34

## 2019-03-30 RX ADMIN — Medication 50 MILLIGRAM(S): at 05:19

## 2019-03-30 NOTE — PROGRESS NOTE ADULT - ASSESSMENT
87 yr old with CKD, COPD, BPH, PPM, Y AVR due to AS, CHF; pt has a UTI several months ago and in the fall and had questionable thrombus or veg on aortic valve   bc were negative at the time     currently admitted lightheadedness and found to have multiple positive blood cultures for vrec.    He also had pain in his right lower flank /back.   pt has an intravascular infection on either the T avr or the ppm    pacemaker was removed    Blood cultures f till growing VRE faecium- Vanco resistant but sensitive to daptomycin  repeat blood cultures sent this AM    MARIA LUSIA - was not helpful and does not demonstrate obv vegetation    MRI of lumbar sacral spine- his pain is lower left back near his sacroiliac area non diagnostic   Continue Daptomycin in the 10/mg/kg equivalent dose  check CPK level  ultrasound of testes is negative      culture of ppm is positve for vrec too  await mri      there re some report suggesting the benefits of adding ampicillin to dapto despite in vitro resistance  will consider if he has vrec endocarditis I dont think he can survive without avr which willl be very risky    allergic to pcn so the addition of ampicillin is not an option   Discussed with Dr. Downey  If no other course is found, we will need to repeat MARIA LUISA next week to see if changes evolve.   discussed  with team   mri of back negative       p   ultrasound of  groin is [id number]   to add ceftaroline empirically instead of ampicillin   wbc scan is negative    very careful workup is negative  if bc remain positive on ceftaroline and dapto  will need followup up MARIA LUISA

## 2019-03-30 NOTE — PROGRESS NOTE ADULT - ASSESSMENT
Pt is a 86 y/o male w/ PMH of HTN, HLD, remote CVA, COPD, CAD with Stents, CHF, chronic afib on eliquis, chronic systolic heart failure s/p TAVR 11/27/18.- with Dr Zuluaga presenting to [facility] ED today d/t weakness. pt states he has been feeling lightheaded and having difficulty breathing for last few days. Patient had cardiac cath during previous admission which showed Distal circumflex: There was a 40 % stenosis. Ramus intermedius: Angiography showed severe atherosclerosis., Mid RCA: There was a 60 % stenosis. Patient states that he has never had to be intubated d/t shortness of breath; also states that he has O2 at home as needed, but does not use his home O2 or his home inhalers. patient reports some chills and productive cough with yellow sputum. patient was seen by his cardiologist today which he was prescribed doxy for possible PNA however he was told to come to ER for worsening symptoms     # Bacteremia, VRE   # SOB and productive cough, R/P PNA VS viral URI   # LE R Hip pain   # COPD exacerbation   # HX of HFrEF, R/O acute on chronic exacerbation   # CAD S/P PCI   # Afib on AC  # S/P TAVR  # HTN  # CVA  #BPH  # HLD     6/6 G + , VRE  repeat Blood Cx positive, cont repeat Blood Cx till negative growth. dapto resistance in some cultures   recent blood Cx negative growth, cont to monitor   started on vanc and changed to daptomycin in view of sensitivity   started on ceftaroline   S/P PPM removal, Cx grew g+  MARIA LUISA noted   hold solumedrol, leukocytosis secondary to steroid use   has back pain, ? osteo/ seeding. MRI of hip noted, no Osteo  MRI lumbar noted, no acute OM   CXR clear, no PNA seen  CT abd/Pelv noted   repeat MARIA LUISA next week   WBC scan negative   plan for GI appreciated   CT Chest noted  pain control with morphin   S/P Solumedrol 40 IV daily   Echo noted  diuresis with lasix   lopressor 50 BID   Eliquis for AC   I andOs, FLuid restriction   Nebs and inhalers   COnt spironolactone   serial CE and EKG, mild Trop elevated, F/U Trend   MOnitor vitals, restart home BP medications and statin and AP with plavix   rate control, cont amiodorone   OMT and pain control   DVT and GI PPX

## 2019-03-31 LAB
CULTURE RESULTS: SIGNIFICANT CHANGE UP
CULTURE RESULTS: SIGNIFICANT CHANGE UP
GRAM STN FLD: SIGNIFICANT CHANGE UP
SPECIMEN SOURCE: SIGNIFICANT CHANGE UP

## 2019-03-31 PROCEDURE — 99232 SBSQ HOSP IP/OBS MODERATE 35: CPT

## 2019-03-31 RX ORDER — SODIUM CHLORIDE 9 MG/ML
1000 INJECTION INTRAMUSCULAR; INTRAVENOUS; SUBCUTANEOUS
Qty: 0 | Refills: 0 | Status: COMPLETED | OUTPATIENT
Start: 2019-03-31 | End: 2019-03-31

## 2019-03-31 RX ADMIN — ATORVASTATIN CALCIUM 40 MILLIGRAM(S): 80 TABLET, FILM COATED ORAL at 21:07

## 2019-03-31 RX ADMIN — Medication 100 MILLIGRAM(S): at 18:13

## 2019-03-31 RX ADMIN — CEFTAROLINE FOSAMIL 50 MILLIGRAM(S): 600 POWDER, FOR SOLUTION INTRAVENOUS at 21:08

## 2019-03-31 RX ADMIN — SENNA PLUS 2 TABLET(S): 8.6 TABLET ORAL at 21:07

## 2019-03-31 RX ADMIN — CEFTAROLINE FOSAMIL 50 MILLIGRAM(S): 600 POWDER, FOR SOLUTION INTRAVENOUS at 05:06

## 2019-03-31 RX ADMIN — TAMSULOSIN HYDROCHLORIDE 0.4 MILLIGRAM(S): 0.4 CAPSULE ORAL at 21:07

## 2019-03-31 RX ADMIN — CEFTAROLINE FOSAMIL 50 MILLIGRAM(S): 600 POWDER, FOR SOLUTION INTRAVENOUS at 13:45

## 2019-03-31 RX ADMIN — CLOPIDOGREL BISULFATE 75 MILLIGRAM(S): 75 TABLET, FILM COATED ORAL at 11:05

## 2019-03-31 RX ADMIN — POLYETHYLENE GLYCOL 3350 17 GRAM(S): 17 POWDER, FOR SOLUTION ORAL at 11:05

## 2019-03-31 RX ADMIN — MENTHOL AND METHYL SALICYLATE 1 APPLICATION(S): 10; 30 STICK TOPICAL at 11:07

## 2019-03-31 RX ADMIN — APIXABAN 5 MILLIGRAM(S): 2.5 TABLET, FILM COATED ORAL at 05:07

## 2019-03-31 RX ADMIN — Medication 50 MILLIGRAM(S): at 18:13

## 2019-03-31 RX ADMIN — MORPHINE SULFATE 2 MILLIGRAM(S): 50 CAPSULE, EXTENDED RELEASE ORAL at 13:51

## 2019-03-31 RX ADMIN — ALBUTEROL 2 PUFF(S): 90 AEROSOL, METERED ORAL at 11:07

## 2019-03-31 RX ADMIN — LIDOCAINE 1 PATCH: 4 CREAM TOPICAL at 18:17

## 2019-03-31 RX ADMIN — MORPHINE SULFATE 2 MILLIGRAM(S): 50 CAPSULE, EXTENDED RELEASE ORAL at 13:36

## 2019-03-31 RX ADMIN — AMIODARONE HYDROCHLORIDE 200 MILLIGRAM(S): 400 TABLET ORAL at 05:07

## 2019-03-31 RX ADMIN — MORPHINE SULFATE 2 MILLIGRAM(S): 50 CAPSULE, EXTENDED RELEASE ORAL at 06:11

## 2019-03-31 RX ADMIN — LIDOCAINE 1 PATCH: 4 CREAM TOPICAL at 11:06

## 2019-03-31 RX ADMIN — Medication 3 MILLIGRAM(S): at 21:07

## 2019-03-31 RX ADMIN — MORPHINE SULFATE 2 MILLIGRAM(S): 50 CAPSULE, EXTENDED RELEASE ORAL at 21:37

## 2019-03-31 RX ADMIN — APIXABAN 5 MILLIGRAM(S): 2.5 TABLET, FILM COATED ORAL at 18:13

## 2019-03-31 RX ADMIN — PANTOPRAZOLE SODIUM 40 MILLIGRAM(S): 20 TABLET, DELAYED RELEASE ORAL at 05:07

## 2019-03-31 RX ADMIN — Medication 50 MILLIGRAM(S): at 05:07

## 2019-03-31 RX ADMIN — MORPHINE SULFATE 2 MILLIGRAM(S): 50 CAPSULE, EXTENDED RELEASE ORAL at 00:46

## 2019-03-31 RX ADMIN — ALBUTEROL 2 PUFF(S): 90 AEROSOL, METERED ORAL at 18:14

## 2019-03-31 RX ADMIN — BUDESONIDE AND FORMOTEROL FUMARATE DIHYDRATE 2 PUFF(S): 160; 4.5 AEROSOL RESPIRATORY (INHALATION) at 05:07

## 2019-03-31 RX ADMIN — BUDESONIDE AND FORMOTEROL FUMARATE DIHYDRATE 2 PUFF(S): 160; 4.5 AEROSOL RESPIRATORY (INHALATION) at 18:14

## 2019-03-31 RX ADMIN — ALBUTEROL 2 PUFF(S): 90 AEROSOL, METERED ORAL at 21:08

## 2019-03-31 RX ADMIN — DAPTOMYCIN 130 MILLIGRAM(S): 500 INJECTION, POWDER, LYOPHILIZED, FOR SOLUTION INTRAVENOUS at 11:06

## 2019-03-31 RX ADMIN — SPIRONOLACTONE 25 MILLIGRAM(S): 25 TABLET, FILM COATED ORAL at 05:07

## 2019-03-31 RX ADMIN — MORPHINE SULFATE 2 MILLIGRAM(S): 50 CAPSULE, EXTENDED RELEASE ORAL at 21:07

## 2019-03-31 RX ADMIN — ALBUTEROL 2 PUFF(S): 90 AEROSOL, METERED ORAL at 13:42

## 2019-03-31 RX ADMIN — Medication 100 MILLIGRAM(S): at 05:07

## 2019-03-31 RX ADMIN — FINASTERIDE 5 MILLIGRAM(S): 5 TABLET, FILM COATED ORAL at 11:05

## 2019-03-31 RX ADMIN — Medication 40 MILLIGRAM(S): at 05:07

## 2019-03-31 RX ADMIN — SODIUM CHLORIDE 75 MILLILITER(S): 9 INJECTION INTRAMUSCULAR; INTRAVENOUS; SUBCUTANEOUS at 14:12

## 2019-03-31 RX ADMIN — ALBUTEROL 2 PUFF(S): 90 AEROSOL, METERED ORAL at 05:08

## 2019-03-31 NOTE — PROGRESS NOTE ADULT - ASSESSMENT
Pt is a 88 y/o male w/ PMH of HTN, HLD, remote CVA, COPD, CAD with Stents, CHF, chronic afib on eliquis, chronic systolic heart failure s/p TAVR 11/27/18.- with Dr Zuluaga presenting to [facility] ED today d/t weakness. pt states he has been feeling lightheaded and having difficulty breathing for last few days. Patient had cardiac cath during previous admission which showed Distal circumflex: There was a 40 % stenosis. Ramus intermedius: Angiography showed severe atherosclerosis., Mid RCA: There was a 60 % stenosis. Patient states that he has never had to be intubated d/t shortness of breath; also states that he has O2 at home as needed, but does not use his home O2 or his home inhalers. patient reports some chills and productive cough with yellow sputum. patient was seen by his cardiologist today which he was prescribed doxy for possible PNA however he was told to come to ER for worsening symptoms     # Bacteremia, VRE   # SOB and productive cough, R/P PNA VS viral URI   # LE R Hip pain   # COPD exacerbation   # HX of HFrEF, R/O acute on chronic exacerbation   # CAD S/P PCI   # Afib on AC  # S/P TAVR  # HTN  # CVA  #BPH  # HLD     6/6 G + , VRE  repeat Blood Cx positive, cont repeat Blood Cx till negative growth. dapto resistance in some cultures   recent blood Cx negative growth, cont to monitor   started on vanc and changed to daptomycin in view of sensitivity   started on ceftaroline   S/P PPM removal, Cx grew g+  MARIA LUISA noted   hold solumedrol, leukocytosis secondary to steroid use   has back pain, ? osteo/ seeding. MRI of hip noted, no Osteo  MRI lumbar noted, no acute OM   CXR clear, no PNA seen  CT abd/Pelv noted   repeat MARIA LUISA next week   WBC scan negative   plan for Bone scan   GI eval appreciated   CT Chest noted  pain control with morphin   S/P Solumedrol 40 IV daily   Echo noted  diuresis with lasix   lopressor 50 BID   Eliquis for AC   I andOs, FLuid restriction   Nebs and inhalers   COnt spironolactone   serial CE and EKG, mild Trop elevated, F/U Trend   MOnitor vitals, restart home BP medications and statin and AP with plavix   rate control, cont amiodorone   OMT and pain control   DVT and GI PPX

## 2019-03-31 NOTE — CHART NOTE - NSCHARTNOTEFT_GEN_A_CORE
Notified by RN pt with + BC from 3/31  - Growth in Aerobic bottle + gram, cocci in pairs and chains   - Pt afebrile at present   - Patient will previous + cultures on ceftaroline empirically      wbc scan is negative, s/p ppm removal     (if bc remain positive on ceftaroline and dapto  will need followup up MARIA LUISA ) per ID note   - Will cont to monitor Notified by RN pt with + BC from 3/31  - Growth in Aerobic bottle + gram, cocci in pairs and chains   - Pt afebrile at present   - Patient will previous + cultures on ceftaroline empirically      wbc scan is negative, s/p ppm removal     (if bc remain positive on ceftaroline and dapto  will need followup up MARIA LUISA ) per ID note   - Discussed with Dr. Coronado, (Bone scan ordered)

## 2019-04-01 LAB
ANION GAP SERPL CALC-SCNC: 16 MMOL/L — SIGNIFICANT CHANGE UP (ref 5–17)
BUN SERPL-MCNC: 34 MG/DL — HIGH (ref 7–23)
CALCIUM SERPL-MCNC: 9.8 MG/DL — SIGNIFICANT CHANGE UP (ref 8.4–10.5)
CHLORIDE SERPL-SCNC: 99 MMOL/L — SIGNIFICANT CHANGE UP (ref 96–108)
CK MB CFR SERPL CALC: 4 NG/ML — SIGNIFICANT CHANGE UP (ref 0–6.7)
CK SERPL-CCNC: 48 U/L — SIGNIFICANT CHANGE UP (ref 30–200)
CO2 SERPL-SCNC: 26 MMOL/L — SIGNIFICANT CHANGE UP (ref 22–31)
CREAT SERPL-MCNC: 1.26 MG/DL — SIGNIFICANT CHANGE UP (ref 0.5–1.3)
CULTURE RESULTS: SIGNIFICANT CHANGE UP
GLUCOSE SERPL-MCNC: 122 MG/DL — HIGH (ref 70–99)
HCT VFR BLD CALC: 42.9 % — SIGNIFICANT CHANGE UP (ref 39–50)
HGB BLD-MCNC: 14.1 G/DL — SIGNIFICANT CHANGE UP (ref 13–17)
MCHC RBC-ENTMCNC: 28.7 PG — SIGNIFICANT CHANGE UP (ref 27–34)
MCHC RBC-ENTMCNC: 32.8 GM/DL — SIGNIFICANT CHANGE UP (ref 32–36)
MCV RBC AUTO: 87.4 FL — SIGNIFICANT CHANGE UP (ref 80–100)
PLATELET # BLD AUTO: 268 K/UL — SIGNIFICANT CHANGE UP (ref 150–400)
POTASSIUM SERPL-MCNC: 4.2 MMOL/L — SIGNIFICANT CHANGE UP (ref 3.5–5.3)
POTASSIUM SERPL-SCNC: 4.2 MMOL/L — SIGNIFICANT CHANGE UP (ref 3.5–5.3)
RBC # BLD: 4.91 M/UL — SIGNIFICANT CHANGE UP (ref 4.2–5.8)
RBC # FLD: 13.6 % — SIGNIFICANT CHANGE UP (ref 10.3–14.5)
SODIUM SERPL-SCNC: 141 MMOL/L — SIGNIFICANT CHANGE UP (ref 135–145)
SPECIMEN SOURCE: SIGNIFICANT CHANGE UP
TROPONIN T, HIGH SENSITIVITY RESULT: 74 NG/L — HIGH (ref 0–51)
WBC # BLD: 17.5 K/UL — HIGH (ref 3.8–10.5)
WBC # FLD AUTO: 17.5 K/UL — HIGH (ref 3.8–10.5)

## 2019-04-01 PROCEDURE — 78306 BONE IMAGING WHOLE BODY: CPT | Mod: 26

## 2019-04-01 PROCEDURE — 93010 ELECTROCARDIOGRAM REPORT: CPT

## 2019-04-01 PROCEDURE — 99232 SBSQ HOSP IP/OBS MODERATE 35: CPT

## 2019-04-01 RX ORDER — DOCUSATE SODIUM 100 MG
100 CAPSULE ORAL THREE TIMES A DAY
Qty: 0 | Refills: 0 | Status: DISCONTINUED | OUTPATIENT
Start: 2019-04-01 | End: 2019-04-16

## 2019-04-01 RX ADMIN — APIXABAN 5 MILLIGRAM(S): 2.5 TABLET, FILM COATED ORAL at 17:33

## 2019-04-01 RX ADMIN — Medication 100 MILLIGRAM(S): at 15:26

## 2019-04-01 RX ADMIN — SPIRONOLACTONE 25 MILLIGRAM(S): 25 TABLET, FILM COATED ORAL at 06:29

## 2019-04-01 RX ADMIN — POLYETHYLENE GLYCOL 3350 17 GRAM(S): 17 POWDER, FOR SOLUTION ORAL at 12:22

## 2019-04-01 RX ADMIN — CEFTAROLINE FOSAMIL 50 MILLIGRAM(S): 600 POWDER, FOR SOLUTION INTRAVENOUS at 06:28

## 2019-04-01 RX ADMIN — LIDOCAINE 1 PATCH: 4 CREAM TOPICAL at 12:42

## 2019-04-01 RX ADMIN — MORPHINE SULFATE 2 MILLIGRAM(S): 50 CAPSULE, EXTENDED RELEASE ORAL at 13:02

## 2019-04-01 RX ADMIN — ALBUTEROL 2 PUFF(S): 90 AEROSOL, METERED ORAL at 15:27

## 2019-04-01 RX ADMIN — ALBUTEROL 2 PUFF(S): 90 AEROSOL, METERED ORAL at 06:30

## 2019-04-01 RX ADMIN — AMIODARONE HYDROCHLORIDE 200 MILLIGRAM(S): 400 TABLET ORAL at 06:29

## 2019-04-01 RX ADMIN — DAPTOMYCIN 130 MILLIGRAM(S): 500 INJECTION, POWDER, LYOPHILIZED, FOR SOLUTION INTRAVENOUS at 12:21

## 2019-04-01 RX ADMIN — MORPHINE SULFATE 2 MILLIGRAM(S): 50 CAPSULE, EXTENDED RELEASE ORAL at 19:27

## 2019-04-01 RX ADMIN — LIDOCAINE 1 PATCH: 4 CREAM TOPICAL at 19:00

## 2019-04-01 RX ADMIN — Medication 100 MILLIGRAM(S): at 21:08

## 2019-04-01 RX ADMIN — APIXABAN 5 MILLIGRAM(S): 2.5 TABLET, FILM COATED ORAL at 06:29

## 2019-04-01 RX ADMIN — PANTOPRAZOLE SODIUM 40 MILLIGRAM(S): 20 TABLET, DELAYED RELEASE ORAL at 06:29

## 2019-04-01 RX ADMIN — FINASTERIDE 5 MILLIGRAM(S): 5 TABLET, FILM COATED ORAL at 12:22

## 2019-04-01 RX ADMIN — MORPHINE SULFATE 2 MILLIGRAM(S): 50 CAPSULE, EXTENDED RELEASE ORAL at 06:29

## 2019-04-01 RX ADMIN — CEFTAROLINE FOSAMIL 50 MILLIGRAM(S): 600 POWDER, FOR SOLUTION INTRAVENOUS at 15:26

## 2019-04-01 RX ADMIN — MORPHINE SULFATE 2 MILLIGRAM(S): 50 CAPSULE, EXTENDED RELEASE ORAL at 13:30

## 2019-04-01 RX ADMIN — Medication 50 MILLIGRAM(S): at 06:29

## 2019-04-01 RX ADMIN — MORPHINE SULFATE 2 MILLIGRAM(S): 50 CAPSULE, EXTENDED RELEASE ORAL at 18:57

## 2019-04-01 RX ADMIN — ALBUTEROL 2 PUFF(S): 90 AEROSOL, METERED ORAL at 21:08

## 2019-04-01 RX ADMIN — MORPHINE SULFATE 2 MILLIGRAM(S): 50 CAPSULE, EXTENDED RELEASE ORAL at 07:00

## 2019-04-01 RX ADMIN — TAMSULOSIN HYDROCHLORIDE 0.4 MILLIGRAM(S): 0.4 CAPSULE ORAL at 21:08

## 2019-04-01 RX ADMIN — MORPHINE SULFATE 2 MILLIGRAM(S): 50 CAPSULE, EXTENDED RELEASE ORAL at 07:30

## 2019-04-01 RX ADMIN — ATORVASTATIN CALCIUM 40 MILLIGRAM(S): 80 TABLET, FILM COATED ORAL at 21:08

## 2019-04-01 RX ADMIN — BUDESONIDE AND FORMOTEROL FUMARATE DIHYDRATE 2 PUFF(S): 160; 4.5 AEROSOL RESPIRATORY (INHALATION) at 17:34

## 2019-04-01 RX ADMIN — Medication 3 MILLIGRAM(S): at 21:08

## 2019-04-01 RX ADMIN — SENNA PLUS 2 TABLET(S): 8.6 TABLET ORAL at 21:08

## 2019-04-01 RX ADMIN — ALBUTEROL 2 PUFF(S): 90 AEROSOL, METERED ORAL at 17:34

## 2019-04-01 RX ADMIN — Medication 100 MILLIGRAM(S): at 06:29

## 2019-04-01 RX ADMIN — CEFTAROLINE FOSAMIL 50 MILLIGRAM(S): 600 POWDER, FOR SOLUTION INTRAVENOUS at 21:08

## 2019-04-01 RX ADMIN — BUDESONIDE AND FORMOTEROL FUMARATE DIHYDRATE 2 PUFF(S): 160; 4.5 AEROSOL RESPIRATORY (INHALATION) at 06:30

## 2019-04-01 RX ADMIN — CLOPIDOGREL BISULFATE 75 MILLIGRAM(S): 75 TABLET, FILM COATED ORAL at 12:22

## 2019-04-01 RX ADMIN — Medication 50 MILLIGRAM(S): at 17:33

## 2019-04-01 NOTE — DIETITIAN INITIAL EVALUATION ADULT. - OTHER INFO
Pt seen for LOS admission. Pt seen for LOS admission. Pt reports fair po intake/appetite at this time 2/2 pain. Denies chewing/swallowing difficulty, denies N+V, endorses constipation but had 1 BM today. Pt denies recent weight change, is unsure of UBW. Pt 189.3 pounds as of 3/27; admit 207 pounds, was diuresed in-house. Pt denies adhering to a particular modified diet PTA, admits to eating traditional Italian foods (sausage and peppers, pasta, restaurant meals).

## 2019-04-01 NOTE — DIETITIAN INITIAL EVALUATION ADULT. - ENERGY NEEDS
ht: 67 inches. wt: 189.3 pounds (current as of 3/27, standing, no edema noted). BMI: 29.6 kG/m2. UBW:  IBW: 148 pounds +/- 10%. %IBW: 128%.  Other pertinent objective information: 87 year old male pt with PMH HTN, HLD, remote CVA, COPD, CAD with Stents, CHF, chronic afib on eliquis, chronic systolic heart failure s/p TAVR 11/27/18 presenting to [facility] ED due to weakness. Found to have bacteremia, SOB and productive cough, PNA VS viral URI. S/p PPM removal. Noted by infectious disease plans to repeat MARIA LUISA for suspected endocarditis. On antibiotics at this time. No pressure injuries noted. ht: 67 inches. wt: 189.3 pounds (current as of 3/27, standing, no edema noted). BMI: 29.6 kG/m2. UBW:  unknown. IBW: 148 pounds +/- 10%. %IBW: 128%.  Other pertinent objective information: 87 year old male pt with PMH HTN, HLD, remote CVA, COPD, CAD with Stents, CHF, chronic afib on eliquis, chronic systolic heart failure s/p TAVR 11/27/18 presenting to [facility] ED due to weakness. Found to have bacteremia, SOB and productive cough, PNA VS viral URI. S/p PPM removal. Noted by infectious disease plans to repeat MARIA LUISA for suspected endocarditis. On antibiotics at this time. No pressure injuries noted.

## 2019-04-01 NOTE — DIETITIAN INITIAL EVALUATION ADULT. - ORAL INTAKE PTA
good/Pt reports good po intake PTA. Usually would eat 2 meals/day breakfast of eggs and/or cereal, may skip lunch or eat at restaurant with friends, for dinner he often goes to his sister's will have traditional Italian meal. Denies food allergy or intolerance. Denies taking vitamin/mineral/herbal supplements PTA.

## 2019-04-01 NOTE — DIETITIAN INITIAL EVALUATION ADULT. - NS AS NUTRI INTERV MEALS SNACK
Can continue current low Na diet in-house to promote heart health. Food preferences obtained and honored on menu.

## 2019-04-01 NOTE — PROGRESS NOTE ADULT - ASSESSMENT
87 yr old with CKD, COPD, BPH, PPM, Y AVR due to AS, CHF; pt has a UTI several months ago and in the fall and had questionable thrombus or veg on aortic valve   bc were negative at the time     currently admitted lightheadedness and found to have multiple positive blood cultures for vrec.    He also had pain in his right lower flank /back.   pt has an intravascular infection on either the T avr or the ppm    pacemaker was removed    Blood cultures f till growing VRE faecium- Vanco resistant but sensitive to daptomycin  repeat blood cultures sent this AM    MARIA LUISA - was not helpful and does not demonstrate obv vegetation    MRI of lumbar sacral spine- his pain is lower left back near his sacroiliac area non diagnostic   Continue Daptomycin in the 10/mg/kg equivalent dose  check CPK level  ultrasound of testes is negative      culture of ppm is positve for vrec too  await mri      there re some report suggesting the benefits of adding ampicillin to dapto despite in vitro resistance  will consider if he has vrec endocarditis I dont think he can survive without avr which willl be very risky    allergic to pcn so the addition of ampicillin is not an option   Discussed with Dr. Downey  If no other course is found, we will need to repeat MARIA LUISA next week to see if changes evolve.   discussed  with team   mri of back negative       p   ultrasound of  groin is [id number]   to add ceftaroline empirically instead of ampicillin   wbc scan is negative    very careful workup is negative  if bc remain positive on ceftaroline and dapto  will need followup up MARIA LUISA   bc from 3/30 still positive    I 5think we need to repeat MARIA LUISA ;  I still suspect that this  is endocarditis

## 2019-04-01 NOTE — PROGRESS NOTE ADULT - ASSESSMENT
Pt is a 88 y/o male w/ PMH of HTN, HLD, remote CVA, COPD, CAD with Stents, CHF, chronic afib on eliquis, chronic systolic heart failure s/p TAVR 11/27/18.- with Dr Zuluaga presenting to [facility] ED today d/t weakness. pt states he has been feeling lightheaded and having difficulty breathing for last few days. Patient had cardiac cath during previous admission which showed Distal circumflex: There was a 40 % stenosis. Ramus intermedius: Angiography showed severe atherosclerosis., Mid RCA: There was a 60 % stenosis. Patient states that he has never had to be intubated d/t shortness of breath; also states that he has O2 at home as needed, but does not use his home O2 or his home inhalers. patient reports some chills and productive cough with yellow sputum. patient was seen by his cardiologist today which he was prescribed doxy for possible PNA however he was told to come to ER for worsening symptoms     # Bacteremia, VRE   # SOB and productive cough, R/P PNA VS viral URI   # DEBI   # LE R Hip pain   # COPD exacerbation   # HX of HFrEF, R/O acute on chronic exacerbation   # CAD S/P PCI   # Afib on AC  # S/P TAVR  # HTN  # CVA  #BPH  # HLD     6/6 G + , VRE  repeat Blood Cx positive, cont repeat Blood Cx till negative growth. dapto resistance in some cultures   recent blood Cx negative growth, cont to monitor   started on vanc and changed to daptomycin in view of sensitivity   started on ceftaroline   S/P PPM removal, Cx grew g+  MARIA LUISA noted   hold solumedrol, leukocytosis secondary to steroid use   has back pain, ? osteo/ seeding. MRI of hip noted, no Osteo  MRI lumbar noted, no acute OM   CXR clear, no PNA seen  CT abd/Pelv noted   repeat MARIA LUISA next week   WBC scan negative   Bone scan done , noted results   sensitivity of initial blood Cx noted, ? switch to linezolid ID F/U   GI eval appreciated   CT Chest noted  pain control with morphin   S/P Solumedrol 40 IV daily   Echo noted  diuresis with lasix, on hold as of now in view of worsening kidney function   lopressor 50 BID   Eliquis for AC   I andOs, FLuid restriction   Nebs and inhalers   COnt spironolactone   serial CE and EKG, mild Trop elevated, F/U Trend   MOnitor vitals, restart home BP medications and statin and AP with plavix   rate control, cont amiodorone   OMT and pain control   DVT and GI PPX

## 2019-04-02 LAB
ANION GAP SERPL CALC-SCNC: 14 MMOL/L — SIGNIFICANT CHANGE UP (ref 5–17)
BUN SERPL-MCNC: 35 MG/DL — HIGH (ref 7–23)
CALCIUM SERPL-MCNC: 9.2 MG/DL — SIGNIFICANT CHANGE UP (ref 8.4–10.5)
CHLORIDE SERPL-SCNC: 101 MMOL/L — SIGNIFICANT CHANGE UP (ref 96–108)
CO2 SERPL-SCNC: 23 MMOL/L — SIGNIFICANT CHANGE UP (ref 22–31)
CREAT SERPL-MCNC: 1.24 MG/DL — SIGNIFICANT CHANGE UP (ref 0.5–1.3)
CULTURE RESULTS: SIGNIFICANT CHANGE UP
GLUCOSE SERPL-MCNC: 120 MG/DL — HIGH (ref 70–99)
HCT VFR BLD CALC: 37.6 % — LOW (ref 39–50)
HGB BLD-MCNC: 13 G/DL — SIGNIFICANT CHANGE UP (ref 13–17)
MCHC RBC-ENTMCNC: 29.9 PG — SIGNIFICANT CHANGE UP (ref 27–34)
MCHC RBC-ENTMCNC: 34.5 GM/DL — SIGNIFICANT CHANGE UP (ref 32–36)
MCV RBC AUTO: 86.7 FL — SIGNIFICANT CHANGE UP (ref 80–100)
PLATELET # BLD AUTO: 247 K/UL — SIGNIFICANT CHANGE UP (ref 150–400)
POTASSIUM SERPL-MCNC: 3.9 MMOL/L — SIGNIFICANT CHANGE UP (ref 3.5–5.3)
POTASSIUM SERPL-SCNC: 3.9 MMOL/L — SIGNIFICANT CHANGE UP (ref 3.5–5.3)
RBC # BLD: 4.34 M/UL — SIGNIFICANT CHANGE UP (ref 4.2–5.8)
RBC # FLD: 13.5 % — SIGNIFICANT CHANGE UP (ref 10.3–14.5)
SODIUM SERPL-SCNC: 138 MMOL/L — SIGNIFICANT CHANGE UP (ref 135–145)
SPECIMEN SOURCE: SIGNIFICANT CHANGE UP
WBC # BLD: 15.1 K/UL — HIGH (ref 3.8–10.5)
WBC # FLD AUTO: 15.1 K/UL — HIGH (ref 3.8–10.5)

## 2019-04-02 PROCEDURE — 99232 SBSQ HOSP IP/OBS MODERATE 35: CPT

## 2019-04-02 RX ADMIN — LIDOCAINE 1 PATCH: 4 CREAM TOPICAL at 00:27

## 2019-04-02 RX ADMIN — CEFTAROLINE FOSAMIL 50 MILLIGRAM(S): 600 POWDER, FOR SOLUTION INTRAVENOUS at 05:05

## 2019-04-02 RX ADMIN — ALBUTEROL 2 PUFF(S): 90 AEROSOL, METERED ORAL at 11:57

## 2019-04-02 RX ADMIN — ALBUTEROL 2 PUFF(S): 90 AEROSOL, METERED ORAL at 14:17

## 2019-04-02 RX ADMIN — APIXABAN 5 MILLIGRAM(S): 2.5 TABLET, FILM COATED ORAL at 18:03

## 2019-04-02 RX ADMIN — MORPHINE SULFATE 2 MILLIGRAM(S): 50 CAPSULE, EXTENDED RELEASE ORAL at 05:42

## 2019-04-02 RX ADMIN — Medication 100 MILLIGRAM(S): at 14:17

## 2019-04-02 RX ADMIN — PANTOPRAZOLE SODIUM 40 MILLIGRAM(S): 20 TABLET, DELAYED RELEASE ORAL at 05:05

## 2019-04-02 RX ADMIN — Medication 50 MILLIGRAM(S): at 18:03

## 2019-04-02 RX ADMIN — MORPHINE SULFATE 2 MILLIGRAM(S): 50 CAPSULE, EXTENDED RELEASE ORAL at 12:20

## 2019-04-02 RX ADMIN — Medication 100 MILLIGRAM(S): at 05:04

## 2019-04-02 RX ADMIN — CLOPIDOGREL BISULFATE 75 MILLIGRAM(S): 75 TABLET, FILM COATED ORAL at 11:52

## 2019-04-02 RX ADMIN — MORPHINE SULFATE 2 MILLIGRAM(S): 50 CAPSULE, EXTENDED RELEASE ORAL at 05:12

## 2019-04-02 RX ADMIN — BUDESONIDE AND FORMOTEROL FUMARATE DIHYDRATE 2 PUFF(S): 160; 4.5 AEROSOL RESPIRATORY (INHALATION) at 05:05

## 2019-04-02 RX ADMIN — MORPHINE SULFATE 2 MILLIGRAM(S): 50 CAPSULE, EXTENDED RELEASE ORAL at 11:55

## 2019-04-02 RX ADMIN — BUDESONIDE AND FORMOTEROL FUMARATE DIHYDRATE 2 PUFF(S): 160; 4.5 AEROSOL RESPIRATORY (INHALATION) at 18:03

## 2019-04-02 RX ADMIN — ALBUTEROL 2 PUFF(S): 90 AEROSOL, METERED ORAL at 05:06

## 2019-04-02 RX ADMIN — Medication 100 MILLIGRAM(S): at 21:36

## 2019-04-02 RX ADMIN — LIDOCAINE 1 PATCH: 4 CREAM TOPICAL at 11:52

## 2019-04-02 RX ADMIN — SPIRONOLACTONE 25 MILLIGRAM(S): 25 TABLET, FILM COATED ORAL at 05:05

## 2019-04-02 RX ADMIN — LIDOCAINE 1 PATCH: 4 CREAM TOPICAL at 23:13

## 2019-04-02 RX ADMIN — SENNA PLUS 2 TABLET(S): 8.6 TABLET ORAL at 21:36

## 2019-04-02 RX ADMIN — LIDOCAINE 1 PATCH: 4 CREAM TOPICAL at 19:00

## 2019-04-02 RX ADMIN — Medication 3 MILLIGRAM(S): at 21:36

## 2019-04-02 RX ADMIN — CEFTAROLINE FOSAMIL 50 MILLIGRAM(S): 600 POWDER, FOR SOLUTION INTRAVENOUS at 14:17

## 2019-04-02 RX ADMIN — CEFTAROLINE FOSAMIL 50 MILLIGRAM(S): 600 POWDER, FOR SOLUTION INTRAVENOUS at 21:36

## 2019-04-02 RX ADMIN — POLYETHYLENE GLYCOL 3350 17 GRAM(S): 17 POWDER, FOR SOLUTION ORAL at 11:52

## 2019-04-02 RX ADMIN — APIXABAN 5 MILLIGRAM(S): 2.5 TABLET, FILM COATED ORAL at 05:04

## 2019-04-02 RX ADMIN — AMIODARONE HYDROCHLORIDE 200 MILLIGRAM(S): 400 TABLET ORAL at 05:05

## 2019-04-02 RX ADMIN — DAPTOMYCIN 130 MILLIGRAM(S): 500 INJECTION, POWDER, LYOPHILIZED, FOR SOLUTION INTRAVENOUS at 11:52

## 2019-04-02 RX ADMIN — TAMSULOSIN HYDROCHLORIDE 0.4 MILLIGRAM(S): 0.4 CAPSULE ORAL at 21:36

## 2019-04-02 RX ADMIN — Medication 50 MILLIGRAM(S): at 05:04

## 2019-04-02 RX ADMIN — ALBUTEROL 2 PUFF(S): 90 AEROSOL, METERED ORAL at 18:03

## 2019-04-02 RX ADMIN — ALBUTEROL 2 PUFF(S): 90 AEROSOL, METERED ORAL at 21:36

## 2019-04-02 RX ADMIN — FINASTERIDE 5 MILLIGRAM(S): 5 TABLET, FILM COATED ORAL at 11:52

## 2019-04-02 RX ADMIN — ATORVASTATIN CALCIUM 40 MILLIGRAM(S): 80 TABLET, FILM COATED ORAL at 21:36

## 2019-04-02 NOTE — PROGRESS NOTE ADULT - ASSESSMENT
Pt is a 88 y/o male w/ PMH of HTN, HLD, remote CVA, COPD, CAD with Stents, CHF, chronic afib on eliquis, chronic systolic heart failure s/p TAVR 11/27/18.- with Dr Zuluaga presenting to [facility] ED today d/t weakness. pt states he has been feeling lightheaded and having difficulty breathing for last few days. Patient had cardiac cath during previous admission which showed Distal circumflex: There was a 40 % stenosis. Ramus intermedius: Angiography showed severe atherosclerosis., Mid RCA: There was a 60 % stenosis. Patient states that he has never had to be intubated d/t shortness of breath; also states that he has O2 at home as needed, but does not use his home O2 or his home inhalers. patient reports some chills and productive cough with yellow sputum. patient was seen by his cardiologist today which he was prescribed doxy for possible PNA however he was told to come to ER for worsening symptoms     # Bacteremia, VRE   # SOB and productive cough, R/P PNA VS viral URI   # DEBI   # LE R Hip pain   # COPD exacerbation   # HX of HFrEF, R/O acute on chronic exacerbation   # CAD S/P PCI   # Afib on AC  # S/P TAVR  # HTN  # CVA  #BPH  # HLD     6/6 G + , VRE  repeat Blood Cx positive, cont repeat Blood Cx till negative growth. dapto resistance in some cultures   recent blood Cx negative growth, cont to monitor   started on vanc and changed to daptomycin in view of sensitivity   started on ceftaroline   S/P PPM removal, Cx grew g+  MARIA LUISA noted   hold solumedrol, leukocytosis secondary to steroid use   has back pain, ? osteo/ seeding. MRI of hip noted, no Osteo  MRI lumbar noted, no acute OM   CXR clear, no PNA seen  CT abd/Pelv noted   repeat MARIA LUISA next week   WBC scan negative   Bone scan done , noted results   sensitivity of initial blood Cx noted, ID f/U appreciated   GI eval appreciated   CT Chest noted  pain control with morphin   S/P Solumedrol 40 IV daily   Echo noted  diuresis with lasix, on hold as of now in view of worsening kidney function   can start lasix 40 mg po daily from tomorrow  lopressor 50 BID   Eliquis for AC   I andOs, FLuid restriction   Nebs and inhalers   COnt spironolactone   serial CE and EKG, mild Trop elevated, F/U Trend   MOnitor vitals, restart home BP medications and statin and AP with plavix   rate control, cont amiodorone   OMT and pain control   DVT and GI PPX

## 2019-04-02 NOTE — PROGRESS NOTE ADULT - ASSESSMENT
87 yr old with CKD, COPD, BPH, PPM, Y AVR due to AS, CHF; pt has a UTI several months ago and in the fall and had questionable thrombus or veg on aortic valve   bc were negative at the time     currently admitted lightheadedness and found to have multiple positive blood cultures for vrec.    He also had pain in his right lower flank /back.   pt has an intravascular infection on either the T avr or the ppm    pacemaker was removed    Blood cultures f till growing VRE faecium- Vanco resistant but sensitive to daptomycin  repeat blood cultures sent this AM    MARIA LUISA - was not helpful and does not demonstrate obv vegetation    MRI of lumbar sacral spine- his pain is lower left back near his sacroiliac area non diagnostic   Continue Daptomycin in the 10/mg/kg equivalent dose  check CPK level  ultrasound of testes is negative      culture of ppm is positve for vrec too  await mri      there re some report suggesting the benefits of adding ampicillin to dapto despite in vitro resistance  will consider if he has vrec endocarditis I dont think he can survive without avr which willl be very risky    allergic to pcn so the addition of ampicillin is not an option   Discussed with Dr. Downey  If no other course is found, we will need to repeat MARIA LUISA next week to see if changes evolve.   discussed  with team   mri of back negative       p   ultrasound of  groin is [id number]   to add ceftaroline empirically instead of ampicillin   wbc scan is negative    very careful workup is negative  if bc remain positive on ceftaroline and dapto  will need followup up MARIA LUISA   bc from 3/30 still positive    I 5think we need to repeat MARIA LUISA ;  I still suspect that this  is endocarditis   can not use Zyvox to treat intravascular infections ( bacteriostatic )  Will request repeat NICK on dapto  most recent bc negative

## 2019-04-02 NOTE — PROGRESS NOTE ADULT - ASSESSMENT
Dr Owens's input appreciated, suspected to have endocarditis  bone scan results noted  no GI bleeding  c/o right flank and groin pain  no plans for endoscopic evaluation at this time, no apparent GI source of infection

## 2019-04-03 LAB
ANION GAP SERPL CALC-SCNC: 14 MMOL/L — SIGNIFICANT CHANGE UP (ref 5–17)
BASOPHILS # BLD AUTO: 0 K/UL — SIGNIFICANT CHANGE UP (ref 0–0.2)
BASOPHILS NFR BLD AUTO: 0.2 % — SIGNIFICANT CHANGE UP (ref 0–2)
BUN SERPL-MCNC: 32 MG/DL — HIGH (ref 7–23)
CALCIUM SERPL-MCNC: 9.3 MG/DL — SIGNIFICANT CHANGE UP (ref 8.4–10.5)
CHLORIDE SERPL-SCNC: 101 MMOL/L — SIGNIFICANT CHANGE UP (ref 96–108)
CO2 SERPL-SCNC: 22 MMOL/L — SIGNIFICANT CHANGE UP (ref 22–31)
CREAT SERPL-MCNC: 1.31 MG/DL — HIGH (ref 0.5–1.3)
CULTURE RESULTS: SIGNIFICANT CHANGE UP
EOSINOPHIL # BLD AUTO: 0.2 K/UL — SIGNIFICANT CHANGE UP (ref 0–0.5)
EOSINOPHIL NFR BLD AUTO: 1.2 % — SIGNIFICANT CHANGE UP (ref 0–6)
GLUCOSE SERPL-MCNC: 99 MG/DL — SIGNIFICANT CHANGE UP (ref 70–99)
HCT VFR BLD CALC: 38.4 % — LOW (ref 39–50)
HGB BLD-MCNC: 13 G/DL — SIGNIFICANT CHANGE UP (ref 13–17)
LYMPHOCYTES # BLD AUTO: 1.2 K/UL — SIGNIFICANT CHANGE UP (ref 1–3.3)
LYMPHOCYTES # BLD AUTO: 8.8 % — LOW (ref 13–44)
MCHC RBC-ENTMCNC: 29.6 PG — SIGNIFICANT CHANGE UP (ref 27–34)
MCHC RBC-ENTMCNC: 33.8 GM/DL — SIGNIFICANT CHANGE UP (ref 32–36)
MCV RBC AUTO: 87.4 FL — SIGNIFICANT CHANGE UP (ref 80–100)
MONOCYTES # BLD AUTO: 1.1 K/UL — HIGH (ref 0–0.9)
MONOCYTES NFR BLD AUTO: 8.3 % — SIGNIFICANT CHANGE UP (ref 2–14)
NEUTROPHILS # BLD AUTO: 10.6 K/UL — HIGH (ref 1.8–7.4)
NEUTROPHILS NFR BLD AUTO: 81.5 % — HIGH (ref 43–77)
PLATELET # BLD AUTO: 211 K/UL — SIGNIFICANT CHANGE UP (ref 150–400)
POTASSIUM SERPL-MCNC: 4.4 MMOL/L — SIGNIFICANT CHANGE UP (ref 3.5–5.3)
POTASSIUM SERPL-SCNC: 4.4 MMOL/L — SIGNIFICANT CHANGE UP (ref 3.5–5.3)
RBC # BLD: 4.39 M/UL — SIGNIFICANT CHANGE UP (ref 4.2–5.8)
RBC # FLD: 13.6 % — SIGNIFICANT CHANGE UP (ref 10.3–14.5)
SODIUM SERPL-SCNC: 137 MMOL/L — SIGNIFICANT CHANGE UP (ref 135–145)
SPECIMEN SOURCE: SIGNIFICANT CHANGE UP
WBC # BLD: 13 K/UL — HIGH (ref 3.8–10.5)
WBC # FLD AUTO: 13 K/UL — HIGH (ref 3.8–10.5)

## 2019-04-03 PROCEDURE — 93320 DOPPLER ECHO COMPLETE: CPT | Mod: 26

## 2019-04-03 PROCEDURE — 93325 DOPPLER ECHO COLOR FLOW MAPG: CPT | Mod: 26

## 2019-04-03 PROCEDURE — 93312 ECHO TRANSESOPHAGEAL: CPT | Mod: 26

## 2019-04-03 PROCEDURE — 99232 SBSQ HOSP IP/OBS MODERATE 35: CPT

## 2019-04-03 RX ORDER — SODIUM CHLORIDE 9 MG/ML
1000 INJECTION INTRAMUSCULAR; INTRAVENOUS; SUBCUTANEOUS
Qty: 0 | Refills: 0 | Status: DISCONTINUED | OUTPATIENT
Start: 2019-04-03 | End: 2019-04-11

## 2019-04-03 RX ADMIN — ALBUTEROL 2 PUFF(S): 90 AEROSOL, METERED ORAL at 11:14

## 2019-04-03 RX ADMIN — SPIRONOLACTONE 25 MILLIGRAM(S): 25 TABLET, FILM COATED ORAL at 05:03

## 2019-04-03 RX ADMIN — APIXABAN 5 MILLIGRAM(S): 2.5 TABLET, FILM COATED ORAL at 05:03

## 2019-04-03 RX ADMIN — ALBUTEROL 2 PUFF(S): 90 AEROSOL, METERED ORAL at 21:11

## 2019-04-03 RX ADMIN — BUDESONIDE AND FORMOTEROL FUMARATE DIHYDRATE 2 PUFF(S): 160; 4.5 AEROSOL RESPIRATORY (INHALATION) at 05:03

## 2019-04-03 RX ADMIN — CEFTAROLINE FOSAMIL 50 MILLIGRAM(S): 600 POWDER, FOR SOLUTION INTRAVENOUS at 21:12

## 2019-04-03 RX ADMIN — MORPHINE SULFATE 2 MILLIGRAM(S): 50 CAPSULE, EXTENDED RELEASE ORAL at 05:58

## 2019-04-03 RX ADMIN — Medication 100 MILLIGRAM(S): at 05:03

## 2019-04-03 RX ADMIN — SENNA PLUS 2 TABLET(S): 8.6 TABLET ORAL at 21:08

## 2019-04-03 RX ADMIN — FINASTERIDE 5 MILLIGRAM(S): 5 TABLET, FILM COATED ORAL at 11:13

## 2019-04-03 RX ADMIN — ATORVASTATIN CALCIUM 40 MILLIGRAM(S): 80 TABLET, FILM COATED ORAL at 21:08

## 2019-04-03 RX ADMIN — Medication 100 MILLIGRAM(S): at 13:22

## 2019-04-03 RX ADMIN — TAMSULOSIN HYDROCHLORIDE 0.4 MILLIGRAM(S): 0.4 CAPSULE ORAL at 21:07

## 2019-04-03 RX ADMIN — Medication 100 MILLIGRAM(S): at 21:08

## 2019-04-03 RX ADMIN — DAPTOMYCIN 130 MILLIGRAM(S): 500 INJECTION, POWDER, LYOPHILIZED, FOR SOLUTION INTRAVENOUS at 11:14

## 2019-04-03 RX ADMIN — MORPHINE SULFATE 2 MILLIGRAM(S): 50 CAPSULE, EXTENDED RELEASE ORAL at 21:16

## 2019-04-03 RX ADMIN — ALBUTEROL 2 PUFF(S): 90 AEROSOL, METERED ORAL at 05:04

## 2019-04-03 RX ADMIN — CEFTAROLINE FOSAMIL 50 MILLIGRAM(S): 600 POWDER, FOR SOLUTION INTRAVENOUS at 13:22

## 2019-04-03 RX ADMIN — SODIUM CHLORIDE 75 MILLILITER(S): 9 INJECTION INTRAMUSCULAR; INTRAVENOUS; SUBCUTANEOUS at 14:27

## 2019-04-03 RX ADMIN — MORPHINE SULFATE 2 MILLIGRAM(S): 50 CAPSULE, EXTENDED RELEASE ORAL at 20:10

## 2019-04-03 RX ADMIN — APIXABAN 5 MILLIGRAM(S): 2.5 TABLET, FILM COATED ORAL at 21:07

## 2019-04-03 RX ADMIN — CLOPIDOGREL BISULFATE 75 MILLIGRAM(S): 75 TABLET, FILM COATED ORAL at 11:13

## 2019-04-03 RX ADMIN — ALBUTEROL 2 PUFF(S): 90 AEROSOL, METERED ORAL at 13:22

## 2019-04-03 RX ADMIN — Medication 3 MILLIGRAM(S): at 21:08

## 2019-04-03 RX ADMIN — Medication 50 MILLIGRAM(S): at 05:03

## 2019-04-03 RX ADMIN — AMIODARONE HYDROCHLORIDE 200 MILLIGRAM(S): 400 TABLET ORAL at 05:03

## 2019-04-03 RX ADMIN — CEFTAROLINE FOSAMIL 50 MILLIGRAM(S): 600 POWDER, FOR SOLUTION INTRAVENOUS at 05:03

## 2019-04-03 RX ADMIN — PANTOPRAZOLE SODIUM 40 MILLIGRAM(S): 20 TABLET, DELAYED RELEASE ORAL at 05:03

## 2019-04-03 RX ADMIN — MORPHINE SULFATE 2 MILLIGRAM(S): 50 CAPSULE, EXTENDED RELEASE ORAL at 06:30

## 2019-04-03 RX ADMIN — Medication 50 MILLIGRAM(S): at 21:08

## 2019-04-03 RX ADMIN — LIDOCAINE 1 PATCH: 4 CREAM TOPICAL at 11:14

## 2019-04-03 NOTE — PROGRESS NOTE ADULT - ATTENDING COMMENTS
I will be away from thursday 04/04/2019 till Monday 04/08. Dr Robertson will be covering I will be away from Friday 04/05/2019 till Monday 04/08. Dr Robertson will be covering

## 2019-04-03 NOTE — PROGRESS NOTE ADULT - ASSESSMENT
Pt is a 88 y/o male w/ PMH of HTN, HLD, remote CVA, COPD, CAD with Stents, CHF, chronic afib on eliquis, chronic systolic heart failure s/p TAVR 11/27/18.- with Dr Zuluaga presenting to [facility] ED today d/t weakness. pt states he has been feeling lightheaded and having difficulty breathing for last few days. Patient had cardiac cath during previous admission which showed Distal circumflex: There was a 40 % stenosis. Ramus intermedius: Angiography showed severe atherosclerosis., Mid RCA: There was a 60 % stenosis. Patient states that he has never had to be intubated d/t shortness of breath; also states that he has O2 at home as needed, but does not use his home O2 or his home inhalers. patient reports some chills and productive cough with yellow sputum. patient was seen by his cardiologist today which he was prescribed doxy for possible PNA however he was told to come to ER for worsening symptoms     # Bacteremia, VRE   # SOB and productive cough, R/P PNA VS viral URI   # DEBI   # LE R Hip pain   # COPD exacerbation   # HX of HFrEF, R/O acute on chronic exacerbation   # CAD S/P PCI   # Afib on AC  # S/P TAVR  # HTN  # CVA  #BPH  # HLD     6/6 G + , VRE  repeat Blood Cx positive, cont repeat Blood Cx till negative growth. dapto resistance in some cultures   recent blood Cx negative growth, cont to monitor   started on vanc and changed to daptomycin in view of sensitivity   started on ceftaroline   S/P PPM removal, Cx grew g+  MARIA LUISA noted   hold solumedrol, leukocytosis secondary to steroid use   has back pain, ? osteo/ seeding. MRI of hip noted, no Osteo  MRI lumbar noted, no acute OM   CXR clear, no PNA seen  CT abd/Pelv noted   repeat MARIA LUISA next week   WBC scan negative   Bone scan done , noted results   sensitivity of initial blood Cx noted, ID f/U appreciated   Repeat MARIA LUISA pending   GI eval appreciated   CT Chest noted  pain control with morphin   S/P Solumedrol 40 IV daily   Echo noted  diuresis with lasix, on hold as of now in view of worsening kidney function   can start lasix 40 mg po daily from tomorrow  lopressor 50 BID   Eliquis for AC   I andOs, FLuid restriction   Nebs and inhalers   COnt spironolactone   serial CE and EKG, mild Trop elevated, F/U Trend   MOnitor vitals, restart home BP medications and statin and AP with plavix   rate control, cont amiodorone   OMT and pain control   DVT and GI PPX

## 2019-04-03 NOTE — PROGRESS NOTE ADULT - ASSESSMENT
87 yr old with CKD, COPD, BPH, PPM, Y AVR due to AS, CHF; pt has a UTI several months ago and in the fall and had questionable thrombus or veg on aortic valve   bc were negative at the time     currently admitted lightheadedness and found to have multiple positive blood cultures for vrec.    He also had pain in his right lower flank /back.   pt has an intravascular infection on either the T avr or the ppm    pacemaker was removed    Blood cultures f till growing VRE faecium- Vanco resistant but sensitive to daptomycin  repeat blood cultures sent this AM    MARIA LUISA - was not helpful and does not demonstrate obv vegetation    MRI of lumbar sacral spine- his pain is lower left back near his sacroiliac area non diagnostic   Continue Daptomycin in the 10/mg/kg equivalent dose  check CPK level  ultrasound of testes is negative      culture of ppm is positve for vrec too  await mri      there re some report suggesting the benefits of adding ampicillin to dapto despite in vitro resistance  will consider if he has vrec endocarditis I dont think he can survive without avr which willl be very risky    allergic to pcn so the addition of ampicillin is not an option   Discussed with Dr. Downey  If no other course is found, we will need to repeat MARIA LIUSA next week to see if changes evolve.   discussed  with team   mri of back negative       p   ultrasound of  groin is [id number]   to add ceftaroline empirically instead of ampicillin   wbc scan is negative    very careful workup is negative  if bc remain positive on ceftaroline and dapto  will need followup up MARIA LUISA   bc from 3/30 still positive     ;  I still suspect that this  is endocarditis   can not use Zyvox to treat intravascular infections ( bacteriostatic )  Will request repeat NICK on dapto  most recent bc negative  for MARIA LUISA today  discussed with Dr. Downey again  await MARIA LUISA

## 2019-04-04 ENCOUNTER — APPOINTMENT (OUTPATIENT)
Dept: ELECTROPHYSIOLOGY | Facility: CLINIC | Age: 84
End: 2019-04-04

## 2019-04-04 LAB
ANION GAP SERPL CALC-SCNC: 13 MMOL/L — SIGNIFICANT CHANGE UP (ref 5–17)
BUN SERPL-MCNC: 26 MG/DL — HIGH (ref 7–23)
CALCIUM SERPL-MCNC: 9.4 MG/DL — SIGNIFICANT CHANGE UP (ref 8.4–10.5)
CHLORIDE SERPL-SCNC: 104 MMOL/L — SIGNIFICANT CHANGE UP (ref 96–108)
CO2 SERPL-SCNC: 22 MMOL/L — SIGNIFICANT CHANGE UP (ref 22–31)
CREAT SERPL-MCNC: 1.13 MG/DL — SIGNIFICANT CHANGE UP (ref 0.5–1.3)
CULTURE RESULTS: SIGNIFICANT CHANGE UP
GLUCOSE SERPL-MCNC: 98 MG/DL — SIGNIFICANT CHANGE UP (ref 70–99)
HCT VFR BLD CALC: 41.3 % — SIGNIFICANT CHANGE UP (ref 39–50)
HGB BLD-MCNC: 12.7 G/DL — LOW (ref 13–17)
MCHC RBC-ENTMCNC: 26.7 PG — LOW (ref 27–34)
MCHC RBC-ENTMCNC: 30.8 GM/DL — LOW (ref 32–36)
MCV RBC AUTO: 86.8 FL — SIGNIFICANT CHANGE UP (ref 80–100)
PLATELET # BLD AUTO: 224 K/UL — SIGNIFICANT CHANGE UP (ref 150–400)
POTASSIUM SERPL-MCNC: 4.2 MMOL/L — SIGNIFICANT CHANGE UP (ref 3.5–5.3)
POTASSIUM SERPL-SCNC: 4.2 MMOL/L — SIGNIFICANT CHANGE UP (ref 3.5–5.3)
RBC # BLD: 4.75 M/UL — SIGNIFICANT CHANGE UP (ref 4.2–5.8)
RBC # FLD: 13.5 % — SIGNIFICANT CHANGE UP (ref 10.3–14.5)
SODIUM SERPL-SCNC: 139 MMOL/L — SIGNIFICANT CHANGE UP (ref 135–145)
SPECIMEN SOURCE: SIGNIFICANT CHANGE UP
WBC # BLD: 10.7 K/UL — HIGH (ref 3.8–10.5)
WBC # FLD AUTO: 10.7 K/UL — HIGH (ref 3.8–10.5)

## 2019-04-04 PROCEDURE — 99232 SBSQ HOSP IP/OBS MODERATE 35: CPT

## 2019-04-04 RX ADMIN — Medication 3 MILLIGRAM(S): at 22:08

## 2019-04-04 RX ADMIN — SENNA PLUS 2 TABLET(S): 8.6 TABLET ORAL at 22:08

## 2019-04-04 RX ADMIN — PANTOPRAZOLE SODIUM 40 MILLIGRAM(S): 20 TABLET, DELAYED RELEASE ORAL at 05:11

## 2019-04-04 RX ADMIN — APIXABAN 5 MILLIGRAM(S): 2.5 TABLET, FILM COATED ORAL at 17:46

## 2019-04-04 RX ADMIN — ALBUTEROL 2 PUFF(S): 90 AEROSOL, METERED ORAL at 13:22

## 2019-04-04 RX ADMIN — MORPHINE SULFATE 2 MILLIGRAM(S): 50 CAPSULE, EXTENDED RELEASE ORAL at 20:30

## 2019-04-04 RX ADMIN — POLYETHYLENE GLYCOL 3350 17 GRAM(S): 17 POWDER, FOR SOLUTION ORAL at 11:19

## 2019-04-04 RX ADMIN — Medication 100 MILLIGRAM(S): at 13:22

## 2019-04-04 RX ADMIN — MORPHINE SULFATE 2 MILLIGRAM(S): 50 CAPSULE, EXTENDED RELEASE ORAL at 11:07

## 2019-04-04 RX ADMIN — TAMSULOSIN HYDROCHLORIDE 0.4 MILLIGRAM(S): 0.4 CAPSULE ORAL at 22:08

## 2019-04-04 RX ADMIN — ALBUTEROL 2 PUFF(S): 90 AEROSOL, METERED ORAL at 22:09

## 2019-04-04 RX ADMIN — ALBUTEROL 2 PUFF(S): 90 AEROSOL, METERED ORAL at 17:47

## 2019-04-04 RX ADMIN — Medication 100 MILLIGRAM(S): at 22:08

## 2019-04-04 RX ADMIN — LIDOCAINE 1 PATCH: 4 CREAM TOPICAL at 19:30

## 2019-04-04 RX ADMIN — CEFTAROLINE FOSAMIL 50 MILLIGRAM(S): 600 POWDER, FOR SOLUTION INTRAVENOUS at 13:22

## 2019-04-04 RX ADMIN — BUDESONIDE AND FORMOTEROL FUMARATE DIHYDRATE 2 PUFF(S): 160; 4.5 AEROSOL RESPIRATORY (INHALATION) at 17:47

## 2019-04-04 RX ADMIN — ALBUTEROL 2 PUFF(S): 90 AEROSOL, METERED ORAL at 05:14

## 2019-04-04 RX ADMIN — MORPHINE SULFATE 2 MILLIGRAM(S): 50 CAPSULE, EXTENDED RELEASE ORAL at 21:01

## 2019-04-04 RX ADMIN — ALBUTEROL 2 PUFF(S): 90 AEROSOL, METERED ORAL at 10:37

## 2019-04-04 RX ADMIN — DAPTOMYCIN 130 MILLIGRAM(S): 500 INJECTION, POWDER, LYOPHILIZED, FOR SOLUTION INTRAVENOUS at 11:19

## 2019-04-04 RX ADMIN — Medication 100 MILLIGRAM(S): at 05:13

## 2019-04-04 RX ADMIN — CLOPIDOGREL BISULFATE 75 MILLIGRAM(S): 75 TABLET, FILM COATED ORAL at 11:19

## 2019-04-04 RX ADMIN — LIDOCAINE 1 PATCH: 4 CREAM TOPICAL at 22:14

## 2019-04-04 RX ADMIN — Medication 50 MILLIGRAM(S): at 05:12

## 2019-04-04 RX ADMIN — AMIODARONE HYDROCHLORIDE 200 MILLIGRAM(S): 400 TABLET ORAL at 05:11

## 2019-04-04 RX ADMIN — Medication 50 MILLIGRAM(S): at 17:46

## 2019-04-04 RX ADMIN — SPIRONOLACTONE 25 MILLIGRAM(S): 25 TABLET, FILM COATED ORAL at 05:12

## 2019-04-04 RX ADMIN — CEFTAROLINE FOSAMIL 50 MILLIGRAM(S): 600 POWDER, FOR SOLUTION INTRAVENOUS at 22:09

## 2019-04-04 RX ADMIN — MORPHINE SULFATE 2 MILLIGRAM(S): 50 CAPSULE, EXTENDED RELEASE ORAL at 10:37

## 2019-04-04 RX ADMIN — FINASTERIDE 5 MILLIGRAM(S): 5 TABLET, FILM COATED ORAL at 11:19

## 2019-04-04 RX ADMIN — APIXABAN 5 MILLIGRAM(S): 2.5 TABLET, FILM COATED ORAL at 05:13

## 2019-04-04 RX ADMIN — ATORVASTATIN CALCIUM 40 MILLIGRAM(S): 80 TABLET, FILM COATED ORAL at 22:09

## 2019-04-04 RX ADMIN — BUDESONIDE AND FORMOTEROL FUMARATE DIHYDRATE 2 PUFF(S): 160; 4.5 AEROSOL RESPIRATORY (INHALATION) at 05:14

## 2019-04-04 RX ADMIN — CEFTAROLINE FOSAMIL 50 MILLIGRAM(S): 600 POWDER, FOR SOLUTION INTRAVENOUS at 05:13

## 2019-04-04 RX ADMIN — LIDOCAINE 1 PATCH: 4 CREAM TOPICAL at 11:19

## 2019-04-04 NOTE — PROGRESS NOTE ADULT - ASSESSMENT
Pt is a 86 y/o male w/ PMH of HTN, HLD, remote CVA, COPD, CAD with Stents, CHF, chronic afib on eliquis, chronic systolic heart failure s/p TAVR 11/27/18.- with Dr Zuluaga presenting to [facility] ED today d/t weakness. pt states he has been feeling lightheaded and having difficulty breathing for last few days. Patient had cardiac cath during previous admission which showed Distal circumflex: There was a 40 % stenosis. Ramus intermedius: Angiography showed severe atherosclerosis., Mid RCA: There was a 60 % stenosis. Patient states that he has never had to be intubated d/t shortness of breath; also states that he has O2 at home as needed, but does not use his home O2 or his home inhalers. patient reports some chills and productive cough with yellow sputum. patient was seen by his cardiologist today which he was prescribed doxy for possible PNA however he was told to come to ER for worsening symptoms     # Bacteremia, VRE   # SOB and productive cough, R/P PNA VS viral URI   # DEBI   # LE R Hip pain   # COPD exacerbation   # HX of HFrEF, R/O acute on chronic exacerbation   # CAD S/P PCI   # Afib on AC  # S/P TAVR  # HTN  # CVA  #BPH  # HLD     6/6 G + , VRE  repeat Blood Cx positive, cont repeat Blood Cx till negative growth. dapto resistance in some cultures   recent blood Cx negative growth, cont to monitor. no growth for past 3 Blood Cx  plan for PICC and total of 6 weeks of ABx   started on vanc and changed to daptomycin in view of sensitivity   started on ceftaroline   S/P PPM removal, Cx grew g+  MARIA LUISA noted   hold solumedrol, leukocytosis secondary to steroid use   has back pain, ? osteo/ seeding. MRI of hip noted, no Osteo  MRI lumbar noted, no acute OM   CXR clear, no PNA seen  CT abd/Pelv noted   repeat MARIA LUISA notd, no acute findings   WBC scan negative   Bone scan done , noted results   sensitivity of initial blood Cx noted, ID f/U appreciated   Repeat MARIA LUISA pending   GI eval appreciated   CT Chest noted  pain control with morphin   S/P Solumedrol 40 IV daily   Echo noted  diuresis with lasix, on hold as of now in view of worsening kidney function   can start lasix 40 mg po daily from tomorrow  lopressor 50 BID   Eliquis for AC   I andOs, FLuid restriction   Nebs and inhalers   COnt spironolactone   serial CE and EKG, mild Trop elevated, F/U Trend   MOnitor vitals, restart home BP medications and statin and AP with plavix   rate control, cont amiodorone   OMT and pain control   DVT and GI PPX

## 2019-04-04 NOTE — PROGRESS NOTE ADULT - ASSESSMENT
87 yr old with CKD, COPD, BPH, PPM, Y AVR due to AS, CHF; pt has a UTI several months ago and in the fall and had questionable thrombus or veg on aortic valve   bc were negative at the time     currently admitted lightheadedness and found to have multiple positive blood cultures for vrec.    He also had pain in his right lower flank /back.   pt has an intravascular infection on either the T avr or the ppm    pacemaker was removed    Blood cultures f till growing VRE faecium- Vanco resistant but sensitive to daptomycin  repeat blood cultures sent this AM    MARIA LUISA - was not helpful and does not demonstrate obv vegetation    MRI of lumbar sacral spine- his pain is lower left back near his sacroiliac area non diagnostic   Continue Daptomycin in the 10/mg/kg equivalent dose  check CPK level  ultrasound of testes is negative      culture of ppm is positve for vrec too           Discussed with Dr. Downey  If no other course is found, we will need to repeat MARIA LUISA next week to see if changes evolve.   discussed  with team   mri of back negative          ultrasound of  groin is [id number]   to add ceftaroline empirically instead of ampicillin   wbc scan is negative    very careful workup is negative    bc remain positive on ceftaroline and dapto  will need followup up MARIA LUISA    last positive bc are 3/30      ;  I still suspect that this  is endocarditis   can not use Zyvox to treat intravascular infections ( bacteriostatic )  Will request repeat NICK on dapto ( called lab)  most recent bc negative   MARIA LUISA repeated and   is without change   discussed with Dr. Downey again  await MARIA LUISA     no [id number] source identified except positve PPM wire  no indication for [id number] on AVR but I still suspect it may be infected despite the negative MARIA LUISA    Plan 6 weeks of current therapy with PICC  will call son in law.

## 2019-04-05 LAB
-  DAPTOMYCIN: SIGNIFICANT CHANGE UP
ANION GAP SERPL CALC-SCNC: 12 MMOL/L — SIGNIFICANT CHANGE UP (ref 5–17)
BUN SERPL-MCNC: 25 MG/DL — HIGH (ref 7–23)
CALCIUM SERPL-MCNC: 9.4 MG/DL — SIGNIFICANT CHANGE UP (ref 8.4–10.5)
CHLORIDE SERPL-SCNC: 104 MMOL/L — SIGNIFICANT CHANGE UP (ref 96–108)
CO2 SERPL-SCNC: 22 MMOL/L — SIGNIFICANT CHANGE UP (ref 22–31)
CREAT SERPL-MCNC: 1.21 MG/DL — SIGNIFICANT CHANGE UP (ref 0.5–1.3)
CULTURE RESULTS: SIGNIFICANT CHANGE UP
GLUCOSE SERPL-MCNC: 94 MG/DL — SIGNIFICANT CHANGE UP (ref 70–99)
HCT VFR BLD CALC: 37.7 % — LOW (ref 39–50)
HGB BLD-MCNC: 12.4 G/DL — LOW (ref 13–17)
MCHC RBC-ENTMCNC: 28.4 PG — SIGNIFICANT CHANGE UP (ref 27–34)
MCHC RBC-ENTMCNC: 33 GM/DL — SIGNIFICANT CHANGE UP (ref 32–36)
MCV RBC AUTO: 85.9 FL — SIGNIFICANT CHANGE UP (ref 80–100)
METHOD TYPE: SIGNIFICANT CHANGE UP
ORGANISM # SPEC MICROSCOPIC CNT: SIGNIFICANT CHANGE UP
PLATELET # BLD AUTO: 197 K/UL — SIGNIFICANT CHANGE UP (ref 150–400)
POTASSIUM SERPL-MCNC: 4.2 MMOL/L — SIGNIFICANT CHANGE UP (ref 3.5–5.3)
POTASSIUM SERPL-SCNC: 4.2 MMOL/L — SIGNIFICANT CHANGE UP (ref 3.5–5.3)
RBC # BLD: 4.39 M/UL — SIGNIFICANT CHANGE UP (ref 4.2–5.8)
RBC # FLD: 13.5 % — SIGNIFICANT CHANGE UP (ref 10.3–14.5)
SODIUM SERPL-SCNC: 138 MMOL/L — SIGNIFICANT CHANGE UP (ref 135–145)
SPECIMEN SOURCE: SIGNIFICANT CHANGE UP
SPECIMEN SOURCE: SIGNIFICANT CHANGE UP
WBC # BLD: 10.9 K/UL — HIGH (ref 3.8–10.5)
WBC # FLD AUTO: 10.9 K/UL — HIGH (ref 3.8–10.5)

## 2019-04-05 PROCEDURE — 99232 SBSQ HOSP IP/OBS MODERATE 35: CPT

## 2019-04-05 RX ORDER — ONDANSETRON 8 MG/1
8 TABLET, FILM COATED ORAL THREE TIMES A DAY
Qty: 0 | Refills: 0 | Status: DISCONTINUED | OUTPATIENT
Start: 2019-04-05 | End: 2019-04-16

## 2019-04-05 RX ORDER — MORPHINE SULFATE 50 MG/1
2 CAPSULE, EXTENDED RELEASE ORAL EVERY 6 HOURS
Qty: 0 | Refills: 0 | Status: DISCONTINUED | OUTPATIENT
Start: 2019-04-06 | End: 2019-04-12

## 2019-04-05 RX ADMIN — MORPHINE SULFATE 2 MILLIGRAM(S): 50 CAPSULE, EXTENDED RELEASE ORAL at 11:23

## 2019-04-05 RX ADMIN — LIDOCAINE 1 PATCH: 4 CREAM TOPICAL at 19:00

## 2019-04-05 RX ADMIN — ALBUTEROL 2 PUFF(S): 90 AEROSOL, METERED ORAL at 19:12

## 2019-04-05 RX ADMIN — ALBUTEROL 2 PUFF(S): 90 AEROSOL, METERED ORAL at 07:02

## 2019-04-05 RX ADMIN — Medication 100 MILLIGRAM(S): at 21:58

## 2019-04-05 RX ADMIN — Medication 100 MILLIGRAM(S): at 07:01

## 2019-04-05 RX ADMIN — AMIODARONE HYDROCHLORIDE 200 MILLIGRAM(S): 400 TABLET ORAL at 07:00

## 2019-04-05 RX ADMIN — APIXABAN 5 MILLIGRAM(S): 2.5 TABLET, FILM COATED ORAL at 19:11

## 2019-04-05 RX ADMIN — TAMSULOSIN HYDROCHLORIDE 0.4 MILLIGRAM(S): 0.4 CAPSULE ORAL at 21:58

## 2019-04-05 RX ADMIN — APIXABAN 5 MILLIGRAM(S): 2.5 TABLET, FILM COATED ORAL at 07:01

## 2019-04-05 RX ADMIN — ALBUTEROL 2 PUFF(S): 90 AEROSOL, METERED ORAL at 11:58

## 2019-04-05 RX ADMIN — FINASTERIDE 5 MILLIGRAM(S): 5 TABLET, FILM COATED ORAL at 11:23

## 2019-04-05 RX ADMIN — BUDESONIDE AND FORMOTEROL FUMARATE DIHYDRATE 2 PUFF(S): 160; 4.5 AEROSOL RESPIRATORY (INHALATION) at 07:01

## 2019-04-05 RX ADMIN — PANTOPRAZOLE SODIUM 40 MILLIGRAM(S): 20 TABLET, DELAYED RELEASE ORAL at 07:00

## 2019-04-05 RX ADMIN — CEFTAROLINE FOSAMIL 50 MILLIGRAM(S): 600 POWDER, FOR SOLUTION INTRAVENOUS at 07:03

## 2019-04-05 RX ADMIN — POLYETHYLENE GLYCOL 3350 17 GRAM(S): 17 POWDER, FOR SOLUTION ORAL at 11:24

## 2019-04-05 RX ADMIN — ATORVASTATIN CALCIUM 40 MILLIGRAM(S): 80 TABLET, FILM COATED ORAL at 21:58

## 2019-04-05 RX ADMIN — MORPHINE SULFATE 2 MILLIGRAM(S): 50 CAPSULE, EXTENDED RELEASE ORAL at 11:55

## 2019-04-05 RX ADMIN — DAPTOMYCIN 130 MILLIGRAM(S): 500 INJECTION, POWDER, LYOPHILIZED, FOR SOLUTION INTRAVENOUS at 11:24

## 2019-04-05 RX ADMIN — SPIRONOLACTONE 25 MILLIGRAM(S): 25 TABLET, FILM COATED ORAL at 07:00

## 2019-04-05 RX ADMIN — CLOPIDOGREL BISULFATE 75 MILLIGRAM(S): 75 TABLET, FILM COATED ORAL at 11:23

## 2019-04-05 RX ADMIN — CEFTAROLINE FOSAMIL 50 MILLIGRAM(S): 600 POWDER, FOR SOLUTION INTRAVENOUS at 21:55

## 2019-04-05 RX ADMIN — Medication 3 MILLIGRAM(S): at 21:59

## 2019-04-05 RX ADMIN — LIDOCAINE 1 PATCH: 4 CREAM TOPICAL at 11:23

## 2019-04-05 RX ADMIN — ALBUTEROL 2 PUFF(S): 90 AEROSOL, METERED ORAL at 15:48

## 2019-04-05 RX ADMIN — Medication 100 MILLIGRAM(S): at 15:47

## 2019-04-05 RX ADMIN — BUDESONIDE AND FORMOTEROL FUMARATE DIHYDRATE 2 PUFF(S): 160; 4.5 AEROSOL RESPIRATORY (INHALATION) at 19:12

## 2019-04-05 RX ADMIN — Medication 50 MILLIGRAM(S): at 19:12

## 2019-04-05 RX ADMIN — SENNA PLUS 2 TABLET(S): 8.6 TABLET ORAL at 21:58

## 2019-04-05 RX ADMIN — CEFTAROLINE FOSAMIL 50 MILLIGRAM(S): 600 POWDER, FOR SOLUTION INTRAVENOUS at 15:47

## 2019-04-05 RX ADMIN — ALBUTEROL 2 PUFF(S): 90 AEROSOL, METERED ORAL at 21:59

## 2019-04-05 RX ADMIN — LIDOCAINE 1 PATCH: 4 CREAM TOPICAL at 23:00

## 2019-04-05 NOTE — PROGRESS NOTE ADULT - ASSESSMENT
# Bacteremia, VRE   # SOB and productive cough, R/P PNA VS viral URI   # DEBI   # LE R Hip pain   # COPD exacerbation   # HX of HFrEF, R/O acute on chronic exacerbation   # CAD S/P PCI   # Afib on AC  # S/P TAVR  # HTN  # CVA  #BPH  # HLD     6/6 G + , VRE  repeat Blood Cx positive, cont repeat Blood Cx till negative growth. dapto resistance in some cultures   recent blood Cx negative growth, cont to monitor. no growth for past 3 Blood Cxplan for PICC and total of 6 weeks of ABx   started on vanc and changed to daptomycin in view of sensitivity   started on ceftaroline   S/P PPM removal, Cx grew g+  MARIA LUISA noted   hold solumedrol, leukocytosis secondary to steroid use   has back pain, ? osteo/ seeding. MRI of hip noted, no Osteo  MRI lumbar noted, no acute OM   CXR clear, no PNA seen  CT abd/Pelv noted   repeat MARIA LUISA notd, no acute findings   WBC scan negative   Bone scan done , noted results   sensitivity of initial blood Cx noted, ID f/U appreciated   Repeat MARIA LUISA pending   GI eval appreciated

## 2019-04-05 NOTE — PROVIDER CONTACT NOTE (CRITICAL VALUE NOTIFICATION) - ACTION/TREATMENT ORDERED:
will continue to monitor patient.
Pt on daily blood cultures.  pt already on ABX.
Pt on daily cultures.
already aware.
will continue to monitor patient.
will continue to montior patient.
ID on board. ev walker. daily BC.
Will start ABX stat
will continue to monitor patient.
Continue plan of care. Monitor & maintain safety.
NP Saige Glasgow aware, no order at this time, continue plan of care, cont to monitor Pt
NP Saige aware, no new order at this time, cont plan of care, cont to monitor Pt
will continue to montior patient.
Continue daptomycin as ordered.
IV ABX given this AM. Will continue to monitor patient.
NP aware. Pt on daptomycin. Will continue to monitor patient.
NP aware. continue dapto.
NP aware. continue daptomycin for now. continue daily BC.
No further intervention at this time. Will continue to monitor pt.
Provider made aware. No further interventions at this time. Provider stated they will contact ID. Continue to monitor pt.
Pt pending MRI of spine.
SERAFIN Maguire aware. Will continue to monitor.
SERAFIN Maguire aware. Will continue to monitor. Safety maintained.

## 2019-04-05 NOTE — PROVIDER CONTACT NOTE (CRITICAL VALUE NOTIFICATION) - BACKGROUND
Admitted for SOB
Dx SOB. PMH CKD, TIA and COPD. Pt. is on contact precautions for Metapneumovirus. Pt. had on 3/21 Daptomycin  1 time dose.
Dx: Possible endocarditis. Hx: see H&P.
Dx: Strep bacteremia, on daptomycince since 21st ; possible endocarditis, +HMPV, COPD exacerbation
Pt admitted for SOB
Pt admitted for SOB.
Pt admitted for metapneumovirus
Pt. is S/P lead extraction. Pt. is on contact precautions for Metapneumovirus and currently is on Daptomycin 750 mg ivpb.
pt admitted for strep bacteremia, possible endocarditis, +HMPV., COPD exac.
pt admitted w/ metapneumovirus & SOB
pt admitted with COPD exacerbation, HMPV, and r/o endocarditis
pt admitted with strep bacteremia, possible endocarditis and COPD exacerbation- pt having (+) BC daily.

## 2019-04-05 NOTE — PROGRESS NOTE ADULT - ASSESSMENT
87 yr old with CKD, COPD, BPH, PPM, Y AVR due to AS, CHF; pt has a UTI several months ago and in the fall and had questionable thrombus or veg on aortic valve   bc were negative at the time     currently admitted lightheadedness and found to have multiple positive blood cultures for vrec.    He also had pain in his right lower flank /back.   pt has an intravascular infection on either the T avr or the ppm    pacemaker was removed    Blood cultures f till growing VRE faecium- Vanco resistant but sensitive to daptomycin  repeat blood cultures sent this AM    MARIA LUISA - was not helpful and does not demonstrate obv vegetation    MRI of lumbar sacral spine- his pain is lower left back near his sacroiliac area non diagnostic   Continue Daptomycin in the 10/mg/kg equivalent dose  check CPK level  ultrasound of testes is negative      culture of ppm is positve for vrec too           Discussed with Dr. Downey  If no other course is found, we will need to repeat MARIA LUISA next week to see if changes evolve.       mri of back negative          ultrasound of  groin is [id number]   to add ceftaroline empirically instead of ampicillin   wbc scan is negative    very careful workup is negative    bc remain positive on ceftaroline and dapto  will need followup up MARIA LUISA    last positive bc are 3/30      ;  I still suspect that this  is endocarditis   can not use Zyvox to treat intravascular infections ( bacteriostatic )  Will request repeat NICK on dapto ( called lab)  most recent bc negative   MARIA LUISA repeated and   is without change   discussed with Dr. Downey again  await MARIA LUISA     no definitve source identified except positve PPM wire  no indication for surgeruy on AVR but I still suspect it may be infected despite the negative MARIA LUISA    Plan 6 weeks of current therapy with PICC   dicussedwith family    needs picc and home care evaluation to make sure that the ab  will be covered  hopefully discharge on monday

## 2019-04-05 NOTE — PROVIDER CONTACT NOTE (CRITICAL VALUE NOTIFICATION) - TEST AND RESULT REPORTED:
+ blood cx; Gram positive cocci in pairs & chains in aerobic & anaerobic bottles
3/30 Blood Cx Aerobic: Enterococcus Saecium Vanco Resistant
Abby Kennedy
BC positive growth in aerobic/anaerobicenterococcus vanco resistant
BC-Growth in aerobic bottle-gram positive cocci in pairs and chains-collected 3/30
BC: Growth in areobic bottle gram positive cocci in pairs and chains
Blood Cx - Growth in anarobic bottle - gram + cocci in pairs and chains
Blood Cx - growth in arobic bottle gram + cocci in pairs and chains
Blood culture
Blood cultures (+)
Blood cultures (+)
Blood cultures 3/27 & 3/28- Vancomycin resistant growth aerobic bottle enterococcus staseum
Both anerobic bottles from 24th final result: enterococcus facium, vancomycin resistant
Catheter Cultures 3/22
Positive Aerobic and anaerobic bottle gram positive cocci in pairs chains
anerobic bottle : gram + cocci in pairs and chairs
blood culture collected today 3/26, preliminary results show growth in both aerobic and anaerobic bottle, enterrococcus fastcium, vanco resistant
blood culture from 3/23 shows growth in both aerobic and anaerobic bottles, enterococcus fastcium, vanco resistant
blood culture positive both anaerobic and aerobic
blood cx 3/28 prelim gram pos. cocci pairs & chains aerobic bottle
growth in anerobic and arobic bottle gram + cocci in pairs and chains
growth in arobic bottle gram positive cocci in pair ans cgains
growth in anerobic & arobic bottles enterococcis vancomycin resistant

## 2019-04-05 NOTE — PROVIDER CONTACT NOTE (CRITICAL VALUE NOTIFICATION) - NS PROVIDER READ BACK TO LAB
yes
yes/More RN /
yes
yes

## 2019-04-05 NOTE — PROVIDER CONTACT NOTE (CRITICAL VALUE NOTIFICATION) - ASSESSMENT
afebrile. vitals as charted. c/o back pain worse upon movement
pt afebrile. vitals as charted.
vitals as charted.
afebrile. vitals as charted.
Pt A+Ox4. Pain controlled with Morphine IV PRN. S/p TAVR in 11/2018; on Dapto 750mg IV QD & Teflaro 600 IV q8h. NM WBC & bone scan done.
Pt resting in bed, a&ox3, VSS, no signs of distress, VSS
Pt resting in bed, no signs of distress, VSS, afebrile, no signs of infection
afebrile.
afebrile. Pt A&oX4.
Pt A&OX4, VSS. No fevers/chills s/s of infection at this time. Pt on daptomycin IV.
Pt A&Ox4, VSS at this time. Pt without s/s of infection. Afebrile at this time. Pt on daptomycin.
Pt A&oX4. afebrile.
Pt A&oX4. afebrile. pt seen by CT sx for possible TAVR infection source, as wel as seen by ID.
Pt AOX 4, Pt with R hip and groin pain.
Pt is in bed, denies s/s of infection. No chest pain, headache, nvd, sob.
Pt. is AOX4 and sleeping comfortable
Pt. is sleeping comfortable no c/o of distress or discomfort at this time.
VSS. Afebrile.
pt a&ox4, resting comfortably, denies any distress at this time.

## 2019-04-05 NOTE — PROVIDER CONTACT NOTE (CRITICAL VALUE NOTIFICATION) - SITUATION
patient on daptocymicin
3/30 Blood Cx Aerobic: Enterococcus Saecium Vanco Resistant
+ blood cx; Gram positive cocci in pairs & chains in aerobic & anaerobic bottles
BC positive growth in aerobic/anaerobic enterococcus vanco resistant
BC-Growth in aerobic bottle-gram positive cocci in pairs and chains-collected 3/30
Blood culture preliminary from 3/24 0811: Anaerobic bottle gram positive cocci pairs and chains.
Blood cultures from 3/27 & 3/28- vancomycin resistant growth in aerobic bottles enterococcus staseum. Pt currently on daptomycin & ceftaroline.
Both anerobic bottles from 24th final result: enterococcus facium, vancomycin resistant
Human Metapneumovirus.
Pt dx SOB 3/19, strep bacteremia, R/O endocarditis
Pt dx SOB 3/19, strep bacteremia, r/o endocarditis
Pt. blood culture result Positive bottle Gram positive cocci in pairs and chains
Pt. result from blood culture from 3/22 Positive Aerobic and anaerobic bottle gram positive cocci in pairs chains
growth in anaerobic and aerobic bottles enterococcus facium and vanco resistant. Catheter culture final report growth in fluid medium only enterococcus facium and vanco resistant.
preliminary blood cultures 3/25 resulted with growth in anarobic bottle- gram (+) cocci in pairs and chains
preliminary results: catheter culture 3/22- growth in fluid (media only) enterococcus faceium- vanco resistant
pt BC 3/25 preliminary result growth in aerobic bottle gram + cocci in pairs and chains

## 2019-04-05 NOTE — PROVIDER CONTACT NOTE (CRITICAL VALUE NOTIFICATION) - PERSON GIVING RESULT:
Bia Lyles
[facility]-Judy Sánchez
[facility]-Samson Caldera
DERIK Alamo
Deng Juarez
Elian Roberts-Donald
Jovanny Jason [facility]
Judy LOYOLA
Lita Nitin
Mary Hickman
Mary Hickman, Lab
Mary Hickman/ balta
[facility]
Neeru Jensen- [facility]
Noe Burk
Olinda Rothman
Rachel JAMES
Savita Gordon
[facility]
Toma Nascimento-Lab
catalina rubio
kenji Adam
Kathleen Love

## 2019-04-06 RX ORDER — ONDANSETRON 8 MG/1
4 TABLET, FILM COATED ORAL ONCE
Qty: 0 | Refills: 0 | Status: COMPLETED | OUTPATIENT
Start: 2019-04-06 | End: 2019-04-06

## 2019-04-06 RX ORDER — ACETAMINOPHEN 500 MG
975 TABLET ORAL EVERY 8 HOURS
Qty: 0 | Refills: 0 | Status: DISCONTINUED | OUTPATIENT
Start: 2019-04-06 | End: 2019-04-16

## 2019-04-06 RX ADMIN — TAMSULOSIN HYDROCHLORIDE 0.4 MILLIGRAM(S): 0.4 CAPSULE ORAL at 22:45

## 2019-04-06 RX ADMIN — SENNA PLUS 2 TABLET(S): 8.6 TABLET ORAL at 22:43

## 2019-04-06 RX ADMIN — Medication 975 MILLIGRAM(S): at 11:00

## 2019-04-06 RX ADMIN — MORPHINE SULFATE 2 MILLIGRAM(S): 50 CAPSULE, EXTENDED RELEASE ORAL at 09:51

## 2019-04-06 RX ADMIN — Medication 100 MILLIGRAM(S): at 18:00

## 2019-04-06 RX ADMIN — MORPHINE SULFATE 2 MILLIGRAM(S): 50 CAPSULE, EXTENDED RELEASE ORAL at 18:57

## 2019-04-06 RX ADMIN — APIXABAN 5 MILLIGRAM(S): 2.5 TABLET, FILM COATED ORAL at 18:00

## 2019-04-06 RX ADMIN — Medication 100 MILLIGRAM(S): at 06:57

## 2019-04-06 RX ADMIN — MORPHINE SULFATE 2 MILLIGRAM(S): 50 CAPSULE, EXTENDED RELEASE ORAL at 06:58

## 2019-04-06 RX ADMIN — ALBUTEROL 2 PUFF(S): 90 AEROSOL, METERED ORAL at 18:01

## 2019-04-06 RX ADMIN — APIXABAN 5 MILLIGRAM(S): 2.5 TABLET, FILM COATED ORAL at 06:57

## 2019-04-06 RX ADMIN — CEFTAROLINE FOSAMIL 50 MILLIGRAM(S): 600 POWDER, FOR SOLUTION INTRAVENOUS at 18:01

## 2019-04-06 RX ADMIN — Medication 3 MILLIGRAM(S): at 22:43

## 2019-04-06 RX ADMIN — ALBUTEROL 2 PUFF(S): 90 AEROSOL, METERED ORAL at 09:22

## 2019-04-06 RX ADMIN — POLYETHYLENE GLYCOL 3350 17 GRAM(S): 17 POWDER, FOR SOLUTION ORAL at 11:24

## 2019-04-06 RX ADMIN — ALBUTEROL 2 PUFF(S): 90 AEROSOL, METERED ORAL at 22:44

## 2019-04-06 RX ADMIN — BUDESONIDE AND FORMOTEROL FUMARATE DIHYDRATE 2 PUFF(S): 160; 4.5 AEROSOL RESPIRATORY (INHALATION) at 06:58

## 2019-04-06 RX ADMIN — LIDOCAINE 1 PATCH: 4 CREAM TOPICAL at 23:00

## 2019-04-06 RX ADMIN — MORPHINE SULFATE 2 MILLIGRAM(S): 50 CAPSULE, EXTENDED RELEASE ORAL at 18:00

## 2019-04-06 RX ADMIN — CLOPIDOGREL BISULFATE 75 MILLIGRAM(S): 75 TABLET, FILM COATED ORAL at 11:24

## 2019-04-06 RX ADMIN — FINASTERIDE 5 MILLIGRAM(S): 5 TABLET, FILM COATED ORAL at 11:24

## 2019-04-06 RX ADMIN — AMIODARONE HYDROCHLORIDE 200 MILLIGRAM(S): 400 TABLET ORAL at 06:57

## 2019-04-06 RX ADMIN — Medication 100 MILLIGRAM(S): at 22:43

## 2019-04-06 RX ADMIN — Medication 50 MILLIGRAM(S): at 06:57

## 2019-04-06 RX ADMIN — SPIRONOLACTONE 25 MILLIGRAM(S): 25 TABLET, FILM COATED ORAL at 06:57

## 2019-04-06 RX ADMIN — BUDESONIDE AND FORMOTEROL FUMARATE DIHYDRATE 2 PUFF(S): 160; 4.5 AEROSOL RESPIRATORY (INHALATION) at 18:01

## 2019-04-06 RX ADMIN — LIDOCAINE 1 PATCH: 4 CREAM TOPICAL at 19:30

## 2019-04-06 RX ADMIN — PANTOPRAZOLE SODIUM 40 MILLIGRAM(S): 20 TABLET, DELAYED RELEASE ORAL at 06:57

## 2019-04-06 RX ADMIN — CEFTAROLINE FOSAMIL 50 MILLIGRAM(S): 600 POWDER, FOR SOLUTION INTRAVENOUS at 09:22

## 2019-04-06 RX ADMIN — ALBUTEROL 2 PUFF(S): 90 AEROSOL, METERED ORAL at 06:58

## 2019-04-06 RX ADMIN — ATORVASTATIN CALCIUM 40 MILLIGRAM(S): 80 TABLET, FILM COATED ORAL at 22:43

## 2019-04-06 RX ADMIN — Medication 50 MILLIGRAM(S): at 22:45

## 2019-04-06 RX ADMIN — Medication 975 MILLIGRAM(S): at 22:43

## 2019-04-06 RX ADMIN — Medication 975 MILLIGRAM(S): at 09:51

## 2019-04-06 RX ADMIN — LIDOCAINE 1 PATCH: 4 CREAM TOPICAL at 11:24

## 2019-04-06 RX ADMIN — Medication 975 MILLIGRAM(S): at 23:33

## 2019-04-06 RX ADMIN — DAPTOMYCIN 130 MILLIGRAM(S): 500 INJECTION, POWDER, LYOPHILIZED, FOR SOLUTION INTRAVENOUS at 11:24

## 2019-04-06 RX ADMIN — ONDANSETRON 4 MILLIGRAM(S): 8 TABLET, FILM COATED ORAL at 11:24

## 2019-04-07 LAB
CULTURE RESULTS: SIGNIFICANT CHANGE UP
CULTURE RESULTS: SIGNIFICANT CHANGE UP
SPECIMEN SOURCE: SIGNIFICANT CHANGE UP
SPECIMEN SOURCE: SIGNIFICANT CHANGE UP

## 2019-04-07 RX ADMIN — Medication 975 MILLIGRAM(S): at 13:41

## 2019-04-07 RX ADMIN — ALBUTEROL 2 PUFF(S): 90 AEROSOL, METERED ORAL at 21:35

## 2019-04-07 RX ADMIN — Medication 975 MILLIGRAM(S): at 07:49

## 2019-04-07 RX ADMIN — Medication 100 MILLIGRAM(S): at 13:41

## 2019-04-07 RX ADMIN — BUDESONIDE AND FORMOTEROL FUMARATE DIHYDRATE 2 PUFF(S): 160; 4.5 AEROSOL RESPIRATORY (INHALATION) at 06:43

## 2019-04-07 RX ADMIN — CEFTAROLINE FOSAMIL 50 MILLIGRAM(S): 600 POWDER, FOR SOLUTION INTRAVENOUS at 10:00

## 2019-04-07 RX ADMIN — Medication 50 MILLIGRAM(S): at 06:42

## 2019-04-07 RX ADMIN — SPIRONOLACTONE 25 MILLIGRAM(S): 25 TABLET, FILM COATED ORAL at 06:42

## 2019-04-07 RX ADMIN — FINASTERIDE 5 MILLIGRAM(S): 5 TABLET, FILM COATED ORAL at 11:36

## 2019-04-07 RX ADMIN — SENNA PLUS 2 TABLET(S): 8.6 TABLET ORAL at 21:35

## 2019-04-07 RX ADMIN — CEFTAROLINE FOSAMIL 50 MILLIGRAM(S): 600 POWDER, FOR SOLUTION INTRAVENOUS at 02:11

## 2019-04-07 RX ADMIN — ALBUTEROL 2 PUFF(S): 90 AEROSOL, METERED ORAL at 13:42

## 2019-04-07 RX ADMIN — CEFTAROLINE FOSAMIL 50 MILLIGRAM(S): 600 POWDER, FOR SOLUTION INTRAVENOUS at 13:40

## 2019-04-07 RX ADMIN — Medication 975 MILLIGRAM(S): at 22:30

## 2019-04-07 RX ADMIN — BUDESONIDE AND FORMOTEROL FUMARATE DIHYDRATE 2 PUFF(S): 160; 4.5 AEROSOL RESPIRATORY (INHALATION) at 19:03

## 2019-04-07 RX ADMIN — ATORVASTATIN CALCIUM 40 MILLIGRAM(S): 80 TABLET, FILM COATED ORAL at 21:35

## 2019-04-07 RX ADMIN — ALBUTEROL 2 PUFF(S): 90 AEROSOL, METERED ORAL at 19:03

## 2019-04-07 RX ADMIN — PANTOPRAZOLE SODIUM 40 MILLIGRAM(S): 20 TABLET, DELAYED RELEASE ORAL at 06:42

## 2019-04-07 RX ADMIN — Medication 975 MILLIGRAM(S): at 14:10

## 2019-04-07 RX ADMIN — ALBUTEROL 2 PUFF(S): 90 AEROSOL, METERED ORAL at 06:43

## 2019-04-07 RX ADMIN — Medication 100 MILLIGRAM(S): at 06:42

## 2019-04-07 RX ADMIN — DAPTOMYCIN 130 MILLIGRAM(S): 500 INJECTION, POWDER, LYOPHILIZED, FOR SOLUTION INTRAVENOUS at 11:10

## 2019-04-07 RX ADMIN — CLOPIDOGREL BISULFATE 75 MILLIGRAM(S): 75 TABLET, FILM COATED ORAL at 11:36

## 2019-04-07 RX ADMIN — APIXABAN 5 MILLIGRAM(S): 2.5 TABLET, FILM COATED ORAL at 19:02

## 2019-04-07 RX ADMIN — Medication 975 MILLIGRAM(S): at 21:35

## 2019-04-07 RX ADMIN — Medication 100 MILLIGRAM(S): at 21:35

## 2019-04-07 RX ADMIN — LIDOCAINE 1 PATCH: 4 CREAM TOPICAL at 11:37

## 2019-04-07 RX ADMIN — ONDANSETRON 8 MILLIGRAM(S): 8 TABLET, FILM COATED ORAL at 21:40

## 2019-04-07 RX ADMIN — TAMSULOSIN HYDROCHLORIDE 0.4 MILLIGRAM(S): 0.4 CAPSULE ORAL at 21:35

## 2019-04-07 RX ADMIN — AMIODARONE HYDROCHLORIDE 200 MILLIGRAM(S): 400 TABLET ORAL at 06:42

## 2019-04-07 RX ADMIN — ALBUTEROL 2 PUFF(S): 90 AEROSOL, METERED ORAL at 10:00

## 2019-04-07 RX ADMIN — APIXABAN 5 MILLIGRAM(S): 2.5 TABLET, FILM COATED ORAL at 06:42

## 2019-04-07 RX ADMIN — Medication 3 MILLIGRAM(S): at 21:35

## 2019-04-07 RX ADMIN — Medication 50 MILLIGRAM(S): at 19:02

## 2019-04-07 RX ADMIN — CEFTAROLINE FOSAMIL 50 MILLIGRAM(S): 600 POWDER, FOR SOLUTION INTRAVENOUS at 21:35

## 2019-04-07 RX ADMIN — Medication 975 MILLIGRAM(S): at 06:42

## 2019-04-07 RX ADMIN — LIDOCAINE 1 PATCH: 4 CREAM TOPICAL at 19:04

## 2019-04-08 LAB
ANION GAP SERPL CALC-SCNC: 12 MMOL/L — SIGNIFICANT CHANGE UP (ref 5–17)
BUN SERPL-MCNC: 28 MG/DL — HIGH (ref 7–23)
CALCIUM SERPL-MCNC: 9 MG/DL — SIGNIFICANT CHANGE UP (ref 8.4–10.5)
CHLORIDE SERPL-SCNC: 104 MMOL/L — SIGNIFICANT CHANGE UP (ref 96–108)
CO2 SERPL-SCNC: 21 MMOL/L — LOW (ref 22–31)
CREAT SERPL-MCNC: 1.26 MG/DL — SIGNIFICANT CHANGE UP (ref 0.5–1.3)
GLUCOSE SERPL-MCNC: 90 MG/DL — SIGNIFICANT CHANGE UP (ref 70–99)
HCT VFR BLD CALC: 36.9 % — LOW (ref 39–50)
HGB BLD-MCNC: 12 G/DL — LOW (ref 13–17)
MCHC RBC-ENTMCNC: 28.7 PG — SIGNIFICANT CHANGE UP (ref 27–34)
MCHC RBC-ENTMCNC: 32.5 GM/DL — SIGNIFICANT CHANGE UP (ref 32–36)
MCV RBC AUTO: 88.3 FL — SIGNIFICANT CHANGE UP (ref 80–100)
PLATELET # BLD AUTO: 173 K/UL — SIGNIFICANT CHANGE UP (ref 150–400)
POTASSIUM SERPL-MCNC: 4 MMOL/L — SIGNIFICANT CHANGE UP (ref 3.5–5.3)
POTASSIUM SERPL-SCNC: 4 MMOL/L — SIGNIFICANT CHANGE UP (ref 3.5–5.3)
RBC # BLD: 4.18 M/UL — LOW (ref 4.2–5.8)
RBC # FLD: 14.2 % — SIGNIFICANT CHANGE UP (ref 10.3–14.5)
SODIUM SERPL-SCNC: 137 MMOL/L — SIGNIFICANT CHANGE UP (ref 135–145)
WBC # BLD: 11.2 K/UL — HIGH (ref 3.8–10.5)
WBC # FLD AUTO: 11.2 K/UL — HIGH (ref 3.8–10.5)

## 2019-04-08 RX ADMIN — CEFTAROLINE FOSAMIL 50 MILLIGRAM(S): 600 POWDER, FOR SOLUTION INTRAVENOUS at 06:22

## 2019-04-08 RX ADMIN — BUDESONIDE AND FORMOTEROL FUMARATE DIHYDRATE 2 PUFF(S): 160; 4.5 AEROSOL RESPIRATORY (INHALATION) at 18:19

## 2019-04-08 RX ADMIN — ALBUTEROL 2 PUFF(S): 90 AEROSOL, METERED ORAL at 06:30

## 2019-04-08 RX ADMIN — ALBUTEROL 2 PUFF(S): 90 AEROSOL, METERED ORAL at 10:00

## 2019-04-08 RX ADMIN — Medication 100 MILLIGRAM(S): at 21:08

## 2019-04-08 RX ADMIN — SENNA PLUS 2 TABLET(S): 8.6 TABLET ORAL at 21:07

## 2019-04-08 RX ADMIN — Medication 50 MILLIGRAM(S): at 06:27

## 2019-04-08 RX ADMIN — CEFTAROLINE FOSAMIL 50 MILLIGRAM(S): 600 POWDER, FOR SOLUTION INTRAVENOUS at 13:17

## 2019-04-08 RX ADMIN — TAMSULOSIN HYDROCHLORIDE 0.4 MILLIGRAM(S): 0.4 CAPSULE ORAL at 21:07

## 2019-04-08 RX ADMIN — CLOPIDOGREL BISULFATE 75 MILLIGRAM(S): 75 TABLET, FILM COATED ORAL at 12:38

## 2019-04-08 RX ADMIN — PANTOPRAZOLE SODIUM 40 MILLIGRAM(S): 20 TABLET, DELAYED RELEASE ORAL at 06:22

## 2019-04-08 RX ADMIN — LIDOCAINE 1 PATCH: 4 CREAM TOPICAL at 12:37

## 2019-04-08 RX ADMIN — ATORVASTATIN CALCIUM 40 MILLIGRAM(S): 80 TABLET, FILM COATED ORAL at 21:07

## 2019-04-08 RX ADMIN — LIDOCAINE 1 PATCH: 4 CREAM TOPICAL at 18:30

## 2019-04-08 RX ADMIN — APIXABAN 5 MILLIGRAM(S): 2.5 TABLET, FILM COATED ORAL at 18:18

## 2019-04-08 RX ADMIN — Medication 3 MILLIGRAM(S): at 21:11

## 2019-04-08 RX ADMIN — APIXABAN 5 MILLIGRAM(S): 2.5 TABLET, FILM COATED ORAL at 06:22

## 2019-04-08 RX ADMIN — Medication 975 MILLIGRAM(S): at 13:16

## 2019-04-08 RX ADMIN — SPIRONOLACTONE 25 MILLIGRAM(S): 25 TABLET, FILM COATED ORAL at 06:22

## 2019-04-08 RX ADMIN — Medication 100 MILLIGRAM(S): at 06:22

## 2019-04-08 RX ADMIN — Medication 100 MILLIGRAM(S): at 13:16

## 2019-04-08 RX ADMIN — MORPHINE SULFATE 2 MILLIGRAM(S): 50 CAPSULE, EXTENDED RELEASE ORAL at 08:42

## 2019-04-08 RX ADMIN — Medication 975 MILLIGRAM(S): at 13:45

## 2019-04-08 RX ADMIN — Medication 975 MILLIGRAM(S): at 21:08

## 2019-04-08 RX ADMIN — Medication 50 MILLIGRAM(S): at 18:18

## 2019-04-08 RX ADMIN — Medication 975 MILLIGRAM(S): at 21:38

## 2019-04-08 RX ADMIN — FINASTERIDE 5 MILLIGRAM(S): 5 TABLET, FILM COATED ORAL at 12:38

## 2019-04-08 RX ADMIN — ALBUTEROL 2 PUFF(S): 90 AEROSOL, METERED ORAL at 18:19

## 2019-04-08 RX ADMIN — DAPTOMYCIN 130 MILLIGRAM(S): 500 INJECTION, POWDER, LYOPHILIZED, FOR SOLUTION INTRAVENOUS at 12:38

## 2019-04-08 RX ADMIN — CEFTAROLINE FOSAMIL 50 MILLIGRAM(S): 600 POWDER, FOR SOLUTION INTRAVENOUS at 21:09

## 2019-04-08 RX ADMIN — MORPHINE SULFATE 2 MILLIGRAM(S): 50 CAPSULE, EXTENDED RELEASE ORAL at 09:12

## 2019-04-08 RX ADMIN — ALBUTEROL 2 PUFF(S): 90 AEROSOL, METERED ORAL at 21:09

## 2019-04-08 RX ADMIN — POLYETHYLENE GLYCOL 3350 17 GRAM(S): 17 POWDER, FOR SOLUTION ORAL at 12:37

## 2019-04-08 RX ADMIN — AMIODARONE HYDROCHLORIDE 200 MILLIGRAM(S): 400 TABLET ORAL at 06:22

## 2019-04-08 RX ADMIN — BUDESONIDE AND FORMOTEROL FUMARATE DIHYDRATE 2 PUFF(S): 160; 4.5 AEROSOL RESPIRATORY (INHALATION) at 06:29

## 2019-04-08 RX ADMIN — ALBUTEROL 2 PUFF(S): 90 AEROSOL, METERED ORAL at 13:17

## 2019-04-08 NOTE — PROGRESS NOTE ADULT - ASSESSMENT
# Bacteremia, VRE   # SOB and productive cough, R/P PNA VS viral URI   # DEBI   # LE R Hip pain   # COPD exacerbation   # HX of HFrEF, R/O acute on chronic exacerbation   # CAD S/P PCI   # Afib on AC  # S/P TAVR  # HTN  # CVA  #BPH  # HLD     6/6 G + , VRE  repeat Blood Cx positive, cont repeat Blood Cx till negative growth. dapto resistance in some cultures   recent blood Cx negative growth, cont to monitor. no growth for past 3 Blood Cxplan for PICC and total of 6 weeks of ABx   cw  ceftaroline and dapto   S/P PPM removal, Cx grew g+  MARIA LUISA noted   has back pain, ? osteo/ seeding. MRI of hip noted, no Osteo  MRI lumbar noted, no acute OM CT abd/Pelv noted   repeat MARIA LUISA notd, no acute findings   WBC scan negative   Bone scan done , noted results   sensitivity of initial blood Cx noted, ID f/U appreciated    GI eval appreciated

## 2019-04-09 PROCEDURE — 99232 SBSQ HOSP IP/OBS MODERATE 35: CPT

## 2019-04-09 RX ADMIN — SPIRONOLACTONE 25 MILLIGRAM(S): 25 TABLET, FILM COATED ORAL at 05:12

## 2019-04-09 RX ADMIN — ALBUTEROL 2 PUFF(S): 90 AEROSOL, METERED ORAL at 15:18

## 2019-04-09 RX ADMIN — MORPHINE SULFATE 2 MILLIGRAM(S): 50 CAPSULE, EXTENDED RELEASE ORAL at 01:36

## 2019-04-09 RX ADMIN — CEFTAROLINE FOSAMIL 50 MILLIGRAM(S): 600 POWDER, FOR SOLUTION INTRAVENOUS at 05:10

## 2019-04-09 RX ADMIN — Medication 975 MILLIGRAM(S): at 15:18

## 2019-04-09 RX ADMIN — Medication 975 MILLIGRAM(S): at 21:12

## 2019-04-09 RX ADMIN — FINASTERIDE 5 MILLIGRAM(S): 5 TABLET, FILM COATED ORAL at 11:35

## 2019-04-09 RX ADMIN — SENNA PLUS 2 TABLET(S): 8.6 TABLET ORAL at 21:13

## 2019-04-09 RX ADMIN — ALBUTEROL 2 PUFF(S): 90 AEROSOL, METERED ORAL at 11:35

## 2019-04-09 RX ADMIN — Medication 50 MILLIGRAM(S): at 18:15

## 2019-04-09 RX ADMIN — DAPTOMYCIN 130 MILLIGRAM(S): 500 INJECTION, POWDER, LYOPHILIZED, FOR SOLUTION INTRAVENOUS at 10:28

## 2019-04-09 RX ADMIN — CEFTAROLINE FOSAMIL 50 MILLIGRAM(S): 600 POWDER, FOR SOLUTION INTRAVENOUS at 15:18

## 2019-04-09 RX ADMIN — PANTOPRAZOLE SODIUM 40 MILLIGRAM(S): 20 TABLET, DELAYED RELEASE ORAL at 05:12

## 2019-04-09 RX ADMIN — ALBUTEROL 2 PUFF(S): 90 AEROSOL, METERED ORAL at 18:16

## 2019-04-09 RX ADMIN — Medication 975 MILLIGRAM(S): at 16:00

## 2019-04-09 RX ADMIN — APIXABAN 5 MILLIGRAM(S): 2.5 TABLET, FILM COATED ORAL at 05:11

## 2019-04-09 RX ADMIN — MORPHINE SULFATE 2 MILLIGRAM(S): 50 CAPSULE, EXTENDED RELEASE ORAL at 01:06

## 2019-04-09 RX ADMIN — Medication 100 MILLIGRAM(S): at 15:18

## 2019-04-09 RX ADMIN — Medication 975 MILLIGRAM(S): at 05:11

## 2019-04-09 RX ADMIN — Medication 100 MILLIGRAM(S): at 21:12

## 2019-04-09 RX ADMIN — AMIODARONE HYDROCHLORIDE 200 MILLIGRAM(S): 400 TABLET ORAL at 05:12

## 2019-04-09 RX ADMIN — Medication 975 MILLIGRAM(S): at 21:30

## 2019-04-09 RX ADMIN — TAMSULOSIN HYDROCHLORIDE 0.4 MILLIGRAM(S): 0.4 CAPSULE ORAL at 21:12

## 2019-04-09 RX ADMIN — Medication 3 MILLIGRAM(S): at 21:12

## 2019-04-09 RX ADMIN — ATORVASTATIN CALCIUM 40 MILLIGRAM(S): 80 TABLET, FILM COATED ORAL at 21:12

## 2019-04-09 RX ADMIN — Medication 50 MILLIGRAM(S): at 05:11

## 2019-04-09 RX ADMIN — LIDOCAINE 1 PATCH: 4 CREAM TOPICAL at 01:06

## 2019-04-09 RX ADMIN — CEFTAROLINE FOSAMIL 50 MILLIGRAM(S): 600 POWDER, FOR SOLUTION INTRAVENOUS at 21:12

## 2019-04-09 RX ADMIN — ALBUTEROL 2 PUFF(S): 90 AEROSOL, METERED ORAL at 05:12

## 2019-04-09 RX ADMIN — ALBUTEROL 2 PUFF(S): 90 AEROSOL, METERED ORAL at 21:13

## 2019-04-09 RX ADMIN — Medication 975 MILLIGRAM(S): at 05:41

## 2019-04-09 RX ADMIN — BUDESONIDE AND FORMOTEROL FUMARATE DIHYDRATE 2 PUFF(S): 160; 4.5 AEROSOL RESPIRATORY (INHALATION) at 05:12

## 2019-04-09 RX ADMIN — Medication 100 MILLIGRAM(S): at 05:11

## 2019-04-09 RX ADMIN — BUDESONIDE AND FORMOTEROL FUMARATE DIHYDRATE 2 PUFF(S): 160; 4.5 AEROSOL RESPIRATORY (INHALATION) at 18:15

## 2019-04-09 RX ADMIN — CLOPIDOGREL BISULFATE 75 MILLIGRAM(S): 75 TABLET, FILM COATED ORAL at 11:34

## 2019-04-09 NOTE — PROGRESS NOTE ADULT - ASSESSMENT
87 yr old with CKD, COPD, BPH, PPM, Y AVR due to AS, CHF; pt has a UTI several months ago and in the fall and had questionable thrombus or veg on aortic valve   bc were negative at the time     currently admitted lightheadedness and found to have multiple positive blood cultures for vrec.    He also had pain in his right lower flank /back.   pt has an intravascular infection on either the T avr or the ppm    pacemaker was removed    Blood cultures f till growing VRE faecium- Vanco resistant but sensitive to daptomycin  repeat blood cultures sent this AM    MARIA LUISA - was not helpful and does not demonstrate obv vegetation    MRI of lumbar sacral spine- his pain is lower left back near his sacroiliac area non diagnostic   Continue Daptomycin in the 10/mg/kg equivalent dose  check CPK level  ultrasound of testes is negative      culture of ppm is positve for vrec too           Discussed with Dr. Downey  If no other course is found, we will need to repeat MARIA LUISA next week to see if changes evolve.       mri of back negative          ultrasound of  groin is [id number]   to add ceftaroline empirically instead of ampicillin   wbc scan is negative    very careful workup is negative    bc remain positive on ceftaroline and dapto  will need followup up MARIA LUISA    last positive bc are 3/30      ;  I still suspect that this  is endocarditis   can not use Zyvox to treat intravascular infections ( bacteriostatic )  Will request repeat NICK on dapto ( called lab)  most recent bc negative   MARIA LUISA repeated and   is without change   discussed with Dr. Downey again  await MARIA LUISA     no definitve source identified except positve PPM wire  no indication for surgeruy on AVR but I still suspect it may be infected despite the negative MARIA LUISA    Plan 6 weeks of current therapy with PICC   dicussedwith family    needs picc and home care evaluation to make sure that the ab  will be covered  hopefully discharge  when services can be arranged to rehab or home

## 2019-04-09 NOTE — CHART NOTE - NSCHARTNOTEFT_GEN_A_CORE
Discussed with Dr. Hernandez and Dr. Coronado, Eliquis for afib on hold for 48 hours for PICC placement (6 weeks of antibiotic therapy). PICC line consent in chart. Will need to start heparin gtt if Eliquis is held for greater than 48 hours.    Jean Paul Abreu  Spectra-link [id number]

## 2019-04-10 LAB
ANION GAP SERPL CALC-SCNC: 13 MMOL/L — SIGNIFICANT CHANGE UP (ref 5–17)
APTT BLD: 34.4 SEC — SIGNIFICANT CHANGE UP (ref 27.5–36.3)
BUN SERPL-MCNC: 23 MG/DL — SIGNIFICANT CHANGE UP (ref 7–23)
CALCIUM SERPL-MCNC: 9 MG/DL — SIGNIFICANT CHANGE UP (ref 8.4–10.5)
CHLORIDE SERPL-SCNC: 104 MMOL/L — SIGNIFICANT CHANGE UP (ref 96–108)
CO2 SERPL-SCNC: 21 MMOL/L — LOW (ref 22–31)
CREAT SERPL-MCNC: 1.12 MG/DL — SIGNIFICANT CHANGE UP (ref 0.5–1.3)
GLUCOSE SERPL-MCNC: 95 MG/DL — SIGNIFICANT CHANGE UP (ref 70–99)
HCT VFR BLD CALC: 36 % — LOW (ref 39–50)
HGB BLD-MCNC: 11.7 G/DL — LOW (ref 13–17)
INR BLD: 1.59 RATIO — HIGH (ref 0.88–1.16)
MCHC RBC-ENTMCNC: 28.3 PG — SIGNIFICANT CHANGE UP (ref 27–34)
MCHC RBC-ENTMCNC: 32.5 GM/DL — SIGNIFICANT CHANGE UP (ref 32–36)
MCV RBC AUTO: 87.2 FL — SIGNIFICANT CHANGE UP (ref 80–100)
PLATELET # BLD AUTO: 173 K/UL — SIGNIFICANT CHANGE UP (ref 150–400)
POTASSIUM SERPL-MCNC: 4.2 MMOL/L — SIGNIFICANT CHANGE UP (ref 3.5–5.3)
POTASSIUM SERPL-SCNC: 4.2 MMOL/L — SIGNIFICANT CHANGE UP (ref 3.5–5.3)
PROTHROM AB SERPL-ACNC: 18.4 SEC — HIGH (ref 10–12.9)
RBC # BLD: 4.13 M/UL — LOW (ref 4.2–5.8)
RBC # FLD: 13.9 % — SIGNIFICANT CHANGE UP (ref 10.3–14.5)
SODIUM SERPL-SCNC: 138 MMOL/L — SIGNIFICANT CHANGE UP (ref 135–145)
WBC # BLD: 9.6 K/UL — SIGNIFICANT CHANGE UP (ref 3.8–10.5)
WBC # FLD AUTO: 9.6 K/UL — SIGNIFICANT CHANGE UP (ref 3.8–10.5)

## 2019-04-10 PROCEDURE — 99232 SBSQ HOSP IP/OBS MODERATE 35: CPT

## 2019-04-10 RX ADMIN — Medication 100 MILLIGRAM(S): at 05:01

## 2019-04-10 RX ADMIN — SPIRONOLACTONE 25 MILLIGRAM(S): 25 TABLET, FILM COATED ORAL at 05:01

## 2019-04-10 RX ADMIN — SENNA PLUS 2 TABLET(S): 8.6 TABLET ORAL at 21:00

## 2019-04-10 RX ADMIN — CLOPIDOGREL BISULFATE 75 MILLIGRAM(S): 75 TABLET, FILM COATED ORAL at 13:13

## 2019-04-10 RX ADMIN — AMIODARONE HYDROCHLORIDE 200 MILLIGRAM(S): 400 TABLET ORAL at 05:01

## 2019-04-10 RX ADMIN — CEFTAROLINE FOSAMIL 50 MILLIGRAM(S): 600 POWDER, FOR SOLUTION INTRAVENOUS at 15:31

## 2019-04-10 RX ADMIN — Medication 3 MILLIGRAM(S): at 21:00

## 2019-04-10 RX ADMIN — TAMSULOSIN HYDROCHLORIDE 0.4 MILLIGRAM(S): 0.4 CAPSULE ORAL at 21:00

## 2019-04-10 RX ADMIN — ALBUTEROL 2 PUFF(S): 90 AEROSOL, METERED ORAL at 21:00

## 2019-04-10 RX ADMIN — CEFTAROLINE FOSAMIL 50 MILLIGRAM(S): 600 POWDER, FOR SOLUTION INTRAVENOUS at 05:00

## 2019-04-10 RX ADMIN — CEFTAROLINE FOSAMIL 50 MILLIGRAM(S): 600 POWDER, FOR SOLUTION INTRAVENOUS at 22:53

## 2019-04-10 RX ADMIN — POLYETHYLENE GLYCOL 3350 17 GRAM(S): 17 POWDER, FOR SOLUTION ORAL at 13:14

## 2019-04-10 RX ADMIN — ALBUTEROL 2 PUFF(S): 90 AEROSOL, METERED ORAL at 17:29

## 2019-04-10 RX ADMIN — BUDESONIDE AND FORMOTEROL FUMARATE DIHYDRATE 2 PUFF(S): 160; 4.5 AEROSOL RESPIRATORY (INHALATION) at 05:02

## 2019-04-10 RX ADMIN — ALBUTEROL 2 PUFF(S): 90 AEROSOL, METERED ORAL at 13:14

## 2019-04-10 RX ADMIN — PANTOPRAZOLE SODIUM 40 MILLIGRAM(S): 20 TABLET, DELAYED RELEASE ORAL at 05:01

## 2019-04-10 RX ADMIN — ALBUTEROL 2 PUFF(S): 90 AEROSOL, METERED ORAL at 05:02

## 2019-04-10 RX ADMIN — FINASTERIDE 5 MILLIGRAM(S): 5 TABLET, FILM COATED ORAL at 13:13

## 2019-04-10 RX ADMIN — Medication 975 MILLIGRAM(S): at 22:10

## 2019-04-10 RX ADMIN — Medication 50 MILLIGRAM(S): at 05:01

## 2019-04-10 RX ADMIN — MORPHINE SULFATE 2 MILLIGRAM(S): 50 CAPSULE, EXTENDED RELEASE ORAL at 00:00

## 2019-04-10 RX ADMIN — Medication 100 MILLIGRAM(S): at 13:13

## 2019-04-10 RX ADMIN — ATORVASTATIN CALCIUM 40 MILLIGRAM(S): 80 TABLET, FILM COATED ORAL at 21:00

## 2019-04-10 RX ADMIN — Medication 975 MILLIGRAM(S): at 21:00

## 2019-04-10 RX ADMIN — Medication 50 MILLIGRAM(S): at 17:29

## 2019-04-10 RX ADMIN — ALBUTEROL 2 PUFF(S): 90 AEROSOL, METERED ORAL at 09:53

## 2019-04-10 RX ADMIN — BUDESONIDE AND FORMOTEROL FUMARATE DIHYDRATE 2 PUFF(S): 160; 4.5 AEROSOL RESPIRATORY (INHALATION) at 17:29

## 2019-04-10 RX ADMIN — Medication 975 MILLIGRAM(S): at 05:00

## 2019-04-10 RX ADMIN — Medication 100 MILLIGRAM(S): at 21:00

## 2019-04-10 RX ADMIN — DAPTOMYCIN 130 MILLIGRAM(S): 500 INJECTION, POWDER, LYOPHILIZED, FOR SOLUTION INTRAVENOUS at 09:55

## 2019-04-10 RX ADMIN — Medication 975 MILLIGRAM(S): at 13:13

## 2019-04-10 NOTE — PROGRESS NOTE ADULT - ASSESSMENT
87 yr old with CKD, COPD, BPH, PPM, Y AVR due to AS, CHF; pt has a UTI several months ago and in the fall and had questionable thrombus or veg on aortic valve   bc were negative at the time     currently admitted lightheadedness and found to have multiple positive blood cultures for vrec.    He also had pain in his right lower flank /back.   pt has an intravascular infection on either the T avr or the ppm    pacemaker was removed    Blood cultures f till growing VRE faecium- Vanco resistant but sensitive to daptomycin  repeat blood cultures sent this AM    MARIA LUISA - was not helpful and does not demonstrate obv vegetation    MRI of lumbar sacral spine- his pain is lower left back near his sacroiliac area non diagnostic   Continue Daptomycin in the 10/mg/kg equivalent dose  check CPK level  ultrasound of testes is negative      culture of ppm is positve for vrec too           Discussed with Dr. Downey  If no other course is found, we will need to repeat MARIA LUISA next week to see if changes evolve.       mri of back negative          ultrasound of  groin is [id number]   to add ceftaroline empirically instead of ampicillin   wbc scan is negative    very careful workup is negative    bc remain positive on ceftaroline and dapto  will need followup up MARIA LUISA    last positive bc are 3/30      ;  I still suspect that this  is endocarditis   can not use Zyvox to treat intravascular infections ( bacteriostatic )  Will request repeat ELLIS on dapto ( called lab)  most recent bc negative   MARIA LUISA repeated and   is without change   discussed with Dr. Downey again  await MARIA LUISA     no definitve source identified except positve PPM wire  no indication for surgeruy on AVR but I still suspect it may be infected despite the negative MARIA LUISA    Plan 6 weeks of current therapy with PICC   dicussed with family    needs picc and home care evaluation to make sure that the ab  will be covered  hopefully discharge  when services can be arranged to rehab or home   repeat ellis done by lab and all strains are still sensitive to dapto

## 2019-04-10 NOTE — PHARMACOTHERAPY INTERVENTION NOTE - COMMENTS
Patient is receiving daptomycin therapy for 6 weeks. Requested order for CK level to monitor while on daptomycin.

## 2019-04-10 NOTE — CHART NOTE - NSCHARTNOTEFT_GEN_A_CORE
Pt seen for LOS follow-up. He continues with fair po intake/appetite. Denies new food preferences, reports pain has been well-managed. Is amenable to try Mightyshake with each meal 4 oz. for supplemental nutrition.    Pt is an 87 year old male pt with PMH HTN, HLD, remote CVA, COPD, CAD with Stents, CHF, chronic afib on eliquis, chronic systolic heart failure s/p TAVR 11/27/18 presenting to [facility] ED due to weakness. Found to have bacteremia, per ID no definitive source identified except positive PPM wire. S/p PPM removal. Noted plans to repeat MARIA LUISA. Continues on antibiotics at this time.     Source: Patient [x]    Family [ ]     other [x] electronic medical records    Diet: Low Sodium    Patient reports [ ] nausea  [ ] vomiting [ ] diarrhea [ ] constipation  [ ]chewing problems [ ] swallowing issues  [x] other: Denies GI distress at this time, last BM 4/8/19    PO intake:  < 50% [ ] 50-75% [x]   % [ ]  other : [x] pt benefits from meal set-up assist    Source for PO intake [x] Patient [ ] family [x] chart [ ] staff [ ] other    Enteral Parenteral Nutrition: n/a    Current Weight: 189.3 pounds as of bedscale wt 3/27.    Pertinent Medications: MEDICATIONS  (STANDING):  acetaminophen   Tablet .. 975 milliGRAM(s) Oral every 8 hours  ALBUTerol    90 MICROgram(s) HFA Inhaler 2 Puff(s) Inhalation every 4 hours  amiodarone    Tablet 200 milliGRAM(s) Oral daily  atorvastatin 40 milliGRAM(s) Oral at bedtime  buDESOnide  80 MICROgram(s)/formoterol 4.5 MICROgram(s) Inhaler 2 Puff(s) Inhalation two times a day  ceftaroline fosamil IVPB 600 milliGRAM(s) IV Intermittent every 8 hours  clopidogrel Tablet 75 milliGRAM(s) Oral daily  DAPTOmycin IVPB 750 milliGRAM(s) IV Intermittent every 24 hours  docusate sodium 100 milliGRAM(s) Oral three times a day  finasteride 5 milliGRAM(s) Oral daily  lidocaine   Patch 1 Patch Transdermal daily  melatonin 3 milliGRAM(s) Oral at bedtime  metoclopramide Injectable 10 milliGRAM(s) IV Push once  metoprolol tartrate 50 milliGRAM(s) Oral every 12 hours  pantoprazole    Tablet 40 milliGRAM(s) Oral before breakfast  polyethylene glycol 3350 17 Gram(s) Oral daily  senna 2 Tablet(s) Oral at bedtime  sodium chloride 0.9%. 1000 milliLiter(s) (75 mL/Hr) IV Continuous <Continuous>  spironolactone 25 milliGRAM(s) Oral daily  tamsulosin 0.4 milliGRAM(s) Oral at bedtime    MEDICATIONS  (PRN):  methyl salicylate 14%/menthol 6% Topical Ointment 1 Application(s) Topical three times a day PRN right hip pain  morphine  - Injectable 2 milliGRAM(s) IV Push every 6 hours PRN Severe Pain (7 - 10)  ondansetron   Disintegrating Tablet 8 milliGRAM(s) Oral three times a day PRN Nausea and/or Vomiting    Pertinent Labs:  04-10 Na138 mmol/L Glu 95 mg/dL K+ 4.2 mmol/L Cr  1.12 mg/dL BUN 23 mg/dL      Skin: No pressure injuries noted      Estimated Needs:   [x] no change since previous assessment  [ ] recalculated:       Previous Nutrition Diagnosis: Inadequate Oral Intake       Nutrition Diagnosis is [x] ongoing  [ ] resolved [ ] not applicable       New Nutrition Diagnosis: [x] not applicable       Interventions:     1) Recommend continue low sodium diet to promote heart health  2) Will add Mightyshake 4oz. with each meal to provided additional 600 kcal, 18 grams of protein daily       Monitoring and Evaluation:     [x] PO intake [x] Tolerance to diet prescription [x] weights [x] follow up per protocol    [x] other: RD remains available: April Newton MS, RDN, CDN, CDE. #[phone]

## 2019-04-10 NOTE — PROGRESS NOTE ADULT - ASSESSMENT
# Bacteremia, VRE   # SOB and productive cough, R/P PNA VS viral URI   # DEBI   # LE R Hip pain   # COPD exacerbation   # HX of HFrEF, R/O acute on chronic exacerbation   # CAD S/P PCI   # Afib on AC  # S/P TAVR  # HTN  # CVA  #BPH  # HLD     6/6 G + , VRE  repeat Blood Cx positive, cont repeat Blood Cx till negative growth. dapto resistance in some cultures   recent blood Cx negative growth, cont to monitor. no growth for past 3 Blood Cxplan for PICC and total of 6 weeks of ABx   Hold eliquis for 48hrs for PICC line placement   patient is clear for PiCC line placement. no plan for future HD   cw  ceftaroline and dapto   S/P PPM removal, Cx grew g+  MARIA LUISA noted   has back pain, ? osteo/ seeding. MRI of hip noted, no Osteo  MRI lumbar noted, no acute OM CT abd/Pelv noted   repeat MARIA LUISA notd, no acute findings   WBC scan negative   Bone scan done , noted results   sensitivity of initial blood Cx noted, ID f/U appreciated    GI eval appreciated

## 2019-04-11 LAB
ANION GAP SERPL CALC-SCNC: 13 MMOL/L — SIGNIFICANT CHANGE UP (ref 5–17)
BUN SERPL-MCNC: 22 MG/DL — SIGNIFICANT CHANGE UP (ref 7–23)
CALCIUM SERPL-MCNC: 9 MG/DL — SIGNIFICANT CHANGE UP (ref 8.4–10.5)
CHLORIDE SERPL-SCNC: 105 MMOL/L — SIGNIFICANT CHANGE UP (ref 96–108)
CO2 SERPL-SCNC: 20 MMOL/L — LOW (ref 22–31)
CREAT SERPL-MCNC: 1.1 MG/DL — SIGNIFICANT CHANGE UP (ref 0.5–1.3)
GLUCOSE SERPL-MCNC: 81 MG/DL — SIGNIFICANT CHANGE UP (ref 70–99)
HCT VFR BLD CALC: 36.7 % — LOW (ref 39–50)
HGB BLD-MCNC: 12.5 G/DL — LOW (ref 13–17)
MCHC RBC-ENTMCNC: 29.8 PG — SIGNIFICANT CHANGE UP (ref 27–34)
MCHC RBC-ENTMCNC: 34 GM/DL — SIGNIFICANT CHANGE UP (ref 32–36)
MCV RBC AUTO: 87.7 FL — SIGNIFICANT CHANGE UP (ref 80–100)
PLATELET # BLD AUTO: 181 K/UL — SIGNIFICANT CHANGE UP (ref 150–400)
POTASSIUM SERPL-MCNC: 4.4 MMOL/L — SIGNIFICANT CHANGE UP (ref 3.5–5.3)
POTASSIUM SERPL-SCNC: 4.4 MMOL/L — SIGNIFICANT CHANGE UP (ref 3.5–5.3)
RBC # BLD: 4.18 M/UL — LOW (ref 4.2–5.8)
RBC # FLD: 14.3 % — SIGNIFICANT CHANGE UP (ref 10.3–14.5)
SODIUM SERPL-SCNC: 138 MMOL/L — SIGNIFICANT CHANGE UP (ref 135–145)
WBC # BLD: 11.1 K/UL — HIGH (ref 3.8–10.5)
WBC # FLD AUTO: 11.1 K/UL — HIGH (ref 3.8–10.5)

## 2019-04-11 PROCEDURE — 99232 SBSQ HOSP IP/OBS MODERATE 35: CPT

## 2019-04-11 PROCEDURE — 71045 X-RAY EXAM CHEST 1 VIEW: CPT | Mod: 26

## 2019-04-11 RX ORDER — SODIUM CHLORIDE 9 MG/ML
10 INJECTION INTRAMUSCULAR; INTRAVENOUS; SUBCUTANEOUS
Qty: 0 | Refills: 0 | Status: DISCONTINUED | OUTPATIENT
Start: 2019-04-11 | End: 2019-04-16

## 2019-04-11 RX ORDER — DAPTOMYCIN 500 MG/10ML
750 INJECTION, POWDER, LYOPHILIZED, FOR SOLUTION INTRAVENOUS
Qty: 1 | Refills: 0
Start: 2019-04-11 | End: 2019-05-21

## 2019-04-11 RX ORDER — APIXABAN 2.5 MG/1
5 TABLET, FILM COATED ORAL EVERY 12 HOURS
Qty: 0 | Refills: 0 | Status: DISCONTINUED | OUTPATIENT
Start: 2019-04-12 | End: 2019-04-16

## 2019-04-11 RX ORDER — MORPHINE SULFATE 50 MG/1
1 CAPSULE, EXTENDED RELEASE ORAL ONCE
Qty: 0 | Refills: 0 | Status: DISCONTINUED | OUTPATIENT
Start: 2019-04-11 | End: 2019-04-11

## 2019-04-11 RX ORDER — CHLORHEXIDINE GLUCONATE 213 G/1000ML
1 SOLUTION TOPICAL
Qty: 0 | Refills: 0 | Status: DISCONTINUED | OUTPATIENT
Start: 2019-04-11 | End: 2019-04-16

## 2019-04-11 RX ORDER — CEFTAROLINE FOSAMIL 600 MG/20ML
600 POWDER, FOR SOLUTION INTRAVENOUS
Qty: 1 | Refills: 0
Start: 2019-04-11 | End: 2019-05-21

## 2019-04-11 RX ADMIN — MORPHINE SULFATE 1 MILLIGRAM(S): 50 CAPSULE, EXTENDED RELEASE ORAL at 03:41

## 2019-04-11 RX ADMIN — ALBUTEROL 2 PUFF(S): 90 AEROSOL, METERED ORAL at 21:06

## 2019-04-11 RX ADMIN — Medication 100 MILLIGRAM(S): at 21:04

## 2019-04-11 RX ADMIN — CEFTAROLINE FOSAMIL 50 MILLIGRAM(S): 600 POWDER, FOR SOLUTION INTRAVENOUS at 13:57

## 2019-04-11 RX ADMIN — ALBUTEROL 2 PUFF(S): 90 AEROSOL, METERED ORAL at 11:42

## 2019-04-11 RX ADMIN — MORPHINE SULFATE 2 MILLIGRAM(S): 50 CAPSULE, EXTENDED RELEASE ORAL at 00:58

## 2019-04-11 RX ADMIN — AMIODARONE HYDROCHLORIDE 200 MILLIGRAM(S): 400 TABLET ORAL at 05:03

## 2019-04-11 RX ADMIN — Medication 975 MILLIGRAM(S): at 05:03

## 2019-04-11 RX ADMIN — Medication 3 MILLIGRAM(S): at 21:07

## 2019-04-11 RX ADMIN — SPIRONOLACTONE 25 MILLIGRAM(S): 25 TABLET, FILM COATED ORAL at 05:03

## 2019-04-11 RX ADMIN — Medication 100 MILLIGRAM(S): at 05:03

## 2019-04-11 RX ADMIN — CLOPIDOGREL BISULFATE 75 MILLIGRAM(S): 75 TABLET, FILM COATED ORAL at 11:41

## 2019-04-11 RX ADMIN — DAPTOMYCIN 130 MILLIGRAM(S): 500 INJECTION, POWDER, LYOPHILIZED, FOR SOLUTION INTRAVENOUS at 11:36

## 2019-04-11 RX ADMIN — PANTOPRAZOLE SODIUM 40 MILLIGRAM(S): 20 TABLET, DELAYED RELEASE ORAL at 05:03

## 2019-04-11 RX ADMIN — Medication 975 MILLIGRAM(S): at 21:04

## 2019-04-11 RX ADMIN — MORPHINE SULFATE 1 MILLIGRAM(S): 50 CAPSULE, EXTENDED RELEASE ORAL at 04:30

## 2019-04-11 RX ADMIN — LIDOCAINE 1 PATCH: 4 CREAM TOPICAL at 21:05

## 2019-04-11 RX ADMIN — SENNA PLUS 2 TABLET(S): 8.6 TABLET ORAL at 21:04

## 2019-04-11 RX ADMIN — Medication 50 MILLIGRAM(S): at 05:03

## 2019-04-11 RX ADMIN — FINASTERIDE 5 MILLIGRAM(S): 5 TABLET, FILM COATED ORAL at 11:41

## 2019-04-11 RX ADMIN — ATORVASTATIN CALCIUM 40 MILLIGRAM(S): 80 TABLET, FILM COATED ORAL at 21:04

## 2019-04-11 RX ADMIN — ALBUTEROL 2 PUFF(S): 90 AEROSOL, METERED ORAL at 05:04

## 2019-04-11 RX ADMIN — Medication 975 MILLIGRAM(S): at 05:47

## 2019-04-11 RX ADMIN — CEFTAROLINE FOSAMIL 50 MILLIGRAM(S): 600 POWDER, FOR SOLUTION INTRAVENOUS at 05:53

## 2019-04-11 RX ADMIN — Medication 975 MILLIGRAM(S): at 21:50

## 2019-04-11 RX ADMIN — TAMSULOSIN HYDROCHLORIDE 0.4 MILLIGRAM(S): 0.4 CAPSULE ORAL at 21:04

## 2019-04-11 RX ADMIN — Medication 50 MILLIGRAM(S): at 17:51

## 2019-04-11 RX ADMIN — CEFTAROLINE FOSAMIL 50 MILLIGRAM(S): 600 POWDER, FOR SOLUTION INTRAVENOUS at 21:05

## 2019-04-11 RX ADMIN — ALBUTEROL 2 PUFF(S): 90 AEROSOL, METERED ORAL at 02:45

## 2019-04-11 RX ADMIN — BUDESONIDE AND FORMOTEROL FUMARATE DIHYDRATE 2 PUFF(S): 160; 4.5 AEROSOL RESPIRATORY (INHALATION) at 05:04

## 2019-04-11 RX ADMIN — MORPHINE SULFATE 2 MILLIGRAM(S): 50 CAPSULE, EXTENDED RELEASE ORAL at 00:14

## 2019-04-11 NOTE — PROGRESS NOTE ADULT - ASSESSMENT
87 yr old with CKD, COPD, BPH, PPM, Y AVR due to AS, CHF; pt has a UTI several months ago and in the fall and had questionable thrombus or veg on aortic valve   bc were negative at the time     currently admitted lightheadedness and found to have multiple positive blood cultures for vrec.    He also had pain in his right lower flank /back.   pt has an intravascular infection on either the T avr or the ppm    pacemaker was removed    Blood cultures f till growing VRE faecium- Vanco resistant but sensitive to daptomycin  repeat blood cultures sent this AM    SLIME - was not helpful and does not demonstrate obv vegetation    MRI of lumbar sacral spine- his pain is lower left back near his sacroiliac area non diagnostic   Continue Daptomycin in the 10/mg/kg equivalent dose  check CPK level  ultrasound of testes is negative      culture of ppm is positve for vrec too           Discussed with Dr. Downey  If no other course is found, we will need to repeat SLIME next week to see if changes evolve.       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 Downey again  await SLIME     no definitve source identified except positve PPM wire  no indication for surgeruy on AVR but I still suspect it may be infected despite the negative SLIME    Plan 6 weeks of current therapy with PICC   dicussed with family    needs picc and home care evaluation to make sure that the ab  will be covered  hopefully discharge  when services can be arranged to rehab or home   repeat ellis done by lab and all strains are still sensitive to dapto   discussed with [facility]:  we will not epeat another slime but I will not be surprised if he relapses when we complete the IV antibiotics..

## 2019-04-12 PROCEDURE — 99232 SBSQ HOSP IP/OBS MODERATE 35: CPT

## 2019-04-12 PROCEDURE — 73701 CT LOWER EXTREMITY W/DYE: CPT | Mod: 26,RT

## 2019-04-12 RX ADMIN — CEFTAROLINE FOSAMIL 50 MILLIGRAM(S): 600 POWDER, FOR SOLUTION INTRAVENOUS at 05:54

## 2019-04-12 RX ADMIN — CEFTAROLINE FOSAMIL 50 MILLIGRAM(S): 600 POWDER, FOR SOLUTION INTRAVENOUS at 22:03

## 2019-04-12 RX ADMIN — Medication 50 MILLIGRAM(S): at 05:52

## 2019-04-12 RX ADMIN — TAMSULOSIN HYDROCHLORIDE 0.4 MILLIGRAM(S): 0.4 CAPSULE ORAL at 22:03

## 2019-04-12 RX ADMIN — BUDESONIDE AND FORMOTEROL FUMARATE DIHYDRATE 2 PUFF(S): 160; 4.5 AEROSOL RESPIRATORY (INHALATION) at 05:54

## 2019-04-12 RX ADMIN — ALBUTEROL 2 PUFF(S): 90 AEROSOL, METERED ORAL at 11:00

## 2019-04-12 RX ADMIN — POLYETHYLENE GLYCOL 3350 17 GRAM(S): 17 POWDER, FOR SOLUTION ORAL at 11:38

## 2019-04-12 RX ADMIN — LIDOCAINE 1 PATCH: 4 CREAM TOPICAL at 11:38

## 2019-04-12 RX ADMIN — FINASTERIDE 5 MILLIGRAM(S): 5 TABLET, FILM COATED ORAL at 11:38

## 2019-04-12 RX ADMIN — CLOPIDOGREL BISULFATE 75 MILLIGRAM(S): 75 TABLET, FILM COATED ORAL at 11:40

## 2019-04-12 RX ADMIN — Medication 100 MILLIGRAM(S): at 22:04

## 2019-04-12 RX ADMIN — APIXABAN 5 MILLIGRAM(S): 2.5 TABLET, FILM COATED ORAL at 17:37

## 2019-04-12 RX ADMIN — ALBUTEROL 2 PUFF(S): 90 AEROSOL, METERED ORAL at 05:54

## 2019-04-12 RX ADMIN — LIDOCAINE 1 PATCH: 4 CREAM TOPICAL at 23:30

## 2019-04-12 RX ADMIN — Medication 975 MILLIGRAM(S): at 20:48

## 2019-04-12 RX ADMIN — ALBUTEROL 2 PUFF(S): 90 AEROSOL, METERED ORAL at 14:16

## 2019-04-12 RX ADMIN — Medication 50 MILLIGRAM(S): at 17:37

## 2019-04-12 RX ADMIN — LIDOCAINE 1 PATCH: 4 CREAM TOPICAL at 11:01

## 2019-04-12 RX ADMIN — DAPTOMYCIN 130 MILLIGRAM(S): 500 INJECTION, POWDER, LYOPHILIZED, FOR SOLUTION INTRAVENOUS at 11:01

## 2019-04-12 RX ADMIN — Medication 975 MILLIGRAM(S): at 21:18

## 2019-04-12 RX ADMIN — ALBUTEROL 2 PUFF(S): 90 AEROSOL, METERED ORAL at 17:36

## 2019-04-12 RX ADMIN — ALBUTEROL 2 PUFF(S): 90 AEROSOL, METERED ORAL at 22:04

## 2019-04-12 RX ADMIN — Medication 975 MILLIGRAM(S): at 15:00

## 2019-04-12 RX ADMIN — CEFTAROLINE FOSAMIL 50 MILLIGRAM(S): 600 POWDER, FOR SOLUTION INTRAVENOUS at 14:15

## 2019-04-12 RX ADMIN — LIDOCAINE 1 PATCH: 4 CREAM TOPICAL at 07:44

## 2019-04-12 RX ADMIN — CHLORHEXIDINE GLUCONATE 1 APPLICATION(S): 213 SOLUTION TOPICAL at 05:57

## 2019-04-12 RX ADMIN — MORPHINE SULFATE 2 MILLIGRAM(S): 50 CAPSULE, EXTENDED RELEASE ORAL at 22:33

## 2019-04-12 RX ADMIN — SENNA PLUS 2 TABLET(S): 8.6 TABLET ORAL at 22:03

## 2019-04-12 RX ADMIN — Medication 975 MILLIGRAM(S): at 05:51

## 2019-04-12 RX ADMIN — APIXABAN 5 MILLIGRAM(S): 2.5 TABLET, FILM COATED ORAL at 05:51

## 2019-04-12 RX ADMIN — MORPHINE SULFATE 2 MILLIGRAM(S): 50 CAPSULE, EXTENDED RELEASE ORAL at 22:03

## 2019-04-12 RX ADMIN — Medication 975 MILLIGRAM(S): at 14:15

## 2019-04-12 RX ADMIN — PANTOPRAZOLE SODIUM 40 MILLIGRAM(S): 20 TABLET, DELAYED RELEASE ORAL at 05:52

## 2019-04-12 RX ADMIN — ALBUTEROL 2 PUFF(S): 90 AEROSOL, METERED ORAL at 02:18

## 2019-04-12 RX ADMIN — ATORVASTATIN CALCIUM 40 MILLIGRAM(S): 80 TABLET, FILM COATED ORAL at 22:04

## 2019-04-12 RX ADMIN — Medication 3 MILLIGRAM(S): at 22:03

## 2019-04-12 RX ADMIN — LIDOCAINE 1 PATCH: 4 CREAM TOPICAL at 19:37

## 2019-04-12 RX ADMIN — SPIRONOLACTONE 25 MILLIGRAM(S): 25 TABLET, FILM COATED ORAL at 05:51

## 2019-04-12 RX ADMIN — Medication 100 MILLIGRAM(S): at 05:52

## 2019-04-12 RX ADMIN — AMIODARONE HYDROCHLORIDE 200 MILLIGRAM(S): 400 TABLET ORAL at 05:53

## 2019-04-12 RX ADMIN — BUDESONIDE AND FORMOTEROL FUMARATE DIHYDRATE 2 PUFF(S): 160; 4.5 AEROSOL RESPIRATORY (INHALATION) at 17:36

## 2019-04-12 NOTE — PROGRESS NOTE ADULT - ASSESSMENT
87 yr old with CKD, COPD, BPH, PPM, Y AVR due to AS, CHF; pt has a UTI several months ago and in the fall and had questionable thrombus or veg on aortic valve   bc were negative at the time     currently admitted lightheadedness and found to have multiple positive blood cultures for vrec.    He also had pain in his right lower flank /back.   pt has an intravascular infection on either the T avr or the ppm    pacemaker was removed    Blood cultures f till growing VRE faecium- Vanco resistant but sensitive to daptomycin  repeat blood cultures sent this AM    SLIME - was not helpful and does not demonstrate obv vegetation    MRI of lumbar sacral spine- his pain is lower left back near his sacroiliac area non diagnostic   Continue Daptomycin in the 10/mg/kg equivalent dose  check CPK level  ultrasound of testes is negative      culture of ppm is positve for vrec too           Discussed with Dr. Downey  If no other course is found, we will need to repeat SLIME next week to see if changes evolve.       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 Downey again  await SLIME     no definitve source identified except positve PPM wire  no indication for surgeruy on AVR but I still suspect it may be infected despite the negative SLIME    Plan 6 weeks of current therapy with PICC from 3/30    discussed with family       hopefully discharge  when services can be arranged to rehab or home   repeat ellis done by lab and all strains are still sensitive to dapto   [facility] with [facility]:  we will not repeat another slime but I will not be surprised if he relapses when we complete the IV antibiotics..  pt to see me in the office at the end of the ab therapy  discussed with home care

## 2019-04-12 NOTE — PROGRESS NOTE ADULT - ASSESSMENT
# Bacteremia, VRE   # SOB and productive cough, R/P PNA VS viral URI   # DEBI   # LE R Hip pain   # COPD exacerbation   # HX of HFrEF, R/O acute on chronic exacerbation   # CAD S/P PCI   # Afib on AC  # S/P TAVR  # HTN  # CVA  #BPH  # HLD     6/6 G + , VRE  repeat Blood Cx positive, cont repeat Blood Cx till negative growth. dapto resistance in some cultures   recent blood Cx negative growth, cont to monitor. no growth for past 3 Blood Cxplan for PICC and total of 6 weeks of ABx   restarted on eliquis   S/P PICC placement   plan for CT LE for hip pain   cw  ceftaroline and dapto   S/P PPM removal, Cx grew g+  MARIA LUISA noted   has back pain, ? osteo/ seeding. MRI of hip noted, no Osteo  MRI lumbar noted, no acute OM CT abd/Pelv noted   repeat MARIA LUISA notd, no acute findings   WBC scan negative   Bone scan done , noted results   sensitivity of initial blood Cx noted, ID f/U appreciated    GI eval appreciated

## 2019-04-13 LAB
ANION GAP SERPL CALC-SCNC: 14 MMOL/L — SIGNIFICANT CHANGE UP (ref 5–17)
BUN SERPL-MCNC: 17 MG/DL — SIGNIFICANT CHANGE UP (ref 7–23)
CALCIUM SERPL-MCNC: 9.1 MG/DL — SIGNIFICANT CHANGE UP (ref 8.4–10.5)
CHLORIDE SERPL-SCNC: 101 MMOL/L — SIGNIFICANT CHANGE UP (ref 96–108)
CO2 SERPL-SCNC: 21 MMOL/L — LOW (ref 22–31)
CREAT SERPL-MCNC: 0.97 MG/DL — SIGNIFICANT CHANGE UP (ref 0.5–1.3)
GLUCOSE SERPL-MCNC: 75 MG/DL — SIGNIFICANT CHANGE UP (ref 70–99)
HCT VFR BLD CALC: 34.6 % — LOW (ref 39–50)
HGB BLD-MCNC: 11.5 G/DL — LOW (ref 13–17)
MCHC RBC-ENTMCNC: 29.3 PG — SIGNIFICANT CHANGE UP (ref 27–34)
MCHC RBC-ENTMCNC: 33.2 GM/DL — SIGNIFICANT CHANGE UP (ref 32–36)
MCV RBC AUTO: 88.1 FL — SIGNIFICANT CHANGE UP (ref 80–100)
PLATELET # BLD AUTO: 178 K/UL — SIGNIFICANT CHANGE UP (ref 150–400)
POTASSIUM SERPL-MCNC: 4.5 MMOL/L — SIGNIFICANT CHANGE UP (ref 3.5–5.3)
POTASSIUM SERPL-SCNC: 4.5 MMOL/L — SIGNIFICANT CHANGE UP (ref 3.5–5.3)
RBC # BLD: 3.93 M/UL — LOW (ref 4.2–5.8)
RBC # FLD: 14.5 % — SIGNIFICANT CHANGE UP (ref 10.3–14.5)
SODIUM SERPL-SCNC: 136 MMOL/L — SIGNIFICANT CHANGE UP (ref 135–145)
WBC # BLD: 8.2 K/UL — SIGNIFICANT CHANGE UP (ref 3.8–10.5)
WBC # FLD AUTO: 8.2 K/UL — SIGNIFICANT CHANGE UP (ref 3.8–10.5)

## 2019-04-13 RX ORDER — IPRATROPIUM/ALBUTEROL SULFATE 18-103MCG
3 AEROSOL WITH ADAPTER (GRAM) INHALATION EVERY 6 HOURS
Qty: 0 | Refills: 0 | Status: DISCONTINUED | OUTPATIENT
Start: 2019-04-13 | End: 2019-04-16

## 2019-04-13 RX ORDER — FUROSEMIDE 40 MG
20 TABLET ORAL DAILY
Qty: 0 | Refills: 0 | Status: DISCONTINUED | OUTPATIENT
Start: 2019-04-13 | End: 2019-04-15

## 2019-04-13 RX ORDER — KETOROLAC TROMETHAMINE 30 MG/ML
30 SYRINGE (ML) INJECTION EVERY 8 HOURS
Qty: 0 | Refills: 0 | Status: DISCONTINUED | OUTPATIENT
Start: 2019-04-13 | End: 2019-04-14

## 2019-04-13 RX ADMIN — Medication 30 MILLIGRAM(S): at 13:40

## 2019-04-13 RX ADMIN — DAPTOMYCIN 130 MILLIGRAM(S): 500 INJECTION, POWDER, LYOPHILIZED, FOR SOLUTION INTRAVENOUS at 11:05

## 2019-04-13 RX ADMIN — Medication 100 MILLIGRAM(S): at 05:28

## 2019-04-13 RX ADMIN — Medication 30 MILLIGRAM(S): at 22:41

## 2019-04-13 RX ADMIN — ALBUTEROL 2 PUFF(S): 90 AEROSOL, METERED ORAL at 13:41

## 2019-04-13 RX ADMIN — Medication 975 MILLIGRAM(S): at 22:41

## 2019-04-13 RX ADMIN — SPIRONOLACTONE 25 MILLIGRAM(S): 25 TABLET, FILM COATED ORAL at 05:28

## 2019-04-13 RX ADMIN — Medication 975 MILLIGRAM(S): at 05:58

## 2019-04-13 RX ADMIN — Medication 975 MILLIGRAM(S): at 13:40

## 2019-04-13 RX ADMIN — ALBUTEROL 2 PUFF(S): 90 AEROSOL, METERED ORAL at 17:42

## 2019-04-13 RX ADMIN — AMIODARONE HYDROCHLORIDE 200 MILLIGRAM(S): 400 TABLET ORAL at 05:28

## 2019-04-13 RX ADMIN — CEFTAROLINE FOSAMIL 50 MILLIGRAM(S): 600 POWDER, FOR SOLUTION INTRAVENOUS at 22:41

## 2019-04-13 RX ADMIN — Medication 975 MILLIGRAM(S): at 14:20

## 2019-04-13 RX ADMIN — CEFTAROLINE FOSAMIL 50 MILLIGRAM(S): 600 POWDER, FOR SOLUTION INTRAVENOUS at 05:29

## 2019-04-13 RX ADMIN — ALBUTEROL 2 PUFF(S): 90 AEROSOL, METERED ORAL at 21:37

## 2019-04-13 RX ADMIN — LIDOCAINE 1 PATCH: 4 CREAM TOPICAL at 11:04

## 2019-04-13 RX ADMIN — BUDESONIDE AND FORMOTEROL FUMARATE DIHYDRATE 2 PUFF(S): 160; 4.5 AEROSOL RESPIRATORY (INHALATION) at 17:42

## 2019-04-13 RX ADMIN — Medication 975 MILLIGRAM(S): at 05:28

## 2019-04-13 RX ADMIN — ALBUTEROL 2 PUFF(S): 90 AEROSOL, METERED ORAL at 05:29

## 2019-04-13 RX ADMIN — Medication 30 MILLIGRAM(S): at 14:20

## 2019-04-13 RX ADMIN — Medication 100 MILLIGRAM(S): at 13:40

## 2019-04-13 RX ADMIN — LIDOCAINE 1 PATCH: 4 CREAM TOPICAL at 22:51

## 2019-04-13 RX ADMIN — Medication 50 MILLIGRAM(S): at 05:28

## 2019-04-13 RX ADMIN — FINASTERIDE 5 MILLIGRAM(S): 5 TABLET, FILM COATED ORAL at 11:05

## 2019-04-13 RX ADMIN — APIXABAN 5 MILLIGRAM(S): 2.5 TABLET, FILM COATED ORAL at 05:28

## 2019-04-13 RX ADMIN — CEFTAROLINE FOSAMIL 50 MILLIGRAM(S): 600 POWDER, FOR SOLUTION INTRAVENOUS at 13:40

## 2019-04-13 RX ADMIN — Medication 50 MILLIGRAM(S): at 17:43

## 2019-04-13 RX ADMIN — TAMSULOSIN HYDROCHLORIDE 0.4 MILLIGRAM(S): 0.4 CAPSULE ORAL at 22:40

## 2019-04-13 RX ADMIN — BUDESONIDE AND FORMOTEROL FUMARATE DIHYDRATE 2 PUFF(S): 160; 4.5 AEROSOL RESPIRATORY (INHALATION) at 05:29

## 2019-04-13 RX ADMIN — Medication 20 MILLIGRAM(S): at 11:08

## 2019-04-13 RX ADMIN — ALBUTEROL 2 PUFF(S): 90 AEROSOL, METERED ORAL at 11:05

## 2019-04-13 RX ADMIN — CHLORHEXIDINE GLUCONATE 1 APPLICATION(S): 213 SOLUTION TOPICAL at 11:04

## 2019-04-13 RX ADMIN — CLOPIDOGREL BISULFATE 75 MILLIGRAM(S): 75 TABLET, FILM COATED ORAL at 11:05

## 2019-04-13 RX ADMIN — Medication 3 MILLIGRAM(S): at 22:40

## 2019-04-13 RX ADMIN — LIDOCAINE 1 PATCH: 4 CREAM TOPICAL at 19:10

## 2019-04-13 RX ADMIN — APIXABAN 5 MILLIGRAM(S): 2.5 TABLET, FILM COATED ORAL at 17:42

## 2019-04-13 RX ADMIN — POLYETHYLENE GLYCOL 3350 17 GRAM(S): 17 POWDER, FOR SOLUTION ORAL at 11:04

## 2019-04-13 RX ADMIN — ATORVASTATIN CALCIUM 40 MILLIGRAM(S): 80 TABLET, FILM COATED ORAL at 22:40

## 2019-04-13 RX ADMIN — SENNA PLUS 2 TABLET(S): 8.6 TABLET ORAL at 22:40

## 2019-04-13 RX ADMIN — Medication 100 MILLIGRAM(S): at 22:40

## 2019-04-13 RX ADMIN — PANTOPRAZOLE SODIUM 40 MILLIGRAM(S): 20 TABLET, DELAYED RELEASE ORAL at 05:28

## 2019-04-13 NOTE — PROGRESS NOTE ADULT - ASSESSMENT
# Bacteremia, VRE   # SOB and productive cough, R/P PNA VS viral URI   # DEBI   # LE R Hip pain   # COPD exacerbation   # HX of HFrEF, R/O acute on chronic exacerbation   # CAD S/P PCI   # Afib on AC  # S/P TAVR  # HTN  # CVA  #BPH  # HLD     6/6 G + , VRE  repeat Blood Cx positive, cont repeat Blood Cx till negative growth. dapto resistance in some cultures   recent blood Cx negative growth, cont to monitor. no growth for past 3 Blood Cxplan for PICC and total of 6 weeks of ABx   restarted on eliquis   S/P PICC placement    CT LE for hip pain showed bursitis  started on toradol IVP x 24 hrs, will monitor   restarted lasix 20 daily   cw  ceftaroline and dapto   S/P PPM removal, Cx grew g+  MARIA LUISA noted   has back pain, ? osteo/ seeding. MRI of hip noted, no Osteo  MRI lumbar noted, no acute OM CT abd/Pelv noted   repeat MARIA LUISA notd, no acute findings   WBC scan negative   Bone scan done , noted results   sensitivity of initial blood Cx noted, ID f/U appreciated    GI eval appreciated

## 2019-04-14 PROCEDURE — 99232 SBSQ HOSP IP/OBS MODERATE 35: CPT

## 2019-04-14 RX ADMIN — Medication 975 MILLIGRAM(S): at 06:09

## 2019-04-14 RX ADMIN — Medication 20 MILLIGRAM(S): at 05:46

## 2019-04-14 RX ADMIN — CEFTAROLINE FOSAMIL 50 MILLIGRAM(S): 600 POWDER, FOR SOLUTION INTRAVENOUS at 13:11

## 2019-04-14 RX ADMIN — CLOPIDOGREL BISULFATE 75 MILLIGRAM(S): 75 TABLET, FILM COATED ORAL at 11:41

## 2019-04-14 RX ADMIN — BUDESONIDE AND FORMOTEROL FUMARATE DIHYDRATE 2 PUFF(S): 160; 4.5 AEROSOL RESPIRATORY (INHALATION) at 05:47

## 2019-04-14 RX ADMIN — Medication 975 MILLIGRAM(S): at 22:44

## 2019-04-14 RX ADMIN — AMIODARONE HYDROCHLORIDE 200 MILLIGRAM(S): 400 TABLET ORAL at 05:46

## 2019-04-14 RX ADMIN — ATORVASTATIN CALCIUM 40 MILLIGRAM(S): 80 TABLET, FILM COATED ORAL at 22:44

## 2019-04-14 RX ADMIN — Medication 3 MILLIGRAM(S): at 22:44

## 2019-04-14 RX ADMIN — Medication 50 MILLIGRAM(S): at 05:46

## 2019-04-14 RX ADMIN — Medication 100 MILLIGRAM(S): at 05:46

## 2019-04-14 RX ADMIN — TAMSULOSIN HYDROCHLORIDE 0.4 MILLIGRAM(S): 0.4 CAPSULE ORAL at 22:44

## 2019-04-14 RX ADMIN — Medication 975 MILLIGRAM(S): at 13:12

## 2019-04-14 RX ADMIN — CEFTAROLINE FOSAMIL 50 MILLIGRAM(S): 600 POWDER, FOR SOLUTION INTRAVENOUS at 22:44

## 2019-04-14 RX ADMIN — POLYETHYLENE GLYCOL 3350 17 GRAM(S): 17 POWDER, FOR SOLUTION ORAL at 11:41

## 2019-04-14 RX ADMIN — Medication 975 MILLIGRAM(S): at 23:41

## 2019-04-14 RX ADMIN — APIXABAN 5 MILLIGRAM(S): 2.5 TABLET, FILM COATED ORAL at 05:46

## 2019-04-14 RX ADMIN — CEFTAROLINE FOSAMIL 50 MILLIGRAM(S): 600 POWDER, FOR SOLUTION INTRAVENOUS at 06:58

## 2019-04-14 RX ADMIN — Medication 975 MILLIGRAM(S): at 05:47

## 2019-04-14 RX ADMIN — SPIRONOLACTONE 25 MILLIGRAM(S): 25 TABLET, FILM COATED ORAL at 05:46

## 2019-04-14 RX ADMIN — Medication 50 MILLIGRAM(S): at 18:03

## 2019-04-14 RX ADMIN — Medication 975 MILLIGRAM(S): at 13:31

## 2019-04-14 RX ADMIN — Medication 30 MILLIGRAM(S): at 07:07

## 2019-04-14 RX ADMIN — SODIUM CHLORIDE 10 MILLILITER(S): 9 INJECTION INTRAMUSCULAR; INTRAVENOUS; SUBCUTANEOUS at 08:17

## 2019-04-14 RX ADMIN — APIXABAN 5 MILLIGRAM(S): 2.5 TABLET, FILM COATED ORAL at 18:03

## 2019-04-14 RX ADMIN — PANTOPRAZOLE SODIUM 40 MILLIGRAM(S): 20 TABLET, DELAYED RELEASE ORAL at 05:46

## 2019-04-14 RX ADMIN — DAPTOMYCIN 130 MILLIGRAM(S): 500 INJECTION, POWDER, LYOPHILIZED, FOR SOLUTION INTRAVENOUS at 09:38

## 2019-04-14 RX ADMIN — Medication 30 MILLIGRAM(S): at 06:58

## 2019-04-14 RX ADMIN — FINASTERIDE 5 MILLIGRAM(S): 5 TABLET, FILM COATED ORAL at 11:41

## 2019-04-14 NOTE — PROGRESS NOTE ADULT - ASSESSMENT
# Bacteremia, VRE   # SOB and productive cough, R/P PNA VS viral URI   # DEBI   # LE R Hip pain   # COPD exacerbation   # HX of HFrEF, R/O acute on chronic exacerbation   # CAD S/P PCI   # Afib on AC  # S/P TAVR  # HTN  # CVA  #BPH  # HLD     6/6 G + , VRE  repeat Blood Cx positive, cont repeat Blood Cx till negative growth. dapto resistance in some cultures   recent blood Cx negative growth, cont to monitor. no growth for past 3 Blood Cxplan for PICC and total of 6 weeks of ABx   restarted on eliquis   S/P PICC placement    CT LE for hip pain showed bursitis   toradol IVP x 24 hrs, improved pain, can give oral NSAIDS PRN for pain    lasix 20 daily   cw  ceftaroline and dapto   S/P PPM removal, Cx grew g+  MARIA LUISA noted   has back pain OMT,, ? osteo/ seeding. MRI of hip noted, no Osteo  MRI lumbar noted, no acute OM CT abd/Pelv noted   repeat MARIA LUISA notd, no acute findings   WBC scan negative   Bone scan done , noted results   sensitivity of initial blood Cx noted, ID f/U appreciated    GI eval appreciated

## 2019-04-15 PROCEDURE — 99232 SBSQ HOSP IP/OBS MODERATE 35: CPT

## 2019-04-15 RX ADMIN — Medication 3 MILLIGRAM(S): at 21:48

## 2019-04-15 RX ADMIN — Medication 100 MILLIGRAM(S): at 21:48

## 2019-04-15 RX ADMIN — Medication 20 MILLIGRAM(S): at 06:16

## 2019-04-15 RX ADMIN — Medication 975 MILLIGRAM(S): at 23:00

## 2019-04-15 RX ADMIN — SODIUM CHLORIDE 10 MILLILITER(S): 9 INJECTION INTRAMUSCULAR; INTRAVENOUS; SUBCUTANEOUS at 15:31

## 2019-04-15 RX ADMIN — TAMSULOSIN HYDROCHLORIDE 0.4 MILLIGRAM(S): 0.4 CAPSULE ORAL at 21:48

## 2019-04-15 RX ADMIN — LIDOCAINE 1 PATCH: 4 CREAM TOPICAL at 01:29

## 2019-04-15 RX ADMIN — ATORVASTATIN CALCIUM 40 MILLIGRAM(S): 80 TABLET, FILM COATED ORAL at 21:48

## 2019-04-15 RX ADMIN — DAPTOMYCIN 130 MILLIGRAM(S): 500 INJECTION, POWDER, LYOPHILIZED, FOR SOLUTION INTRAVENOUS at 10:32

## 2019-04-15 RX ADMIN — Medication 975 MILLIGRAM(S): at 21:48

## 2019-04-15 RX ADMIN — CLOPIDOGREL BISULFATE 75 MILLIGRAM(S): 75 TABLET, FILM COATED ORAL at 09:12

## 2019-04-15 RX ADMIN — LIDOCAINE 1 PATCH: 4 CREAM TOPICAL at 12:49

## 2019-04-15 RX ADMIN — CEFTAROLINE FOSAMIL 50 MILLIGRAM(S): 600 POWDER, FOR SOLUTION INTRAVENOUS at 21:47

## 2019-04-15 RX ADMIN — Medication 975 MILLIGRAM(S): at 13:01

## 2019-04-15 RX ADMIN — Medication 100 MILLIGRAM(S): at 06:16

## 2019-04-15 RX ADMIN — AMIODARONE HYDROCHLORIDE 200 MILLIGRAM(S): 400 TABLET ORAL at 06:16

## 2019-04-15 RX ADMIN — SODIUM CHLORIDE 10 MILLILITER(S): 9 INJECTION INTRAMUSCULAR; INTRAVENOUS; SUBCUTANEOUS at 21:48

## 2019-04-15 RX ADMIN — BUDESONIDE AND FORMOTEROL FUMARATE DIHYDRATE 2 PUFF(S): 160; 4.5 AEROSOL RESPIRATORY (INHALATION) at 17:25

## 2019-04-15 RX ADMIN — SENNA PLUS 2 TABLET(S): 8.6 TABLET ORAL at 21:47

## 2019-04-15 RX ADMIN — CEFTAROLINE FOSAMIL 50 MILLIGRAM(S): 600 POWDER, FOR SOLUTION INTRAVENOUS at 13:01

## 2019-04-15 RX ADMIN — APIXABAN 5 MILLIGRAM(S): 2.5 TABLET, FILM COATED ORAL at 06:16

## 2019-04-15 RX ADMIN — LIDOCAINE 1 PATCH: 4 CREAM TOPICAL at 06:18

## 2019-04-15 RX ADMIN — APIXABAN 5 MILLIGRAM(S): 2.5 TABLET, FILM COATED ORAL at 17:25

## 2019-04-15 RX ADMIN — PANTOPRAZOLE SODIUM 40 MILLIGRAM(S): 20 TABLET, DELAYED RELEASE ORAL at 06:16

## 2019-04-15 RX ADMIN — SPIRONOLACTONE 25 MILLIGRAM(S): 25 TABLET, FILM COATED ORAL at 06:16

## 2019-04-15 RX ADMIN — Medication 50 MILLIGRAM(S): at 06:16

## 2019-04-15 RX ADMIN — Medication 975 MILLIGRAM(S): at 06:52

## 2019-04-15 RX ADMIN — FINASTERIDE 5 MILLIGRAM(S): 5 TABLET, FILM COATED ORAL at 09:12

## 2019-04-15 RX ADMIN — Medication 975 MILLIGRAM(S): at 06:16

## 2019-04-15 RX ADMIN — BUDESONIDE AND FORMOTEROL FUMARATE DIHYDRATE 2 PUFF(S): 160; 4.5 AEROSOL RESPIRATORY (INHALATION) at 06:17

## 2019-04-15 RX ADMIN — POLYETHYLENE GLYCOL 3350 17 GRAM(S): 17 POWDER, FOR SOLUTION ORAL at 09:12

## 2019-04-15 RX ADMIN — CEFTAROLINE FOSAMIL 50 MILLIGRAM(S): 600 POWDER, FOR SOLUTION INTRAVENOUS at 06:16

## 2019-04-15 NOTE — PROGRESS NOTE ADULT - ASSESSMENT
87 yr old with CKD, COPD, BPH, PPM, Y AVR due to AS, CHF; pt has a UTI several months ago and in the fall and had questionable thrombus or veg on aortic valve   bc were negative at the time     currently admitted lightheadedness and found to have multiple positive blood cultures for vrec.    He also had pain in his right lower flank /back.   pt has an intravascular infection on either the T avr or the ppm    pacemaker was removed    Blood cultures f till growing VRE faecium- Vanco resistant but sensitive to daptomycin  repeat blood cultures sent this AM    SLIME - was not helpful and does not demonstrate obv vegetation    MRI of lumbar sacral spine- his pain is lower left back near his sacroiliac area non diagnostic   Continue Daptomycin in the 10/mg/kg equivalent dose  check CPK level  ultrasound of testes is negative      culture of ppm is positve for vrec too           Discussed with Dr. Downey  If no other course is found, we will need to repeat SLIME next week to see if changes evolve.       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 Downey again  await SLIME     no definitve source identified except positve PPM wire  no indication for surgeruy on AVR but I still suspect it may be infected despite the negative SLIME    Plan 6 weeks of current therapy with PICC from 3/30    discussed with family       hopefully discharge  when services can be arranged to rehab or home   repeat ellis done by lab and all strains are still sensitive to dapto   [facility]ed with [facility]:  we will not repeat another slime but I will not be surprised if he relapses when we complete the IV antibiotics..  pt to see me in the office at the end of the ab therapy  discussed with home care     we cam cut the ceftaroline to q 12 hrs if that helps with the discharge

## 2019-04-16 ENCOUNTER — TRANSCRIPTION ENCOUNTER (OUTPATIENT)
Age: 84
End: 2019-04-16

## 2019-04-16 VITALS
OXYGEN SATURATION: 97 % | TEMPERATURE: 98 F | SYSTOLIC BLOOD PRESSURE: 115 MMHG | HEART RATE: 70 BPM | DIASTOLIC BLOOD PRESSURE: 58 MMHG | RESPIRATION RATE: 18 BRPM

## 2019-04-16 LAB
ANION GAP SERPL CALC-SCNC: 13 MMOL/L — SIGNIFICANT CHANGE UP (ref 5–17)
BUN SERPL-MCNC: 24 MG/DL — HIGH (ref 7–23)
CALCIUM SERPL-MCNC: 8.8 MG/DL — SIGNIFICANT CHANGE UP (ref 8.4–10.5)
CHLORIDE SERPL-SCNC: 102 MMOL/L — SIGNIFICANT CHANGE UP (ref 96–108)
CO2 SERPL-SCNC: 21 MMOL/L — LOW (ref 22–31)
CREAT SERPL-MCNC: 1.17 MG/DL — SIGNIFICANT CHANGE UP (ref 0.5–1.3)
GLUCOSE SERPL-MCNC: 84 MG/DL — SIGNIFICANT CHANGE UP (ref 70–99)
HCT VFR BLD CALC: 30.8 % — LOW (ref 39–50)
HGB BLD-MCNC: 10.6 G/DL — LOW (ref 13–17)
MCHC RBC-ENTMCNC: 30 PG — SIGNIFICANT CHANGE UP (ref 27–34)
MCHC RBC-ENTMCNC: 34.4 GM/DL — SIGNIFICANT CHANGE UP (ref 32–36)
MCV RBC AUTO: 87.1 FL — SIGNIFICANT CHANGE UP (ref 80–100)
PLATELET # BLD AUTO: 178 K/UL — SIGNIFICANT CHANGE UP (ref 150–400)
POTASSIUM SERPL-MCNC: 4.3 MMOL/L — SIGNIFICANT CHANGE UP (ref 3.5–5.3)
POTASSIUM SERPL-SCNC: 4.3 MMOL/L — SIGNIFICANT CHANGE UP (ref 3.5–5.3)
RBC # BLD: 3.53 M/UL — LOW (ref 4.2–5.8)
RBC # FLD: 14.1 % — SIGNIFICANT CHANGE UP (ref 10.3–14.5)
SODIUM SERPL-SCNC: 136 MMOL/L — SIGNIFICANT CHANGE UP (ref 135–145)
WBC # BLD: 7 K/UL — SIGNIFICANT CHANGE UP (ref 3.8–10.5)
WBC # FLD AUTO: 7 K/UL — SIGNIFICANT CHANGE UP (ref 3.8–10.5)

## 2019-04-16 PROCEDURE — 85730 THROMBOPLASTIN TIME PARTIAL: CPT

## 2019-04-16 PROCEDURE — 85014 HEMATOCRIT: CPT

## 2019-04-16 PROCEDURE — A9569: CPT

## 2019-04-16 PROCEDURE — 93312 ECHO TRANSESOPHAGEAL: CPT

## 2019-04-16 PROCEDURE — 84132 ASSAY OF SERUM POTASSIUM: CPT

## 2019-04-16 PROCEDURE — 84443 ASSAY THYROID STIM HORMONE: CPT

## 2019-04-16 PROCEDURE — 84295 ASSAY OF SERUM SODIUM: CPT

## 2019-04-16 PROCEDURE — 72148 MRI LUMBAR SPINE W/O DYE: CPT

## 2019-04-16 PROCEDURE — 82330 ASSAY OF CALCIUM: CPT

## 2019-04-16 PROCEDURE — 72195 MRI PELVIS W/O DYE: CPT

## 2019-04-16 PROCEDURE — 97110 THERAPEUTIC EXERCISES: CPT

## 2019-04-16 PROCEDURE — 93320 DOPPLER ECHO COMPLETE: CPT

## 2019-04-16 PROCEDURE — A9561: CPT

## 2019-04-16 PROCEDURE — 87633 RESP VIRUS 12-25 TARGETS: CPT

## 2019-04-16 PROCEDURE — 85027 COMPLETE CBC AUTOMATED: CPT

## 2019-04-16 PROCEDURE — 86900 BLOOD TYPING SEROLOGIC ABO: CPT

## 2019-04-16 PROCEDURE — 76870 US EXAM SCROTUM: CPT

## 2019-04-16 PROCEDURE — 86923 COMPATIBILITY TEST ELECTRIC: CPT

## 2019-04-16 PROCEDURE — 99285 EMERGENCY DEPT VISIT HI MDM: CPT | Mod: 25

## 2019-04-16 PROCEDURE — 87581 M.PNEUMON DNA AMP PROBE: CPT

## 2019-04-16 PROCEDURE — 93925 LOWER EXTREMITY STUDY: CPT

## 2019-04-16 PROCEDURE — 97530 THERAPEUTIC ACTIVITIES: CPT

## 2019-04-16 PROCEDURE — 82435 ASSAY OF BLOOD CHLORIDE: CPT

## 2019-04-16 PROCEDURE — 94640 AIRWAY INHALATION TREATMENT: CPT

## 2019-04-16 PROCEDURE — 78306 BONE IMAGING WHOLE BODY: CPT

## 2019-04-16 PROCEDURE — 93005 ELECTROCARDIOGRAM TRACING: CPT

## 2019-04-16 PROCEDURE — 82550 ASSAY OF CK (CPK): CPT

## 2019-04-16 PROCEDURE — 87116 MYCOBACTERIA CULTURE: CPT

## 2019-04-16 PROCEDURE — 82803 BLOOD GASES ANY COMBINATION: CPT

## 2019-04-16 PROCEDURE — P9016: CPT

## 2019-04-16 PROCEDURE — 80053 COMPREHEN METABOLIC PANEL: CPT

## 2019-04-16 PROCEDURE — 86850 RBC ANTIBODY SCREEN: CPT

## 2019-04-16 PROCEDURE — 87040 BLOOD CULTURE FOR BACTERIA: CPT

## 2019-04-16 PROCEDURE — 87798 DETECT AGENT NOS DNA AMP: CPT

## 2019-04-16 PROCEDURE — 71250 CT THORAX DX C-: CPT

## 2019-04-16 PROCEDURE — 36569 INSJ PICC 5 YR+ W/O IMAGING: CPT

## 2019-04-16 PROCEDURE — 73701 CT LOWER EXTREMITY W/DYE: CPT

## 2019-04-16 PROCEDURE — 80048 BASIC METABOLIC PNL TOTAL CA: CPT

## 2019-04-16 PROCEDURE — 87186 SC STD MICRODIL/AGAR DIL: CPT

## 2019-04-16 PROCEDURE — 74177 CT ABD & PELVIS W/CONTRAST: CPT

## 2019-04-16 PROCEDURE — 87206 SMEAR FLUORESCENT/ACID STAI: CPT

## 2019-04-16 PROCEDURE — 82553 CREATINE MB FRACTION: CPT

## 2019-04-16 PROCEDURE — 87486 CHLMYD PNEUM DNA AMP PROBE: CPT

## 2019-04-16 PROCEDURE — 93975 VASCULAR STUDY: CPT

## 2019-04-16 PROCEDURE — 82947 ASSAY GLUCOSE BLOOD QUANT: CPT

## 2019-04-16 PROCEDURE — 78802 RP LOCLZJ TUM WHBDY 1 D IMG: CPT

## 2019-04-16 PROCEDURE — C1751: CPT

## 2019-04-16 PROCEDURE — 93325 DOPPLER ECHO COLOR FLOW MAPG: CPT

## 2019-04-16 PROCEDURE — 87150 DNA/RNA AMPLIFIED PROBE: CPT

## 2019-04-16 PROCEDURE — 82962 GLUCOSE BLOOD TEST: CPT

## 2019-04-16 PROCEDURE — 71045 X-RAY EXAM CHEST 1 VIEW: CPT

## 2019-04-16 PROCEDURE — 84145 PROCALCITONIN (PCT): CPT

## 2019-04-16 PROCEDURE — 84484 ASSAY OF TROPONIN QUANT: CPT

## 2019-04-16 PROCEDURE — 87070 CULTURE OTHR SPECIMN AEROBIC: CPT

## 2019-04-16 PROCEDURE — 97161 PT EVAL LOW COMPLEX 20 MIN: CPT

## 2019-04-16 PROCEDURE — 93306 TTE W/DOPPLER COMPLETE: CPT

## 2019-04-16 PROCEDURE — 87631 RESP VIRUS 3-5 TARGETS: CPT

## 2019-04-16 PROCEDURE — 81001 URINALYSIS AUTO W/SCOPE: CPT

## 2019-04-16 PROCEDURE — 78999 UNLISTED MISC PX DX NUC MED: CPT

## 2019-04-16 PROCEDURE — 86901 BLOOD TYPING SEROLOGIC RH(D): CPT

## 2019-04-16 PROCEDURE — 83605 ASSAY OF LACTIC ACID: CPT

## 2019-04-16 PROCEDURE — 85610 PROTHROMBIN TIME: CPT

## 2019-04-16 PROCEDURE — 93308 TTE F-UP OR LMTD: CPT

## 2019-04-16 PROCEDURE — 83880 ASSAY OF NATRIURETIC PEPTIDE: CPT

## 2019-04-16 PROCEDURE — 97116 GAIT TRAINING THERAPY: CPT

## 2019-04-16 RX ORDER — KETOROLAC TROMETHAMINE 30 MG/ML
15 SYRINGE (ML) INJECTION ONCE
Qty: 0 | Refills: 0 | Status: DISCONTINUED | OUTPATIENT
Start: 2019-04-16 | End: 2019-04-16

## 2019-04-16 RX ORDER — METOPROLOL TARTRATE 50 MG
1 TABLET ORAL
Qty: 0 | Refills: 0 | DISCHARGE
Start: 2019-04-16

## 2019-04-16 RX ORDER — PANTOPRAZOLE SODIUM 20 MG/1
1 TABLET, DELAYED RELEASE ORAL
Qty: 0 | Refills: 0 | DISCHARGE
Start: 2019-04-16

## 2019-04-16 RX ORDER — MENTHOL AND METHYL SALICYLATE 10; 30 G/100G; G/100G
1 STICK TOPICAL
Qty: 0 | Refills: 0 | DISCHARGE
Start: 2019-04-16

## 2019-04-16 RX ORDER — ACETAMINOPHEN 500 MG
2 TABLET ORAL
Qty: 0 | Refills: 0 | COMMUNITY

## 2019-04-16 RX ORDER — TAMSULOSIN HYDROCHLORIDE 0.4 MG/1
1 CAPSULE ORAL
Qty: 0 | Refills: 0 | DISCHARGE
Start: 2019-04-16

## 2019-04-16 RX ORDER — SPIRONOLACTONE 25 MG/1
1 TABLET, FILM COATED ORAL
Qty: 0 | Refills: 0 | DISCHARGE
Start: 2019-04-16

## 2019-04-16 RX ORDER — DOCUSATE SODIUM 100 MG
1 CAPSULE ORAL
Qty: 0 | Refills: 0 | DISCHARGE
Start: 2019-04-16

## 2019-04-16 RX ORDER — RANITIDINE HYDROCHLORIDE 150 MG/1
1 TABLET, FILM COATED ORAL
Qty: 0 | Refills: 0 | COMMUNITY

## 2019-04-16 RX ORDER — LIDOCAINE 4 G/100G
1 CREAM TOPICAL
Qty: 0 | Refills: 0 | DISCHARGE
Start: 2019-04-16

## 2019-04-16 RX ORDER — METOPROLOL TARTRATE 50 MG
1 TABLET ORAL
Qty: 0 | Refills: 0 | COMMUNITY

## 2019-04-16 RX ORDER — SENNA PLUS 8.6 MG/1
2 TABLET ORAL
Qty: 0 | Refills: 0 | DISCHARGE
Start: 2019-04-16

## 2019-04-16 RX ORDER — AMIODARONE HYDROCHLORIDE 400 MG/1
1 TABLET ORAL
Qty: 0 | Refills: 0 | DISCHARGE
Start: 2019-04-16

## 2019-04-16 RX ORDER — CLOPIDOGREL BISULFATE 75 MG/1
1 TABLET, FILM COATED ORAL
Qty: 0 | Refills: 0 | DISCHARGE
Start: 2019-04-16

## 2019-04-16 RX ORDER — ACETAMINOPHEN 500 MG
3 TABLET ORAL
Qty: 0 | Refills: 0 | DISCHARGE
Start: 2019-04-16

## 2019-04-16 RX ORDER — TAMSULOSIN HYDROCHLORIDE 0.4 MG/1
1 CAPSULE ORAL
Qty: 0 | Refills: 0 | COMMUNITY

## 2019-04-16 RX ORDER — LANOLIN ALCOHOL/MO/W.PET/CERES
1 CREAM (GRAM) TOPICAL
Qty: 0 | Refills: 0 | DISCHARGE
Start: 2019-04-16

## 2019-04-16 RX ORDER — APIXABAN 2.5 MG/1
1 TABLET, FILM COATED ORAL
Qty: 0 | Refills: 0 | DISCHARGE
Start: 2019-04-16

## 2019-04-16 RX ORDER — CLOPIDOGREL BISULFATE 75 MG/1
1 TABLET, FILM COATED ORAL
Qty: 0 | Refills: 0 | COMMUNITY

## 2019-04-16 RX ORDER — FINASTERIDE 5 MG/1
1 TABLET, FILM COATED ORAL
Qty: 0 | Refills: 0 | DISCHARGE
Start: 2019-04-16

## 2019-04-16 RX ADMIN — DAPTOMYCIN 130 MILLIGRAM(S): 500 INJECTION, POWDER, LYOPHILIZED, FOR SOLUTION INTRAVENOUS at 12:05

## 2019-04-16 RX ADMIN — Medication 50 MILLIGRAM(S): at 06:00

## 2019-04-16 RX ADMIN — CEFTAROLINE FOSAMIL 50 MILLIGRAM(S): 600 POWDER, FOR SOLUTION INTRAVENOUS at 13:40

## 2019-04-16 RX ADMIN — Medication 975 MILLIGRAM(S): at 06:00

## 2019-04-16 RX ADMIN — MENTHOL AND METHYL SALICYLATE 1 APPLICATION(S): 10; 30 STICK TOPICAL at 07:01

## 2019-04-16 RX ADMIN — SPIRONOLACTONE 25 MILLIGRAM(S): 25 TABLET, FILM COATED ORAL at 06:00

## 2019-04-16 RX ADMIN — BUDESONIDE AND FORMOTEROL FUMARATE DIHYDRATE 2 PUFF(S): 160; 4.5 AEROSOL RESPIRATORY (INHALATION) at 06:01

## 2019-04-16 RX ADMIN — Medication 100 MILLIGRAM(S): at 06:00

## 2019-04-16 RX ADMIN — Medication 975 MILLIGRAM(S): at 06:35

## 2019-04-16 RX ADMIN — SODIUM CHLORIDE 10 MILLILITER(S): 9 INJECTION INTRAMUSCULAR; INTRAVENOUS; SUBCUTANEOUS at 06:00

## 2019-04-16 RX ADMIN — FINASTERIDE 5 MILLIGRAM(S): 5 TABLET, FILM COATED ORAL at 12:06

## 2019-04-16 RX ADMIN — AMIODARONE HYDROCHLORIDE 200 MILLIGRAM(S): 400 TABLET ORAL at 06:00

## 2019-04-16 RX ADMIN — APIXABAN 5 MILLIGRAM(S): 2.5 TABLET, FILM COATED ORAL at 06:00

## 2019-04-16 RX ADMIN — PANTOPRAZOLE SODIUM 40 MILLIGRAM(S): 20 TABLET, DELAYED RELEASE ORAL at 06:00

## 2019-04-16 RX ADMIN — CLOPIDOGREL BISULFATE 75 MILLIGRAM(S): 75 TABLET, FILM COATED ORAL at 12:06

## 2019-04-16 RX ADMIN — CHLORHEXIDINE GLUCONATE 1 APPLICATION(S): 213 SOLUTION TOPICAL at 07:02

## 2019-04-16 RX ADMIN — CEFTAROLINE FOSAMIL 50 MILLIGRAM(S): 600 POWDER, FOR SOLUTION INTRAVENOUS at 06:00

## 2019-04-16 RX ADMIN — Medication 100 MILLIGRAM(S): at 13:40

## 2019-04-16 RX ADMIN — POLYETHYLENE GLYCOL 3350 17 GRAM(S): 17 POWDER, FOR SOLUTION ORAL at 12:08

## 2019-04-16 RX ADMIN — Medication 15 MILLIGRAM(S): at 02:25

## 2019-04-16 RX ADMIN — Medication 975 MILLIGRAM(S): at 14:30

## 2019-04-16 RX ADMIN — Medication 975 MILLIGRAM(S): at 13:40

## 2019-04-16 RX ADMIN — Medication 15 MILLIGRAM(S): at 01:41

## 2019-04-16 RX ADMIN — LIDOCAINE 1 PATCH: 4 CREAM TOPICAL at 12:06

## 2019-04-16 NOTE — DISCHARGE NOTE PROVIDER - HOSPITAL COURSE
87 yr old with CKD, COPD, BPH, PPM, Y AVR due to AS, CHF; pt has a UTI several months ago and in the fall and had questionable thrombus or veg on aortic valve   bc were negative at the time     currently admitted lightheadedness and found to have multiple positive blood cultures for vrec.        He also had pain in his right lower flank /back.     pt has an intravascular infection on either the T avr or the ppm        pacemaker was removed      Blood cultures f till growing VRE faecium- Vanco resistant but sensitive to daptomycin    repeat blood cultures sent this AM      SLIME - was not helpful and does not demonstrate obv vegetation      MRI of lumbar sacral spine- his pain is lower left back near his sacroiliac area non diagnostic     Continue Daptomycin in the 10/mg/kg equivalent dose    check CPK level    ultrasound of testes is negative          culture of ppm is positve for vrec too                   Discussed with Dr. Downey  If no other course is found, we will need to repeat SLIME next week to see if changes evolve.           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 Downey again  await SLIME         no definitve source identified except positve PPM wire    no indication for surgeruy on AVR but I still suspect it may be infected despite the negative SLIME        Plan 6 weeks of current therapy with PICC from 3/30      discussed with family             hopefully discharge  when services can be arranged to rehab or home     repeat ellis done by lab and all strains are still sensitive to dapto     discussed with [facility]:  we will not repeat another slime but I will not be surprised if he relapses when we complete the IV antibiotics..    pt to see me in the office at the end of the ab therapy 87 yr old with CKD, COPD, BPH, PPM, Y AVR due to AS, CHF; pt has a UTI several months ago and in the fall and had questionable thrombus or veg on aortic valve   bc were negative at the time     currently admitted lightheadedness and found to have multiple positive blood cultures for vrec.        Seen by ID:    pacemaker was removed      Blood cultures growing VRE faecium- Vanco resistant but sensitive to daptomycin    repeat blood cultures sent this AM      MARIA LUISA - was not helpful and does not demonstrate obv vegetation      MRI of lumbar sacral spine- his pain is lower left back near his sacroiliac area non diagnostic     check CPK level    ultrasound of testes is negative     culture of ppm is positive for vrec too    mri of back negative    ultrasound of  groin is negative     wbc scan is negative    bc remain positive on ceftaroline and dapto  will need followup up MARIA LUISA     last positive bc are 3/30       most recent bc negative    MARIA LUISA repeated and is without change             no definitve source identified except positve PPM wire    no indication for surgeruy on AVR but I still suspect it may be infected despite the negative MARIA LUISA        Plan 6 weeks of current therapy with PICC from 3/30      discussed with family             hopefully discharge  when services can be arranged to rehab or home     repeat ellis done by lab and all strains are still sensitive to dapto     discussed with [facility]:  we will not repeat another maria luisa but I will not be surprised if he relapses when we complete the IV antibiotics..    pt to see me in the office at the end of the ab therapy 87 yr old with CKD, COPD, BPH, PPM, Y AVR due to AS, CHF; pt has a UTI several months ago and in the fall and had questionable thrombus or veg on aortic valve   bc were negative at the time     currently admitted lightheadedness and found to have multiple positive blood cultures for vrec.        Seen by ID:    pacemaker was removed      Blood cultures growing VRE faecium- Vanco resistant but sensitive to daptomycin    repeat blood cultures sent this AM      MARIA LUISA - was not helpful and does not demonstrate obv vegetation      MRI of lumbar sacral spine- his pain is lower left back near his sacroiliac area non diagnostic     check CPK level    ultrasound of testes is negative     culture of ppm is positive for vrec too    mri of back negative    ultrasound of  groin is negative     wbc scan is negative    bc remain positive on ceftaroline and dapto  will need followup up MARIA LUISA     last positive bc are 3/30       most recent bc negative    MARIA LUISA repeated and is without change     no definitve source identified except positve PPM wire    no indication for surgeruy on AVR but I still suspect it may be infected despite the negative MARIA LUISA    Plan 6 weeks of current therapy with PICC from 3/30      discussed with family    CT on 4/12 revealed: Tricompartmental right knee arthrosis with small right knee joint effusion. Suspected right  trochanteric bursitis.                     hopefully discharge  when services can be arranged to rehab or home     repeat ellis done by lab and all strains are still sensitive to dapto     discussed with [facility]:  we will not repeat another maria luisa but I will not be surprised if he relapses when we complete the IV antibiotics..    pt to see me in the office at the end of the ab therapy 87 yr old with CKD, COPD, BPH, PPM, Y AVR due to AS, CHF; pt has a UTI several months ago and in the fall and had questionable thrombus or veg on aortic valve   bc were negative at the time     currently admitted lightheadedness and found to have multiple positive blood cultures for vrec.        Seen by ID:    pacemaker was removed      Blood cultures growing VRE faecium- Vanco resistant but sensitive to daptomycin    repeat blood cultures sent this AM      MARIA LUISA - was not helpful and does not demonstrate obv vegetation      MRI of lumbar sacral spine- his pain is lower left back near his sacroiliac area non diagnostic     check CPK level    ultrasound of testes is negative     culture of ppm is positive for vrec too    mri of back negative    ultrasound of  groin is negative     wbc scan is negative    bc remain positive on ceftaroline and dapto  will need followup up MARIA LUISA     last positive bc are 3/30       most recent bc negative    MARIA LUISA repeated and is without change     Plan 6 weeks of current therapy with PICC from 3/30    CT on 4/12 revealed: Tricompartmental right knee arthrosis with small right knee joint effusion. Suspected right  trochanteric bursitis.                     hopefully discharge  when services can be arranged to rehab or home     repeat ellis done by lab and all strains are still sensitive to dapto     discussed with [facility]:  we will not repeat another maria luisa but I will not be surprised if he relapses when we complete the IV antibiotics..    pt to see me in the office at the end of the ab therapy 87 yr old with CKD, COPD, BPH, PPM, Y AVR due to AS, CHF; pt has a UTI several months ago and in the fall and had questionable thrombus or veg on aortic valve   bc were negative at the time     currently admitted lightheadedness and found to have multiple positive blood cultures for vrec.        Seen by ID:    pacemaker was removed      Blood cultures growing VRE faecium- Vanco resistant but sensitive to daptomycin    repeat blood cultures sent this AM     MARIA LUISA - was not helpful and does not demonstrate obv vegetation      MRI of lumbar sacral spine- his pain is lower left back near his sacroiliac area non diagnostic     check CPK level    ultrasound of testes is negative     culture of ppm is positive for vrec too    mri of back negative    ultrasound of  groin is negative     wbc scan is negative    bc remain positive on ceftaroline and dapto  will need followup up MARIA LUISA     last positive bc are 3/30       most recent bc negative    MARIA LUISA repeated and is without change     Plan 6 weeks of current therapy with PICC from 3/30    CT on 4/12 revealed: Tricompartmental right knee arthrosis with small right knee joint effusion. Suspected right  trochanteric bursitis.                     hopefully discharge  when services can be arranged to rehab or home     repeat ellis done by lab and all strains are still sensitive to dapto     [facility]ed with [facility]:  we will not repeat another maria luisa but I will not be surprised if he relapses when we complete the IV antibiotics..    pt to see me in the office at the end of the ab therapy

## 2019-04-16 NOTE — PROGRESS NOTE ADULT - PROVIDER SPECIALTY LIST ADULT
CT Surgery
Cardiology
Electrophysiology
Gastroenterology
Hospitalist
Hospitalist
Infectious Disease
Internal Medicine
Cardiology
Gastroenterology
Infectious Disease

## 2019-04-16 NOTE — DISCHARGE NOTE NURSING/CASE MANAGEMENT/SOCIAL WORK - NSDCPEEMAIL_GEN_ALL_CORE
[facility] email [email or web address]

## 2019-04-16 NOTE — DISCHARGE NOTE PROVIDER - CARE PROVIDER_API CALL
Diego Owens (MD)  Infectious Disease; Internal Medicine  [address] [place] [address]  Phone: [phone]  Fax: [phone]  Follow Up Time:     Timothy Paredes (DO)  Cardiovascular Disease; Internal Medicine; Nuclear Cardiology  [address]  [place] [address]  Phone: [phone]  Fax: [phone]  Follow Up Time:     Kathleen Vivas  Primary care doctor  Phone: (   )    -  Fax: (   )    -  Follow Up Time: Diego Owens (MD)  Infectious Disease; Internal Medicine  [address] [place] [address]  Phone: [phone]  Fax: [phone]  Follow Up Time:     Timothy Paredes (DO)  Cardiovascular Disease; Internal Medicine; Nuclear Cardiology  [address]  [place] [address]  Phone: [phone]  Fax: [phone]  Follow Up Time:     Rolando Coronado (DO)  Medicine  [address]  [place] [address]  Phone: [phone]  Fax: [phone]  Follow Up Time:

## 2019-04-16 NOTE — DISCHARGE NOTE PROVIDER - NSDCFUADDINST_GEN_ALL_CORE_FT
Follow up with Infectious disease  Follow up with cardiology  Follow up with your Primary care doctor

## 2019-04-16 NOTE — DISCHARGE NOTE NURSING/CASE MANAGEMENT/SOCIAL WORK - NSDCDPATPORTLINK_GEN_ALL_CORE
You can access the [facility] Patient Portal, offered by [facility], by registering with the following website: [email or web address]

## 2019-04-16 NOTE — PROGRESS NOTE ADULT - SUBJECTIVE AND OBJECTIVE BOX
Patient is a 87y old  Male who presents with a chief complaint of SOB, cough (08 Apr 2019 18:44)      INTERVAL HISTORY: feels ok     	  MEDICATIONS:  amiodarone    Tablet 200 milliGRAM(s) Oral daily  metoprolol tartrate 50 milliGRAM(s) Oral every 12 hours  spironolactone 25 milliGRAM(s) Oral daily  tamsulosin 0.4 milliGRAM(s) Oral at bedtime        PHYSICAL EXAM:   T(C): 36.7 (07 Apr 2019 05:30), Max: 36.7 (07 Apr 2019 05:30)  T(F): 98 (07 Apr 2019 05:30), Max: 98 (07 Apr 2019 05:30)  HR: 63 (07 Apr 2019 06:40) (63 - 97)  BP: 113/90 (07 Apr 2019 06:40) (98/60 - 113/90)  BP(mean): --  RR: 18 (07 Apr 2019 05:30) (18 - 18)  SpO2: 96% (07 Apr 2019 05:30) (95% - 96%)        Appearance: Normal	  HEENT:    PERRL, EOMI	  Cardiovascular:  S1 S2, No JVD  Respiratory: Lungs clear to auscultation	  Psychiatry: Alert  Gastrointestinal:  Soft, Non-tender, + BS	  Skin: No rashes, No cyanosis  Extremities:  No edema of LE                                12.0   11.2  )-----------( 173      ( 08 Apr 2019 05:54 )             36.9     04-08    137  |  104  |  28<H>  ----------------------------<  90  4.0   |  21<L>  |  1.26    Ca    9.0      08 Apr 2019 05:54          Labs personally reviewed      Plan:   Bacteremia: ID on board , TTE with no vegetations, s/p PPM removal , MARIA LUISA with no veg present, ABX as per ID Cultures cleared    Afib: c/w eliquis 5 mg BID, s/p  PPM removal, c/w amiodarone and metoprolol     HFpEF: compensated,  PO Tien Paredes DO [facility]  Cardiovascular Medicine  [phone]
RUMA BANEGAS:[id number],   87yMale followed for:  Keppra (Rash)  penicillin (Unknown)    PAST MEDICAL & SURGICAL HISTORY:  CKD (chronic kidney disease) stage 3, GFR 30-59 ml/min  Hepatitis B  PTSD (post-traumatic stress disorder)  Tachy-idris syndrome  TIA (transient ischemic attack): 2010  Seizure  DIEGO (obstructive sleep apnea)  Chronic diastolic HF (heart failure)  Aortic stenosis  CVA (cerebral vascular accident)  Gait instability  COPD (chronic obstructive pulmonary disease)  BPH (benign prostatic hyperplasia)  Asthma  CHF (congestive heart failure)  Atrial fibrillation  Pacemaker: 2015 Medtronic [id number]  [id number]  Hypertension  H/O thalassemia  CAD (coronary artery disease): s/p stent 2010  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  Artificial pacemaker  S/P primary angioplasty with coronary stent: 2012 AND 2017    FAMILY HISTORY:  No pertinent family history in first degree relatives    MEDICATIONS  (STANDING):  ALBUTerol    90 MICROgram(s) HFA Inhaler 2 Puff(s) Inhalation every 4 hours  amiodarone    Tablet 200 milliGRAM(s) Oral daily  apixaban 5 milliGRAM(s) Oral every 12 hours  atorvastatin 40 milliGRAM(s) Oral at bedtime  buDESOnide  80 MICROgram(s)/formoterol 4.5 MICROgram(s) Inhaler 2 Puff(s) Inhalation two times a day  ceftaroline fosamil IVPB 600 milliGRAM(s) IV Intermittent every 8 hours  clopidogrel Tablet 75 milliGRAM(s) Oral daily  DAPTOmycin IVPB 750 milliGRAM(s) IV Intermittent every 24 hours  docusate sodium 100 milliGRAM(s) Oral two times a day  finasteride 5 milliGRAM(s) Oral daily  furosemide    Tablet 40 milliGRAM(s) Oral daily  lidocaine   Patch 1 Patch Transdermal daily  melatonin 3 milliGRAM(s) Oral at bedtime  metoclopramide Injectable 10 milliGRAM(s) IV Push once  metoprolol tartrate 50 milliGRAM(s) Oral every 12 hours  pantoprazole    Tablet 40 milliGRAM(s) Oral before breakfast  polyethylene glycol 3350 17 Gram(s) Oral daily  senna 2 Tablet(s) Oral at bedtime  spironolactone 25 milliGRAM(s) Oral daily  tamsulosin 0.4 milliGRAM(s) Oral at bedtime    MEDICATIONS  (PRN):  methyl salicylate 14%/menthol 6% Topical Ointment 1 Application(s) Topical three times a day PRN right hip pain  morphine  - Injectable 2 milliGRAM(s) IV Push every 6 hours PRN Severe Pain (7 - 10)      Vital Signs Last 24 Hrs  T(C): 36.4 (30 Mar 2019 04:05), Max: 36.8 (29 Mar 2019 14:14)  T(F): 97.5 (30 Mar 2019 04:05), Max: 98.3 (29 Mar 2019 14:14)  HR: 92 (30 Mar 2019 04:05) (90 - 93)  BP: 111/66 (30 Mar 2019 04:05) (106/67 - 113/72)  BP(mean): --  RR: 18 (29 Mar 2019 22:19) (18 - 18)  SpO2: 95% (29 Mar 2019 22:19) (94% - 95%)  nc/at  s1s2  cta  soft, nt, nd no guarding or rebound  no c/c/e    CBC Full  -  ( 29 Mar 2019 17:09 )  WBC Count : 16.7 K/uL  RBC Count : 4.59 M/uL  Hemoglobin : 13.6 g/dL  Hematocrit : 40.0 %  Platelet Count - Automated : 252 K/uL  Mean Cell Volume : 87.1 fl  Mean Cell Hemoglobin : 29.6 pg  Mean Cell Hemoglobin Concentration : 34.0 gm/dL  Auto Neutrophil # : x  Auto Lymphocyte # : x  Auto Monocyte # : x  Auto Eosinophil # : x  Auto Basophil # : x  Auto Neutrophil % : x  Auto Lymphocyte % : x  Auto Monocyte % : x  Auto Eosinophil % : x  Auto Basophil % : x    03-29    136  |  97  |  34<H>  ----------------------------<  146<H>  4.1   |  26  |  1.22    Ca    9.3      29 Mar 2019 17:12
infectious diseases progress note:    Patient is a 87y old  Male who presents with a chief complaint of SOB, cough (28 Mar 2019 13:43)        Shortness of breath        ROS:  CONSTITUTIONAL:  Negative fever or chills, feels well, good appetite  EYES:  Negative  blurry vision or double vision  CARDIOVASCULAR:  Negative for chest pain or palpitations  RESPIRATORY:  Negative for cough, wheezing, or SOB   GASTROINTESTINAL:  Negative for nausea, vomiting, diarrhea, constipation, or abdominal pain  GENITOURINARY:  Negative frequency, urgency or dysuria  NEUROLOGIC:  No headache, confusion, dizziness, lightheadedness    Allergies    Keppra (Rash)  penicillin (Unknown)    Intolerances        ANTIBIOTICS/RELEVANT:  antimicrobials  DAPTOmycin IVPB 750 milliGRAM(s) IV Intermittent every 24 hours    immunologic:    OTHER:  ALBUTerol    90 MICROgram(s) HFA Inhaler 2 Puff(s) Inhalation every 4 hours  amiodarone    Tablet 200 milliGRAM(s) Oral daily  apixaban 5 milliGRAM(s) Oral every 12 hours  atorvastatin 40 milliGRAM(s) Oral at bedtime  buDESOnide  80 MICROgram(s)/formoterol 4.5 MICROgram(s) Inhaler 2 Puff(s) Inhalation two times a day  clopidogrel Tablet 75 milliGRAM(s) Oral daily  docusate sodium 100 milliGRAM(s) Oral two times a day  finasteride 5 milliGRAM(s) Oral daily  furosemide    Tablet 40 milliGRAM(s) Oral daily  HYDROcodone/homatropine Syrup 5 milliLiter(s) Oral every 6 hours PRN  HYDROmorphone  Injectable 1 milliGRAM(s) IV Push every 4 hours PRN  lidocaine   Patch 1 Patch Transdermal daily  melatonin 3 milliGRAM(s) Oral at bedtime  methyl salicylate 14%/menthol 6% Topical Ointment 1 Application(s) Topical three times a day PRN  metoclopramide Injectable 10 milliGRAM(s) IV Push once  metoprolol tartrate 50 milliGRAM(s) Oral every 12 hours  pantoprazole    Tablet 40 milliGRAM(s) Oral before breakfast  polyethylene glycol 3350 17 Gram(s) Oral daily  senna 2 Tablet(s) Oral at bedtime  spironolactone 25 milliGRAM(s) Oral daily  tamsulosin 0.4 milliGRAM(s) Oral at bedtime      Objective:  Vital Signs Last 24 Hrs  T(C): 36.8 (29 Mar 2019 04:15), Max: 36.8 (29 Mar 2019 04:15)  T(F): 98.2 (29 Mar 2019 04:15), Max: 98.2 (29 Mar 2019 04:15)  HR: 85 (29 Mar 2019 07:00) (85 - 98)  BP: 124/78 (29 Mar 2019 07:00) (106/70 - 126/68)  BP(mean): --  RR: 18 (29 Mar 2019 04:15) (18 - 18)  SpO2: 97% (29 Mar 2019 04:15) (96% - 97%)    PHYSICAL EXAM:     Eyes:JESSEE, EOMI  Ear/Nose/Throat: no oral lesion, no sinus tenderness on percussion	  Neck:no JVD, no lymphadenopathy, supple  Respiratory: CTA marci  Cardiovascular: S1S2 RRR, no murmurs  Gastrointestinal:soft, (+) BS, no HSM  Extremities: no inflammation in groin         LABS:                        13.9   13.8  )-----------( 262      ( 28 Mar 2019 06:59 )             42.0     03-28    138  |  99  |  40<H>  ----------------------------<  120<H>  4.3   |  26  |  1.27    Ca    9.6      28 Mar 2019 06:59              MICROBIOLOGY:    RECENT CULTURES:  03-28 @ 10:34 .Blood Blood                No growth to date.    03-27 @ 10:01 .Blood Blood-Peripheral       Growth in aerobic bottle: Gram Positive Cocci in Pairs and Chains           Growth in aerobic bottle: Enterococcus species  Identification and susceptibility to follow.    03-26 @ 09:26 .Blood Blood                No growth to date.    03-25 @ 17:18 .Blood Blood       Growth in anaerobic bottle: Gram Positive Cocci in Pairs and Chains           Growth in anaerobic bottle: Enterococcus faecium (vancomycin resistant)  See previous culture [id number]    03-24 @ 12:23 .Blood Blood       Growth in anaerobic bottle: Gram Positive Cocci in Pairs and Chains           Growth in anaerobic bottle: Enterococcus faecium (vancomycin resistant)  See previous culture [id number]    03-23 @ 14:33 .Blood Blood   NICK    Growth in aerobic bottle: Gram Positive Cocci in Pairs and Chains    Enterococcus faecium (vancomycin resistant)  Enterococcus faecium (vancomycin resistant)     Growth in aerobic bottle: Enterococcus faecium (vancomycin resistant)  See previous culture [id number]    03-22 @ 22:20 .Other Other, pacemaker leads   NICK      Enterococcus faecium (vancomycin resistant)  Enterococcus faecium (vancomycin resistant)     Culture is being performed.          RESPIRATORY CULTURES:              RADIOLOGY & ADDITIONAL STUDIES:        Pager [phone]  After 5 pm/weekends or if no response :[phone]
CC: f/u for now VRE bacteremia    Patient reports still with cough, no chills; c/o low back pain    REVIEW OF SYSTEMS:  All other review of systems negative (Comprehensive ROS)    Antimicrobials Day # 1  DAPTOmycin IVPB        Other Medications Reviewed    T(F): 97.3 (19 @ 03:50), Max: 97.7 (19 @ 18:11)  HR: 82 (19 @ 03:52)  BP: 124/85 (19 @ 03:50)  RR: 18 (19 @ 03:50)  SpO2: 96% (19 @ 03:50)  Wt(kg): --    PHYSICAL EXAM:  General: alert, no acute distress  Eyes:  anicteric, no conjunctival injection, no discharge  Oropharynx: no lesions or injection 	  Neck: supple, without adenopathy  Lungs: diminished BSs, few rales bases  Heart: irregular rate and rhythm; systolic murmur  Abdomen: soft, nondistended, nontender, without mass or organomegaly  Skin: no lesions  Extremities: leg edema  Neurologic: alert, oriented, moves all extremities    LAB RESULTS:                        12.5   19.4  )-----------( 257      ( 21 Mar 2019 06:02 )             37.5     -    136  |  101  |  42<H>  ----------------------------<  135<H>  4.7   |  22  |  1.13    Ca    9.7      21 Mar 2019 06:02    TPro  6.5  /  Alb  3.2<L>  /  TBili  0.3  /  DBili  x   /  AST  29  /  ALT  26  /  AlkPhos  130<H>           Urinalysis Basic - ( 20 Mar 2019 15:13 )    Color: Yellow / Appearance: Clear / S.032 / pH: x  Gluc: x / Ketone: Negative  / Bili: Negative / Urobili: Negative   Blood: x / Protein: 30 mg/dL / Nitrite: Negative   Leuk Esterase: Negative / RBC: 4 /hpf / WBC 4 /HPF   Sq Epi: x / Non Sq Epi: 2 /hpf / Bacteria: Negative      MICROBIOLOGY:  RECENT CULTURES:  Culture - Blood (19 @ 02:46)    Gram Stain:   Growth in anaerobic bottle: Gram Positive Cocci in Pairs and Chains  Growth in aerobic bottle: Gram Positive Cocci in Pairs and Chains     Culture - Blood (19 @ 02:44)    Gram Stain:   Growth in aerobic and anaerobic bottles: Gram Positive Cocci in Pairs and  Chains    -  Vancomycin resistant Enterococcus sp.: Detec    Specimen Source: .Blood Blood    Organism: Blood Culture PCR     Culture - Blood (19 @ 02:44)    Gram Stain:   Growth in aerobic and anaerobic bottles: Gram Positive Cocci in Pairs and  Chains    Specimen Source: .Blood Blood    Culture - Blood (19 @ 21:20)    Specimen Source: .Blood Blood    Culture Results:   No growth at 5 days.      RADIOLOGY REVIEWED:  CT Chest No Cont (19 @ 08:14) >  Few tiny scattered calcified granulomas. Lungs otherwise   clear.    CT Abdomen and Pelvis w/ IV Cont (19 @ 08:48) >  No acute abnormality in the abdomen and pelvis.    Transesophageal Echocardiogram w/o TTE (19 @ 16:02) >  1.Mitral annular calcification and calcified mitral  leaflets with normal diastolic opening. Minimal mitral  regurgitation.  2. Transcatheter aortic valve replacement (TAVR). Valve  leaflets appear to open normally. An independently mobile  echodensity measuring approximately 0.6 cm x 0.4 cm is seen  on the LVOT side of the TAVR, attached to the anterior  aspect of the valve. This echodensity could represent a  small vegetation, thrombus, or may be part of the native  valvular apparatus. Mild paravalvular aortic regurgitation.  3. Left atrial enlargement. Dense spontaneous echo contrast  seen throughout the left atrium. No left atrial or left  atrial appendage thrombus. Decreased (about 20 cm/sec) left  atrial appendage velocities noted.  4. Overall preserved left ventricular systolic function.  The basal inferior and basal inferoseptum appear  hypokinetic.  5. Normal right ventricular size and function. Device wires  are noted in the right heart. Small, linear, mobile  echodensities are seen attached to the RV lead within the  right atirum. These mobile echodensities could represent  fibrous stranding but cannot exlude that they represent  small vegetations depending on clinical scenario.  *** Compared with intra-operative transesophageal  echocardiogram of 2018, images post-TAVR placement  were limited. Mobile echodensity was not see on these  images but may have been present but not visualized.
Covering for Dr. Reggie Franco MD  Interventional Cardiology  [place] Office : [phone] [place] [id number]  Tel:   [place] Office : [address] [facility] [address] [address]  Tel: [phone]  Cell : [phone]    Subjective : Pt lying in bed comfortable, not in distress, denies any chest pain or SOB  	  MEDICATIONS:  amiodarone    Tablet 200 milliGRAM(s) Oral daily  spironolactone 25 milliGRAM(s) Oral daily  tamsulosin 0.4 milliGRAM(s) Oral at bedtime    DAPTOmycin IVPB 750 milliGRAM(s) IV Intermittent every 24 hours    ALBUTerol    90 MICROgram(s) HFA Inhaler 2 Puff(s) Inhalation every 4 hours  buDESOnide  80 MICROgram(s)/formoterol 4.5 MICROgram(s) Inhaler 2 Puff(s) Inhalation two times a day  HYDROcodone/homatropine Syrup 5 milliLiter(s) Oral every 6 hours PRN    melatonin 3 milliGRAM(s) Oral at bedtime  metoclopramide Injectable 10 milliGRAM(s) IV Push once    docusate sodium 100 milliGRAM(s) Oral two times a day  pantoprazole    Tablet 40 milliGRAM(s) Oral before breakfast  senna 2 Tablet(s) Oral at bedtime    atorvastatin 40 milliGRAM(s) Oral at bedtime  finasteride 5 milliGRAM(s) Oral daily    lidocaine   Patch 1 Patch Transdermal daily  methyl salicylate 14%/menthol 6% Topical Ointment 1 Application(s) Topical three times a day PRN      PHYSICAL EXAM:  T(C): 36.5 (03-22-19 @ 18:45), Max: 36.7 (03-21-19 @ 21:22)  HR: 91 (03-22-19 @ 20:15) (81 - 107)  BP: 125/59 (03-22-19 @ 20:15) (90/57 - 150/80)  RR: 18 (03-22-19 @ 20:15) (16 - 18)  SpO2: 100% (03-22-19 @ 20:15) (95% - 100%)  Wt(kg): --  I&O's Summary    22 Mar 2019 07:01  -  22 Mar 2019 20:25  --------------------------------------------------------  IN: 250 mL / OUT: 0 mL / NET: 250 mL      Height (cm): 170.18 (03-22 @ 13:52)  Weight (kg): 94.3 (03-22 @ 13:52)  BMI (kg/m2): 32.6 (03-22 @ 13:52)  BSA (m2): 2.06 (03-22 @ 13:52)    Appearance: Normal	  HEENT:   Normal oral mucosa, PERRL, EOMI	  Cardiovascular: Normal S1 S2, No JVD, No murmurs, No edema  Respiratory: Lungs clear to auscultation	  Gastrointestinal:  Soft, Non-tender, + BS	  Extremities:  No clubbing, cyanosis or edema                                    12.7   23.5  )-----------( 218      ( 22 Mar 2019 07:01 )             38.8     03-22    138  |  104  |  43<H>  ----------------------------<  107<H>  4.9   |  22  |  1.10    Ca    9.6      22 Mar 2019 06:59    TPro  6.5  /  Alb  3.0<L>  /  TBili  0.2  /  DBili  x   /  AST  50<H>  /  ALT  43  /  AlkPhos  130<H>  03-22    proBNP:   Lipid Profile:   HgA1c:   TSH:
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      ROS:  CONSTITUTIONAL:  No fever, good appetite  c/o pain on the right side near his sacroiliac area  CARDIOVASCULAR:  No chest pain or palpitations  RESPIRATORY:  No dyspnea  GASTROINTESTINAL:  No nausea, vomiting, diarrhea, or abdominal pain  GENITOURINARY:  No dysuria  NEUROLOGIC:  No headache,     Allergies    Keppra (Rash)  penicillin (Unknown)    Intolerances        ANTIBIOTICS/RELEVANT:  antimicrobials  DAPTOmycin IVPB 750 milliGRAM(s) IV Intermittent every 24 hours    immunologic:    OTHER:  ALBUTerol    90 MICROgram(s) HFA Inhaler 2 Puff(s) Inhalation every 4 hours  amiodarone    Tablet 200 milliGRAM(s) Oral daily  apixaban 5 milliGRAM(s) Oral every 12 hours  atorvastatin 40 milliGRAM(s) Oral at bedtime  buDESOnide  80 MICROgram(s)/formoterol 4.5 MICROgram(s) Inhaler 2 Puff(s) Inhalation two times a day  clopidogrel Tablet 75 milliGRAM(s) Oral daily  docusate sodium 100 milliGRAM(s) Oral two times a day  finasteride 5 milliGRAM(s) Oral daily  furosemide   Injectable 40 milliGRAM(s) IV Push daily  HYDROcodone/homatropine Syrup 5 milliLiter(s) Oral every 6 hours PRN  lidocaine   Patch 1 Patch Transdermal daily  melatonin 3 milliGRAM(s) Oral at bedtime  methyl salicylate 14%/menthol 6% Topical Ointment 1 Application(s) Topical three times a day PRN  metoclopramide Injectable 10 milliGRAM(s) IV Push once  metoprolol tartrate 25 milliGRAM(s) Oral two times a day  pantoprazole    Tablet 40 milliGRAM(s) Oral before breakfast  saline laxative (FLEET) Rectal Enema 1 Enema Rectal once  senna 2 Tablet(s) Oral at bedtime  spironolactone 25 milliGRAM(s) Oral daily  tamsulosin 0.4 milliGRAM(s) Oral at bedtime      Objective:  Vital Signs Last 24 Hrs  T(C): 36.3 (23 Mar 2019 12:26), Max: 36.5 (22 Mar 2019 18:45)  T(F): 97.3 (23 Mar 2019 12:26), Max: 97.7 (22 Mar 2019 18:45)  HR: 107 (23 Mar 2019 12:26) (81 - 107)  BP: 119/69 (23 Mar 2019 12:26) (104/73 - 135/92)  BP(mean): 97 (22 Mar 2019 21:00) (87 - 98)  RR: 19 (23 Mar 2019 12:26) (16 - 19)  SpO2: 96% (23 Mar 2019 12:26) (94% - 100%)    PHYSICAL EXAM:  Constitutional:no acute distress  Eyes:JESSEE, EOMI  Ear/Nose/Throat: no oral lesions, 	  Respiratory: clear BL  left upper chest s/p pacer removal minimal bruising  Cardiovascular: S1S2 distant HS  Gastrointestinal:soft, (+) BS, no tenderness  Extremities:no e/e/c  pain and tenderness lower right sacroiliac area  No Lymphadenopathy  IV sites not inflammed.    LABS:                        12.6   15.8  )-----------( 248      ( 23 Mar 2019 06:20 )             38.0     03-23    138  |  103  |  39<H>  ----------------------------<  109<H>  5.3   |  24  |  0.96    Ca    9.4      23 Mar 2019 06:20    TPro  6.5  /  Alb  3.0<L>  /  TBili  0.2  /  DBili  x   /  AST  50<H>  /  ALT  43  /  AlkPhos  130<H>  03-22          MICROBIOLOGY:            RECENT CULTURES:  03-22 @ 22:20  .Other Other, pacemaker leads  --  --  --  --  --  03-22 @ 09:40  .Blood Blood  --  --  --    Growth in anaerobic bottle: Gram Positive Cocci in Pairs and Chains  Growth in aerobic bottle: Gram Positive Cocci in Pairs and Chains  --  03-21 @ 10:02  .Blood Blood-Peripheral  --  --  --    Growth in aerobic and anaerobic bottles: Enterococcus faecium (vancomycin  resistant)  See previous culture [id number]  --  03-20 @ 02:46  .Blood Blood  --  --  --    Growth in aerobic and anaerobic bottles: Enterococcus faecium (vancomycin  resistant)  See previous culture [id number]  --  03-20 @ 02:44  .Blood Blood  Blood Culture PCR  Enterococcus faecium (vancomycin resistant)  Blood Culture PCR  PCR    Growth in aerobic and anaerobic bottles: Enterococcus faecium (vancomycin  resistant)  See previous culture [id number]  --      RADIOLOGY & ADDITIONAL STUDIES:    < from: Xray Chest 1 View- PORTABLE-Urgent (03.23.19 @ 12:16) >    FINDINGS/  IMPRESSION:    No consolidation, pleural effusion or pneumothorax. No pulmonary edema.    Interval removal of the left pacer. The cardiac silhouette remains   enlarged.    < end of copied text >  < from: US TTE 2D F/U, Limited w/o Contrast (ED) (03.22.19 @ 16:00) >  INTERPRETATION:  A focused transthoracic cardiac ultrasound examination   was performed on 3/19/18.   No pericardial effusion was present.  No global wall motion abnormality was identified  No Anterior B lines were visualized    IMPRESSION:   No Pericardial Effusion.    < end of copied text >
INTERVAL HPI/OVERNIGHT EVENTS:    MEDICATIONS  (STANDING):  ALBUTerol    90 MICROgram(s) HFA Inhaler 2 Puff(s) Inhalation every 4 hours  amiodarone    Tablet 200 milliGRAM(s) Oral daily  apixaban 5 milliGRAM(s) Oral every 12 hours  atorvastatin 40 milliGRAM(s) Oral at bedtime  buDESOnide  80 MICROgram(s)/formoterol 4.5 MICROgram(s) Inhaler 2 Puff(s) Inhalation two times a day  ceftaroline fosamil IVPB 600 milliGRAM(s) IV Intermittent every 8 hours  clopidogrel Tablet 75 milliGRAM(s) Oral daily  DAPTOmycin IVPB 750 milliGRAM(s) IV Intermittent every 24 hours  docusate sodium 100 milliGRAM(s) Oral three times a day  finasteride 5 milliGRAM(s) Oral daily  lidocaine   Patch 1 Patch Transdermal daily  melatonin 3 milliGRAM(s) Oral at bedtime  metoclopramide Injectable 10 milliGRAM(s) IV Push once  metoprolol tartrate 50 milliGRAM(s) Oral every 12 hours  pantoprazole    Tablet 40 milliGRAM(s) Oral before breakfast  polyethylene glycol 3350 17 Gram(s) Oral daily  senna 2 Tablet(s) Oral at bedtime  sodium chloride 0.9%. 1000 milliLiter(s) (75 mL/Hr) IV Continuous <Continuous>  spironolactone 25 milliGRAM(s) Oral daily  tamsulosin 0.4 milliGRAM(s) Oral at bedtime    MEDICATIONS  (PRN):  methyl salicylate 14%/menthol 6% Topical Ointment 1 Application(s) Topical three times a day PRN right hip pain  morphine  - Injectable 2 milliGRAM(s) IV Push every 6 hours PRN Severe Pain (7 - 10)      Allergies    Keppra (Rash)  penicillin (Unknown)    Intolerances            PHYSICAL EXAM:   Vital Signs:  Vital Signs Last 24 Hrs  T(C): 36.4 (2019 03:54), Max: 36.5 (2019 20:26)  T(F): 97.5 (2019 03:54), Max: 97.7 (2019 20:26)  HR: 75 (2019 03:54) (75 - 98)  BP: 111/77 (2019 03:54) (100/61 - 118/85)  BP(mean): --  RR: 18 (2019 03:54) (18 - 18)  SpO2: 98% (2019 03:54) (97% - 99%)  Daily     Daily Weight in k.9 (2019 11:51)    GENERAL:  no distress  HEENT:  NC/AT,  anicteric  CHEST:   no increased effort, breath sounds clear  HEART:  Regular rhythm  ABDOMEN:  Soft, non-tender, non-distended, normoactive bowel sounds,  no masses ,no hepato-splenomegaly, no signs of chronic liver disease  EXTEREMITIES:  no cyanosis      LABS:                        13.0   15.1  )-----------( 247      ( 2019 06:53 )             37.6     04-02    138  |  101  |  35<H>  ----------------------------<  120<H>  3.9   |  23  |  1.24    Ca    9.2      2019 06:52            RADIOLOGY & ADDITIONAL TESTS:
INTERVAL HPI/OVERNIGHT EVENTS: Pt seen in bed resting comfortably in bed. No complaints voiced.     MEDICATIONS  (STANDING):  acetaminophen 300 mG/codeine 30 mG 1 Tablet(s) Oral once  ALBUTerol    90 MICROgram(s) HFA Inhaler 2 Puff(s) Inhalation every 4 hours  amiodarone    Tablet 200 milliGRAM(s) Oral daily  apixaban 5 milliGRAM(s) Oral every 12 hours  atorvastatin 40 milliGRAM(s) Oral at bedtime  buDESOnide  80 MICROgram(s)/formoterol 4.5 MICROgram(s) Inhaler 2 Puff(s) Inhalation two times a day  clopidogrel Tablet 75 milliGRAM(s) Oral daily  DAPTOmycin IVPB 750 milliGRAM(s) IV Intermittent every 24 hours  docusate sodium 100 milliGRAM(s) Oral two times a day  finasteride 5 milliGRAM(s) Oral daily  furosemide   Injectable 40 milliGRAM(s) IV Push daily  lidocaine   Patch 1 Patch Transdermal daily  melatonin 3 milliGRAM(s) Oral at bedtime  metoclopramide Injectable 10 milliGRAM(s) IV Push once  metoprolol tartrate 25 milliGRAM(s) Oral two times a day  pantoprazole    Tablet 40 milliGRAM(s) Oral before breakfast  saline laxative (FLEET) Rectal Enema 1 Enema Rectal once  senna 2 Tablet(s) Oral at bedtime  spironolactone 25 milliGRAM(s) Oral daily  tamsulosin 0.4 milliGRAM(s) Oral at bedtime    MEDICATIONS  (PRN):  HYDROcodone/homatropine Syrup 5 milliLiter(s) Oral every 6 hours PRN Cough  methyl salicylate 14%/menthol 6% Topical Ointment 1 Application(s) Topical three times a day PRN right hip pain      Allergies    Keppra (Rash)  penicillin (Unknown)    Intolerances      ROS:  General: Pt denies fever/chills  Cardiovascular: denies chest pain/palpitations/leg edema  Respiratory and Thorax: denies SOB/cough/wheezing  Gastrointestinal: denies abdominal pain/diarrhea/constipation/bloody stool  Skin: denies rashes/sores        Vital Signs Last 24 Hrs  T(C): 36.5 (23 Mar 2019 04:25), Max: 36.5 (22 Mar 2019 18:45)  T(F): 97.7 (23 Mar 2019 04:25), Max: 97.7 (22 Mar 2019 18:45)  HR: 104 (23 Mar 2019 04:25) (81 - 104)  BP: 117/81 (23 Mar 2019 04:25) (90/57 - 135/92)  BP(mean): 97 (22 Mar 2019 21:00) (87 - 98)  RR: 18 (23 Mar 2019 04:25) (16 - 18)  SpO2: 99% (23 Mar 2019 04:25) (94% - 100%)    Physical Exam:  Neurological: Alert & Oriented x 3  Respiratory: CTA B/L, No wheezing/crackles/rhonchi  Cardiovascular: (+) S1 & S2, RRR  Gastrointestinal: soft, NT, nondistended, (+) BS  Extremities: No pedal edema, No clubbing, No cyanosis  Skin:  normal skin color and pigmentation, no skin lesions          LABS:                        12.6   15.8  )-----------( 248      ( 23 Mar 2019 06:20 )             38.0     03-23    138  |  103  |  39<H>  ----------------------------<  109<H>  5.3   |  24  |  0.96    Ca    9.4      23 Mar 2019 06:20    TPro  6.5  /  Alb  3.0<L>  /  TBili  0.2  /  DBili  x   /  AST  50<H>  /  ALT  43  /  AlkPhos  130<H>  03-22
INTERVAL HPI/OVERNIGHT EVENTS: c/o nausea this am, no other GI complaints, repeat BC remain neg    MEDICATIONS  (STANDING):  ALBUTerol    90 MICROgram(s) HFA Inhaler 2 Puff(s) Inhalation every 4 hours  amiodarone    Tablet 200 milliGRAM(s) Oral daily  apixaban 5 milliGRAM(s) Oral every 12 hours  atorvastatin 40 milliGRAM(s) Oral at bedtime  buDESOnide  80 MICROgram(s)/formoterol 4.5 MICROgram(s) Inhaler 2 Puff(s) Inhalation two times a day  ceftaroline fosamil IVPB 600 milliGRAM(s) IV Intermittent every 8 hours  clopidogrel Tablet 75 milliGRAM(s) Oral daily  DAPTOmycin IVPB 750 milliGRAM(s) IV Intermittent every 24 hours  docusate sodium 100 milliGRAM(s) Oral three times a day  finasteride 5 milliGRAM(s) Oral daily  lidocaine   Patch 1 Patch Transdermal daily  melatonin 3 milliGRAM(s) Oral at bedtime  metoclopramide Injectable 10 milliGRAM(s) IV Push once  metoprolol tartrate 50 milliGRAM(s) Oral every 12 hours  pantoprazole    Tablet 40 milliGRAM(s) Oral before breakfast  polyethylene glycol 3350 17 Gram(s) Oral daily  senna 2 Tablet(s) Oral at bedtime  sodium chloride 0.9%. 1000 milliLiter(s) (75 mL/Hr) IV Continuous <Continuous>  spironolactone 25 milliGRAM(s) Oral daily  tamsulosin 0.4 milliGRAM(s) Oral at bedtime    MEDICATIONS  (PRN):  methyl salicylate 14%/menthol 6% Topical Ointment 1 Application(s) Topical three times a day PRN right hip pain  morphine  - Injectable 2 milliGRAM(s) IV Push every 6 hours PRN Severe Pain (7 - 10)  ondansetron   Disintegrating Tablet 8 milliGRAM(s) Oral three times a day PRN Nausea and/or Vomiting      Allergies    Keppra (Rash)  penicillin (Unknown)    Intolerances            PHYSICAL EXAM:   Vital Signs:  Vital Signs Last 24 Hrs  T(C): 36.3 (05 Apr 2019 04:26), Max: 36.6 (04 Apr 2019 14:00)  T(F): 97.4 (05 Apr 2019 04:26), Max: 97.8 (04 Apr 2019 14:00)  HR: 83 (05 Apr 2019 06:57) (75 - 94)  BP: 99/60 (05 Apr 2019 06:57) (99/60 - 138/80)  BP(mean): --  RR: 18 (05 Apr 2019 04:26) (16 - 18)  SpO2: 97% (05 Apr 2019 04:26) (95% - 97%)  Daily     Daily     GENERAL:  no distress  HEENT:  NC/AT,  anicteric  CHEST:   no increased effort, breath sounds clear  HEART:  Regular rhythm  ABDOMEN:  Soft, non-tender, non-distended, normoactive bowel sounds,  no masses ,no hepato-splenomegaly, no signs of chronic liver disease  EXTEREMITIES:  no cyanosis      LABS:                        12.4   10.9  )-----------( 197      ( 05 Apr 2019 07:19 )             37.7     04-05    138  |  104  |  25<H>  ----------------------------<  94  4.2   |  22  |  1.21    Ca    9.4      05 Apr 2019 07:19            RADIOLOGY & ADDITIONAL TESTS:
Luis Alfredo Franco MD  Interventional Cardiology  [place] Office : [address] [place] [address]  Tel:   [place] Office : [address] [facility] [place] [address]  Tel: [phone]  Cell : [phone]    Subjective : Pt lying in bed comfortable, not in distress, denies any chest pain or SOB, c/o back pain   	  MEDICATIONS:  amiodarone    Tablet 200 milliGRAM(s) Oral daily  apixaban 5 milliGRAM(s) Oral every 12 hours  clopidogrel Tablet 75 milliGRAM(s) Oral daily  furosemide   Injectable 40 milliGRAM(s) IV Push daily  metoprolol tartrate 25 milliGRAM(s) Oral two times a day  spironolactone 25 milliGRAM(s) Oral daily  tamsulosin 0.4 milliGRAM(s) Oral at bedtime    DAPTOmycin IVPB 750 milliGRAM(s) IV Intermittent every 24 hours    ALBUTerol    90 MICROgram(s) HFA Inhaler 2 Puff(s) Inhalation every 4 hours  buDESOnide  80 MICROgram(s)/formoterol 4.5 MICROgram(s) Inhaler 2 Puff(s) Inhalation two times a day  HYDROcodone/homatropine Syrup 5 milliLiter(s) Oral every 6 hours PRN    melatonin 3 milliGRAM(s) Oral at bedtime  metoclopramide Injectable 10 milliGRAM(s) IV Push once    docusate sodium 100 milliGRAM(s) Oral two times a day  pantoprazole    Tablet 40 milliGRAM(s) Oral before breakfast  senna 2 Tablet(s) Oral at bedtime    atorvastatin 40 milliGRAM(s) Oral at bedtime  finasteride 5 milliGRAM(s) Oral daily    lidocaine   Patch 1 Patch Transdermal daily  methyl salicylate 14%/menthol 6% Topical Ointment 1 Application(s) Topical three times a day PRN      PHYSICAL EXAM:  T(C): 36.5 (03-23-19 @ 20:38), Max: 36.5 (03-23-19 @ 04:25)  HR: 73 (03-23-19 @ 20:38) (73 - 107)  BP: 154/98 (03-23-19 @ 21:13) (97/57 - 154/98)  RR: 18 (03-23-19 @ 20:38) (18 - 19)  SpO2: 96% (03-23-19 @ 20:38) (96% - 99%)  Wt(kg): --  I&O's Summary    22 Mar 2019 07:01  -  23 Mar 2019 07:00  --------------------------------------------------------  IN: 250 mL / OUT: 600 mL / NET: -350 mL    23 Mar 2019 07:01  -  24 Mar 2019 01:54  --------------------------------------------------------  IN: 670 mL / OUT: 300 mL / NET: 370 mL      Appearance: Normal	  HEENT:   Normal oral mucosa, PERRL, EOMI	  Cardiovascular: Normal S1 S2, No JVD, No murmurs, No edema  Respiratory: Lungs clear to auscultation	  Gastrointestinal:  Soft, Non-tender, + BS	  Extremities:  No clubbing, cyanosis or edema                                        12.6   15.8  )-----------( 248      ( 23 Mar 2019 06:20 )             38.0     03-23    138  |  103  |  39<H>  ----------------------------<  109<H>  5.3   |  24  |  0.96    Ca    9.4      23 Mar 2019 06:20    TPro  6.5  /  Alb  3.0<L>  /  TBili  0.2  /  DBili  x   /  AST  50<H>  /  ALT  43  /  AlkPhos  130<H>  03-22    proBNP:   Lipid Profile:   HgA1c:   TSH:
Luis Alfredo Franco MD  Interventional Cardiology  [place] Office : [address] [place] [address]  Tel:   [place] Office : [address] [facility] [place] [address]  Tel: [phone]  Cell : [phone]    Subjective : Pt lying in bed comfortable, not in distress, denies any chest pain or SOB, c/o back pain   	  MEDICATIONS:  amiodarone    Tablet 200 milliGRAM(s) Oral daily  apixaban 5 milliGRAM(s) Oral every 12 hours  clopidogrel Tablet 75 milliGRAM(s) Oral daily  furosemide   Injectable 40 milliGRAM(s) IV Push daily  metoprolol tartrate 50 milliGRAM(s) Oral every 12 hours  spironolactone 25 milliGRAM(s) Oral daily  tamsulosin 0.4 milliGRAM(s) Oral at bedtime    DAPTOmycin IVPB 750 milliGRAM(s) IV Intermittent every 24 hours    ALBUTerol    90 MICROgram(s) HFA Inhaler 2 Puff(s) Inhalation every 4 hours  buDESOnide  80 MICROgram(s)/formoterol 4.5 MICROgram(s) Inhaler 2 Puff(s) Inhalation two times a day  HYDROcodone/homatropine Syrup 5 milliLiter(s) Oral every 6 hours PRN    melatonin 3 milliGRAM(s) Oral at bedtime  metoclopramide Injectable 10 milliGRAM(s) IV Push once    docusate sodium 100 milliGRAM(s) Oral two times a day  pantoprazole    Tablet 40 milliGRAM(s) Oral before breakfast  polyethylene glycol 3350 17 Gram(s) Oral daily  senna 2 Tablet(s) Oral at bedtime    atorvastatin 40 milliGRAM(s) Oral at bedtime  finasteride 5 milliGRAM(s) Oral daily    lidocaine   Patch 1 Patch Transdermal daily  methyl salicylate 14%/menthol 6% Topical Ointment 1 Application(s) Topical three times a day PRN      PHYSICAL EXAM:  T(C): 36.8 (03-24-19 @ 19:00), Max: 37 (03-24-19 @ 05:13)  HR: 89 (03-24-19 @ 21:50) (89 - 98)  BP: 131/97 (03-24-19 @ 21:50) (96/54 - 131/97)  RR: 16 (03-24-19 @ 19:00) (16 - 18)  SpO2: 95% (03-24-19 @ 19:00) (95% - 96%)  Wt(kg): --  I&O's Summary    23 Mar 2019 07:01  -  24 Mar 2019 07:00  --------------------------------------------------------  IN: 790 mL / OUT: 300 mL / NET: 490 mL    24 Mar 2019 07:01  -  25 Mar 2019 00:07  --------------------------------------------------------  IN: 180 mL / OUT: 0 mL / NET: 180 mL      Appearance: Normal	  HEENT:   Normal oral mucosa, PERRL, EOMI	  Cardiovascular: Normal S1 S2, No JVD, No murmurs, No edema  Respiratory: Lungs clear to auscultation	  Gastrointestinal:  Soft, Non-tender, + BS	  Extremities:  No clubbing, cyanosis or edema                                      12.8   12.7  )-----------( 253      ( 24 Mar 2019 08:11 )             40.9     03-24    141  |  102  |  38<H>  ----------------------------<  84  4.6   |  26  |  1.12    Ca    9.5      24 Mar 2019 08:11      proBNP:   Lipid Profile:   HgA1c:   TSH:
MARIA LUISA on 3/26 showed no sign of vegetation on aortic Core-valve.  The patient has persistent bacteremia with VREC.  He complains of right groin and right side lumbar back pain.  He has tenderness in these areas, but no obvious abscess or mass effect.  Agree with continuing antibiotics and repeat MARIA LUISA next week.
Patient is a 87y old  Male who presents with a chief complaint of SOB, cough (01 Apr 2019 13:49)      INTERVAL HISTORY: feels ok    TELEMETRY: no events  	  MEDICATIONS:  amiodarone    Tablet 200 milliGRAM(s) Oral daily  metoprolol tartrate 50 milliGRAM(s) Oral every 12 hours  spironolactone 25 milliGRAM(s) Oral daily  tamsulosin 0.4 milliGRAM(s) Oral at bedtime        PHYSICAL EXAM:  T(C): 36.5 (04-01-19 @ 20:26), Max: 36.5 (04-01-19 @ 20:26)  HR: 98 (04-01-19 @ 20:26) (76 - 98)  BP: 100/61 (04-01-19 @ 20:26) (100/61 - 118/85)  RR: 18 (04-01-19 @ 20:26) (18 - 18)  SpO2: 99% (04-01-19 @ 20:26) (97% - 99%)  Wt(kg): --  I&O's Summary    31 Mar 2019 07:01  -  01 Apr 2019 07:00  --------------------------------------------------------  IN: 370 mL / OUT: 300 mL / NET: 70 mL    01 Apr 2019 07:01  -  01 Apr 2019 22:38  --------------------------------------------------------  IN: 340 mL / OUT: 0 mL / NET: 340 mL          Appearance: Normal	  HEENT:    PERRL, EOMI	  Cardiovascular:  S1 S2, No JVD  Respiratory: Lungs clear to auscultation	  Psychiatry: Alert  Gastrointestinal:  Soft, Non-tender, + BS	  Skin: No rashes, No cyanosis  Extremities:  No edema of LE                                14.1   17.5  )-----------( 268      ( 01 Apr 2019 14:19 )             42.9     04-01    141  |  99  |  34<H>  ----------------------------<  122<H>  4.2   |  26  |  1.26    Ca    9.8      01 Apr 2019 14:19          Labs personally reviewed      Assessment and Plan:   · Assessment		  Pt is a 86 y/o male w/ PMH of HTN, HLD, remote CVA, COPD, CAD with Stents, CHF, chronic afib on eliquis, chronic systolic heart failure s/p TAVR 11/27/18.presenting to [facility] ED today d/t weakness/SOB/cough. patient reports some chills and productive cough with yellow sputum. patient was seen by his cardiologist    # Bacteremia   # HX of HFpEF,   # CAD S/P PCI   # Afib on AC  # S/P TAVR  # HTN  # CVA  #BPH  # HLD     Plan:   Bacteremia: ID on board , TTE with no vegetations, s/p PPM removal , MARIA LUISA with no veg present, ABX as per ID , c/o back pain MRI Spine r/o epidural abscess report pending    Afib: c/w eliquis 5 mg BID, s/p  PPM removal, c/w amiodarone and metoprolol     HFpEF: compensated,  PO Lasix     CTS rec repeat MARIA LUISA this week    GI consult for colonoscopy        Timothy Paredes DO [facility]  Cardiovascular Medicine  [phone]
Patient is a 87y old  Male who presents with a chief complaint of SOB, cough (02 Apr 2019 09:41)      INTERVAL HISTORY: feels ok    	  MEDICATIONS:  amiodarone    Tablet 200 milliGRAM(s) Oral daily  metoprolol tartrate 50 milliGRAM(s) Oral every 12 hours  spironolactone 25 milliGRAM(s) Oral daily  tamsulosin 0.4 milliGRAM(s) Oral at bedtime        PHYSICAL EXAM:  T(C): 36.4 (04-02-19 @ 21:22), Max: 36.4 (04-02-19 @ 03:54)  HR: 67 (04-02-19 @ 21:22) (67 - 94)  BP: 112/62 (04-02-19 @ 21:22) (101/65 - 121/79)  RR: 18 (04-02-19 @ 21:22) (18 - 19)  SpO2: 99% (04-02-19 @ 21:22) (97% - 99%)  Wt(kg): --  I&O's Summary    01 Apr 2019 07:01  -  02 Apr 2019 07:00  --------------------------------------------------------  IN: 340 mL / OUT: 250 mL / NET: 90 mL    02 Apr 2019 07:01  -  02 Apr 2019 23:33  --------------------------------------------------------  IN: 520 mL / OUT: 500 mL / NET: 20 mL          Appearance: Normal	  HEENT:    PERRL, EOMI	  Cardiovascular:  S1 S2, No JVD  Respiratory: Lungs clear to auscultation	  Psychiatry: Alert  Gastrointestinal:  Soft, Non-tender, + BS	  Skin: No rashes, No cyanosis  Extremities:  No edema of LE                                13.0   15.1  )-----------( 247      ( 02 Apr 2019 06:53 )             37.6     04-02    138  |  101  |  35<H>  ----------------------------<  120<H>  3.9   |  23  |  1.24    Ca    9.2      02 Apr 2019 06:52          Labs personally reviewed      Assessment and Plan:   · Assessment		  Pt is a 88 y/o male w/ PMH of HTN, HLD, remote CVA, COPD, CAD with Stents, CHF, chronic afib on eliquis, chronic systolic heart failure s/p TAVR 11/27/18.presenting to [facility] ED today d/t weakness/SOB/cough. patient reports some chills and productive cough with yellow sputum. patient was seen by his cardiologist    # Bacteremia   # HX of HFpEF,   # CAD S/P PCI   # Afib on AC  # S/P TAVR  # HTN  # CVA  #BPH  # HLD     Plan:   Bacteremia: ID on board , TTE with no vegetations, s/p PPM removal , MARIA LUISA with no veg present, ABX as per ID , c/o back pain MRI Spine r/o epidural abscess report pending    Afib: c/w eliquis 5 mg BID, s/p  PPM removal, c/w amiodarone and metoprolol     HFpEF: compensated,  PO Lasix     CTS rec repeat MARIA LUISA, isabel Paredes DO [facility]  Cardiovascular Medicine  [phone]
Patient is a 87y old  Male who presents with a chief complaint of SOB, cough (03 Apr 2019 13:34)      INTERVAL HISTORY: feels ok  	  MEDICATIONS:  amiodarone    Tablet 200 milliGRAM(s) Oral daily  metoprolol tartrate 50 milliGRAM(s) Oral every 12 hours  spironolactone 25 milliGRAM(s) Oral daily  tamsulosin 0.4 milliGRAM(s) Oral at bedtime        PHYSICAL EXAM:  T(C): 36.8 (04-03-19 @ 21:12), Max: 37 (04-03-19 @ 12:32)  HR: 97 (04-03-19 @ 21:12) (65 - 97)  BP: 137/86 (04-03-19 @ 21:12) (102/75 - 137/86)  RR: 18 (04-03-19 @ 21:12) (18 - 18)  SpO2: 99% (04-03-19 @ 21:12) (97% - 99%)  Wt(kg): --  I&O's Summary    02 Apr 2019 07:01  -  03 Apr 2019 07:00  --------------------------------------------------------  IN: 570 mL / OUT: 700 mL / NET: -130 mL          Appearance: Normal	  HEENT:    PERRL, EOMI	  Cardiovascular:  S1 S2, No JVD  Respiratory: Lungs clear to auscultation	  Psychiatry: Alert  Gastrointestinal:  Soft, Non-tender, + BS	  Skin: No rashes, No cyanosis  Extremities:  No edema of LE                                13.0   13.0  )-----------( 211      ( 03 Apr 2019 06:55 )             38.4     04-03    137  |  101  |  32<H>  ----------------------------<  99  4.4   |  22  |  1.31<H>    Ca    9.3      03 Apr 2019 06:55          Labs personally reviewed      Assessment and Plan:   · Assessment		  Pt is a 88 y/o male w/ PMH of HTN, HLD, remote CVA, COPD, CAD with Stents, CHF, chronic afib on eliquis, chronic systolic heart failure s/p TAVR 11/27/18.presenting to [facility] ED today d/t weakness/SOB/cough. patient reports some chills and productive cough with yellow sputum. patient was seen by his cardiologist    # Bacteremia   # HX of HFpEF,   # CAD S/P PCI   # Afib on AC  # S/P TAVR  # HTN  # CVA  #BPH  # HLD     Plan:   Bacteremia: ID on board , TTE with no vegetations, s/p PPM removal , MARIA LUISA with no veg present, ABX as per ID , c/o back pain MRI Spine r/o epidural abscess report pending    Afib: c/w eliquis 5 mg BID, s/p  PPM removal, c/w amiodarone and metoprolol     HFpEF: compensated,  PO Lasix     CTS rec repeat MARIA LUISA, unchanged, bcx now negative        Timothy Paredes DO [facility]  Cardiovascular Medicine  [phone]
Patient is a 87y old  Male who presents with a chief complaint of SOB, cough (04 Apr 2019 13:54)      INTERVAL HISTORY: feels ok    TELEMETRY: af  	  MEDICATIONS:  amiodarone    Tablet 200 milliGRAM(s) Oral daily  metoprolol tartrate 50 milliGRAM(s) Oral every 12 hours  spironolactone 25 milliGRAM(s) Oral daily  tamsulosin 0.4 milliGRAM(s) Oral at bedtime        PHYSICAL EXAM:  T(C): 36.4 (04-04-19 @ 20:16), Max: 36.6 (04-04-19 @ 05:07)  HR: 75 (04-04-19 @ 20:16) (67 - 94)  BP: 104/68 (04-04-19 @ 20:16) (104/68 - 138/80)  RR: 16 (04-04-19 @ 20:16) (16 - 18)  SpO2: 95% (04-04-19 @ 20:16) (95% - 98%)  Wt(kg): --  I&O's Summary    03 Apr 2019 07:01  -  04 Apr 2019 07:00  --------------------------------------------------------  IN: 0 mL / OUT: 250 mL / NET: -250 mL          Appearance: Normal	  HEENT:    PERRL, EOMI	  Cardiovascular:  S1 S2, No JVD  Respiratory: Lungs clear to auscultation	  Psychiatry: Alert  Gastrointestinal:  Soft, Non-tender, + BS	  Skin: No rashes, No cyanosis  Extremities:  No edema of LE                                12.7   10.7  )-----------( 224      ( 04 Apr 2019 06:33 )             41.3     04-04    139  |  104  |  26<H>  ----------------------------<  98  4.2   |  22  |  1.13    Ca    9.4      04 Apr 2019 06:32          Labs personally reviewed      Plan:   Bacteremia: ID on board , TTE with no vegetations, s/p PPM removal , MARIA LUISA with no veg present, ABX as per ID Cultures cleared    Afib: c/w eliquis 5 mg BID, s/p  PPM removal, c/w amiodarone and metoprolol     HFpEF: compensated,  PO Tien Paredes DO [facility]  Cardiovascular Medicine  [phone]
Patient is a 87y old  Male who presents with a chief complaint of SOB, cough (05 Apr 2019 08:59)      INTERVAL HPI/OVERNIGHT EVENTS:  T(C): 36.4 (04-05-19 @ 14:10), Max: 36.4 (04-04-19 @ 20:16)  HR: 69 (04-05-19 @ 14:10) (69 - 91)  BP: 105/60 (04-05-19 @ 14:10) (99/60 - 105/60)  RR: 19 (04-05-19 @ 14:10) (16 - 19)  SpO2: 99% (04-05-19 @ 14:10) (95% - 99%)  Wt(kg): --  I&O's Summary    05 Apr 2019 07:01  -  05 Apr 2019 18:30  --------------------------------------------------------  IN: 790 mL / OUT: 0 mL / NET: 790 mL        LABS:                        12.4   10.9  )-----------( 197      ( 05 Apr 2019 07:19 )             37.7     04-05    138  |  104  |  25<H>  ----------------------------<  94  4.2   |  22  |  1.21    Ca    9.4      05 Apr 2019 07:19          CAPILLARY BLOOD GLUCOSE                MEDICATIONS  (STANDING):  ALBUTerol    90 MICROgram(s) HFA Inhaler 2 Puff(s) Inhalation every 4 hours  amiodarone    Tablet 200 milliGRAM(s) Oral daily  apixaban 5 milliGRAM(s) Oral every 12 hours  atorvastatin 40 milliGRAM(s) Oral at bedtime  buDESOnide  80 MICROgram(s)/formoterol 4.5 MICROgram(s) Inhaler 2 Puff(s) Inhalation two times a day  ceftaroline fosamil IVPB 600 milliGRAM(s) IV Intermittent every 8 hours  clopidogrel Tablet 75 milliGRAM(s) Oral daily  DAPTOmycin IVPB 750 milliGRAM(s) IV Intermittent every 24 hours  docusate sodium 100 milliGRAM(s) Oral three times a day  finasteride 5 milliGRAM(s) Oral daily  lidocaine   Patch 1 Patch Transdermal daily  melatonin 3 milliGRAM(s) Oral at bedtime  metoclopramide Injectable 10 milliGRAM(s) IV Push once  metoprolol tartrate 50 milliGRAM(s) Oral every 12 hours  pantoprazole    Tablet 40 milliGRAM(s) Oral before breakfast  polyethylene glycol 3350 17 Gram(s) Oral daily  senna 2 Tablet(s) Oral at bedtime  sodium chloride 0.9%. 1000 milliLiter(s) (75 mL/Hr) IV Continuous <Continuous>  spironolactone 25 milliGRAM(s) Oral daily  tamsulosin 0.4 milliGRAM(s) Oral at bedtime    MEDICATIONS  (PRN):  methyl salicylate 14%/menthol 6% Topical Ointment 1 Application(s) Topical three times a day PRN right hip pain  morphine  - Injectable 2 milliGRAM(s) IV Push every 6 hours PRN Severe Pain (7 - 10)  ondansetron   Disintegrating Tablet 8 milliGRAM(s) Oral three times a day PRN Nausea and/or Vomiting          PHYSICAL EXAM:  GENERAL: NAD, well-groomed, well-developed  HEAD:  Atraumatic, Normocephalic  CHEST/LUNG: Clear to percussion bilaterally; No rales, rhonchi, wheezing, or rubs  HEART: Regular rate and rhythm; No murmurs, rubs, or gallops  ABDOMEN: Soft, Nontender, Nondistended; Bowel sounds present  EXTREMITIES:  2+ Peripheral Pulses, No clubbing, cyanosis, or edema  LYMPH: No lymphadenopathy noted  SKIN: No rashes or lesions    Care Discussed with Consultants/Other Providers [ ] YES  [ ] NO
Patient is a 87y old  Male who presents with a chief complaint of SOB, cough (05 Apr 2019 18:30)      INTERVAL HISTORY:  asleep comfortably     MEDICATIONS:  amiodarone    Tablet 200 milliGRAM(s) Oral daily  metoprolol tartrate 50 milliGRAM(s) Oral every 12 hours  spironolactone 25 milliGRAM(s) Oral daily  tamsulosin 0.4 milliGRAM(s) Oral at bedtime        PHYSICAL EXAM:  T(C): 36.6 (04-05-19 @ 18:39), Max: 36.6 (04-05-19 @ 18:39)  HR: 92 (04-05-19 @ 18:39) (69 - 92)  BP: 109/57 (04-05-19 @ 18:39) (99/60 - 109/57)  RR: 18 (04-05-19 @ 18:39) (16 - 19)  SpO2: 98% (04-05-19 @ 18:39) (95% - 99%)  Wt(kg): --  I&O's Summary    05 Apr 2019 07:01  -  05 Apr 2019 18:41  --------------------------------------------------------  IN: 790 mL / OUT: 0 mL / NET: 790 mL          Appearance: Normal	  HEENT:    PERRL, EOMI	  Cardiovascular:  S1 S2, No JVD  Respiratory: Lungs clear to auscultation	  Gastrointestinal:  Soft, Non-tender, + BS	  Skin: No rashes, No cyanosis  Extremities:  No edema of LE                                12.4   10.9  )-----------( 197      ( 05 Apr 2019 07:19 )             37.7     04-05    138  |  104  |  25<H>  ----------------------------<  94  4.2   |  22  |  1.21    Ca    9.4      05 Apr 2019 07:19          Labs personally reviewed      Plan:   Bacteremia: ID on board , TTE with no vegetations, s/p PPM removal , MARIA LUISA with no veg present, ABX as per ID Cultures cleared    Afib: c/w eliquis 5 mg BID, s/p  PPM removal, c/w amiodarone and metoprolol     HFpEF: compensated,  PO Tien Paredes DO [facility]  Cardiovascular Medicine  [phone]
Patient is a 87y old  Male who presents with a chief complaint of SOB, cough (08 Apr 2019 11:59)      INTERVAL HPI/OVERNIGHT EVENTS:  T(C): 36.4 (04-08-19 @ 04:03), Max: 36.4 (04-08-19 @ 04:03)  HR: 63 (04-08-19 @ 10:29) (63 - 82)  BP: 110/67 (04-08-19 @ 10:29) (103/63 - 110/67)  RR: 18 (04-08-19 @ 10:29) (17 - 18)  SpO2: 100% (04-08-19 @ 10:29) (95% - 100%)  Wt(kg): --  I&O's Summary    07 Apr 2019 07:01  -  08 Apr 2019 07:00  --------------------------------------------------------  IN: 570 mL / OUT: 0 mL / NET: 570 mL        LABS:                        12.0   11.2  )-----------( 173      ( 08 Apr 2019 05:54 )             36.9     04-08    137  |  104  |  28<H>  ----------------------------<  90  4.0   |  21<L>  |  1.26    Ca    9.0      08 Apr 2019 05:54          CAPILLARY BLOOD GLUCOSE                MEDICATIONS  (STANDING):  acetaminophen   Tablet .. 975 milliGRAM(s) Oral every 8 hours  ALBUTerol    90 MICROgram(s) HFA Inhaler 2 Puff(s) Inhalation every 4 hours  amiodarone    Tablet 200 milliGRAM(s) Oral daily  apixaban 5 milliGRAM(s) Oral every 12 hours  atorvastatin 40 milliGRAM(s) Oral at bedtime  buDESOnide  80 MICROgram(s)/formoterol 4.5 MICROgram(s) Inhaler 2 Puff(s) Inhalation two times a day  ceftaroline fosamil IVPB 600 milliGRAM(s) IV Intermittent every 8 hours  clopidogrel Tablet 75 milliGRAM(s) Oral daily  DAPTOmycin IVPB 750 milliGRAM(s) IV Intermittent every 24 hours  docusate sodium 100 milliGRAM(s) Oral three times a day  finasteride 5 milliGRAM(s) Oral daily  lidocaine   Patch 1 Patch Transdermal daily  melatonin 3 milliGRAM(s) Oral at bedtime  metoclopramide Injectable 10 milliGRAM(s) IV Push once  metoprolol tartrate 50 milliGRAM(s) Oral every 12 hours  pantoprazole    Tablet 40 milliGRAM(s) Oral before breakfast  polyethylene glycol 3350 17 Gram(s) Oral daily  senna 2 Tablet(s) Oral at bedtime  sodium chloride 0.9%. 1000 milliLiter(s) (75 mL/Hr) IV Continuous <Continuous>  spironolactone 25 milliGRAM(s) Oral daily  tamsulosin 0.4 milliGRAM(s) Oral at bedtime    MEDICATIONS  (PRN):  methyl salicylate 14%/menthol 6% Topical Ointment 1 Application(s) Topical three times a day PRN right hip pain  morphine  - Injectable 2 milliGRAM(s) IV Push every 6 hours PRN Severe Pain (7 - 10)  ondansetron   Disintegrating Tablet 8 milliGRAM(s) Oral three times a day PRN Nausea and/or Vomiting          PHYSICAL EXAM:  GENERAL: frail  CHEST/LUNG: Clear to percussion bilaterally; No rales, rhonchi, wheezing, or rubs  HEART: Regular rate and rhythm; No murmurs, rubs, or gallops  ABDOMEN: Soft, Nontender, Nondistended; Bowel sounds present  EXTREMITIES:  2+ Peripheral Pulses, No clubbing, cyanosis, or edema  LYMPH: No lymphadenopathy noted  SKIN: No rashes or lesions    Care Discussed with Consultants/Other Providers [ ] YES  [ ] NO
Patient is a 87y old  Male who presents with a chief complaint of SOB, cough (08 Apr 2019 18:32)      INTERVAL HISTORY: feels better    MEDICATIONS:  amiodarone    Tablet 200 milliGRAM(s) Oral daily  metoprolol tartrate 50 milliGRAM(s) Oral every 12 hours  spironolactone 25 milliGRAM(s) Oral daily  tamsulosin 0.4 milliGRAM(s) Oral at bedtime        PHYSICAL EXAM:  T(C): 36.3 (04-08-19 @ 20:18), Max: 36.4 (04-08-19 @ 04:03)  HR: 69 (04-08-19 @ 20:18) (63 - 77)  BP: 115/52 (04-08-19 @ 20:18) (105/65 - 115/52)  RR: 17 (04-08-19 @ 20:18) (17 - 18)  SpO2: 95% (04-08-19 @ 20:18) (95% - 100%)  Wt(kg): --  I&O's Summary    07 Apr 2019 07:01  -  08 Apr 2019 07:00  --------------------------------------------------------  IN: 570 mL / OUT: 0 mL / NET: 570 mL    08 Apr 2019 07:01  -  08 Apr 2019 21:44  --------------------------------------------------------  IN: 240 mL / OUT: 0 mL / NET: 240 mL          Appearance: Normal	  HEENT:    PERRL, EOMI	  Cardiovascular:  S1 S2, No JVD  Respiratory: Lungs clear to auscultation	  Psychiatry: Alert  Gastrointestinal:  Soft, Non-tender, + BS	  Skin: No rashes, No cyanosis  Extremities:  No edema of LE                                12.0   11.2  )-----------( 173      ( 08 Apr 2019 05:54 )             36.9     04-08    137  |  104  |  28<H>  ----------------------------<  90  4.0   |  21<L>  |  1.26    Ca    9.0      08 Apr 2019 05:54          Labs personally reviewed      Plan:   Bacteremia: ID on board , TTE with no vegetations, s/p PPM removal , MARIA LUISA with no veg present, ABX as per ID Cultures cleared    Afib: c/w eliquis 5 mg BID, s/p  PPM removal, c/w amiodarone and metoprolol     HFpEF: compensated,  PO Lasix     PICC line and discharge planning        Timothy Paredes DO [facility]  Cardiovascular Medicine  [phone]
Patient is a 87y old  Male who presents with a chief complaint of SOB, cough (09 Apr 2019 16:47)       	  MEDICATIONS:  amiodarone    Tablet 200 milliGRAM(s) Oral daily  metoprolol tartrate 50 milliGRAM(s) Oral every 12 hours  spironolactone 25 milliGRAM(s) Oral daily  tamsulosin 0.4 milliGRAM(s) Oral at bedtime        PHYSICAL EXAM:  T(C): 36.7 (04-09-19 @ 20:36), Max: 36.7 (04-09-19 @ 20:36)  HR: 85 (04-09-19 @ 20:36) (83 - 94)  BP: 115/66 (04-09-19 @ 20:36) (100/58 - 115/66)  RR: 18 (04-09-19 @ 20:36) (18 - 18)  SpO2: 97% (04-09-19 @ 20:36) (95% - 97%)  Wt(kg): --  I&O's Summary    08 Apr 2019 07:01  -  09 Apr 2019 07:00  --------------------------------------------------------  IN: 240 mL / OUT: 0 mL / NET: 240 mL          Appearance: Normal	  HEENT:    PERRL, EOMI	  Cardiovascular:  S1 S2, No JVD  Respiratory: Lungs clear to auscultation	  Psychiatry: Alert  Gastrointestinal:  Soft, Non-tender, + BS	  Skin: No rashes, No cyanosis  Extremities:  No edema of LE                                12.0   11.2  )-----------( 173      ( 08 Apr 2019 05:54 )             36.9     04-08    137  |  104  |  28<H>  ----------------------------<  90  4.0   |  21<L>  |  1.26    Ca    9.0      08 Apr 2019 05:54          Labs personally reviewed      Assessment plan:   Bacteremia: ID on board , TTE with no vegetations, s/p PPM removal , MARIA LUISA with no veg present, ABX as per ID Cultures cleared    Afib: c/w eliquis 5 mg BID, s/p  PPM removal, c/w amiodarone and metoprolol     HFpEF: compensated,  PO Lasix     PICC line and discharge planning        Timothy Paredes DO [facility]  Cardiovascular Medicine  [phone]          Timothy Paredes DO [facility]  Cardiovascular Medicine  [phone]
Patient is a 87y old  Male who presents with a chief complaint of SOB, cough (10 Apr 2019 21:05)      INTERVAL HISTORY: feels ok    	  MEDICATIONS:  amiodarone    Tablet 200 milliGRAM(s) Oral daily  metoprolol tartrate 50 milliGRAM(s) Oral every 12 hours  spironolactone 25 milliGRAM(s) Oral daily  tamsulosin 0.4 milliGRAM(s) Oral at bedtime        PHYSICAL EXAM:  T(C): 36.1 (04-10-19 @ 20:48), Max: 36.6 (04-10-19 @ 12:01)  HR: 93 (04-10-19 @ 20:48) (72 - 93)  BP: 117/71 (04-10-19 @ 20:48) (105/55 - 129/66)  RR: 19 (04-10-19 @ 20:48) (18 - 19)  SpO2: 97% (04-10-19 @ 20:48) (96% - 98%)  Wt(kg): --  I&O's Summary    10 Apr 2019 07:01  -  10 Apr 2019 22:53  --------------------------------------------------------  IN: 840 mL / OUT: 1350 mL / NET: -510 mL          Appearance: Normal	  HEENT:    PERRL, EOMI	  Cardiovascular:  S1 S2, No JVD  Respiratory: Lungs clear to auscultation	  Psychiatry: Alert  Gastrointestinal:  Soft, Non-tender, + BS	  Skin: No rashes, No cyanosis  Extremities:  No edema of LE                                11.7   9.6   )-----------( 173      ( 10 Apr 2019 07:16 )             36.0     04-10    138  |  104  |  23  ----------------------------<  95  4.2   |  21<L>  |  1.12    Ca    9.0      10 Apr 2019 07:16          Labs personally reviewed      Assessment plan:   Bacteremia: ID on board , TTE with no vegetations, s/p PPM removal , MARIA LUISA with no veg present, ABX as per ID Cultures cleared    Afib: c/w eliquis 5 mg BID, s/p  PPM removal, c/w amiodarone and metoprolol     PICC line and discharge planning, plan to discharge Friday if home care set up    d/w SERAFIN Paredes DO [facility]  Cardiovascular Medicine  [phone]
Patient is a 87y old  Male who presents with a chief complaint of SOB, cough (11 Apr 2019 08:01)      INTERVAL HISTORY: feels ok    TELEMETRY:  	  MEDICATIONS:  amiodarone    Tablet 200 milliGRAM(s) Oral daily  metoprolol tartrate 50 milliGRAM(s) Oral every 12 hours  spironolactone 25 milliGRAM(s) Oral daily  tamsulosin 0.4 milliGRAM(s) Oral at bedtime        PHYSICAL EXAM:  T(C): 36.4 (04-11-19 @ 04:47), Max: 36.6 (04-10-19 @ 12:01)  HR: 76 (04-11-19 @ 04:47) (72 - 93)  BP: 106/61 (04-11-19 @ 04:47) (105/55 - 117/71)  RR: 17 (04-11-19 @ 04:47) (17 - 19)  SpO2: 95% (04-11-19 @ 04:47) (95% - 97%)  Wt(kg): --  I&O's Summary    10 Apr 2019 07:01  -  11 Apr 2019 07:00  --------------------------------------------------------  IN: 840 mL / OUT: 1350 mL / NET: -510 mL          Appearance: Normal	  HEENT:    PERRL, EOMI	  Cardiovascular:  S1 S2, No JVD  Respiratory: Lungs clear to auscultation	  Psychiatry: Alert  Gastrointestinal:  Soft, Non-tender, + BS	  Skin: No rashes, No cyanosis  Extremities:  No edema of LE                                12.5   11.1  )-----------( 181      ( 11 Apr 2019 09:53 )             36.7     04-11    138  |  105  |  22  ----------------------------<  81  4.4   |  20<L>  |  1.10    Ca    9.0      11 Apr 2019 07:26          Labs personally reviewed      Assessment plan:   Bacteremia: ID on board , TTE with no vegetations, s/p PPM removal , MARIA LUISA with no veg present, ABX as per ID Cultures cleared    Afib: c/w eliquis 5 mg BID, s/p  PPM removal, c/w amiodarone and metoprolol     PICC line and discharge planning, plan to discharge Friday if home care set up        Timothy Paredes DO [facility]  Cardiovascular Medicine  [phone]
Patient is a 87y old  Male who presents with a chief complaint of SOB, cough (12 Apr 2019 07:44)      INTERVAL HISTORY: feels ok       MEDICATIONS:  amiodarone    Tablet 200 milliGRAM(s) Oral daily  metoprolol tartrate 50 milliGRAM(s) Oral every 12 hours  spironolactone 25 milliGRAM(s) Oral daily  tamsulosin 0.4 milliGRAM(s) Oral at bedtime        PHYSICAL EXAM:  T(C): 36.4 (04-12-19 @ 12:17), Max: 36.4 (04-11-19 @ 12:39)  HR: 71 (04-12-19 @ 12:17) (71 - 100)  BP: 102/58 (04-12-19 @ 12:17) (102/58 - 117/73)  RR: 18 (04-12-19 @ 12:17) (16 - 18)  SpO2: 99% (04-12-19 @ 12:17) (96% - 99%)  Wt(kg): --  I&O's Summary    11 Apr 2019 07:01  -  12 Apr 2019 07:00  --------------------------------------------------------  IN: 730 mL / OUT: 1200 mL / NET: -470 mL          Appearance: Normal	  HEENT:    PERRL, EOMI	  Cardiovascular:  S1 S2, No JVD  Respiratory: Lungs clear to auscultation	  Psychiatry: Alert  Gastrointestinal:  Soft, Non-tender, + BS	  Skin: No rashes, No cyanosis  Extremities:  No edema of LE                                12.5   11.1  )-----------( 181      ( 11 Apr 2019 09:53 )             36.7     04-11    138  |  105  |  22  ----------------------------<  81  4.4   |  20<L>  |  1.10    Ca    9.0      11 Apr 2019 07:26          Labs personally reviewed      Assessment plan:   Bacteremia: ID on board , TTE with no vegetations, s/p PPM removal , MARIA LUISA with no veg present, ABX as per ID Cultures cleared    Afib: c/w eliquis 5 mg BID, s/p  PPM removal, c/w amiodarone and metoprolol     PICC line and discharge planning     Persistent hip pain of unclear etiology - w/u per primary team         Timothy Paredes DO [facility]  Cardiovascular Medicine  [phone]
Patient is a 87y old  Male who presents with a chief complaint of SOB, cough (13 Apr 2019 19:25)      INTERVAL HISTORY:  feels ok    MEDICATIONS:  amiodarone    Tablet 200 milliGRAM(s) Oral daily  furosemide    Tablet 20 milliGRAM(s) Oral daily  metoprolol tartrate 50 milliGRAM(s) Oral every 12 hours  spironolactone 25 milliGRAM(s) Oral daily  tamsulosin 0.4 milliGRAM(s) Oral at bedtime        PHYSICAL EXAM:  T(C): 36.7 (04-13-19 @ 16:39), Max: 36.7 (04-13-19 @ 16:39)  HR: 93 (04-13-19 @ 21:38) (87 - 99)  BP: 146/98 (04-13-19 @ 17:44) (94/60 - 146/98)  RR: 18 (04-13-19 @ 16:39) (18 - 18)  SpO2: 97% (04-13-19 @ 21:38) (97% - 98%)  Wt(kg): --  I&O's Summary        Appearance: Normal	  HEENT:    PERRL, EOMI	  Cardiovascular:  S1 S2, No JVD  Respiratory: Lungs clear to auscultation	  Psychiatry: Alert  Gastrointestinal:  Soft, Non-tender, + BS	  Skin: No rashes, No cyanosis  Extremities:  No edema of LE                                11.5   8.2   )-----------( 178      ( 13 Apr 2019 06:58 )             34.6     04-13    136  |  101  |  17  ----------------------------<  75  4.5   |  21<L>  |  0.97    Ca    9.1      13 Apr 2019 06:58          Labs personally reviewed      Assessment plan:   Bacteremia: ID on board , TTE with no vegetations, s/p PPM removal , MARIA LUISA with no veg present, ABX as per ID Cultures cleared    Afib: c/w eliquis 5 mg BID, s/p  PPM removal, c/w amiodarone and metoprolol     PICC line and discharge planning     Persistent hip pain 2/2 bursitis seen on CT - Toradol started         Timothy Paredes DO [facility]  Cardiovascular Medicine  [phone]
Patient is a 87y old  Male who presents with a chief complaint of SOB, cough (14 Apr 2019 22:43)      INTERVAL HISTORY: feels ok    	  MEDICATIONS:  amiodarone    Tablet 200 milliGRAM(s) Oral daily  furosemide    Tablet 20 milliGRAM(s) Oral daily  metoprolol tartrate 50 milliGRAM(s) Oral every 12 hours  spironolactone 25 milliGRAM(s) Oral daily  tamsulosin 0.4 milliGRAM(s) Oral at bedtime        PHYSICAL EXAM:  T(C): 36.3 (04-14-19 @ 21:08), Max: 36.6 (04-14-19 @ 04:10)  HR: 67 (04-14-19 @ 21:08) (67 - 89)  BP: 91/50 (04-14-19 @ 21:08) (91/50 - 109/69)  RR: 18 (04-14-19 @ 21:08) (18 - 18)  SpO2: 97% (04-14-19 @ 21:08) (96% - 100%)  Wt(kg): --  I&O's Summary    14 Apr 2019 07:01  -  14 Apr 2019 23:39  --------------------------------------------------------  IN: 340 mL / OUT: 0 mL / NET: 340 mL          Appearance: Normal	  HEENT:    PERRL, EOMI	  Cardiovascular:  S1 S2, No JVD  Respiratory: Lungs clear to auscultation	  Psychiatry: Alert  Gastrointestinal:  Soft, Non-tender, + BS	  Skin: No rashes, No cyanosis  Extremities:  No edema of LE                                11.5   8.2   )-----------( 178      ( 13 Apr 2019 06:58 )             34.6     04-13    136  |  101  |  17  ----------------------------<  75  4.5   |  21<L>  |  0.97    Ca    9.1      13 Apr 2019 06:58          Labs personally reviewed      Assessment plan:   Bacteremia: ID on board , TTE with no vegetations, s/p PPM removal , MARIA LUISA with no veg present, ABX as per ID Cultures cleared    Afib: c/w eliquis 5 mg BID, s/p  PPM removal, c/w amiodarone and metoprolol     PICC line and discharge planning     Persistent hip pain 2/2 bursitis seen on CT - Toradol started         Timothy Paredes DO [facility]  Cardiovascular Medicine  [phone]
Patient is a 87y old  Male who presents with a chief complaint of SOB, cough (15 Apr 2019 18:00)      INTERVAL HISTORY: feels ok    	  MEDICATIONS:  amiodarone    Tablet 200 milliGRAM(s) Oral daily  metoprolol tartrate 50 milliGRAM(s) Oral every 12 hours  spironolactone 25 milliGRAM(s) Oral daily  tamsulosin 0.4 milliGRAM(s) Oral at bedtime        PHYSICAL EXAM:  T(C): 36.8 (04-15-19 @ 20:47), Max: 36.8 (04-15-19 @ 20:47)  HR: 88 (04-15-19 @ 21:55) (53 - 88)  BP: 143/99 (04-15-19 @ 21:55) (86/47 - 143/99)  RR: 19 (04-15-19 @ 20:47) (18 - 20)  SpO2: 99% (04-15-19 @ 20:47) (95% - 99%)  Wt(kg): --  I&O's Summary    14 Apr 2019 07:01  -  15 Apr 2019 07:00  --------------------------------------------------------  IN: 870 mL / OUT: 0 mL / NET: 870 mL    15 Apr 2019 07:01  -  15 Apr 2019 22:48  --------------------------------------------------------  IN: 530 mL / OUT: 0 mL / NET: 530 mL          Appearance: Normal	  HEENT:    PERRL, EOMI	  Cardiovascular:  S1 S2, No JVD  Respiratory: Lungs clear to auscultation	  Psychiatry: Alert  Gastrointestinal:  Soft, Non-tender, + BS	  Skin: No rashes, No cyanosis  Extremities:  No edema of LE      Labs personally reviewed      Assessment plan:   Bacteremia: ID on board , TTE with no vegetations, s/p PPM removal , MARIA LUISA with no veg present, ABX as per ID Cultures cleared    Afib: c/w eliquis 5 mg BID, s/p  PPM removal, c/w amiodarone and metoprolol     PICC line and discharge planning     Persistent hip pain 2/2 bursitis seen on CT - Toradol started       Timothy Paredes DO [facility]  Cardiovascular Medicine  [phone]
Patient is a 87y old  Male who presents with a chief complaint of SOB, cough (16 Apr 2019 13:16)      INTERVAL HISTORY: feels ok    MEDICATIONS:  amiodarone    Tablet 200 milliGRAM(s) Oral daily  metoprolol tartrate 50 milliGRAM(s) Oral every 12 hours  spironolactone 25 milliGRAM(s) Oral daily  tamsulosin 0.4 milliGRAM(s) Oral at bedtime        PHYSICAL EXAM:  T(C): 36.7 (04-16-19 @ 15:12), Max: 36.8 (04-15-19 @ 20:47)  HR: 70 (04-16-19 @ 15:12) (67 - 88)  BP: 115/58 (04-16-19 @ 15:12) (115/58 - 143/99)  RR: 18 (04-16-19 @ 15:12) (17 - 19)  SpO2: 97% (04-16-19 @ 15:12) (96% - 99%)  Wt(kg): --  I&O's Summary    15 Apr 2019 07:01  -  16 Apr 2019 07:00  --------------------------------------------------------  IN: 870 mL / OUT: 400 mL / NET: 470 mL          Appearance: Normal	  HEENT:    PERRL, EOMI	  Cardiovascular:  S1 S2, No JVD  Respiratory: Lungs clear to auscultation	  Psychiatry: Alert  Gastrointestinal:  Soft, Non-tender, + BS	  Skin: No rashes, No cyanosis  Extremities:  No edema of LE                                10.6   7.0   )-----------( 178      ( 16 Apr 2019 06:18 )             30.8     04-16    136  |  102  |  24<H>  ----------------------------<  84  4.3   |  21<L>  |  1.17    Ca    8.8      16 Apr 2019 06:18          Labs personally reviewed      Assessment plan:   Bacteremia: ID on board , TTE with no vegetations, s/p PPM removal , MARIA LUISA with no veg present, ABX as per ID Cultures cleared    Afib: c/w eliquis 5 mg BID, s/p  PPM removal, c/w amiodarone and metoprolol     PICC line and discharge planning     Persistent hip pain 2/2 bursitis seen on CT - Toradol started       Timothy Paredes DO [facility]  Cardiovascular Medicine  [phone]
Patient is a 87y old  Male who presents with a chief complaint of SOB, cough (25 Mar 2019 18:24)      INTERVAL HISTORY:  feels ok    MEDICATIONS:  amiodarone    Tablet 200 milliGRAM(s) Oral daily  furosemide   Injectable 40 milliGRAM(s) IV Push daily  metoprolol tartrate 50 milliGRAM(s) Oral every 12 hours  spironolactone 25 milliGRAM(s) Oral daily  tamsulosin 0.4 milliGRAM(s) Oral at bedtime        PHYSICAL EXAM:  T(C): 36.4 (03-25-19 @ 21:11), Max: 36.4 (03-25-19 @ 07:45)  HR: 100 (03-25-19 @ 21:11) (95 - 109)  BP: 101/73 (03-25-19 @ 21:11) (95/62 - 134/85)  RR: 18 (03-25-19 @ 21:11) (17 - 18)  SpO2: 96% (03-25-19 @ 21:11) (96% - 97%)  Wt(kg): --  I&O's Summary    24 Mar 2019 07:01  -  25 Mar 2019 07:00  --------------------------------------------------------  IN: 520 mL / OUT: 300 mL / NET: 220 mL    25 Mar 2019 07:01  -  26 Mar 2019 00:23  --------------------------------------------------------  IN: 0 mL / OUT: 300 mL / NET: -300 mL          Appearance: Normal	  HEENT:    PERRL, EOMI	  Cardiovascular:  S1 S2, No JVD  Respiratory: Lungs clear to auscultation	  Psychiatry: Alert  Gastrointestinal:  Soft, Non-tender, + BS	  Skin: No rashes, No cyanosis  Extremities:  No edema of LE                                14.0   15.0  )-----------( 269      ( 25 Mar 2019 14:32 )             39.2     03-25    136  |  96  |  37<H>  ----------------------------<  176<H>  4.4   |  26  |  1.18    Ca    9.4      25 Mar 2019 14:32          Labs personally reviewed      Assessment and Plan:   · Assessment		  Pt is a 86 y/o male w/ PMH of HTN, HLD, remote CVA, COPD, CAD with Stents, CHF, chronic afib on eliquis, chronic systolic heart failure s/p TAVR 11/27/18.presenting to [facility] ED today d/t weakness/SOB/cough. patient reports some chills and productive cough with yellow sputum. patient was seen by his cardiologist    # Bacteremia   # HX of HFpEF,   # CAD S/P PCI   # Afib on AC  # S/P TAVR  # HTN  # CVA  #BPH  # HLD     Plan:   Bacteremia: ID on board , TTE with no vegetations, s/p PPM removal , pending MARIA LUISA to assess TAVR valve , ABX as per ID , c/o back pain MRI Spine r/o epidural abscess report pending    Afib: c/w eliquis 5 mg BID, s/p  PPM removal, c/w amiodarone and metoprolol     HFpEF: compensated              Timothy Paredes DO [facility]  Cardiovascular Medicine  [phone]
Patient is a 87y old  Male who presents with a chief complaint of SOB, cough (26 Mar 2019 19:34)      INTERVAL HISTORY:  feels ok    MEDICATIONS:  amiodarone    Tablet 200 milliGRAM(s) Oral daily  furosemide   Injectable 40 milliGRAM(s) IV Push daily  metoprolol tartrate 50 milliGRAM(s) Oral every 12 hours  spironolactone 25 milliGRAM(s) Oral daily  tamsulosin 0.4 milliGRAM(s) Oral at bedtime        PHYSICAL EXAM:  T(C): 36.4 (03-26-19 @ 21:18), Max: 36.4 (03-26-19 @ 04:44)  HR: 77 (03-26-19 @ 21:18) (71 - 92)  BP: 129/86 (03-26-19 @ 21:18) (106/65 - 132/77)  RR: 18 (03-26-19 @ 21:18) (18 - 18)  SpO2: 98% (03-26-19 @ 21:18) (97% - 98%)  Wt(kg): --  I&O's Summary    25 Mar 2019 07:01  -  26 Mar 2019 07:00  --------------------------------------------------------  IN: 0 mL / OUT: 300 mL / NET: -300 mL    26 Mar 2019 07:01  -  26 Mar 2019 22:38  --------------------------------------------------------  IN: 120 mL / OUT: 600 mL / NET: -480 mL          Appearance: Normal	  HEENT:    PERRL, EOMI	  Cardiovascular:  S1 S2, No JVD  Respiratory: Lungs clear to auscultation	  Psychiatry: Alert  Gastrointestinal:  Soft, Non-tender, + BS	  Skin: No rashes, No cyanosis  Extremities:  No edema of LE                                13.2   14.9  )-----------( 259      ( 26 Mar 2019 06:21 )             39.9     03-26    137  |  98  |  37<H>  ----------------------------<  106<H>  4.4   |  26  |  1.05    Ca    9.5      26 Mar 2019 06:21    TPro  6.5  /  Alb  3.0<L>  /  TBili  0.4  /  DBili  x   /  AST  17  /  ALT  19  /  AlkPhos  105  03-26        Labs personally reviewed      Assessment and Plan:   · Assessment		  Pt is a 86 y/o male w/ PMH of HTN, HLD, remote CVA, COPD, CAD with Stents, CHF, chronic afib on eliquis, chronic systolic heart failure s/p TAVR 11/27/18.presenting to [facility] ED today d/t weakness/SOB/cough. patient reports some chills and productive cough with yellow sputum. patient was seen by his cardiologist    # Bacteremia   # HX of HFpEF,   # CAD S/P PCI   # Afib on AC  # S/P TAVR  # HTN  # CVA  #BPH  # HLD     Plan:   Bacteremia: ID on board , TTE with no vegetations, s/p PPM removal , MARIA LUISA with no veg present, ABX as per ID , c/o back pain MRI Spine r/o epidural abscess report pending    Afib: c/w eliquis 5 mg BID, s/p  PPM removal, c/w amiodarone and metoprolol     HFpEF: compensated, will switch to PO Lasix Wednesday    d/w pt and daughter at bedside          Timothy Paredes  [facility]  Cardiovascular Medicine  [phone]
Patient is a 87y old  Male who presents with a chief complaint of SOB, cough (27 Mar 2019 11:12)      INTERVAL HISTORY: feels ok     	  MEDICATIONS:  amiodarone    Tablet 200 milliGRAM(s) Oral daily  furosemide   Injectable 40 milliGRAM(s) IV Push daily  metoprolol tartrate 50 milliGRAM(s) Oral every 12 hours  spironolactone 25 milliGRAM(s) Oral daily  tamsulosin 0.4 milliGRAM(s) Oral at bedtime        PHYSICAL EXAM:  T(C): 36.6 (03-27-19 @ 14:20), Max: 36.6 (03-27-19 @ 14:20)  HR: 103 (03-27-19 @ 14:20) (77 - 103)  BP: 103/65 (03-27-19 @ 14:20) (103/65 - 137/84)  RR: 18 (03-27-19 @ 14:20) (18 - 18)  SpO2: 98% (03-27-19 @ 14:20) (98% - 98%)  Wt(kg): --  I&O's Summary    26 Mar 2019 07:01  -  27 Mar 2019 07:00  --------------------------------------------------------  IN: 120 mL / OUT: 900 mL / NET: -780 mL    27 Mar 2019 07:01  -  27 Mar 2019 16:46  --------------------------------------------------------  IN: 1040 mL / OUT: 0 mL / NET: 1040 mL          Appearance: Normal	  HEENT:    PERRL, EOMI	  Cardiovascular:  S1 S2, No JVD  Respiratory: Lungs clear to auscultation	  Psychiatry: Alert  Gastrointestinal:  Soft, Non-tender, + BS	  Skin: No rashes, No cyanosis  Extremities:  No edema of LE                                14.2   12.7  )-----------( 268      ( 27 Mar 2019 06:35 )             42.4     03-27    140  |  100  |  33<H>  ----------------------------<  93  4.6   |  25  |  0.96    Ca    9.4      27 Mar 2019 06:35    TPro  6.5  /  Alb  3.0<L>  /  TBili  0.4  /  DBili  x   /  AST  17  /  ALT  19  /  AlkPhos  105  03-26        Labs personally reviewed      Assessment and Plan:   · Assessment		  Pt is a 88 y/o male w/ PMH of HTN, HLD, remote CVA, COPD, CAD with Stents, CHF, chronic afib on eliquis, chronic systolic heart failure s/p TAVR 11/27/18.presenting to [facility] ED today d/t weakness/SOB/cough. patient reports some chills and productive cough with yellow sputum. patient was seen by his cardiologist    # Bacteremia   # HX of HFpEF,   # CAD S/P PCI   # Afib on AC  # S/P TAVR  # HTN  # CVA  #BPH  # HLD     Plan:   Bacteremia: ID on board , TTE with no vegetations, s/p PPM removal , MARIA LUISA with no veg present, ABX as per ID , c/o back pain MRI Spine r/o epidural abscess report pending    Afib: c/w eliquis 5 mg BID, s/p  PPM removal, c/w amiodarone and metoprolol     HFpEF: compensated, will switch to PO Lasix     CTS rec repeat MARIA LUISA in one week    Timothy Paredes DO [facility]  Cardiovascular Medicine  [phone]
Patient is a 87y old  Male who presents with a chief complaint of SOB, cough (28 Mar 2019 13:43)      INTERVAL HISTORY: feels ok  	  MEDICATIONS:  amiodarone    Tablet 200 milliGRAM(s) Oral daily  furosemide    Tablet 40 milliGRAM(s) Oral daily  metoprolol tartrate 50 milliGRAM(s) Oral every 12 hours  spironolactone 25 milliGRAM(s) Oral daily  tamsulosin 0.4 milliGRAM(s) Oral at bedtime        PHYSICAL EXAM:  T(C): 36.3 (03-28-19 @ 20:10), Max: 36.6 (03-28-19 @ 04:37)  HR: 88 (03-28-19 @ 20:10) (67 - 98)  BP: 106/70 (03-28-19 @ 20:10) (94/55 - 126/68)  RR: 18 (03-28-19 @ 20:10) (18 - 19)  SpO2: 96% (03-28-19 @ 20:10) (91% - 97%)  Wt(kg): --  I&O's Summary    27 Mar 2019 07:01  -  28 Mar 2019 07:00  --------------------------------------------------------  IN: 1560 mL / OUT: 400 mL / NET: 1160 mL          Appearance: Normal	  HEENT:    PERRL, EOMI	  Cardiovascular:  S1 S2, No JVD  Respiratory: Lungs clear to auscultation	  Psychiatry: Alert  Gastrointestinal:  Soft, Non-tender, + BS	  Skin: No rashes, No cyanosis  Extremities:  No edema of LE                                13.9   13.8  )-----------( 262      ( 28 Mar 2019 06:59 )             42.0     03-28    138  |  99  |  40<H>  ----------------------------<  120<H>  4.3   |  26  |  1.27    Ca    9.6      28 Mar 2019 06:59          Labs personally reviewed      Assessment and Plan:   · Assessment		  Pt is a 86 y/o male w/ PMH of HTN, HLD, remote CVA, COPD, CAD with Stents, CHF, chronic afib on eliquis, chronic systolic heart failure s/p TAVR 11/27/18.presenting to [facility] ED today d/t weakness/SOB/cough. patient reports some chills and productive cough with yellow sputum. patient was seen by his cardiologist    # Bacteremia   # HX of HFpEF,   # CAD S/P PCI   # Afib on AC  # S/P TAVR  # HTN  # CVA  #BPH  # HLD     Plan:   Bacteremia: ID on board , TTE with no vegetations, s/p PPM removal , MARIA LUISA with no veg present, ABX as per ID , c/o back pain MRI Spine r/o epidural abscess report pending    Afib: c/w eliquis 5 mg BID, s/p  PPM removal, c/w amiodarone and metoprolol     HFpEF: compensated,  PO Lasix     CTS rec repeat MARIA LUISA in one week        Timothy Paredes DO [facility]  Cardiovascular Medicine  [phone]
Patient is a 87y old  Male who presents with a chief complaint of SOB, cough (29 Mar 2019 16:51)      INTERVAL HISTORY: feels ok    	  MEDICATIONS:  amiodarone    Tablet 200 milliGRAM(s) Oral daily  furosemide    Tablet 40 milliGRAM(s) Oral daily  metoprolol tartrate 50 milliGRAM(s) Oral every 12 hours  spironolactone 25 milliGRAM(s) Oral daily  tamsulosin 0.4 milliGRAM(s) Oral at bedtime        PHYSICAL EXAM:  T(C): 36.8 (03-29-19 @ 14:14), Max: 36.8 (03-29-19 @ 04:15)  HR: 93 (03-29-19 @ 14:14) (85 - 93)  BP: 113/72 (03-29-19 @ 14:14) (106/70 - 124/78)  RR: 18 (03-29-19 @ 14:14) (18 - 18)  SpO2: 94% (03-29-19 @ 14:14) (94% - 97%)  Wt(kg): --  I&O's Summary    28 Mar 2019 07:01  -  29 Mar 2019 07:00  --------------------------------------------------------  IN: 0 mL / OUT: 0 mL / NET: 0 mL    29 Mar 2019 07:01  -  29 Mar 2019 19:06  --------------------------------------------------------  IN: 140 mL / OUT: 0 mL / NET: 140 mL          Appearance: Normal	  HEENT:    PERRL, EOMI	  Cardiovascular:  S1 S2, No JVD  Respiratory: Lungs clear to auscultation	  Psychiatry: Alert  Gastrointestinal:  Soft, Non-tender, + BS	  Skin: No rashes, No cyanosis  Extremities:  No edema of LE                                13.6   16.7  )-----------( 252      ( 29 Mar 2019 17:09 )             40.0     03-29    136  |  97  |  34<H>  ----------------------------<  146<H>  4.1   |  26  |  1.22    Ca    9.3      29 Mar 2019 17:12          Labs personally reviewed      Assessment and Plan:   · Assessment		  Pt is a 86 y/o male w/ PMH of HTN, HLD, remote CVA, COPD, CAD with Stents, CHF, chronic afib on eliquis, chronic systolic heart failure s/p TAVR 11/27/18.presenting to [facility] ED today d/t weakness/SOB/cough. patient reports some chills and productive cough with yellow sputum. patient was seen by his cardiologist    # Bacteremia   # HX of HFpEF,   # CAD S/P PCI   # Afib on AC  # S/P TAVR  # HTN  # CVA  #BPH  # HLD     Plan:   Bacteremia: ID on board , TTE with no vegetations, s/p PPM removal , MARIA LUISA with no veg present, ABX as per ID , c/o back pain MRI Spine r/o epidural abscess report pending    Afib: c/w eliquis 5 mg BID, s/p  PPM removal, c/w amiodarone and metoprolol     HFpEF: compensated,  PO Lasix     CTS rec repeat MARIA LUISA in one week    GI consult for colonoscopy      Timothy Paredes DO [facility]  Cardiovascular Medicine  [phone]
RUMA BANEGAS:[id number],   87yMale followed for:  Keppra (Rash)  penicillin (Unknown)    PAST MEDICAL & SURGICAL HISTORY:  CKD (chronic kidney disease) stage 3, GFR 30-59 ml/min  Hepatitis B  PTSD (post-traumatic stress disorder)  Tachy-idris syndrome  TIA (transient ischemic attack): 2010  Seizure  DIEGO (obstructive sleep apnea)  Chronic diastolic HF (heart failure)  Aortic stenosis  CVA (cerebral vascular accident)  Gait instability  COPD (chronic obstructive pulmonary disease)  BPH (benign prostatic hyperplasia)  Asthma  CHF (congestive heart failure)  Atrial fibrillation  Pacemaker: 2015 Medtronic [id number]  [id number]  Hypertension  H/O thalassemia  CAD (coronary artery disease): s/p stent 2010  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  Artificial pacemaker  S/P primary angioplasty with coronary stent: 2012 AND 2017    FAMILY HISTORY:  No pertinent family history in first degree relatives    MEDICATIONS  (STANDING):  ALBUTerol    90 MICROgram(s) HFA Inhaler 2 Puff(s) Inhalation every 4 hours  amiodarone    Tablet 200 milliGRAM(s) Oral daily  apixaban 5 milliGRAM(s) Oral every 12 hours  atorvastatin 40 milliGRAM(s) Oral at bedtime  buDESOnide  80 MICROgram(s)/formoterol 4.5 MICROgram(s) Inhaler 2 Puff(s) Inhalation two times a day  ceftaroline fosamil IVPB 600 milliGRAM(s) IV Intermittent every 8 hours  clopidogrel Tablet 75 milliGRAM(s) Oral daily  DAPTOmycin IVPB 750 milliGRAM(s) IV Intermittent every 24 hours  docusate sodium 100 milliGRAM(s) Oral three times a day  finasteride 5 milliGRAM(s) Oral daily  lidocaine   Patch 1 Patch Transdermal daily  melatonin 3 milliGRAM(s) Oral at bedtime  metoclopramide Injectable 10 milliGRAM(s) IV Push once  metoprolol tartrate 50 milliGRAM(s) Oral every 12 hours  pantoprazole    Tablet 40 milliGRAM(s) Oral before breakfast  polyethylene glycol 3350 17 Gram(s) Oral daily  senna 2 Tablet(s) Oral at bedtime  sodium chloride 0.9%. 1000 milliLiter(s) (75 mL/Hr) IV Continuous <Continuous>  spironolactone 25 milliGRAM(s) Oral daily  tamsulosin 0.4 milliGRAM(s) Oral at bedtime    MEDICATIONS  (PRN):  methyl salicylate 14%/menthol 6% Topical Ointment 1 Application(s) Topical three times a day PRN right hip pain  morphine  - Injectable 2 milliGRAM(s) IV Push every 6 hours PRN Severe Pain (7 - 10)      Vital Signs Last 24 Hrs  T(C): 36.6 (04 Apr 2019 08:45), Max: 37 (03 Apr 2019 12:32)  T(F): 97.8 (04 Apr 2019 08:45), Max: 98.6 (03 Apr 2019 12:32)  HR: 67 (04 Apr 2019 08:45) (67 - 97)  BP: 130/80 (04 Apr 2019 08:45) (111/75 - 137/86)  BP(mean): --  RR: 17 (04 Apr 2019 08:45) (16 - 18)  SpO2: 97% (04 Apr 2019 08:45) (97% - 99%)  nc/at  s1s2  cta  soft, nt, nd no guarding or rebound  no c/c/e    CBC Full  -  ( 04 Apr 2019 06:33 )  WBC Count : 10.7 K/uL  RBC Count : 4.75 M/uL  Hemoglobin : 12.7 g/dL  Hematocrit : 41.3 %  Platelet Count - Automated : 224 K/uL  Mean Cell Volume : 86.8 fl  Mean Cell Hemoglobin : 26.7 pg  Mean Cell Hemoglobin Concentration : 30.8 gm/dL  Auto Neutrophil # : x  Auto Lymphocyte # : x  Auto Monocyte # : x  Auto Eosinophil # : x  Auto Basophil # : x  Auto Neutrophil % : x  Auto Lymphocyte % : x  Auto Monocyte % : x  Auto Eosinophil % : x  Auto Basophil % : x    04-04    139  |  104  |  26<H>  ----------------------------<  98  4.2   |  22  |  1.13    Ca    9.4      04 Apr 2019 06:32
RUMA BANEGAS:[id number],   87yMale followed for:  Keppra (Rash)  penicillin (Unknown)    PAST MEDICAL & SURGICAL HISTORY:  CKD (chronic kidney disease) stage 3, GFR 30-59 ml/min  Hepatitis B  PTSD (post-traumatic stress disorder)  Tachy-idris syndrome  TIA (transient ischemic attack): 2010  Seizure  DIEGO (obstructive sleep apnea)  Chronic diastolic HF (heart failure)  Aortic stenosis  CVA (cerebral vascular accident)  Gait instability  COPD (chronic obstructive pulmonary disease)  BPH (benign prostatic hyperplasia)  Asthma  CHF (congestive heart failure)  Atrial fibrillation  Pacemaker: 2015 Medtronic [id number]  [id number]  Hypertension  H/O thalassemia  CAD (coronary artery disease): s/p stent 2010  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  Artificial pacemaker  S/P primary angioplasty with coronary stent: 2012 AND 2017    FAMILY HISTORY:  No pertinent family history in first degree relatives    MEDICATIONS  (STANDING):  ALBUTerol    90 MICROgram(s) HFA Inhaler 2 Puff(s) Inhalation every 4 hours  amiodarone    Tablet 200 milliGRAM(s) Oral daily  apixaban 5 milliGRAM(s) Oral every 12 hours  atorvastatin 40 milliGRAM(s) Oral at bedtime  buDESOnide  80 MICROgram(s)/formoterol 4.5 MICROgram(s) Inhaler 2 Puff(s) Inhalation two times a day  ceftaroline fosamil IVPB 600 milliGRAM(s) IV Intermittent every 8 hours  clopidogrel Tablet 75 milliGRAM(s) Oral daily  DAPTOmycin IVPB 750 milliGRAM(s) IV Intermittent every 24 hours  docusate sodium 100 milliGRAM(s) Oral three times a day  finasteride 5 milliGRAM(s) Oral daily  lidocaine   Patch 1 Patch Transdermal daily  melatonin 3 milliGRAM(s) Oral at bedtime  metoclopramide Injectable 10 milliGRAM(s) IV Push once  metoprolol tartrate 50 milliGRAM(s) Oral every 12 hours  pantoprazole    Tablet 40 milliGRAM(s) Oral before breakfast  polyethylene glycol 3350 17 Gram(s) Oral daily  senna 2 Tablet(s) Oral at bedtime  sodium chloride 0.9%. 1000 milliLiter(s) (75 mL/Hr) IV Continuous <Continuous>  sodium chloride 0.9%. 1000 milliLiter(s) (75 mL/Hr) IV Continuous <Continuous>  spironolactone 25 milliGRAM(s) Oral daily  tamsulosin 0.4 milliGRAM(s) Oral at bedtime    MEDICATIONS  (PRN):  methyl salicylate 14%/menthol 6% Topical Ointment 1 Application(s) Topical three times a day PRN right hip pain  morphine  - Injectable 2 milliGRAM(s) IV Push every 6 hours PRN Severe Pain (7 - 10)      Vital Signs Last 24 Hrs  T(C): 36.9 (03 Apr 2019 09:38), Max: 36.9 (03 Apr 2019 09:38)  T(F): 98.5 (03 Apr 2019 09:38), Max: 98.5 (03 Apr 2019 09:38)  HR: 65 (03 Apr 2019 09:38) (65 - 87)  BP: 104/67 (03 Apr 2019 09:38) (102/75 - 112/62)  BP(mean): --  RR: 18 (03 Apr 2019 04:42) (18 - 19)  SpO2: 97% (03 Apr 2019 04:42) (97% - 99%)  nc/at  s1s2  cta  soft, nt, nd no guarding or rebound  no c/c/e    CBC Full  -  ( 03 Apr 2019 06:55 )  WBC Count : 13.0 K/uL  RBC Count : 4.39 M/uL  Hemoglobin : 13.0 g/dL  Hematocrit : 38.4 %  Platelet Count - Automated : 211 K/uL  Mean Cell Volume : 87.4 fl  Mean Cell Hemoglobin : 29.6 pg  Mean Cell Hemoglobin Concentration : 33.8 gm/dL  Auto Neutrophil # : 10.6 K/uL  Auto Lymphocyte # : 1.2 K/uL  Auto Monocyte # : 1.1 K/uL  Auto Eosinophil # : 0.2 K/uL  Auto Basophil # : 0.0 K/uL  Auto Neutrophil % : 81.5 %  Auto Lymphocyte % : 8.8 %  Auto Monocyte % : 8.3 %  Auto Eosinophil % : 1.2 %  Auto Basophil % : 0.2 %    04-03    137  |  101  |  32<H>  ----------------------------<  99  4.4   |  22  |  1.31<H>    Ca    9.3      03 Apr 2019 06:55
RUMA BANEGAS:[id number],   87yMale followed for:  Keppra (Rash)  penicillin (Unknown)    PAST MEDICAL & SURGICAL HISTORY:  CKD (chronic kidney disease) stage 3, GFR 30-59 ml/min  Hepatitis B  PTSD (post-traumatic stress disorder)  Tachy-idris syndrome  TIA (transient ischemic attack): 2010  Seizure  DIEGO (obstructive sleep apnea)  Chronic diastolic HF (heart failure)  Aortic stenosis  CVA (cerebral vascular accident)  Gait instability  COPD (chronic obstructive pulmonary disease)  BPH (benign prostatic hyperplasia)  Asthma  CHF (congestive heart failure)  Atrial fibrillation  Pacemaker: 2015 Medtronic [id number]  [id number]  Hypertension  H/O thalassemia  CAD (coronary artery disease): s/p stent 2010  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  Artificial pacemaker  S/P primary angioplasty with coronary stent: 2012 AND 2017    FAMILY HISTORY:  No pertinent family history in first degree relatives    MEDICATIONS  (STANDING):  ALBUTerol    90 MICROgram(s) HFA Inhaler 2 Puff(s) Inhalation every 4 hours  amiodarone    Tablet 200 milliGRAM(s) Oral daily  apixaban 5 milliGRAM(s) Oral every 12 hours  atorvastatin 40 milliGRAM(s) Oral at bedtime  buDESOnide  80 MICROgram(s)/formoterol 4.5 MICROgram(s) Inhaler 2 Puff(s) Inhalation two times a day  ceftaroline fosamil IVPB 600 milliGRAM(s) IV Intermittent every 8 hours  clopidogrel Tablet 75 milliGRAM(s) Oral daily  DAPTOmycin IVPB 750 milliGRAM(s) IV Intermittent every 24 hours  docusate sodium 100 milliGRAM(s) Oral two times a day  finasteride 5 milliGRAM(s) Oral daily  lidocaine   Patch 1 Patch Transdermal daily  melatonin 3 milliGRAM(s) Oral at bedtime  metoclopramide Injectable 10 milliGRAM(s) IV Push once  metoprolol tartrate 50 milliGRAM(s) Oral every 12 hours  pantoprazole    Tablet 40 milliGRAM(s) Oral before breakfast  polyethylene glycol 3350 17 Gram(s) Oral daily  senna 2 Tablet(s) Oral at bedtime  sodium chloride 0.9%. 1000 milliLiter(s) (75 mL/Hr) IV Continuous <Continuous>  spironolactone 25 milliGRAM(s) Oral daily  tamsulosin 0.4 milliGRAM(s) Oral at bedtime    MEDICATIONS  (PRN):  methyl salicylate 14%/menthol 6% Topical Ointment 1 Application(s) Topical three times a day PRN right hip pain  morphine  - Injectable 2 milliGRAM(s) IV Push every 6 hours PRN Severe Pain (7 - 10)      Vital Signs Last 24 Hrs  T(C): 36.3 (01 Apr 2019 05:47), Max: 36.7 (31 Mar 2019 20:57)  T(F): 97.3 (01 Apr 2019 05:47), Max: 98 (31 Mar 2019 20:57)  HR: 80 (01 Apr 2019 05:47) (80 - 88)  BP: 105/66 (01 Apr 2019 05:47) (101/66 - 116/77)  BP(mean): --  RR: 18 (01 Apr 2019 05:47) (18 - 18)  SpO2: 97% (01 Apr 2019 05:47) (94% - 97%)  nc/at  s1s2  cta  soft, nt, nd no guarding or rebound  no c/c/e    CBC Full  -  ( 30 Mar 2019 11:54 )  WBC Count : 14.9 K/uL  RBC Count : 4.95 M/uL  Hemoglobin : 14.8 g/dL  Hematocrit : 43.3 %  Platelet Count - Automated : 294 K/uL  Mean Cell Volume : 87.5 fl  Mean Cell Hemoglobin : 30.0 pg  Mean Cell Hemoglobin Concentration : 34.3 gm/dL  Auto Neutrophil # : 12.6 K/uL  Auto Lymphocyte # : 1.2 K/uL  Auto Monocyte # : 1.0 K/uL  Auto Eosinophil # : 0.1 K/uL  Auto Basophil # : 0.0 K/uL  Auto Neutrophil % : 84.8 %  Auto Lymphocyte % : 8.2 %  Auto Monocyte % : 6.4 %  Auto Eosinophil % : 0.7 %  Auto Basophil % : 0.0 %    03-30    140  |  98  |  35<H>  ----------------------------<  131<H>  4.4   |  26  |  1.33<H>    Ca    9.9      30 Mar 2019 11:54
RUMA BANEGAS:[id number],   87yMale followed for:  Keppra (Rash)  penicillin (Unknown)    PAST MEDICAL & SURGICAL HISTORY:  CKD (chronic kidney disease) stage 3, GFR 30-59 ml/min  Hepatitis B  PTSD (post-traumatic stress disorder)  Tachy-idris syndrome  TIA (transient ischemic attack): 2010  Seizure  DIEGO (obstructive sleep apnea)  Chronic diastolic HF (heart failure)  Aortic stenosis  CVA (cerebral vascular accident)  Gait instability  COPD (chronic obstructive pulmonary disease)  BPH (benign prostatic hyperplasia)  Asthma  CHF (congestive heart failure)  Atrial fibrillation  Pacemaker: 2015 Medtronic [id number]  [id number]  Hypertension  H/O thalassemia  CAD (coronary artery disease): s/p stent 2010  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 975 milliGRAM(s) Oral every 8 hours  ALBUTerol    90 MICROgram(s) HFA Inhaler 2 Puff(s) Inhalation every 4 hours  amiodarone    Tablet 200 milliGRAM(s) Oral daily  apixaban 5 milliGRAM(s) Oral every 12 hours  atorvastatin 40 milliGRAM(s) Oral at bedtime  buDESOnide  80 MICROgram(s)/formoterol 4.5 MICROgram(s) Inhaler 2 Puff(s) Inhalation two times a day  ceftaroline fosamil IVPB 600 milliGRAM(s) IV Intermittent every 8 hours  clopidogrel Tablet 75 milliGRAM(s) Oral daily  DAPTOmycin IVPB 750 milliGRAM(s) IV Intermittent every 24 hours  docusate sodium 100 milliGRAM(s) Oral three times a day  finasteride 5 milliGRAM(s) Oral daily  lidocaine   Patch 1 Patch Transdermal daily  melatonin 3 milliGRAM(s) Oral at bedtime  metoclopramide Injectable 10 milliGRAM(s) IV Push once  metoprolol tartrate 50 milliGRAM(s) Oral every 12 hours  pantoprazole    Tablet 40 milliGRAM(s) Oral before breakfast  polyethylene glycol 3350 17 Gram(s) Oral daily  senna 2 Tablet(s) Oral at bedtime  sodium chloride 0.9%. 1000 milliLiter(s) (75 mL/Hr) IV Continuous <Continuous>  spironolactone 25 milliGRAM(s) Oral daily  tamsulosin 0.4 milliGRAM(s) Oral at bedtime    MEDICATIONS  (PRN):  methyl salicylate 14%/menthol 6% Topical Ointment 1 Application(s) Topical three times a day PRN right hip pain  morphine  - Injectable 2 milliGRAM(s) IV Push every 6 hours PRN Severe Pain (7 - 10)  ondansetron   Disintegrating Tablet 8 milliGRAM(s) Oral three times a day PRN Nausea and/or Vomiting      Vital Signs Last 24 Hrs  T(C): 36.4 (08 Apr 2019 04:03), Max: 36.7 (07 Apr 2019 14:46)  T(F): 97.6 (08 Apr 2019 04:03), Max: 98 (07 Apr 2019 14:46)  HR: 63 (08 Apr 2019 10:29) (63 - 82)  BP: 110/67 (08 Apr 2019 10:29) (103/63 - 111/60)  BP(mean): --  RR: 18 (08 Apr 2019 10:29) (17 - 19)  SpO2: 100% (08 Apr 2019 10:29) (95% - 100%)  nc/at  s1s2  cta  soft, nt, nd no guarding or rebound  no c/c/e    CBC Full  -  ( 08 Apr 2019 05:54 )  WBC Count : 11.2 K/uL  RBC Count : 4.18 M/uL  Hemoglobin : 12.0 g/dL  Hematocrit : 36.9 %  Platelet Count - Automated : 173 K/uL  Mean Cell Volume : 88.3 fl  Mean Cell Hemoglobin : 28.7 pg  Mean Cell Hemoglobin Concentration : 32.5 gm/dL  Auto Neutrophil # : x  Auto Lymphocyte # : x  Auto Monocyte # : x  Auto Eosinophil # : x  Auto Basophil # : x  Auto Neutrophil % : x  Auto Lymphocyte % : x  Auto Monocyte % : x  Auto Eosinophil % : x  Auto Basophil % : x    04-08    137  |  104  |  28<H>  ----------------------------<  90  4.0   |  21<L>  |  1.26    Ca    9.0      08 Apr 2019 05:54
RUMA BANEGAS:[id number],   87yMale followed for:  Keppra (Rash)  penicillin (Unknown)    PAST MEDICAL & SURGICAL HISTORY:  CKD (chronic kidney disease) stage 3, GFR 30-59 ml/min  Hepatitis B  PTSD (post-traumatic stress disorder)  Tachy-idris syndrome  TIA (transient ischemic attack): 2010  Seizure  DIEGO (obstructive sleep apnea)  Chronic diastolic HF (heart failure)  Aortic stenosis  CVA (cerebral vascular accident)  Gait instability  COPD (chronic obstructive pulmonary disease)  BPH (benign prostatic hyperplasia)  Asthma  CHF (congestive heart failure)  Atrial fibrillation  Pacemaker: 2015 Medtronic [id number]  [id number]  Hypertension  H/O thalassemia  CAD (coronary artery disease): s/p stent 2010  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 975 milliGRAM(s) Oral every 8 hours  ALBUTerol    90 MICROgram(s) HFA Inhaler 2 Puff(s) Inhalation every 4 hours  amiodarone    Tablet 200 milliGRAM(s) Oral daily  apixaban 5 milliGRAM(s) Oral every 12 hours  atorvastatin 40 milliGRAM(s) Oral at bedtime  buDESOnide  80 MICROgram(s)/formoterol 4.5 MICROgram(s) Inhaler 2 Puff(s) Inhalation two times a day  ceftaroline fosamil IVPB 600 milliGRAM(s) IV Intermittent every 8 hours  clopidogrel Tablet 75 milliGRAM(s) Oral daily  DAPTOmycin IVPB 750 milliGRAM(s) IV Intermittent every 24 hours  docusate sodium 100 milliGRAM(s) Oral three times a day  finasteride 5 milliGRAM(s) Oral daily  lidocaine   Patch 1 Patch Transdermal daily  melatonin 3 milliGRAM(s) Oral at bedtime  metoclopramide Injectable 10 milliGRAM(s) IV Push once  metoprolol tartrate 50 milliGRAM(s) Oral every 12 hours  ondansetron Injectable 4 milliGRAM(s) IV Push once  pantoprazole    Tablet 40 milliGRAM(s) Oral before breakfast  polyethylene glycol 3350 17 Gram(s) Oral daily  senna 2 Tablet(s) Oral at bedtime  sodium chloride 0.9%. 1000 milliLiter(s) (75 mL/Hr) IV Continuous <Continuous>  spironolactone 25 milliGRAM(s) Oral daily  tamsulosin 0.4 milliGRAM(s) Oral at bedtime    MEDICATIONS  (PRN):  methyl salicylate 14%/menthol 6% Topical Ointment 1 Application(s) Topical three times a day PRN right hip pain  morphine  - Injectable 2 milliGRAM(s) IV Push every 6 hours PRN Severe Pain (7 - 10)  ondansetron   Disintegrating Tablet 8 milliGRAM(s) Oral three times a day PRN Nausea and/or Vomiting      Vital Signs Last 24 Hrs  T(C): 36.4 (06 Apr 2019 06:53), Max: 36.6 (05 Apr 2019 18:39)  T(F): 97.6 (06 Apr 2019 06:53), Max: 97.8 (05 Apr 2019 18:39)  HR: 82 (06 Apr 2019 06:53) (69 - 92)  BP: 116/736 (06 Apr 2019 06:53) (104/74 - 116/736)  BP(mean): --  RR: 18 (06 Apr 2019 06:53) (18 - 19)  SpO2: 97% (06 Apr 2019 06:53) (97% - 99%)  nc/at  s1s2  cta  soft, nt, nd no guarding or rebound  no c/c/e    CBC Full  -  ( 05 Apr 2019 07:19 )  WBC Count : 10.9 K/uL  RBC Count : 4.39 M/uL  Hemoglobin : 12.4 g/dL  Hematocrit : 37.7 %  Platelet Count - Automated : 197 K/uL  Mean Cell Volume : 85.9 fl  Mean Cell Hemoglobin : 28.4 pg  Mean Cell Hemoglobin Concentration : 33.0 gm/dL  Auto Neutrophil # : x  Auto Lymphocyte # : x  Auto Monocyte # : x  Auto Eosinophil # : x  Auto Basophil # : x  Auto Neutrophil % : x  Auto Lymphocyte % : x  Auto Monocyte % : x  Auto Eosinophil % : x  Auto Basophil % : x    04-05    138  |  104  |  25<H>  ----------------------------<  94  4.2   |  22  |  1.21    Ca    9.4      05 Apr 2019 07:19
RUMA BANEGAS:[id number],   87yMale followed for:  Keppra (Rash)  penicillin (Unknown)    PAST MEDICAL & SURGICAL HISTORY:  CKD (chronic kidney disease) stage 3, GFR 30-59 ml/min  Hepatitis B  PTSD (post-traumatic stress disorder)  Tachy-idris syndrome  TIA (transient ischemic attack): 2010  Seizure  DIEGO (obstructive sleep apnea)  Chronic diastolic HF (heart failure)  Aortic stenosis  CVA (cerebral vascular accident)  Gait instability  COPD (chronic obstructive pulmonary disease)  BPH (benign prostatic hyperplasia)  Asthma  CHF (congestive heart failure)  Atrial fibrillation  Pacemaker: 2015 Medtronic [id number]  [id number]  Hypertension  H/O thalassemia  CAD (coronary artery disease): s/p stent 2010  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  Artificial pacemaker  S/P primary angioplasty with coronary stent: 2012 AND 2017    FAMILY HISTORY:  No pertinent family history in first degree relatives    MEDICATIONS  (STANDING):  ALBUTerol    90 MICROgram(s) HFA Inhaler 2 Puff(s) Inhalation every 4 hours  amiodarone    Tablet 200 milliGRAM(s) Oral daily  apixaban 5 milliGRAM(s) Oral every 12 hours  atorvastatin 40 milliGRAM(s) Oral at bedtime  buDESOnide  80 MICROgram(s)/formoterol 4.5 MICROgram(s) Inhaler 2 Puff(s) Inhalation two times a day  ceftaroline fosamil IVPB 600 milliGRAM(s) IV Intermittent every 8 hours  clopidogrel Tablet 75 milliGRAM(s) Oral daily  DAPTOmycin IVPB 750 milliGRAM(s) IV Intermittent every 24 hours  docusate sodium 100 milliGRAM(s) Oral two times a day  finasteride 5 milliGRAM(s) Oral daily  furosemide    Tablet 40 milliGRAM(s) Oral daily  lidocaine   Patch 1 Patch Transdermal daily  melatonin 3 milliGRAM(s) Oral at bedtime  metoclopramide Injectable 10 milliGRAM(s) IV Push once  metoprolol tartrate 50 milliGRAM(s) Oral every 12 hours  pantoprazole    Tablet 40 milliGRAM(s) Oral before breakfast  polyethylene glycol 3350 17 Gram(s) Oral daily  senna 2 Tablet(s) Oral at bedtime  spironolactone 25 milliGRAM(s) Oral daily  tamsulosin 0.4 milliGRAM(s) Oral at bedtime    MEDICATIONS  (PRN):  methyl salicylate 14%/menthol 6% Topical Ointment 1 Application(s) Topical three times a day PRN right hip pain  morphine  - Injectable 2 milliGRAM(s) IV Push every 6 hours PRN Severe Pain (7 - 10)      Vital Signs Last 24 Hrs  T(C): 35.9 (31 Mar 2019 07:31), Max: 36.6 (30 Mar 2019 11:51)  T(F): 96.6 (31 Mar 2019 07:31), Max: 97.9 (31 Mar 2019 04:50)  HR: 91 (31 Mar 2019 07:31) (67 - 91)  BP: 101/68 (31 Mar 2019 07:31) (101/68 - 120/71)  BP(mean): --  RR: 18 (31 Mar 2019 07:31) (18 - 18)  SpO2: 95% (31 Mar 2019 07:31) (95% - 98%)  nc/at  s1s2  cta  soft, nt, nd no guarding or rebound  no c/c/e    CBC Full  -  ( 30 Mar 2019 11:54 )  WBC Count : 14.9 K/uL  RBC Count : 4.95 M/uL  Hemoglobin : 14.8 g/dL  Hematocrit : 43.3 %  Platelet Count - Automated : 294 K/uL  Mean Cell Volume : 87.5 fl  Mean Cell Hemoglobin : 30.0 pg  Mean Cell Hemoglobin Concentration : 34.3 gm/dL  Auto Neutrophil # : 12.6 K/uL  Auto Lymphocyte # : 1.2 K/uL  Auto Monocyte # : 1.0 K/uL  Auto Eosinophil # : 0.1 K/uL  Auto Basophil # : 0.0 K/uL  Auto Neutrophil % : 84.8 %  Auto Lymphocyte % : 8.2 %  Auto Monocyte % : 6.4 %  Auto Eosinophil % : 0.7 %  Auto Basophil % : 0.0 %    03-30    140  |  98  |  35<H>  ----------------------------<  131<H>  4.4   |  26  |  1.33<H>    Ca    9.9      30 Mar 2019 11:54
RUMA BANEGAS:[id number],   87yMale followed for:  Keppra (Rash)  penicillin (Unknown)    PAST MEDICAL & SURGICAL HISTORY:  CKD (chronic kidney disease) stage 3, GFR 30-59 ml/min  Hepatitis B  PTSD (post-traumatic stress disorder)  Tachy-idris syndrome  TIA (transient ischemic attack): 2010  Seizure  DIEGO (obstructive sleep apnea)  Chronic diastolic HF (heart failure)  Aortic stenosis  CVA (cerebral vascular accident)  Gait instability  COPD (chronic obstructive pulmonary disease)  BPH (benign prostatic hyperplasia)  Asthma  CHF (congestive heart failure)  Atrial fibrillation  Pacemaker: 2015 Medtronic [id number]  [id number]  Hypertension  H/O thalassemia  CAD (coronary artery disease): s/p stent 2010  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 975 milliGRAM(s) Oral every 8 hours  ALBUTerol    90 MICROgram(s) HFA Inhaler 2 Puff(s) Inhalation every 4 hours  amiodarone    Tablet 200 milliGRAM(s) Oral daily  apixaban 5 milliGRAM(s) Oral every 12 hours  atorvastatin 40 milliGRAM(s) Oral at bedtime  buDESOnide  80 MICROgram(s)/formoterol 4.5 MICROgram(s) Inhaler 2 Puff(s) Inhalation two times a day  ceftaroline fosamil IVPB 600 milliGRAM(s) IV Intermittent every 8 hours  clopidogrel Tablet 75 milliGRAM(s) Oral daily  DAPTOmycin IVPB 750 milliGRAM(s) IV Intermittent every 24 hours  docusate sodium 100 milliGRAM(s) Oral three times a day  finasteride 5 milliGRAM(s) Oral daily  lidocaine   Patch 1 Patch Transdermal daily  melatonin 3 milliGRAM(s) Oral at bedtime  metoclopramide Injectable 10 milliGRAM(s) IV Push once  metoprolol tartrate 50 milliGRAM(s) Oral every 12 hours  pantoprazole    Tablet 40 milliGRAM(s) Oral before breakfast  polyethylene glycol 3350 17 Gram(s) Oral daily  senna 2 Tablet(s) Oral at bedtime  sodium chloride 0.9%. 1000 milliLiter(s) (75 mL/Hr) IV Continuous <Continuous>  spironolactone 25 milliGRAM(s) Oral daily  tamsulosin 0.4 milliGRAM(s) Oral at bedtime    MEDICATIONS  (PRN):  methyl salicylate 14%/menthol 6% Topical Ointment 1 Application(s) Topical three times a day PRN right hip pain  morphine  - Injectable 2 milliGRAM(s) IV Push every 6 hours PRN Severe Pain (7 - 10)  ondansetron   Disintegrating Tablet 8 milliGRAM(s) Oral three times a day PRN Nausea and/or Vomiting      Vital Signs Last 24 Hrs  T(C): 36.7 (07 Apr 2019 05:30), Max: 36.7 (07 Apr 2019 05:30)  T(F): 98 (07 Apr 2019 05:30), Max: 98 (07 Apr 2019 05:30)  HR: 63 (07 Apr 2019 06:40) (63 - 97)  BP: 113/90 (07 Apr 2019 06:40) (98/60 - 113/90)  BP(mean): --  RR: 18 (07 Apr 2019 05:30) (18 - 18)  SpO2: 96% (07 Apr 2019 05:30) (95% - 96%)  nc/at  s1s2  cta  soft, nt, nd no guarding or rebound  no c/c/e
Subjective: Patient seen and examined. No new events except as noted.     REVIEW OF SYSTEMS:    CONSTITUTIONAL: No weakness, fevers or chills  EYES/ENT: No visual changes;  No vertigo or throat pain   NECK: No pain or stiffness  RESPIRATORY: No cough, wheezing, hemoptysis; No shortness of breath  CARDIOVASCULAR: No chest pain or palpitations  GASTROINTESTINAL: No abdominal or epigastric pain. No nausea, vomiting, or hematemesis; No diarrhea or constipation. No melena or hematochezia.  GENITOURINARY: No dysuria, frequency or hematuria  NEUROLOGICAL: No numbness or weakness  SKIN: No itching, burning, rashes, or lesions   All other review of systems is negative unless indicated above.    MEDICATIONS:  MEDICATIONS  (STANDING):  acetaminophen   Tablet .. 975 milliGRAM(s) Oral every 8 hours  amiodarone    Tablet 200 milliGRAM(s) Oral daily  apixaban 5 milliGRAM(s) Oral every 12 hours  atorvastatin 40 milliGRAM(s) Oral at bedtime  buDESOnide  80 MICROgram(s)/formoterol 4.5 MICROgram(s) Inhaler 2 Puff(s) Inhalation two times a day  ceftaroline fosamil IVPB 600 milliGRAM(s) IV Intermittent every 8 hours  chlorhexidine 4% Liquid 1 Application(s) Topical <User Schedule>  clopidogrel Tablet 75 milliGRAM(s) Oral daily  DAPTOmycin IVPB 750 milliGRAM(s) IV Intermittent every 24 hours  docusate sodium 100 milliGRAM(s) Oral three times a day  finasteride 5 milliGRAM(s) Oral daily  lidocaine   Patch 1 Patch Transdermal daily  melatonin 3 milliGRAM(s) Oral at bedtime  metoclopramide Injectable 10 milliGRAM(s) IV Push once  metoprolol tartrate 50 milliGRAM(s) Oral every 12 hours  pantoprazole    Tablet 40 milliGRAM(s) Oral before breakfast  polyethylene glycol 3350 17 Gram(s) Oral daily  senna 2 Tablet(s) Oral at bedtime  spironolactone 25 milliGRAM(s) Oral daily  tamsulosin 0.4 milliGRAM(s) Oral at bedtime      PHYSICAL EXAM:  T(C): 36.3 (04-15-19 @ 04:00), Max: 36.3 (04-14-19 @ 21:08)  HR: 70 (04-15-19 @ 16:33) (53 - 77)  BP: 104/62 (04-15-19 @ 16:33) (86/47 - 109/74)  RR: 20 (04-15-19 @ 16:33) (18 - 20)  SpO2: 95% (04-15-19 @ 16:33) (95% - 97%)  Wt(kg): --  I&O's Summary    14 Apr 2019 07:01  -  15 Apr 2019 07:00  --------------------------------------------------------  IN: 870 mL / OUT: 0 mL / NET: 870 mL    15 Apr 2019 07:01  -  15 Apr 2019 18:00  --------------------------------------------------------  IN: 530 mL / OUT: 0 mL / NET: 530 mL          Appearance: Normal	  HEENT:   Normal oral mucosa, PERRL, EOMI	  Lymphatic: No lymphadenopathy , no edema  Cardiovascular: Normal S1 S2, No JVD, No murmurs , Peripheral pulses palpable 2+ bilaterally  Respiratory: Lungs clear to auscultation, normal effort 	  Gastrointestinal:  Soft, Non-tender, + BS	  Skin: No rashes, No ecchymoses, No cyanosis, warm to touch  Musculoskeletal: Normal range of motion, normal strength  Psychiatry:  Mood & affect appropriate  Ext: No edema      All labs, Imaging and EKGs personally reviewed
Subjective: Patient seen and examined. No new events except as noted.     REVIEW OF SYSTEMS:    CONSTITUTIONAL: No weakness, fevers or chills  EYES/ENT: No visual changes;  No vertigo or throat pain   NECK: No pain or stiffness  RESPIRATORY: No cough, wheezing, hemoptysis; No shortness of breath  CARDIOVASCULAR: No chest pain or palpitations  GASTROINTESTINAL: No abdominal or epigastric pain. No nausea, vomiting, or hematemesis; No diarrhea or constipation. No melena or hematochezia.  GENITOURINARY: No dysuria, frequency or hematuria  NEUROLOGICAL: R hip pain , No numbness  SKIN: No itching, burning, rashes, or lesions   All other review of systems is negative unless indicated above.    MEDICATIONS:  MEDICATIONS  (STANDING):  ALBUTerol    90 MICROgram(s) HFA Inhaler 2 Puff(s) Inhalation every 4 hours  amiodarone    Tablet 200 milliGRAM(s) Oral daily  apixaban 5 milliGRAM(s) Oral every 12 hours  atorvastatin 40 milliGRAM(s) Oral at bedtime  buDESOnide  80 MICROgram(s)/formoterol 4.5 MICROgram(s) Inhaler 2 Puff(s) Inhalation two times a day  clopidogrel Tablet 75 milliGRAM(s) Oral daily  DAPTOmycin IVPB 750 milliGRAM(s) IV Intermittent every 24 hours  docusate sodium 100 milliGRAM(s) Oral two times a day  finasteride 5 milliGRAM(s) Oral daily  furosemide   Injectable 40 milliGRAM(s) IV Push daily  lidocaine   Patch 1 Patch Transdermal daily  melatonin 3 milliGRAM(s) Oral at bedtime  metoclopramide Injectable 10 milliGRAM(s) IV Push once  metoprolol tartrate 50 milliGRAM(s) Oral every 12 hours  pantoprazole    Tablet 40 milliGRAM(s) Oral before breakfast  polyethylene glycol 3350 17 Gram(s) Oral daily  senna 2 Tablet(s) Oral at bedtime  spironolactone 25 milliGRAM(s) Oral daily  tamsulosin 0.4 milliGRAM(s) Oral at bedtime      PHYSICAL EXAM:  T(C): 36.4 (03-25-19 @ 16:32), Max: 36.8 (03-24-19 @ 19:00)  HR: 104 (03-25-19 @ 17:30) (89 - 109)  BP: 134/85 (03-25-19 @ 17:30) (95/62 - 134/85)  RR: 18 (03-25-19 @ 16:32) (16 - 18)  SpO2: 97% (03-25-19 @ 16:32) (95% - 97%)  Wt(kg): --  I&O's Summary    24 Mar 2019 07:01  -  25 Mar 2019 07:00  --------------------------------------------------------  IN: 520 mL / OUT: 300 mL / NET: 220 mL          Appearance: Normal	  HEENT:   Normal oral mucosa, PERRL, EOMI	  Lymphatic: No lymphadenopathy , no edema  Cardiovascular: S1S2, irregularly irregular   Respiratory: Lungs clear to auscultation, normal effort 	  Gastrointestinal:  Soft, Non-tender, + BS	  Skin: No rashes, No ecchymoses, No cyanosis, warm to touch  Musculoskeletal: R hip pain   Psychiatry:  Mood & affect appropriate  Ext: No edema      All labs, Imaging and EKGs personally reviewed                           14.0   15.0  )-----------( 269      ( 25 Mar 2019 14:32 )             39.2               03-25    136  |  96  |  37<H>  ----------------------------<  176<H>  4.4   |  26  |  1.18    Ca    9.4      25 Mar 2019 14:32        < from: MR Pelvis No Cont (03.25.19 @ 11:57) >    IMPRESSION:   1.  No MR evidence of acute osteomyelitis.  2.  No acute fracture or osteonecrosis.
Subjective: Patient seen and examined. No new events except as noted.     REVIEW OF SYSTEMS:    CONSTITUTIONAL: No weakness, fevers or chills  EYES/ENT: No visual changes;  No vertigo or throat pain   NECK: No pain or stiffness  RESPIRATORY: No cough, wheezing, hemoptysis; No shortness of breath  CARDIOVASCULAR: No chest pain or palpitations  GASTROINTESTINAL: No abdominal or epigastric pain. No nausea, vomiting, or hematemesis; No diarrhea or constipation. No melena or hematochezia.  GENITOURINARY: No dysuria, frequency or hematuria  NEUROLOGICAL: back pain   SKIN: No itching, burning, rashes, or lesions   All other review of systems is negative unless indicated above.    MEDICATIONS:  MEDICATIONS  (STANDING):  ALBUTerol    90 MICROgram(s) HFA Inhaler 2 Puff(s) Inhalation every 4 hours  amiodarone    Tablet 200 milliGRAM(s) Oral daily  apixaban 5 milliGRAM(s) Oral every 12 hours  atorvastatin 40 milliGRAM(s) Oral at bedtime  buDESOnide  80 MICROgram(s)/formoterol 4.5 MICROgram(s) Inhaler 2 Puff(s) Inhalation two times a day  ceftaroline fosamil IVPB 600 milliGRAM(s) IV Intermittent every 8 hours  clopidogrel Tablet 75 milliGRAM(s) Oral daily  DAPTOmycin IVPB 750 milliGRAM(s) IV Intermittent every 24 hours  docusate sodium 100 milliGRAM(s) Oral three times a day  finasteride 5 milliGRAM(s) Oral daily  lidocaine   Patch 1 Patch Transdermal daily  melatonin 3 milliGRAM(s) Oral at bedtime  metoclopramide Injectable 10 milliGRAM(s) IV Push once  metoprolol tartrate 50 milliGRAM(s) Oral every 12 hours  pantoprazole    Tablet 40 milliGRAM(s) Oral before breakfast  polyethylene glycol 3350 17 Gram(s) Oral daily  senna 2 Tablet(s) Oral at bedtime  sodium chloride 0.9%. 1000 milliLiter(s) (75 mL/Hr) IV Continuous <Continuous>  spironolactone 25 milliGRAM(s) Oral daily  tamsulosin 0.4 milliGRAM(s) Oral at bedtime      PHYSICAL EXAM:  T(C): 36.4 (04-02-19 @ 03:54), Max: 36.5 (04-01-19 @ 20:26)  HR: 75 (04-02-19 @ 03:54) (75 - 98)  BP: 111/77 (04-02-19 @ 03:54) (100/61 - 118/85)  RR: 18 (04-02-19 @ 03:54) (18 - 18)  SpO2: 98% (04-02-19 @ 03:54) (97% - 99%)  Wt(kg): --  I&O's Summary    01 Apr 2019 07:01  -  02 Apr 2019 07:00  --------------------------------------------------------  IN: 340 mL / OUT: 250 mL / NET: 90 mL          Appearance: Normal	  HEENT:   Normal oral mucosa  Lymphatic: No lymphadenopathy , no edema  Cardiovascular: Normal S1 S2, No JVD, No murmurs , Peripheral pulses palpable 2+ bilaterally  Respiratory: Lungs clear to auscultation, normal effort 	  Gastrointestinal:  Soft, Non-tender, + BS	  Skin: No rashes, No ecchymoses, No cyanosis, warm to touch  Musculoskeletal: Nlower back paina nd RLE pain on palpation   Psychiatry:  Mood & affect appropriate  Ext: No edema      All labs, Imaging and EKGs personally reviewed                           13.0   15.1  )-----------( 247      ( 02 Apr 2019 06:53 )             37.6               04-02    138  |  101  |  35<H>  ----------------------------<  120<H>  3.9   |  23  |  1.24    Ca    9.2      02 Apr 2019 06:52             CARDIAC MARKERS ( 01 Apr 2019 14:19 )  x     / x     / 48 U/L / x     / 4.0 ng/mL
Subjective: Patient seen and examined. No new events except as noted.   Hip pain improved     REVIEW OF SYSTEMS:    CONSTITUTIONAL: No weakness, fevers or chills  EYES/ENT: No visual changes;  No vertigo or throat pain   NECK: No pain or stiffness  RESPIRATORY: No cough, wheezing, hemoptysis; No shortness of breath  CARDIOVASCULAR: No chest pain or palpitations  GASTROINTESTINAL: No abdominal or epigastric pain. No nausea, vomiting, or hematemesis; No diarrhea or constipation. No melena or hematochezia.  GENITOURINARY: No dysuria, frequency or hematuria  NEUROLOGICAL: No numbness or weakness  SKIN: No itching, burning, rashes, or lesions   All other review of systems is negative unless indicated above.    MEDICATIONS:  MEDICATIONS  (STANDING):  acetaminophen   Tablet .. 975 milliGRAM(s) Oral every 8 hours  amiodarone    Tablet 200 milliGRAM(s) Oral daily  apixaban 5 milliGRAM(s) Oral every 12 hours  atorvastatin 40 milliGRAM(s) Oral at bedtime  buDESOnide  80 MICROgram(s)/formoterol 4.5 MICROgram(s) Inhaler 2 Puff(s) Inhalation two times a day  ceftaroline fosamil IVPB 600 milliGRAM(s) IV Intermittent every 8 hours  chlorhexidine 4% Liquid 1 Application(s) Topical <User Schedule>  clopidogrel Tablet 75 milliGRAM(s) Oral daily  DAPTOmycin IVPB 750 milliGRAM(s) IV Intermittent every 24 hours  docusate sodium 100 milliGRAM(s) Oral three times a day  finasteride 5 milliGRAM(s) Oral daily  furosemide    Tablet 20 milliGRAM(s) Oral daily  lidocaine   Patch 1 Patch Transdermal daily  melatonin 3 milliGRAM(s) Oral at bedtime  metoclopramide Injectable 10 milliGRAM(s) IV Push once  metoprolol tartrate 50 milliGRAM(s) Oral every 12 hours  pantoprazole    Tablet 40 milliGRAM(s) Oral before breakfast  polyethylene glycol 3350 17 Gram(s) Oral daily  senna 2 Tablet(s) Oral at bedtime  spironolactone 25 milliGRAM(s) Oral daily  tamsulosin 0.4 milliGRAM(s) Oral at bedtime      PHYSICAL EXAM:  T(C): 36.3 (04-14-19 @ 21:08), Max: 36.6 (04-14-19 @ 04:10)  HR: 67 (04-14-19 @ 21:08) (67 - 89)  BP: 91/50 (04-14-19 @ 21:08) (91/50 - 109/69)  RR: 18 (04-14-19 @ 21:08) (18 - 18)  SpO2: 97% (04-14-19 @ 21:08) (96% - 100%)  Wt(kg): --  I&O's Summary    14 Apr 2019 07:01  -  14 Apr 2019 22:43  --------------------------------------------------------  IN: 340 mL / OUT: 0 mL / NET: 340 mL          Appearance: Normal	  HEENT:   Normal oral mucosa, PERRL, EOMI	  Lymphatic: No lymphadenopathy , no edema  Cardiovascular: Normal S1 S2, No JVD, No murmurs , Peripheral pulses palpable 2+ bilaterally  Respiratory: Lungs clear to auscultation, normal effort 	  Gastrointestinal:  Soft, Non-tender, + BS	  Skin: No rashes, No ecchymoses, No cyanosis, warm to touch  Musculoskeletal: Normal range of motion, normal strength  Psychiatry:  Mood & affect appropriate  Ext: No edema      All labs, Imaging and EKGs personally reviewed                           11.5   8.2   )-----------( 178      ( 13 Apr 2019 06:58 )             34.6               04-13    136  |  101  |  17  ----------------------------<  75  4.5   |  21<L>  |  0.97    Ca    9.1      13 Apr 2019 06:58
Subjective: Patient seen and examined. No new events except as noted.   MARIA LUISA today     REVIEW OF SYSTEMS:  no pain     MEDICATIONS:  MEDICATIONS  (STANDING):  ALBUTerol    90 MICROgram(s) HFA Inhaler 2 Puff(s) Inhalation every 4 hours  amiodarone    Tablet 200 milliGRAM(s) Oral daily  apixaban 5 milliGRAM(s) Oral every 12 hours  atorvastatin 40 milliGRAM(s) Oral at bedtime  buDESOnide  80 MICROgram(s)/formoterol 4.5 MICROgram(s) Inhaler 2 Puff(s) Inhalation two times a day  ceftaroline fosamil IVPB 600 milliGRAM(s) IV Intermittent every 8 hours  clopidogrel Tablet 75 milliGRAM(s) Oral daily  DAPTOmycin IVPB 750 milliGRAM(s) IV Intermittent every 24 hours  docusate sodium 100 milliGRAM(s) Oral three times a day  finasteride 5 milliGRAM(s) Oral daily  lidocaine   Patch 1 Patch Transdermal daily  melatonin 3 milliGRAM(s) Oral at bedtime  metoclopramide Injectable 10 milliGRAM(s) IV Push once  metoprolol tartrate 50 milliGRAM(s) Oral every 12 hours  pantoprazole    Tablet 40 milliGRAM(s) Oral before breakfast  polyethylene glycol 3350 17 Gram(s) Oral daily  senna 2 Tablet(s) Oral at bedtime  sodium chloride 0.9%. 1000 milliLiter(s) (75 mL/Hr) IV Continuous <Continuous>  spironolactone 25 milliGRAM(s) Oral daily  tamsulosin 0.4 milliGRAM(s) Oral at bedtime      PHYSICAL EXAM:  T(C): 36.6 (04-04-19 @ 08:45), Max: 36.8 (04-03-19 @ 21:12)  HR: 67 (04-04-19 @ 08:45) (67 - 97)  BP: 130/80 (04-04-19 @ 08:45) (128/72 - 137/86)  RR: 17 (04-04-19 @ 08:45) (16 - 18)  SpO2: 97% (04-04-19 @ 08:45) (97% - 99%)  Wt(kg): --  I&O's Summary    03 Apr 2019 07:01  -  04 Apr 2019 07:00  --------------------------------------------------------  IN: 0 mL / OUT: 250 mL / NET: -250 mL          Appearance: Normal	  HEENT:   Normal oral mucosa, PERRL, EOMI	  Lymphatic: No lymphadenopathy , no edema  Cardiovascular: Normal S1 S2, No JVD, No murmurs , Peripheral pulses palpable 2+ bilaterally  Respiratory: Lungs clear to auscultation, normal effort 	  Gastrointestinal:  Soft, Non-tender, + BS	  Skin: No rashes, No ecchymoses, No cyanosis, warm to touch  Musculoskeletal: Normal range of motion, normal strength  Psychiatry:  Mood & affect appropriate  Ext: No edema      All labs, Imaging and EKGs personally reviewed                           12.7   10.7  )-----------( 224      ( 04 Apr 2019 06:33 )             41.3               04-04    139  |  104  |  26<H>  ----------------------------<  98  4.2   |  22  |  1.13    Ca    9.4      04 Apr 2019 06:32    < from: Transesophageal Echocardiogram w/o TTE (04.03.19 @ 17:34) >  Conclusions:  1.  Mitral annular calcification and calcified mitral  leaflets. Minimal mitral regurgitation.  2. Transcatheter aortic valve replacement (TAVR). Leaflets  appear mildly thickened with normal opening. No vegetations  seen associated with the TAVR. Mild-moderate paravalvular  aortic regurgitation. Minimal intravalvular aortic  regurgitation.  3. Left atrial enlargement. Dense spontaneous echo contrast  seen throughout the left atrium and left atrial appendage.  No definitive left atrial or left atrial appendage  thrombus. Decreased left atrial appendage velocities  (maximum velocityabout 35 cm/sec) noted. Mobile interatrial  septum.  4. Grossly normal left ventricular systolic function.  Paradoxical septal motion consistent with conduction  defect.  5. Normal right ventricular size and function.  *** Compared with echocardiogram of 3/26/2019, results are  similar on today's study. No vegetations seen on today's  study.
Subjective: Patient seen and examined. No new events except as noted.   R hip pain     REVIEW OF SYSTEMS:    CONSTITUTIONAL: No weakness, fevers or chills  EYES/ENT: No visual changes;  No vertigo or throat pain   NECK: No pain or stiffness  RESPIRATORY: No cough, wheezing, hemoptysis; No shortness of breath  CARDIOVASCULAR: No chest pain or palpitations  GASTROINTESTINAL: No abdominal or epigastric pain. No nausea, vomiting, or hematemesis; No diarrhea or constipation. No melena or hematochezia.  GENITOURINARY: No dysuria, frequency or hematuria  NEUROLOGICAL: R hip pain   SKIN: No itching, burning, rashes, or lesions   All other review of systems is negative unless indicated above.    MEDICATIONS:  MEDICATIONS  (STANDING):  acetaminophen   Tablet .. 975 milliGRAM(s) Oral every 8 hours  ALBUTerol    90 MICROgram(s) HFA Inhaler 2 Puff(s) Inhalation every 4 hours  amiodarone    Tablet 200 milliGRAM(s) Oral daily  apixaban 5 milliGRAM(s) Oral every 12 hours  atorvastatin 40 milliGRAM(s) Oral at bedtime  buDESOnide  80 MICROgram(s)/formoterol 4.5 MICROgram(s) Inhaler 2 Puff(s) Inhalation two times a day  ceftaroline fosamil IVPB 600 milliGRAM(s) IV Intermittent every 8 hours  chlorhexidine 4% Liquid 1 Application(s) Topical <User Schedule>  clopidogrel Tablet 75 milliGRAM(s) Oral daily  DAPTOmycin IVPB 750 milliGRAM(s) IV Intermittent every 24 hours  docusate sodium 100 milliGRAM(s) Oral three times a day  finasteride 5 milliGRAM(s) Oral daily  lidocaine   Patch 1 Patch Transdermal daily  melatonin 3 milliGRAM(s) Oral at bedtime  metoclopramide Injectable 10 milliGRAM(s) IV Push once  metoprolol tartrate 50 milliGRAM(s) Oral every 12 hours  pantoprazole    Tablet 40 milliGRAM(s) Oral before breakfast  polyethylene glycol 3350 17 Gram(s) Oral daily  senna 2 Tablet(s) Oral at bedtime  spironolactone 25 milliGRAM(s) Oral daily  tamsulosin 0.4 milliGRAM(s) Oral at bedtime      PHYSICAL EXAM:  T(C): 36.4 (04-12-19 @ 12:17), Max: 36.4 (04-12-19 @ 12:17)  HR: 71 (04-12-19 @ 12:17) (71 - 100)  BP: 102/58 (04-12-19 @ 12:17) (102/58 - 109/75)  RR: 18 (04-12-19 @ 12:17) (16 - 18)  SpO2: 99% (04-12-19 @ 12:17) (96% - 99%)  Wt(kg): --  I&O's Summary    11 Apr 2019 07:01  -  12 Apr 2019 07:00  --------------------------------------------------------  IN: 730 mL / OUT: 1200 mL / NET: -470 mL          Appearance: Normal	  HEENT:   Normal oral mucosa, PERRL, EOMI	  Lymphatic: No lymphadenopathy , no edema  Cardiovascular: Normal S1 S2, No JVD, No murmurs , Peripheral pulses palpable 2+ bilaterally  Respiratory: Lungs clear to auscultation, normal effort 	  Gastrointestinal:  Soft, Non-tender, + BS	  Skin: No rashes, No ecchymoses, No cyanosis, warm to touch  Musculoskeletal: R hip pain   Psychiatry:  Mood & affect appropriate  Ext: No edema      All labs, Imaging and EKGs personally reviewed                           12.5   11.1  )-----------( 181      ( 11 Apr 2019 09:53 )             36.7               04-11    138  |  105  |  22  ----------------------------<  81  4.4   |  20<L>  |  1.10    Ca    9.0      11 Apr 2019 07:26
Subjective: Patient seen and examined. No new events except as noted.   S/P Bone scan this morning       REVIEW OF SYSTEMS:    CONSTITUTIONAL: No weakness, fevers or chills  EYES/ENT: No visual changes;  No vertigo or throat pain   NECK: No pain or stiffness  RESPIRATORY: No cough, wheezing, hemoptysis; No shortness of breath  CARDIOVASCULAR: No chest pain or palpitations  GASTROINTESTINAL: No abdominal or epigastric pain. No nausea, vomiting, or hematemesis; No diarrhea or constipation. No melena or hematochezia.  GENITOURINARY: No dysuria, frequency or hematuria  NEUROLOGICAL: No numbness or weakness  SKIN: No itching, burning, rashes, or lesions   All other review of systems is negative unless indicated above.    MEDICATIONS:  MEDICATIONS  (STANDING):  ALBUTerol    90 MICROgram(s) HFA Inhaler 2 Puff(s) Inhalation every 4 hours  amiodarone    Tablet 200 milliGRAM(s) Oral daily  apixaban 5 milliGRAM(s) Oral every 12 hours  atorvastatin 40 milliGRAM(s) Oral at bedtime  buDESOnide  80 MICROgram(s)/formoterol 4.5 MICROgram(s) Inhaler 2 Puff(s) Inhalation two times a day  ceftaroline fosamil IVPB 600 milliGRAM(s) IV Intermittent every 8 hours  clopidogrel Tablet 75 milliGRAM(s) Oral daily  DAPTOmycin IVPB 750 milliGRAM(s) IV Intermittent every 24 hours  docusate sodium 100 milliGRAM(s) Oral three times a day  finasteride 5 milliGRAM(s) Oral daily  lidocaine   Patch 1 Patch Transdermal daily  melatonin 3 milliGRAM(s) Oral at bedtime  metoclopramide Injectable 10 milliGRAM(s) IV Push once  metoprolol tartrate 50 milliGRAM(s) Oral every 12 hours  pantoprazole    Tablet 40 milliGRAM(s) Oral before breakfast  polyethylene glycol 3350 17 Gram(s) Oral daily  senna 2 Tablet(s) Oral at bedtime  sodium chloride 0.9%. 1000 milliLiter(s) (75 mL/Hr) IV Continuous <Continuous>  spironolactone 25 milliGRAM(s) Oral daily  tamsulosin 0.4 milliGRAM(s) Oral at bedtime      PHYSICAL EXAM:  T(C): 36.3 (04-01-19 @ 12:43), Max: 36.7 (03-31-19 @ 20:57)  HR: 80 (04-01-19 @ 05:47) (80 - 88)  BP: 118/85 (04-01-19 @ 12:36) (101/66 - 118/85)  RR: 18 (04-01-19 @ 12:43) (18 - 18)  SpO2: 99% (04-01-19 @ 12:43) (94% - 99%)  Wt(kg): --  I&O's Summary    31 Mar 2019 07:01  -  01 Apr 2019 07:00  --------------------------------------------------------  IN: 370 mL / OUT: 300 mL / NET: 70 mL          Appearance: Normal	  HEENT:   Normal oral mucosa, PERRL, EOMI	  Lymphatic: No lymphadenopathy , no edema  Cardiovascular: Normal S1 S2, No JVD, No murmurs , Peripheral pulses palpable 2+ bilaterally  Respiratory: Lungs clear to auscultation, normal effort 	  Gastrointestinal:  Soft, Non-tender, + BS	  Skin: No rashes, No ecchymoses, No cyanosis, warm to touch  Musculoskeletal: Normal range of motion, normal strength  Psychiatry:  Mood & affect appropriate  Ext: No edema      All labs, Imaging and EKGs personally reviewed     pending labs for this morning
Subjective: Patient seen and examined. No new events except as noted.   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 No nausea, vomiting, or hematemesis; No diarrhea or constipation. No melena or hematochezia.  GENITOURINARY: No dysuria, frequency or hematuria  NEUROLOGICAL: R back pain   SKIN: No itching, burning, rashes, or lesions   All other review of systems is negative unless indicated above.    MEDICATIONS:  MEDICATIONS  (STANDING):  ALBUTerol    90 MICROgram(s) HFA Inhaler 2 Puff(s) Inhalation every 4 hours  amiodarone    Tablet 200 milliGRAM(s) Oral daily  apixaban 5 milliGRAM(s) Oral every 12 hours  atorvastatin 40 milliGRAM(s) Oral at bedtime  buDESOnide  80 MICROgram(s)/formoterol 4.5 MICROgram(s) Inhaler 2 Puff(s) Inhalation two times a day  clopidogrel Tablet 75 milliGRAM(s) Oral daily  DAPTOmycin IVPB 750 milliGRAM(s) IV Intermittent every 24 hours  docusate sodium 100 milliGRAM(s) Oral two times a day  finasteride 5 milliGRAM(s) Oral daily  furosemide   Injectable 40 milliGRAM(s) IV Push daily  lidocaine   Patch 1 Patch Transdermal daily  melatonin 3 milliGRAM(s) Oral at bedtime  metoclopramide Injectable 10 milliGRAM(s) IV Push once  metoprolol tartrate 25 milliGRAM(s) Oral two times a day  pantoprazole    Tablet 40 milliGRAM(s) Oral before breakfast  senna 2 Tablet(s) Oral at bedtime  spironolactone 25 milliGRAM(s) Oral daily  tamsulosin 0.4 milliGRAM(s) Oral at bedtime      PHYSICAL EXAM:  T(C): 36.3 (03-23-19 @ 12:26), Max: 36.5 (03-22-19 @ 20:15)  HR: 105 (03-23-19 @ 17:21) (88 - 107)  BP: 142/80 (03-23-19 @ 17:21) (104/73 - 142/80)  RR: 19 (03-23-19 @ 12:26) (18 - 19)  SpO2: 96% (03-23-19 @ 12:26) (94% - 100%)  Wt(kg): --  I&O's Summary    22 Mar 2019 07:01  -  23 Mar 2019 07:00  --------------------------------------------------------  IN: 250 mL / OUT: 600 mL / NET: -350 mL    23 Mar 2019 07:01  -  23 Mar 2019 19:05  --------------------------------------------------------  IN: 550 mL / OUT: 300 mL / NET: 250 mL          Appearance: Normal	  HEENT:   Normal oral mucosa, PERRL, EOMI	  Lymphatic: No lymphadenopathy , no edema  Cardiovascular: S1S2, irregularly irregular   Respiratory: Lungs clear to auscultation, normal effort 	  Gastrointestinal:  Soft, Non-tender, + BS	  Skin: No rashes, No ecchymoses, No cyanosis, warm to touch  Musculoskeletal: R groin pain   Psychiatry:  Mood & affect appropriate  Ext: No edema      All labs, Imaging and EKGs personally reviewed                           12.6   15.8  )-----------( 248      ( 23 Mar 2019 06:20 )             38.0               03-23    138  |  103  |  39<H>  ----------------------------<  109<H>  5.3   |  24  |  0.96    Ca    9.4      23 Mar 2019 06:20    TPro  6.5  /  Alb  3.0<L>  /  TBili  0.2  /  DBili  x   /  AST  50<H>  /  ALT  43  /  AlkPhos  130<H>  03-22
Subjective: Patient seen and examined. No new events except as noted.   awaiting for repeat [facility]IA LUISA     REVIEW OF SYSTEMS:    CONSTITUTIONAL: No weakness, fevers or chills  EYES/ENT: No visual changes;  No vertigo or throat pain   NECK: No pain or stiffness  RESPIRATORY: No cough, wheezing, hemoptysis; No shortness of breath  CARDIOVASCULAR: No chest pain or palpitations  GASTROINTESTINAL: No abdominal or epigastric pain. No nausea, vomiting, or hematemesis; No diarrhea or constipation. No melena or hematochezia.  GENITOURINARY: No dysuria, frequency or hematuria  NEUROLOGICAL: No numbness or weakness  SKIN: No itching, burning, rashes, or lesions   All other review of systems is negative unless indicated above.    MEDICATIONS:  MEDICATIONS  (STANDING):  ALBUTerol    90 MICROgram(s) HFA Inhaler 2 Puff(s) Inhalation every 4 hours  amiodarone    Tablet 200 milliGRAM(s) Oral daily  apixaban 5 milliGRAM(s) Oral every 12 hours  atorvastatin 40 milliGRAM(s) Oral at bedtime  buDESOnide  80 MICROgram(s)/formoterol 4.5 MICROgram(s) Inhaler 2 Puff(s) Inhalation two times a day  ceftaroline fosamil IVPB 600 milliGRAM(s) IV Intermittent every 8 hours  clopidogrel Tablet 75 milliGRAM(s) Oral daily  DAPTOmycin IVPB 750 milliGRAM(s) IV Intermittent every 24 hours  docusate sodium 100 milliGRAM(s) Oral three times a day  finasteride 5 milliGRAM(s) Oral daily  lidocaine   Patch 1 Patch Transdermal daily  melatonin 3 milliGRAM(s) Oral at bedtime  metoclopramide Injectable 10 milliGRAM(s) IV Push once  metoprolol tartrate 50 milliGRAM(s) Oral every 12 hours  pantoprazole    Tablet 40 milliGRAM(s) Oral before breakfast  polyethylene glycol 3350 17 Gram(s) Oral daily  senna 2 Tablet(s) Oral at bedtime  sodium chloride 0.9%. 1000 milliLiter(s) (75 mL/Hr) IV Continuous <Continuous>  sodium chloride 0.9%. 1000 milliLiter(s) (75 mL/Hr) IV Continuous <Continuous>  spironolactone 25 milliGRAM(s) Oral daily  tamsulosin 0.4 milliGRAM(s) Oral at bedtime      PHYSICAL EXAM:  T(C): 37 (04-03-19 @ 12:32), Max: 37 (04-03-19 @ 12:32)  HR: 71 (04-03-19 @ 12:32) (65 - 87)  BP: 111/75 (04-03-19 @ 12:32) (102/75 - 112/62)  RR: 18 (04-03-19 @ 04:42) (18 - 19)  SpO2: 97% (04-03-19 @ 04:42) (97% - 99%)  Wt(kg): --  I&O's Summary    02 Apr 2019 07:01  -  03 Apr 2019 07:00  --------------------------------------------------------  IN: 570 mL / OUT: 700 mL / NET: -130 mL          Appearance: Normal	  HEENT:   Normal oral mucosa, PERRL, EOMI	  Lymphatic: No lymphadenopathy , no edema  Cardiovascular: Normal S1 S2, No JVD, No murmurs , Peripheral pulses palpable 2+ bilaterally  Respiratory: Lungs clear to auscultation, normal effort 	  Gastrointestinal:  Soft, Non-tender, + BS	  Skin: No rashes, No ecchymoses, No cyanosis, warm to touch  Musculoskeletal: lower back pain   Psychiatry:  Mood & affect appropriate  Ext: No edema      All labs, Imaging and EKGs personally reviewed                           13.0   13.0  )-----------( 211      ( 03 Apr 2019 06:55 )             38.4               04-03    137  |  101  |  32<H>  ----------------------------<  99  4.4   |  22  |  1.31<H>    Ca    9.3      03 Apr 2019 06:55             CARDIAC MARKERS ( 01 Apr 2019 14:19 )  x     / x     / 48 U/L / x     / 4.0 ng/mL
Subjective: Patient seen and examined. No new events except as noted.   back pain thismorning   PICC today     REVIEW OF SYSTEMS:    CONSTITUTIONAL: No weakness, fevers or chills  EYES/ENT: No visual changes;  No vertigo or throat pain   NECK: No pain or stiffness  RESPIRATORY: No cough, wheezing, hemoptysis; No shortness of breath  CARDIOVASCULAR: No chest pain or palpitations  GASTROINTESTINAL: No abdominal or epigastric pain. No nausea, vomiting, or hematemesis; No diarrhea or constipation. No melena or hematochezia.  GENITOURINARY: No dysuria, frequency or hematuria  NEUROLOGICAL: back pain   SKIN: No itching, burning, rashes, or lesions   All other review of systems is negative unless indicated above.    MEDICATIONS:  MEDICATIONS  (STANDING):  acetaminophen   Tablet .. 975 milliGRAM(s) Oral every 8 hours  ALBUTerol    90 MICROgram(s) HFA Inhaler 2 Puff(s) Inhalation every 4 hours  amiodarone    Tablet 200 milliGRAM(s) Oral daily  atorvastatin 40 milliGRAM(s) Oral at bedtime  buDESOnide  80 MICROgram(s)/formoterol 4.5 MICROgram(s) Inhaler 2 Puff(s) Inhalation two times a day  ceftaroline fosamil IVPB 600 milliGRAM(s) IV Intermittent every 8 hours  chlorhexidine 4% Liquid 1 Application(s) Topical <User Schedule>  clopidogrel Tablet 75 milliGRAM(s) Oral daily  DAPTOmycin IVPB 750 milliGRAM(s) IV Intermittent every 24 hours  docusate sodium 100 milliGRAM(s) Oral three times a day  finasteride 5 milliGRAM(s) Oral daily  lidocaine   Patch 1 Patch Transdermal daily  melatonin 3 milliGRAM(s) Oral at bedtime  metoclopramide Injectable 10 milliGRAM(s) IV Push once  metoprolol tartrate 50 milliGRAM(s) Oral every 12 hours  pantoprazole    Tablet 40 milliGRAM(s) Oral before breakfast  polyethylene glycol 3350 17 Gram(s) Oral daily  senna 2 Tablet(s) Oral at bedtime  spironolactone 25 milliGRAM(s) Oral daily  tamsulosin 0.4 milliGRAM(s) Oral at bedtime      PHYSICAL EXAM:  T(C): 36.4 (04-11-19 @ 12:39), Max: 36.4 (04-11-19 @ 04:47)  HR: 81 (04-11-19 @ 12:39) (76 - 93)  BP: 117/73 (04-11-19 @ 12:39) (106/61 - 117/73)  RR: 18 (04-11-19 @ 12:39) (17 - 19)  SpO2: 97% (04-11-19 @ 12:39) (95% - 97%)  Wt(kg): --  I&O's Summary    10 Apr 2019 07:01  -  11 Apr 2019 07:00  --------------------------------------------------------  IN: 840 mL / OUT: 1350 mL / NET: -510 mL    11 Apr 2019 07:01  -  11 Apr 2019 17:05  --------------------------------------------------------  IN: 490 mL / OUT: 1200 mL / NET: -710 mL          Appearance: Normal	  HEENT:   Normal oral mucosa, PERRL, EOMI	  Lymphatic: No lymphadenopathy , no edema  Cardiovascular: Normal S1 S2, No JVD, No murmurs , Peripheral pulses palpable 2+ bilaterally  Respiratory: Lungs clear to auscultation, normal effort 	  Gastrointestinal:  Soft, Non-tender, + BS	  Skin: No rashes, No ecchymoses, No cyanosis, warm to touch  Musculoskeletal: Normal range of motion, normal strength  Psychiatry:  Mood & affect appropriate  Ext: No edema      All labs, Imaging and EKGs personally reviewed                           12.5   11.1  )-----------( 181      ( 11 Apr 2019 09:53 )             36.7               04-11    138  |  105  |  22  ----------------------------<  81  4.4   |  20<L>  |  1.10    Ca    9.0      11 Apr 2019 07:26      PT/INR - ( 10 Apr 2019 07:16 )   PT: 18.4 sec;   INR: 1.59 ratio         PTT - ( 10 Apr 2019 07:16 )  PTT:34.4 sec       CARDIAC MARKERS ( 10 Apr 2019 07:16 )  x     / x     / 58 U/L / x     / x
Subjective: Patient seen and examined. No new events except as noted.   cont to have pain n back     REVIEW OF SYSTEMS:    CONSTITUTIONAL: No weakness, fevers or chills  EYES/ENT: No visual changes;  No vertigo or throat pain   NECK: No pain or stiffness  RESPIRATORY: No cough, wheezing, hemoptysis; No shortness of breath  CARDIOVASCULAR: No chest pain or palpitations  GASTROINTESTINAL: No abdominal or epigastric pain. No nausea, vomiting, or hematemesis; No diarrhea or constipation. No melena or hematochezia.  GENITOURINARY: No dysuria, frequency or hematuria  NEUROLOGICAL: back apin  SKIN: No itching, burning, rashes, or lesions   All other review of systems is negative unless indicated above.    MEDICATIONS:  MEDICATIONS  (STANDING):  ALBUTerol    90 MICROgram(s) HFA Inhaler 2 Puff(s) Inhalation every 4 hours  amiodarone    Tablet 200 milliGRAM(s) Oral daily  apixaban 5 milliGRAM(s) Oral every 12 hours  atorvastatin 40 milliGRAM(s) Oral at bedtime  buDESOnide  80 MICROgram(s)/formoterol 4.5 MICROgram(s) Inhaler 2 Puff(s) Inhalation two times a day  clopidogrel Tablet 75 milliGRAM(s) Oral daily  DAPTOmycin IVPB 750 milliGRAM(s) IV Intermittent every 24 hours  docusate sodium 100 milliGRAM(s) Oral two times a day  finasteride 5 milliGRAM(s) Oral daily  furosemide   Injectable 40 milliGRAM(s) IV Push daily  lidocaine   Patch 1 Patch Transdermal daily  melatonin 3 milliGRAM(s) Oral at bedtime  metoclopramide Injectable 10 milliGRAM(s) IV Push once  metoprolol tartrate 50 milliGRAM(s) Oral every 12 hours  pantoprazole    Tablet 40 milliGRAM(s) Oral before breakfast  polyethylene glycol 3350 17 Gram(s) Oral daily  senna 2 Tablet(s) Oral at bedtime  spironolactone 25 milliGRAM(s) Oral daily  tamsulosin 0.4 milliGRAM(s) Oral at bedtime      PHYSICAL EXAM:  T(C): 36.3 (03-26-19 @ 13:58), Max: 36.4 (03-25-19 @ 21:11)  HR: 92 (03-26-19 @ 17:13) (71 - 100)  BP: 127/65 (03-26-19 @ 17:13) (101/73 - 132/77)  RR: 18 (03-26-19 @ 13:58) (18 - 18)  SpO2: 97% (03-26-19 @ 13:58) (96% - 98%)  Wt(kg): --  I&O's Summary    25 Mar 2019 07:01  -  26 Mar 2019 07:00  --------------------------------------------------------  IN: 0 mL / OUT: 300 mL / NET: -300 mL    26 Mar 2019 07:01  -  26 Mar 2019 19:34  --------------------------------------------------------  IN: 120 mL / OUT: 600 mL / NET: -480 mL          Appearance: Normal	  HEENT:   Normal oral mucosa, PERRL, EOMI	  Lymphatic: No lymphadenopathy , no edema  Cardiovascular: Normal S1 S2, No JVD, No murmurs , Peripheral pulses palpable 2+ bilaterally  Respiratory: Lungs clear to auscultation, normal effort 	  Gastrointestinal:  Soft, Non-tender, + BS	  Skin: No rashes, No ecchymoses, No cyanosis, warm to touch  Musculoskeletal: R back pain   Psychiatry:  Mood & affect appropriate  Ext: No edema      All labs, Imaging and EKGs personally reviewed                           13.2   14.9  )-----------( 259      ( 26 Mar 2019 06:21 )             39.9               03-26    137  |  98  |  37<H>  ----------------------------<  106<H>  4.4   |  26  |  1.05    Ca    9.5      26 Mar 2019 06:21    TPro  6.5  /  Alb  3.0<L>  /  TBili  0.4  /  DBili  x   /  AST  17  /  ALT  19  /  AlkPhos  105  03-26           CARDIAC MARKERS ( 26 Mar 2019 06:21 )  x     / x     / 38 U/L / x     / x         < from: US Doppler Scrotum (03.26.19 @ 09:36) >  IMPRESSION:   Bilateralvaricoceles.    No evidence of orchitis or testicular torsion.        < from: Transesophageal Echocardiogram w/o TTE (03.26.19 @ 12:17) >  Conclusions:  1. Mitral annular calcification and calcified mitral  leaflets. Calcification of the mitral annulus and leaflets  limits assessment for the presence of vegetations. Minimal  mitral regurgitation.  2. Transcatheter aortic valve replacement (TAVR).  Previously visualized mobile echodensity seen along the  anterior aspect of the TAVRon the [facility] LUISA performed 1/2/2019  is not visualized on today's study. (Previous images  reviewed.) Peak transaortic valve gradient equals 8 mm Hg,  mean transaortic valve gradient equals 5 mm Hg, which is  probably normal in the presence of a transcatheter aortic  valve replacement. Mild paravalvular aortic regurgitation.  3. Left atrial enlargement. Dense spontaneous echo contrast  seen throughout the left atrium. No definitive left atrial  or left atrial appendage thrombus. Decreased (about 30  cm/sec) left atrial appendage velocities noted.  4. Normal left ventricular systolic function.  5. Normal right ventricular size and function.  6. Normal tricuspid valve. Mild tricuspid regurgitation.  No vegetations seen. If clinical suspicion for endocarditis  remains high, consider interval follow-up MARIA LUISA at a later  date.  *** Compared with transesophageal echocardiogram performed  1/2/2019, previously visualized mobile echodensity seen on  the anterior aspect of the TAVR is not seen on today's  study. (Previous study reviewed.)
Subjective: Patient seen and examined. No new events except as noted.   cont to have r hip pain     REVIEW OF SYSTEMS:    CONSTITUTIONAL: No weakness, fevers or chills  EYES/ENT: No visual changes;  No vertigo or throat pain   NECK: No pain or stiffness  RESPIRATORY: No cough, wheezing, hemoptysis; No shortness of breath  CARDIOVASCULAR: No chest pain or palpitations  GASTROINTESTINAL: No abdominal or epigastric pain. No nausea, vomiting, or hematemesis; No diarrhea or constipation. No melena or hematochezia.  GENITOURINARY: No dysuria, frequency or hematuria  NEUROLOGICAL: r hip pain   SKIN: No itching, burning, rashes, or lesions   All other review of systems is negative unless indicated above.    MEDICATIONS:  MEDICATIONS  (STANDING):  ALBUTerol    90 MICROgram(s) HFA Inhaler 2 Puff(s) Inhalation every 4 hours  amiodarone    Tablet 200 milliGRAM(s) Oral daily  apixaban 5 milliGRAM(s) Oral every 12 hours  atorvastatin 40 milliGRAM(s) Oral at bedtime  buDESOnide  80 MICROgram(s)/formoterol 4.5 MICROgram(s) Inhaler 2 Puff(s) Inhalation two times a day  clopidogrel Tablet 75 milliGRAM(s) Oral daily  DAPTOmycin IVPB 750 milliGRAM(s) IV Intermittent every 24 hours  docusate sodium 100 milliGRAM(s) Oral two times a day  finasteride 5 milliGRAM(s) Oral daily  furosemide   Injectable 40 milliGRAM(s) IV Push daily  lidocaine   Patch 1 Patch Transdermal daily  melatonin 3 milliGRAM(s) Oral at bedtime  metoclopramide Injectable 10 milliGRAM(s) IV Push once  metoprolol tartrate 25 milliGRAM(s) Oral two times a day  pantoprazole    Tablet 40 milliGRAM(s) Oral before breakfast  polyethylene glycol 3350 17 Gram(s) Oral daily  senna 2 Tablet(s) Oral at bedtime  spironolactone 25 milliGRAM(s) Oral daily  tamsulosin 0.4 milliGRAM(s) Oral at bedtime      PHYSICAL EXAM:  T(C): 35.7 (03-24-19 @ 10:48), Max: 37 (03-24-19 @ 05:13)  HR: 90 (03-24-19 @ 10:48) (73 - 98)  BP: 107/71 (03-24-19 @ 10:48) (97/57 - 154/98)  RR: 18 (03-24-19 @ 10:48) (18 - 18)  SpO2: 95% (03-24-19 @ 10:48) (95% - 96%)  Wt(kg): --  I&O's Summary    23 Mar 2019 07:01  -  24 Mar 2019 07:00  --------------------------------------------------------  IN: 790 mL / OUT: 300 mL / NET: 490 mL          Appearance: Normal	  HEENT:   Normal oral mucosa, PERRL, EOMI	  Lymphatic: No lymphadenopathy , no edema  Cardiovascular: Normal S1 S2, No JVD, No murmurs , Peripheral pulses palpable 2+ bilaterally  Respiratory: Lungs clear to auscultation, normal effort 	  Gastrointestinal:  Soft, Non-tender, + BS	  Skin: No rashes, No ecchymoses, No cyanosis, warm to touch  Musculoskeletal: r hip pain   Psychiatry:  Mood & affect appropriate  Ext: No edema      All labs, Imaging and EKGs personally reviewed                           12.8   12.7  )-----------( 253      ( 24 Mar 2019 08:11 )             40.9               03-24    141  |  102  |  38<H>  ----------------------------<  84  4.6   |  26  |  1.12    Ca    9.5      24 Mar 2019 08:11
Subjective: Patient seen and examined. No new events except as noted.   doing okay, cont to have cough     REVIEW OF SYSTEMS:    CONSTITUTIONAL: No weakness, fevers or chills  EYES/ENT: No visual changes;  No vertigo or throat pain   NECK: No pain or stiffness  RESPIRATORY: No cough, wheezing, hemoptysis; No shortness of breath  CARDIOVASCULAR: No chest pain or palpitations  GASTROINTESTINAL: No abdominal or epigastric pain. No nausea, vomiting, or hematemesis; No diarrhea or constipation. No melena or hematochezia.  GENITOURINARY: No dysuria, frequency or hematuria  NEUROLOGICAL: No numbness or weakness  SKIN: No itching, burning, rashes, or lesions   All other review of systems is negative unless indicated above.    MEDICATIONS:  MEDICATIONS  (STANDING):      PHYSICAL EXAM:  T(C): 36.4 (03-22-19 @ 14:24), Max: 36.7 (03-21-19 @ 21:22)  HR: 85 (03-22-19 @ 14:24) (82 - 107)  BP: 102/61 (03-22-19 @ 14:24) (90/57 - 150/80)  RR: 18 (03-22-19 @ 14:24) (18 - 18)  SpO2: 96% (03-22-19 @ 14:24) (96% - 99%)  Wt(kg): --  I&O's Summary    22 Mar 2019 07:01  -  22 Mar 2019 18:12  --------------------------------------------------------  IN: 250 mL / OUT: 0 mL / NET: 250 mL      Height (cm): 170.18 (03-22 @ 13:52)  Weight (kg): 94.3 (03-22 @ 13:52)  BMI (kg/m2): 32.6 (03-22 @ 13:52)  BSA (m2): 2.06 (03-22 @ 13:52)    Appearance: Normal	  HEENT:   Normal oral mucosa	  Lymphatic: No lymphadenopathy , no edema  Cardiovascular: S1S2 irregularly irregular   Respiratory: Lungs clear to auscultation, normal effort 	  Gastrointestinal:  Soft, Non-tender, + BS	  Skin: No rashes, No ecchymoses, No cyanosis, warm to touch  Musculoskeletal: Normal range of motion, normal strength  Psychiatry:  Mood & affect appropriate  Ext: No edema      All labs, Imaging and EKGs personally reviewed                           12.7   23.5  )-----------( 218      ( 22 Mar 2019 07:01 )             38.8               03-22    138  |  104  |  43<H>  ----------------------------<  107<H>  4.9   |  22  |  1.10    Ca    9.6      22 Mar 2019 06:59    TPro  6.5  /  Alb  3.0<L>  /  TBili  0.2  /  DBili  x   /  AST  50<H>  /  ALT  43  /  AlkPhos  130<H>  03-22      < from: Transthoracic Echocardiogram (03.22.19 @ 08:35) >  Conclusions:  Estimated LV ejection fraction 55%.  TAVR with normal function. Minimal aortic regurgitation.  A device wire is noted in the right heart.
Subjective: Patient seen and examined. No new events except as noted.   doing well     REVIEW OF SYSTEMS:    CONSTITUTIONAL: No weakness, fevers or chills  EYES/ENT: No visual changes;  No vertigo or throat pain   NECK: No pain or stiffness  RESPIRATORY: No cough, wheezing, hemoptysis; No shortness of breath  CARDIOVASCULAR: No chest pain or palpitations  GASTROINTESTINAL: No abdominal or epigastric pain. No nausea, vomiting, or hematemesis; No diarrhea or constipation. No melena or hematochezia.  GENITOURINARY: No dysuria, frequency or hematuria  NEUROLOGICAL: No numbness or weakness  SKIN: No itching, burning, rashes, or lesions   All other review of systems is negative unless indicated above.    MEDICATIONS:  MEDICATIONS  (STANDING):  acetaminophen   Tablet .. 975 milliGRAM(s) Oral every 8 hours  ALBUTerol    90 MICROgram(s) HFA Inhaler 2 Puff(s) Inhalation every 4 hours  amiodarone    Tablet 200 milliGRAM(s) Oral daily  atorvastatin 40 milliGRAM(s) Oral at bedtime  buDESOnide  80 MICROgram(s)/formoterol 4.5 MICROgram(s) Inhaler 2 Puff(s) Inhalation two times a day  ceftaroline fosamil IVPB 600 milliGRAM(s) IV Intermittent every 8 hours  clopidogrel Tablet 75 milliGRAM(s) Oral daily  DAPTOmycin IVPB 750 milliGRAM(s) IV Intermittent every 24 hours  docusate sodium 100 milliGRAM(s) Oral three times a day  finasteride 5 milliGRAM(s) Oral daily  lidocaine   Patch 1 Patch Transdermal daily  melatonin 3 milliGRAM(s) Oral at bedtime  metoclopramide Injectable 10 milliGRAM(s) IV Push once  metoprolol tartrate 50 milliGRAM(s) Oral every 12 hours  pantoprazole    Tablet 40 milliGRAM(s) Oral before breakfast  polyethylene glycol 3350 17 Gram(s) Oral daily  senna 2 Tablet(s) Oral at bedtime  sodium chloride 0.9%. 1000 milliLiter(s) (75 mL/Hr) IV Continuous <Continuous>  spironolactone 25 milliGRAM(s) Oral daily  tamsulosin 0.4 milliGRAM(s) Oral at bedtime      PHYSICAL EXAM:  T(C): 36.7 (04-09-19 @ 20:36), Max: 36.7 (04-09-19 @ 20:36)  HR: 85 (04-09-19 @ 20:36) (83 - 94)  BP: 115/66 (04-09-19 @ 20:36) (100/58 - 115/66)  RR: 18 (04-09-19 @ 20:36) (18 - 18)  SpO2: 97% (04-09-19 @ 20:36) (95% - 97%)  Wt(kg): --  I&O's Summary    08 Apr 2019 07:01  -  09 Apr 2019 07:00  --------------------------------------------------------  IN: 240 mL / OUT: 0 mL / NET: 240 mL          Appearance: Normal	  HEENT:   Normal oral mucosa, PERRL, EOMI	  Lymphatic: No lymphadenopathy , no edema  Cardiovascular: Normal S1 S2, No JVD, No murmurs , Peripheral pulses palpable 2+ bilaterally  Respiratory: Lungs clear to auscultation, normal effort 	  Gastrointestinal:  Soft, Non-tender, + BS	  Skin: No rashes, No ecchymoses, No cyanosis, warm to touch  Musculoskeletal: Normal range of motion, normal strength  Psychiatry:  Mood & affect appropriate  Ext: No edema      All labs, Imaging and EKGs personally reviewed                           12.0   11.2  )-----------( 173      ( 08 Apr 2019 05:54 )             36.9               04-08    137  |  104  |  28<H>  ----------------------------<  90  4.0   |  21<L>  |  1.26    Ca    9.0      08 Apr 2019 05:54
Subjective: Patient seen and examined. No new events except as noted.   doing well  pending PICC    REVIEW OF SYSTEMS:    CONSTITUTIONAL: No weakness, fevers or chills  EYES/ENT: No visual changes;  No vertigo or throat pain   NECK: No pain or stiffness  RESPIRATORY: No cough, wheezing, hemoptysis; No shortness of breath  CARDIOVASCULAR: No chest pain or palpitations  GASTROINTESTINAL: No abdominal or epigastric pain. No nausea, vomiting, or hematemesis; No diarrhea or constipation. No melena or hematochezia.  GENITOURINARY: No dysuria, frequency or hematuria  NEUROLOGICAL: No numbness or weakness  SKIN: No itching, burning, rashes, or lesions   All other review of systems is negative unless indicated above.    MEDICATIONS:  MEDICATIONS  (STANDING):  acetaminophen   Tablet .. 975 milliGRAM(s) Oral every 8 hours  ALBUTerol    90 MICROgram(s) HFA Inhaler 2 Puff(s) Inhalation every 4 hours  amiodarone    Tablet 200 milliGRAM(s) Oral daily  atorvastatin 40 milliGRAM(s) Oral at bedtime  buDESOnide  80 MICROgram(s)/formoterol 4.5 MICROgram(s) Inhaler 2 Puff(s) Inhalation two times a day  ceftaroline fosamil IVPB 600 milliGRAM(s) IV Intermittent every 8 hours  clopidogrel Tablet 75 milliGRAM(s) Oral daily  DAPTOmycin IVPB 750 milliGRAM(s) IV Intermittent every 24 hours  docusate sodium 100 milliGRAM(s) Oral three times a day  finasteride 5 milliGRAM(s) Oral daily  lidocaine   Patch 1 Patch Transdermal daily  melatonin 3 milliGRAM(s) Oral at bedtime  metoclopramide Injectable 10 milliGRAM(s) IV Push once  metoprolol tartrate 50 milliGRAM(s) Oral every 12 hours  pantoprazole    Tablet 40 milliGRAM(s) Oral before breakfast  polyethylene glycol 3350 17 Gram(s) Oral daily  senna 2 Tablet(s) Oral at bedtime  sodium chloride 0.9%. 1000 milliLiter(s) (75 mL/Hr) IV Continuous <Continuous>  spironolactone 25 milliGRAM(s) Oral daily  tamsulosin 0.4 milliGRAM(s) Oral at bedtime      PHYSICAL EXAM:  T(C): 36.1 (04-10-19 @ 20:48), Max: 36.6 (04-10-19 @ 12:01)  HR: 93 (04-10-19 @ 20:48) (72 - 93)  BP: 117/71 (04-10-19 @ 20:48) (105/55 - 129/66)  RR: 19 (04-10-19 @ 20:48) (18 - 19)  SpO2: 97% (04-10-19 @ 20:48) (96% - 98%)  Wt(kg): --  I&O's Summary    10 Apr 2019 07:01  -  10 Apr 2019 21:06  --------------------------------------------------------  IN: 720 mL / OUT: 1350 mL / NET: -630 mL          Appearance: Normal	  HEENT:   Normal oral mucosa, PERRL, EOMI	  Lymphatic: No lymphadenopathy , no edema  Cardiovascular: Normal S1 S2, No JVD, No murmurs , Peripheral pulses palpable 2+ bilaterally  Respiratory: Lungs clear to auscultation, normal effort 	  Gastrointestinal:  Soft, Non-tender, + BS	  Skin: No rashes, No ecchymoses, No cyanosis, warm to touch  Musculoskeletal: Normal range of motion, normal strength  Psychiatry:  Mood & affect appropriate  Ext: No edema      All labs, Imaging and EKGs personally reviewed                           11.7   9.6   )-----------( 173      ( 10 Apr 2019 07:16 )             36.0               04-10    138  |  104  |  23  ----------------------------<  95  4.2   |  21<L>  |  1.12    Ca    9.0      10 Apr 2019 07:16      PT/INR - ( 10 Apr 2019 07:16 )   PT: 18.4 sec;   INR: 1.59 ratio         PTT - ( 10 Apr 2019 07:16 )  PTT:34.4 sec       CARDIAC MARKERS ( 10 Apr 2019 07:16 )  x     / x     / 58 U/L / x     / x
Subjective: Patient seen and examined. No new events except as noted.   feeling weak, + COugh     REVIEW OF SYSTEMS:    CONSTITUTIONAL: + weakness   EYES/ENT: No visual changes;  No vertigo or throat pain   NECK: No pain or stiffness  RESPIRATORY: + cough   CARDIOVASCULAR: No chest pain or palpitations  GASTROINTESTINAL: No abdominal or epigastric pain. No nausea, vomiting, or hematemesis; No diarrhea or constipation. No melena or hematochezia.  GENITOURINARY: No dysuria, frequency or hematuria  NEUROLOGICAL: No numbness or weakness  SKIN: No itching, burning, rashes, or lesions   All other review of systems is negative unless indicated above.    MEDICATIONS:  MEDICATIONS  (STANDING):  ALBUTerol    90 MICROgram(s) HFA Inhaler 2 Puff(s) Inhalation every 4 hours  amiodarone    Tablet 200 milliGRAM(s) Oral daily  apixaban 5 milliGRAM(s) Oral every 12 hours  atorvastatin 40 milliGRAM(s) Oral at bedtime  clopidogrel Tablet 75 milliGRAM(s) Oral daily  finasteride 5 milliGRAM(s) Oral daily  methylPREDNISolone sodium succinate Injectable 20 milliGRAM(s) IV Push every 8 hours  metoprolol succinate ER 50 milliGRAM(s) Oral daily  pantoprazole    Tablet 40 milliGRAM(s) Oral before breakfast  spironolactone 25 milliGRAM(s) Oral daily  tamsulosin 0.4 milliGRAM(s) Oral at bedtime      PHYSICAL EXAM:  T(C): 36.3 (03-20-19 @ 11:33), Max: 36.9 (03-19-19 @ 20:16)  HR: 86 (03-20-19 @ 11:33) (76 - 91)  BP: 113/60 (03-20-19 @ 11:33) (98/65 - 118/99)  RR: 18 (03-20-19 @ 11:33) (18 - 20)  SpO2: 98% (03-20-19 @ 11:33) (96% - 100%)  Wt(kg): --  I&O's Summary    Height (cm): 170.18 (03-19 @ 20:16)  Weight (kg): 94.3 (03-19 @ 20:16)  BMI (kg/m2): 32.6 (03-19 @ 20:16)  BSA (m2): 2.06 (03-19 @ 20:16)    Appearance: Normal	  HEENT:   Normal oral mucosa, PERRL, EOMI	  Lymphatic: No lymphadenopathy , no edema  Cardiovascular: Normal S1 S2  Respiratory: Lungs clear to auscultation, normal effort 	  Gastrointestinal:  Soft, Non-tender, + BS	  Skin: No rashes, No ecchymoses, No cyanosis, warm to touch  Musculoskeletal: Normal range of motion, normal strength  Psychiatry:  Mood & affect appropriate  Ext: No edema      All labs, Imaging and EKGs personally reviewed                             12.6   10.19 )-----------( 227      ( 20 Mar 2019 09:12 )             39.4               03-20    136  |  100  |  28<H>  ----------------------------<  143<H>  4.3   |  21<L>  |  1.16    Ca    9.6      20 Mar 2019 05:28    TPro  7.1  /  Alb  3.3  /  TBili  0.4  /  DBili  x   /  AST  19  /  ALT  20  /  AlkPhos  123<H>  03-20    PT/INR - ( 19 Mar 2019 20:57 )   PT: 23.3 sec;   INR: 2.00 ratio         PTT - ( 19 Mar 2019 20:57 )  PTT:35.5 sec       CARDIAC MARKERS ( 20 Mar 2019 05:28 )  x     / x     / 70 U/L / x     / x        < from: CT Chest No Cont (03.20.19 @ 08:14) >  IMPRESSION: Few tiny scattered calcified granulomas. Lungs otherwise   clear.
Subjective: Patient seen and examined. No new events except as noted.   improved cough    REVIEW OF SYSTEMS:    CONSTITUTIONAL: No weakness, fevers or chills  EYES/ENT: No visual changes;  No vertigo or throat pain   NECK: No pain or stiffness  RESPIRATORY: + cough   CARDIOVASCULAR: No chest pain or palpitations  GASTROINTESTINAL: No abdominal or epigastric pain. No nausea, vomiting, or hematemesis; No diarrhea or constipation. No melena or hematochezia.  GENITOURINARY: No dysuria, frequency or hematuria  NEUROLOGICAL: No numbness or weakness  SKIN: No itching, burning, rashes, or lesions   All other review of systems is negative unless indicated above.    MEDICATIONS:  MEDICATIONS  (STANDING):  ALBUTerol    90 MICROgram(s) HFA Inhaler 2 Puff(s) Inhalation every 4 hours  amiodarone    Tablet 200 milliGRAM(s) Oral daily  apixaban 5 milliGRAM(s) Oral every 12 hours  atorvastatin 40 milliGRAM(s) Oral at bedtime  clopidogrel Tablet 75 milliGRAM(s) Oral daily  DAPTOmycin IVPB      finasteride 5 milliGRAM(s) Oral daily  metoprolol succinate ER 50 milliGRAM(s) Oral daily  pantoprazole    Tablet 40 milliGRAM(s) Oral before breakfast  spironolactone 25 milliGRAM(s) Oral daily  tamsulosin 0.4 milliGRAM(s) Oral at bedtime      PHYSICAL EXAM:  T(C): 36.1 (19 @ 14:30), Max: 36.5 (19 @ 20:57)  HR: 94 (19 @ 17:38) (78 - 100)  BP: 129/97 (19 @ 17:38) (113/73 - 139/77)  RR: 22 (19 @ 14:30) (18 - 22)  SpO2: 98% (19 @ 17:38) (92% - 98%)  Wt(kg): --  I&O's Summary    20 Mar 2019 07:01  -  21 Mar 2019 07:00  --------------------------------------------------------  IN: 240 mL / OUT: 0 mL / NET: 240 mL          Appearance: Normal	  HEENT:   Normal oral mucosa, PERRL, EOMI	  Lymphatic: No lymphadenopathy , no edema  Cardiovascular: Normal S1 S2, No JVD, No murmurs , Peripheral pulses palpable 2+ bilaterally  Respiratory: Lungs clear to auscultation, normal effort 	  Gastrointestinal:  Soft, Non-tender, + BS	  Skin: No rashes, No ecchymoses, No cyanosis, warm to touch  Musculoskeletal: Normal range of motion, normal strength  Psychiatry:  Mood & affect appropriate  Ext: No edema      All labs, Imaging and EKGs personally reviewed                           .   19.4  )-----------( 257      ( 21 Mar 2019 06:02 )             37.5               03-    136  |  101  |  42<H>  ----------------------------<  135<H>  4.7   |  22  |  1.13    Ca    9.7      21 Mar 2019 06:02    TPro  6.5  /  Alb  3.2<L>  /  TBili  0.3  /  DBili  x   /  AST  29  /  ALT  26  /  AlkPhos  130<H>  03-21    PT/INR - ( 19 Mar 2019 20:57 )   PT: 23.3 sec;   INR: 2.00 ratio         PTT - ( 19 Mar 2019 20:57 )  PTT:35.5 sec       CARDIAC MARKERS ( 20 Mar 2019 05:28 )  x     / x     / 70 U/L / x     / x                  Urinalysis Basic - ( 20 Mar 2019 15:13 )    Color: Yellow / Appearance: Clear / S.032 / pH: x  Gluc: x / Ketone: Negative  / Bili: Negative / Urobili: Negative   Blood: x / Protein: 30 mg/dL / Nitrite: Negative   Leuk Esterase: Negative / RBC: 4 /hpf / WBC 4 /HPF   Sq Epi: x / Non Sq Epi: 2 /hpf / Bacteria: Negative
Subjective: Patient seen and examined. No new events except as noted.   improved pain    REVIEW OF SYSTEMS:    CONSTITUTIONAL: No weakness, fevers or chills  EYES/ENT: No visual changes;  No vertigo or throat pain   NECK: No pain or stiffness  RESPIRATORY: No cough, wheezing, hemoptysis; No shortness of breath  CARDIOVASCULAR: No chest pain or palpitations  GASTROINTESTINAL: No abdominal or epigastric pain. No nausea, vomiting, or hematemesis; No diarrhea or constipation. No melena or hematochezia.  GENITOURINARY: No dysuria, frequency or hematuria  NEUROLOGICAL: back pain   SKIN: No itching, burning, rashes, or lesions   All other review of systems is negative unless indicated above.    MEDICATIONS:  MEDICATIONS  (STANDING):  ALBUTerol    90 MICROgram(s) HFA Inhaler 2 Puff(s) Inhalation every 4 hours  amiodarone    Tablet 200 milliGRAM(s) Oral daily  apixaban 5 milliGRAM(s) Oral every 12 hours  atorvastatin 40 milliGRAM(s) Oral at bedtime  buDESOnide  80 MICROgram(s)/formoterol 4.5 MICROgram(s) Inhaler 2 Puff(s) Inhalation two times a day  ceftaroline fosamil IVPB 600 milliGRAM(s) IV Intermittent every 8 hours  clopidogrel Tablet 75 milliGRAM(s) Oral daily  DAPTOmycin IVPB 750 milliGRAM(s) IV Intermittent every 24 hours  docusate sodium 100 milliGRAM(s) Oral two times a day  finasteride 5 milliGRAM(s) Oral daily  lidocaine   Patch 1 Patch Transdermal daily  melatonin 3 milliGRAM(s) Oral at bedtime  metoclopramide Injectable 10 milliGRAM(s) IV Push once  metoprolol tartrate 50 milliGRAM(s) Oral every 12 hours  pantoprazole    Tablet 40 milliGRAM(s) Oral before breakfast  polyethylene glycol 3350 17 Gram(s) Oral daily  saline laxative (FLEET) Rectal Enema 1 Enema Rectal once  senna 2 Tablet(s) Oral at bedtime  sodium chloride 0.9%. 1000 milliLiter(s) (75 mL/Hr) IV Continuous <Continuous>  spironolactone 25 milliGRAM(s) Oral daily  tamsulosin 0.4 milliGRAM(s) Oral at bedtime      PHYSICAL EXAM:  T(C): 35.9 (03-31-19 @ 07:31), Max: 36.6 (03-31-19 @ 04:50)  HR: 91 (03-31-19 @ 07:31) (67 - 91)  BP: 101/68 (03-31-19 @ 07:31) (101/68 - 120/71)  RR: 18 (03-31-19 @ 07:31) (18 - 18)  SpO2: 95% (03-31-19 @ 07:31) (95% - 98%)  Wt(kg): --  I&O's Summary    30 Mar 2019 07:01  -  31 Mar 2019 07:00  --------------------------------------------------------  IN: 0 mL / OUT: 200 mL / NET: -200 mL          Appearance: Normal	  HEENT:   Normal oral mucosa, PERRL, EOMI	  Lymphatic: No lymphadenopathy , no edema  Cardiovascular: Normal S1 S2, No JVD, No murmurs , Peripheral pulses palpable 2+ bilaterally  Respiratory: Lungs clear to auscultation, normal effort 	  Gastrointestinal:  Soft, Non-tender, + BS	  Skin: No rashes, No ecchymoses, No cyanosis, warm to touch  Musculoskeletal: lower back pain   Psychiatry:  Mood & affect appropriate  Ext: No edema      All labs, Imaging and EKGs personally reviewed                           14.8   14.9  )-----------( 294      ( 30 Mar 2019 11:54 )             43.3               03-30    140  |  98  |  35<H>  ----------------------------<  131<H>  4.4   |  26  |  1.33<H>    Ca    9.9      30 Mar 2019 11:54             CARDIAC MARKERS ( 29 Mar 2019 17:12 )  x     / x     / 38 U/L / x     / x
Subjective: Patient seen and examined. No new events except as noted.   improved pain, dilaudid caused dizziness   changed to morphin for pain control     REVIEW OF SYSTEMS:    CONSTITUTIONAL: No weakness, fevers or chills  EYES/ENT: + dizziness   NECK: No pain or stiffness  RESPIRATORY: No cough, wheezing, hemoptysis; No shortness of breath  CARDIOVASCULAR: No chest pain or palpitations  GASTROINTESTINAL: No abdominal or epigastric pain. No nausea, vomiting, or hematemesis; No diarrhea or constipation. No melena or hematochezia.  GENITOURINARY: No dysuria, frequency or hematuria  NEUROLOGICAL: back pain   SKIN: No itching, burning, rashes, or lesions   All other review of systems is negative unless indicated above.    MEDICATIONS:  MEDICATIONS  (STANDING):  ALBUTerol    90 MICROgram(s) HFA Inhaler 2 Puff(s) Inhalation every 4 hours  amiodarone    Tablet 200 milliGRAM(s) Oral daily  apixaban 5 milliGRAM(s) Oral every 12 hours  atorvastatin 40 milliGRAM(s) Oral at bedtime  buDESOnide  80 MICROgram(s)/formoterol 4.5 MICROgram(s) Inhaler 2 Puff(s) Inhalation two times a day  clopidogrel Tablet 75 milliGRAM(s) Oral daily  DAPTOmycin IVPB 750 milliGRAM(s) IV Intermittent every 24 hours  docusate sodium 100 milliGRAM(s) Oral two times a day  finasteride 5 milliGRAM(s) Oral daily  furosemide    Tablet 40 milliGRAM(s) Oral daily  lidocaine   Patch 1 Patch Transdermal daily  melatonin 3 milliGRAM(s) Oral at bedtime  metoclopramide Injectable 10 milliGRAM(s) IV Push once  metoprolol tartrate 50 milliGRAM(s) Oral every 12 hours  pantoprazole    Tablet 40 milliGRAM(s) Oral before breakfast  polyethylene glycol 3350 17 Gram(s) Oral daily  senna 2 Tablet(s) Oral at bedtime  spironolactone 25 milliGRAM(s) Oral daily  tamsulosin 0.4 milliGRAM(s) Oral at bedtime      PHYSICAL EXAM:  T(C): 36.2 (03-28-19 @ 07:15), Max: 36.6 (03-27-19 @ 14:20)  HR: 72 (03-28-19 @ 09:12) (67 - 103)  BP: 122/76 (03-28-19 @ 09:12) (94/55 - 122/76)  RR: 18 (03-28-19 @ 07:15) (18 - 19)  SpO2: 91% (03-28-19 @ 07:15) (91% - 98%)  Wt(kg): --  I&O's Summary    27 Mar 2019 07:01  -  28 Mar 2019 07:00  --------------------------------------------------------  IN: 1560 mL / OUT: 400 mL / NET: 1160 mL          Appearance: Normal	  HEENT:   Normal oral mucosa,	  Lymphatic: No lymphadenopathy , no edema  Cardiovascular: Normal S1 S2  Respiratory: Lungs clear to auscultation, normal effort 	  Gastrointestinal:  Soft, Non-tender, + BS	  Skin: No rashes, No ecchymoses, No cyanosis, warm to touch  Musculoskeletal: R leg and back pain   Psychiatry:  Mood & affect appropriate  Ext: No edema      All labs, Imaging and EKGs personally reviewed                           13.9   13.8  )-----------( 262      ( 28 Mar 2019 06:59 )             42.0               03-28    138  |  99  |  40<H>  ----------------------------<  120<H>  4.3   |  26  |  1.27    Ca    9.6      28 Mar 2019 06:59             CARDIAC MARKERS ( 27 Mar 2019 06:35 )  x     / x     / 32 U/L / x     / x         < from: MR Lumbar Spine No Cont (03.27.19 @ 21:56) >  T12-L1: Normal    L1-2: Bilateral hypertrophic facet joint changes are seen. Mild narrowing   of the spinal canal is seen.    L2-3: Disc bulge and bilateral hypertrophic facet joint changes are seen.   Mild to moderate narrowing of the spinal canal is seen.    L3-4: Disc bulge and bilateral hypertrophic facet joint changes seen.   Mild narrowing of the spinal canal and severe narrowing of the left   neural foramen.    L4-5: Disc bulge and bilateral hypertrophic facet joint changes seen.   Severe narrowing of the right neural foramen is seen. The hypertrophic   facet change on the right side does appear to abut the right L5 nerve   root.    L5-S1: Central disc extrusion is seen. Mild narrowing spinal canal.    The conus ends at bottom of L1 and appears normal.    Impression: Abnormal T1 and T2 prolongation involving the L1 vertebral   bodies described above.
Subjective: Patient seen and examined. No new events except as noted.   pain control     REVIEW OF SYSTEMS:    CONSTITUTIONAL: No weakness, fevers or chills  EYES/ENT: No visual changes;  No vertigo or throat pain   NECK: No pain or stiffness  RESPIRATORY: No cough, wheezing, hemoptysis; No shortness of breath  CARDIOVASCULAR: No chest pain or palpitations  GASTROINTESTINAL: No abdominal or epigastric pain. No nausea, vomiting, or hematemesis; No diarrhea or constipation. No melena or hematochezia.  GENITOURINARY: No dysuria, frequency or hematuria  NEUROLOGICAL: No numbness or weakness  SKIN: No itching, burning, rashes, or lesions   All other review of systems is negative unless indicated above.    MEDICATIONS:  MEDICATIONS  (STANDING):  ALBUTerol    90 MICROgram(s) HFA Inhaler 2 Puff(s) Inhalation every 4 hours  amiodarone    Tablet 200 milliGRAM(s) Oral daily  apixaban 5 milliGRAM(s) Oral every 12 hours  atorvastatin 40 milliGRAM(s) Oral at bedtime  buDESOnide  80 MICROgram(s)/formoterol 4.5 MICROgram(s) Inhaler 2 Puff(s) Inhalation two times a day  ceftaroline fosamil IVPB 600 milliGRAM(s) IV Intermittent every 8 hours  clopidogrel Tablet 75 milliGRAM(s) Oral daily  DAPTOmycin IVPB 750 milliGRAM(s) IV Intermittent every 24 hours  docusate sodium 100 milliGRAM(s) Oral two times a day  finasteride 5 milliGRAM(s) Oral daily  furosemide    Tablet 40 milliGRAM(s) Oral daily  lidocaine   Patch 1 Patch Transdermal daily  melatonin 3 milliGRAM(s) Oral at bedtime  metoclopramide Injectable 10 milliGRAM(s) IV Push once  metoprolol tartrate 50 milliGRAM(s) Oral every 12 hours  pantoprazole    Tablet 40 milliGRAM(s) Oral before breakfast  polyethylene glycol 3350 17 Gram(s) Oral daily  senna 2 Tablet(s) Oral at bedtime  spironolactone 25 milliGRAM(s) Oral daily  tamsulosin 0.4 milliGRAM(s) Oral at bedtime      PHYSICAL EXAM:  T(C): 36.8 (03-29-19 @ 04:15), Max: 36.8 (03-29-19 @ 04:15)  HR: 85 (03-29-19 @ 07:00) (85 - 98)  BP: 124/78 (03-29-19 @ 07:00) (106/70 - 126/68)  RR: 18 (03-29-19 @ 04:15) (18 - 18)  SpO2: 97% (03-29-19 @ 04:15) (96% - 97%)  Wt(kg): --  I&O's Summary    28 Mar 2019 07:01  -  29 Mar 2019 07:00  --------------------------------------------------------  IN: 0 mL / OUT: 0 mL / NET: 0 mL          Appearance: Normal	  HEENT:   Normal oral mucosa, PERRL, EOMI	  Lymphatic: No lymphadenopathy , no edema  Cardiovascular: Normal S1 S2  Respiratory: Lungs clear to auscultation, normal effort 	  Gastrointestinal:  Soft, Non-tender, + BS	  Skin: No rashes, No ecchymoses, No cyanosis, warm to touch  Musculoskeletal: R leg pain   Psychiatry:  Mood & affect appropriate  Ext: No edema      All labs, Imaging and EKGs personally reviewed                           13.9   13.8  )-----------( 262      ( 28 Mar 2019 06:59 )             42.0               03-28    138  |  99  |  40<H>  ----------------------------<  120<H>  4.3   |  26  |  1.27    Ca    9.6      28 Mar 2019 06:59    < from: NM Inflammatory Loc Wholebody, WBC (03.29.19 @ 10:10) >  IMPRESSION: NormalTc-99m labeled leukocyte scan. No evidence of infection   involving the TAVR or pacemaker pocket.     No evidence of infection elsewhere.
Subjective: Patient seen and examined. No new events except as noted.   pain control     REVIEW OF SYSTEMS:    CONSTITUTIONAL: No weakness, fevers or chills  EYES/ENT: No visual changes;  No vertigo or throat pain   NECK: No pain or stiffness  RESPIRATORY: No cough, wheezing, hemoptysis; No shortness of breath  CARDIOVASCULAR: No chest pain or palpitations  GASTROINTESTINAL: No abdominal or epigastric pain. No nausea, vomiting, or hematemesis; No diarrhea or constipation. No melena or hematochezia.  GENITOURINARY: No dysuria, frequency or hematuria  NEUROLOGICAL: back pain which has improved   SKIN: No itching, burning, rashes, or lesions   All other review of systems is negative unless indicated above.    MEDICATIONS:  MEDICATIONS  (STANDING):  ALBUTerol    90 MICROgram(s) HFA Inhaler 2 Puff(s) Inhalation every 4 hours  amiodarone    Tablet 200 milliGRAM(s) Oral daily  apixaban 5 milliGRAM(s) Oral every 12 hours  atorvastatin 40 milliGRAM(s) Oral at bedtime  buDESOnide  80 MICROgram(s)/formoterol 4.5 MICROgram(s) Inhaler 2 Puff(s) Inhalation two times a day  ceftaroline fosamil IVPB 600 milliGRAM(s) IV Intermittent every 8 hours  clopidogrel Tablet 75 milliGRAM(s) Oral daily  DAPTOmycin IVPB 750 milliGRAM(s) IV Intermittent every 24 hours  docusate sodium 100 milliGRAM(s) Oral two times a day  finasteride 5 milliGRAM(s) Oral daily  furosemide    Tablet 40 milliGRAM(s) Oral daily  lidocaine   Patch 1 Patch Transdermal daily  melatonin 3 milliGRAM(s) Oral at bedtime  metoclopramide Injectable 10 milliGRAM(s) IV Push once  metoprolol tartrate 50 milliGRAM(s) Oral every 12 hours  pantoprazole    Tablet 40 milliGRAM(s) Oral before breakfast  polyethylene glycol 3350 17 Gram(s) Oral daily  senna 2 Tablet(s) Oral at bedtime  spironolactone 25 milliGRAM(s) Oral daily  tamsulosin 0.4 milliGRAM(s) Oral at bedtime      PHYSICAL EXAM:  T(C): 36.4 (03-30-19 @ 04:05), Max: 36.8 (03-29-19 @ 14:14)  HR: 92 (03-30-19 @ 04:05) (90 - 93)  BP: 111/66 (03-30-19 @ 04:05) (106/67 - 113/72)  RR: 18 (03-29-19 @ 22:19) (18 - 18)  SpO2: 95% (03-29-19 @ 22:19) (94% - 95%)  Wt(kg): --  I&O's Summary    29 Mar 2019 07:01  -  30 Mar 2019 07:00  --------------------------------------------------------  IN: 140 mL / OUT: 0 mL / NET: 140 mL          Appearance: Normal	  HEENT:   Normal oral mucosa, PERRL, EOMI	  Lymphatic: No lymphadenopathy , no edema  Cardiovascular: Normal S1 S2, No JVD, No murmurs , Peripheral pulses palpable 2+ bilaterally  Respiratory: Lungs clear to auscultation, normal effort 	  Gastrointestinal:  Soft, Non-tender, + BS	  Skin: No rashes, No ecchymoses, No cyanosis, warm to touch  Musculoskeletal: back and R LE pain on palpation   Psychiatry:  Mood & affect appropriate  Ext: No edema      All labs, Imaging and EKGs personally reviewed                           13.6   16.7  )-----------( 252      ( 29 Mar 2019 17:09 )             40.0               03-29    136  |  97  |  34<H>  ----------------------------<  146<H>  4.1   |  26  |  1.22    Ca    9.3      29 Mar 2019 17:12             CARDIAC MARKERS ( 29 Mar 2019 17:12 )  x     / x     / 38 U/L / x     / x                  < from: CT Abdomen and Pelvis w/ IV Cont (03.29.19 @ 15:35) >  IMPRESSION:     No intra-abdominal or pelvic collection is identified.
Subjective: Patient seen and examined. No new events except as noted.   pain control  CT LE done     REVIEW OF SYSTEMS:    CONSTITUTIONAL: No weakness, fevers or chills  EYES/ENT: No visual changes;  No vertigo or throat pain   NECK: No pain or stiffness  RESPIRATORY: No cough, wheezing, hemoptysis; No shortness of breath  CARDIOVASCULAR: No chest pain or palpitations  GASTROINTESTINAL: No abdominal or epigastric pain. No nausea, vomiting, or hematemesis; No diarrhea or constipation. No melena or hematochezia.  GENITOURINARY: No dysuria, frequency or hematuria  NEUROLOGICAL: No numbness or weakness  SKIN: No itching, burning, rashes, or lesions   All other review of systems is negative unless indicated above.    MEDICATIONS:  MEDICATIONS  (STANDING):  acetaminophen   Tablet .. 975 milliGRAM(s) Oral every 8 hours  ALBUTerol    90 MICROgram(s) HFA Inhaler 2 Puff(s) Inhalation every 4 hours  amiodarone    Tablet 200 milliGRAM(s) Oral daily  apixaban 5 milliGRAM(s) Oral every 12 hours  atorvastatin 40 milliGRAM(s) Oral at bedtime  buDESOnide  80 MICROgram(s)/formoterol 4.5 MICROgram(s) Inhaler 2 Puff(s) Inhalation two times a day  ceftaroline fosamil IVPB 600 milliGRAM(s) IV Intermittent every 8 hours  chlorhexidine 4% Liquid 1 Application(s) Topical <User Schedule>  clopidogrel Tablet 75 milliGRAM(s) Oral daily  DAPTOmycin IVPB 750 milliGRAM(s) IV Intermittent every 24 hours  docusate sodium 100 milliGRAM(s) Oral three times a day  finasteride 5 milliGRAM(s) Oral daily  furosemide    Tablet 20 milliGRAM(s) Oral daily  ketorolac   Injectable 30 milliGRAM(s) IV Push every 8 hours  lidocaine   Patch 1 Patch Transdermal daily  melatonin 3 milliGRAM(s) Oral at bedtime  metoclopramide Injectable 10 milliGRAM(s) IV Push once  metoprolol tartrate 50 milliGRAM(s) Oral every 12 hours  pantoprazole    Tablet 40 milliGRAM(s) Oral before breakfast  polyethylene glycol 3350 17 Gram(s) Oral daily  senna 2 Tablet(s) Oral at bedtime  spironolactone 25 milliGRAM(s) Oral daily  tamsulosin 0.4 milliGRAM(s) Oral at bedtime      PHYSICAL EXAM:  T(C): 36.7 (04-13-19 @ 16:39), Max: 36.7 (04-12-19 @ 21:04)  HR: 98 (04-13-19 @ 17:44) (87 - 99)  BP: 146/98 (04-13-19 @ 17:44) (94/60 - 146/98)  RR: 18 (04-13-19 @ 16:39) (18 - 18)  SpO2: 98% (04-13-19 @ 16:39) (97% - 99%)  Wt(kg): --  I&O's Summary        Appearance: Normal	  HEENT:   Normal oral mucosa  Lymphatic: No lymphadenopathy , no edema  Cardiovascular: Normal S1 S2, No JVD, No murmurs , Peripheral pulses palpable 2+ bilaterally  Respiratory: Lungs clear to auscultation, normal effort 	  Gastrointestinal:  Soft, Non-tender, + BS	  Skin: No rashes, No ecchymoses, No cyanosis, warm to touch  Musculoskeletal: LE pain   Psychiatry:  Mood & affect appropriate  Ext: No edema      All labs, Imaging and EKGs personally reviewed                           11.5   8.2   )-----------( 178      ( 13 Apr 2019 06:58 )             34.6               04-13    136  |  101  |  17  ----------------------------<  75  4.5   |  21<L>  |  0.97    Ca    9.1      13 Apr 2019 06:58
Subjective: Patient seen and examined. No new events except as noted.   pain in better controlled  Blood Cx cont to be positive     REVIEW OF SYSTEMS:    CONSTITUTIONAL: No weakness, fevers or chills  EYES/ENT: No visual changes;  No vertigo or throat pain   NECK: No pain or stiffness  RESPIRATORY: No cough, wheezing, hemoptysis; No shortness of breath  CARDIOVASCULAR: No chest pain or palpitations  GASTROINTESTINAL: No abdominal or epigastric pain. No nausea, vomiting, or hematemesis; No diarrhea or constipation. No melena or hematochezia.  GENITOURINARY: No dysuria, frequency or hematuria  NEUROLOGICAL: back pain   SKIN: No itching, burning, rashes, or lesions   All other review of systems is negative unless indicated above.    MEDICATIONS:  MEDICATIONS  (STANDING):  ALBUTerol    90 MICROgram(s) HFA Inhaler 2 Puff(s) Inhalation every 4 hours  amiodarone    Tablet 200 milliGRAM(s) Oral daily  apixaban 5 milliGRAM(s) Oral every 12 hours  atorvastatin 40 milliGRAM(s) Oral at bedtime  buDESOnide  80 MICROgram(s)/formoterol 4.5 MICROgram(s) Inhaler 2 Puff(s) Inhalation two times a day  clopidogrel Tablet 75 milliGRAM(s) Oral daily  DAPTOmycin IVPB 750 milliGRAM(s) IV Intermittent every 24 hours  docusate sodium 100 milliGRAM(s) Oral two times a day  finasteride 5 milliGRAM(s) Oral daily  furosemide   Injectable 40 milliGRAM(s) IV Push daily  lidocaine   Patch 1 Patch Transdermal daily  melatonin 3 milliGRAM(s) Oral at bedtime  metoclopramide Injectable 10 milliGRAM(s) IV Push once  metoprolol tartrate 50 milliGRAM(s) Oral every 12 hours  pantoprazole    Tablet 40 milliGRAM(s) Oral before breakfast  polyethylene glycol 3350 17 Gram(s) Oral daily  senna 2 Tablet(s) Oral at bedtime  spironolactone 25 milliGRAM(s) Oral daily  tamsulosin 0.4 milliGRAM(s) Oral at bedtime      PHYSICAL EXAM:  T(C): 36.3 (03-27-19 @ 06:09), Max: 36.4 (03-26-19 @ 21:18)  HR: 88 (03-27-19 @ 06:09) (71 - 92)  BP: 137/84 (03-27-19 @ 06:09) (106/65 - 137/84)  RR: 18 (03-27-19 @ 06:09) (18 - 18)  SpO2: 98% (03-27-19 @ 06:09) (97% - 98%)  Wt(kg): --  I&O's Summary    26 Mar 2019 07:01  -  27 Mar 2019 07:00  --------------------------------------------------------  IN: 120 mL / OUT: 900 mL / NET: -780 mL          Appearance: Normal	  HEENT:   Normal oral mucosa, PERRL, EOMI	  Lymphatic: No lymphadenopathy , no edema  Cardiovascular: Normal S1 S2  Respiratory: Lungs clear to auscultation, normal effort 	  Gastrointestinal:  Soft, Non-tender, + BS	  Skin: No rashes, No ecchymoses, No cyanosis, warm to touch  Musculoskeletal: R Hip, pelvic pain   Psychiatry:  Mood & affect appropriate  Ext: No edema      All labs, Imaging and EKGs personally reviewed                             14.2   12.7  )-----------( 268      ( 27 Mar 2019 06:35 )             42.4               03-27    140  |  100  |  33<H>  ----------------------------<  93  4.6   |  25  |  0.96    Ca    9.4      27 Mar 2019 06:35    TPro  6.5  /  Alb  3.0<L>  /  TBili  0.4  /  DBili  x   /  AST  17  /  ALT  19  /  AlkPhos  105  03-26           CARDIAC MARKERS ( 27 Mar 2019 06:35 )  x     / x     / 32 U/L / x     / x      CARDIAC MARKERS ( 26 Mar 2019 06:21 )  x     / x     / 38 U/L / x     / x
infectious diseases progress note:    Patient is a 87y old  Male who presents with a chief complaint of SOB, cough (01 Apr 2019 22:37)        Shortness of breath             Allergies    Keppra (Rash)  penicillin (Unknown)    Intolerances        ANTIBIOTICS/RELEVANT:  antimicrobials  ceftaroline fosamil IVPB 600 milliGRAM(s) IV Intermittent every 8 hours  DAPTOmycin IVPB 750 milliGRAM(s) IV Intermittent every 24 hours    immunologic:    OTHER:  ALBUTerol    90 MICROgram(s) HFA Inhaler 2 Puff(s) Inhalation every 4 hours  amiodarone    Tablet 200 milliGRAM(s) Oral daily  apixaban 5 milliGRAM(s) Oral every 12 hours  atorvastatin 40 milliGRAM(s) Oral at bedtime  buDESOnide  80 MICROgram(s)/formoterol 4.5 MICROgram(s) Inhaler 2 Puff(s) Inhalation two times a day  clopidogrel Tablet 75 milliGRAM(s) Oral daily  docusate sodium 100 milliGRAM(s) Oral three times a day  finasteride 5 milliGRAM(s) Oral daily  lidocaine   Patch 1 Patch Transdermal daily  melatonin 3 milliGRAM(s) Oral at bedtime  methyl salicylate 14%/menthol 6% Topical Ointment 1 Application(s) Topical three times a day PRN  metoclopramide Injectable 10 milliGRAM(s) IV Push once  metoprolol tartrate 50 milliGRAM(s) Oral every 12 hours  morphine  - Injectable 2 milliGRAM(s) IV Push every 6 hours PRN  pantoprazole    Tablet 40 milliGRAM(s) Oral before breakfast  polyethylene glycol 3350 17 Gram(s) Oral daily  senna 2 Tablet(s) Oral at bedtime  sodium chloride 0.9%. 1000 milliLiter(s) IV Continuous <Continuous>  spironolactone 25 milliGRAM(s) Oral daily  tamsulosin 0.4 milliGRAM(s) Oral at bedtime      Objective:  Vital Signs Last 24 Hrs  T(C): 36.4 (02 Apr 2019 03:54), Max: 36.5 (01 Apr 2019 20:26)  T(F): 97.5 (02 Apr 2019 03:54), Max: 97.7 (01 Apr 2019 20:26)  HR: 75 (02 Apr 2019 03:54) (75 - 98)  BP: 111/77 (02 Apr 2019 03:54) (100/61 - 118/85)  BP(mean): --  RR: 18 (02 Apr 2019 03:54) (18 - 18)  SpO2: 98% (02 Apr 2019 03:54) (97% - 99%)          Eyes:JESSEE, EOMI  Ear/Nose/Throat: no oral lesion, no sinus tenderness on percussion	  Neck:no JVD, no lymphadenopathy, supple  Respiratory: CTA marci  Cardiovascular: S1S2 RRR, no murmurs  Gastrointestinal:soft, (+) BS, no HSM  Extremities:no e/e/c        LABS:                        14.1   17.5  )-----------( 268      ( 01 Apr 2019 14:19 )             42.9     04-01    141  |  99  |  34<H>  ----------------------------<  122<H>  4.2   |  26  |  1.26    Ca    9.8      01 Apr 2019 14:19              MICROBIOLOGY:    RECENT CULTURES:  03-31 @ 16:42 .Blood Blood                No growth to date.    03-30 @ 15:26 .Blood Blood       Growth in aerobic bottle: Gram Positive Cocci in Pairs and Chains           Growth in aerobic bottle: Enterococcus faecium (vancomycin resistant) See  previous culture [id number]    03-29 @ 21:18 .Blood Blood-Peripheral                No growth to date.    03-28 @ 10:34 .Blood Blood       Growth in aerobic bottle: Gram Positive Cocci in Pairs and Chains           Growth in aerobic bottle: Enterococcus faecium (vancomycin resistant)  See previous culture [id number]    03-27 @ 10:01 .Blood Blood-Peripheral   NICK    Growth in aerobic bottle: Gram Positive Cocci in Pairs and Chains    Enterococcus faecium (vancomycin resistant)  Enterococcus faecium (vancomycin resistant)     Growth in aerobic bottle: Enterococcus faecium (vancomycin resistant)    03-26 @ 09:26 .Blood Blood                No growth at 5 days.          RESPIRATORY CULTURES:              RADIOLOGY & ADDITIONAL STUDIES:        Pager [phone]  After 5 pm/weekends or if no response :[phone]
infectious diseases progress note:    Patient is a 87y old  Male who presents with a chief complaint of SOB, cough (02 Apr 2019 18:33)        Shortness of breath           Allergies    Keppra (Rash)  penicillin (Unknown)    Intolerances        ANTIBIOTICS/RELEVANT:  antimicrobials  ceftaroline fosamil IVPB 600 milliGRAM(s) IV Intermittent every 8 hours  DAPTOmycin IVPB 750 milliGRAM(s) IV Intermittent every 24 hours    immunologic:    OTHER:  ALBUTerol    90 MICROgram(s) HFA Inhaler 2 Puff(s) Inhalation every 4 hours  amiodarone    Tablet 200 milliGRAM(s) Oral daily  apixaban 5 milliGRAM(s) Oral every 12 hours  atorvastatin 40 milliGRAM(s) Oral at bedtime  buDESOnide  80 MICROgram(s)/formoterol 4.5 MICROgram(s) Inhaler 2 Puff(s) Inhalation two times a day  clopidogrel Tablet 75 milliGRAM(s) Oral daily  docusate sodium 100 milliGRAM(s) Oral three times a day  finasteride 5 milliGRAM(s) Oral daily  lidocaine   Patch 1 Patch Transdermal daily  melatonin 3 milliGRAM(s) Oral at bedtime  methyl salicylate 14%/menthol 6% Topical Ointment 1 Application(s) Topical three times a day PRN  metoclopramide Injectable 10 milliGRAM(s) IV Push once  metoprolol tartrate 50 milliGRAM(s) Oral every 12 hours  morphine  - Injectable 2 milliGRAM(s) IV Push every 6 hours PRN  pantoprazole    Tablet 40 milliGRAM(s) Oral before breakfast  polyethylene glycol 3350 17 Gram(s) Oral daily  senna 2 Tablet(s) Oral at bedtime  sodium chloride 0.9%. 1000 milliLiter(s) IV Continuous <Continuous>  spironolactone 25 milliGRAM(s) Oral daily  tamsulosin 0.4 milliGRAM(s) Oral at bedtime      Objective:  Vital Signs Last 24 Hrs  T(C): 36.4 (03 Apr 2019 04:42), Max: 36.4 (02 Apr 2019 11:28)  T(F): 97.5 (03 Apr 2019 04:42), Max: 97.6 (02 Apr 2019 16:22)  HR: 69 (03 Apr 2019 04:42) (67 - 94)  BP: 102/75 (03 Apr 2019 04:42) (102/75 - 121/79)  BP(mean): --  RR: 18 (03 Apr 2019 04:42) (18 - 19)  SpO2: 97% (03 Apr 2019 04:42) (97% - 99%)       Ear/Nose/Throat: no oral lesion, no sinus tenderness on percussion	  Neck:no JVD, no lymphadenopathy, supple  Respiratory: CTA marci  Cardiovascular: S1S2 RRR, no murmurs  Gastrointestinal:soft, (+) BS, no HSM  Extremities:no e/e/c        LABS:                        13.0   13.0  )-----------( 211      ( 03 Apr 2019 06:55 )             38.4     04-03    137  |  101  |  32<H>  ----------------------------<  99  4.4   |  22  |  1.31<H>    Ca    9.3      03 Apr 2019 06:55              MICROBIOLOGY:    RECENT CULTURES:  03-31 @ 16:42 .Blood Blood                No growth to date.    03-30 @ 15:26 .Blood Blood       Growth in aerobic bottle: Gram Positive Cocci in Pairs and Chains           Growth in aerobic bottle: Enterococcus faecium (vancomycin resistant) See  previous culture [id number]    03-29 @ 21:18 .Blood Blood-Peripheral                No growth to date.    03-28 @ 10:34 .Blood Blood       Growth in aerobic bottle: Gram Positive Cocci in Pairs and Chains           Growth in aerobic bottle: Enterococcus faecium (vancomycin resistant)  See previous culture [id number]    03-27 @ 10:01 .Blood Blood-Peripheral   NICK    Growth in aerobic bottle: Gram Positive Cocci in Pairs and Chains    Enterococcus faecium (vancomycin resistant)  Enterococcus faecium (vancomycin resistant)     Growth in aerobic bottle: Enterococcus faecium (vancomycin resistant)          RESPIRATORY CULTURES:              RADIOLOGY & ADDITIONAL STUDIES:        Pager [phone]  After 5 pm/weekends or if no response :[phone]
infectious diseases progress note:    Patient is a 87y old  Male who presents with a chief complaint of SOB, cough (03 Apr 2019 23:36)        Shortness of breath        ROS:  CONSTITUTIONAL:  Negative fever or chills, feels well, good appetite  EYES:  Negative  blurry vision or double vision  CARDIOVASCULAR:  Negative for chest pain or palpitations  RESPIRATORY:  Negative for cough, wheezing, or SOB   GASTROINTESTINAL:  Negative for nausea, vomiting, diarrhea, constipation, or abdominal pain  GENITOURINARY:  Negative frequency, urgency or dysuria  NEUROLOGIC:  pain in lower back   Allergies    Keppra (Rash)  penicillin (Unknown)    Intolerances        ANTIBIOTICS/RELEVANT:  antimicrobials  ceftaroline fosamil IVPB 600 milliGRAM(s) IV Intermittent every 8 hours  DAPTOmycin IVPB 750 milliGRAM(s) IV Intermittent every 24 hours    immunologic:    OTHER:  ALBUTerol    90 MICROgram(s) HFA Inhaler 2 Puff(s) Inhalation every 4 hours  amiodarone    Tablet 200 milliGRAM(s) Oral daily  apixaban 5 milliGRAM(s) Oral every 12 hours  atorvastatin 40 milliGRAM(s) Oral at bedtime  buDESOnide  80 MICROgram(s)/formoterol 4.5 MICROgram(s) Inhaler 2 Puff(s) Inhalation two times a day  clopidogrel Tablet 75 milliGRAM(s) Oral daily  docusate sodium 100 milliGRAM(s) Oral three times a day  finasteride 5 milliGRAM(s) Oral daily  lidocaine   Patch 1 Patch Transdermal daily  melatonin 3 milliGRAM(s) Oral at bedtime  methyl salicylate 14%/menthol 6% Topical Ointment 1 Application(s) Topical three times a day PRN  metoclopramide Injectable 10 milliGRAM(s) IV Push once  metoprolol tartrate 50 milliGRAM(s) Oral every 12 hours  morphine  - Injectable 2 milliGRAM(s) IV Push every 6 hours PRN  pantoprazole    Tablet 40 milliGRAM(s) Oral before breakfast  polyethylene glycol 3350 17 Gram(s) Oral daily  senna 2 Tablet(s) Oral at bedtime  sodium chloride 0.9%. 1000 milliLiter(s) IV Continuous <Continuous>  spironolactone 25 milliGRAM(s) Oral daily  tamsulosin 0.4 milliGRAM(s) Oral at bedtime      Objective:  Vital Signs Last 24 Hrs  T(C): 36.6 (04 Apr 2019 08:45), Max: 37 (03 Apr 2019 12:32)  T(F): 97.8 (04 Apr 2019 08:45), Max: 98.6 (03 Apr 2019 12:32)  HR: 67 (04 Apr 2019 08:45) (67 - 97)  BP: 130/80 (04 Apr 2019 08:45) (111/75 - 137/86)  BP(mean): --  RR: 17 (04 Apr 2019 08:45) (16 - 18)  SpO2: 97% (04 Apr 2019 08:45) (97% - 99%)     well nourished--no acute distress  Eyes:JESSEE, EOMI  Ear/Nose/Throat: no oral lesion, no sinus tenderness on percussion	  Neck:no JVD, no lymphadenopathy, supple  Respiratory: CTA marci  Cardiovascular: S1S2 RRR, no murmurs  Gastrointestinal:soft, (+) BS, no HSM  Extremities:no e/e/c        LABS:                        12.7   10.7  )-----------( 224      ( 04 Apr 2019 06:33 )             41.3     04-04    139  |  104  |  26<H>  ----------------------------<  98  4.2   |  22  |  1.13    Ca    9.4      04 Apr 2019 06:32              MICROBIOLOGY:    RECENT CULTURES:  04-02 @ 14:37 .Blood Blood                No growth to date.    03-31 @ 16:42 .Blood Blood                No growth to date.    03-30 @ 15:26 .Blood Blood       Growth in aerobic bottle: Gram Positive Cocci in Pairs and Chains           Growth in aerobic bottle: Enterococcus faecium (vancomycin resistant) See  previous culture [id number]    03-29 @ 21:18 .Blood Blood-Peripheral                No growth at 5 days.    03-28 @ 10:34 .Blood Blood       Growth in aerobic bottle: Gram Positive Cocci in Pairs and Chains           Growth in aerobic bottle: Enterococcus faecium (vancomycin resistant)  See previous culture [id number]          RESPIRATORY CULTURES:              RADIOLOGY & ADDITIONAL STUDIES:        Pager [phone]  After 5 pm/weekends or if no response :[phone]
infectious diseases progress note:    Patient is a 87y old  Male who presents with a chief complaint of SOB, cough (04 Apr 2019 18:01)        Shortness of breath        R     Allergies    Keppra (Rash)  penicillin (Unknown)    Intolerances        ANTIBIOTICS/RELEVANT:  antimicrobials  ceftaroline fosamil IVPB 600 milliGRAM(s) IV Intermittent every 8 hours  DAPTOmycin IVPB 750 milliGRAM(s) IV Intermittent every 24 hours    immunologic:    OTHER:  ALBUTerol    90 MICROgram(s) HFA Inhaler 2 Puff(s) Inhalation every 4 hours  amiodarone    Tablet 200 milliGRAM(s) Oral daily  apixaban 5 milliGRAM(s) Oral every 12 hours  atorvastatin 40 milliGRAM(s) Oral at bedtime  buDESOnide  80 MICROgram(s)/formoterol 4.5 MICROgram(s) Inhaler 2 Puff(s) Inhalation two times a day  clopidogrel Tablet 75 milliGRAM(s) Oral daily  docusate sodium 100 milliGRAM(s) Oral three times a day  finasteride 5 milliGRAM(s) Oral daily  lidocaine   Patch 1 Patch Transdermal daily  melatonin 3 milliGRAM(s) Oral at bedtime  methyl salicylate 14%/menthol 6% Topical Ointment 1 Application(s) Topical three times a day PRN  metoclopramide Injectable 10 milliGRAM(s) IV Push once  metoprolol tartrate 50 milliGRAM(s) Oral every 12 hours  morphine  - Injectable 2 milliGRAM(s) IV Push every 6 hours PRN  pantoprazole    Tablet 40 milliGRAM(s) Oral before breakfast  polyethylene glycol 3350 17 Gram(s) Oral daily  senna 2 Tablet(s) Oral at bedtime  sodium chloride 0.9%. 1000 milliLiter(s) IV Continuous <Continuous>  spironolactone 25 milliGRAM(s) Oral daily  tamsulosin 0.4 milliGRAM(s) Oral at bedtime      Objective:  Vital Signs Last 24 Hrs  T(C): 36.3 (05 Apr 2019 04:26), Max: 36.6 (04 Apr 2019 08:45)  T(F): 97.4 (05 Apr 2019 04:26), Max: 97.8 (04 Apr 2019 08:45)  HR: 83 (05 Apr 2019 06:57) (67 - 94)  BP: 99/60 (05 Apr 2019 06:57) (99/60 - 138/80)  BP(mean): --  RR: 18 (05 Apr 2019 04:26) (16 - 18)  SpO2: 97% (05 Apr 2019 04:26) (95% - 97%)    PHYSICAL EXAM:     Eyes:JESSEE, EOMI  Ear/Nose/Throat: no oral lesion, no sinus tenderness on percussion	  Neck:no JVD, no lymphadenopathy, supple  Respiratory: CTA marci  Cardiovascular: S1S2 RRR, no murmurs  Gastrointestinal:soft, (+) BS, no HSM  Extremities:no e/e/c        LABS:                        12.7   10.7  )-----------( 224      ( 04 Apr 2019 06:33 )             41.3     04-04    139  |  104  |  26<H>  ----------------------------<  98  4.2   |  22  |  1.13    Ca    9.4      04 Apr 2019 06:32              MICROBIOLOGY:    RECENT CULTURES:  04-02 @ 14:37 .Blood Blood                No growth to date.    03-31 @ 16:42 .Blood Blood                No growth to date.    03-30 @ 15:26 .Blood Blood       Growth in aerobic bottle: Gram Positive Cocci in Pairs and Chains           Growth in aerobic bottle: Enterococcus faecium (vancomycin resistant) See  previous culture [id number]    03-29 @ 21:18 .Blood Blood-Peripheral                No growth at 5 days.          RESPIRATORY CULTURES:              RADIOLOGY & ADDITIONAL STUDIES:        Pager [phone]  After 5 pm/weekends or if no response :[phone]
infectious diseases progress note:    Patient is a 87y old  Male who presents with a chief complaint of SOB, cough (08 Apr 2019 18:44)        Shortness of breath             Allergies    Keppra (Rash)  penicillin (Unknown)    Intolerances        ANTIBIOTICS/RELEVANT:  antimicrobials  ceftaroline fosamil IVPB 600 milliGRAM(s) IV Intermittent every 8 hours  DAPTOmycin IVPB 750 milliGRAM(s) IV Intermittent every 24 hours    immunologic:    OTHER:  acetaminophen   Tablet .. 975 milliGRAM(s) Oral every 8 hours  ALBUTerol    90 MICROgram(s) HFA Inhaler 2 Puff(s) Inhalation every 4 hours  amiodarone    Tablet 200 milliGRAM(s) Oral daily  apixaban 5 milliGRAM(s) Oral every 12 hours  atorvastatin 40 milliGRAM(s) Oral at bedtime  buDESOnide  80 MICROgram(s)/formoterol 4.5 MICROgram(s) Inhaler 2 Puff(s) Inhalation two times a day  clopidogrel Tablet 75 milliGRAM(s) Oral daily  docusate sodium 100 milliGRAM(s) Oral three times a day  finasteride 5 milliGRAM(s) Oral daily  lidocaine   Patch 1 Patch Transdermal daily  melatonin 3 milliGRAM(s) Oral at bedtime  methyl salicylate 14%/menthol 6% Topical Ointment 1 Application(s) Topical three times a day PRN  metoclopramide Injectable 10 milliGRAM(s) IV Push once  metoprolol tartrate 50 milliGRAM(s) Oral every 12 hours  morphine  - Injectable 2 milliGRAM(s) IV Push every 6 hours PRN  ondansetron   Disintegrating Tablet 8 milliGRAM(s) Oral three times a day PRN  pantoprazole    Tablet 40 milliGRAM(s) Oral before breakfast  polyethylene glycol 3350 17 Gram(s) Oral daily  senna 2 Tablet(s) Oral at bedtime  sodium chloride 0.9%. 1000 milliLiter(s) IV Continuous <Continuous>  spironolactone 25 milliGRAM(s) Oral daily  tamsulosin 0.4 milliGRAM(s) Oral at bedtime      Objective:  Vital Signs Last 24 Hrs  T(C): 36.4 (09 Apr 2019 04:52), Max: 36.4 (09 Apr 2019 04:52)  T(F): 97.5 (09 Apr 2019 04:52), Max: 97.5 (09 Apr 2019 04:52)  HR: 83 (09 Apr 2019 04:52) (63 - 83)  BP: 100/58 (09 Apr 2019 04:52) (100/58 - 115/52)  BP(mean): --  RR: 18 (09 Apr 2019 04:52) (17 - 18)  SpO2: 96% (09 Apr 2019 04:52) (95% - 100%)    PHYSICAL EXAM:     Eyes:JESSEE, EOMI  Ear/Nose/Throat: no oral lesion, no sinus tenderness on percussion	  Neck:no JVD, no lymphadenopathy, supple  Respiratory: CTA marci  Cardiovascular: S1S2 RRR, no murmurs  Gastrointestinal:soft, (+) BS, no HSM  Extremities:no e/e/c        LABS:                        12.0   11.2  )-----------( 173      ( 08 Apr 2019 05:54 )             36.9     04-08    137  |  104  |  28<H>  ----------------------------<  90  4.0   |  21<L>  |  1.26    Ca    9.0      08 Apr 2019 05:54              MICROBIOLOGY:    RECENT CULTURES:  04-02 @ 14:37 .Blood Blood                No growth at 5 days.          RESPIRATORY CULTURES:              RADIOLOGY & ADDITIONAL STUDIES:        Pager [phone]  After 5 pm/weekends or if no response :[phone]
infectious diseases progress note:    Patient is a 87y old  Male who presents with a chief complaint of SOB, cough (09 Apr 2019 22:59)        Shortness of breath        R     Allergies    Keppra (Rash)  penicillin (Unknown)    Intolerances        ANTIBIOTICS/RELEVANT:  antimicrobials  ceftaroline fosamil IVPB 600 milliGRAM(s) IV Intermittent every 8 hours  DAPTOmycin IVPB 750 milliGRAM(s) IV Intermittent every 24 hours    immunologic:    OTHER:  acetaminophen   Tablet .. 975 milliGRAM(s) Oral every 8 hours  ALBUTerol    90 MICROgram(s) HFA Inhaler 2 Puff(s) Inhalation every 4 hours  amiodarone    Tablet 200 milliGRAM(s) Oral daily  atorvastatin 40 milliGRAM(s) Oral at bedtime  buDESOnide  80 MICROgram(s)/formoterol 4.5 MICROgram(s) Inhaler 2 Puff(s) Inhalation two times a day  clopidogrel Tablet 75 milliGRAM(s) Oral daily  docusate sodium 100 milliGRAM(s) Oral three times a day  finasteride 5 milliGRAM(s) Oral daily  lidocaine   Patch 1 Patch Transdermal daily  melatonin 3 milliGRAM(s) Oral at bedtime  methyl salicylate 14%/menthol 6% Topical Ointment 1 Application(s) Topical three times a day PRN  metoclopramide Injectable 10 milliGRAM(s) IV Push once  metoprolol tartrate 50 milliGRAM(s) Oral every 12 hours  morphine  - Injectable 2 milliGRAM(s) IV Push every 6 hours PRN  ondansetron   Disintegrating Tablet 8 milliGRAM(s) Oral three times a day PRN  pantoprazole    Tablet 40 milliGRAM(s) Oral before breakfast  polyethylene glycol 3350 17 Gram(s) Oral daily  senna 2 Tablet(s) Oral at bedtime  sodium chloride 0.9%. 1000 milliLiter(s) IV Continuous <Continuous>  spironolactone 25 milliGRAM(s) Oral daily  tamsulosin 0.4 milliGRAM(s) Oral at bedtime      Objective:  Vital Signs Last 24 Hrs  T(C): 36.4 (10 Apr 2019 05:09), Max: 36.7 (09 Apr 2019 20:36)  T(F): 97.5 (10 Apr 2019 05:09), Max: 98 (09 Apr 2019 20:36)  HR: 87 (10 Apr 2019 05:09) (85 - 94)  BP: 129/66 (10 Apr 2019 05:09) (112/70 - 129/66)  BP(mean): --  RR: 18 (10 Apr 2019 05:09) (18 - 18)  SpO2: 98% (10 Apr 2019 05:09) (95% - 98%)     4.2   |  21<L>  |  1.12    Ca    9.0      10 Apr 2019 07:16      PT/INR - ( 10 Apr 2019 07:16 )   PT: 18.4 sec;   INR: 1.59 ratio         PTT - ( 10 Apr 2019 07:16 )  PTT:34.4 sec        MICROBIOLOGY:    RECENT CULTURES:        RESPIRATORY CULTURES:              RADIOLOGY & ADDITIONAL STUDIES:        Pager [phone]  After 5 pm/weekends or if no response :[phone]
infectious diseases progress note:    Patient is a 87y old  Male who presents with a chief complaint of SOB, cough (10 Apr 2019 21:05)        Shortness of breath             Allergies    Keppra (Rash)  penicillin (Unknown)    Intolerances        ANTIBIOTICS/RELEVANT:  antimicrobials  ceftaroline fosamil IVPB 600 milliGRAM(s) IV Intermittent every 8 hours  DAPTOmycin IVPB 750 milliGRAM(s) IV Intermittent every 24 hours    immunologic:    OTHER:  acetaminophen   Tablet .. 975 milliGRAM(s) Oral every 8 hours  ALBUTerol    90 MICROgram(s) HFA Inhaler 2 Puff(s) Inhalation every 4 hours  amiodarone    Tablet 200 milliGRAM(s) Oral daily  atorvastatin 40 milliGRAM(s) Oral at bedtime  buDESOnide  80 MICROgram(s)/formoterol 4.5 MICROgram(s) Inhaler 2 Puff(s) Inhalation two times a day  clopidogrel Tablet 75 milliGRAM(s) Oral daily  docusate sodium 100 milliGRAM(s) Oral three times a day  finasteride 5 milliGRAM(s) Oral daily  lidocaine   Patch 1 Patch Transdermal daily  melatonin 3 milliGRAM(s) Oral at bedtime  methyl salicylate 14%/menthol 6% Topical Ointment 1 Application(s) Topical three times a day PRN  metoclopramide Injectable 10 milliGRAM(s) IV Push once  metoprolol tartrate 50 milliGRAM(s) Oral every 12 hours  morphine  - Injectable 2 milliGRAM(s) IV Push every 6 hours PRN  ondansetron   Disintegrating Tablet 8 milliGRAM(s) Oral three times a day PRN  pantoprazole    Tablet 40 milliGRAM(s) Oral before breakfast  polyethylene glycol 3350 17 Gram(s) Oral daily  senna 2 Tablet(s) Oral at bedtime  sodium chloride 0.9%. 1000 milliLiter(s) IV Continuous <Continuous>  spironolactone 25 milliGRAM(s) Oral daily  tamsulosin 0.4 milliGRAM(s) Oral at bedtime      Objective:  Vital Signs Last 24 Hrs  T(C): 36.4 (11 Apr 2019 04:47), Max: 36.6 (10 Apr 2019 12:01)  T(F): 97.5 (11 Apr 2019 04:47), Max: 97.9 (10 Apr 2019 12:01)  HR: 76 (11 Apr 2019 04:47) (72 - 93)  BP: 106/61 (11 Apr 2019 04:47) (105/55 - 117/71)  BP(mean): --  RR: 17 (11 Apr 2019 04:47) (17 - 19)  SpO2: 95% (11 Apr 2019 04:47) (95% - 97%)                 LABS:                        11.7   9.6   )-----------( 173      ( 10 Apr 2019 07:16 )             36.0     04-10    138  |  104  |  23  ----------------------------<  95  4.2   |  21<L>  |  1.12    Ca    9.0      10 Apr 2019 07:16      PT/INR - ( 10 Apr 2019 07:16 )   PT: 18.4 sec;   INR: 1.59 ratio         PTT - ( 10 Apr 2019 07:16 )  PTT:34.4 sec        MICROBIOLOGY:    RECENT CULTURES:        RESPIRATORY CULTURES:              RADIOLOGY & ADDITIONAL STUDIES:        Pager [phone]  After 5 pm/weekends or if no response :[phone]
infectious diseases progress note:    Patient is a 87y old  Male who presents with a chief complaint of SOB, cough (11 Apr 2019 17:04)        Shortness of breath        ROS:  CONSTITUTIONAL:  Negative fever or chills, feels well, good appetite  EYES:  Negative  blurry vision or double vision  CARDIOVASCULAR:  Negative for chest pain or palpitations  RESPIRATORY:  Negative for cough, wheezing, or SOB   GASTROINTESTINAL:  Negative for nausea, vomiting, diarrhea, constipation, or abdominal pain  GENITOURINARY:  Negative frequency, urgency or dysuria  NEUROLOGIC:  No headache, confusion, dizziness, lightheadedness    Allergies    Keppra (Rash)  penicillin (Unknown)    Intolerances        ANTIBIOTICS/RELEVANT:  antimicrobials  ceftaroline fosamil IVPB 600 milliGRAM(s) IV Intermittent every 8 hours  DAPTOmycin IVPB 750 milliGRAM(s) IV Intermittent every 24 hours    immunologic:    OTHER:  acetaminophen   Tablet .. 975 milliGRAM(s) Oral every 8 hours  ALBUTerol    90 MICROgram(s) HFA Inhaler 2 Puff(s) Inhalation every 4 hours  amiodarone    Tablet 200 milliGRAM(s) Oral daily  apixaban 5 milliGRAM(s) Oral every 12 hours  atorvastatin 40 milliGRAM(s) Oral at bedtime  buDESOnide  80 MICROgram(s)/formoterol 4.5 MICROgram(s) Inhaler 2 Puff(s) Inhalation two times a day  chlorhexidine 4% Liquid 1 Application(s) Topical <User Schedule>  clopidogrel Tablet 75 milliGRAM(s) Oral daily  docusate sodium 100 milliGRAM(s) Oral three times a day  finasteride 5 milliGRAM(s) Oral daily  lidocaine   Patch 1 Patch Transdermal daily  melatonin 3 milliGRAM(s) Oral at bedtime  methyl salicylate 14%/menthol 6% Topical Ointment 1 Application(s) Topical three times a day PRN  metoclopramide Injectable 10 milliGRAM(s) IV Push once  metoprolol tartrate 50 milliGRAM(s) Oral every 12 hours  morphine  - Injectable 2 milliGRAM(s) IV Push every 6 hours PRN  ondansetron   Disintegrating Tablet 8 milliGRAM(s) Oral three times a day PRN  pantoprazole    Tablet 40 milliGRAM(s) Oral before breakfast  polyethylene glycol 3350 17 Gram(s) Oral daily  senna 2 Tablet(s) Oral at bedtime  sodium chloride 0.9% lock flush 10 milliLiter(s) IV Push every 1 hour PRN  spironolactone 25 milliGRAM(s) Oral daily  tamsulosin 0.4 milliGRAM(s) Oral at bedtime      Objective:  Vital Signs Last 24 Hrs  T(C): 36.3 (12 Apr 2019 05:46), Max: 36.4 (11 Apr 2019 12:39)  T(F): 97.4 (12 Apr 2019 05:46), Max: 97.5 (11 Apr 2019 12:39)  HR: 98 (12 Apr 2019 05:46) (76 - 100)  BP: 109/75 (12 Apr 2019 05:46) (105/64 - 117/73)  BP(mean): --  RR: 18 (12 Apr 2019 05:46) (16 - 18)  SpO2: 96% (12 Apr 2019 05:46) (96% - 97%)       Eyes:JESSEE, EOMI  Ear/Nose/Throat: no oral lesion, no sinus tenderness on percussion	  Neck:no JVD, no lymphadenopathy, supple  Respiratory: CTA marci  Cardiovascular: S1S2 RRR, no murmurs  Gastrointestinal:soft, (+) BS, no HSM  Extremities:no e/e/c        LABS:                        12.5   11.1  )-----------( 181      ( 11 Apr 2019 09:53 )             36.7     04-11    138  |  105  |  22  ----------------------------<  81  4.4   |  20<L>  |  1.10    Ca    9.0      11 Apr 2019 07:26              MICROBIOLOGY:    RECENT CULTURES:        RESPIRATORY CULTURES:              RADIOLOGY & ADDITIONAL STUDIES:        Pager [phone]  After 5 pm/weekends or if no response :[phone]
infectious diseases progress note:    Patient is a 87y old  Male who presents with a chief complaint of SOB, cough (14 Apr 2019 23:39)        Shortness of breath        ROS:  CONSTITUTIONAL:  Negative fever or chills, feels well, good appetite  EYES:  Negative  blurry vision or double vision  CARDIOVASCULAR:  Negative for chest pain or palpitations  RESPIRATORY:  Negative for cough, wheezing, or SOB   GASTROINTESTINAL:  Negative for nausea, vomiting, diarrhea, constipation, or abdominal pain  GENITOURINARY:  Negative frequency, urgency or dysuria  NEUROLOGIC:  No headache, confusion, dizziness, lightheadedness    Allergies    Keppra (Rash)  penicillin (Unknown)    Intolerances        ANTIBIOTICS/RELEVANT:  antimicrobials  ceftaroline fosamil IVPB 600 milliGRAM(s) IV Intermittent every 8 hours  DAPTOmycin IVPB 750 milliGRAM(s) IV Intermittent every 24 hours    immunologic:    OTHER:  acetaminophen   Tablet .. 975 milliGRAM(s) Oral every 8 hours  ALBUTerol/ipratropium for Nebulization 3 milliLiter(s) Nebulizer every 6 hours PRN  amiodarone    Tablet 200 milliGRAM(s) Oral daily  apixaban 5 milliGRAM(s) Oral every 12 hours  atorvastatin 40 milliGRAM(s) Oral at bedtime  buDESOnide  80 MICROgram(s)/formoterol 4.5 MICROgram(s) Inhaler 2 Puff(s) Inhalation two times a day  chlorhexidine 4% Liquid 1 Application(s) Topical <User Schedule>  clopidogrel Tablet 75 milliGRAM(s) Oral daily  docusate sodium 100 milliGRAM(s) Oral three times a day  finasteride 5 milliGRAM(s) Oral daily  furosemide    Tablet 20 milliGRAM(s) Oral daily  lidocaine   Patch 1 Patch Transdermal daily  melatonin 3 milliGRAM(s) Oral at bedtime  methyl salicylate 14%/menthol 6% Topical Ointment 1 Application(s) Topical three times a day PRN  metoclopramide Injectable 10 milliGRAM(s) IV Push once  metoprolol tartrate 50 milliGRAM(s) Oral every 12 hours  ondansetron   Disintegrating Tablet 8 milliGRAM(s) Oral three times a day PRN  pantoprazole    Tablet 40 milliGRAM(s) Oral before breakfast  polyethylene glycol 3350 17 Gram(s) Oral daily  senna 2 Tablet(s) Oral at bedtime  sodium chloride 0.9% lock flush 10 milliLiter(s) IV Push every 1 hour PRN  spironolactone 25 milliGRAM(s) Oral daily  tamsulosin 0.4 milliGRAM(s) Oral at bedtime      Objective:  Vital Signs Last 24 Hrs  T(C): 36.3 (15 Apr 2019 04:00), Max: 36.3 (14 Apr 2019 21:08)  T(F): 97.4 (15 Apr 2019 04:00), Max: 97.4 (14 Apr 2019 21:08)  HR: 67 (15 Apr 2019 08:02) (67 - 89)  BP: 109/74 (15 Apr 2019 06:22) (91/50 - 109/74)  BP(mean): --  RR: 18 (15 Apr 2019 04:00) (18 - 18)  SpO2: 96% (15 Apr 2019 04:00) (96% - 100%)   o acute distress  Eyes:JESSEE, EOMI  Ear/Nose/Throat: no oral lesion, no sinus tenderness on percussion	  Neck:no JVD, no lymphadenopathy, supple  Respiratory: CTA marci  Cardiovascular: S1S2 RRR, no murmurs  Gastrointestinal:soft, (+) BS, no HSM  Extremities:no e/e/c        LABS:                  MICROBIOLOGY:    RECENT CULTURES:        RESPIRATORY CULTURES:              RADIOLOGY & ADDITIONAL STUDIES:        Pager [phone]  After 5 pm/weekends or if no response :[phone]
infectious diseases progress note:    Patient is a 87y old  Male who presents with a chief complaint of SOB, cough (24 Mar 2019 18:06)        Shortness of breath             Allergies    Keppra (Rash)  penicillin (Unknown)    Intolerances        ANTIBIOTICS/RELEVANT:  antimicrobials  DAPTOmycin IVPB 750 milliGRAM(s) IV Intermittent every 24 hours    immunologic:    OTHER:  ALBUTerol    90 MICROgram(s) HFA Inhaler 2 Puff(s) Inhalation every 4 hours  amiodarone    Tablet 200 milliGRAM(s) Oral daily  apixaban 5 milliGRAM(s) Oral every 12 hours  atorvastatin 40 milliGRAM(s) Oral at bedtime  buDESOnide  80 MICROgram(s)/formoterol 4.5 MICROgram(s) Inhaler 2 Puff(s) Inhalation two times a day  clopidogrel Tablet 75 milliGRAM(s) Oral daily  docusate sodium 100 milliGRAM(s) Oral two times a day  finasteride 5 milliGRAM(s) Oral daily  furosemide   Injectable 40 milliGRAM(s) IV Push daily  HYDROcodone/homatropine Syrup 5 milliLiter(s) Oral every 6 hours PRN  lidocaine   Patch 1 Patch Transdermal daily  melatonin 3 milliGRAM(s) Oral at bedtime  methyl salicylate 14%/menthol 6% Topical Ointment 1 Application(s) Topical three times a day PRN  metoclopramide Injectable 10 milliGRAM(s) IV Push once  metoprolol tartrate 50 milliGRAM(s) Oral every 12 hours  pantoprazole    Tablet 40 milliGRAM(s) Oral before breakfast  polyethylene glycol 3350 17 Gram(s) Oral daily  senna 2 Tablet(s) Oral at bedtime  spironolactone 25 milliGRAM(s) Oral daily  tamsulosin 0.4 milliGRAM(s) Oral at bedtime      Objective:  Vital Signs Last 24 Hrs  T(C): 36.4 (25 Mar 2019 07:45), Max: 36.8 (24 Mar 2019 19:00)  T(F): 97.6 (25 Mar 2019 07:45), Max: 98.2 (24 Mar 2019 19:00)  HR: 95 (25 Mar 2019 07:45) (89 - 95)  BP: 95/62 (25 Mar 2019 07:45) (95/62 - 131/97)  BP(mean): 107 (24 Mar 2019 21:50) (93 - 107)  RR: 17 (25 Mar 2019 07:45) (16 - 18)  SpO2: 96% (25 Mar 2019 07:45) (95% - 96%)    PHYSICAL EXAM:   --no acute distress  Eyes:JESSEE, EOMI  Ear/Nose/Throat: no oral lesion, no sinus tenderness on percussion	  Neck:no JVD, no lymphadenopathy, supple  Respiratory: CTA marci  Cardiovascular: S1S2 RRR, no murmurs  Gastrointestinal:soft, (+) BS, no HSM  Extremities:no e/e/c        LABS:                        12.8   12.7  )-----------( 253      ( 24 Mar 2019 08:11 )             40.9     03-24    141  |  102  |  38<H>  ----------------------------<  84  4.6   |  26  |  1.12    Ca    9.5      24 Mar 2019 08:11              MICROBIOLOGY:    RECENT CULTURES:  03-24 @ 12:23 .Blood Blood       Growth in anaerobic bottle: Gram Positive Cocci in Pairs and Chains           Growth in anaerobic bottle: Gram Positive Cocci in Pairs and Chains    03-23 @ 14:33 .Blood Blood       Growth in aerobic bottle: Gram Positive Cocci in Pairs and Chains  Growth in anaerobic bottle: Gram Positive Cocci in Pairs and Chains           No growth to date.    03-22 @ 22:20 .Other Other, pacemaker leads                Growth in fluid media only Gram Positive Cocci in Pairs and Chains    03-22 @ 09:40 .Blood Blood       Growth in anaerobic bottle: Gram Positive Cocci in Pairs and Chains  Growth in aerobic bottle: Gram Positive Cocci in Pairs and Chains           Growth in aerobic and anaerobic bottles: Enterococcus faecium (vancomycin  resistant)  See previous culture [id number]    03-21 @ 10:02 .Blood Blood-Peripheral       Growth in aerobic bottle: Gram Positive Cocci in Pairs and Chains  Growth in anaerobic bottle: Gram Positive Cocci in Pairs and Chains           Growth in aerobic and anaerobic bottles: Enterococcus faecium (vancomycin  resistant)  See previous culture [id number]    03-20 @ 02:46 .Blood Blood       Growth in anaerobic bottle: Gram Positive Cocci in Pairs and Chains  Growth in aerobic bottle: Gram Positive Cocci in Pairs and Chains           Growth in aerobic and anaerobic bottles: Enterococcus faecium (vancomycin  resistant)  See previous culture [id number]    03-20 @ 02:44 .Blood Blood   PCR    Growth in aerobic and anaerobic bottles: Gram Positive Cocci in Pairs and  Chains    Blood Culture PCR  Enterococcus faecium (vancomycin resistant)  Blood Culture PCR     Growth in aerobic and anaerobic bottles: Enterococcus faecium (vancomycin  resistant)  See previous culture [id number]          RESPIRATORY CULTURES:              RADIOLOGY & ADDITIONAL STUDIES:        Pager [phone]  After 5 pm/weekends or if no response :[phone]
infectious diseases progress note:    Patient is a 87y old  Male who presents with a chief complaint of SOB, cough (25 Mar 2019 20:23)        Shortness of breath        ROS:  CONSTITUTIONAL:  Negative fever or chills, feels well, good appetite  EYES:  Negative  blurry vision or double vision  CARDIOVASCULAR:  Negative for chest pain or palpitations  RESPIRATORY:  Negative for cough, wheezing, or SOB   GASTROINTESTINAL:  Negative for nausea, vomiting, diarrhea, constipation, or abdominal pain  GENITOURINARY: pain in groin   NEUROLOGIC:  No headache, confusion, dizziness, lightheadedness    Allergies    Keppra (Rash)  penicillin (Unknown)    Intolerances        ANTIBIOTICS/RELEVANT:  antimicrobials  DAPTOmycin IVPB 750 milliGRAM(s) IV Intermittent every 24 hours    immunologic:    OTHER:  ALBUTerol    90 MICROgram(s) HFA Inhaler 2 Puff(s) Inhalation every 4 hours  amiodarone    Tablet 200 milliGRAM(s) Oral daily  apixaban 5 milliGRAM(s) Oral every 12 hours  atorvastatin 40 milliGRAM(s) Oral at bedtime  buDESOnide  80 MICROgram(s)/formoterol 4.5 MICROgram(s) Inhaler 2 Puff(s) Inhalation two times a day  clopidogrel Tablet 75 milliGRAM(s) Oral daily  docusate sodium 100 milliGRAM(s) Oral two times a day  finasteride 5 milliGRAM(s) Oral daily  furosemide   Injectable 40 milliGRAM(s) IV Push daily  HYDROcodone/homatropine Syrup 5 milliLiter(s) Oral every 6 hours PRN  lidocaine   Patch 1 Patch Transdermal daily  melatonin 3 milliGRAM(s) Oral at bedtime  methyl salicylate 14%/menthol 6% Topical Ointment 1 Application(s) Topical three times a day PRN  metoclopramide Injectable 10 milliGRAM(s) IV Push once  metoprolol tartrate 50 milliGRAM(s) Oral every 12 hours  pantoprazole    Tablet 40 milliGRAM(s) Oral before breakfast  polyethylene glycol 3350 17 Gram(s) Oral daily  senna 2 Tablet(s) Oral at bedtime  spironolactone 25 milliGRAM(s) Oral daily  tamsulosin 0.4 milliGRAM(s) Oral at bedtime      Objective:  Vital Signs Last 24 Hrs  T(C): 36.4 (26 Mar 2019 04:44), Max: 36.4 (25 Mar 2019 16:32)  T(F): 97.5 (26 Mar 2019 04:44), Max: 97.6 (25 Mar 2019 16:32)  HR: 71 (26 Mar 2019 08:46) (71 - 109)  BP: 106/65 (26 Mar 2019 08:46) (101/73 - 134/85)  BP(mean): 3 (26 Mar 2019 04:44) (3 - 3)  RR: 18 (26 Mar 2019 08:46) (18 - 18)  SpO2: 98% (26 Mar 2019 08:46) (96% - 98%)    PHYSICAL EXAM:   -no acute distress  Eyes:JESSEE, EOMI  Ear/Nose/Throat: no oral lesion, no sinus tenderness on percussion	  Neck:no JVD, no lymphadenopathy, supple  Respiratory: CTA marci  Cardiovascular: S1S2 RRR, no murmurs  Gastrointestinal:soft, (+) BS, no HSM  Extremities:no e/e/c        LABS:                        13.2   14.9  )-----------( 259      ( 26 Mar 2019 06:21 )             39.9     03-26    137  |  98  |  37<H>  ----------------------------<  106<H>  4.4   |  26  |  1.05    Ca    9.5      26 Mar 2019 06:21    TPro  6.5  /  Alb  3.0<L>  /  TBili  0.4  /  DBili  x   /  AST  17  /  ALT  19  /  AlkPhos  105  03-26            MICROBIOLOGY:    RECENT CULTURES:  03-24 @ 12:23 .Blood Blood       Growth in anaerobic bottle: Gram Positive Cocci in Pairs and Chains           Growth in anaerobic bottle: Gram Positive Cocci in Pairs and Chains    03-23 @ 14:33 .Blood Blood   NICK    Growth in aerobic bottle: Gram Positive Cocci in Pairs and Chains    Enterococcus faecium (vancomycin resistant)  Enterococcus faecium (vancomycin resistant)     Growth in aerobic bottle: Gram Positive Cocci in Pairs and Chains    03-22 @ 22:20 .Other Other, pacemaker leads   NICK      Enterococcus faecium (vancomycin resistant)  Enterococcus faecium (vancomycin resistant)     Growth in fluid media only Enterococcus faecium (vancomycin resistant)    03-22 @ 09:40 .Blood Blood       Growth in anaerobic bottle: Gram Positive Cocci in Pairs and Chains  Growth in aerobic bottle: Gram Positive Cocci in Pairs and Chains           Growth in aerobic and anaerobic bottles: Enterococcus faecium (vancomycin  resistant)  See previous culture [id number]    03-21 @ 10:02 .Blood Blood-Peripheral       Growth in aerobic bottle: Gram Positive Cocci in Pairs and Chains  Growth in anaerobic bottle: Gram Positive Cocci in Pairs and Chains           Growth in aerobic and anaerobic bottles: Enterococcus faecium (vancomycin  resistant)  See previous culture [id number]    03-20 @ 02:46 .Blood Blood       Growth in anaerobic bottle: Gram Positive Cocci in Pairs and Chains  Growth in aerobic bottle: Gram Positive Cocci in Pairs and Chains           Growth in aerobic and anaerobic bottles: Enterococcus faecium (vancomycin  resistant)  See previous culture [id number]    03-20 @ 02:44 .Blood Blood   PCR    Growth in aerobic and anaerobic bottles: Gram Positive Cocci in Pairs and  Chains    Blood Culture PCR  Enterococcus faecium (vancomycin resistant)  Blood Culture PCR     Growth in aerobic and anaerobic bottles: Enterococcus faecium (vancomycin  resistant)  See previous culture [id number]          RESPIRATORY CULTURES:              RADIOLOGY & ADDITIONAL STUDIES:        Pager [phone]  After 5 pm/weekends or if no response :[phone]
infectious diseases progress note:    Patient is a 87y old  Male who presents with a chief complaint of SOB, cough (27 Mar 2019 11:12)        Shortness of breath        ROS:  CONSTITUTIONAL:  Negative fever or chills, feels well, good appetite  EYES:  Negative  blurry vision or double vision  CARDIOVASCULAR:  Negative for chest pain or palpitations  RESPIRATORY:  Negative for cough, wheezing, or SOB   GASTROINTESTINAL:  Negative for nausea, vomiting, diarrhea, constipation, or abdominal pain  GENITOURINARY:  Negative frequency, urgency or dysuria  NEUROLOGIC:  No headache, confusion, dizziness, lightheadedness  pain in lower back not groin this am     Allergies    Keppra (Rash)  penicillin (Unknown)    Intolerances        ANTIBIOTICS/RELEVANT:  antimicrobials  DAPTOmycin IVPB 750 milliGRAM(s) IV Intermittent every 24 hours    immunologic:    OTHER:  ALBUTerol    90 MICROgram(s) HFA Inhaler 2 Puff(s) Inhalation every 4 hours  amiodarone    Tablet 200 milliGRAM(s) Oral daily  apixaban 5 milliGRAM(s) Oral every 12 hours  atorvastatin 40 milliGRAM(s) Oral at bedtime  buDESOnide  80 MICROgram(s)/formoterol 4.5 MICROgram(s) Inhaler 2 Puff(s) Inhalation two times a day  clopidogrel Tablet 75 milliGRAM(s) Oral daily  docusate sodium 100 milliGRAM(s) Oral two times a day  finasteride 5 milliGRAM(s) Oral daily  furosemide    Tablet 40 milliGRAM(s) Oral daily  HYDROcodone/homatropine Syrup 5 milliLiter(s) Oral every 6 hours PRN  HYDROmorphone  Injectable 1 milliGRAM(s) IV Push every 4 hours PRN  lidocaine   Patch 1 Patch Transdermal daily  melatonin 3 milliGRAM(s) Oral at bedtime  methyl salicylate 14%/menthol 6% Topical Ointment 1 Application(s) Topical three times a day PRN  metoclopramide Injectable 10 milliGRAM(s) IV Push once  metoprolol tartrate 50 milliGRAM(s) Oral every 12 hours  pantoprazole    Tablet 40 milliGRAM(s) Oral before breakfast  polyethylene glycol 3350 17 Gram(s) Oral daily  senna 2 Tablet(s) Oral at bedtime  spironolactone 25 milliGRAM(s) Oral daily  tamsulosin 0.4 milliGRAM(s) Oral at bedtime      Objective:  Vital Signs Last 24 Hrs  T(C): 36.2 (28 Mar 2019 07:15), Max: 36.6 (27 Mar 2019 14:20)  T(F): 97.1 (28 Mar 2019 07:15), Max: 97.9 (28 Mar 2019 04:37)  HR: 67 (28 Mar 2019 07:15) (67 - 103)  BP: 94/55 (28 Mar 2019 07:15) (94/55 - 119/82)  BP(mean): --  RR: 18 (28 Mar 2019 07:15) (18 - 19)  SpO2: 91% (28 Mar 2019 07:15) (91% - 98%)    PHYSICAL EXAM:  Constitutional:Well-developed, well nourished--no acute distress  Eyes:JESSEE, EOMI  Ear/Nose/Throat: no oral lesion, no sinus tenderness on percussion	  Neck:no JVD, no lymphadenopathy, supple  Respiratory: CTA marci  Cardiovascular: S1S2 RRR, no murmurs  Gastrointestinal:soft, (+) BS, no HSM  Extremities:no e/e/c        LABS:                        13.9   13.8  )-----------( 262      ( 28 Mar 2019 06:59 )             42.0     03-28    138  |  99  |  40<H>  ----------------------------<  120<H>  4.3   |  26  |  1.27    Ca    9.6      28 Mar 2019 06:59              MICROBIOLOGY:    RECENT CULTURES:  03-27 @ 10:01 .Blood Blood-Peripheral       Growth in aerobic bottle: Gram Positive Cocci in Pairs and Chains           Growth in aerobic bottle: Gram Positive Cocci in Pairs and Chains    03-26 @ 09:26 .Blood Blood                No growth to date.    03-25 @ 17:18 .Blood Blood       Growth in anaerobic bottle: Gram Positive Cocci in Pairs and Chains           Growth in anaerobic bottle: Enterococcus faecium (vancomycin resistant)  See previous culture [id number]    03-24 @ 12:23 .Blood Blood       Growth in anaerobic bottle: Gram Positive Cocci in Pairs and Chains           Growth in anaerobic bottle: Enterococcus faecium (vancomycin resistant)  See previous culture [id number]    03-23 @ 14:33 .Blood Blood   NICK    Growth in aerobic bottle: Gram Positive Cocci in Pairs and Chains    Enterococcus faecium (vancomycin resistant)  Enterococcus faecium (vancomycin resistant)     Growth in aerobic bottle: Enterococcus faecium (vancomycin resistant)  See previous culture [id number]    03-22 @ 22:20 .Other Other, pacemaker leads   NICK      Enterococcus faecium (vancomycin resistant)  Enterococcus faecium (vancomycin resistant)     Culture is being performed.    03-22 @ 09:40 .Blood Blood       Growth in anaerobic bottle: Gram Positive Cocci in Pairs and Chains  Growth in aerobic bottle: Gram Positive Cocci in Pairs and Chains           Growth in aerobic and anaerobic bottles: Enterococcus faecium (vancomycin  resistant)  See previous culture [id number]    03-21 @ 10:02 .Blood Blood-Peripheral       Growth in aerobic bottle: Gram Positive Cocci in Pairs and Chains  Growth in anaerobic bottle: Gram Positive Cocci in Pairs and Chains           Growth in aerobic and anaerobic bottles: Enterococcus faecium (vancomycin  resistant)  See previous culture [id number]          RESPIRATORY CULTURES:              RADIOLOGY & ADDITIONAL STUDIES:        Pager [phone]  After 5 pm/weekends or if no response :[phone]
infectious diseases progress note:    Patient is a 87y old  Male who presents with a chief complaint of SOB, cough (29 Mar 2019 16:51)        Shortness of breath           Allergies    Keppra (Rash)  penicillin (Unknown)    Intolerances        ANTIBIOTICS/RELEVANT:  antimicrobials  ceftaroline fosamil IVPB 600 milliGRAM(s) IV Intermittent every 8 hours  DAPTOmycin IVPB 750 milliGRAM(s) IV Intermittent every 24 hours    immunologic:    OTHER:  ALBUTerol    90 MICROgram(s) HFA Inhaler 2 Puff(s) Inhalation every 4 hours  amiodarone    Tablet 200 milliGRAM(s) Oral daily  apixaban 5 milliGRAM(s) Oral every 12 hours  atorvastatin 40 milliGRAM(s) Oral at bedtime  buDESOnide  80 MICROgram(s)/formoterol 4.5 MICROgram(s) Inhaler 2 Puff(s) Inhalation two times a day  clopidogrel Tablet 75 milliGRAM(s) Oral daily  docusate sodium 100 milliGRAM(s) Oral two times a day  finasteride 5 milliGRAM(s) Oral daily  furosemide    Tablet 40 milliGRAM(s) Oral daily  lidocaine   Patch 1 Patch Transdermal daily  melatonin 3 milliGRAM(s) Oral at bedtime  methyl salicylate 14%/menthol 6% Topical Ointment 1 Application(s) Topical three times a day PRN  metoclopramide Injectable 10 milliGRAM(s) IV Push once  metoprolol tartrate 50 milliGRAM(s) Oral every 12 hours  morphine  - Injectable 2 milliGRAM(s) IV Push every 6 hours PRN  pantoprazole    Tablet 40 milliGRAM(s) Oral before breakfast  polyethylene glycol 3350 17 Gram(s) Oral daily  senna 2 Tablet(s) Oral at bedtime  spironolactone 25 milliGRAM(s) Oral daily  tamsulosin 0.4 milliGRAM(s) Oral at bedtime      Objective:  Vital Signs Last 24 Hrs  T(C): 36.4 (30 Mar 2019 04:05), Max: 36.8 (29 Mar 2019 14:14)  T(F): 97.5 (30 Mar 2019 04:05), Max: 98.3 (29 Mar 2019 14:14)  HR: 92 (30 Mar 2019 04:05) (90 - 93)  BP: 111/66 (30 Mar 2019 04:05) (106/67 - 113/72)  BP(mean): --  RR: 18 (29 Mar 2019 22:19) (18 - 18)  SpO2: 95% (29 Mar 2019 22:19) (94% - 95%)    PHYSICAL EXAM:     Eyes:JESSEE, EOMI  Ear/Nose/Throat: no oral lesion, no sinus tenderness on percussion	  Neck:no JVD, no lymphadenopathy, supple           LABS:                        13.6   16.7  )-----------( 252      ( 29 Mar 2019 17:09 )             40.0     03-29    136  |  97  |  34<H>  ----------------------------<  146<H>  4.1   |  26  |  1.22    Ca    9.3      29 Mar 2019 17:12              MICROBIOLOGY:    RECENT CULTURES:  03-28 @ 10:34 .Blood Blood       Growth in aerobic bottle: Gram Positive Cocci in Pairs and Chains           Growth in aerobic bottle: Gram Positive Cocci in Pairs and Chains    03-27 @ 10:01 .Blood Blood-Peripheral       Growth in aerobic bottle: Gram Positive Cocci in Pairs and Chains           Growth in aerobic bottle: Enterococcus species  Identification and susceptibility to follow.    03-26 @ 09:26 .Blood Blood                No growth to date.    03-25 @ 17:18 .Blood Blood       Growth in anaerobic bottle: Gram Positive Cocci in Pairs and Chains           Growth in anaerobic bottle: Enterococcus faecium (vancomycin resistant)  See previous culture [id number]    03-24 @ 12:23 .Blood Blood       Growth in anaerobic bottle: Gram Positive Cocci in Pairs and Chains           Growth in anaerobic bottle: Enterococcus faecium (vancomycin resistant)  See previous culture [id number]    03-23 @ 14:33 .Blood Blood   NICK    Growth in aerobic bottle: Gram Positive Cocci in Pairs and Chains    Enterococcus faecium (vancomycin resistant)  Enterococcus faecium (vancomycin resistant)     Growth in aerobic bottle: Enterococcus faecium (vancomycin resistant)  See previous culture [id number]          RESPIRATORY CULTURES:              RADIOLOGY & ADDITIONAL STUDIES:        Pager [phone]  After 5 pm/weekends or if no response :[phone]
infectious diseases progress note:    Patient is a 87y old  Male who presents with a chief complaint of SOB, cough (31 Mar 2019 13:27)        Shortness of breath        ROS:  CONSTITUTIONAL:  Negative fever or chills, feels well, good appetite  EYES:  Negative  blurry vision or double vision  CARDIOVASCULAR:  Negative for chest pain or palpitations  RESPIRATORY:  Negative for cough, wheezing, or SOB   GASTROINTESTINAL:  Negative for nausea, vomiting, diarrhea, constipation, or abdominal pain  GENITOURINARY:  Negative frequency, urgency or dysuria  NEUROLOGIC:  No headache, confusion, dizziness, lightheadedness    Allergies    Keppra (Rash)  penicillin (Unknown)    Intolerances        ANTIBIOTICS/RELEVANT:  antimicrobials  ceftaroline fosamil IVPB 600 milliGRAM(s) IV Intermittent every 8 hours  DAPTOmycin IVPB 750 milliGRAM(s) IV Intermittent every 24 hours    immunologic:    OTHER:  ALBUTerol    90 MICROgram(s) HFA Inhaler 2 Puff(s) Inhalation every 4 hours  amiodarone    Tablet 200 milliGRAM(s) Oral daily  apixaban 5 milliGRAM(s) Oral every 12 hours  atorvastatin 40 milliGRAM(s) Oral at bedtime  buDESOnide  80 MICROgram(s)/formoterol 4.5 MICROgram(s) Inhaler 2 Puff(s) Inhalation two times a day  clopidogrel Tablet 75 milliGRAM(s) Oral daily  docusate sodium 100 milliGRAM(s) Oral two times a day  finasteride 5 milliGRAM(s) Oral daily  lidocaine   Patch 1 Patch Transdermal daily  melatonin 3 milliGRAM(s) Oral at bedtime  methyl salicylate 14%/menthol 6% Topical Ointment 1 Application(s) Topical three times a day PRN  metoclopramide Injectable 10 milliGRAM(s) IV Push once  metoprolol tartrate 50 milliGRAM(s) Oral every 12 hours  morphine  - Injectable 2 milliGRAM(s) IV Push every 6 hours PRN  pantoprazole    Tablet 40 milliGRAM(s) Oral before breakfast  polyethylene glycol 3350 17 Gram(s) Oral daily  senna 2 Tablet(s) Oral at bedtime  sodium chloride 0.9%. 1000 milliLiter(s) IV Continuous <Continuous>  spironolactone 25 milliGRAM(s) Oral daily  tamsulosin 0.4 milliGRAM(s) Oral at bedtime      Objective:  Vital Signs Last 24 Hrs  T(C): 36.3 (01 Apr 2019 05:47), Max: 36.7 (31 Mar 2019 20:57)  T(F): 97.3 (01 Apr 2019 05:47), Max: 98 (31 Mar 2019 20:57)  HR: 80 (01 Apr 2019 05:47) (80 - 88)  BP: 105/66 (01 Apr 2019 05:47) (101/66 - 116/77)  BP(mean): --  RR: 18 (01 Apr 2019 05:47) (18 - 18)  SpO2: 97% (01 Apr 2019 05:47) (94% - 97%)    PHYSICAL EXAM:     Eyes:JESSEE, EOMI  Ear/Nose/Throat: no oral lesion, no sinus tenderness on percussion	  Neck:no JVD, no lymphadenopathy, supple  Respiratory: CTA marci  Cardiovascular: S1S2 RRR, no murmurs  Gastrointestinal:soft, (+) BS, no HSM  Extremities:no e/e/c        LABS:                        14.8   14.9  )-----------( 294      ( 30 Mar 2019 11:54 )             43.3     03-30    140  |  98  |  35<H>  ----------------------------<  131<H>  4.4   |  26  |  1.33<H>    Ca    9.9      30 Mar 2019 11:54              MICROBIOLOGY:    RECENT CULTURES:  03-30 @ 15:26 .Blood Blood       Growth in aerobic bottle: Gram Positive Cocci in Pairs and Chains           Growth in aerobic bottle: Gram Positive Cocci in Pairs and Chains    03-29 @ 21:18 .Blood Blood-Peripheral                No growth to date.    03-28 @ 10:34 .Blood Blood       Growth in aerobic bottle: Gram Positive Cocci in Pairs and Chains           Growth in aerobic bottle: Enterococcus faecium (vancomycin resistant)  See previous culture [id number]    03-27 @ 10:01 .Blood Blood-Peripheral   NICK    Growth in aerobic bottle: Gram Positive Cocci in Pairs and Chains    Enterococcus faecium (vancomycin resistant)  Enterococcus faecium (vancomycin resistant)     Growth in aerobic bottle: Enterococcus faecium (vancomycin resistant)    03-26 @ 09:26 .Blood Blood                No growth at 5 days.    03-25 @ 17:18 .Blood Blood       Growth in anaerobic bottle: Gram Positive Cocci in Pairs and Chains           Growth in anaerobic bottle: Enterococcus faecium (vancomycin resistant)  See previous culture [id number]          RESPIRATORY CULTURES:              RADIOLOGY & ADDITIONAL STUDIES:        Pager [phone]  After 5 pm/weekends or if no response :[phone]
infectious diseases progress note:    Patient is a 87y old  Male who presents with a chief complaint of SOB, cough, bacteremia (27 Mar 2019 08:08)        Shortness of breath        ROS:  CONSTITUTIONAL:  Negative fever or chills, feels well, good appetite  EYES:  Negative  blurry vision or double vision  CARDIOVASCULAR:  Negative for chest pain or palpitations  RESPIRATORY:  Negative for cough, wheezing, or SOB   GASTROINTESTINAL:  Negative for nausea, vomiting, diarrhea, constipation, or abdominal pain  GENITOURINARY:  Negative frequency, urgency or dysuria  NEUROLOGIC:   pain in back and groin     Allergies    Keppra (Rash)  penicillin (Unknown)    Intolerances        ANTIBIOTICS/RELEVANT:  antimicrobials  DAPTOmycin IVPB 750 milliGRAM(s) IV Intermittent every 24 hours    immunologic:    OTHER:  ALBUTerol    90 MICROgram(s) HFA Inhaler 2 Puff(s) Inhalation every 4 hours  amiodarone    Tablet 200 milliGRAM(s) Oral daily  apixaban 5 milliGRAM(s) Oral every 12 hours  atorvastatin 40 milliGRAM(s) Oral at bedtime  buDESOnide  80 MICROgram(s)/formoterol 4.5 MICROgram(s) Inhaler 2 Puff(s) Inhalation two times a day  clopidogrel Tablet 75 milliGRAM(s) Oral daily  docusate sodium 100 milliGRAM(s) Oral two times a day  finasteride 5 milliGRAM(s) Oral daily  furosemide   Injectable 40 milliGRAM(s) IV Push daily  HYDROcodone/homatropine Syrup 5 milliLiter(s) Oral every 6 hours PRN  HYDROmorphone  Injectable 1 milliGRAM(s) IV Push every 4 hours PRN  lidocaine   Patch 1 Patch Transdermal daily  melatonin 3 milliGRAM(s) Oral at bedtime  methyl salicylate 14%/menthol 6% Topical Ointment 1 Application(s) Topical three times a day PRN  metoclopramide Injectable 10 milliGRAM(s) IV Push once  metoprolol tartrate 50 milliGRAM(s) Oral every 12 hours  pantoprazole    Tablet 40 milliGRAM(s) Oral before breakfast  polyethylene glycol 3350 17 Gram(s) Oral daily  senna 2 Tablet(s) Oral at bedtime  spironolactone 25 milliGRAM(s) Oral daily  tamsulosin 0.4 milliGRAM(s) Oral at bedtime      Objective:  Vital Signs Last 24 Hrs  T(C): 36.3 (27 Mar 2019 06:09), Max: 36.4 (26 Mar 2019 21:18)  T(F): 97.4 (27 Mar 2019 06:09), Max: 97.6 (26 Mar 2019 21:18)  HR: 88 (27 Mar 2019 06:09) (77 - 92)  BP: 137/84 (27 Mar 2019 06:09) (118/75 - 137/84)  BP(mean): --  RR: 18 (27 Mar 2019 06:09) (18 - 18)  SpO2: 98% (27 Mar 2019 06:09) (97% - 98%)    PHYSICAL EXAM:   -no acute distress  Eyes:JESSEE, EOMI  Ear/Nose/Throat: no oral lesion, no sinus tenderness on percussion	  Neck:no JVD, no lymphadenopathy, supple  Respiratory: CTA marci  Cardiovascular: S1S2 RRR, no murmurs  Gastrointestinal:soft, (+) BS, no HSM  Extremities:no e/e/c  tender over right back and groin       LABS:                        14.2   12.7  )-----------( 268      ( 27 Mar 2019 06:35 )             42.4     03-27    140  |  100  |  33<H>  ----------------------------<  93  4.6   |  25  |  0.96    Ca    9.4      27 Mar 2019 06:35    TPro  6.5  /  Alb  3.0<L>  /  TBili  0.4  /  DBili  x   /  AST  17  /  ALT  19  /  AlkPhos  105  03-26            MICROBIOLOGY:    RECENT CULTURES:  03-25 @ 17:18 .Blood Blood       Growth in anaerobic bottle: Gram Positive Cocci in Pairs and Chains           Growth in anaerobic bottle: Gram Positive Cocci in Pairs and Chains    03-24 @ 12:23 .Blood Blood       Growth in anaerobic bottle: Gram Positive Cocci in Pairs and Chains           Growth in anaerobic bottle: Enterococcus faecium (vancomycin resistant)  See previous culture [id number]    03-23 @ 14:33 .Blood Blood   NICK    Growth in aerobic bottle: Gram Positive Cocci in Pairs and Chains    Enterococcus faecium (vancomycin resistant)  Enterococcus faecium (vancomycin resistant)     Growth in aerobic bottle: Enterococcus faecium (vancomycin resistant)  See previous culture [id number]    03-22 @ 22:20 .Other Other, pacemaker leads   NICK      Enterococcus faecium (vancomycin resistant)  Enterococcus faecium (vancomycin resistant)     Growth in fluid media only Enterococcus faecium (vancomycin resistant)    03-22 @ 09:40 .Blood Blood       Growth in anaerobic bottle: Gram Positive Cocci in Pairs and Chains  Growth in aerobic bottle: Gram Positive Cocci in Pairs and Chains           Growth in aerobic and anaerobic bottles: Enterococcus faecium (vancomycin  resistant)  See previous culture [id number]    03-21 @ 10:02 .Blood Blood-Peripheral       Growth in aerobic bottle: Gram Positive Cocci in Pairs and Chains  Growth in anaerobic bottle: Gram Positive Cocci in Pairs and Chains           Growth in aerobic and anaerobic bottles: Enterococcus faecium (vancomycin  resistant)  See previous culture [id number]          RESPIRATORY CULTURES:              RADIOLOGY & ADDITIONAL STUDIES:        Pager [phone]  After 5 pm/weekends or if no response :[phone]

## 2019-04-16 NOTE — PROGRESS NOTE ADULT - REASON FOR ADMISSION
SOB, cough
SOB, cough, bacteremia
SOB, cough

## 2019-04-16 NOTE — DISCHARGE NOTE NURSING/CASE MANAGEMENT/SOCIAL WORK - NSDCPEWEB_GEN_ALL_CORE
[place] website --- [email or web address] for Tobacco Control website --- [email or web address]

## 2019-04-16 NOTE — DISCHARGE NOTE PROVIDER - NSDCCPCAREPLAN_GEN_ALL_CORE_FT
PRINCIPAL DISCHARGE DIAGNOSIS  Diagnosis: VRE bacteremia  Assessment and Plan of Treatment: Continue with intravenous antibiotics PRINCIPAL DISCHARGE DIAGNOSIS  Diagnosis: VRE bacteremia  Assessment and Plan of Treatment: Continue with intravenous antibiotics for six weeks total  Follow up with Infectious disease      SECONDARY DISCHARGE DIAGNOSES  Diagnosis: DEBI (acute kidney injury)  Assessment and Plan of Treatment: Creatinine improved    Diagnosis: Other bursitis of right hip  Assessment and Plan of Treatment: Continue with pain control    Diagnosis: Atrial fibrillation  Assessment and Plan of Treatment: Continue with Eliquis    Diagnosis: COPD exacerbation  Assessment and Plan of Treatment: Continue with nebulizers

## 2019-04-16 NOTE — DISCHARGE NOTE PROVIDER - CARE PROVIDERS DIRECT ADDRESSES
,[email or web address],DirectAddress_Unknown,DirectAddress_Unknown

## 2019-04-16 NOTE — DISCHARGE NOTE PROVIDER - PROVIDER TOKENS
PROVIDER:[TOKEN:[[id number]]],PROVIDER:[TOKEN:[[id number]]],FREE:[LAST:[Sangeetha],FIRST:[Kathleen],PHONE:[(   )    -],FAX:[(   )    -],ADDRESS:[Primary care doctor]] PROVIDER:[TOKEN:[[id number]]],PROVIDER:[TOKEN:[[id number]]],PROVIDER:[TOKEN:[[id number]]]

## 2019-04-18 ENCOUNTER — APPOINTMENT (OUTPATIENT)
Dept: GERIATRICS | Facility: CLINIC | Age: 84
End: 2019-04-18

## 2019-04-19 LAB
CULTURE RESULTS: SIGNIFICANT CHANGE UP
SPECIMEN SOURCE: SIGNIFICANT CHANGE UP

## 2019-04-22 ENCOUNTER — APPOINTMENT (OUTPATIENT)
Dept: INFECTIOUS DISEASE | Facility: CLINIC | Age: 84
End: 2019-04-22
Payer: MEDICARE

## 2019-04-22 VITALS
DIASTOLIC BLOOD PRESSURE: 52 MMHG | HEIGHT: 66 IN | TEMPERATURE: 96.6 F | OXYGEN SATURATION: 96 % | HEART RATE: 58 BPM | SYSTOLIC BLOOD PRESSURE: 91 MMHG

## 2019-04-22 PROCEDURE — 99213 OFFICE O/P EST LOW 20 MIN: CPT

## 2019-04-23 NOTE — ASSESSMENT
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

## 2019-04-23 NOTE — PHYSICAL EXAM
[General Appearance - Alert] : alert [General Appearance - In No Acute Distress] : in no acute distress [Oropharynx] : the oropharynx was normal with no thrush [Outer Ear] : the ears and nose were normal in appearance [Jugular Venous Distention Increased] : there was no jugular-venous distention [Neck Cervical Mass (___cm)] : no neck mass was observed [Neck Appearance] : the appearance of the neck was normal [Thyroid Diffuse Enlargement] : the thyroid was not enlarged [] : no respiratory distress [Auscultation Breath Sounds / Voice Sounds] : lungs were clear to auscultation bilaterally [Heart Sounds] : normal S1 and S2 [Heart Rate And Rhythm] : heart rate was normal and rhythm regular [Heart Sounds Gallop] : no gallops [Murmurs] : no murmurs [Heart Sounds Pericardial Friction Rub] : no pericardial rub

## 2019-04-23 NOTE — HISTORY OF PRESENT ILLNESS
[FreeTextEntry1] : doing well on dapto and ceftaroline  no new issues no fever  back and groin pain isbetter

## 2019-04-30 NOTE — ED STATDOCS - OBJECTIVE STATEMENT
88 y/o male hx of COPD, CAD and recent TAVR done 2 months ago as well as pacer who presents to the ED for lightheadness and SOB x 4-5 days. patient endorsing chills yesterday but didn't measure temp. patient states cough is at baseline but his sister states she feels it is worse. + decreased appetite as well as body aches. patient too SOB to walk up stairs. saw Dr Paredes who ordered xray today and he was told if he was worse to come to the ED. 88 yo M, accompanied by son in law, w/ PMH of HTN, HLD, remote CVA, COPD, CAD with Stents, CHF, chronic afib on eliquis, chronic systolic heart failure TTE EF 45% s/p TAVR  11/27/18.- with Dr Zuluaga, presents to the ED for lightheadness and SOB x 4-5 days. patient endorsing chills yesterday but didn't measure temp. patient states cough is at baseline but his sister states she feels it is worse. + decreased appetite as well as body aches. patient too SOB to walk up stairs. saw Dr Paredes who ordered xray today and he was told if he was worse to come to the ED. 88 yo M, accompanied by sister w/ PMH of HTN, HLD, remote CVA, COPD, CAD with Stents, CHF, chronic afib on eliquis, chronic systolic heart failure TTE EF 45% s/p TAVR  11/27/18.- with Dr Zuluaga, presents to the ED for lightheadness and SOB x 4-5 days. patient endorsing chills yesterday but didn't measure temp. patient states cough is at baseline but his sister states she feels it is worse. + decreased appetite as well as body aches. patient too SOB to walk up stairs. saw Dr Paredes who ordered xray today and he was told if he was worse to come to the ED. took 2 tylenol and 1 aleve at 6 pm 3 = unable to understand or speak (not related to language barrier)

## 2019-05-01 ENCOUNTER — FORM ENCOUNTER (OUTPATIENT)
Age: 84
End: 2019-05-01

## 2019-05-11 LAB
CULTURE RESULTS: SIGNIFICANT CHANGE UP
SPECIMEN SOURCE: SIGNIFICANT CHANGE UP

## 2019-05-23 ENCOUNTER — APPOINTMENT (OUTPATIENT)
Dept: GERIATRICS | Facility: CLINIC | Age: 84
End: 2019-05-23
Payer: MEDICARE

## 2019-05-23 VITALS
RESPIRATION RATE: 15 BRPM | HEIGHT: 66 IN | WEIGHT: 191.13 LBS | SYSTOLIC BLOOD PRESSURE: 100 MMHG | DIASTOLIC BLOOD PRESSURE: 60 MMHG | BODY MASS INDEX: 30.72 KG/M2 | OXYGEN SATURATION: 98 % | HEART RATE: 83 BPM | TEMPERATURE: 96.7 F

## 2019-05-23 DIAGNOSIS — R41.89 OTHER SYMPTOMS AND SIGNS INVOLVING COGNITIVE FUNCTIONS AND AWARENESS: ICD-10-CM

## 2019-05-23 DIAGNOSIS — M54.5 LOW BACK PAIN: ICD-10-CM

## 2019-05-23 DIAGNOSIS — R78.81 BACTEREMIA: ICD-10-CM

## 2019-05-23 DIAGNOSIS — M70.61 TROCHANTERIC BURSITIS, RIGHT HIP: ICD-10-CM

## 2019-05-23 DIAGNOSIS — K59.00 CONSTIPATION, UNSPECIFIED: ICD-10-CM

## 2019-05-23 DIAGNOSIS — E88.09 OTHER DISORDERS OF PLASMA-PROTEIN METABOLISM, NOT ELSEWHERE CLASSIFIED: ICD-10-CM

## 2019-05-23 DIAGNOSIS — I50.30 UNSPECIFIED DIASTOLIC (CONGESTIVE) HEART FAILURE: ICD-10-CM

## 2019-05-23 DIAGNOSIS — D72.829 ELEVATED WHITE BLOOD CELL COUNT, UNSPECIFIED: ICD-10-CM

## 2019-05-23 PROCEDURE — 99215 OFFICE O/P EST HI 40 MIN: CPT

## 2019-05-23 RX ORDER — METOPROLOL SUCCINATE 25 MG/1
25 TABLET, EXTENDED RELEASE ORAL DAILY
Refills: 0 | Status: ACTIVE | COMMUNITY
Start: 2018-12-05

## 2019-05-24 ENCOUNTER — LABORATORY RESULT (OUTPATIENT)
Age: 84
End: 2019-05-24

## 2019-05-24 PROBLEM — K59.00 CONSTIPATION: Status: ACTIVE | Noted: 2019-05-24

## 2019-05-24 PROBLEM — M70.61 TROCHANTERIC BURSITIS OF RIGHT HIP: Status: ACTIVE | Noted: 2019-05-23

## 2019-05-24 PROBLEM — E88.09 HYPOALBUMINEMIA: Status: ACTIVE | Noted: 2019-05-24

## 2019-05-24 PROBLEM — I50.30 (HFPEF) HEART FAILURE WITH PRESERVED EJECTION FRACTION: Status: ACTIVE | Noted: 2018-02-13

## 2019-05-24 PROBLEM — M54.5 LOWER BACK PAIN: Status: ACTIVE | Noted: 2019-05-24

## 2019-05-24 PROBLEM — R78.81 BACTEREMIA: Status: ACTIVE | Noted: 2019-05-23

## 2019-05-24 PROBLEM — R41.89 COGNITIVE IMPAIRMENT: Status: ACTIVE | Noted: 2018-03-14

## 2019-05-24 PROBLEM — D72.829 LEUKOCYTOSIS: Status: ACTIVE | Noted: 2019-05-24

## 2019-05-24 LAB
25(OH)D3 SERPL-MCNC: 25.6 NG/ML
ALBUMIN SERPL ELPH-MCNC: 3.4 G/DL
ALP BLD-CCNC: 169 U/L
ALT SERPL-CCNC: 18 U/L
ANION GAP SERPL CALC-SCNC: 18 MMOL/L
AST SERPL-CCNC: 20 U/L
BASOPHILS # BLD AUTO: 0.04 K/UL
BASOPHILS NFR BLD AUTO: 0.3 %
BILIRUB SERPL-MCNC: 0.6 MG/DL
BUN SERPL-MCNC: 24 MG/DL
CALCIUM SERPL-MCNC: 9 MG/DL
CHLORIDE SERPL-SCNC: 99 MMOL/L
CO2 SERPL-SCNC: 22 MMOL/L
CREAT SERPL-MCNC: 1.08 MG/DL
EOSINOPHIL # BLD AUTO: 0.24 K/UL
EOSINOPHIL NFR BLD AUTO: 1.9 %
FOLATE SERPL-MCNC: 10 NG/ML
GLUCOSE SERPL-MCNC: 77 MG/DL
HCT VFR BLD CALC: 36.4 %
HGB BLD-MCNC: 11.4 G/DL
IMM GRANULOCYTES NFR BLD AUTO: 0.7 %
LYMPHOCYTES # BLD AUTO: 1.21 K/UL
LYMPHOCYTES NFR BLD AUTO: 9.4 %
MAN DIFF?: NORMAL
MCHC RBC-ENTMCNC: 27.3 PG
MCHC RBC-ENTMCNC: 31.3 GM/DL
MCV RBC AUTO: 87.3 FL
MONOCYTES # BLD AUTO: 1.48 K/UL
MONOCYTES NFR BLD AUTO: 11.5 %
NEUTROPHILS # BLD AUTO: 9.81 K/UL
NEUTROPHILS NFR BLD AUTO: 76.2 %
PLATELET # BLD AUTO: 249 K/UL
POTASSIUM SERPL-SCNC: 3.6 MMOL/L
PROT SERPL-MCNC: 6.7 G/DL
RBC # BLD: 4.17 M/UL
RBC # FLD: 15.8 %
SODIUM SERPL-SCNC: 139 MMOL/L
VIT B12 SERPL-MCNC: 797 PG/ML
WBC # FLD AUTO: 12.87 K/UL

## 2019-05-24 RX ORDER — ACETAMINOPHEN 500 MG/1
500 TABLET ORAL EVERY 8 HOURS
Refills: 0 | Status: ACTIVE | COMMUNITY
Start: 2018-02-13

## 2019-05-24 NOTE — SOCIAL HISTORY
[No falls in past year] : Patient reported no falls in the past year [Walker] : walker [Canes] : rafael

## 2019-05-24 NOTE — HISTORY OF PRESENT ILLNESS
[FreeTextEntry1] : 87 y/o Man who presents for acute visit after recent hospitalization for bacteremia of unclear etiology. \par Accompanied by son Giovani who assists with history. \par \par Hospitalized at [facility] on 3/19/19 for SOB/Cough/lightheadedness - found to have VRE.faecium (sensitive to dapto) Bacteremia. MARIA LUISA done but was not helpful and did not demonstrate obv vegetation.  MRI of lumbar sacral spine - had pain is lower left back near his sacroiliac - was non diagnostic. ultrasound of testes and groin was negative. Wbc scan was negative. PPM Culture was +ve for VREC so Pacemaker was removed. Discharged on 4/16/19 with plan for 6 weeks of IV Abx via PICC from 3/30.  Discharged from [facility] on 5/12/19. \par \par VRE BACTEREMIA - source unclear \par - NM Bone scan 4/1/19: increased radiopharmaceutical uptake in the right elbow. This may represent severe degenerative changes and/or old trauma; however radiographic confirmation is suggested.\par Intense, diffusely increased myocardial uptake of radiopharmaceutical. This is a nonspecific finding but can be seen in association with recent myocardial infarction, unstable angina, myocarditis, congestive heart failure, and cardiac amyloid. \par - b/l Groin U/S 3/29/19: normal with patency of the common femoral arteries and veins\par - b/l Testicle U/S 3/26/19: Bilateral varicoceles. No evidence of orchitis or testicular torsion\par - Whole body WBC scan 3/29/19: no evidence of infection \par - MRI Pelvis w/o IVC 3/25/19: No evidence of acute osteomyelitis. No acute fracture or osteonecrosis.\par - Since hospital discharge pt was seen by ID Dr. Owens on 4/23/19 - note appreciated in EMR - rec to complete 6 week course of Dapto. \par - MARIA LUISA 4/3/19: no vegetations. Dense spontaneous echo contrast seen throughout the left atrium and left atrial appendage. No definitive left atrial or left atrial appendage thrombus. \par - Last WBC was 5/10/19 of 7.36 (normal). CK was normal \par - States last dose of IV Abx was last Saturday night 5/18. \par - Denies fever, chills, other new symptoms since completed course of Abx on 5/18. \par - Last urine studies in March '19  w/ proteinuria and not s/o infection \par - AFB 3/22/19: NEG \par - Last Blood culture on 4/14/19: NEG so far, BCx 4/2/19 also NEG  \par \par Today main complaint is LBP radiating to groin - sharp, constant couple of months (since initial hospitalization for bacteremia in March-ish), exacerbated by movement and worse when lying down horizontally, better when sitting up, worst in the morning, heating pads help. Currently approx 6/10 intensity. For pain currently using icyhot patches, Tylenol 1gram twice daily.  Pt states occasionally using Tramadol however son states he doesn't given it. \par - MRI Lumbar spine w/o IVC 3/27/19: Abnormal T1 and T2 prolongation is seen involving the L1 vertebral body. This could be due to underlying edema secondary to a posttraumatic compression fracture. L4-5: Disc bulge and bilateral hypertrophic facet joint changes seen with severe narrowing of the right neural foramen is seen. \par \par b/l shoulder pain - chronic\par - R>L\par - R shoulder Xray 5/2/19 s/o long-standing rotator cuff pathology with severe AC joint arthropathy\par \par CONSTIPATION\par - had a large BM yesterday. Not taking bowel regimen regularly but has some stool softener at home which uses PRN \par \par HFpEF / PAF\par - Follows w/ cards Dr. Timothy Paredes [phone]\par - On Plavix 75mg daily, Lasix 40mg daily, Toprol 25mg daily, Spirinolactone 25mg daily, Crestor\par - On Amiodarone 200mg daily, and Eliquis 5mg daily \par \par Hypoalbuminemia\par - Last Alb of 3.1 on 5/10/19  \par Lives with family - someone is around 95% of the time. Family helps to fill pill box, patient takes own meds from pill box.  Pt had trouble managing pill box for a week after discharged from hospital - family noted pt was not taking medications as prescribed - went back to old med list, was only drinking 1 glass of water daily, and not eating well. Now family supervising med intake. \par \par

## 2019-05-24 NOTE — PHYSICAL EXAM
[General Appearance - Alert] : alert [General Appearance - In No Acute Distress] : in no acute distress [PERRL With Normal Accommodation] : pupils were equal in size, round, and reactive to light [Sclera] : the sclera and conjunctiva were normal [Neck Appearance] : the appearance of the neck was normal [Jugular Venous Distention Increased] : there was no jugular-venous distention [Respiration, Rhythm And Depth] : normal respiratory rhythm and effort [] : no respiratory distress [Auscultation Breath Sounds / Voice Sounds] : lungs were clear to auscultation bilaterally [Exaggerated Use Of Accessory Muscles For Inspiration] : no accessory muscle use [Bowel Sounds] : normal bowel sounds [Abdomen Soft] : soft [No CVA Tenderness] : no ~M costovertebral angle tenderness [Abdomen Tenderness] : non-tender [No Spinal Tenderness] : no spinal tenderness [Involuntary Movements] : no involuntary movements were seen [Motor Tone] : muscle strength and tone were normal [No Focal Deficits] : no focal deficits [Affect] : the affect was normal [Normal Oral Mucosa] : normal oral mucosa [Outer Ear] : the ears and nose were normal in appearance [Heart Rate And Rhythm] : heart rate was normal and rhythm regular [Nail Clubbing] : no clubbing  or cyanosis of the fingernails [Cranial Nerves] : cranial nerves 2-12 were intact [FreeTextEntry1] : memory loss, anxious

## 2019-05-24 NOTE — REVIEW OF SYSTEMS
[Loss Of Hearing] : hearing loss [Lower Ext Edema] : lower extremity edema [Nocturia] : nocturia [Arthralgias] : arthralgias [Sleep Disturbances] : sleep disturbances [Difficulty Walking] : difficulty walking [Fever] : no fever [Chills] : no chills [Eyesight Problems] : no eyesight problems [Palpitations] : no palpitations [Constipation] : constipation [As Noted in HPI] : as noted in HPI [Fainting] : no fainting [Anxiety] : anxiety [Depression] : no depression [Negative] : Integumentary [FreeTextEntry4] : tinnitus, chronic x70 years, rarely uses his hearing aids  [de-identified] : ambulates with cane or walker [de-identified] : h

## 2019-05-25 LAB
AMYLASE/CREAT SERPL: 54 U/L
APPEARANCE: CLEAR
BILIRUBIN URINE: NEGATIVE
BLOOD URINE: NEGATIVE
COLOR: NORMAL
CRP SERPL-MCNC: 5.76 MG/DL
ERYTHROCYTE [SEDIMENTATION RATE] IN BLOOD BY WESTERGREN METHOD: 65 MM/HR
GLUCOSE QUALITATIVE U: NEGATIVE
KETONES URINE: NEGATIVE
LEUKOCYTE ESTERASE URINE: ABNORMAL
LPL SERPL-CCNC: 19 U/L
NITRITE URINE: NEGATIVE
PH URINE: 6.5
PROTEIN URINE: NEGATIVE
SPECIFIC GRAVITY URINE: 1.01
UROBILINOGEN URINE: NORMAL

## 2019-05-26 ENCOUNTER — INPATIENT (INPATIENT)
Facility: HOSPITAL | Age: 84
LOS: 21 days | Discharge: NURSING, REHAB OR LONG-TERM CARE | DRG: 314 | End: 2019-06-17
Attending: INTERNAL MEDICINE | Admitting: INTERNAL MEDICINE
Payer: MEDICARE

## 2019-05-26 VITALS
RESPIRATION RATE: 18 BRPM | TEMPERATURE: 98 F | HEIGHT: 67 IN | OXYGEN SATURATION: 100 % | HEART RATE: 87 BPM | DIASTOLIC BLOOD PRESSURE: 54 MMHG | SYSTOLIC BLOOD PRESSURE: 92 MMHG | WEIGHT: 190.92 LBS

## 2019-05-26 DIAGNOSIS — I25.10 ATHEROSCLEROTIC HEART DISEASE OF NATIVE CORONARY ARTERY WITHOUT ANGINA PECTORIS: ICD-10-CM

## 2019-05-26 DIAGNOSIS — J44.9 CHRONIC OBSTRUCTIVE PULMONARY DISEASE, UNSPECIFIED: ICD-10-CM

## 2019-05-26 DIAGNOSIS — Z95.2 PRESENCE OF PROSTHETIC HEART VALVE: Chronic | ICD-10-CM

## 2019-05-26 DIAGNOSIS — Z29.9 ENCOUNTER FOR PROPHYLACTIC MEASURES, UNSPECIFIED: ICD-10-CM

## 2019-05-26 DIAGNOSIS — I35.0 NONRHEUMATIC AORTIC (VALVE) STENOSIS: ICD-10-CM

## 2019-05-26 DIAGNOSIS — R78.81 BACTEREMIA: ICD-10-CM

## 2019-05-26 DIAGNOSIS — I48.91 UNSPECIFIED ATRIAL FIBRILLATION: ICD-10-CM

## 2019-05-26 DIAGNOSIS — Z95.0 PRESENCE OF CARDIAC PACEMAKER: Chronic | ICD-10-CM

## 2019-05-26 DIAGNOSIS — N18.3 CHRONIC KIDNEY DISEASE, STAGE 3 (MODERATE): ICD-10-CM

## 2019-05-26 DIAGNOSIS — I50.32 CHRONIC DIASTOLIC (CONGESTIVE) HEART FAILURE: ICD-10-CM

## 2019-05-26 LAB
ALBUMIN SERPL ELPH-MCNC: 3.3 G/DL — SIGNIFICANT CHANGE UP (ref 3.3–5)
ALP SERPL-CCNC: 148 U/L — HIGH (ref 40–120)
ALT FLD-CCNC: 15 U/L — SIGNIFICANT CHANGE UP (ref 10–45)
ANION GAP SERPL CALC-SCNC: 15 MMOL/L — SIGNIFICANT CHANGE UP (ref 5–17)
APTT BLD: 35.2 SEC — SIGNIFICANT CHANGE UP (ref 27.5–36.3)
AST SERPL-CCNC: 25 U/L — SIGNIFICANT CHANGE UP (ref 10–40)
BASOPHILS # BLD AUTO: 0 K/UL — SIGNIFICANT CHANGE UP (ref 0–0.2)
BASOPHILS NFR BLD AUTO: 0.1 % — SIGNIFICANT CHANGE UP (ref 0–2)
BILIRUB SERPL-MCNC: 0.4 MG/DL — SIGNIFICANT CHANGE UP (ref 0.2–1.2)
BUN SERPL-MCNC: 27 MG/DL — HIGH (ref 7–23)
CALCIUM SERPL-MCNC: 9.1 MG/DL — SIGNIFICANT CHANGE UP (ref 8.4–10.5)
CHLORIDE SERPL-SCNC: 100 MMOL/L — SIGNIFICANT CHANGE UP (ref 96–108)
CO2 SERPL-SCNC: 22 MMOL/L — SIGNIFICANT CHANGE UP (ref 22–31)
CREAT SERPL-MCNC: 1.27 MG/DL — SIGNIFICANT CHANGE UP (ref 0.5–1.3)
EOSINOPHIL # BLD AUTO: 0.2 K/UL — SIGNIFICANT CHANGE UP (ref 0–0.5)
EOSINOPHIL NFR BLD AUTO: 1.9 % — SIGNIFICANT CHANGE UP (ref 0–6)
GAS PNL BLDV: SIGNIFICANT CHANGE UP
GLUCOSE SERPL-MCNC: 86 MG/DL — SIGNIFICANT CHANGE UP (ref 70–99)
HCT VFR BLD CALC: 35.4 % — LOW (ref 39–50)
HGB BLD-MCNC: 11.3 G/DL — LOW (ref 13–17)
INR BLD: 1.78 RATIO — HIGH (ref 0.88–1.16)
LYMPHOCYTES # BLD AUTO: 1.1 K/UL — SIGNIFICANT CHANGE UP (ref 1–3.3)
LYMPHOCYTES # BLD AUTO: 8.9 % — LOW (ref 13–44)
MCHC RBC-ENTMCNC: 27.4 PG — SIGNIFICANT CHANGE UP (ref 27–34)
MCHC RBC-ENTMCNC: 32 GM/DL — SIGNIFICANT CHANGE UP (ref 32–36)
MCV RBC AUTO: 85.7 FL — SIGNIFICANT CHANGE UP (ref 80–100)
MONOCYTES # BLD AUTO: 1.2 K/UL — HIGH (ref 0–0.9)
MONOCYTES NFR BLD AUTO: 9.8 % — SIGNIFICANT CHANGE UP (ref 2–14)
NEUTROPHILS # BLD AUTO: 9.7 K/UL — HIGH (ref 1.8–7.4)
NEUTROPHILS NFR BLD AUTO: 79.3 % — HIGH (ref 43–77)
PLATELET # BLD AUTO: 268 K/UL — SIGNIFICANT CHANGE UP (ref 150–400)
POTASSIUM SERPL-MCNC: 4.1 MMOL/L — SIGNIFICANT CHANGE UP (ref 3.5–5.3)
POTASSIUM SERPL-SCNC: 4.1 MMOL/L — SIGNIFICANT CHANGE UP (ref 3.5–5.3)
PROT SERPL-MCNC: 7.2 G/DL — SIGNIFICANT CHANGE UP (ref 6–8.3)
PROTHROM AB SERPL-ACNC: 20.8 SEC — HIGH (ref 10–12.9)
RBC # BLD: 4.13 M/UL — LOW (ref 4.2–5.8)
RBC # FLD: 14.4 % — SIGNIFICANT CHANGE UP (ref 10.3–14.5)
SODIUM SERPL-SCNC: 137 MMOL/L — SIGNIFICANT CHANGE UP (ref 135–145)
WBC # BLD: 12.2 K/UL — HIGH (ref 3.8–10.5)
WBC # FLD AUTO: 12.2 K/UL — HIGH (ref 3.8–10.5)

## 2019-05-26 PROCEDURE — 99223 1ST HOSP IP/OBS HIGH 75: CPT

## 2019-05-26 PROCEDURE — 99285 EMERGENCY DEPT VISIT HI MDM: CPT | Mod: 25

## 2019-05-26 PROCEDURE — 93010 ELECTROCARDIOGRAM REPORT: CPT

## 2019-05-26 RX ORDER — FINASTERIDE 5 MG/1
5 TABLET, FILM COATED ORAL DAILY
Refills: 0 | Status: DISCONTINUED | OUTPATIENT
Start: 2019-05-26 | End: 2019-06-17

## 2019-05-26 RX ORDER — CEFTAROLINE FOSAMIL 600 MG/20ML
600 POWDER, FOR SOLUTION INTRAVENOUS ONCE
Refills: 0 | Status: COMPLETED | OUTPATIENT
Start: 2019-05-26 | End: 2019-05-26

## 2019-05-26 RX ORDER — OXYCODONE HYDROCHLORIDE 5 MG/1
5 TABLET ORAL EVERY 8 HOURS
Refills: 0 | Status: DISCONTINUED | OUTPATIENT
Start: 2019-05-26 | End: 2019-06-02

## 2019-05-26 RX ORDER — METOPROLOL TARTRATE 50 MG
50 TABLET ORAL EVERY 12 HOURS
Refills: 0 | Status: DISCONTINUED | OUTPATIENT
Start: 2019-05-26 | End: 2019-05-28

## 2019-05-26 RX ORDER — INSULIN LISPRO 100/ML
VIAL (ML) SUBCUTANEOUS
Refills: 0 | Status: DISCONTINUED | OUTPATIENT
Start: 2019-05-26 | End: 2019-05-28

## 2019-05-26 RX ORDER — ATORVASTATIN CALCIUM 80 MG/1
40 TABLET, FILM COATED ORAL AT BEDTIME
Refills: 0 | Status: DISCONTINUED | OUTPATIENT
Start: 2019-05-26 | End: 2019-06-17

## 2019-05-26 RX ORDER — BUDESONIDE AND FORMOTEROL FUMARATE DIHYDRATE 160; 4.5 UG/1; UG/1
2 AEROSOL RESPIRATORY (INHALATION)
Refills: 0 | Status: DISCONTINUED | OUTPATIENT
Start: 2019-05-26 | End: 2019-06-17

## 2019-05-26 RX ORDER — PANTOPRAZOLE SODIUM 20 MG/1
40 TABLET, DELAYED RELEASE ORAL
Refills: 0 | Status: DISCONTINUED | OUTPATIENT
Start: 2019-05-26 | End: 2019-06-17

## 2019-05-26 RX ORDER — TAMSULOSIN HYDROCHLORIDE 0.4 MG/1
0.4 CAPSULE ORAL AT BEDTIME
Refills: 0 | Status: DISCONTINUED | OUTPATIENT
Start: 2019-05-26 | End: 2019-06-17

## 2019-05-26 RX ORDER — AMIODARONE HYDROCHLORIDE 400 MG/1
200 TABLET ORAL DAILY
Refills: 0 | Status: DISCONTINUED | OUTPATIENT
Start: 2019-05-26 | End: 2019-06-17

## 2019-05-26 RX ORDER — LANOLIN ALCOHOL/MO/W.PET/CERES
3 CREAM (GRAM) TOPICAL AT BEDTIME
Refills: 0 | Status: DISCONTINUED | OUTPATIENT
Start: 2019-05-26 | End: 2019-06-17

## 2019-05-26 RX ORDER — CEFTAROLINE FOSAMIL 600 MG/20ML
600 POWDER, FOR SOLUTION INTRAVENOUS EVERY 8 HOURS
Refills: 0 | Status: DISCONTINUED | OUTPATIENT
Start: 2019-05-26 | End: 2019-06-17

## 2019-05-26 RX ORDER — SENNA PLUS 8.6 MG/1
2 TABLET ORAL AT BEDTIME
Refills: 0 | Status: DISCONTINUED | OUTPATIENT
Start: 2019-05-26 | End: 2019-06-17

## 2019-05-26 RX ORDER — CLOPIDOGREL BISULFATE 75 MG/1
75 TABLET, FILM COATED ORAL DAILY
Refills: 0 | Status: DISCONTINUED | OUTPATIENT
Start: 2019-05-26 | End: 2019-06-17

## 2019-05-26 RX ORDER — DEXTROSE 50 % IN WATER 50 %
25 SYRINGE (ML) INTRAVENOUS ONCE
Refills: 0 | Status: DISCONTINUED | OUTPATIENT
Start: 2019-05-26 | End: 2019-05-28

## 2019-05-26 RX ORDER — DEXTROSE 50 % IN WATER 50 %
15 SYRINGE (ML) INTRAVENOUS ONCE
Refills: 0 | Status: DISCONTINUED | OUTPATIENT
Start: 2019-05-26 | End: 2019-05-28

## 2019-05-26 RX ORDER — APIXABAN 2.5 MG/1
5 TABLET, FILM COATED ORAL EVERY 12 HOURS
Refills: 0 | Status: DISCONTINUED | OUTPATIENT
Start: 2019-05-26 | End: 2019-05-29

## 2019-05-26 RX ORDER — LIDOCAINE 4 G/100G
1 CREAM TOPICAL DAILY
Refills: 0 | Status: DISCONTINUED | OUTPATIENT
Start: 2019-05-26 | End: 2019-06-17

## 2019-05-26 RX ORDER — SODIUM CHLORIDE 9 MG/ML
500 INJECTION INTRAMUSCULAR; INTRAVENOUS; SUBCUTANEOUS ONCE
Refills: 0 | Status: COMPLETED | OUTPATIENT
Start: 2019-05-26 | End: 2019-05-26

## 2019-05-26 RX ORDER — SPIRONOLACTONE 25 MG/1
25 TABLET, FILM COATED ORAL DAILY
Refills: 0 | Status: DISCONTINUED | OUTPATIENT
Start: 2019-05-26 | End: 2019-06-09

## 2019-05-26 RX ORDER — CEFTAROLINE FOSAMIL 600 MG/20ML
POWDER, FOR SOLUTION INTRAVENOUS
Refills: 0 | Status: DISCONTINUED | OUTPATIENT
Start: 2019-05-26 | End: 2019-05-26

## 2019-05-26 RX ORDER — DAPTOMYCIN 500 MG/10ML
850 INJECTION, POWDER, LYOPHILIZED, FOR SOLUTION INTRAVENOUS EVERY 24 HOURS
Refills: 0 | Status: DISCONTINUED | OUTPATIENT
Start: 2019-05-26 | End: 2019-06-17

## 2019-05-26 RX ORDER — ONDANSETRON 8 MG/1
4 TABLET, FILM COATED ORAL EVERY 8 HOURS
Refills: 0 | Status: DISCONTINUED | OUTPATIENT
Start: 2019-05-26 | End: 2019-06-17

## 2019-05-26 RX ORDER — DEXTROSE 50 % IN WATER 50 %
12.5 SYRINGE (ML) INTRAVENOUS ONCE
Refills: 0 | Status: DISCONTINUED | OUTPATIENT
Start: 2019-05-26 | End: 2019-05-28

## 2019-05-26 RX ORDER — GLUCAGON INJECTION, SOLUTION 0.5 MG/.1ML
1 INJECTION, SOLUTION SUBCUTANEOUS ONCE
Refills: 0 | Status: DISCONTINUED | OUTPATIENT
Start: 2019-05-26 | End: 2019-05-28

## 2019-05-26 RX ORDER — SODIUM CHLORIDE 9 MG/ML
1000 INJECTION, SOLUTION INTRAVENOUS
Refills: 0 | Status: DISCONTINUED | OUTPATIENT
Start: 2019-05-26 | End: 2019-05-28

## 2019-05-26 RX ORDER — ACETAMINOPHEN 500 MG
975 TABLET ORAL ONCE
Refills: 0 | Status: COMPLETED | OUTPATIENT
Start: 2019-05-26 | End: 2019-05-26

## 2019-05-26 RX ORDER — ALBUTEROL 90 UG/1
2 AEROSOL, METERED ORAL EVERY 6 HOURS
Refills: 0 | Status: DISCONTINUED | OUTPATIENT
Start: 2019-05-26 | End: 2019-06-17

## 2019-05-26 RX ORDER — DOCUSATE SODIUM 100 MG
100 CAPSULE ORAL THREE TIMES A DAY
Refills: 0 | Status: DISCONTINUED | OUTPATIENT
Start: 2019-05-26 | End: 2019-06-17

## 2019-05-26 RX ADMIN — SODIUM CHLORIDE 500 MILLILITER(S): 9 INJECTION INTRAMUSCULAR; INTRAVENOUS; SUBCUTANEOUS at 15:26

## 2019-05-26 RX ADMIN — DAPTOMYCIN 134 MILLIGRAM(S): 500 INJECTION, POWDER, LYOPHILIZED, FOR SOLUTION INTRAVENOUS at 15:17

## 2019-05-26 RX ADMIN — Medication 3 MILLIGRAM(S): at 21:49

## 2019-05-26 RX ADMIN — OXYCODONE HYDROCHLORIDE 5 MILLIGRAM(S): 5 TABLET ORAL at 22:30

## 2019-05-26 RX ADMIN — Medication 100 MILLIGRAM(S): at 21:49

## 2019-05-26 RX ADMIN — ATORVASTATIN CALCIUM 40 MILLIGRAM(S): 80 TABLET, FILM COATED ORAL at 21:48

## 2019-05-26 RX ADMIN — CEFTAROLINE FOSAMIL 50 MILLIGRAM(S): 600 POWDER, FOR SOLUTION INTRAVENOUS at 16:26

## 2019-05-26 RX ADMIN — Medication 975 MILLIGRAM(S): at 15:25

## 2019-05-26 RX ADMIN — CEFTAROLINE FOSAMIL 50 MILLIGRAM(S): 600 POWDER, FOR SOLUTION INTRAVENOUS at 23:13

## 2019-05-26 RX ADMIN — APIXABAN 5 MILLIGRAM(S): 2.5 TABLET, FILM COATED ORAL at 21:48

## 2019-05-26 RX ADMIN — SENNA PLUS 2 TABLET(S): 8.6 TABLET ORAL at 21:49

## 2019-05-26 RX ADMIN — OXYCODONE HYDROCHLORIDE 5 MILLIGRAM(S): 5 TABLET ORAL at 21:48

## 2019-05-26 RX ADMIN — TAMSULOSIN HYDROCHLORIDE 0.4 MILLIGRAM(S): 0.4 CAPSULE ORAL at 21:48

## 2019-05-26 NOTE — H&P ADULT - NSHPLABSRESULTS_GEN_ALL_CORE
11.3   12.2  )-----------( 268      ( 26 May 2019 14:08 )             35.4               05-26    137  |  100  |  27<H>  ----------------------------<  86  4.1   |  22  |  1.27    Ca    9.1      26 May 2019 14:08    TPro  7.2  /  Alb  3.3  /  TBili  0.4  /  DBili  x   /  AST  25  /  ALT  15  /  AlkPhos  148<H>  05-26    PT/INR - ( 26 May 2019 14:08 )   PT: 20.8 sec;   INR: 1.78 ratio         PTT - ( 26 May 2019 14:08 )  PTT:35.2 sec

## 2019-05-26 NOTE — ED PROVIDER NOTE - ATTENDING CONTRIBUTION TO CARE
Nemes - 87yo M with PMH Afib on Eliquis, poss VRE endocarditis s/p PPM removal, AS s/p biomechanical TAVR, seizure not on meds, TIA, CHF on Lasix, COPD, CAD/stent p/w positive bcx 2/3 bottles for VRE drawn 5/24. Pt has been asymptomatic. Recent hospitalization for VRE infected PM, removed, finished IV abx through PICC line 2 wks ago. Mild hypotension, otherwise VS wnl, well appearing, lungs/cardiac wnl, abdomen soft/NT, no CVAT. Will admit for IV abx and ID consult

## 2019-05-26 NOTE — H&P ADULT - NSHPPHYSICALEXAM_GEN_ALL_CORE
Vital Signs Last 24 Hrs  T(C): 37.1 (26 May 2019 17:49), Max: 37.1 (26 May 2019 17:49)  T(F): 98.7 (26 May 2019 17:49), Max: 98.7 (26 May 2019 17:49)  HR: 80 (26 May 2019 17:49) (80 - 87)  BP: 117/74 (26 May 2019 17:49) (92/54 - 117/74)  BP(mean): --  RR: 18 (26 May 2019 17:49) (18 - 20)  SpO2: 98% (26 May 2019 17:49) (98% - 100%)    Appearance: Normal	  HEENT:   Normal oral mucosa, PERRL, EOMI	  Lymphatic: No lymphadenopathy , no edema  Cardiovascular: Normal S1 S2, No JVD, No murmurs , Peripheral pulses palpable 2+ bilaterally  Respiratory: Lungs clear to auscultation, normal effort 	  Gastrointestinal:  Soft, Non-tender, + BS	  Skin: No rashes, No ecchymoses, No cyanosis, warm to touch  Musculoskeletal: back pain  Psychiatry:  Mood & affect appropriate  Ext: No edema

## 2019-05-26 NOTE — H&P ADULT - PROBLEM SELECTOR PLAN 1
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

## 2019-05-26 NOTE — H&P ADULT - NSICDXPASTMEDICALHX_GEN_ALL_CORE_FT
PAST MEDICAL HISTORY:  Aortic stenosis     Asthma     Atrial fibrillation     BPH (benign prostatic hyperplasia)     CAD (coronary artery disease) s/p stent 2010    CHF (congestive heart failure)     Chronic diastolic HF (heart failure)     CKD (chronic kidney disease) stage 3, GFR 30-59 ml/min     COPD (chronic obstructive pulmonary disease)     CVA (cerebral vascular accident)     Gait instability     H/O thalassemia     Hepatitis B     Hypertension     DIEGO (obstructive sleep apnea)     Pacemaker 2015 Medtronic [id number]  A2DR01    PTSD (post-traumatic stress disorder)     Seizure     Tachy-idris syndrome     TIA (transient ischemic attack) 2010

## 2019-05-26 NOTE — ED PROVIDER NOTE - PROGRESS NOTE DETAILS
Id recommends dapto 850 mg q 24 hours, admitted to Dr Coronado who would like patient also to be given teflaro. -SM

## 2019-05-26 NOTE — ED ADULT NURSE NOTE - PMH
Aortic stenosis    Asthma    Atrial fibrillation    BPH (benign prostatic hyperplasia)    CAD (coronary artery disease)  s/p stent 2010  CHF (congestive heart failure)    Chronic diastolic HF (heart failure)    CKD (chronic kidney disease) stage 3, GFR 30-59 ml/min    COPD (chronic obstructive pulmonary disease)    CVA (cerebral vascular accident)    Gait instability    H/O thalassemia    Hepatitis B    Hypertension    DIEGO (obstructive sleep apnea)    Pacemaker  2015 Medtronic [id number]  A2DR01  PTSD (post-traumatic stress disorder)    Seizure    Tachy-idris syndrome    TIA (transient ischemic attack)  2010

## 2019-05-26 NOTE — CONSULT NOTE ADULT - SUBJECTIVE AND OBJECTIVE BOX
Patient is a 88y old  Male who presents with brought to ED by family after + blood culture for VRE    HPI: 87 yr old with CKD, COPD, BPH, PPM, Y AVR due to AS, CHF. He completed 6 week course of Daptomycin.Ceftaroline about 2 weeks ago. Follow up blood culture done on 5/24/19 now growing VRE.    Mr Jay notes severe mid back pain - upper lumbar region as well as pain in groin, shoulder. His pain is long standing. There has been no increase in pain since discontinuing antibiotics. He denies any recent fever, chills or sweats. He has no dysuria.    pt has a UTI several months ago and in the fall and had questionable thrombus or veg on aortic valve   bc were negative at the time     Hospitalized 3/19/19 lightheadedness and found to have multiple positive blood cultures for vrec from 3/20 --> 3/30  3/30/19  VR Enterococcus faecium isolate had DAPTO NICK = 3 (susceptible)    Antibiotics  Vanco 3/20 -->21  Daptomycin 3/21 -->  about 5/11  Ceftaroline 3/29  -->  about 5/11     At the previous hospitalization  pacemaker was removed  and cultured out VRE  Blood cultures growing VRE faecium- Vanco resistant but sensitive to daptomycin  repeat blood cultures sent this AM   MARIA LUISA - was not helpful and does not demonstrate obv vegetation    MRI of lumbar sacral spine- his pain is lower left back near his sacroiliac area non diagnostic   ultrasound of testes was negative   mri of back was negative  ultrasound of  groin was  negative   wbc scan was negative  MARIA LUISA was repeated and is without change       PAST MEDICAL & SURGICAL HISTORY:  CKD (chronic kidney disease) stage 3, GFR 30-59 ml/min  Hepatitis B  PTSD (post-traumatic stress disorder)  Tachy-idris syndrome  TIA (transient ischemic attack): 2010  Seizure  DIEGO (obstructive sleep apnea)  Chronic diastolic HF (heart failure)  Aortic stenosis  CVA (cerebral vascular accident)  Gait instability  COPD (chronic obstructive pulmonary disease)  BPH (benign prostatic hyperplasia)  Asthma  CHF (congestive heart failure)  Atrial fibrillation  Pacemaker: 2015 Medtronic [id number]  A2DR01  Hypertension  H/O thalassemia  CAD (coronary artery disease): s/p stent 2010  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 No nausea, vomiting, or hematemesis; No diarrhea. +constipation. No melena or hematochezia.  GENITOURINARY: No dysuria, frequency or hematuria  NEUROLOGICAL: No numbness or weakness  SKIN: No itching, burning, rashes, or lesions   All other review of systems is negative unless indicated above    Allergies  Keppra (Rash)  penicillin (Unknown)    Antimicrobials:  ceftaroline fosamil IVPB      DAPTOmycin IVPB 850 milliGRAM(s) IV Intermittent every 24 hours    Vital Signs Last 24 Hrs  T(C): 36.7 (26 May 2019 16:29), Max: 36.7 (26 May 2019 16:29)  T(F): 98.1 (26 May 2019 16:29), Max: 98.1 (26 May 2019 16:29)  HR: 80 (26 May 2019 16:29) (80 - 87)  BP: 116/68 (26 May 2019 16:29) (92/54 - 116/68)  BP(mean): --  RR: 20 (26 May 2019 16:29) (18 - 20)  SpO2: 98% (26 May 2019 16:29) (98% - 100%)    PHYSICAL EXAM:  General: WN/WD NAD, Non-toxic, upset about condition  Neurology: A&Ox3, nonfocal  Respiratory: Clear to auscultation bilaterally  CV: RRR, S1S2, no murmurs, rubs or gallops  Abdominal: Soft, Non-tender, non-distended  Extremities: No edema,   Line Sites: Clear  Skin: No rash                        11.3   12.2  )-----------( 268      ( 26 May 2019 14:08 )             35.4        05-26    137  |  100  |  27<H>  ----------------------------<  86  4.1   |  22  |  1.27    Ca    9.1      26 May 2019 14:08    TPro  7.2  /  Alb  3.3  /  TBili  0.4  /  DBili  x   /  AST  25  /  ALT  15  /  AlkPhos  148<H>  05-26        Diego Hope MD; Division of Infectious Disease; Pager: [phone]; nights and weekends: [phone]

## 2019-05-26 NOTE — ED PROVIDER NOTE - OBJECTIVE STATEMENT
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 Was admitted recently and dx with possible endocarditis, ppm removed, received dapto/ceftaroline through PICC, PICC was dc and patient has been off abx for 2 weeks, plan was to observe off abx for 3 weeks and repeat labs. Patient had labs/bcx done yesterday with primary care, bcx were positive, patient otherwise does not have new symptoms or feel ill.     PMD: Sangeetha/Ivonne  ID: Roman  ROS: Denies fever, palpitations, chills, recent sickness, HA, vision changes, cough, SOB, chest pain, abdominal pain, n/v/d/c, dysuria, hematuria, rash, new joint aches, sick contacts, and recent travel.

## 2019-05-26 NOTE — ED PROVIDER NOTE - CLINICAL SUMMARY MEDICAL DECISION MAKING FREE TEXT BOX
Hypernatremia Recurrent bacteremia with hx of possible endocarditis, will need septic workup and likely admission. Will page ID to ask about abx selection. Chest pain Abdominal pain

## 2019-05-26 NOTE — PATIENT PROFILE ADULT - ABILITY TO HEAR (WITH HEARING AID OR HEARING APPLIANCE IF NORMALLY USED):
Mildly to Moderately Impaired: difficulty hearing in some environments or speaker may need to increase volume or speak distinctly/Has hearing aides at home

## 2019-05-26 NOTE — CONSULT NOTE ADULT - ASSESSMENT
87 yr old with CKD, COPD, BPH, PPM, Y AVR due to AS, CHF. He completed 6 week course of Daptomycin.Ceftaroline about 5/11/19 and is now being admitted for recurrent VRE bacteremia.  Challenging situation with few good therapeutic options. Will consider increase in Daptomycin dose and careful addition of Gentamicin.    Suggest  Resume antibiotics  Dapto 10 mg/kg  Ceftaroline 600 every 8 hours  monitor blood cultures every 48 hours   repeat MARIA LUISA

## 2019-05-26 NOTE — ED CLERICAL - NS ED CLERK NOTE PRE-ARRIVAL INFORMATION; ADDITIONAL PRE-ARRIVAL INFORMATION
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 No  Would you still like to speak to an ED attending? No

## 2019-05-26 NOTE — H&P ADULT - HISTORY OF PRESENT ILLNESS
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 Was admitted recently and dx with possible endocarditis, ppm removed, received dapto/ceftaroline through PICC, PICC was dc and patient has been off abx for 2 weeks, plan was to observe off abx for 3 weeks and repeat labs. Patient had labs/bcx done yesterday with primary care, bcx were positive, patient otherwise does not have new symptoms or feel ill.

## 2019-05-26 NOTE — H&P ADULT - ASSESSMENT
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 Was admitted recently and dx with possible endocarditis, ppm removed, received dapto/ceftaroline through PICC, PICC was dc and patient has been off abx for 2 weeks, plan was to observe off abx for 3 weeks and repeat labs. Patient had labs/bcx done yesterday with primary care, bcx were positive, patient otherwise does not have new symptoms or feel ill.

## 2019-05-26 NOTE — ED ADULT NURSE NOTE - OBJECTIVE STATEMENT
88 year old male a/ox3 from private home presenting to ed with positive blood cultures. patient with pmh of a-fib, endocarditis, s/p PPM removal, TAVR, seizure,  TIA, CHF, COPD, CAD with stent presenting with positive bcx 2/3 bottles for VRE drawn 5/24. patient was admitted recently and dx with possible endocarditis, ppm removed, received dapto/ceftaroline through PICC, PICC was dc and patient has been off abx for 2 weeks, plan was to observe off abx for 3 weeks and repeat labs. Patient had labs/bcx done yesterday with primary care, bcx were positive, patient otherwise does not have new symptoms or feel ill.   patient Denies fever, palpitations, chills, recent sickness, HA, vision changes, cough, SOB, chest pain, abdominal pain, n/v/d/c, dysuria, hematuria, rash, new joint aches, sick contacts, and recent travel.

## 2019-05-26 NOTE — CONSULT NOTE ADULT - SUBJECTIVE AND OBJECTIVE BOX
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 Was admitted recently and dx with possible endocarditis, ppm removed, received dapto/ceftaroline through PICC, PICC was dc and patient has been off abx for 2 weeks, plan was to observe off abx for 3 weeks and repeat labs. Patient had labs/bcx done yesterday with primary care, bcx were positive, patient otherwise does not have new symptoms or feel ill. (26 May 2019 18:54)      PAST MEDICAL & SURGICAL HISTORY:  CKD (chronic kidney disease) stage 3, GFR 30-59 ml/min  Hepatitis B  PTSD (post-traumatic stress disorder)  Tachy-idris syndrome  TIA (transient ischemic attack): 2010  Seizure  DIEGO (obstructive sleep apnea)  Chronic diastolic HF (heart failure)  Aortic stenosis  CVA (cerebral vascular accident)  Gait instability  COPD (chronic obstructive pulmonary disease)  BPH (benign prostatic hyperplasia)  Asthma  CHF (congestive heart failure)  Atrial fibrillation  Pacemaker: 2015 Medtronic [id number]  A2DR01  Hypertension  H/O thalassemia  CAD (coronary artery disease): s/p stent 2010  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  Artificial pacemaker  S/P primary angioplasty with coronary stent: 2012 AND 2017          MEDICATIONS:  amiodarone    Tablet 200 milliGRAM(s) Oral daily  apixaban 5 milliGRAM(s) Oral every 12 hours  clopidogrel Tablet 75 milliGRAM(s) Oral daily  metoprolol tartrate 50 milliGRAM(s) Oral every 12 hours  spironolactone 25 milliGRAM(s) Oral daily  tamsulosin 0.4 milliGRAM(s) Oral at bedtime    ceftaroline fosamil IVPB 600 milliGRAM(s) IV Intermittent every 8 hours  DAPTOmycin IVPB 850 milliGRAM(s) IV Intermittent every 24 hours    ALBUTerol    90 MICROgram(s) HFA Inhaler 2 Puff(s) Inhalation every 6 hours PRN  buDESOnide  80 MICROgram(s)/formoterol 4.5 MICROgram(s) Inhaler 2 Puff(s) Inhalation two times a day    melatonin 3 milliGRAM(s) Oral at bedtime  ondansetron    Tablet 4 milliGRAM(s) Oral every 8 hours PRN  oxyCODONE    IR 5 milliGRAM(s) Oral every 8 hours PRN    docusate sodium 100 milliGRAM(s) Oral three times a day  pantoprazole    Tablet 40 milliGRAM(s) Oral before breakfast  senna 2 Tablet(s) Oral at bedtime    atorvastatin 40 milliGRAM(s) Oral at bedtime  dextrose 40% Gel 15 Gram(s) Oral once PRN  dextrose 50% Injectable 12.5 Gram(s) IV Push once  dextrose 50% Injectable 25 Gram(s) IV Push once  dextrose 50% Injectable 25 Gram(s) IV Push once  finasteride 5 milliGRAM(s) Oral daily  glucagon  Injectable 1 milliGRAM(s) IntraMuscular once PRN  insulin lispro (HumaLOG) corrective regimen sliding scale   SubCutaneous three times a day before meals    dextrose 5%. 1000 milliLiter(s) IV Continuous <Continuous>  lidocaine   Patch 1 Patch Transdermal daily      FAMILY HISTORY:  No pertinent family history in first degree relatives      Non-contributory    SOCIAL HISTORY:    former smoker    Allergies    Keppra (Rash)  penicillin (Unknown)    Intolerances    	    REVIEW OF SYSTEMS:  CONSTITUTIONAL: No fever  EYES: No eye pain, visual disturbances, or discharge  ENMT:  No difficulty hearing, tinnitus  NECK: No pain or stiffness  RESPIRATORY: No cough, wheezing,  CARDIOVASCULAR: No chest pain, palpitations, passing out, dizziness, or leg swelling  GASTROINTESTINAL:  No nausea, vomiting, diarrhea or constipation. No melena.  GENITOURINARY: No dysuria, hematuria  NEUROLOGICAL: No stroke like symptoms  SKIN: No burning or lesions   ENDOCRINE: No heat or cold intolerance  MUSCULOSKELETAL: diffuse joint pains and back pain  PSYCHIATRIC: No  anxiety, mood swings  HEME/LYMPH: No bleeding gums  ALLERY AND IMMUNOLOGIC: No hives or eczema	    All other ROS negative    PHYSICAL EXAM:  T(C): 36.4 (05-26-19 @ 21:37), Max: 37.1 (05-26-19 @ 17:49)  HR: 78 (05-26-19 @ 21:37) (78 - 87)  BP: 108/70 (05-26-19 @ 21:37) (92/54 - 117/74)  RR: 18 (05-26-19 @ 21:37) (18 - 20)  SpO2: 97% (05-26-19 @ 21:37) (97% - 100%)  Wt(kg): --  I&O's Summary      Appearance: Normal	  HEENT:   Normal oral mucosa, EOMI	  Cardiovascular: Normal S1 S2, No JVD, No murmurs  Respiratory: Lungs clear to auscultation	  Psychiatry: Alert  Gastrointestinal:  Soft, Non-tender, + BS	  Skin: No rashes   Neurologic: Non-focal  Extremities:  No edema  Vascular: Peripheral pulses palpable    	    	  	  CARDIAC MARKERS:                                  11.3   12.2  )-----------( 268      ( 26 May 2019 14:08 )             35.4     05-26    137  |  100  |  27<H>  ----------------------------<  86  4.1   |  22  |  1.27    Ca    9.1      26 May 2019 14:08    TPro  7.2  /  Alb  3.3  /  TBili  0.4  /  DBili  x   /  AST  25  /  ALT  15  /  AlkPhos  148<H>  05-26      Assessment and Plan:   Assessment:  · Assessment		  Patient is a 89 y/o male with PMH SSS/a fib on eliquis, 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 Was admitted recently and dx with possible endocarditis, ppm removed, received dapto/ceftaroline through PICC, PICC was dc and patient has been off abx for 2 weeks, plan was to observe off abx for 3 weeks and repeat labs. Patient had labs/bcx done yesterday with primary care, bcx were positive, patient otherwise does not have new symptoms or feel ill.     Problem/Plan - 1:  ·  Problem: Bacteremia.  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     Problem/Plan - 2:  ·  Problem: CKD (chronic kidney disease) stage 3, GFR 30-59 ml/min.  Plan: monitor BUN/Cr  At baseline    Problem/Plan - 3:  ·  Problem: Chronic diastolic HF (heart failure).  Plan: continue with aldactone 25mg, euvolemic at this time    Problem/Plan - 4:  ·  Problem: Aortic stenosis.  Plan: S/P TAVR   - ?bioprosthetic valve endocarditis  - high risk candidate for open valve replacement    Problem/Plan - 5:  Problem: Atrial fibrillation. Plan: On eliquisRate controlled on Metoprolol and Amio 100mg daily    Problem/Plan - 6:  ·  Problem: CAD (coronary artery disease).  Plan: On Plavix given TAVR, c/w statin.     Problem/Plan - 7:  ·  Problem: Prophylactic measure.  Plan: DVT and GI PPX.         Timothy Paredes DO [facility]  [facility]  [phone]

## 2019-05-26 NOTE — H&P ADULT - NSHPREVIEWOFSYSTEMS_GEN_ALL_CORE
REVIEW OF SYSTEMS:    CONSTITUTIONAL: No weakness, fevers or chills  EYES/ENT: No visual changes;  No vertigo or throat pain   NECK: No pain or stiffness  RESPIRATORY: No cough, wheezing, hemoptysis; No shortness of breath  CARDIOVASCULAR: No chest pain or palpitations  GASTROINTESTINAL: No abdominal or epigastric pain. No nausea, vomiting, or hematemesis; No diarrhea or constipation. No melena or hematochezia.  GENITOURINARY: No dysuria, frequency or hematuria  NEUROLOGICAL: back pain   SKIN: No itching, burning, rashes, or lesions   All other review of systems is negative unless indicated above.

## 2019-05-27 LAB
ALBUMIN SERPL ELPH-MCNC: 2.8 G/DL — LOW (ref 3.3–5)
ALP SERPL-CCNC: 126 U/L — HIGH (ref 40–120)
ALT FLD-CCNC: 10 U/L — SIGNIFICANT CHANGE UP (ref 10–45)
ANION GAP SERPL CALC-SCNC: 14 MMOL/L — SIGNIFICANT CHANGE UP (ref 5–17)
AST SERPL-CCNC: 13 U/L — SIGNIFICANT CHANGE UP (ref 10–40)
BILIRUB SERPL-MCNC: 0.6 MG/DL — SIGNIFICANT CHANGE UP (ref 0.2–1.2)
BUN SERPL-MCNC: 21 MG/DL — SIGNIFICANT CHANGE UP (ref 7–23)
CALCIUM SERPL-MCNC: 9 MG/DL — SIGNIFICANT CHANGE UP (ref 8.4–10.5)
CHLORIDE SERPL-SCNC: 102 MMOL/L — SIGNIFICANT CHANGE UP (ref 96–108)
CHOLEST SERPL-MCNC: 86 MG/DL — SIGNIFICANT CHANGE UP (ref 10–199)
CK SERPL-CCNC: 35 U/L — SIGNIFICANT CHANGE UP (ref 30–200)
CO2 SERPL-SCNC: 22 MMOL/L — SIGNIFICANT CHANGE UP (ref 22–31)
CREAT SERPL-MCNC: 1.02 MG/DL — SIGNIFICANT CHANGE UP (ref 0.5–1.3)
GLUCOSE SERPL-MCNC: 94 MG/DL — SIGNIFICANT CHANGE UP (ref 70–99)
GRAM STN FLD: SIGNIFICANT CHANGE UP
HBA1C BLD-MCNC: 5.3 % — SIGNIFICANT CHANGE UP (ref 4–5.6)
HCT VFR BLD CALC: 33 % — LOW (ref 39–50)
HDLC SERPL-MCNC: 26 MG/DL — LOW
HGB BLD-MCNC: 10.7 G/DL — LOW (ref 13–17)
LIPID PNL WITH DIRECT LDL SERPL: 41 MG/DL — SIGNIFICANT CHANGE UP
MCHC RBC-ENTMCNC: 27.6 PG — SIGNIFICANT CHANGE UP (ref 27–34)
MCHC RBC-ENTMCNC: 32.4 GM/DL — SIGNIFICANT CHANGE UP (ref 32–36)
MCV RBC AUTO: 85.1 FL — SIGNIFICANT CHANGE UP (ref 80–100)
PLATELET # BLD AUTO: 253 K/UL — SIGNIFICANT CHANGE UP (ref 150–400)
POTASSIUM SERPL-MCNC: 3.2 MMOL/L — LOW (ref 3.5–5.3)
POTASSIUM SERPL-SCNC: 3.2 MMOL/L — LOW (ref 3.5–5.3)
PROT SERPL-MCNC: 6.5 G/DL — SIGNIFICANT CHANGE UP (ref 6–8.3)
RBC # BLD: 3.88 M/UL — LOW (ref 4.2–5.8)
RBC # FLD: 15.4 % — HIGH (ref 10.3–14.5)
SODIUM SERPL-SCNC: 138 MMOL/L — SIGNIFICANT CHANGE UP (ref 135–145)
SPECIMEN SOURCE: SIGNIFICANT CHANGE UP
SPECIMEN SOURCE: SIGNIFICANT CHANGE UP
TOTAL CHOLESTEROL/HDL RATIO MEASUREMENT: 3.3 RATIO — LOW (ref 3.4–9.6)
TRIGL SERPL-MCNC: 95 MG/DL — SIGNIFICANT CHANGE UP (ref 10–149)
TSH SERPL-MCNC: 2 UIU/ML — SIGNIFICANT CHANGE UP (ref 0.27–4.2)
WBC # BLD: 10.27 K/UL — SIGNIFICANT CHANGE UP (ref 3.8–10.5)
WBC # FLD AUTO: 10.27 K/UL — SIGNIFICANT CHANGE UP (ref 3.8–10.5)

## 2019-05-27 PROCEDURE — 99232 SBSQ HOSP IP/OBS MODERATE 35: CPT

## 2019-05-27 RX ORDER — POTASSIUM CHLORIDE 20 MEQ
40 PACKET (EA) ORAL ONCE
Refills: 0 | Status: COMPLETED | OUTPATIENT
Start: 2019-05-27 | End: 2019-05-27

## 2019-05-27 RX ORDER — ACETAMINOPHEN 500 MG
650 TABLET ORAL EVERY 6 HOURS
Refills: 0 | Status: DISCONTINUED | OUTPATIENT
Start: 2019-05-27 | End: 2019-06-04

## 2019-05-27 RX ADMIN — Medication 100 MILLIGRAM(S): at 21:54

## 2019-05-27 RX ADMIN — Medication 3 MILLIGRAM(S): at 21:54

## 2019-05-27 RX ADMIN — Medication 100 MILLIGRAM(S): at 11:29

## 2019-05-27 RX ADMIN — CLOPIDOGREL BISULFATE 75 MILLIGRAM(S): 75 TABLET, FILM COATED ORAL at 11:29

## 2019-05-27 RX ADMIN — Medication 650 MILLIGRAM(S): at 23:14

## 2019-05-27 RX ADMIN — ATORVASTATIN CALCIUM 40 MILLIGRAM(S): 80 TABLET, FILM COATED ORAL at 21:54

## 2019-05-27 RX ADMIN — APIXABAN 5 MILLIGRAM(S): 2.5 TABLET, FILM COATED ORAL at 05:55

## 2019-05-27 RX ADMIN — Medication 100 MILLIGRAM(S): at 05:55

## 2019-05-27 RX ADMIN — Medication 50 MILLIGRAM(S): at 05:55

## 2019-05-27 RX ADMIN — PANTOPRAZOLE SODIUM 40 MILLIGRAM(S): 20 TABLET, DELAYED RELEASE ORAL at 05:54

## 2019-05-27 RX ADMIN — LIDOCAINE 1 PATCH: 4 CREAM TOPICAL at 11:28

## 2019-05-27 RX ADMIN — BUDESONIDE AND FORMOTEROL FUMARATE DIHYDRATE 2 PUFF(S): 160; 4.5 AEROSOL RESPIRATORY (INHALATION) at 05:51

## 2019-05-27 RX ADMIN — BUDESONIDE AND FORMOTEROL FUMARATE DIHYDRATE 2 PUFF(S): 160; 4.5 AEROSOL RESPIRATORY (INHALATION) at 17:22

## 2019-05-27 RX ADMIN — FINASTERIDE 5 MILLIGRAM(S): 5 TABLET, FILM COATED ORAL at 11:29

## 2019-05-27 RX ADMIN — Medication 40 MILLIEQUIVALENT(S): at 18:21

## 2019-05-27 RX ADMIN — CEFTAROLINE FOSAMIL 50 MILLIGRAM(S): 600 POWDER, FOR SOLUTION INTRAVENOUS at 21:54

## 2019-05-27 RX ADMIN — DAPTOMYCIN 134 MILLIGRAM(S): 500 INJECTION, POWDER, LYOPHILIZED, FOR SOLUTION INTRAVENOUS at 13:41

## 2019-05-27 RX ADMIN — AMIODARONE HYDROCHLORIDE 200 MILLIGRAM(S): 400 TABLET ORAL at 05:54

## 2019-05-27 RX ADMIN — SPIRONOLACTONE 25 MILLIGRAM(S): 25 TABLET, FILM COATED ORAL at 05:54

## 2019-05-27 RX ADMIN — CEFTAROLINE FOSAMIL 50 MILLIGRAM(S): 600 POWDER, FOR SOLUTION INTRAVENOUS at 05:50

## 2019-05-27 RX ADMIN — Medication 650 MILLIGRAM(S): at 22:14

## 2019-05-27 RX ADMIN — Medication 50 MILLIGRAM(S): at 17:22

## 2019-05-27 RX ADMIN — SENNA PLUS 2 TABLET(S): 8.6 TABLET ORAL at 21:54

## 2019-05-27 RX ADMIN — APIXABAN 5 MILLIGRAM(S): 2.5 TABLET, FILM COATED ORAL at 17:22

## 2019-05-27 RX ADMIN — TAMSULOSIN HYDROCHLORIDE 0.4 MILLIGRAM(S): 0.4 CAPSULE ORAL at 21:54

## 2019-05-27 RX ADMIN — CEFTAROLINE FOSAMIL 50 MILLIGRAM(S): 600 POWDER, FOR SOLUTION INTRAVENOUS at 14:28

## 2019-05-27 NOTE — PROGRESS NOTE ADULT - SUBJECTIVE AND OBJECTIVE BOX
Follow Up:  recurrent VRE bacteremia    Interval History/ROS: "no better, no worse" continued diffuse pains    Allergies  Keppra (Rash)  penicillin (Unknown)    ANTIMICROBIALS:  ceftaroline fosamil IVPB 600 every 8 hours  DAPTOmycin IVPB 850 every 24 hours      OTHER MEDS:  MEDICATIONS  (STANDING):  ALBUTerol    90 MICROgram(s) HFA Inhaler 2 every 6 hours PRN  amiodarone    Tablet 200 daily  apixaban 5 every 12 hours  atorvastatin 40 at bedtime  buDESOnide  80 MICROgram(s)/formoterol 4.5 MICROgram(s) Inhaler 2 two times a day  clopidogrel Tablet 75 daily  dextrose 40% Gel 15 once PRN  dextrose 50% Injectable 12.5 once  dextrose 50% Injectable 25 once  dextrose 50% Injectable 25 once  docusate sodium 100 three times a day  finasteride 5 daily  glucagon  Injectable 1 once PRN  insulin lispro (HumaLOG) corrective regimen sliding scale  three times a day before meals  melatonin 3 at bedtime  metoprolol tartrate 50 every 12 hours  ondansetron    Tablet 4 every 8 hours PRN  oxyCODONE    IR 5 every 8 hours PRN  pantoprazole    Tablet 40 before breakfast  senna 2 at bedtime  spironolactone 25 daily  tamsulosin 0.4 at bedtime    Vital Signs Last 24 Hrs  T(C): 36.2 (27 May 2019 14:32), Max: 37.1 (26 May 2019 17:49)  T(F): 97.1 (27 May 2019 14:32), Max: 98.7 (26 May 2019 17:49)  HR: 72 (27 May 2019 14:32) (72 - 84)  BP: 111/61 (27 May 2019 14:32) (108/70 - 117/74)  BP(mean): --  RR: 18 (27 May 2019 14:32) (18 - 20)  SpO2: 96% (27 May 2019 14:32) (95% - 98%)    PHYSICAL EXAM:  General: WN/WD NAD, Non-toxic  Neurology: A&Ox3, nonfocal  Respiratory: Clear to auscultation bilaterally  CV: RRR, S1S2, no murmurs, rubs or gallops  Abdominal: Soft, Non-tender, non-distended, normal bowel sounds  Extremities: No edema,  Line Sites: Clear  Skin: No rash                        10.7   10.27 )-----------( 253      ( 27 May 2019 08:49 )             33.0     05-27    138  |  102  |  21  ----------------------------<  94  3.2<L>   |  22  |  1.02    Ca    9.0      27 May 2019 07:13    TPro  6.5  /  Alb  2.8<L>  /  TBili  0.6  /  DBili  x   /  AST  13  /  ALT  10  /  AlkPhos  126<H>  05-27    Creatine Kinase, Serum (05.27.19 @ 10:27)    Creatine Kinase, Serum: 35 U/L    MICROBIOLOGY:    Out -pt Blood culture 5/24/19 Vanco Resist Enterococcus faecium Resist AMP, Vanco; Susceptible to Dapto (NICK =4), Gent Synergy, Ceftaroline    RADIOLOGY:      Diego Hope MD; Division of Infectious Disease; Pager: [phone]; nights and weekends: [phone]

## 2019-05-27 NOTE — PROGRESS NOTE ADULT - PROBLEM SELECTOR PLAN 1
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

## 2019-05-27 NOTE — PROGRESS NOTE ADULT - SUBJECTIVE AND OBJECTIVE BOX
Subjective: Patient seen and examined. No new events except as noted.   doing well     REVIEW OF SYSTEMS:    CONSTITUTIONAL: No weakness, fevers or chills  EYES/ENT: No visual changes;  No vertigo or throat pain   NECK: No pain or stiffness  RESPIRATORY: No cough, wheezing, hemoptysis; No shortness of breath  CARDIOVASCULAR: No chest pain or palpitations  GASTROINTESTINAL: No abdominal or epigastric pain. No nausea, vomiting, or hematemesis; No diarrhea or constipation. No melena or hematochezia.  GENITOURINARY: No dysuria, frequency or hematuria  NEUROLOGICAL: No numbness or weakness  SKIN: No itching, burning, rashes, or lesions   All other review of systems is negative unless indicated above.    MEDICATIONS:  MEDICATIONS  (STANDING):  amiodarone    Tablet 200 milliGRAM(s) Oral daily  apixaban 5 milliGRAM(s) Oral every 12 hours  atorvastatin 40 milliGRAM(s) Oral at bedtime  buDESOnide  80 MICROgram(s)/formoterol 4.5 MICROgram(s) Inhaler 2 Puff(s) Inhalation two times a day  ceftaroline fosamil IVPB 600 milliGRAM(s) IV Intermittent every 8 hours  clopidogrel Tablet 75 milliGRAM(s) Oral daily  DAPTOmycin IVPB 850 milliGRAM(s) IV Intermittent every 24 hours  dextrose 5%. 1000 milliLiter(s) (50 mL/Hr) IV Continuous <Continuous>  dextrose 50% Injectable 12.5 Gram(s) IV Push once  dextrose 50% Injectable 25 Gram(s) IV Push once  dextrose 50% Injectable 25 Gram(s) IV Push once  docusate sodium 100 milliGRAM(s) Oral three times a day  finasteride 5 milliGRAM(s) Oral daily  insulin lispro (HumaLOG) corrective regimen sliding scale   SubCutaneous three times a day before meals  lidocaine   Patch 1 Patch Transdermal daily  melatonin 3 milliGRAM(s) Oral at bedtime  metoprolol tartrate 50 milliGRAM(s) Oral every 12 hours  pantoprazole    Tablet 40 milliGRAM(s) Oral before breakfast  senna 2 Tablet(s) Oral at bedtime  spironolactone 25 milliGRAM(s) Oral daily  tamsulosin 0.4 milliGRAM(s) Oral at bedtime      PHYSICAL EXAM:  T(C): 36.2 (05-27-19 @ 14:32), Max: 37.1 (05-26-19 @ 17:49)  HR: 72 (05-27-19 @ 14:32) (72 - 84)  BP: 111/61 (05-27-19 @ 14:32) (108/70 - 117/74)  RR: 18 (05-27-19 @ 14:32) (18 - 18)  SpO2: 96% (05-27-19 @ 14:32) (95% - 98%)  Wt(kg): --  I&O's Summary    26 May 2019 07:01  -  27 May 2019 07:00  --------------------------------------------------------  IN: 100 mL / OUT: 300 mL / NET: -200 mL    27 May 2019 07:01  -  27 May 2019 17:20  --------------------------------------------------------  IN: 360 mL / OUT: 0 mL / NET: 360 mL      Height (cm): 170.18 (05-26 @ 21:37)  Weight (kg): 84.7 (05-26 @ 21:37)  BMI (kg/m2): 29.2 (05-26 @ 21:37)  BSA (m2): 1.96 (05-26 @ 21:37)    Appearance: Normal	  HEENT:   Normal oral mucosa, PERRL, EOMI	  Lymphatic: No lymphadenopathy , no edema  Cardiovascular: Normal S1 S2, No JVD, No murmurs , Peripheral pulses palpable 2+ bilaterally  Respiratory: Lungs clear to auscultation, normal effort 	  Gastrointestinal:  Soft, Non-tender, + BS	  Skin: No rashes, No ecchymoses, No cyanosis, warm to touch  Musculoskeletal: Normal range of motion, normal strength  Psychiatry:  Mood & affect appropriate  Ext: No edema      All labs, Imaging and EKGs personally reviewed                           10.7   10.27 )-----------( 253      ( 27 May 2019 08:49 )             33.0               05-27    138  |  102  |  21  ----------------------------<  94  3.2<L>   |  22  |  1.02    Ca    9.0      27 May 2019 07:13    TPro  6.5  /  Alb  2.8<L>  /  TBili  0.6  /  DBili  x   /  AST  13  /  ALT  10  /  AlkPhos  126<H>  05-27    PT/INR - ( 26 May 2019 14:08 )   PT: 20.8 sec;   INR: 1.78 ratio         PTT - ( 26 May 2019 14:08 )  PTT:35.2 sec       CARDIAC MARKERS ( 27 May 2019 10:27 )  x     / x     / 35 U/L / x     / x

## 2019-05-27 NOTE — PROGRESS NOTE ADULT - ASSESSMENT
87 yr old with CKD, COPD, BPH, PPM, Y AVR due to AS, CHF. 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    Suggest  Continue antibiotics  Dapto 10 mg/kg 5/26 -->  Ceftaroline 600 every 8 hours 5/26 -->  monitor blood cultures every 48 hours   repeat MARIA LUISA

## 2019-05-27 NOTE — PROVIDER CONTACT NOTE (CRITICAL VALUE NOTIFICATION) - SITUATION
Blood culture + for both bottles.  Growth in Aerobic bottle. Gram + cocci in pairs and chains.  Growth in Anaerobic bottle. Gram + cocci in pairs and chains.

## 2019-05-27 NOTE — PROGRESS NOTE ADULT - SUBJECTIVE AND OBJECTIVE BOX
Patient is a 88y old  Male who presents with a chief complaint of bacteremia (27 May 2019 15:04)    	  MEDICATIONS:  amiodarone    Tablet 200 milliGRAM(s) Oral daily  metoprolol tartrate 50 milliGRAM(s) Oral every 12 hours  spironolactone 25 milliGRAM(s) Oral daily  tamsulosin 0.4 milliGRAM(s) Oral at bedtime        PHYSICAL EXAM:  T(C): 36.2 (05-27-19 @ 14:32), Max: 37.1 (05-26-19 @ 17:49)  HR: 72 (05-27-19 @ 14:32) (72 - 84)  BP: 111/61 (05-27-19 @ 14:32) (108/70 - 117/74)  RR: 18 (05-27-19 @ 14:32) (18 - 18)  SpO2: 96% (05-27-19 @ 14:32) (95% - 98%)  Wt(kg): --  I&O's Summary    26 May 2019 07:01  -  27 May 2019 07:00  --------------------------------------------------------  IN: 100 mL / OUT: 300 mL / NET: -200 mL    27 May 2019 07:01  -  27 May 2019 17:13  --------------------------------------------------------  IN: 360 mL / OUT: 0 mL / NET: 360 mL      Height (cm): 170.18 (05-26 @ 21:37)  Weight (kg): 84.7 (05-26 @ 21:37)  BMI (kg/m2): 29.2 (05-26 @ 21:37)  BSA (m2): 1.96 (05-26 @ 21:37)    Appearance: In no distress	  HEENT:    PERRL, EOMI	  Cardiovascular:  S1 S2, No JVD  Respiratory: Lungs clear to auscultation	  Gastrointestinal:  Soft, Non-tender, + BS	  Extremities:  No edema of LE                                10.7   10.27 )-----------( 253      ( 27 May 2019 08:49 )             33.0     05-27    138  |  102  |  21  ----------------------------<  94  3.2<L>   |  22  |  1.02    Ca    9.0      27 May 2019 07:13    TPro  6.5  /  Alb  2.8<L>  /  TBili  0.6  /  DBili  x   /  AST  13  /  ALT  10  /  AlkPhos  126<H>  05-27        Labs personally reviewed    Assessment and Plan:   Assessment:  · Assessment		  Patient is a 87 y/o male with PMH SSS/a fib on eliquis, 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 Was admitted recently and dx with possible endocarditis, ppm removed, received dapto/ceftaroline through PICC, PICC was dc and patient has been off abx for 2 weeks, plan was to observe off abx for 3 weeks and repeat labs. Patient had labs/bcx done yesterday with primary care, bcx were positive, patient otherwise does not have new symptoms or feel ill.     Problem/Plan - 1:  ·  Problem: Bacteremia.  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     Problem/Plan - 2:  ·  Problem: CKD (chronic kidney disease) stage 3, GFR 30-59 ml/min.  Plan: monitor BUN/Cr  At baseline    Problem/Plan - 3:  ·  Problem: Chronic diastolic HF (heart failure).  Plan: continue with aldactone 25mg, euvolemic at this time    Problem/Plan - 4:  ·  Problem: Aortic stenosis.  Plan: S/P TAVR   - ?bioprosthetic valve endocarditis  - high risk candidate for open valve replacement    Problem/Plan - 5:  Problem: Atrial fibrillation. Plan: On eliquisRate controlled on Metoprolol and Amio 100mg daily    Problem/Plan - 6:  ·  Problem: CAD (coronary artery disease).  Plan: On Plavix given TAVR, c/w statin.         Timothy Paredes DO [facility]  [facility]  [phone]

## 2019-05-28 ENCOUNTER — TRANSCRIPTION ENCOUNTER (OUTPATIENT)
Age: 84
End: 2019-05-28

## 2019-05-28 ENCOUNTER — APPOINTMENT (OUTPATIENT)
Dept: INFECTIOUS DISEASE | Facility: CLINIC | Age: 84
End: 2019-05-28

## 2019-05-28 LAB
ANION GAP SERPL CALC-SCNC: 10 MMOL/L — SIGNIFICANT CHANGE UP (ref 5–17)
BACTERIA BLD CULT: ABNORMAL
BUN SERPL-MCNC: 21 MG/DL — SIGNIFICANT CHANGE UP (ref 7–23)
CALCIUM SERPL-MCNC: 8.8 MG/DL — SIGNIFICANT CHANGE UP (ref 8.4–10.5)
CHLORIDE SERPL-SCNC: 104 MMOL/L — SIGNIFICANT CHANGE UP (ref 96–108)
CO2 SERPL-SCNC: 24 MMOL/L — SIGNIFICANT CHANGE UP (ref 22–31)
CREAT SERPL-MCNC: 1.18 MG/DL — SIGNIFICANT CHANGE UP (ref 0.5–1.3)
GLUCOSE SERPL-MCNC: 96 MG/DL — SIGNIFICANT CHANGE UP (ref 70–99)
GRAM STN FLD: SIGNIFICANT CHANGE UP
POTASSIUM SERPL-MCNC: 3.9 MMOL/L — SIGNIFICANT CHANGE UP (ref 3.5–5.3)
POTASSIUM SERPL-SCNC: 3.9 MMOL/L — SIGNIFICANT CHANGE UP (ref 3.5–5.3)
SODIUM SERPL-SCNC: 138 MMOL/L — SIGNIFICANT CHANGE UP (ref 135–145)

## 2019-05-28 PROCEDURE — 93312 ECHO TRANSESOPHAGEAL: CPT | Mod: 26

## 2019-05-28 PROCEDURE — 99232 SBSQ HOSP IP/OBS MODERATE 35: CPT

## 2019-05-28 PROCEDURE — 93306 TTE W/DOPPLER COMPLETE: CPT | Mod: 26

## 2019-05-28 PROCEDURE — 99223 1ST HOSP IP/OBS HIGH 75: CPT

## 2019-05-28 RX ORDER — ONDANSETRON 8 MG/1
1 TABLET, FILM COATED ORAL
Qty: 0 | Refills: 0 | DISCHARGE

## 2019-05-28 RX ORDER — METOPROLOL TARTRATE 50 MG
25 TABLET ORAL
Refills: 0 | Status: DISCONTINUED | OUTPATIENT
Start: 2019-05-28 | End: 2019-06-06

## 2019-05-28 RX ORDER — CHOLECALCIFEROL (VITAMIN D3) 125 MCG
1 CAPSULE ORAL
Qty: 0 | Refills: 0 | DISCHARGE

## 2019-05-28 RX ADMIN — Medication 100 MILLIGRAM(S): at 05:16

## 2019-05-28 RX ADMIN — TAMSULOSIN HYDROCHLORIDE 0.4 MILLIGRAM(S): 0.4 CAPSULE ORAL at 21:30

## 2019-05-28 RX ADMIN — BUDESONIDE AND FORMOTEROL FUMARATE DIHYDRATE 2 PUFF(S): 160; 4.5 AEROSOL RESPIRATORY (INHALATION) at 21:27

## 2019-05-28 RX ADMIN — LIDOCAINE 1 PATCH: 4 CREAM TOPICAL at 20:47

## 2019-05-28 RX ADMIN — SENNA PLUS 2 TABLET(S): 8.6 TABLET ORAL at 21:29

## 2019-05-28 RX ADMIN — FINASTERIDE 5 MILLIGRAM(S): 5 TABLET, FILM COATED ORAL at 13:08

## 2019-05-28 RX ADMIN — APIXABAN 5 MILLIGRAM(S): 2.5 TABLET, FILM COATED ORAL at 21:24

## 2019-05-28 RX ADMIN — CEFTAROLINE FOSAMIL 50 MILLIGRAM(S): 600 POWDER, FOR SOLUTION INTRAVENOUS at 13:08

## 2019-05-28 RX ADMIN — BUDESONIDE AND FORMOTEROL FUMARATE DIHYDRATE 2 PUFF(S): 160; 4.5 AEROSOL RESPIRATORY (INHALATION) at 05:17

## 2019-05-28 RX ADMIN — ATORVASTATIN CALCIUM 40 MILLIGRAM(S): 80 TABLET, FILM COATED ORAL at 21:28

## 2019-05-28 RX ADMIN — Medication 100 MILLIGRAM(S): at 21:28

## 2019-05-28 RX ADMIN — CEFTAROLINE FOSAMIL 50 MILLIGRAM(S): 600 POWDER, FOR SOLUTION INTRAVENOUS at 21:31

## 2019-05-28 RX ADMIN — Medication 3 MILLIGRAM(S): at 22:34

## 2019-05-28 RX ADMIN — DAPTOMYCIN 134 MILLIGRAM(S): 500 INJECTION, POWDER, LYOPHILIZED, FOR SOLUTION INTRAVENOUS at 14:25

## 2019-05-28 RX ADMIN — APIXABAN 5 MILLIGRAM(S): 2.5 TABLET, FILM COATED ORAL at 05:16

## 2019-05-28 RX ADMIN — AMIODARONE HYDROCHLORIDE 200 MILLIGRAM(S): 400 TABLET ORAL at 05:16

## 2019-05-28 RX ADMIN — PANTOPRAZOLE SODIUM 40 MILLIGRAM(S): 20 TABLET, DELAYED RELEASE ORAL at 05:16

## 2019-05-28 RX ADMIN — Medication 25 MILLIGRAM(S): at 22:34

## 2019-05-28 RX ADMIN — LIDOCAINE 1 PATCH: 4 CREAM TOPICAL at 13:09

## 2019-05-28 RX ADMIN — SPIRONOLACTONE 25 MILLIGRAM(S): 25 TABLET, FILM COATED ORAL at 05:16

## 2019-05-28 RX ADMIN — Medication 650 MILLIGRAM(S): at 08:28

## 2019-05-28 RX ADMIN — Medication 100 MILLIGRAM(S): at 13:08

## 2019-05-28 RX ADMIN — Medication 650 MILLIGRAM(S): at 09:25

## 2019-05-28 RX ADMIN — CLOPIDOGREL BISULFATE 75 MILLIGRAM(S): 75 TABLET, FILM COATED ORAL at 13:08

## 2019-05-28 RX ADMIN — CEFTAROLINE FOSAMIL 50 MILLIGRAM(S): 600 POWDER, FOR SOLUTION INTRAVENOUS at 05:16

## 2019-05-28 NOTE — DISCHARGE NOTE NURSING/CASE MANAGEMENT/SOCIAL WORK - NSDCPEPTSTRK_GEN_ALL_CORE
Stroke support groups for patients, families, and friends/Stroke education booklet/Call 911 for stroke/Need for follow up after discharge/Stroke warning signs and symptoms/Signs and symptoms of stroke/Prescribed medications/Risk factors for stroke

## 2019-05-28 NOTE — DISCHARGE NOTE NURSING/CASE MANAGEMENT/SOCIAL WORK - NSDCPEWEB_GEN_ALL_CORE
[facility] for Tobacco Control website --- [email or web address] --- [email or web address]

## 2019-05-28 NOTE — CONSULT NOTE ADULT - SUBJECTIVE AND OBJECTIVE BOX
[facility] - Division of Pulmonary, Critical Care and Sleep Medicine   Please call [phone] between 8am-pm weekdays, [phone] after hours and weekends      CHIEF COMPLAINT:    HPI:    PAST MEDICAL & SURGICAL HISTORY:  CKD (chronic kidney disease) stage 3, GFR 30-59 ml/min  Hepatitis B  PTSD (post-traumatic stress disorder)  Tachy-idris syndrome  TIA (transient ischemic attack): 2010  Seizure  DIEGO (obstructive sleep apnea)  Chronic diastolic HF (heart failure)  Aortic stenosis  CVA (cerebral vascular accident)  Gait instability  COPD (chronic obstructive pulmonary disease)  BPH (benign prostatic hyperplasia)  Asthma  CHF (congestive heart failure)  Atrial fibrillation  Pacemaker: 2015 Medtronic [id number]  [id number]  Hypertension  H/O thalassemia  CAD (coronary artery disease): s/p stent 2010  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  Artificial pacemaker  S/P primary angioplasty with coronary stent: 2012 AND 2017      FAMILY HISTORY:  No pertinent family history in first degree relatives      SOCIAL HISTORY:  Smoking: [ ] Never Smoked [ ] Former Smoker (__ packs x ___ years) [ ] Current Smoker  (__ packs x ___ years)  Substance Use: [ ] Never Used [ ] Used ____  EtOH Use:  Marital Status: [ ] Single [ ]  [ ]  [ ]   Occupation:  Recent Travel:  Country of Birth:  Advance Directives:    Allergies    Keppra (Rash)  penicillin (Unknown)    Intolerances        HOME MEDICATIONS:    REVIEW OF SYSTEMS:  Constitutional: [ ] fevers [ ] chills [ ] weight loss [ ] weight gain  HEENT: [ ] dry eyes [ ] eye irritation [ ] postnasal drip [ ] nasal congestion  CV: [ ] chest pain [ ] orthopnea [ ] palpitations [ ] murmur  Resp: [ ] cough [ ] shortness of breath [ ] wheezing [ ] sputum [ ] hemoptysis  GI: [ ] nausea [ ] vomiting [ ] diarrhea [ ] constipation [ ] abd pain [ ] dysphagia   : [ ] dysuria [ ] nocturia [ ] hematuria [ ] increased urinary frequency  Musculoskeletal: [ ] myalgias [ ] arthralgias   Skin: [ ] rash [ ] itch  Neurological: [ ] headache [ ] dizziness [ ] syncope [ ] weakness [ ] numbness  Psychiatric: [ ] anxiety [ ] depression  Endocrine: [ ] diabetes [ ] thyroid problem  Hematologic/Lymphatic: [ ] anemia [ ] bleeding problem  Allergic/Immunologic: [ ] itchy eyes [ ] nasal discharge [ ] hives [ ] angioedema  [ ] All other systems negative  [ ] Unable to assess ROS because ________    OBJECTIVE:  ICU Vital Signs Last 24 Hrs  T(C): 36.8 (28 May 2019 04:29), Max: 36.8 (28 May 2019 04:29)  T(F): 98.2 (28 May 2019 04:29), Max: 98.2 (28 May 2019 04:29)  HR: 71 (28 May 2019 08:26) (71 - 77)  BP: 97/60 (28 May 2019 08:26) (97/60 - 111/61)  BP(mean): --  ABP: --  ABP(mean): --  RR: 17 (28 May 2019 04:29) (16 - 18)  SpO2: 97% (28 May 2019 04:29) (95% - 97%)        05-27 @ 07:01  -  05-28 @ 07:00  --------------------------------------------------------  IN: 930 mL / OUT: 0 mL / NET: 930 mL      CAPILLARY BLOOD GLUCOSE      POCT Blood Glucose.: 113 mg/dL (28 May 2019 07:46)      PHYSICAL EXAM:  General:  NAD  HEENT:  NC/AT  Lymph Nodes: No cervical or supraclavicular lymphadenopathy   Neck: Supple  Respiratory:  CTA B/L, no wheezes, crackles or rhonchi  Cardiovascular:  RRR, no m/r/g  Abdomen: soft, NT/ND, +BS  Extremities: no clubbing, cyanosis or edema, warm  Skin: no rash  Neurological: AAOx3, no focal deficits  Psychiatry: not anxious, normal affect    HOSPITAL MEDICATIONS:  MEDICATIONS  (STANDING):  amiodarone    Tablet 200 milliGRAM(s) Oral daily  apixaban 5 milliGRAM(s) Oral every 12 hours  atorvastatin 40 milliGRAM(s) Oral at bedtime  buDESOnide  80 MICROgram(s)/formoterol 4.5 MICROgram(s) Inhaler 2 Puff(s) Inhalation two times a day  ceftaroline fosamil IVPB 600 milliGRAM(s) IV Intermittent every 8 hours  clopidogrel Tablet 75 milliGRAM(s) Oral daily  DAPTOmycin IVPB 850 milliGRAM(s) IV Intermittent every 24 hours  docusate sodium 100 milliGRAM(s) Oral three times a day  finasteride 5 milliGRAM(s) Oral daily  lidocaine   Patch 1 Patch Transdermal daily  melatonin 3 milliGRAM(s) Oral at bedtime  metoprolol tartrate 50 milliGRAM(s) Oral every 12 hours  pantoprazole    Tablet 40 milliGRAM(s) Oral before breakfast  senna 2 Tablet(s) Oral at bedtime  spironolactone 25 milliGRAM(s) Oral daily  tamsulosin 0.4 milliGRAM(s) Oral at bedtime    MEDICATIONS  (PRN):  acetaminophen   Tablet .. 650 milliGRAM(s) Oral every 6 hours PRN Moderate Pain (4 - 6)  ALBUTerol    90 MICROgram(s) HFA Inhaler 2 Puff(s) Inhalation every 6 hours PRN Shortness of Breath and/or Wheezing  ondansetron    Tablet 4 milliGRAM(s) Oral every 8 hours PRN Nausea and/or Vomiting  oxyCODONE    IR 5 milliGRAM(s) Oral every 8 hours PRN Moderate Pain (4 - 6)      LABS:                        10.7   10.27 )-----------( 253      ( 27 May 2019 08:49 )             33.0     Hgb Trend: 10.7<--, 11.3<--  05-28    138  |  104  |  21  ----------------------------<  96  3.9   |  24  |  1.18    Ca    8.8      28 May 2019 06:40    TPro  6.5  /  Alb  2.8<L>  /  TBili  0.6  /  DBili  x   /  AST  13  /  ALT  10  /  AlkPhos  126<H>  05-27    Creatinine Trend: 1.18<--, 1.02<--, 1.27<--, 1.31<--, 1.14<--  PT/INR - ( 26 May 2019 14:08 )   PT: 20.8 sec;   INR: 1.78 ratio         PTT - ( 26 May 2019 14:08 )  PTT:35.2 sec      Venous Blood Gas:  05-26 @ 14:08  7.43/40/32/26/53  VBG Lactate: 1.8      MICROBIOLOGY:     RADIOLOGY:  [ ] Reviewed and interpreted by me    PULMONARY FUNCTION TESTS:    EKG: [facility] - Division of Pulmonary, Critical Care and Sleep Medicine   Please call [phone] between 8am-pm weekdays, [phone] after hours and weekends      CHIEF COMPLAINT: Bacteremia    HPI: 89 yo M, former smoke, h/o SSS s/p PPM, and Afib on eliquis, AS s/p biomechanical TAVR in 11/2018, COPD, CAD with stent presenting with positive bcx 2/3 bottles for VRE drawn 5/24. Was admitted recently and dx with possible VRE endocarditis, ppm removed, received dapto/ceftaroline through PICC.  PICC was removed and patient has been off abx for 2 weeks. Repeat blood cultures on 5/24 +for enterococcus and was sent to the ED.  Denies fevers, chills, cough or chest pain.  Main complaint is of lower back and b/l shoulder pain - has been chronic but constant. States he does not like to take oxycodone for this, Tylenol with minimal improvement.  Had TTE earlier this morning - as per patient, the plan is for MARIA LUISA this afernoon.     PAST MEDICAL & SURGICAL HISTORY:  CKD (chronic kidney disease) stage 3, GFR 30-59 ml/min  Hepatitis B  PTSD (post-traumatic stress disorder)  Tachy-idris syndrome  TIA (transient ischemic attack): 2010  Seizure  DIEGO (obstructive sleep apnea)  Chronic diastolic HF (heart failure)  Aortic stenosis  CVA (cerebral vascular accident)  Gait instability  COPD (chronic obstructive pulmonary disease)  BPH (benign prostatic hyperplasia)  Asthma  CHF (congestive heart failure)  Atrial fibrillation  Pacemaker: 2015 Medtronic [id number]  A2DR01  Hypertension  H/O thalassemia  CAD (coronary artery disease): s/p stent 2010  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  Artificial pacemaker  S/P primary angioplasty with coronary stent: 2012 AND 2017      FAMILY HISTORY:  No pertinent family history in first degree relatives      SOCIAL HISTORY:  Smoking: [ ] Never Smoked [x ] Former Smoker ( 1 packs40  x ___ years) [ ] Current Smoker  (__ packs x ___ years)  Substance Use: [x ] Never Used [ ] Used ____  EtOH Use: denies    Allergies  Keppra (Rash)  penicillin (Unknown)      HOME MEDICATIONS: reviewed    REVIEW OF SYSTEMS:  Constitutional: [ -] fevers [- ] chills [ ] weight loss [ ] weight gain  HEENT: [ ] dry eyes [ ] eye irritation [ ] postnasal drip [- ] nasal congestion  CV: [- ] chest pain [- ] orthopnea [ ] palpitations [ ] murmur  Resp: [- ] cough [- ] shortness of breath [- ] wheezing [- ] sputum [- ] hemoptysis  GI: [- ] nausea [ ] vomiting [ ] diarrhea [ ] constipation [- ] abd pain [ -] dysphagia   : [ -] dysuria [ ] nocturia [ ] hematuria [ ] increased urinary frequency  Musculoskeletal: [ ] myalgias [+ ] arthralgias   Skin: [- ] rash [ ] itch  Neurological: [- ] headache [ ] dizziness [ ] syncope [ ] weakness [ ] numbness  [x ] All other systems negative  [ ] Unable to assess ROS because ________    OBJECTIVE:  ICU Vital Signs Last 24 Hrs  T(C): 36.8 (28 May 2019 04:29), Max: 36.8 (28 May 2019 04:29)  T(F): 98.2 (28 May 2019 04:29), Max: 98.2 (28 May 2019 04:29)  HR: 71 (28 May 2019 08:26) (71 - 77)  BP: 97/60 (28 May 2019 08:26) (97/60 - 111/61)  BP(mean): --  ABP: --  ABP(mean): --  RR: 17 (28 May 2019 04:29) (16 - 18)  SpO2: 97% (28 May 2019 04:29) (95% - 97%)        05-27 @ 07:01  -  05-28 @ 07:00  --------------------------------------------------------  IN: 930 mL / OUT: 0 mL / NET: 930 mL      CAPILLARY BLOOD GLUCOSE      POCT Blood Glucose.: 113 mg/dL (28 May 2019 07:46)      PHYSICAL EXAM:  General:  NAD  HEENT:  NC/AT  Neck: Supple  Respiratory:  CTA B/L, no wheezes, crackles or rhonchi  Cardiovascular:  RRR, no m/r/g  Abdomen: soft, NT/ND, +BS  Extremities: no clubbing, cyanosis or edema, warm  Skin: no rash  Neurological: AAOx3, no focal deficits  Psychiatry: not anxious, normal affect    HOSPITAL MEDICATIONS:  MEDICATIONS  (STANDING):  amiodarone    Tablet 200 milliGRAM(s) Oral daily  apixaban 5 milliGRAM(s) Oral every 12 hours  atorvastatin 40 milliGRAM(s) Oral at bedtime  buDESOnide  80 MICROgram(s)/formoterol 4.5 MICROgram(s) Inhaler 2 Puff(s) Inhalation two times a day  ceftaroline fosamil IVPB 600 milliGRAM(s) IV Intermittent every 8 hours  clopidogrel Tablet 75 milliGRAM(s) Oral daily  DAPTOmycin IVPB 850 milliGRAM(s) IV Intermittent every 24 hours  docusate sodium 100 milliGRAM(s) Oral three times a day  finasteride 5 milliGRAM(s) Oral daily  lidocaine   Patch 1 Patch Transdermal daily  melatonin 3 milliGRAM(s) Oral at bedtime  metoprolol tartrate 50 milliGRAM(s) Oral every 12 hours  pantoprazole    Tablet 40 milliGRAM(s) Oral before breakfast  senna 2 Tablet(s) Oral at bedtime  spironolactone 25 milliGRAM(s) Oral daily  tamsulosin 0.4 milliGRAM(s) Oral at bedtime    MEDICATIONS  (PRN):  acetaminophen   Tablet .. 650 milliGRAM(s) Oral every 6 hours PRN Moderate Pain (4 - 6)  ALBUTerol    90 MICROgram(s) HFA Inhaler 2 Puff(s) Inhalation every 6 hours PRN Shortness of Breath and/or Wheezing  ondansetron    Tablet 4 milliGRAM(s) Oral every 8 hours PRN Nausea and/or Vomiting  oxyCODONE    IR 5 milliGRAM(s) Oral every 8 hours PRN Moderate Pain (4 - 6)      LABS:                        10.7   10.27 )-----------( 253      ( 27 May 2019 08:49 )             33.0     Hgb Trend: 10.7<--, 11.3<--  05-28    138  |  104  |  21  ----------------------------<  96  3.9   |  24  |  1.18    Ca    8.8      28 May 2019 06:40    TPro  6.5  /  Alb  2.8<L>  /  TBili  0.6  /  DBili  x   /  AST  13  /  ALT  10  /  AlkPhos  126<H>  05-27    Creatinine Trend: 1.18<--, 1.02<--, 1.27<--, 1.31<--, 1.14<--  PT/INR - ( 26 May 2019 14:08 )   PT: 20.8 sec;   INR: 1.78 ratio         PTT - ( 26 May 2019 14:08 )  PTT:35.2 sec      Venous Blood Gas:  05-26 @ 14:08  7.43/40/32/26/53  VBG Lactate: 1.8      MICROBIOLOGY:   Culture - Blood in AM (05.27.19 @ 09:41)    Gram Stain:   Growth in aerobic and anaerobic bottles: Gram Positive Cocci in Pairs and  Chains    Specimen Source: .Blood    Culture Results:   Growth in aerobic and anaerobic bottles: Gram Positive Cocci in Pairs and  Chains    Culture - Blood (05.26.19 @ 17:09)    Gram Stain:   Growth in aerobic bottle: Gram Positive Cocci in Pairs and Chains  Growth in anaerobic bottle: Gram Positive Cocci in Pairs and Chains    Specimen Source: .Blood    Culture Results:   Growth in aerobic and anaerobic bottles: Enterococcus faecium  See previous culture [id number]      RADIOLOGY:  [x ] Reviewed and interpreted by me

## 2019-05-28 NOTE — DISCHARGE NOTE NURSING/CASE MANAGEMENT/SOCIAL WORK - NSDCPEFALRISK_GEN_ALL_CORE
Problem: Depressive Symptoms  Goal: Depressive Symptoms  Signs and symptoms of listed problems will be absent or manageable.  -pt will remain safe without any self harm during hospitalization.  -pt will have decreased thoughts of self harm and or suicidal ideation.  -pt will report improved sleep.  -pt will have adequate daily oral intake.  -pt will have improvement in self care and person hygiene.  -pt will spend time out of their room.  -pt will express decrease feelings of hopelessness.   Outcome: Jorge Slater attended community meeting and participated. The topic was owning responsibility and was a short video. He seemed to really enjoy the meeting. Afterwards, he has been social with peers, and making phone calls. At bedtime, his BP was elevated. He took his bedtime meds and this nurse plans to recheck his BP. He was worried about it being so high. This nurse reminded him that next time he even thinks of using illicit drugs, he should remember his heart is enlarged and his BP tends high. He acknowledged that his brother  in just that way (R/T drug use.) Shows some insight, but still uncertain if he grasps the necessity to stay sober.        Patient information on fall and injury prevention

## 2019-05-28 NOTE — PROGRESS NOTE ADULT - SUBJECTIVE AND OBJECTIVE BOX
Subjective: Patient seen and examined. No new events except as noted.   doing well   bacteremia, Bld Cx cont to be positive      REVIEW OF SYSTEMS:    CONSTITUTIONAL: No weakness, fevers or chills  EYES/ENT: No visual changes;  No vertigo or throat pain   NECK: No pain or stiffness  RESPIRATORY: No cough, wheezing, hemoptysis; No shortness of breath  CARDIOVASCULAR: No chest pain or palpitations  GASTROINTESTINAL: No abdominal or epigastric pain. No nausea, vomiting, or hematemesis; No diarrhea or constipation. No melena or hematochezia.  GENITOURINARY: No dysuria, frequency or hematuria  NEUROLOGICAL: No numbness or weakness  SKIN: No itching, burning, rashes, or lesions   All other review of systems is negative unless indicated above.    MEDICATIONS:  MEDICATIONS  (STANDING):  amiodarone    Tablet 200 milliGRAM(s) Oral daily  apixaban 5 milliGRAM(s) Oral every 12 hours  atorvastatin 40 milliGRAM(s) Oral at bedtime  buDESOnide  80 MICROgram(s)/formoterol 4.5 MICROgram(s) Inhaler 2 Puff(s) Inhalation two times a day  ceftaroline fosamil IVPB 600 milliGRAM(s) IV Intermittent every 8 hours  clopidogrel Tablet 75 milliGRAM(s) Oral daily  DAPTOmycin IVPB 850 milliGRAM(s) IV Intermittent every 24 hours  docusate sodium 100 milliGRAM(s) Oral three times a day  finasteride 5 milliGRAM(s) Oral daily  lidocaine   Patch 1 Patch Transdermal daily  melatonin 3 milliGRAM(s) Oral at bedtime  metoprolol tartrate 25 milliGRAM(s) Oral two times a day  pantoprazole    Tablet 40 milliGRAM(s) Oral before breakfast  senna 2 Tablet(s) Oral at bedtime  spironolactone 25 milliGRAM(s) Oral daily  tamsulosin 0.4 milliGRAM(s) Oral at bedtime      PHYSICAL EXAM:  T(C): 36.3 (05-28-19 @ 12:55), Max: 36.8 (05-28-19 @ 04:29)  HR: 75 (05-28-19 @ 12:55) (71 - 77)  BP: 111/72 (05-28-19 @ 12:55) (97/60 - 111/72)  RR: 18 (05-28-19 @ 12:55) (16 - 18)  SpO2: 96% (05-28-19 @ 12:55) (95% - 97%)  Wt(kg): --  I&O's Summary    27 May 2019 07:01  -  28 May 2019 07:00  --------------------------------------------------------  IN: 930 mL / OUT: 0 mL / NET: 930 mL          Appearance: Normal	  HEENT:   Normal oral mucosa, PERRL, EOMI	  Lymphatic: No lymphadenopathy , no edema  Cardiovascular: Normal S1 S2, No JVD, No murmurs , Peripheral pulses palpable 2+ bilaterally  Respiratory: Lungs clear to auscultation, normal effort 	  Gastrointestinal:  Soft, Non-tender, + BS	  Skin: No rashes, No ecchymoses, No cyanosis, warm to touch  Musculoskeletal: Normal range of motion, normal strength  Psychiatry:  Mood & affect appropriate  Ext: No edema      All labs, Imaging and EKGs personally reviewed                           10.7   10.27 )-----------( 253      ( 27 May 2019 08:49 )             33.0               05-28    138  |  104  |  21  ----------------------------<  96  3.9   |  24  |  1.18    Ca    8.8      28 May 2019 06:40    TPro  6.5  /  Alb  2.8<L>  /  TBili  0.6  /  DBili  x   /  AST  13  /  ALT  10  /  AlkPhos  126<H>  05-27           CARDIAC MARKERS ( 27 May 2019 10:27 )  x     / x     / 35 U/L / x     / x

## 2019-05-28 NOTE — DISCHARGE NOTE NURSING/CASE MANAGEMENT/SOCIAL WORK - NSDCDPATPORTLINK_GEN_ALL_CORE
You can access the [facility] Portal, offered by [facility], by registering with the following website: [email or web address]

## 2019-05-28 NOTE — CHART NOTE - NSCHARTNOTEFT_GEN_A_CORE
[id number]  RUMA BANEGAS    Notified by RN patient with critical lab result of  Culture - Blood (05.26.19 @ 17:09)    Gram Stain:   Growth in anaerobic bottle: Gram Positive Cocci in Pairs and Chains  Growth in aerobic bottle: Gram Positive Cocci in Pairs and Chains    Specimen Source: .Blood    Culture Results:   Growth in anaerobic bottle: Gram Positive Cocci in Pairs and Chains  Growth in aerobic bottle: Gram Positive Cocci in Pairs and Chains      PLAN:   >Continue IV Teflaro & IV Daptomycin as ordered  >F/U final blood culture results for ID & sensitivities  >Will endorse to day team; follow up with attending

## 2019-05-28 NOTE — CONSULT NOTE ADULT - ASSESSMENT
87 yr old with CKD, COPD, BPH, PPM, Y AVR due to AS, CHF. 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    COPD - stable  c/w Symbicort, Spiriva, bronchodilators as needed    TTE completed this morning  MARIA LUISA pending for this afternoon - no pulmonary contraindication to procedure.    Full consult to follow 87 yr old with CKD, COPD, BPH, PPM, Y AVR due to AS, CHF. 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    1. COPD - stable  c/w Symbicort BID and Albuterol PRN,   Please restart Spiriva  Out of bed to chair, incentive spirometry.    2. 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  c/w Dapto and Ceftaroline as per ID    3.  Lower back and shoulder pain - patient states this is chronic but recently worsened. If continues would consider imaging to better evaluate    DVT ppx    THank you for the consult. Dr. Mack to follow up on 5/29

## 2019-05-28 NOTE — PROGRESS NOTE ADULT - SUBJECTIVE AND OBJECTIVE BOX
Patient is a 88y old  Male who presents with a chief complaint of bacteremia (28 May 2019 07:35)         	  MEDICATIONS:  amiodarone    Tablet 200 milliGRAM(s) Oral daily  metoprolol tartrate 50 milliGRAM(s) Oral every 12 hours  spironolactone 25 milliGRAM(s) Oral daily  tamsulosin 0.4 milliGRAM(s) Oral at bedtime        PHYSICAL EXAM:  T(C): 36.8 (05-28-19 @ 04:29), Max: 36.8 (05-28-19 @ 04:29)  HR: 71 (05-28-19 @ 08:26) (71 - 77)  BP: 97/60 (05-28-19 @ 08:26) (97/60 - 111/61)  RR: 17 (05-28-19 @ 04:29) (16 - 18)  SpO2: 97% (05-28-19 @ 04:29) (95% - 97%)  Wt(kg): --  I&O's Summary    27 May 2019 07:01  -  28 May 2019 07:00  --------------------------------------------------------  IN: 930 mL / OUT: 0 mL / NET: 930 mL          Appearance: In no distress	  HEENT:    PERRL, EOMI	  Cardiovascular:  S1 S2, No JVD  Respiratory: Lungs clear to auscultation	  Gastrointestinal:  Soft, Non-tender, + BS	  Extremities:  No edema of LE                                10.7   10.27 )-----------( 253      ( 27 May 2019 08:49 )             33.0     05-28    138  |  104  |  21  ----------------------------<  96  3.9   |  24  |  1.18    Ca    8.8      28 May 2019 06:40    TPro  6.5  /  Alb  2.8<L>  /  TBili  0.6  /  DBili  x   /  AST  13  /  ALT  10  /  AlkPhos  126<H>  05-27        Labs personally reviewed    Assessment and Plan:   Assessment:  · Assessment		  Patient is a 87 y/o male with PMH SSS/a fib on eliquis, 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 Was admitted recently and dx with possible endocarditis, ppm removed, received dapto/ceftaroline through PICC, PICC was dc and patient has been off abx for 2 weeks, plan was to observe off abx for 3 weeks and repeat labs. Patient had labs/bcx done yesterday with primary care, bcx were positive, patient otherwise does not have new symptoms or feel ill.     Problem/Plan - 1:  ·  Problem: Bacteremia.  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  Plan: monitor BUN/Cr  At baseline    Problem/Plan - 3:  ·  Problem: Chronic diastolic HF (heart failure).  Plan: continue with aldactone 25mg, euvolemic at this time    Problem/Plan - 4:  ·  Problem: Aortic stenosis.  Plan: S/P TAVR   - ?bioprosthetic valve endocarditis  - high risk candidate for open valve replacement    Problem/Plan - 5:  Problem: Atrial fibrillation. Plan: On eliquis, Rate controlled on Metoprolol and Amio 100mg daily    Problem/Plan - 6:  ·  Problem: CAD (coronary artery disease).  Plan: On Plavix given TAVR, c/w statin.         Timothy Paredes DO [facility]  [phone]            Timothy Paredes DO [facility]  [phone]

## 2019-05-28 NOTE — PROGRESS NOTE ADULT - PROBLEM SELECTOR PLAN 1
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

## 2019-05-28 NOTE — PROGRESS NOTE ADULT - ASSESSMENT
87 yr old with CKD, COPD, BPH, PPM, Y AVR due to AS, CHF. 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    Suggest  Continue antibiotics  Dapto 10 mg/kg 5/26 -->  Ceftaroline 600 every 8 hours 5/26 -->     repeat SLIME today  after slime we can decide on need to rescan his lower back

## 2019-05-28 NOTE — PROGRESS NOTE ADULT - SUBJECTIVE AND OBJECTIVE BOX
infectious diseases progress note:    Patient is a 88y old  Male who presents with a chief complaint of bacteremia (27 May 2019 17:20)        Bacteremia        ROS:  CONSTITUTIONAL:  Negative fever or chills, feels well, good appetite  EYES:  Negative  blurry vision or double vision  CARDIOVASCULAR:  Negative for chest pain or palpitations  RESPIRATORY:  Negative for cough, wheezing, or SOB   GASTROINTESTINAL:  Negative for nausea, vomiting, diarrhea, constipation, or abdominal pain  GENITOURINARY:  Negative frequency, urgency or dysuria  NEUROLOGIC:  No headache, confusion, dizziness, lightheadedness    Allergies    Keppra (Rash)  penicillin (Unknown)    Intolerances        ANTIBIOTICS/RELEVANT:  antimicrobials  ceftaroline fosamil IVPB 600 milliGRAM(s) IV Intermittent every 8 hours  DAPTOmycin IVPB 850 milliGRAM(s) IV Intermittent every 24 hours    immunologic:    OTHER:  acetaminophen   Tablet .. 650 milliGRAM(s) Oral every 6 hours PRN  ALBUTerol    90 MICROgram(s) HFA Inhaler 2 Puff(s) Inhalation every 6 hours PRN  amiodarone    Tablet 200 milliGRAM(s) Oral daily  apixaban 5 milliGRAM(s) Oral every 12 hours  atorvastatin 40 milliGRAM(s) Oral at bedtime  buDESOnide  80 MICROgram(s)/formoterol 4.5 MICROgram(s) Inhaler 2 Puff(s) Inhalation two times a day  clopidogrel Tablet 75 milliGRAM(s) Oral daily  dextrose 40% Gel 15 Gram(s) Oral once PRN  dextrose 5%. 1000 milliLiter(s) IV Continuous <Continuous>  dextrose 50% Injectable 12.5 Gram(s) IV Push once  dextrose 50% Injectable 25 Gram(s) IV Push once  dextrose 50% Injectable 25 Gram(s) IV Push once  docusate sodium 100 milliGRAM(s) Oral three times a day  finasteride 5 milliGRAM(s) Oral daily  glucagon  Injectable 1 milliGRAM(s) IntraMuscular once PRN  insulin lispro (HumaLOG) corrective regimen sliding scale   SubCutaneous three times a day before meals  lidocaine   Patch 1 Patch Transdermal daily  melatonin 3 milliGRAM(s) Oral at bedtime  metoprolol tartrate 50 milliGRAM(s) Oral every 12 hours  ondansetron    Tablet 4 milliGRAM(s) Oral every 8 hours PRN  oxyCODONE    IR 5 milliGRAM(s) Oral every 8 hours PRN  pantoprazole    Tablet 40 milliGRAM(s) Oral before breakfast  senna 2 Tablet(s) Oral at bedtime  spironolactone 25 milliGRAM(s) Oral daily  tamsulosin 0.4 milliGRAM(s) Oral at bedtime      Objective:  Vital Signs Last 24 Hrs  T(C): 36.8 (28 May 2019 04:29), Max: 36.8 (28 May 2019 04:29)  T(F): 98.2 (28 May 2019 04:29), Max: 98.2 (28 May 2019 04:29)  HR: 76 (28 May 2019 04:29) (72 - 77)  BP: 105/66 (28 May 2019 04:29) (105/66 - 111/61)  BP(mean): --  RR: 17 (28 May 2019 04:29) (16 - 18)  SpO2: 97% (28 May 2019 04:29) (95% - 97%)     --no acute distress  Eyes:JESSEE, EOMI  Ear/Nose/Throat: no oral lesion, no sinus tenderness on percussion	  Neck:no JVD, no lymphadenopathy, supple  Respiratory: CTA marci  Cardiovascular: S1S2 RRR, no murmurs  Gastrointestinal:soft, (+) BS, no HSM  Extremities:no e/e/c        LABS:                        10.7   10.27 )-----------( 253      ( 27 May 2019 08:49 )             33.0     05-28    138  |  104  |  21  ----------------------------<  96  3.9   |  24  |  1.18    Ca    8.8      28 May 2019 06:40    TPro  6.5  /  Alb  2.8<L>  /  TBili  0.6  /  DBili  x   /  AST  13  /  ALT  10  /  AlkPhos  126<H>  05-27    PT/INR - ( 26 May 2019 14:08 )   PT: 20.8 sec;   INR: 1.78 ratio         PTT - ( 26 May 2019 14:08 )  PTT:35.2 sec        MICROBIOLOGY:    RECENT CULTURES:  05-26 @ 17:09 .Blood       Growth in aerobic bottle: Gram Positive Cocci in Pairs and Chains  Growth in anaerobic bottle: Gram Positive Cocci in Pairs and Chains           Growth in aerobic bottle: Gram Positive Cocci in Pairs and Chains  Growth in anaerobic bottle: Gram Positive Cocci in Pairs and Chains          RESPIRATORY CULTURES:              RADIOLOGY & ADDITIONAL STUDIES:        Pager [phone]  After 5 pm/weekends or if no response :[phone]

## 2019-05-29 ENCOUNTER — APPOINTMENT (OUTPATIENT)
Dept: PULMONOLOGY | Facility: CLINIC | Age: 84
End: 2019-05-29

## 2019-05-29 DIAGNOSIS — M54.9 DORSALGIA, UNSPECIFIED: ICD-10-CM

## 2019-05-29 DIAGNOSIS — G47.33 OBSTRUCTIVE SLEEP APNEA (ADULT) (PEDIATRIC): ICD-10-CM

## 2019-05-29 DIAGNOSIS — I27.21 SECONDARY PULMONARY ARTERIAL HYPERTENSION: ICD-10-CM

## 2019-05-29 DIAGNOSIS — R26.81 UNSTEADINESS ON FEET: ICD-10-CM

## 2019-05-29 LAB
-  AMPICILLIN: SIGNIFICANT CHANGE UP
-  DAPTOMYCIN: SIGNIFICANT CHANGE UP
-  GENTAMICIN SYNERGY: SIGNIFICANT CHANGE UP
-  LINEZOLID: SIGNIFICANT CHANGE UP
-  VANCOMYCIN: SIGNIFICANT CHANGE UP
ANION GAP SERPL CALC-SCNC: 12 MMOL/L — SIGNIFICANT CHANGE UP (ref 5–17)
BUN SERPL-MCNC: 18 MG/DL — SIGNIFICANT CHANGE UP (ref 7–23)
CALCIUM SERPL-MCNC: 9 MG/DL — SIGNIFICANT CHANGE UP (ref 8.4–10.5)
CHLORIDE SERPL-SCNC: 104 MMOL/L — SIGNIFICANT CHANGE UP (ref 96–108)
CO2 SERPL-SCNC: 22 MMOL/L — SIGNIFICANT CHANGE UP (ref 22–31)
CREAT SERPL-MCNC: 0.99 MG/DL — SIGNIFICANT CHANGE UP (ref 0.5–1.3)
CULTURE RESULTS: SIGNIFICANT CHANGE UP
GLUCOSE SERPL-MCNC: 103 MG/DL — HIGH (ref 70–99)
HCT VFR BLD CALC: 31.9 % — LOW (ref 39–50)
HGB BLD-MCNC: 10 G/DL — LOW (ref 13–17)
MCHC RBC-ENTMCNC: 26.7 PG — LOW (ref 27–34)
MCHC RBC-ENTMCNC: 31.3 GM/DL — LOW (ref 32–36)
MCV RBC AUTO: 85.1 FL — SIGNIFICANT CHANGE UP (ref 80–100)
METHOD TYPE: SIGNIFICANT CHANGE UP
ORGANISM # SPEC MICROSCOPIC CNT: SIGNIFICANT CHANGE UP
ORGANISM # SPEC MICROSCOPIC CNT: SIGNIFICANT CHANGE UP
PLATELET # BLD AUTO: 242 K/UL — SIGNIFICANT CHANGE UP (ref 150–400)
POTASSIUM SERPL-MCNC: 3.8 MMOL/L — SIGNIFICANT CHANGE UP (ref 3.5–5.3)
POTASSIUM SERPL-SCNC: 3.8 MMOL/L — SIGNIFICANT CHANGE UP (ref 3.5–5.3)
RBC # BLD: 3.75 M/UL — LOW (ref 4.2–5.8)
RBC # FLD: 15.6 % — HIGH (ref 10.3–14.5)
SODIUM SERPL-SCNC: 138 MMOL/L — SIGNIFICANT CHANGE UP (ref 135–145)
SPECIMEN SOURCE: SIGNIFICANT CHANGE UP
WBC # BLD: 9.12 K/UL — SIGNIFICANT CHANGE UP (ref 3.8–10.5)
WBC # FLD AUTO: 9.12 K/UL — SIGNIFICANT CHANGE UP (ref 3.8–10.5)

## 2019-05-29 PROCEDURE — 99232 SBSQ HOSP IP/OBS MODERATE 35: CPT

## 2019-05-29 RX ADMIN — LIDOCAINE 1 PATCH: 4 CREAM TOPICAL at 21:46

## 2019-05-29 RX ADMIN — Medication 650 MILLIGRAM(S): at 22:14

## 2019-05-29 RX ADMIN — APIXABAN 5 MILLIGRAM(S): 2.5 TABLET, FILM COATED ORAL at 06:50

## 2019-05-29 RX ADMIN — CEFTAROLINE FOSAMIL 50 MILLIGRAM(S): 600 POWDER, FOR SOLUTION INTRAVENOUS at 13:15

## 2019-05-29 RX ADMIN — FINASTERIDE 5 MILLIGRAM(S): 5 TABLET, FILM COATED ORAL at 13:14

## 2019-05-29 RX ADMIN — CEFTAROLINE FOSAMIL 50 MILLIGRAM(S): 600 POWDER, FOR SOLUTION INTRAVENOUS at 06:51

## 2019-05-29 RX ADMIN — AMIODARONE HYDROCHLORIDE 200 MILLIGRAM(S): 400 TABLET ORAL at 06:49

## 2019-05-29 RX ADMIN — PANTOPRAZOLE SODIUM 40 MILLIGRAM(S): 20 TABLET, DELAYED RELEASE ORAL at 06:49

## 2019-05-29 RX ADMIN — SPIRONOLACTONE 25 MILLIGRAM(S): 25 TABLET, FILM COATED ORAL at 06:49

## 2019-05-29 RX ADMIN — BUDESONIDE AND FORMOTEROL FUMARATE DIHYDRATE 2 PUFF(S): 160; 4.5 AEROSOL RESPIRATORY (INHALATION) at 06:49

## 2019-05-29 RX ADMIN — ATORVASTATIN CALCIUM 40 MILLIGRAM(S): 80 TABLET, FILM COATED ORAL at 21:59

## 2019-05-29 RX ADMIN — Medication 650 MILLIGRAM(S): at 14:00

## 2019-05-29 RX ADMIN — Medication 100 MILLIGRAM(S): at 13:14

## 2019-05-29 RX ADMIN — BUDESONIDE AND FORMOTEROL FUMARATE DIHYDRATE 2 PUFF(S): 160; 4.5 AEROSOL RESPIRATORY (INHALATION) at 17:55

## 2019-05-29 RX ADMIN — Medication 650 MILLIGRAM(S): at 13:15

## 2019-05-29 RX ADMIN — CEFTAROLINE FOSAMIL 50 MILLIGRAM(S): 600 POWDER, FOR SOLUTION INTRAVENOUS at 21:58

## 2019-05-29 RX ADMIN — Medication 650 MILLIGRAM(S): at 06:50

## 2019-05-29 RX ADMIN — LIDOCAINE 1 PATCH: 4 CREAM TOPICAL at 01:00

## 2019-05-29 RX ADMIN — Medication 100 MILLIGRAM(S): at 06:49

## 2019-05-29 RX ADMIN — TAMSULOSIN HYDROCHLORIDE 0.4 MILLIGRAM(S): 0.4 CAPSULE ORAL at 21:59

## 2019-05-29 RX ADMIN — DAPTOMYCIN 134 MILLIGRAM(S): 500 INJECTION, POWDER, LYOPHILIZED, FOR SOLUTION INTRAVENOUS at 15:15

## 2019-05-29 RX ADMIN — CLOPIDOGREL BISULFATE 75 MILLIGRAM(S): 75 TABLET, FILM COATED ORAL at 15:25

## 2019-05-29 RX ADMIN — LIDOCAINE 1 PATCH: 4 CREAM TOPICAL at 13:14

## 2019-05-29 RX ADMIN — Medication 650 MILLIGRAM(S): at 21:59

## 2019-05-29 RX ADMIN — Medication 3 MILLIGRAM(S): at 21:59

## 2019-05-29 RX ADMIN — Medication 25 MILLIGRAM(S): at 17:55

## 2019-05-29 RX ADMIN — Medication 650 MILLIGRAM(S): at 00:56

## 2019-05-29 RX ADMIN — Medication 650 MILLIGRAM(S): at 07:50

## 2019-05-29 RX ADMIN — Medication 650 MILLIGRAM(S): at 00:26

## 2019-05-29 NOTE — PROGRESS NOTE ADULT - ASSESSMENT
87 yr old with CKD, COPD, BPH, PPM, Y AVR due to AS, CHF. 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 Downey ,  Thre is no good oral suppression medication  for  VREC   We would repeat the MRI of his lumbar spine although it would be most unusal for it to be causing bacteremia even if ther was om present   last time it took two weeks for his bc to turn negative

## 2019-05-29 NOTE — PROGRESS NOTE ADULT - ASSESSMENT
87 yr old with CKD, COPD, BPH, PPM, Y AVR due to AS, CHF. 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    1. COPD - stable  c/w Symbicort BID and Albuterol PRN, Spiriva  Out of bed to chair, incentive spirometry.  2. 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  Lower back and shoulder pain - patient states this is chronic but recently worsened. If continues would consider imaging to better evaluate  *************  5/29-await results of cx; re-scan of back likely

## 2019-05-29 NOTE — CONSULT NOTE ADULT - SUBJECTIVE AND OBJECTIVE BOX
RUMA BANEGAS:[id number],   88yMale followed for:  Keppra (Rash)  penicillin (Unknown)    PAST MEDICAL & SURGICAL HISTORY:  CKD (chronic kidney disease) stage 3, GFR 30-59 ml/min  Hepatitis B  PTSD (post-traumatic stress disorder)  Tachy-idris syndrome  TIA (transient ischemic attack): 2010  Seizure  DIEGO (obstructive sleep apnea)  Chronic diastolic HF (heart failure)  Aortic stenosis  CVA (cerebral vascular accident)  Gait instability  COPD (chronic obstructive pulmonary disease)  BPH (benign prostatic hyperplasia)  Asthma  CHF (congestive heart failure)  Atrial fibrillation  Pacemaker: 2015 Medtronic [id number]  [id number]  Hypertension  H/O thalassemia  CAD (coronary artery disease): s/p stent 2010  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  Artificial pacemaker  S/P primary angioplasty with coronary stent: 2012 AND 2017    FAMILY HISTORY:  No pertinent family history in first degree relatives    MEDICATIONS  (STANDING):  amiodarone    Tablet 200 milliGRAM(s) Oral daily  atorvastatin 40 milliGRAM(s) Oral at bedtime  buDESOnide  80 MICROgram(s)/formoterol 4.5 MICROgram(s) Inhaler 2 Puff(s) Inhalation two times a day  ceftaroline fosamil IVPB 600 milliGRAM(s) IV Intermittent every 8 hours  clopidogrel Tablet 75 milliGRAM(s) Oral daily  DAPTOmycin IVPB 850 milliGRAM(s) IV Intermittent every 24 hours  docusate sodium 100 milliGRAM(s) Oral three times a day  finasteride 5 milliGRAM(s) Oral daily  lidocaine   Patch 1 Patch Transdermal daily  melatonin 3 milliGRAM(s) Oral at bedtime  metoprolol tartrate 25 milliGRAM(s) Oral two times a day  pantoprazole    Tablet 40 milliGRAM(s) Oral before breakfast  senna 2 Tablet(s) Oral at bedtime  spironolactone 25 milliGRAM(s) Oral daily  tamsulosin 0.4 milliGRAM(s) Oral at bedtime    MEDICATIONS  (PRN):  acetaminophen   Tablet .. 650 milliGRAM(s) Oral every 6 hours PRN Moderate Pain (4 - 6)  ALBUTerol    90 MICROgram(s) HFA Inhaler 2 Puff(s) Inhalation every 6 hours PRN Shortness of Breath and/or Wheezing  ondansetron    Tablet 4 milliGRAM(s) Oral every 8 hours PRN Nausea and/or Vomiting  oxyCODONE    IR 5 milliGRAM(s) Oral every 8 hours PRN Moderate Pain (4 - 6)      Vital Signs Last 24 Hrs  T(C): 36.7 (29 May 2019 04:04), Max: 36.7 (29 May 2019 04:04)  T(F): 98.1 (29 May 2019 04:04), Max: 98.1 (29 May 2019 04:04)  HR: 79 (29 May 2019 09:38) (75 - 84)  BP: 90/46 (29 May 2019 09:59) (90/45 - 138/75)  BP(mean): --  RR: 18 (29 May 2019 09:38) (18 - 18)  SpO2: 98% (29 May 2019 09:38) (95% - 98%)  nc/at  s1s2  cta  soft, nt, nd no guarding or rebound  no c/c/e    CBC Full  -  ( 29 May 2019 08:34 )  WBC Count : 9.12 K/uL  RBC Count : 3.75 M/uL  Hemoglobin : 10.0 g/dL  Hematocrit : 31.9 %  Platelet Count - Automated : 242 K/uL  Mean Cell Volume : 85.1 fl  Mean Cell Hemoglobin : 26.7 pg  Mean Cell Hemoglobin Concentration : 31.3 gm/dL  Auto Neutrophil # : x  Auto Lymphocyte # : x  Auto Monocyte # : x  Auto Eosinophil # : x  Auto Basophil # : x  Auto Neutrophil % : x  Auto Lymphocyte % : x  Auto Monocyte % : x  Auto Eosinophil % : x  Auto Basophil % : x    05-29    138  |  104  |  18  ----------------------------<  103<H>  3.8   |  22  |  0.99    Ca    9.0      29 May 2019 05:44

## 2019-05-29 NOTE — PROGRESS NOTE ADULT - PROBLEM SELECTOR PLAN 1
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

## 2019-05-29 NOTE — PROGRESS NOTE ADULT - ATTENDING COMMENTS
as above-stable  ID-bacteremia--on dapto/ceftaroline-f/up all cx-? bone studies  COPD-symbiocrt/spiriva, incentive spirometry/acapella  PAH-no defin. rx  Cards-AF etc.--mult rx  DVT.GI proph--KATIANA Mack MD-Pulmonary   [phone]

## 2019-05-29 NOTE — PROGRESS NOTE ADULT - SUBJECTIVE AND OBJECTIVE BOX
Patient is a 88y old  Male who presents with a chief complaint of bacteremia (29 May 2019 10:36)      INTERVAL HISTORY: feels ok    	  MEDICATIONS:  amiodarone    Tablet 200 milliGRAM(s) Oral daily  metoprolol tartrate 25 milliGRAM(s) Oral two times a day  spironolactone 25 milliGRAM(s) Oral daily  tamsulosin 0.4 milliGRAM(s) Oral at bedtime        PHYSICAL EXAM:  T(C): 37 (05-29-19 @ 14:12), Max: 37 (05-29-19 @ 14:12)  HR: 71 (05-29-19 @ 17:43) (71 - 84)  BP: 120/71 (05-29-19 @ 17:43) (90/45 - 138/75)  RR: 18 (05-29-19 @ 14:12) (18 - 18)  SpO2: 99% (05-29-19 @ 14:12) (95% - 99%)  Wt(kg): --  I&O's Summary    28 May 2019 07:01  -  29 May 2019 07:00  --------------------------------------------------------  IN: 170 mL / OUT: 0 mL / NET: 170 mL    29 May 2019 07:01  -  29 May 2019 17:58  --------------------------------------------------------  IN: 600 mL / OUT: 0 mL / NET: 600 mL          Appearance: In no distress	  HEENT:    PERRL, EOMI	  Cardiovascular:  S1 S2, No JVD  Respiratory: Lungs clear to auscultation	  Gastrointestinal:  Soft, Non-tender, + BS	  Extremities:  No edema of LE                                10.0   9.12  )-----------( 242      ( 29 May 2019 08:34 )             31.9     05-29    138  |  104  |  18  ----------------------------<  103<H>  3.8   |  22  |  0.99    Ca    9.0      29 May 2019 05:44          Labs personally reviewed      Assessment and Plan:   Assessment:  · Assessment		  Patient is a 89 y/o male with PMH SSS/a fib on eliquis, 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 Was admitted recently and dx with possible endocarditis, ppm removed, received dapto/ceftaroline through PICC, PICC was dc and patient has been off abx for 2 weeks, plan was to observe off abx for 3 weeks and repeat labs. Patient had labs/bcx done yesterday with primary care, bcx were positive, patient otherwise does not have new symptoms or feel ill.     Problem/Plan - 1:  ·  Problem: Bacteremia.  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  Plan: continue with aldactone 25mg, euvolemic at this time    Problem/Plan - 3:  ·  Problem: Aortic stenosis.  Plan: S/P TAVR   - ?bioprosthetic valve endocarditis  - high risk candidate for open valve replacement    Problem/Plan - 4:  Problem: Atrial fibrillation. Plan: On eliquis, Rate controlled on Metoprolol and Amio 100mg daily    Problem/Plan - 5:  ·  Problem: CAD (coronary artery disease).  Plan: On Plavix given TAVR, c/w statin.

## 2019-05-29 NOTE — PROGRESS NOTE ADULT - SUBJECTIVE AND OBJECTIVE BOX
infectious diseases progress note:    Patient is a 88y old  Male who presents with a chief complaint of bacteremia (29 May 2019 05:07)        Bacteremia        ROS:  CONSTITUTIONAL:  Negative fever or chills, feels well, good appetite  EYES:  Negative  blurry vision or double vision  CARDIOVASCULAR:  Negative for chest pain or palpitations  RESPIRATORY:  Negative for cough, wheezing, or SOB   GASTROINTESTINAL:  Negative for nausea, vomiting, diarrhea, constipation, or abdominal pain  GENITOURINARY:  Negative frequency, urgency or dysuria  NEUROLOGIC:  No headache, confusion, dizziness, lightheadedness    Allergies    Keppra (Rash)  penicillin (Unknown)    Intolerances        ANTIBIOTICS/RELEVANT:  antimicrobials  ceftaroline fosamil IVPB 600 milliGRAM(s) IV Intermittent every 8 hours  DAPTOmycin IVPB 850 milliGRAM(s) IV Intermittent every 24 hours    immunologic:    OTHER:  acetaminophen   Tablet .. 650 milliGRAM(s) Oral every 6 hours PRN  ALBUTerol    90 MICROgram(s) HFA Inhaler 2 Puff(s) Inhalation every 6 hours PRN  amiodarone    Tablet 200 milliGRAM(s) Oral daily  apixaban 5 milliGRAM(s) Oral every 12 hours  atorvastatin 40 milliGRAM(s) Oral at bedtime  buDESOnide  80 MICROgram(s)/formoterol 4.5 MICROgram(s) Inhaler 2 Puff(s) Inhalation two times a day  clopidogrel Tablet 75 milliGRAM(s) Oral daily  docusate sodium 100 milliGRAM(s) Oral three times a day  finasteride 5 milliGRAM(s) Oral daily  lidocaine   Patch 1 Patch Transdermal daily  melatonin 3 milliGRAM(s) Oral at bedtime  metoprolol tartrate 25 milliGRAM(s) Oral two times a day  ondansetron    Tablet 4 milliGRAM(s) Oral every 8 hours PRN  oxyCODONE    IR 5 milliGRAM(s) Oral every 8 hours PRN  pantoprazole    Tablet 40 milliGRAM(s) Oral before breakfast  senna 2 Tablet(s) Oral at bedtime  spironolactone 25 milliGRAM(s) Oral daily  tamsulosin 0.4 milliGRAM(s) Oral at bedtime      Objective:  Vital Signs Last 24 Hrs  T(C): 36.7 (29 May 2019 04:04), Max: 36.7 (29 May 2019 04:04)  T(F): 98.1 (29 May 2019 04:04), Max: 98.1 (29 May 2019 04:04)  HR: 78 (29 May 2019 04:04) (71 - 84)  BP: 101/64 (29 May 2019 04:04) (97/60 - 138/75)  BP(mean): --  RR: 18 (29 May 2019 04:04) (18 - 18)  SpO2: 95% (29 May 2019 04:04) (95% - 98%)       Eyes:JESSEE, EOMI  Ear/Nose/Throat: no oral lesion, no sinus tenderness on percussion	  Neck:no JVD, no lymphadenopathy, supple  Respiratory: CTA marci  Cardiovascular: S1S2 RRR, no murmurs  Gastrointestinal:soft, (+) BS, no HSM  Extremities:no e/e/c        LABS:                        10.7   10.27 )-----------( 253      ( 27 May 2019 08:49 )             33.0     05-29    138  |  104  |  18  ----------------------------<  103<H>  3.8   |  22  |  0.99    Ca    9.0      29 May 2019 05:44              MICROBIOLOGY:    RECENT CULTURES:  05-27 @ 09:41 .Blood       Growth in aerobic bottle: Gram Positive Cocci in Pairs and Chains  Growth in anaerobic bottle: Gram Positive Cocci in Pairs and Chains           Growth in aerobic bottle: Gram Positive Cocci in Pairs and Chains  Growth in anaerobic bottle: Gram Positive Cocci in Pairs and Chains    05-26 @ 17:09 .Blood       Growth in aerobic bottle: Gram Positive Cocci in Pairs and Chains  Growth in anaerobic bottle: Gram Positive Cocci in Pairs and Chains           Growth in aerobic and anaerobic bottles: Enterococcus faecium  See previous culture [id number]          RESPIRATORY CULTURES:              RADIOLOGY & ADDITIONAL STUDIES:        Pager [phone]  After 5 pm/weekends or if no response :[phone]

## 2019-05-29 NOTE — PROGRESS NOTE ADULT - SUBJECTIVE AND OBJECTIVE BOX
CHIEF COMPLAINT: f/up sob, copd, osas, abnormal ct chest---"terrible"-infection concerns    Interval Events: floor bed-ID f/up    REVIEW OF SYSTEMS:  Constitutional: No fevers or chills. No weight loss. + fatigue or generalized malaise.  Eyes: No itching or discharge from the eyes  ENT: No ear pain. No ear discharge. No nasal congestion. No post nasal drip. No epistaxis. No throat pain. No sore throat. No difficulty swallowing.   CV: No chest pain. No palpitations. No lightheadedness or dizziness.   Resp: No dyspnea at rest. No dyspnea on exertion. No orthopnea. No wheezing. No cough. No stridor. No sputum production. No chest pain with respiration.  GI: No nausea. No vomiting. No diarrhea.  MSK: + joint pain or pain in any extremities-all over  Integumentary: No skin lesions. No pedal edema.  Neurological: No gross motor weakness. No sensory changes.  [+ ] All other systems negative  [ ] Unable to assess ROS because ________    OBJECTIVE:  ICU Vital Signs Last 24 Hrs  T(C): 36.7 (29 May 2019 04:04), Max: 36.7 (29 May 2019 04:04)  T(F): 98.1 (29 May 2019 04:04), Max: 98.1 (29 May 2019 04:04)  HR: 78 (29 May 2019 04:04) (71 - 84)  BP: 101/64 (29 May 2019 04:04) (97/60 - 138/75)  BP(mean): --  ABP: --  ABP(mean): --  RR: 18 (29 May 2019 04:04) (18 - 18)  SpO2: 95% (29 May 2019 04:04) (95% - 98%)        05-27 @ 07:01  -  05-28 @ 07:00  --------------------------------------------------------  IN: 930 mL / OUT: 0 mL / NET: 930 mL    05-28 @ 07:01  -  05-29 @ 05:07  --------------------------------------------------------  IN: 50 mL / OUT: 0 mL / NET: 50 mL      CAPILLARY BLOOD GLUCOSE      POCT Blood Glucose.: 104 mg/dL (28 May 2019 12:24)      PHYSICAL EXAM: NAD in bed RA  General: Awake, alert, oriented X 3.   HEENT: Atraumatic, normocephalic.                 Mallampatti Grade                 No nasal congestion.                No tonsillar or pharyngeal exudates.  Lymph Nodes: No palpable lymphadenopathy  Neck: No JVD. No carotid bruit.   Respiratory: Normal chest expansion                         Normal percussion                         Normal and equal air entry                         No wheeze, rhonchi or rales.  Cardiovascular: S1 S2 normal. No murmurs, rubs or gallops.   Abdomen: Soft, non-tender, non-distended. No organomegaly. Normoactive bowel sounds.  Extremities: Warm to touch. Peripheral pulse palpable. No pedal edema.   Skin: No rashes or skin lesions  Neurological: Motor and sensory examination equal and normal in all four extremities.  Psychiatry: Appropriate mood and affect.    HOSPITAL MEDICATIONS:  MEDICATIONS  (STANDING):  amiodarone    Tablet 200 milliGRAM(s) Oral daily  apixaban 5 milliGRAM(s) Oral every 12 hours  atorvastatin 40 milliGRAM(s) Oral at bedtime  buDESOnide  80 MICROgram(s)/formoterol 4.5 MICROgram(s) Inhaler 2 Puff(s) Inhalation two times a day  ceftaroline fosamil IVPB 600 milliGRAM(s) IV Intermittent every 8 hours  clopidogrel Tablet 75 milliGRAM(s) Oral daily  DAPTOmycin IVPB 850 milliGRAM(s) IV Intermittent every 24 hours  docusate sodium 100 milliGRAM(s) Oral three times a day  finasteride 5 milliGRAM(s) Oral daily  lidocaine   Patch 1 Patch Transdermal daily  melatonin 3 milliGRAM(s) Oral at bedtime  metoprolol tartrate 25 milliGRAM(s) Oral two times a day  pantoprazole    Tablet 40 milliGRAM(s) Oral before breakfast  senna 2 Tablet(s) Oral at bedtime  spironolactone 25 milliGRAM(s) Oral daily  tamsulosin 0.4 milliGRAM(s) Oral at bedtime    MEDICATIONS  (PRN):  acetaminophen   Tablet .. 650 milliGRAM(s) Oral every 6 hours PRN Moderate Pain (4 - 6)  ALBUTerol    90 MICROgram(s) HFA Inhaler 2 Puff(s) Inhalation every 6 hours PRN Shortness of Breath and/or Wheezing  ondansetron    Tablet 4 milliGRAM(s) Oral every 8 hours PRN Nausea and/or Vomiting  oxyCODONE    IR 5 milliGRAM(s) Oral every 8 hours PRN Moderate Pain (4 - 6)      LABS:                        10.7   10.27 )-----------( 253      ( 27 May 2019 08:49 )             33.0     05-28    138  |  104  |  21  ----------------------------<  96  3.9   |  24  |  1.18    Ca    8.8      28 May 2019 06:40    TPro  6.5  /  Alb  2.8<L>  /  TBili  0.6  /  DBili  x   /  AST  13  /  ALT  10  /  AlkPhos  126<H>  05-27              MICROBIOLOGY:     RADIOLOGY:  < from: Transesophageal Echocardiogram (05.28.19 @ 10:10) >  Conclusions:  1. Transcatheter aortic valve replacement (TAVR). Leaflets  appear mildly thickened with normal opening. No vegetations  seen associated with the TAVR Mild-moderate paravalvular  aortic regurgitation.  2. Severe left atrial enlargement.  Very dense smoke/  Spontaneous echo contrast seen in LA and BERTA. Decreased  left atrial appendage velocities noted.  3.Overall preserved left ventricular ejection fraction.  Septal motion consistent with conduction defect.  4. Right ventricular enlargement with decreased right  ventricular systolic function.  5. Normal tricuspid valve. Moderate tricuspid  regurgitation.  6. Estimated pulmonary artery systolic pressure equals 52  mm Hg, assuming right atrial pressure equals 8 mm Hg,  consistent with moderate pulmonary pressures.  7. No evidence of valvular vegetation is seen.  *** Compared with echocardiogram of 4/3/2019, no  significant changes noted.    < end of copied text >    [ ] Reviewed and interpreted by me    Point of Care Ultrasound Findings:    PFT:    EKG:

## 2019-05-29 NOTE — CHART NOTE - NSCHARTNOTEFT_GEN_A_CORE
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 Was admitted recently and dx with possible endocarditis, ppm removed, received dapto/ceftaroline through PICC, PICC was dc and patient has been off abx for 2 weeks, plan was to observe off abx for 3 weeks and repeat labs. Patient had labs/bcx done yesterday with primary care, bcx were positive, patient otherwise does not have new symptoms or feel ill.       Patient with unknown source of infection   Per Dr. Coronado please order MRI Lumbar and pelvis w/wo IV contrast   Reviewed chart with Dr. Coronado and Patient Mr. 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 Was admitted recently and dx with possible endocarditis, ppm removed, received dapto/ceftaroline through PICC, PICC was dc and patient has been off abx for 2 weeks, plan was to observe off abx for 3 weeks and repeat labs. Patient had labs/bcx done yesterday with primary care, bcx were positive, patient otherwise does not have new symptoms or feel ill.       Per Dr. Coronado please order MRI Lumbar and pelvis w/wo IV contrast   Order populated with MRI Contraindication for PPM and Vascular clips  Reviewed chart with Dr. Coronado and Patient Mr. Thompson A+O x 3  Patient s/p PPM removal and biomechanical TAVR   Patient Denies any metal within body cavity   Dr. Coronado reports no contraindications for MR    Patient with history of MR lumbar and pelvis completed on March 27, 2019 without complications    Will continue to monitor      Jesica Walker NP   Internal Medicine

## 2019-05-29 NOTE — PROGRESS NOTE ADULT - SUBJECTIVE AND OBJECTIVE BOX
Subjective: Patient seen and examined. No new events except as noted.   doing okay  no acute complaints, back pain     REVIEW OF SYSTEMS:    CONSTITUTIONAL: No weakness, fevers or chills  EYES/ENT: No visual changes;  No vertigo or throat pain   NECK: No pain or stiffness  RESPIRATORY: No cough, wheezing, hemoptysis; No shortness of breath  CARDIOVASCULAR: No chest pain or palpitations  GASTROINTESTINAL: No abdominal or epigastric pain. No nausea, vomiting, or hematemesis; No diarrhea or constipation. No melena or hematochezia.  GENITOURINARY: No dysuria, frequency or hematuria  NEUROLOGICAL: No numbness or weakness  SKIN: No itching, burning, rashes, or lesions   All other review of systems is negative unless indicated above.    MEDICATIONS:  MEDICATIONS  (STANDING):  amiodarone    Tablet 200 milliGRAM(s) Oral daily  atorvastatin 40 milliGRAM(s) Oral at bedtime  buDESOnide  80 MICROgram(s)/formoterol 4.5 MICROgram(s) Inhaler 2 Puff(s) Inhalation two times a day  ceftaroline fosamil IVPB 600 milliGRAM(s) IV Intermittent every 8 hours  clopidogrel Tablet 75 milliGRAM(s) Oral daily  DAPTOmycin IVPB 850 milliGRAM(s) IV Intermittent every 24 hours  docusate sodium 100 milliGRAM(s) Oral three times a day  finasteride 5 milliGRAM(s) Oral daily  lidocaine   Patch 1 Patch Transdermal daily  melatonin 3 milliGRAM(s) Oral at bedtime  metoprolol tartrate 25 milliGRAM(s) Oral two times a day  pantoprazole    Tablet 40 milliGRAM(s) Oral before breakfast  senna 2 Tablet(s) Oral at bedtime  spironolactone 25 milliGRAM(s) Oral daily  tamsulosin 0.4 milliGRAM(s) Oral at bedtime      PHYSICAL EXAM:  T(C): 36.7 (05-29-19 @ 04:04), Max: 36.7 (05-29-19 @ 04:04)  HR: 79 (05-29-19 @ 09:38) (75 - 84)  BP: 90/46 (05-29-19 @ 09:59) (90/45 - 138/75)  RR: 18 (05-29-19 @ 09:38) (18 - 18)  SpO2: 98% (05-29-19 @ 09:38) (95% - 98%)  Wt(kg): --  I&O's Summary    28 May 2019 07:01  -  29 May 2019 07:00  --------------------------------------------------------  IN: 170 mL / OUT: 0 mL / NET: 170 mL    29 May 2019 07:01  -  29 May 2019 10:36  --------------------------------------------------------  IN: 360 mL / OUT: 0 mL / NET: 360 mL          Appearance: Normal	  HEENT:   Normal oral mucosa, PERRL, EOMI	  Lymphatic: No lymphadenopathy , no edema  Cardiovascular: S1S2, systolic murmur   Respiratory: Lungs clear to auscultation, normal effort 	  Gastrointestinal:  Soft, Non-tender, + BS	  Skin: No rashes, No ecchymoses, No cyanosis, warm to touch  Musculoskeletal: Normal range of motion, normal strength  Psychiatry:  Mood & affect appropriate  Ext: No edema      All labs, Imaging and EKGs personally reviewed                           10.0   9.12  )-----------( 242      ( 29 May 2019 08:34 )             31.9               05-29    138  |  104  |  18  ----------------------------<  103<H>  3.8   |  22  |  0.99    Ca    9.0      29 May 2019 05:44    < from: Transesophageal Echocardiogram (05.28.19 @ 10:10) >  Conclusions:  1. Transcatheter aortic valve replacement (TAVR). Leaflets  appear mildly thickened with normal opening. No vegetations  seen associated with the TAVR Mild-moderate paravalvular  aortic regurgitation.  2. Severe left atrial enlargement.  Very dense smoke/  Spontaneous echo contrast seen in LA and BERTA. Decreased  left atrial appendage velocities noted.  3.Overall preserved left ventricular ejection fraction.  Septal motion consistent with conduction defect.  4. Right ventricular enlargement with decreased right  ventricular systolic function.  5. Normal tricuspid valve. Moderate tricuspid  regurgitation.  6. Estimated pulmonary artery systolic pressure equals 52  mm Hg, assuming right atrial pressure equals 8 mm Hg,  consistent with moderate pulmonary pressures.  7. No evidence of valvular vegetation is seen.  *** Compared with echocardiogram of 4/3/2019, no  significant changes noted.

## 2019-05-30 LAB
ANION GAP SERPL CALC-SCNC: 11 MMOL/L — SIGNIFICANT CHANGE UP (ref 5–17)
BUN SERPL-MCNC: 18 MG/DL — SIGNIFICANT CHANGE UP (ref 7–23)
CALCIUM SERPL-MCNC: 8.8 MG/DL — SIGNIFICANT CHANGE UP (ref 8.4–10.5)
CHLORIDE SERPL-SCNC: 103 MMOL/L — SIGNIFICANT CHANGE UP (ref 96–108)
CO2 SERPL-SCNC: 22 MMOL/L — SIGNIFICANT CHANGE UP (ref 22–31)
CREAT SERPL-MCNC: 1.03 MG/DL — SIGNIFICANT CHANGE UP (ref 0.5–1.3)
GLUCOSE SERPL-MCNC: 91 MG/DL — SIGNIFICANT CHANGE UP (ref 70–99)
GRAM STN FLD: SIGNIFICANT CHANGE UP
HCT VFR BLD CALC: 29.7 % — LOW (ref 39–50)
HGB BLD-MCNC: 9.4 G/DL — LOW (ref 13–17)
MCHC RBC-ENTMCNC: 27.2 PG — SIGNIFICANT CHANGE UP (ref 27–34)
MCHC RBC-ENTMCNC: 31.6 GM/DL — LOW (ref 32–36)
MCV RBC AUTO: 86.1 FL — SIGNIFICANT CHANGE UP (ref 80–100)
PLATELET # BLD AUTO: 212 K/UL — SIGNIFICANT CHANGE UP (ref 150–400)
POTASSIUM SERPL-MCNC: 3.7 MMOL/L — SIGNIFICANT CHANGE UP (ref 3.5–5.3)
POTASSIUM SERPL-SCNC: 3.7 MMOL/L — SIGNIFICANT CHANGE UP (ref 3.5–5.3)
RBC # BLD: 3.45 M/UL — LOW (ref 4.2–5.8)
RBC # FLD: 15.5 % — HIGH (ref 10.3–14.5)
SODIUM SERPL-SCNC: 136 MMOL/L — SIGNIFICANT CHANGE UP (ref 135–145)
SPECIMEN SOURCE: SIGNIFICANT CHANGE UP
SPECIMEN SOURCE: SIGNIFICANT CHANGE UP
WBC # BLD: 8.06 K/UL — SIGNIFICANT CHANGE UP (ref 3.8–10.5)
WBC # FLD AUTO: 8.06 K/UL — SIGNIFICANT CHANGE UP (ref 3.8–10.5)

## 2019-05-30 PROCEDURE — 99232 SBSQ HOSP IP/OBS MODERATE 35: CPT

## 2019-05-30 PROCEDURE — 71045 X-RAY EXAM CHEST 1 VIEW: CPT | Mod: 26

## 2019-05-30 PROCEDURE — 72158 MRI LUMBAR SPINE W/O & W/DYE: CPT | Mod: 26

## 2019-05-30 RX ORDER — APIXABAN 2.5 MG/1
5 TABLET, FILM COATED ORAL EVERY 12 HOURS
Refills: 0 | Status: DISCONTINUED | OUTPATIENT
Start: 2019-05-30 | End: 2019-06-10

## 2019-05-30 RX ADMIN — CLOPIDOGREL BISULFATE 75 MILLIGRAM(S): 75 TABLET, FILM COATED ORAL at 12:03

## 2019-05-30 RX ADMIN — Medication 100 MILLIGRAM(S): at 22:12

## 2019-05-30 RX ADMIN — CEFTAROLINE FOSAMIL 50 MILLIGRAM(S): 600 POWDER, FOR SOLUTION INTRAVENOUS at 14:17

## 2019-05-30 RX ADMIN — LIDOCAINE 1 PATCH: 4 CREAM TOPICAL at 18:00

## 2019-05-30 RX ADMIN — SPIRONOLACTONE 25 MILLIGRAM(S): 25 TABLET, FILM COATED ORAL at 06:29

## 2019-05-30 RX ADMIN — LIDOCAINE 1 PATCH: 4 CREAM TOPICAL at 06:30

## 2019-05-30 RX ADMIN — BUDESONIDE AND FORMOTEROL FUMARATE DIHYDRATE 2 PUFF(S): 160; 4.5 AEROSOL RESPIRATORY (INHALATION) at 17:54

## 2019-05-30 RX ADMIN — BUDESONIDE AND FORMOTEROL FUMARATE DIHYDRATE 2 PUFF(S): 160; 4.5 AEROSOL RESPIRATORY (INHALATION) at 06:30

## 2019-05-30 RX ADMIN — Medication 650 MILLIGRAM(S): at 06:32

## 2019-05-30 RX ADMIN — FINASTERIDE 5 MILLIGRAM(S): 5 TABLET, FILM COATED ORAL at 12:03

## 2019-05-30 RX ADMIN — CEFTAROLINE FOSAMIL 50 MILLIGRAM(S): 600 POWDER, FOR SOLUTION INTRAVENOUS at 06:30

## 2019-05-30 RX ADMIN — TAMSULOSIN HYDROCHLORIDE 0.4 MILLIGRAM(S): 0.4 CAPSULE ORAL at 22:13

## 2019-05-30 RX ADMIN — Medication 25 MILLIGRAM(S): at 17:52

## 2019-05-30 RX ADMIN — Medication 650 MILLIGRAM(S): at 15:17

## 2019-05-30 RX ADMIN — LIDOCAINE 1 PATCH: 4 CREAM TOPICAL at 12:03

## 2019-05-30 RX ADMIN — SENNA PLUS 2 TABLET(S): 8.6 TABLET ORAL at 22:12

## 2019-05-30 RX ADMIN — Medication 650 MILLIGRAM(S): at 16:24

## 2019-05-30 RX ADMIN — Medication 100 MILLIGRAM(S): at 14:17

## 2019-05-30 RX ADMIN — DAPTOMYCIN 134 MILLIGRAM(S): 500 INJECTION, POWDER, LYOPHILIZED, FOR SOLUTION INTRAVENOUS at 15:20

## 2019-05-30 RX ADMIN — Medication 25 MILLIGRAM(S): at 06:29

## 2019-05-30 RX ADMIN — APIXABAN 5 MILLIGRAM(S): 2.5 TABLET, FILM COATED ORAL at 17:59

## 2019-05-30 RX ADMIN — Medication 1 MILLIGRAM(S): at 17:49

## 2019-05-30 RX ADMIN — Medication 650 MILLIGRAM(S): at 07:02

## 2019-05-30 RX ADMIN — AMIODARONE HYDROCHLORIDE 200 MILLIGRAM(S): 400 TABLET ORAL at 06:29

## 2019-05-30 RX ADMIN — PANTOPRAZOLE SODIUM 40 MILLIGRAM(S): 20 TABLET, DELAYED RELEASE ORAL at 06:29

## 2019-05-30 RX ADMIN — CEFTAROLINE FOSAMIL 50 MILLIGRAM(S): 600 POWDER, FOR SOLUTION INTRAVENOUS at 22:12

## 2019-05-30 RX ADMIN — ATORVASTATIN CALCIUM 40 MILLIGRAM(S): 80 TABLET, FILM COATED ORAL at 22:13

## 2019-05-30 RX ADMIN — Medication 3 MILLIGRAM(S): at 22:12

## 2019-05-30 NOTE — PROGRESS NOTE ADULT - SUBJECTIVE AND OBJECTIVE BOX
CHIEF COMPLAINT:  f/up sob, copd, osas, abnormal ct chest--arm/shoulder/back etc pain-sob on exertion    Interval Events: ambulating well    REVIEW OF SYSTEMS:  Constitutional: No fevers or chills. No weight loss. + fatigue or generalized malaise.  Eyes: No itching or discharge from the eyes  ENT: No ear pain. No ear discharge. No nasal congestion. No post nasal drip. No epistaxis. No throat pain. No sore throat. No difficulty swallowing.   CV: No chest pain. No palpitations. No lightheadedness or dizziness.   Resp: No dyspnea at rest. + dyspnea on exertion. No orthopnea. No wheezing. No cough. No stridor. No sputum production. No chest pain with respiration.  GI: No nausea. No vomiting. No diarrhea.  MSK: ++ joint pain or pain in any extremities  Integumentary: No skin lesions. No pedal edema.  Neurological: + gross motor weakness. No sensory changes.  [+ ] All other systems negative  [ ] Unable to assess ROS because ________    OBJECTIVE:  ICU Vital Signs Last 24 Hrs  T(C): 36.6 (30 May 2019 04:47), Max: 37 (29 May 2019 14:12)  T(F): 97.8 (30 May 2019 04:47), Max: 98.6 (29 May 2019 14:12)  HR: 74 (30 May 2019 04:47) (71 - 79)  BP: 108/63 (30 May 2019 04:47) (90/45 - 120/71)  BP(mean): --  ABP: --  ABP(mean): --  RR: 17 (30 May 2019 04:47) (17 - 18)  SpO2: 96% (30 May 2019 04:47) (96% - 99%)        05-28 @ 07:01  -  05-29 @ 07:00  --------------------------------------------------------  IN: 170 mL / OUT: 0 mL / NET: 170 mL    05-29 @ 07:01  -  05-30 @ 05:11  --------------------------------------------------------  IN: 1020 mL / OUT: 300 mL / NET: 720 mL      CAPILLARY BLOOD GLUCOSE      POCT Blood Glucose.: 104 mg/dL (28 May 2019 12:24)      PHYSICAL EXAM: NAD in bed on RA  General: Awake, alert, oriented X 3.   HEENT: Atraumatic, normocephalic.                 Mallampatti Grade 3                No nasal congestion.                No tonsillar or pharyngeal exudates.  Lymph Nodes: No palpable lymphadenopathy  Neck: No JVD. No carotid bruit.   Respiratory: Normal chest expansion                         Normal percussion                         Normal and equal air entry                         No wheeze, rhonchi or rales.  Cardiovascular: S1 S2 normal. + murmurs, rubs or gallops.   Abdomen: Soft, non-tender, non-distended. No organomegaly. Normoactive bowel sounds.  Extremities: Warm to touch. Peripheral pulse palpable. No pedal edema.   Skin: No rashes or skin lesions  Neurological: Motor and sensory examination equal and normal in all four extremities.  Psychiatry: Appropriate mood and affect.    HOSPITAL MEDICATIONS:  MEDICATIONS  (STANDING):  amiodarone    Tablet 200 milliGRAM(s) Oral daily  atorvastatin 40 milliGRAM(s) Oral at bedtime  buDESOnide  80 MICROgram(s)/formoterol 4.5 MICROgram(s) Inhaler 2 Puff(s) Inhalation two times a day  ceftaroline fosamil IVPB 600 milliGRAM(s) IV Intermittent every 8 hours  clopidogrel Tablet 75 milliGRAM(s) Oral daily  DAPTOmycin IVPB 850 milliGRAM(s) IV Intermittent every 24 hours  docusate sodium 100 milliGRAM(s) Oral three times a day  finasteride 5 milliGRAM(s) Oral daily  lidocaine   Patch 1 Patch Transdermal daily  melatonin 3 milliGRAM(s) Oral at bedtime  metoprolol tartrate 25 milliGRAM(s) Oral two times a day  pantoprazole    Tablet 40 milliGRAM(s) Oral before breakfast  senna 2 Tablet(s) Oral at bedtime  spironolactone 25 milliGRAM(s) Oral daily  tamsulosin 0.4 milliGRAM(s) Oral at bedtime    MEDICATIONS  (PRN):  acetaminophen   Tablet .. 650 milliGRAM(s) Oral every 6 hours PRN Moderate Pain (4 - 6)  ALBUTerol    90 MICROgram(s) HFA Inhaler 2 Puff(s) Inhalation every 6 hours PRN Shortness of Breath and/or Wheezing  ondansetron    Tablet 4 milliGRAM(s) Oral every 8 hours PRN Nausea and/or Vomiting  oxyCODONE    IR 5 milliGRAM(s) Oral every 8 hours PRN Moderate Pain (4 - 6)      LABS:                        10.0   9.12  )-----------( 242      ( 29 May 2019 08:34 )             31.9     05-29    138  |  104  |  18  ----------------------------<  103<H>  3.8   |  22  |  0.99    Ca    9.0      29 May 2019 05:44                MICROBIOLOGY:     RADIOLOGY:  [ ] Reviewed and interpreted by me    Point of Care Ultrasound Findings:    PFT:    EKG:

## 2019-05-30 NOTE — PROGRESS NOTE ADULT - ASSESSMENT
87 yr old with CKD, COPD, BPH, PPM, Y AVR due to AS, CHF. 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    1. COPD - stable  c/w Symbicort BID and Albuterol PRN, Spiriva  Out of bed to chair, incentive spirometry.  2. 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  Lower back and shoulder pain - patient states this is chronic but recently worsened. If continues would consider imaging to better evaluate  *************  5/29-await results of cx; re-scan of back likely  5/30-ID/CTS f/up-picc placed (Roman et al)

## 2019-05-30 NOTE — PHYSICAL THERAPY INITIAL EVALUATION ADULT - LIVES WITH, PROFILE
Pt resides alone in private house house.  Prior to admission was independent with adls and ambulating with a cane.  Pt currently staying at his daughter's house with 2 stairs to entrance and 13 steps to negotiate to second floor bedroom./alone Pt resides alone in private house although he reports since recent rehab has stayed at daughters home.  Prior to admission was independent ambulating with cane and reports independent with ADL's with difficulty.  Pt's daughters home with 2 stairs to entrance and 13 steps to negotiate to second floor bedroom./alone

## 2019-05-30 NOTE — PROGRESS NOTE ADULT - PROBLEM SELECTOR PLAN 1
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

## 2019-05-30 NOTE — PROVIDER CONTACT NOTE (CRITICAL VALUE NOTIFICATION) - SITUATION
blood culture 5/26- both aerobic and anaerobic for vre in all five bottles, final report blood culture 5/26- both aerobic and anaerobic positive for vre in all five bottles, final report

## 2019-05-30 NOTE — PROGRESS NOTE ADULT - SUBJECTIVE AND OBJECTIVE BOX
RUMA BANEGAS:[id number],   88yMale followed for:  Keppra (Rash)  penicillin (Unknown)    PAST MEDICAL & SURGICAL HISTORY:  CKD (chronic kidney disease) stage 3, GFR 30-59 ml/min  Hepatitis B  PTSD (post-traumatic stress disorder)  Tachy-idris syndrome  TIA (transient ischemic attack): 2010  Seizure  DIEGO (obstructive sleep apnea)  Chronic diastolic HF (heart failure)  Aortic stenosis  CVA (cerebral vascular accident)  Gait instability  COPD (chronic obstructive pulmonary disease)  BPH (benign prostatic hyperplasia)  Asthma  CHF (congestive heart failure)  Atrial fibrillation  Pacemaker: 2015 Medtronic [id number]  [id number]  Hypertension  H/O thalassemia  CAD (coronary artery disease): s/p stent 2010  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  Artificial pacemaker  S/P primary angioplasty with coronary stent: 2012 AND 2017    FAMILY HISTORY:  No pertinent family history in first degree relatives    MEDICATIONS  (STANDING):  amiodarone    Tablet 200 milliGRAM(s) Oral daily  atorvastatin 40 milliGRAM(s) Oral at bedtime  buDESOnide  80 MICROgram(s)/formoterol 4.5 MICROgram(s) Inhaler 2 Puff(s) Inhalation two times a day  ceftaroline fosamil IVPB 600 milliGRAM(s) IV Intermittent every 8 hours  clopidogrel Tablet 75 milliGRAM(s) Oral daily  DAPTOmycin IVPB 850 milliGRAM(s) IV Intermittent every 24 hours  docusate sodium 100 milliGRAM(s) Oral three times a day  finasteride 5 milliGRAM(s) Oral daily  lidocaine   Patch 1 Patch Transdermal daily  melatonin 3 milliGRAM(s) Oral at bedtime  metoprolol tartrate 25 milliGRAM(s) Oral two times a day  pantoprazole    Tablet 40 milliGRAM(s) Oral before breakfast  senna 2 Tablet(s) Oral at bedtime  spironolactone 25 milliGRAM(s) Oral daily  tamsulosin 0.4 milliGRAM(s) Oral at bedtime    MEDICATIONS  (PRN):  acetaminophen   Tablet .. 650 milliGRAM(s) Oral every 6 hours PRN Moderate Pain (4 - 6)  ALBUTerol    90 MICROgram(s) HFA Inhaler 2 Puff(s) Inhalation every 6 hours PRN Shortness of Breath and/or Wheezing  ondansetron    Tablet 4 milliGRAM(s) Oral every 8 hours PRN Nausea and/or Vomiting  oxyCODONE    IR 5 milliGRAM(s) Oral every 8 hours PRN Moderate Pain (4 - 6)      Vital Signs Last 24 Hrs  T(C): 36.6 (30 May 2019 04:47), Max: 37 (29 May 2019 14:12)  T(F): 97.8 (30 May 2019 04:47), Max: 98.6 (29 May 2019 14:12)  HR: 74 (30 May 2019 04:47) (71 - 79)  BP: 108/63 (30 May 2019 04:47) (90/45 - 120/71)  BP(mean): --  RR: 17 (30 May 2019 04:47) (17 - 18)  SpO2: 96% (30 May 2019 04:47) (96% - 99%)  nc/at  s1s2  cta  soft, nt, nd no guarding or rebound  no c/c/e    CBC Full  -  ( 30 May 2019 08:55 )  WBC Count : 8.06 K/uL  RBC Count : 3.45 M/uL  Hemoglobin : 9.4 g/dL  Hematocrit : 29.7 %  Platelet Count - Automated : 212 K/uL  Mean Cell Volume : 86.1 fl  Mean Cell Hemoglobin : 27.2 pg  Mean Cell Hemoglobin Concentration : 31.6 gm/dL  Auto Neutrophil # : x  Auto Lymphocyte # : x  Auto Monocyte # : x  Auto Eosinophil # : x  Auto Basophil # : x  Auto Neutrophil % : x  Auto Lymphocyte % : x  Auto Monocyte % : x  Auto Eosinophil % : x  Auto Basophil % : x    05-30    136  |  103  |  18  ----------------------------<  91  3.7   |  22  |  1.03    Ca    8.8      30 May 2019 06:38

## 2019-05-30 NOTE — PHYSICAL THERAPY INITIAL EVALUATION ADULT - CRITERIA FOR SKILLED THERAPEUTIC INTERVENTIONS
impairments found/anticipated discharge recommendation/functional limitations in following categories/therapy frequency/risk reduction/prevention/rehab potential

## 2019-05-30 NOTE — PHYSICAL THERAPY INITIAL EVALUATION ADULT - PERTINENT HX OF CURRENT PROBLEM, REHAB EVAL
87 yr old with CKD, COPD, BPH, PPM, Y AVR due to AS, CHF. 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

## 2019-05-30 NOTE — PROGRESS NOTE ADULT - ASSESSMENT
87 yr old with CKD, COPD, BPH, PPM, Y AVR due to AS, CHF. 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 Downey ,  Thre is no good oral suppression medication  for  VREC   We would repeat the MRI of his lumbar spine although it would be most unusal for it to be causing bacteremia even if ther was om present   last time it took two weeks for his bc to turn negative      the high grade bacteremia is almost always a sign of an intravascular focus  we can try a prolong course of po zyvoxx after he responds down the line but his strain is resistant to ampicillin and the dapto NICK is   not   great.   ary to be discussed with other ID colleagues

## 2019-05-30 NOTE — PROGRESS NOTE ADULT - ASSESSMENT
casediscussed with dr. barnett who is storngly in favor of endoscopic evaluation.  recommend stool guic.  fulll workup e/c if no source found and id/ medicine in agreement.

## 2019-05-30 NOTE — PROGRESS NOTE ADULT - SUBJECTIVE AND OBJECTIVE BOX
infectious diseases progress note:    Patient is a 88y old  Male who presents with a chief complaint of bacteremia (30 May 2019 05:10)        Bacteremia        ROS:  CONSTITUTIONAL:  Negative fever or chills, feels well, good appetite  EYES:  Negative  blurry vision or double vision  CARDIOVASCULAR:  Negative for chest pain or palpitations  RESPIRATORY:  Negative for cough, wheezing, or SOB   GASTROINTESTINAL:  Negative for nausea, vomiting, diarrhea, constipation, or abdominal pain  GENITOURINARY:  Negative frequency, urgency or dysuria  NEUROLOGIC:  No headache, confusion, dizziness, lightheadedness    Allergies    Keppra (Rash)  penicillin (Unknown)    Intolerances        ANTIBIOTICS/RELEVANT:  antimicrobials  ceftaroline fosamil IVPB 600 milliGRAM(s) IV Intermittent every 8 hours  DAPTOmycin IVPB 850 milliGRAM(s) IV Intermittent every 24 hours    immunologic:    OTHER:  acetaminophen   Tablet .. 650 milliGRAM(s) Oral every 6 hours PRN  ALBUTerol    90 MICROgram(s) HFA Inhaler 2 Puff(s) Inhalation every 6 hours PRN  amiodarone    Tablet 200 milliGRAM(s) Oral daily  atorvastatin 40 milliGRAM(s) Oral at bedtime  buDESOnide  80 MICROgram(s)/formoterol 4.5 MICROgram(s) Inhaler 2 Puff(s) Inhalation two times a day  clopidogrel Tablet 75 milliGRAM(s) Oral daily  docusate sodium 100 milliGRAM(s) Oral three times a day  finasteride 5 milliGRAM(s) Oral daily  lidocaine   Patch 1 Patch Transdermal daily  melatonin 3 milliGRAM(s) Oral at bedtime  metoprolol tartrate 25 milliGRAM(s) Oral two times a day  ondansetron    Tablet 4 milliGRAM(s) Oral every 8 hours PRN  oxyCODONE    IR 5 milliGRAM(s) Oral every 8 hours PRN  pantoprazole    Tablet 40 milliGRAM(s) Oral before breakfast  senna 2 Tablet(s) Oral at bedtime  spironolactone 25 milliGRAM(s) Oral daily  tamsulosin 0.4 milliGRAM(s) Oral at bedtime      Objective:  Vital Signs Last 24 Hrs  T(C): 36.6 (30 May 2019 04:47), Max: 37 (29 May 2019 14:12)  T(F): 97.8 (30 May 2019 04:47), Max: 98.6 (29 May 2019 14:12)  HR: 74 (30 May 2019 04:47) (71 - 79)  BP: 108/63 (30 May 2019 04:47) (90/45 - 120/71)  BP(mean): --  RR: 17 (30 May 2019 04:47) (17 - 18)  SpO2: 96% (30 May 2019 04:47) (96% - 99%)    PHYSICAL EXAM:  Constitutional:Well-developed, well nourished--no acute distress  Eyes:JESSEE, EOMI  Ear/Nose/Throat: no oral lesion, no sinus tenderness on percussion	  Neck:no JVD, no lymphadenopathy, supple  Respiratory: CTA marci  Cardiovascular: S1S2 RRR, no murmurs  Gastrointestinal:soft, (+) BS, no HSM  Extremities:no e/e/c        LABS:                        10.0   9.12  )-----------( 242      ( 29 May 2019 08:34 )             31.9     05-30    136  |  103  |  18  ----------------------------<  91  3.7   |  22  |  1.03    Ca    8.8      30 May 2019 06:38              MICROBIOLOGY:    RECENT CULTURES:  05-27 @ 09:41 .Blood       Growth in aerobic bottle: Gram Positive Cocci in Pairs and Chains  Growth in anaerobic bottle: Gram Positive Cocci in Pairs and Chains           Growth in aerobic and anaerobic bottles: Enterococcus faecium (vancomycin  resistant)  See previous culture [id number]    05-26 @ 17:09 .Blood   NICK    Growth in aerobic bottle: Gram Positive Cocci in Pairs and Chains  Growth in anaerobic bottle: Gram Positive Cocci in Pairs and Chains    Enterococcus faecium (vancomycin resistant)  Enterococcus faecium (vancomycin resistant)     Growth in aerobic and anaerobic bottles: Enterococcus faecium (vancomycin  resistant)  See previous culture [id number]          RESPIRATORY CULTURES:              RADIOLOGY & ADDITIONAL STUDIES:        Pager [phone]  After 5 pm/weekends or if no response :[phone]

## 2019-05-30 NOTE — PROGRESS NOTE ADULT - SUBJECTIVE AND OBJECTIVE BOX
Subjective: Patient seen and examined. No new events except as noted.   doing well  PT today  MRi pending     REVIEW OF SYSTEMS:    CONSTITUTIONAL: No weakness, fevers or chills  EYES/ENT: No visual changes;  No vertigo or throat pain   NECK: No pain or stiffness  RESPIRATORY: No cough, wheezing, hemoptysis; No shortness of breath  CARDIOVASCULAR: No chest pain or palpitations  GASTROINTESTINAL: No abdominal or epigastric pain. No nausea, vomiting, or hematemesis; No diarrhea or constipation. No melena or hematochezia.  GENITOURINARY: No dysuria, frequency or hematuria  NEUROLOGICAL: No numbness or weakness  SKIN: No itching, burning, rashes, or lesions   All other review of systems is negative unless indicated above.    MEDICATIONS:  MEDICATIONS  (STANDING):  amiodarone    Tablet 200 milliGRAM(s) Oral daily  apixaban 5 milliGRAM(s) Oral every 12 hours  atorvastatin 40 milliGRAM(s) Oral at bedtime  buDESOnide  80 MICROgram(s)/formoterol 4.5 MICROgram(s) Inhaler 2 Puff(s) Inhalation two times a day  ceftaroline fosamil IVPB 600 milliGRAM(s) IV Intermittent every 8 hours  clopidogrel Tablet 75 milliGRAM(s) Oral daily  DAPTOmycin IVPB 850 milliGRAM(s) IV Intermittent every 24 hours  docusate sodium 100 milliGRAM(s) Oral three times a day  finasteride 5 milliGRAM(s) Oral daily  lidocaine   Patch 1 Patch Transdermal daily  LORazepam     Tablet 1 milliGRAM(s) Oral once  melatonin 3 milliGRAM(s) Oral at bedtime  metoprolol tartrate 25 milliGRAM(s) Oral two times a day  pantoprazole    Tablet 40 milliGRAM(s) Oral before breakfast  senna 2 Tablet(s) Oral at bedtime  spironolactone 25 milliGRAM(s) Oral daily  tamsulosin 0.4 milliGRAM(s) Oral at bedtime      PHYSICAL EXAM:  T(C): 36.3 (05-30-19 @ 13:24), Max: 36.6 (05-29-19 @ 21:20)  HR: 87 (05-30-19 @ 13:24) (70 - 87)  BP: 125/70 (05-30-19 @ 13:24) (106/66 - 125/70)  RR: 17 (05-30-19 @ 13:24) (17 - 17)  SpO2: 95% (05-30-19 @ 13:24) (95% - 97%)  Wt(kg): --  I&O's Summary    29 May 2019 07:01  -  30 May 2019 07:00  --------------------------------------------------------  IN: 1220 mL / OUT: 300 mL / NET: 920 mL    30 May 2019 07:01  -  30 May 2019 17:00  --------------------------------------------------------  IN: 700 mL / OUT: 500 mL / NET: 200 mL          Appearance: Normal	  HEENT:   Normal oral mucosa, PERRL, EOMI	  Lymphatic: No lymphadenopathy , no edema  Cardiovascular: S1S2 irregularly irregular    Respiratory: Lungs clear to auscultation, normal effort 	  Gastrointestinal:  Soft, Non-tender, + BS	  Skin: No rashes, No ecchymoses, No cyanosis, warm to touch  Musculoskeletal: back pain   Psychiatry:  Mood & affect appropriate  Ext: No edema      All labs, Imaging and EKGs personally reviewed                           9.4    8.06  )-----------( 212      ( 30 May 2019 08:55 )             29.7               05-30    136  |  103  |  18  ----------------------------<  91  3.7   |  22  |  1.03    Ca    8.8      30 May 2019 06:38

## 2019-05-30 NOTE — PROGRESS NOTE ADULT - SUBJECTIVE AND OBJECTIVE BOX
Patient is a 88y old  Male who presents with a chief complaint of bacteremia (30 May 2019 17:00)      INTERVAL HISTORY: feels ok  	  MEDICATIONS:  amiodarone    Tablet 200 milliGRAM(s) Oral daily  metoprolol tartrate 25 milliGRAM(s) Oral two times a day  spironolactone 25 milliGRAM(s) Oral daily  tamsulosin 0.4 milliGRAM(s) Oral at bedtime        PHYSICAL EXAM:  T(C): 36.7 (05-30-19 @ 20:19), Max: 36.7 (05-30-19 @ 20:19)  HR: 72 (05-30-19 @ 20:19) (70 - 87)  BP: 104/64 (05-30-19 @ 20:19) (104/64 - 125/70)  RR: 18 (05-30-19 @ 20:19) (17 - 18)  SpO2: 96% (05-30-19 @ 20:19) (95% - 97%)  Wt(kg): --  I&O's Summary    29 May 2019 07:01  -  30 May 2019 07:00  --------------------------------------------------------  IN: 1220 mL / OUT: 300 mL / NET: 920 mL    30 May 2019 07:01  -  30 May 2019 23:06  --------------------------------------------------------  IN: 700 mL / OUT: 800 mL / NET: -100 mL          Appearance: In no distress	  HEENT:    PERRL, EOMI	  Cardiovascular:  S1 S2, No JVD  Respiratory: Lungs clear to auscultation	  Gastrointestinal:  Soft, Non-tender, + BS	  Extremities:  No edema of LE                                9.4    8.06  )-----------( 212      ( 30 May 2019 08:55 )             29.7     05-30    136  |  103  |  18  ----------------------------<  91  3.7   |  22  |  1.03    Ca    8.8      30 May 2019 06:38          Labs personally reviewed      Assessment and Plan:   Assessment:  · Assessment		  Patient is a 87 y/o male with PMH SSS/a fib on eliquis, 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 Was admitted recently and dx with possible endocarditis, ppm removed, received dapto/ceftaroline through PICC, PICC was dc and patient has been off abx for 2 weeks, plan was to observe off abx for 3 weeks and repeat labs. Patient had labs/bcx done yesterday with primary care, bcx were positive, patient otherwise does not have new symptoms or feel ill.     Problem/Plan - 1:  ·  Problem: Bacteremia.  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  Plan: continue with aldactone 25mg, euvolemic at this time    Problem/Plan - 3:  ·  Problem: Aortic stenosis.  Plan: S/P TAVR   - ?bioprosthetic valve endocarditis  - high risk candidate for open valve replacement    Problem/Plan - 4:  Problem: Atrial fibrillation. Plan: On eliquis, Rate controlled on Metoprolol and Amio 100mg daily    Problem/Plan - 5:  ·  Problem: CAD (coronary artery disease).  Plan: On Plavix given TAVR, c/w statin.         Timothy Paredes DO [facility]  Cardiovascular Medicine  [phone]

## 2019-05-30 NOTE — PHYSICAL THERAPY INITIAL EVALUATION ADULT - DIAGNOSIS, PT EVAL
Pt p/w impaired strength, balance, endurance, cardiopulmonary deconditioning resulting in mobility deficits.

## 2019-05-30 NOTE — PROGRESS NOTE ADULT - ATTENDING COMMENTS
as above-stable-await PICC placement (re-evaluation by CTS/ID f/up-[place])  ID-bacteremia--on dapto/ceftaroline-f/up all cx-? bone studies (mri today)  COPD-symbiocrt/spiriva, incentive spirometry/acapella  PAH-no defin. rx  Cards-AF etc.--mult rx  DVT.GI proph--KATIANA Mack MD-Pulmonary   [phone]

## 2019-05-31 DIAGNOSIS — Z01.811 ENCOUNTER FOR PREPROCEDURAL RESPIRATORY EXAMINATION: ICD-10-CM

## 2019-05-31 LAB
CULTURE RESULTS: SIGNIFICANT CHANGE UP
CULTURE RESULTS: SIGNIFICANT CHANGE UP
HCT VFR BLD CALC: 31.3 % — LOW (ref 39–50)
HGB BLD-MCNC: 9.9 G/DL — LOW (ref 13–17)
MCHC RBC-ENTMCNC: 27.3 PG — SIGNIFICANT CHANGE UP (ref 27–34)
MCHC RBC-ENTMCNC: 31.6 GM/DL — LOW (ref 32–36)
MCV RBC AUTO: 86.2 FL — SIGNIFICANT CHANGE UP (ref 80–100)
PLATELET # BLD AUTO: 226 K/UL — SIGNIFICANT CHANGE UP (ref 150–400)
RBC # BLD: 3.63 M/UL — LOW (ref 4.2–5.8)
RBC # FLD: 15.6 % — HIGH (ref 10.3–14.5)
SPECIMEN SOURCE: SIGNIFICANT CHANGE UP
SPECIMEN SOURCE: SIGNIFICANT CHANGE UP
WBC # BLD: 9.92 K/UL — SIGNIFICANT CHANGE UP (ref 3.8–10.5)
WBC # FLD AUTO: 9.92 K/UL — SIGNIFICANT CHANGE UP (ref 3.8–10.5)

## 2019-05-31 PROCEDURE — 99232 SBSQ HOSP IP/OBS MODERATE 35: CPT

## 2019-05-31 PROCEDURE — 99222 1ST HOSP IP/OBS MODERATE 55: CPT

## 2019-05-31 PROCEDURE — 99221 1ST HOSP IP/OBS SF/LOW 40: CPT

## 2019-05-31 RX ORDER — LIDOCAINE 4 G/100G
1 CREAM TOPICAL DAILY
Refills: 0 | Status: DISCONTINUED | OUTPATIENT
Start: 2019-05-31 | End: 2019-06-17

## 2019-05-31 RX ORDER — TRAMADOL HYDROCHLORIDE 50 MG/1
25 TABLET ORAL ONCE
Refills: 0 | Status: DISCONTINUED | OUTPATIENT
Start: 2019-05-31 | End: 2019-05-31

## 2019-05-31 RX ADMIN — LIDOCAINE 1 PATCH: 4 CREAM TOPICAL at 01:00

## 2019-05-31 RX ADMIN — ATORVASTATIN CALCIUM 40 MILLIGRAM(S): 80 TABLET, FILM COATED ORAL at 22:05

## 2019-05-31 RX ADMIN — Medication 100 MILLIGRAM(S): at 06:09

## 2019-05-31 RX ADMIN — CEFTAROLINE FOSAMIL 50 MILLIGRAM(S): 600 POWDER, FOR SOLUTION INTRAVENOUS at 14:13

## 2019-05-31 RX ADMIN — SENNA PLUS 2 TABLET(S): 8.6 TABLET ORAL at 22:05

## 2019-05-31 RX ADMIN — BUDESONIDE AND FORMOTEROL FUMARATE DIHYDRATE 2 PUFF(S): 160; 4.5 AEROSOL RESPIRATORY (INHALATION) at 17:30

## 2019-05-31 RX ADMIN — Medication 25 MILLIGRAM(S): at 17:30

## 2019-05-31 RX ADMIN — Medication 100 MILLIGRAM(S): at 14:14

## 2019-05-31 RX ADMIN — TRAMADOL HYDROCHLORIDE 25 MILLIGRAM(S): 50 TABLET ORAL at 23:15

## 2019-05-31 RX ADMIN — CEFTAROLINE FOSAMIL 50 MILLIGRAM(S): 600 POWDER, FOR SOLUTION INTRAVENOUS at 22:04

## 2019-05-31 RX ADMIN — AMIODARONE HYDROCHLORIDE 200 MILLIGRAM(S): 400 TABLET ORAL at 06:09

## 2019-05-31 RX ADMIN — Medication 650 MILLIGRAM(S): at 00:38

## 2019-05-31 RX ADMIN — FINASTERIDE 5 MILLIGRAM(S): 5 TABLET, FILM COATED ORAL at 11:07

## 2019-05-31 RX ADMIN — Medication 650 MILLIGRAM(S): at 11:09

## 2019-05-31 RX ADMIN — CLOPIDOGREL BISULFATE 75 MILLIGRAM(S): 75 TABLET, FILM COATED ORAL at 11:07

## 2019-05-31 RX ADMIN — APIXABAN 5 MILLIGRAM(S): 2.5 TABLET, FILM COATED ORAL at 06:11

## 2019-05-31 RX ADMIN — Medication 100 MILLIGRAM(S): at 22:05

## 2019-05-31 RX ADMIN — Medication 650 MILLIGRAM(S): at 11:39

## 2019-05-31 RX ADMIN — Medication 650 MILLIGRAM(S): at 04:14

## 2019-05-31 RX ADMIN — TAMSULOSIN HYDROCHLORIDE 0.4 MILLIGRAM(S): 0.4 CAPSULE ORAL at 22:05

## 2019-05-31 RX ADMIN — CEFTAROLINE FOSAMIL 50 MILLIGRAM(S): 600 POWDER, FOR SOLUTION INTRAVENOUS at 06:12

## 2019-05-31 RX ADMIN — TRAMADOL HYDROCHLORIDE 25 MILLIGRAM(S): 50 TABLET ORAL at 22:31

## 2019-05-31 RX ADMIN — Medication 25 MILLIGRAM(S): at 06:09

## 2019-05-31 RX ADMIN — Medication 3 MILLIGRAM(S): at 22:05

## 2019-05-31 RX ADMIN — PANTOPRAZOLE SODIUM 40 MILLIGRAM(S): 20 TABLET, DELAYED RELEASE ORAL at 06:09

## 2019-05-31 RX ADMIN — LIDOCAINE 1 PATCH: 4 CREAM TOPICAL at 20:43

## 2019-05-31 RX ADMIN — DAPTOMYCIN 134 MILLIGRAM(S): 500 INJECTION, POWDER, LYOPHILIZED, FOR SOLUTION INTRAVENOUS at 15:26

## 2019-05-31 RX ADMIN — APIXABAN 5 MILLIGRAM(S): 2.5 TABLET, FILM COATED ORAL at 17:30

## 2019-05-31 RX ADMIN — LIDOCAINE 1 PATCH: 4 CREAM TOPICAL at 22:30

## 2019-05-31 RX ADMIN — SPIRONOLACTONE 25 MILLIGRAM(S): 25 TABLET, FILM COATED ORAL at 06:09

## 2019-05-31 RX ADMIN — LIDOCAINE 1 PATCH: 4 CREAM TOPICAL at 11:05

## 2019-05-31 RX ADMIN — BUDESONIDE AND FORMOTEROL FUMARATE DIHYDRATE 2 PUFF(S): 160; 4.5 AEROSOL RESPIRATORY (INHALATION) at 06:09

## 2019-05-31 RX ADMIN — LIDOCAINE 1 PATCH: 4 CREAM TOPICAL at 14:19

## 2019-05-31 NOTE — PROGRESS NOTE ADULT - PROBLEM SELECTOR PLAN 1
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

## 2019-05-31 NOTE — PROGRESS NOTE ADULT - PROBLEM SELECTOR PLAN 1
ID f/up-on dapto/ceftaroline--MARIA LUISA unrevealing--? MRI spine pending, picc to placed ID f/up-on dapto/ceftaroline--MARIA LUISA unrevealing--s/p MRI spine-abnormal, picc to placed  GI w/up if other studies non dx.

## 2019-05-31 NOTE — PROGRESS NOTE ADULT - ATTENDING COMMENTS
as above-stable-await PICC placement (re-evaluation by CTS/ID f/up-[place])  ID-bacteremia--on dapto/ceftaroline-f/up all cx-? bone studies (mri today)  COPD-symbiocrt/spiriva, incentive spirometry/acapella  PAH-no defin. rx  Cards-AF etc.--mult rx  DVT.GI proph--KATIANA Mack MD-Pulmonary   [phone] as above-stable-await PICC placement (re-evaluation by CTS/ID f/up-[place]); may require GI evaln-no absolute contras to EGD/colonoscopy.  ID-bacteremia--on dapto/ceftaroline-f/up all cx-s/p bone studies (mri abnormal-unclear if enough to base source of infection)  COPD-symbiocrt/spiriva, incentive spirometry/acapella  PAH-no defin. rx  Cards-AF etc.--mult rx  DVT.GI proph--KATIANA Mack MD-Pulmonary   [phone]

## 2019-05-31 NOTE — PROGRESS NOTE ADULT - SUBJECTIVE AND OBJECTIVE BOX
INTERVAL HPI/OVERNIGHT EVENTS:    MEDICATIONS  (STANDING):  amiodarone    Tablet 200 milliGRAM(s) Oral daily  apixaban 5 milliGRAM(s) Oral every 12 hours  atorvastatin 40 milliGRAM(s) Oral at bedtime  buDESOnide  80 MICROgram(s)/formoterol 4.5 MICROgram(s) Inhaler 2 Puff(s) Inhalation two times a day  ceftaroline fosamil IVPB 600 milliGRAM(s) IV Intermittent every 8 hours  clopidogrel Tablet 75 milliGRAM(s) Oral daily  DAPTOmycin IVPB 850 milliGRAM(s) IV Intermittent every 24 hours  docusate sodium 100 milliGRAM(s) Oral three times a day  finasteride 5 milliGRAM(s) Oral daily  lidocaine   Patch 1 Patch Transdermal daily  melatonin 3 milliGRAM(s) Oral at bedtime  metoprolol tartrate 25 milliGRAM(s) Oral two times a day  pantoprazole    Tablet 40 milliGRAM(s) Oral before breakfast  senna 2 Tablet(s) Oral at bedtime  spironolactone 25 milliGRAM(s) Oral daily  tamsulosin 0.4 milliGRAM(s) Oral at bedtime    MEDICATIONS  (PRN):  acetaminophen   Tablet .. 650 milliGRAM(s) Oral every 6 hours PRN Moderate Pain (4 - 6)  ALBUTerol    90 MICROgram(s) HFA Inhaler 2 Puff(s) Inhalation every 6 hours PRN Shortness of Breath and/or Wheezing  ondansetron    Tablet 4 milliGRAM(s) Oral every 8 hours PRN Nausea and/or Vomiting  oxyCODONE    IR 5 milliGRAM(s) Oral every 8 hours PRN Moderate Pain (4 - 6)      Allergies    Keppra (Rash)  penicillin (Unknown)    Intolerances            PHYSICAL EXAM:   Vital Signs:  Vital Signs Last 24 Hrs  T(C): 36.8 (31 May 2019 04:01), Max: 36.8 (31 May 2019 04:01)  T(F): 98.3 (31 May 2019 04:01), Max: 98.3 (31 May 2019 04:01)  HR: 70 (31 May 2019 06:12) (70 - 87)  BP: 120/72 (31 May 2019 06:12) (104/64 - 125/70)  BP(mean): --  RR: 18 (31 May 2019 06:12) (17 - 18)  SpO2: 96% (31 May 2019 04:01) (95% - 97%)  Daily     Daily Weight in k.9 (31 May 2019 06:20)    GENERAL:  no distress  HEENT:  NC/AT,  anicteric  CHEST:   no increased effort, breath sounds clear  HEART:  Regular rhythm  ABDOMEN:  Soft, non-tender, non-distended, normoactive bowel sounds,  no masses ,no hepato-splenomegaly, no signs of chronic liver disease  EXTEREMITIES:  no cyanosis      LABS:                        9.4    8.06  )-----------( 212      ( 30 May 2019 08:55 )             29.7     05-30    136  |  103  |  18  ----------------------------<  91  3.7   |  22  |  1.03    Ca    8.8      30 May 2019 06:38            RADIOLOGY & ADDITIONAL TESTS:

## 2019-05-31 NOTE — PROGRESS NOTE ADULT - SUBJECTIVE AND OBJECTIVE BOX
CHIEF COMPLAINT:  f/up sob, copd, osas, abnormal ct chest-    Interval Events: ID, cards    REVIEW OF SYSTEMS:  Constitutional: No fevers or chills. No weight loss. No fatigue or generalized malaise.  Eyes: No itching or discharge from the eyes  ENT: No ear pain. No ear discharge. No nasal congestion. No post nasal drip. No epistaxis. No throat pain. No sore throat. No difficulty swallowing.   CV: No chest pain. No palpitations. No lightheadedness or dizziness.   Resp: No dyspnea at rest. No dyspnea on exertion. No orthopnea. No wheezing. No cough. No stridor. No sputum production. No chest pain with respiration.  GI: No nausea. No vomiting. No diarrhea.  MSK: No joint pain or pain in any extremities  Integumentary: No skin lesions. No pedal edema.  Neurological: No gross motor weakness. No sensory changes.  [ ] All other systems negative  [ ] Unable to assess ROS because ________    OBJECTIVE:  ICU Vital Signs Last 24 Hrs  T(C): 36.8 (31 May 2019 04:01), Max: 36.8 (31 May 2019 04:01)  T(F): 98.3 (31 May 2019 04:01), Max: 98.3 (31 May 2019 04:01)  HR: 76 (31 May 2019 04:01) (70 - 87)  BP: 115/72 (31 May 2019 04:01) (104/64 - 125/70)  BP(mean): --  ABP: --  ABP(mean): --  RR: 18 (31 May 2019 04:01) (17 - 18)  SpO2: 96% (31 May 2019 04:01) (95% - 97%)        05-29 @ 07:01  -  05-30 @ 07:00  --------------------------------------------------------  IN: 1220 mL / OUT: 300 mL / NET: 920 mL    05-30 @ 07:01  -  05-31 @ 05:23  --------------------------------------------------------  IN: 700 mL / OUT: 800 mL / NET: -100 mL      CAPILLARY BLOOD GLUCOSE          PHYSICAL EXAM:  General: Awake, alert, oriented X 3.   HEENT: Atraumatic, normocephalic.                 Mallampatti Grade                 No nasal congestion.                No tonsillar or pharyngeal exudates.  Lymph Nodes: No palpable lymphadenopathy  Neck: No JVD. No carotid bruit.   Respiratory: Normal chest expansion                         Normal percussion                         Normal and equal air entry                         No wheeze, rhonchi or rales.  Cardiovascular: S1 S2 normal. No murmurs, rubs or gallops.   Abdomen: Soft, non-tender, non-distended. No organomegaly. Normoactive bowel sounds.  Extremities: Warm to touch. Peripheral pulse palpable. No pedal edema.   Skin: No rashes or skin lesions  Neurological: Motor and sensory examination equal and normal in all four extremities.  Psychiatry: Appropriate mood and affect.    HOSPITAL MEDICATIONS:  MEDICATIONS  (STANDING):  amiodarone    Tablet 200 milliGRAM(s) Oral daily  apixaban 5 milliGRAM(s) Oral every 12 hours  atorvastatin 40 milliGRAM(s) Oral at bedtime  buDESOnide  80 MICROgram(s)/formoterol 4.5 MICROgram(s) Inhaler 2 Puff(s) Inhalation two times a day  ceftaroline fosamil IVPB 600 milliGRAM(s) IV Intermittent every 8 hours  clopidogrel Tablet 75 milliGRAM(s) Oral daily  DAPTOmycin IVPB 850 milliGRAM(s) IV Intermittent every 24 hours  docusate sodium 100 milliGRAM(s) Oral three times a day  finasteride 5 milliGRAM(s) Oral daily  lidocaine   Patch 1 Patch Transdermal daily  melatonin 3 milliGRAM(s) Oral at bedtime  metoprolol tartrate 25 milliGRAM(s) Oral two times a day  pantoprazole    Tablet 40 milliGRAM(s) Oral before breakfast  senna 2 Tablet(s) Oral at bedtime  spironolactone 25 milliGRAM(s) Oral daily  tamsulosin 0.4 milliGRAM(s) Oral at bedtime    MEDICATIONS  (PRN):  acetaminophen   Tablet .. 650 milliGRAM(s) Oral every 6 hours PRN Moderate Pain (4 - 6)  ALBUTerol    90 MICROgram(s) HFA Inhaler 2 Puff(s) Inhalation every 6 hours PRN Shortness of Breath and/or Wheezing  ondansetron    Tablet 4 milliGRAM(s) Oral every 8 hours PRN Nausea and/or Vomiting  oxyCODONE    IR 5 milliGRAM(s) Oral every 8 hours PRN Moderate Pain (4 - 6)      LABS:                        9.4    8.06  )-----------( 212      ( 30 May 2019 08:55 )             29.7     05-30    136  |  103  |  18  ----------------------------<  91  3.7   |  22  |  1.03    Ca    8.8      30 May 2019 06:38                MICROBIOLOGY:     RADIOLOGY:  [ ] Reviewed and interpreted by me    Point of Care Ultrasound Findings:    PFT:    EKG: CHIEF COMPLAINT:  f/up sob, copd, osas, abnormal ct chest- fatigued-slept well    Interval Events: ID, cards; GI    REVIEW OF SYSTEMS:  Constitutional: No fevers or chills. No weight loss. + fatigue or generalized malaise.  Eyes: No itching or discharge from the eyes  ENT: No ear pain. No ear discharge. No nasal congestion. No post nasal drip. No epistaxis. No throat pain. No sore throat. No difficulty swallowing.   CV: No chest pain. No palpitations. No lightheadedness or dizziness.   Resp: No dyspnea at rest. + dyspnea on exertion. No orthopnea. No wheezing. No cough. No stridor. No sputum production. No chest pain with respiration.  GI: No nausea. No vomiting. No diarrhea.  MSK: + joint pain or pain in any extremities  Integumentary: No skin lesions. No pedal edema.  Neurological: No gross motor weakness. No sensory changes.  [+ ] All other systems negative  [ ] Unable to assess ROS because ________    OBJECTIVE:  ICU Vital Signs Last 24 Hrs  T(C): 36.8 (31 May 2019 04:01), Max: 36.8 (31 May 2019 04:01)  T(F): 98.3 (31 May 2019 04:01), Max: 98.3 (31 May 2019 04:01)  HR: 76 (31 May 2019 04:01) (70 - 87)  BP: 115/72 (31 May 2019 04:01) (104/64 - 125/70)  BP(mean): --  ABP: --  ABP(mean): --  RR: 18 (31 May 2019 04:01) (17 - 18)  SpO2: 96% (31 May 2019 04:01) (95% - 97%)        05-29 @ 07:01  -  05-30 @ 07:00  --------------------------------------------------------  IN: 1220 mL / OUT: 300 mL / NET: 920 mL    05-30 @ 07:01  -  05-31 @ 05:23  --------------------------------------------------------  IN: 700 mL / OUT: 800 mL / NET: -100 mL      CAPILLARY BLOOD GLUCOSE          PHYSICAL EXAM: NAD in bed on RA  General: Awake, alert, oriented X 3.   HEENT: Atraumatic, normocephalic.                 Mallampatti Grade 3                No nasal congestion.                No tonsillar or pharyngeal exudates.  Lymph Nodes: No palpable lymphadenopathy  Neck: No JVD. No carotid bruit.   Respiratory: Normal chest expansion                         Normal percussion                         Normal and equal air entry                         No wheeze, rhonchi or rales.  Cardiovascular: S1 S2 normal. No murmurs, rubs or gallops.   Abdomen: Soft, non-tender, non-distended. No organomegaly. Normoactive bowel sounds.  Extremities: Warm to touch. Peripheral pulse palpable. No pedal edema.   Skin: No rashes or skin lesions  Neurological: Motor and sensory examination equal and normal in all four extremities.  Psychiatry: Appropriate mood and affect.    HOSPITAL MEDICATIONS:  MEDICATIONS  (STANDING):  amiodarone    Tablet 200 milliGRAM(s) Oral daily  apixaban 5 milliGRAM(s) Oral every 12 hours  atorvastatin 40 milliGRAM(s) Oral at bedtime  buDESOnide  80 MICROgram(s)/formoterol 4.5 MICROgram(s) Inhaler 2 Puff(s) Inhalation two times a day  ceftaroline fosamil IVPB 600 milliGRAM(s) IV Intermittent every 8 hours  clopidogrel Tablet 75 milliGRAM(s) Oral daily  DAPTOmycin IVPB 850 milliGRAM(s) IV Intermittent every 24 hours  docusate sodium 100 milliGRAM(s) Oral three times a day  finasteride 5 milliGRAM(s) Oral daily  lidocaine   Patch 1 Patch Transdermal daily  melatonin 3 milliGRAM(s) Oral at bedtime  metoprolol tartrate 25 milliGRAM(s) Oral two times a day  pantoprazole    Tablet 40 milliGRAM(s) Oral before breakfast  senna 2 Tablet(s) Oral at bedtime  spironolactone 25 milliGRAM(s) Oral daily  tamsulosin 0.4 milliGRAM(s) Oral at bedtime    MEDICATIONS  (PRN):  acetaminophen   Tablet .. 650 milliGRAM(s) Oral every 6 hours PRN Moderate Pain (4 - 6)  ALBUTerol    90 MICROgram(s) HFA Inhaler 2 Puff(s) Inhalation every 6 hours PRN Shortness of Breath and/or Wheezing  ondansetron    Tablet 4 milliGRAM(s) Oral every 8 hours PRN Nausea and/or Vomiting  oxyCODONE    IR 5 milliGRAM(s) Oral every 8 hours PRN Moderate Pain (4 - 6)      LABS:                        9.4    8.06  )-----------( 212      ( 30 May 2019 08:55 )             29.7     05-30    136  |  103  |  18  ----------------------------<  91  3.7   |  22  |  1.03    Ca    8.8      30 May 2019 06:38                MICROBIOLOGY:     RADIOLOGY:  < from: MR Lumbar Spine w/wo IV Cont (05.30.19 @ 19:22) >  FINDINGS:    VERTEBRAL BODIES AND DISCS: Evidence of signal hypointensity T1   hyperintensity T2 involving the L1 vertebral body the superior endplate   of L2 as well as the disc space at the L1-L2 level. Postcontrast evidence   of enhancement at the level of the disc space as well as involving the   vertebral bodies is identified. Findings are most consistent with   discitis and osteomyelitis at this level. Mild paravertebral cuff of soft   tissue is noted. Questionof some minimal edematous changes involving the   right so as. ALIGNMENT:  No subluxations.  L1-L2 LEVEL: As mentioned, evidence of discitis and osteomyelitis at the   L1-L2 level. No evidence of epidural extension in the form of abscess.  L2-L3 LEVEL: Disc bulge with ligamentous and facet hypertrophic change   and a moderate degree of central stenosis.  L3-L4 LEVEL: Disc bulge with ligamentous and facet joint hypertrophic   change and a mild central stenosis. L4-L5 LEVEL:  Normal.  L5-S1 LEVEL:Normal.  SPINAL CANAL:  No other intradural or extradural defects are seen.   CONUS MEDULLARIS:  Normal.  MISCELLANEOUS:  None.    IMPRESSION:  Findings consistent with discitis and osteomyelitis at the   L1-2 level with evidence of edema involving the L1 vertebral body in its   entirety as well as a large portion of the L2 vertebral body with   hyperintensity at the level of the disc space and associated enhancement   on the postcontrast images. No epidural abscess or phlegmon compromising   thecanal noted at this time.       < end of copied text >    [ ] Reviewed and interpreted by me    Point of Care Ultrasound Findings:    PFT:    EKG:

## 2019-05-31 NOTE — PROGRESS NOTE ADULT - SUBJECTIVE AND OBJECTIVE BOX
Subjective: Patient seen and examined. No new events except as noted.   back pain     REVIEW OF SYSTEMS:    CONSTITUTIONAL: No weakness, fevers or chills  EYES/ENT: No visual changes;  No vertigo or throat pain   NECK: No pain or stiffness  RESPIRATORY: No cough, wheezing, hemoptysis; No shortness of breath  CARDIOVASCULAR: No chest pain or palpitations  GASTROINTESTINAL: No abdominal or epigastric pain. No nausea, vomiting, or hematemesis; No diarrhea or constipation. No melena or hematochezia.  GENITOURINARY: No dysuria, frequency or hematuria  NEUROLOGICAL: back pain   SKIN: No itching, burning, rashes, or lesions   All other review of systems is negative unless indicated above.    MEDICATIONS:  MEDICATIONS  (STANDING):  amiodarone    Tablet 200 milliGRAM(s) Oral daily  apixaban 5 milliGRAM(s) Oral every 12 hours  atorvastatin 40 milliGRAM(s) Oral at bedtime  buDESOnide  80 MICROgram(s)/formoterol 4.5 MICROgram(s) Inhaler 2 Puff(s) Inhalation two times a day  ceftaroline fosamil IVPB 600 milliGRAM(s) IV Intermittent every 8 hours  clopidogrel Tablet 75 milliGRAM(s) Oral daily  DAPTOmycin IVPB 850 milliGRAM(s) IV Intermittent every 24 hours  docusate sodium 100 milliGRAM(s) Oral three times a day  finasteride 5 milliGRAM(s) Oral daily  lidocaine   Patch 1 Patch Transdermal daily  lidocaine   Patch 1 Patch Transdermal daily  melatonin 3 milliGRAM(s) Oral at bedtime  metoprolol tartrate 25 milliGRAM(s) Oral two times a day  pantoprazole    Tablet 40 milliGRAM(s) Oral before breakfast  senna 2 Tablet(s) Oral at bedtime  spironolactone 25 milliGRAM(s) Oral daily  tamsulosin 0.4 milliGRAM(s) Oral at bedtime      PHYSICAL EXAM:  T(C): 36.4 (05-31-19 @ 12:01), Max: 36.8 (05-31-19 @ 04:01)  HR: 69 (05-31-19 @ 12:01) (69 - 76)  BP: 96/58 (05-31-19 @ 12:01) (96/58 - 120/72)  RR: 18 (05-31-19 @ 12:01) (18 - 18)  SpO2: 96% (05-31-19 @ 12:01) (96% - 96%)  Wt(kg): --  I&O's Summary    30 May 2019 07:01  -  31 May 2019 07:00  --------------------------------------------------------  IN: 920 mL / OUT: 1100 mL / NET: -180 mL    31 May 2019 07:01  -  31 May 2019 16:42  --------------------------------------------------------  IN: 700 mL / OUT: 800 mL / NET: -100 mL          Appearance: Normal	  HEENT:   Normal oral mucosa, PERRL, EOMI	  Lymphatic: No lymphadenopathy , no edema  Cardiovascular: Normal S1 S2, No JVD, No murmurs  Respiratory: Lungs clear to auscultation, normal effort 	  Gastrointestinal:  Soft, Non-tender, + BS	  Skin: No rashes, No ecchymoses, No cyanosis, warm to touch  Musculoskeletal: pain in lower back   Psychiatry:  Mood & affect appropriate  Ext: No edema      All labs, Imaging and EKGs personally reviewed                           9.9    9.92  )-----------( 226      ( 31 May 2019 10:14 )             31.3               05-30    136  |  103  |  18  ----------------------------<  91  3.7   |  22  |  1.03    Ca    8.8      30 May 2019 06:38       < from: MR Lumbar Spine w/wo IV Cont (05.30.19 @ 19:22) >  IMPRESSION:  Findings consistent with discitis and osteomyelitis at the   L1-2 level with evidence of edema involving the L1 vertebral body in its   entirety as well as a large portion of the L2 vertebral body with   hyperintensity at the level of the disc space and associated enhancement   on the postcontrast images. No epidural abscess or phlegmon compromising   thecanal noted at this time.

## 2019-05-31 NOTE — PROGRESS NOTE ADULT - ASSESSMENT
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  Recent MARIA LUISA shows TAVR with mild/moderate paravalvular regurgitation, but no vegetation.  MRI of spine shows evidence of osteomyelitis.  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  I have discussed the possibility of cardiac surgery with patient.  He is not willing to undergo cardiac surgery.  Agree with plan for continued antibiotic therapy.

## 2019-05-31 NOTE — PROGRESS NOTE ADULT - ASSESSMENT
Doing well from GI standpoint  Discussed case with Dr. Owens, suspicious for infected AVR, low suspicion for GI source  no immediate plans for endoscopic evaluation at this time

## 2019-05-31 NOTE — CONSULT NOTE ADULT - ATTENDING COMMENTS
Pt seen and examined with resident.  Agree with resident evaluation and assessment.  Pt is alert, POTTS. He has low back pain.  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  Continue per ID, abiotics in the long term.  Recommend repeat imaging with CT in 3 weeks.

## 2019-05-31 NOTE — CONSULT NOTE ADULT - SUBJECTIVE AND OBJECTIVE BOX
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 Was admitted recently and dx with possible endocarditis, ppm removed, received dapto/ceftaroline through PICC, PICC was dc and patient has been off abx for 2 weeks, plan was to observe off abx for 3 weeks and repeat labs. Patient had labs/bcx done yesterday with primary care, bcx were positive, patient otherwise does not have new symptoms or feel ill. (26 May 2019 18:54)    PAST MEDICAL HISTORY   CKD (chronic kidney disease) stage 3, GFR 30-59 ml/min  Hepatitis B  PTSD (post-traumatic stress disorder)  Tachy-idris syndrome  TIA (transient ischemic attack)  Seizure  DIEGO (obstructive sleep apnea)  Chronic diastolic HF (heart failure)  Aortic stenosis  CVA (cerebral vascular accident)  Gait instability  COPD (chronic obstructive pulmonary disease)  BPH (benign prostatic hyperplasia)  Asthma  CHF (congestive heart failure)  Atrial fibrillation  Pacemaker  Hypertension  H/O thalassemia  CAD (coronary artery disease)    PAST SURGICAL HISTORY   S/P TAVR (transcatheter aortic valve replacement)  Artificial pacemaker  S/P primary angioplasty with coronary stent    Keppra (Rash)  penicillin (Unknown)      MEDICATIONS:  Antibiotics:  ceftaroline fosamil IVPB 600 milliGRAM(s) IV Intermittent every 8 hours  DAPTOmycin IVPB 850 milliGRAM(s) IV Intermittent every 24 hours    Neuro:  acetaminophen   Tablet .. 650 milliGRAM(s) Oral every 6 hours PRN  melatonin 3 milliGRAM(s) Oral at bedtime  ondansetron    Tablet 4 milliGRAM(s) Oral every 8 hours PRN  oxyCODONE    IR 5 milliGRAM(s) Oral every 8 hours PRN    Anticoagulation:  apixaban 5 milliGRAM(s) Oral every 12 hours  clopidogrel Tablet 75 milliGRAM(s) Oral daily    Other:  ALBUTerol    90 MICROgram(s) HFA Inhaler 2 Puff(s) Inhalation every 6 hours PRN  amiodarone    Tablet 200 milliGRAM(s) Oral daily  atorvastatin 40 milliGRAM(s) Oral at bedtime  buDESOnide  80 MICROgram(s)/formoterol 4.5 MICROgram(s) Inhaler 2 Puff(s) Inhalation two times a day  docusate sodium 100 milliGRAM(s) Oral three times a day  finasteride 5 milliGRAM(s) Oral daily  metoprolol tartrate 25 milliGRAM(s) Oral two times a day  pantoprazole    Tablet 40 milliGRAM(s) Oral before breakfast  senna 2 Tablet(s) Oral at bedtime  spironolactone 25 milliGRAM(s) Oral daily  tamsulosin 0.4 milliGRAM(s) Oral at bedtime      SOCIAL HISTORY:   Occupation:   Marital Status:     FAMILY HISTORY:  No pertinent family history in first degree relatives      REVIEW OF SYSTEMS: per hpi  General:  Eyes:  ENT:  Cardiac:  Respiratory:  GI:  Musculoskeletal:   Skin:  Neurologic:   Psychiatric:     PHYSICAL EXAMINATION:   T(C): 36.8 (05-31-19 @ 04:01), Max: 36.8 (05-31-19 @ 04:01)  HR: 70 (05-31-19 @ 06:12) (70 - 87)  BP: 120/72 (05-31-19 @ 06:12) (104/64 - 125/70)  RR: 18 (05-31-19 @ 06:12) (17 - 18)  SpO2: 96% (05-31-19 @ 04:01) (95% - 97%)  Wt(kg): --    General Examination:     Neurologic Examination:           AOx3, FC, PERRL, EOMI, no facial   5/5 throughout, no drift  SILT  no clonus      LABS:                        9.9    9.92  )-----------( 226      ( 31 May 2019 10:14 )             31.3     05-30    136  |  103  |  18  ----------------------------<  91  3.7   |  22  |  1.03    Ca    8.8      30 May 2019 06:38            RADIOLOGY & ADDITIONAL STUDIES:  IMPRESSION:  Findings consistent with discitis and osteomyelitis at the   L1-2 level with evidence of edema involving the L1 vertebral body in its   entirety as well as a large portion of the L2 vertebral body with   hyperintensity at the level of the disc space and associated enhancement   on the postcontrast images. No epidural abscess or phlegmon compromising   thecanal noted at this time.                     REY HENRY M.D., ATTENDING RADIOLOGIST  This document has been electronically signed. May 30 2019  8:24PM

## 2019-05-31 NOTE — PROGRESS NOTE ADULT - ASSESSMENT
87 yr old with CKD, COPD, BPH, PPM, Y AVR due to AS, CHF. 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    1. COPD - stable  c/w Symbicort BID and Albuterol PRN, Spiriva  Out of bed to chair, incentive spirometry.  2. 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  Lower back and shoulder pain - patient states this is chronic but recently worsened. If continues would consider imaging to better evaluate  *************  5/29-await results of cx; re-scan of back likely  5/30-ID/CTS f/up-picc placed (Roman et al) 87 yr old with CKD, COPD, BPH, PPM, Y AVR due to AS, CHF. 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    1. COPD - stable  c/w Symbicort BID and Albuterol PRN, Spiriva  Out of bed to chair, incentive spirometry.  2. 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  Lower back and shoulder pain - patient states this is chronic but recently worsened. If continues would consider imaging to better evaluate  *************  5/29-await results of cx; re-scan of back likely  5/30-ID/CTS f/up-picc placed (Roman et al)  5/31-for GI evaln if other w/up neg to assess for source of bacteria (MRI abnormal)

## 2019-05-31 NOTE — PROGRESS NOTE ADULT - SUBJECTIVE AND OBJECTIVE BOX
infectious diseases progress note:    Patient is a 88y old  Male who presents with a chief complaint of bacteremia (31 May 2019 05:22)        Bacteremia             Allergies    Keppra (Rash)  penicillin (Unknown)    Intolerances        ANTIBIOTICS/RELEVANT:  antimicrobials  ceftaroline fosamil IVPB 600 milliGRAM(s) IV Intermittent every 8 hours  DAPTOmycin IVPB 850 milliGRAM(s) IV Intermittent every 24 hours    immunologic:    OTHER:  acetaminophen   Tablet .. 650 milliGRAM(s) Oral every 6 hours PRN  ALBUTerol    90 MICROgram(s) HFA Inhaler 2 Puff(s) Inhalation every 6 hours PRN  amiodarone    Tablet 200 milliGRAM(s) Oral daily  apixaban 5 milliGRAM(s) Oral every 12 hours  atorvastatin 40 milliGRAM(s) Oral at bedtime  buDESOnide  80 MICROgram(s)/formoterol 4.5 MICROgram(s) Inhaler 2 Puff(s) Inhalation two times a day  clopidogrel Tablet 75 milliGRAM(s) Oral daily  docusate sodium 100 milliGRAM(s) Oral three times a day  finasteride 5 milliGRAM(s) Oral daily  lidocaine   Patch 1 Patch Transdermal daily  melatonin 3 milliGRAM(s) Oral at bedtime  metoprolol tartrate 25 milliGRAM(s) Oral two times a day  ondansetron    Tablet 4 milliGRAM(s) Oral every 8 hours PRN  oxyCODONE    IR 5 milliGRAM(s) Oral every 8 hours PRN  pantoprazole    Tablet 40 milliGRAM(s) Oral before breakfast  senna 2 Tablet(s) Oral at bedtime  spironolactone 25 milliGRAM(s) Oral daily  tamsulosin 0.4 milliGRAM(s) Oral at bedtime      Objective:  Vital Signs Last 24 Hrs  T(C): 36.8 (31 May 2019 04:01), Max: 36.8 (31 May 2019 04:01)  T(F): 98.3 (31 May 2019 04:01), Max: 98.3 (31 May 2019 04:01)  HR: 70 (31 May 2019 06:12) (70 - 87)  BP: 120/72 (31 May 2019 06:12) (104/64 - 125/70)  BP(mean): --  RR: 18 (31 May 2019 06:12) (17 - 18)  SpO2: 96% (31 May 2019 04:01) (95% - 97%)    PHYSICAL EXAM:   Eyes:JESSEE, EOMI  Ear/Nose/Throat: no oral lesion, no sinus tenderness on percussion	  Neck:no JVD, no lymphadenopathy, supple  Respiratory: CTA marci  Cardiovascular: S1S2 RRR, no murmurs  Gastrointestinal:soft, (+) BS, no HSM  Extremities:no e/e/c        LABS:                        9.4    8.06  )-----------( 212      ( 30 May 2019 08:55 )             29.7     05-30    136  |  103  |  18  ----------------------------<  91  3.7   |  22  |  1.03    Ca    8.8      30 May 2019 06:38              MICROBIOLOGY:    RECENT CULTURES:  05-29 @ 13:25 .Blood       Growth in aerobic and anaerobic bottles: Gram Positive Cocci in Pairs and  Chains           Growth in aerobic and anaerobic bottles: Gram Positive Cocci in Pairs and  Chains    05-27 @ 09:41 .Blood       Growth in aerobic bottle: Gram Positive Cocci in Pairs and Chains  Growth in anaerobic bottle: Gram Positive Cocci in Pairs and Chains           Growth in aerobic and anaerobic bottles: Enterococcus faecium (vancomycin  resistant)  See previous culture [id number]    05-26 @ 17:09 .Blood   NICK    Growth in aerobic bottle: Gram Positive Cocci in Pairs and Chains  Growth in anaerobic bottle: Gram Positive Cocci in Pairs and Chains    Enterococcus faecium (vancomycin resistant)  Enterococcus faecium (vancomycin resistant)     Growth in aerobic and anaerobic bottles: Enterococcus faecium (vancomycin  resistant)  See previous culture [id number]          RESPIRATORY CULTURES:              RADIOLOGY & ADDITIONAL STUDIES:        Pager [phone]  After 5 pm/weekends or if no response :[phone]

## 2019-05-31 NOTE — CONSULT NOTE ADULT - ASSESSMENT
88M here with recurrent VRE bacteremia now with L1/2 OM / dsicitis  - no acute neurosurgical intervention  - cont care as per primary team  - abx per id  - no further neurosurgical intervention anticipated at this time.

## 2019-05-31 NOTE — PROGRESS NOTE ADULT - SUBJECTIVE AND OBJECTIVE BOX
Patient is a 88y old  Male who presents with a chief complaint of bacteremia (31 May 2019 16:48)      INTERVAL HISTORY: feels ok  	  MEDICATIONS:  amiodarone    Tablet 200 milliGRAM(s) Oral daily  metoprolol tartrate 25 milliGRAM(s) Oral two times a day  spironolactone 25 milliGRAM(s) Oral daily  tamsulosin 0.4 milliGRAM(s) Oral at bedtime        PHYSICAL EXAM:  T(C): 36.4 (05-31-19 @ 12:01), Max: 36.8 (05-31-19 @ 04:01)  HR: 69 (05-31-19 @ 12:01) (69 - 76)  BP: 96/58 (05-31-19 @ 12:01) (96/58 - 120/72)  RR: 18 (05-31-19 @ 12:01) (18 - 18)  SpO2: 96% (05-31-19 @ 12:01) (96% - 96%)  Wt(kg): --  I&O's Summary    30 May 2019 07:01  -  31 May 2019 07:00  --------------------------------------------------------  IN: 920 mL / OUT: 1100 mL / NET: -180 mL    31 May 2019 07:01  -  31 May 2019 18:06  --------------------------------------------------------  IN: 700 mL / OUT: 800 mL / NET: -100 mL          Appearance: In no distress	  HEENT:    PERRL, EOMI	  Cardiovascular:  S1 S2, No JVD  Respiratory: Lungs clear to auscultation	  Gastrointestinal:  Soft, Non-tender, + BS	  Extremities:  No edema of LE                                9.9    9.92  )-----------( 226      ( 31 May 2019 10:14 )             31.3     05-30    136  |  103  |  18  ----------------------------<  91  3.7   |  22  |  1.03    Ca    8.8      30 May 2019 06:38          Labs personally reviewed      Assessment and Plan:   Assessment:  · Assessment		  Patient is a 87 y/o male with PMH SSS/a fib on eliquis, 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 Was admitted recently and dx with possible endocarditis, ppm removed, received dapto/ceftaroline through PICC, PICC was dc and patient has been off abx for 2 weeks, plan was to observe off abx for 3 weeks and repeat labs. Patient had labs/bcx done yesterday with primary care, bcx were positive, patient otherwise does not have new symptoms or feel ill.     Problem/Plan - 1:  ·  Problem: Bacteremia.  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  Plan: continue with aldactone 25mg, euvolemic at this time    Problem/Plan - 3:  ·  Problem: Aortic stenosis.  Plan: S/P TAVR   - ?bioprosthetic valve endocarditis  - high risk candidate for open valve replacement    Problem/Plan - 4:  Problem: Atrial fibrillation. Plan: On eliquis, Rate controlled on Metoprolol and Amio 100mg daily    Problem/Plan - 5:  ·  Problem: CAD (coronary artery disease).  Plan: On Plavix given TAVR, c/w statin.       Timothy Paredes DO [facility]  Cardiovascular Medicine  [phone]

## 2019-05-31 NOTE — PROGRESS NOTE ADULT - ASSESSMENT
87 yr old with CKD, COPD, BPH, PPM, Y AVR due to AS, CHF. 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 Downey who will review MARIA LUISA  Thre is no good oral suppression medication  for  VREC    MRI of his lumbar spine now reveals diskitis  but no abscess this is a change compared to last scan      the high grade bacteremia is almost always a sign of an intravascular focus  we can try a prolong course of po Zyvox after he responds down the line but his strain is resistant to ampicillin and the dapto NICK is   not   great.   ary to be discussed with other ID colleagues   would like to hold on PICC until we clear blood cultures     ID to cover next 3 days

## 2019-06-01 LAB
ALBUMIN SERPL ELPH-MCNC: 3.1 G/DL — LOW (ref 3.3–5)
ALP SERPL-CCNC: 120 U/L — SIGNIFICANT CHANGE UP (ref 40–120)
ALT FLD-CCNC: 8 U/L — LOW (ref 10–45)
ANION GAP SERPL CALC-SCNC: 12 MMOL/L — SIGNIFICANT CHANGE UP (ref 5–17)
AST SERPL-CCNC: 10 U/L — SIGNIFICANT CHANGE UP (ref 10–40)
BASOPHILS # BLD AUTO: 0.03 K/UL — SIGNIFICANT CHANGE UP (ref 0–0.2)
BASOPHILS NFR BLD AUTO: 0.3 % — SIGNIFICANT CHANGE UP (ref 0–2)
BILIRUB SERPL-MCNC: 0.5 MG/DL — SIGNIFICANT CHANGE UP (ref 0.2–1.2)
BUN SERPL-MCNC: 18 MG/DL — SIGNIFICANT CHANGE UP (ref 7–23)
CALCIUM SERPL-MCNC: 9.3 MG/DL — SIGNIFICANT CHANGE UP (ref 8.4–10.5)
CHLORIDE SERPL-SCNC: 99 MMOL/L — SIGNIFICANT CHANGE UP (ref 96–108)
CO2 SERPL-SCNC: 21 MMOL/L — LOW (ref 22–31)
CREAT SERPL-MCNC: 0.98 MG/DL — SIGNIFICANT CHANGE UP (ref 0.5–1.3)
CRP SERPL-MCNC: 10.4 MG/DL — HIGH (ref 0–0.4)
EOSINOPHIL # BLD AUTO: 0.53 K/UL — HIGH (ref 0–0.5)
EOSINOPHIL NFR BLD AUTO: 5.7 % — SIGNIFICANT CHANGE UP (ref 0–6)
ERYTHROCYTE [SEDIMENTATION RATE] IN BLOOD: 63 MM/HR — HIGH (ref 0–20)
GLUCOSE SERPL-MCNC: 101 MG/DL — HIGH (ref 70–99)
GRAM STN FLD: SIGNIFICANT CHANGE UP
HCT VFR BLD CALC: 30.2 % — LOW (ref 39–50)
HGB BLD-MCNC: 9.6 G/DL — LOW (ref 13–17)
IMM GRANULOCYTES NFR BLD AUTO: 0.4 % — SIGNIFICANT CHANGE UP (ref 0–1.5)
INR BLD: 1.68 RATIO — HIGH (ref 0.88–1.16)
LYMPHOCYTES # BLD AUTO: 0.79 K/UL — LOW (ref 1–3.3)
LYMPHOCYTES # BLD AUTO: 8.5 % — LOW (ref 13–44)
MCHC RBC-ENTMCNC: 27 PG — SIGNIFICANT CHANGE UP (ref 27–34)
MCHC RBC-ENTMCNC: 31.8 GM/DL — LOW (ref 32–36)
MCV RBC AUTO: 84.8 FL — SIGNIFICANT CHANGE UP (ref 80–100)
MONOCYTES # BLD AUTO: 0.95 K/UL — HIGH (ref 0–0.9)
MONOCYTES NFR BLD AUTO: 10.2 % — SIGNIFICANT CHANGE UP (ref 2–14)
NEUTROPHILS # BLD AUTO: 6.94 K/UL — SIGNIFICANT CHANGE UP (ref 1.8–7.4)
NEUTROPHILS NFR BLD AUTO: 74.9 % — SIGNIFICANT CHANGE UP (ref 43–77)
PLATELET # BLD AUTO: 230 K/UL — SIGNIFICANT CHANGE UP (ref 150–400)
POTASSIUM SERPL-MCNC: 3.8 MMOL/L — SIGNIFICANT CHANGE UP (ref 3.5–5.3)
POTASSIUM SERPL-SCNC: 3.8 MMOL/L — SIGNIFICANT CHANGE UP (ref 3.5–5.3)
PROT SERPL-MCNC: 6.5 G/DL — SIGNIFICANT CHANGE UP (ref 6–8.3)
PROTHROM AB SERPL-ACNC: 19.5 SEC — HIGH (ref 10–13.1)
RBC # BLD: 3.56 M/UL — LOW (ref 4.2–5.8)
RBC # FLD: 15.5 % — HIGH (ref 10.3–14.5)
SODIUM SERPL-SCNC: 132 MMOL/L — LOW (ref 135–145)
WBC # BLD: 9.28 K/UL — SIGNIFICANT CHANGE UP (ref 3.8–10.5)
WBC # FLD AUTO: 9.28 K/UL — SIGNIFICANT CHANGE UP (ref 3.8–10.5)

## 2019-06-01 PROCEDURE — 72197 MRI PELVIS W/O & W/DYE: CPT | Mod: 26

## 2019-06-01 PROCEDURE — 99232 SBSQ HOSP IP/OBS MODERATE 35: CPT

## 2019-06-01 RX ADMIN — Medication 100 MILLIGRAM(S): at 13:13

## 2019-06-01 RX ADMIN — AMIODARONE HYDROCHLORIDE 200 MILLIGRAM(S): 400 TABLET ORAL at 05:59

## 2019-06-01 RX ADMIN — Medication 3 MILLIGRAM(S): at 21:56

## 2019-06-01 RX ADMIN — TAMSULOSIN HYDROCHLORIDE 0.4 MILLIGRAM(S): 0.4 CAPSULE ORAL at 21:56

## 2019-06-01 RX ADMIN — Medication 25 MILLIGRAM(S): at 05:59

## 2019-06-01 RX ADMIN — Medication 650 MILLIGRAM(S): at 18:21

## 2019-06-01 RX ADMIN — CEFTAROLINE FOSAMIL 50 MILLIGRAM(S): 600 POWDER, FOR SOLUTION INTRAVENOUS at 13:14

## 2019-06-01 RX ADMIN — BUDESONIDE AND FORMOTEROL FUMARATE DIHYDRATE 2 PUFF(S): 160; 4.5 AEROSOL RESPIRATORY (INHALATION) at 05:58

## 2019-06-01 RX ADMIN — CEFTAROLINE FOSAMIL 50 MILLIGRAM(S): 600 POWDER, FOR SOLUTION INTRAVENOUS at 05:58

## 2019-06-01 RX ADMIN — LIDOCAINE 1 PATCH: 4 CREAM TOPICAL at 13:14

## 2019-06-01 RX ADMIN — OXYCODONE HYDROCHLORIDE 5 MILLIGRAM(S): 5 TABLET ORAL at 00:35

## 2019-06-01 RX ADMIN — Medication 650 MILLIGRAM(S): at 17:21

## 2019-06-01 RX ADMIN — DAPTOMYCIN 134 MILLIGRAM(S): 500 INJECTION, POWDER, LYOPHILIZED, FOR SOLUTION INTRAVENOUS at 16:41

## 2019-06-01 RX ADMIN — FINASTERIDE 5 MILLIGRAM(S): 5 TABLET, FILM COATED ORAL at 13:13

## 2019-06-01 RX ADMIN — Medication 1 MILLIGRAM(S): at 13:59

## 2019-06-01 RX ADMIN — Medication 650 MILLIGRAM(S): at 10:02

## 2019-06-01 RX ADMIN — APIXABAN 5 MILLIGRAM(S): 2.5 TABLET, FILM COATED ORAL at 05:58

## 2019-06-01 RX ADMIN — Medication 100 MILLIGRAM(S): at 21:56

## 2019-06-01 RX ADMIN — LIDOCAINE 1 PATCH: 4 CREAM TOPICAL at 19:22

## 2019-06-01 RX ADMIN — Medication 100 MILLIGRAM(S): at 05:58

## 2019-06-01 RX ADMIN — APIXABAN 5 MILLIGRAM(S): 2.5 TABLET, FILM COATED ORAL at 17:21

## 2019-06-01 RX ADMIN — SPIRONOLACTONE 25 MILLIGRAM(S): 25 TABLET, FILM COATED ORAL at 05:58

## 2019-06-01 RX ADMIN — BUDESONIDE AND FORMOTEROL FUMARATE DIHYDRATE 2 PUFF(S): 160; 4.5 AEROSOL RESPIRATORY (INHALATION) at 17:25

## 2019-06-01 RX ADMIN — Medication 650 MILLIGRAM(S): at 11:05

## 2019-06-01 RX ADMIN — LIDOCAINE 1 PATCH: 4 CREAM TOPICAL at 19:23

## 2019-06-01 RX ADMIN — Medication 25 MILLIGRAM(S): at 17:24

## 2019-06-01 RX ADMIN — SENNA PLUS 2 TABLET(S): 8.6 TABLET ORAL at 21:56

## 2019-06-01 RX ADMIN — CEFTAROLINE FOSAMIL 50 MILLIGRAM(S): 600 POWDER, FOR SOLUTION INTRAVENOUS at 21:55

## 2019-06-01 RX ADMIN — PANTOPRAZOLE SODIUM 40 MILLIGRAM(S): 20 TABLET, DELAYED RELEASE ORAL at 06:00

## 2019-06-01 RX ADMIN — CLOPIDOGREL BISULFATE 75 MILLIGRAM(S): 75 TABLET, FILM COATED ORAL at 13:14

## 2019-06-01 RX ADMIN — LIDOCAINE 1 PATCH: 4 CREAM TOPICAL at 13:13

## 2019-06-01 RX ADMIN — ATORVASTATIN CALCIUM 40 MILLIGRAM(S): 80 TABLET, FILM COATED ORAL at 21:56

## 2019-06-01 NOTE — PROGRESS NOTE ADULT - SUBJECTIVE AND OBJECTIVE BOX
Subjective: Patient seen and examined. No new events except as noted.     REVIEW OF SYSTEMS:    CONSTITUTIONAL: No weakness, fevers or chills  EYES/ENT: No visual changes;  No vertigo or throat pain   NECK: No pain or stiffness  RESPIRATORY: No cough, wheezing, hemoptysis; No shortness of breath  CARDIOVASCULAR: No chest pain or palpitations  GASTROINTESTINAL: No abdominal or epigastric pain. No nausea, vomiting, or hematemesis; No diarrhea or constipation. No melena or hematochezia.  GENITOURINARY: No dysuria, frequency or hematuria  NEUROLOGICAL: back pain   SKIN: No itching, burning, rashes, or lesions   All other review of systems is negative unless indicated above.    MEDICATIONS:  MEDICATIONS  (STANDING):  amiodarone    Tablet 200 milliGRAM(s) Oral daily  apixaban 5 milliGRAM(s) Oral every 12 hours  atorvastatin 40 milliGRAM(s) Oral at bedtime  buDESOnide  80 MICROgram(s)/formoterol 4.5 MICROgram(s) Inhaler 2 Puff(s) Inhalation two times a day  ceftaroline fosamil IVPB 600 milliGRAM(s) IV Intermittent every 8 hours  clopidogrel Tablet 75 milliGRAM(s) Oral daily  DAPTOmycin IVPB 850 milliGRAM(s) IV Intermittent every 24 hours  docusate sodium 100 milliGRAM(s) Oral three times a day  finasteride 5 milliGRAM(s) Oral daily  lidocaine   Patch 1 Patch Transdermal daily  lidocaine   Patch 1 Patch Transdermal daily  melatonin 3 milliGRAM(s) Oral at bedtime  metoprolol tartrate 25 milliGRAM(s) Oral two times a day  pantoprazole    Tablet 40 milliGRAM(s) Oral before breakfast  senna 2 Tablet(s) Oral at bedtime  spironolactone 25 milliGRAM(s) Oral daily  tamsulosin 0.4 milliGRAM(s) Oral at bedtime      PHYSICAL EXAM:  T(C): 36.3 (06-01-19 @ 14:00), Max: 36.6 (05-31-19 @ 20:57)  HR: 87 (06-01-19 @ 14:00) (63 - 87)  BP: 138/70 (06-01-19 @ 17:25) (98/61 - 138/70)  RR: 19 (06-01-19 @ 14:00) (17 - 19)  SpO2: 95% (06-01-19 @ 14:00) (95% - 96%)  Wt(kg): --  I&O's Summary    31 May 2019 07:01  -  01 Jun 2019 07:00  --------------------------------------------------------  IN: 1290 mL / OUT: 1150 mL / NET: 140 mL    01 Jun 2019 07:01  -  01 Jun 2019 18:04  --------------------------------------------------------  IN: 50 mL / OUT: 0 mL / NET: 50 mL          Appearance: Normal	  HEENT:   Normal oral mucosa, PERRL, EOMI	  Lymphatic: No lymphadenopathy , no edema  Cardiovascular: Normal S1 S2, No JVD, No murmurs , Peripheral pulses palpable 2+ bilaterally  Respiratory: Lungs clear to auscultation, normal effort 	  Gastrointestinal:  Soft, Non-tender, + BS	  Skin: No rashes, No ecchymoses, No cyanosis, warm to touch  Musculoskeletal: Normal range of motion, normal strength  Psychiatry:  Mood & affect appropriate  Ext: No edema      All labs, Imaging and EKGs personally reviewed                           9.6    9.28  )-----------( 230      ( 01 Jun 2019 10:41 )             30.2               06-01    132<L>  |  99  |  18  ----------------------------<  101<H>  3.8   |  21<L>  |  0.98    Ca    9.3      01 Jun 2019 07:24    TPro  6.5  /  Alb  3.1<L>  /  TBili  0.5  /  DBili  x   /  AST  10  /  ALT  8<L>  /  AlkPhos  120  06-01    PT/INR - ( 01 Jun 2019 09:21 )   PT: 19.5 sec;   INR: 1.68 ratio

## 2019-06-01 NOTE — PROGRESS NOTE ADULT - SUBJECTIVE AND OBJECTIVE BOX
RUMA BANEGAS:[id number],   88yMale followed for:  Keppra (Rash)  penicillin (Unknown)    PAST MEDICAL & SURGICAL HISTORY:  CKD (chronic kidney disease) stage 3, GFR 30-59 ml/min  Hepatitis B  PTSD (post-traumatic stress disorder)  Tachy-idris syndrome  TIA (transient ischemic attack): 2010  Seizure  DIEGO (obstructive sleep apnea)  Chronic diastolic HF (heart failure)  Aortic stenosis  CVA (cerebral vascular accident)  Gait instability  COPD (chronic obstructive pulmonary disease)  BPH (benign prostatic hyperplasia)  Asthma  CHF (congestive heart failure)  Atrial fibrillation  Pacemaker: 2015 Medtronic [id number]  [id number]  Hypertension  H/O thalassemia  CAD (coronary artery disease): s/p stent 2010  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  Artificial pacemaker  S/P primary angioplasty with coronary stent: 2012 AND 2017    FAMILY HISTORY:  No pertinent family history in first degree relatives    MEDICATIONS  (STANDING):  amiodarone    Tablet 200 milliGRAM(s) Oral daily  apixaban 5 milliGRAM(s) Oral every 12 hours  atorvastatin 40 milliGRAM(s) Oral at bedtime  buDESOnide  80 MICROgram(s)/formoterol 4.5 MICROgram(s) Inhaler 2 Puff(s) Inhalation two times a day  ceftaroline fosamil IVPB 600 milliGRAM(s) IV Intermittent every 8 hours  clopidogrel Tablet 75 milliGRAM(s) Oral daily  DAPTOmycin IVPB 850 milliGRAM(s) IV Intermittent every 24 hours  docusate sodium 100 milliGRAM(s) Oral three times a day  finasteride 5 milliGRAM(s) Oral daily  lidocaine   Patch 1 Patch Transdermal daily  lidocaine   Patch 1 Patch Transdermal daily  melatonin 3 milliGRAM(s) Oral at bedtime  metoprolol tartrate 25 milliGRAM(s) Oral two times a day  pantoprazole    Tablet 40 milliGRAM(s) Oral before breakfast  senna 2 Tablet(s) Oral at bedtime  spironolactone 25 milliGRAM(s) Oral daily  tamsulosin 0.4 milliGRAM(s) Oral at bedtime    MEDICATIONS  (PRN):  acetaminophen   Tablet .. 650 milliGRAM(s) Oral every 6 hours PRN Moderate Pain (4 - 6)  ALBUTerol    90 MICROgram(s) HFA Inhaler 2 Puff(s) Inhalation every 6 hours PRN Shortness of Breath and/or Wheezing  ondansetron    Tablet 4 milliGRAM(s) Oral every 8 hours PRN Nausea and/or Vomiting  oxyCODONE    IR 5 milliGRAM(s) Oral every 8 hours PRN Moderate Pain (4 - 6)      Vital Signs Last 24 Hrs  T(C): 36.4 (01 Jun 2019 05:09), Max: 36.6 (31 May 2019 20:57)  T(F): 97.5 (01 Jun 2019 05:09), Max: 97.8 (31 May 2019 20:57)  HR: 70 (01 Jun 2019 05:09) (69 - 81)  BP: 128/78 (01 Jun 2019 05:09) (96/58 - 128/78)  BP(mean): --  RR: 17 (01 Jun 2019 05:09) (17 - 18)  SpO2: 96% (01 Jun 2019 05:09) (96% - 96%)  nc/at  s1s2  cta  soft, nt, nd no guarding or rebound  no c/c/e    CBC Full  -  ( 31 May 2019 10:14 )  WBC Count : 9.92 K/uL  RBC Count : 3.63 M/uL  Hemoglobin : 9.9 g/dL  Hematocrit : 31.3 %  Platelet Count - Automated : 226 K/uL  Mean Cell Volume : 86.2 fl  Mean Cell Hemoglobin : 27.3 pg  Mean Cell Hemoglobin Concentration : 31.6 gm/dL  Auto Neutrophil # : x  Auto Lymphocyte # : x  Auto Monocyte # : x  Auto Eosinophil # : x  Auto Basophil # : x  Auto Neutrophil % : x  Auto Lymphocyte % : x  Auto Monocyte % : x  Auto Eosinophil % : x  Auto Basophil % : x

## 2019-06-01 NOTE — PROVIDER CONTACT NOTE (CRITICAL VALUE NOTIFICATION) - SITUATION
Blood culture 5/31 2 sets  First set Growth in aerobic bottle gram positive cocci pairs and chains and 2nd set growth in anaerobic bottle gram positive in cocci and pairs and chains. Blood culture 5/29 growth both bottles enterococcus saccium vanco resistant.

## 2019-06-01 NOTE — PROGRESS NOTE ADULT - SUBJECTIVE AND OBJECTIVE BOX
INFECTIOUS DISEASES FOLLOW UP-- Sabiha Simon  [phone]    This is a follow up note for this  88yMale with  Bacteremia      ROS:  CONSTITUTIONAL:  No fever, good appetite, very talkative  Back pain remains but tolerating with tylenol  CARDIOVASCULAR:  No chest pain or palpitations  RESPIRATORY:  No dyspnea  GASTROINTESTINAL:  No nausea, vomiting, diarrhea, or abdominal pain  GENITOURINARY:  No dysuria  NEUROLOGIC:  No headache,     Allergies    Keppra (Rash)  penicillin (Unknown)    Intolerances        ANTIBIOTICS/RELEVANT:  antimicrobials  ceftaroline fosamil IVPB 600 milliGRAM(s) IV Intermittent every 8 hours  DAPTOmycin IVPB 850 milliGRAM(s) IV Intermittent every 24 hours    immunologic:    OTHER:  acetaminophen   Tablet .. 650 milliGRAM(s) Oral every 6 hours PRN  ALBUTerol    90 MICROgram(s) HFA Inhaler 2 Puff(s) Inhalation every 6 hours PRN  amiodarone    Tablet 200 milliGRAM(s) Oral daily  apixaban 5 milliGRAM(s) Oral every 12 hours  atorvastatin 40 milliGRAM(s) Oral at bedtime  buDESOnide  80 MICROgram(s)/formoterol 4.5 MICROgram(s) Inhaler 2 Puff(s) Inhalation two times a day  clopidogrel Tablet 75 milliGRAM(s) Oral daily  docusate sodium 100 milliGRAM(s) Oral three times a day  finasteride 5 milliGRAM(s) Oral daily  lidocaine   Patch 1 Patch Transdermal daily  lidocaine   Patch 1 Patch Transdermal daily  melatonin 3 milliGRAM(s) Oral at bedtime  metoprolol tartrate 25 milliGRAM(s) Oral two times a day  ondansetron    Tablet 4 milliGRAM(s) Oral every 8 hours PRN  oxyCODONE    IR 5 milliGRAM(s) Oral every 8 hours PRN  pantoprazole    Tablet 40 milliGRAM(s) Oral before breakfast  senna 2 Tablet(s) Oral at bedtime  spironolactone 25 milliGRAM(s) Oral daily  tamsulosin 0.4 milliGRAM(s) Oral at bedtime      Objective:  Vital Signs Last 24 Hrs  T(C): 36.4 (01 Jun 2019 05:09), Max: 36.6 (31 May 2019 20:57)  T(F): 97.5 (01 Jun 2019 05:09), Max: 97.8 (31 May 2019 20:57)  HR: 75 (01 Jun 2019 06:40) (69 - 81)  BP: 105/67 (01 Jun 2019 06:40) (96/58 - 128/78)  BP(mean): --  RR: 17 (01 Jun 2019 05:09) (17 - 18)  SpO2: 95% (01 Jun 2019 06:40) (95% - 96%)    PHYSICAL EXAM:  Constitutional:no acute distress  Eyes:JESSEE, EOMI  Ear/Nose/Throat: no oral lesions, 	  Respiratory: clear BL  Cardiovascular: S1S2 irregular  Gastrointestinal:soft, (+) BS, no tenderness  Extremities:no e/e/c  No Lymphadenopathy  IV sites not inflammed.    LABS:                        9.6    9.28  )-----------( 230      ( 01 Jun 2019 10:41 )             30.2     06-01    132<L>  |  99  |  18  ----------------------------<  101<H>  3.8   |  21<L>  |  0.98    Ca    9.3      01 Jun 2019 07:24    TPro  6.5  /  Alb  3.1<L>  /  TBili  0.5  /  DBili  x   /  AST  10  /  ALT  8<L>  /  AlkPhos  120  06-01    PT/INR - ( 01 Jun 2019 09:21 )   PT: 19.5 sec;   INR: 1.68 ratio               MICROBIOLOGY:            RECENT CULTURES:  05-31 @ 09:44  .Blood  --  --  --    Growth in aerobic bottle: Gram Positive Cocci in Pairs and Chains  --  05-29 @ 13:25  .Blood  --  --  --    Growth in aerobic and anaerobic bottles: Enterococcus faecium (vancomycin  resistant)  See previous culture [id number]  --  05-27 @ 09:41  .Blood  --  --  --    Growth in aerobic and anaerobic bottles: Enterococcus faecium (vancomycin  resistant)  See previous culture [id number]  --  05-26 @ 17:09  .Blood  Enterococcus faecium (vancomycin resistant)  Enterococcus faecium (vancomycin resistant)  NICK    Growth in aerobic and anaerobic bottles: Enterococcus faecium (vancomycin  resistant)  See previous culture [id number]  --      RADIOLOGY & ADDITIONAL STUDIES:

## 2019-06-01 NOTE — PROGRESS NOTE ADULT - PROBLEM SELECTOR PLAN 1
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

## 2019-06-01 NOTE — PROVIDER CONTACT NOTE (CRITICAL VALUE NOTIFICATION) - TEST AND RESULT REPORTED:
Blood culture from 5/31 2 sets growth in aerobic bottle gram positive cocci in pairs and chains, 2nd bottle growth in anaerobic bottle gram positive cocci pairs and chains Blood culture from 5/31 2 sets growth in aerobic bottle gram positive cocci in pairs and chains, 2nd bottle growth in anaerobic bottle gram positive cocci pairs and chains, 5/29 growth in both bottles enterococcus saccium vanco resistant.

## 2019-06-01 NOTE — PROGRESS NOTE ADULT - ASSESSMENT
87 yr old with CKD, COPD, BPH, PPM, Y AVR due to AS, CHF. 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 Downey who will review MARIA LUISA  Thre is no good oral suppression medication  for  VREC    MRI of his lumbar spine now reveals diskitis  but no abscess this is a change compared to last scan      the high grade bacteremia is almost always a sign of an intravascular focus  we can try a prolong course of po Zyvox after he responds down the line but his strain is resistant to ampicillin and the dapto NICK is   not   great.     As above- but patient also with a history of extensive colon polyps with two prior colonoscopies removing 18 polyps and 10-12 the second time. Patient reports last colonoscopy many years ago (thinks over ten)  I would suggest a GI colonoscopy evaluation as a possible source and would ask GI to reconsider the procedure while an in patient.    Mateo Simon MD  [phone]  After 5pm/weekends [phone] 87 yr old with CKD, COPD, BPH, PPM, Y AVR due to AS, CHF. 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 He remains with positive blood cultures on specimen from 5/31 with limited abx. options and will continue the Dapto 10mg/kg and ceftaroline as it may take awhile to clear the infection.    As above- but patient also with a history of extensive colon polyps with two prior colonoscopies removing 18 polyps and 10-12 removed by the second procedure . Patient reports last colonoscopy many years ago (thinks over ten)  I would suggest a GI colonoscopy evaluation as a possible source and would ask GI to reconsider the procedure while an in patient.    Mateo Simon MD  [phone]  After 5pm/weekends [phone]

## 2019-06-02 LAB
ALBUMIN SERPL ELPH-MCNC: 2.9 G/DL — LOW (ref 3.3–5)
ALP SERPL-CCNC: 116 U/L — SIGNIFICANT CHANGE UP (ref 40–120)
ALT FLD-CCNC: 8 U/L — LOW (ref 10–45)
ANION GAP SERPL CALC-SCNC: 14 MMOL/L — SIGNIFICANT CHANGE UP (ref 5–17)
AST SERPL-CCNC: 10 U/L — SIGNIFICANT CHANGE UP (ref 10–40)
BASOPHILS # BLD AUTO: 0.03 K/UL — SIGNIFICANT CHANGE UP (ref 0–0.2)
BASOPHILS NFR BLD AUTO: 0.3 % — SIGNIFICANT CHANGE UP (ref 0–2)
BILIRUB SERPL-MCNC: 0.4 MG/DL — SIGNIFICANT CHANGE UP (ref 0.2–1.2)
BUN SERPL-MCNC: 18 MG/DL — SIGNIFICANT CHANGE UP (ref 7–23)
CALCIUM SERPL-MCNC: 9 MG/DL — SIGNIFICANT CHANGE UP (ref 8.4–10.5)
CHLORIDE SERPL-SCNC: 102 MMOL/L — SIGNIFICANT CHANGE UP (ref 96–108)
CO2 SERPL-SCNC: 20 MMOL/L — LOW (ref 22–31)
CREAT SERPL-MCNC: 1.01 MG/DL — SIGNIFICANT CHANGE UP (ref 0.5–1.3)
EOSINOPHIL # BLD AUTO: 0.53 K/UL — HIGH (ref 0–0.5)
EOSINOPHIL NFR BLD AUTO: 5.6 % — SIGNIFICANT CHANGE UP (ref 0–6)
GLUCOSE SERPL-MCNC: 88 MG/DL — SIGNIFICANT CHANGE UP (ref 70–99)
GRAM STN FLD: SIGNIFICANT CHANGE UP
GRAM STN FLD: SIGNIFICANT CHANGE UP
HCT VFR BLD CALC: 30.4 % — LOW (ref 39–50)
HGB BLD-MCNC: 9.4 G/DL — LOW (ref 13–17)
IMM GRANULOCYTES NFR BLD AUTO: 0.3 % — SIGNIFICANT CHANGE UP (ref 0–1.5)
LYMPHOCYTES # BLD AUTO: 0.74 K/UL — LOW (ref 1–3.3)
LYMPHOCYTES # BLD AUTO: 7.9 % — LOW (ref 13–44)
MCHC RBC-ENTMCNC: 26.9 PG — LOW (ref 27–34)
MCHC RBC-ENTMCNC: 30.9 GM/DL — LOW (ref 32–36)
MCV RBC AUTO: 86.9 FL — SIGNIFICANT CHANGE UP (ref 80–100)
MONOCYTES # BLD AUTO: 0.93 K/UL — HIGH (ref 0–0.9)
MONOCYTES NFR BLD AUTO: 9.9 % — SIGNIFICANT CHANGE UP (ref 2–14)
NEUTROPHILS # BLD AUTO: 7.15 K/UL — SIGNIFICANT CHANGE UP (ref 1.8–7.4)
NEUTROPHILS NFR BLD AUTO: 76 % — SIGNIFICANT CHANGE UP (ref 43–77)
PLATELET # BLD AUTO: 233 K/UL — SIGNIFICANT CHANGE UP (ref 150–400)
POTASSIUM SERPL-MCNC: 4.2 MMOL/L — SIGNIFICANT CHANGE UP (ref 3.5–5.3)
POTASSIUM SERPL-SCNC: 4.2 MMOL/L — SIGNIFICANT CHANGE UP (ref 3.5–5.3)
PROT SERPL-MCNC: 6.3 G/DL — SIGNIFICANT CHANGE UP (ref 6–8.3)
RBC # BLD: 3.5 M/UL — LOW (ref 4.2–5.8)
RBC # FLD: 15.6 % — HIGH (ref 10.3–14.5)
SODIUM SERPL-SCNC: 136 MMOL/L — SIGNIFICANT CHANGE UP (ref 135–145)
SPECIMEN SOURCE: SIGNIFICANT CHANGE UP
WBC # BLD: 9.41 K/UL — SIGNIFICANT CHANGE UP (ref 3.8–10.5)
WBC # FLD AUTO: 9.41 K/UL — SIGNIFICANT CHANGE UP (ref 3.8–10.5)

## 2019-06-02 RX ORDER — OXYCODONE HYDROCHLORIDE 5 MG/1
2.5 TABLET ORAL EVERY 6 HOURS
Refills: 0 | Status: DISCONTINUED | OUTPATIENT
Start: 2019-06-02 | End: 2019-06-09

## 2019-06-02 RX ADMIN — CEFTAROLINE FOSAMIL 50 MILLIGRAM(S): 600 POWDER, FOR SOLUTION INTRAVENOUS at 05:41

## 2019-06-02 RX ADMIN — Medication 650 MILLIGRAM(S): at 01:55

## 2019-06-02 RX ADMIN — LIDOCAINE 1 PATCH: 4 CREAM TOPICAL at 19:03

## 2019-06-02 RX ADMIN — ATORVASTATIN CALCIUM 40 MILLIGRAM(S): 80 TABLET, FILM COATED ORAL at 20:36

## 2019-06-02 RX ADMIN — Medication 100 MILLIGRAM(S): at 14:55

## 2019-06-02 RX ADMIN — BUDESONIDE AND FORMOTEROL FUMARATE DIHYDRATE 2 PUFF(S): 160; 4.5 AEROSOL RESPIRATORY (INHALATION) at 17:29

## 2019-06-02 RX ADMIN — CEFTAROLINE FOSAMIL 50 MILLIGRAM(S): 600 POWDER, FOR SOLUTION INTRAVENOUS at 22:24

## 2019-06-02 RX ADMIN — Medication 25 MILLIGRAM(S): at 05:41

## 2019-06-02 RX ADMIN — APIXABAN 5 MILLIGRAM(S): 2.5 TABLET, FILM COATED ORAL at 17:29

## 2019-06-02 RX ADMIN — LIDOCAINE 1 PATCH: 4 CREAM TOPICAL at 01:58

## 2019-06-02 RX ADMIN — APIXABAN 5 MILLIGRAM(S): 2.5 TABLET, FILM COATED ORAL at 05:40

## 2019-06-02 RX ADMIN — Medication 3 MILLIGRAM(S): at 22:24

## 2019-06-02 RX ADMIN — Medication 100 MILLIGRAM(S): at 05:41

## 2019-06-02 RX ADMIN — AMIODARONE HYDROCHLORIDE 200 MILLIGRAM(S): 400 TABLET ORAL at 05:40

## 2019-06-02 RX ADMIN — OXYCODONE HYDROCHLORIDE 2.5 MILLIGRAM(S): 5 TABLET ORAL at 13:49

## 2019-06-02 RX ADMIN — PANTOPRAZOLE SODIUM 40 MILLIGRAM(S): 20 TABLET, DELAYED RELEASE ORAL at 05:43

## 2019-06-02 RX ADMIN — LIDOCAINE 1 PATCH: 4 CREAM TOPICAL at 12:09

## 2019-06-02 RX ADMIN — OXYCODONE HYDROCHLORIDE 2.5 MILLIGRAM(S): 5 TABLET ORAL at 21:03

## 2019-06-02 RX ADMIN — Medication 25 MILLIGRAM(S): at 17:29

## 2019-06-02 RX ADMIN — DAPTOMYCIN 134 MILLIGRAM(S): 500 INJECTION, POWDER, LYOPHILIZED, FOR SOLUTION INTRAVENOUS at 14:49

## 2019-06-02 RX ADMIN — SPIRONOLACTONE 25 MILLIGRAM(S): 25 TABLET, FILM COATED ORAL at 05:41

## 2019-06-02 RX ADMIN — Medication 100 MILLIGRAM(S): at 20:38

## 2019-06-02 RX ADMIN — CLOPIDOGREL BISULFATE 75 MILLIGRAM(S): 75 TABLET, FILM COATED ORAL at 12:08

## 2019-06-02 RX ADMIN — CEFTAROLINE FOSAMIL 50 MILLIGRAM(S): 600 POWDER, FOR SOLUTION INTRAVENOUS at 15:44

## 2019-06-02 RX ADMIN — OXYCODONE HYDROCHLORIDE 2.5 MILLIGRAM(S): 5 TABLET ORAL at 14:19

## 2019-06-02 RX ADMIN — SENNA PLUS 2 TABLET(S): 8.6 TABLET ORAL at 20:36

## 2019-06-02 RX ADMIN — OXYCODONE HYDROCHLORIDE 2.5 MILLIGRAM(S): 5 TABLET ORAL at 20:33

## 2019-06-02 RX ADMIN — Medication 650 MILLIGRAM(S): at 09:21

## 2019-06-02 RX ADMIN — FINASTERIDE 5 MILLIGRAM(S): 5 TABLET, FILM COATED ORAL at 12:08

## 2019-06-02 RX ADMIN — Medication 650 MILLIGRAM(S): at 09:51

## 2019-06-02 RX ADMIN — TAMSULOSIN HYDROCHLORIDE 0.4 MILLIGRAM(S): 0.4 CAPSULE ORAL at 20:37

## 2019-06-02 RX ADMIN — BUDESONIDE AND FORMOTEROL FUMARATE DIHYDRATE 2 PUFF(S): 160; 4.5 AEROSOL RESPIRATORY (INHALATION) at 05:41

## 2019-06-02 NOTE — PROGRESS NOTE ADULT - SUBJECTIVE AND OBJECTIVE BOX
Subjective: Patient seen and examined. No new events except as noted.   doing well however has lower back pain   cont to have positive Blood Cx     REVIEW OF SYSTEMS:    CONSTITUTIONAL: No weakness, fevers or chills  EYES/ENT: No visual changes;  No vertigo or throat pain   NECK: No pain or stiffness  RESPIRATORY: No cough, wheezing, hemoptysis; No shortness of breath  CARDIOVASCULAR: No chest pain or palpitations  GASTROINTESTINAL: No abdominal or epigastric pain. No nausea, vomiting, or hematemesis; No diarrhea or constipation. No melena or hematochezia.  GENITOURINARY: No dysuria, frequency or hematuria  NEUROLOGICAL: No numbness or weakness  SKIN: No itching, burning, rashes, or lesions   All other review of systems is negative unless indicated above.    MEDICATIONS:  MEDICATIONS  (STANDING):  amiodarone    Tablet 200 milliGRAM(s) Oral daily  apixaban 5 milliGRAM(s) Oral every 12 hours  atorvastatin 40 milliGRAM(s) Oral at bedtime  buDESOnide  80 MICROgram(s)/formoterol 4.5 MICROgram(s) Inhaler 2 Puff(s) Inhalation two times a day  ceftaroline fosamil IVPB 600 milliGRAM(s) IV Intermittent every 8 hours  clopidogrel Tablet 75 milliGRAM(s) Oral daily  DAPTOmycin IVPB 850 milliGRAM(s) IV Intermittent every 24 hours  docusate sodium 100 milliGRAM(s) Oral three times a day  finasteride 5 milliGRAM(s) Oral daily  lidocaine   Patch 1 Patch Transdermal daily  lidocaine   Patch 1 Patch Transdermal daily  melatonin 3 milliGRAM(s) Oral at bedtime  metoprolol tartrate 25 milliGRAM(s) Oral two times a day  pantoprazole    Tablet 40 milliGRAM(s) Oral before breakfast  senna 2 Tablet(s) Oral at bedtime  spironolactone 25 milliGRAM(s) Oral daily  tamsulosin 0.4 milliGRAM(s) Oral at bedtime      PHYSICAL EXAM:  T(C): 36.3 (06-02-19 @ 11:57), Max: 36.7 (06-02-19 @ 04:28)  HR: 70 (06-02-19 @ 11:57) (70 - 87)  BP: 108/61 (06-02-19 @ 11:57) (102/63 - 138/70)  RR: 18 (06-02-19 @ 11:57) (18 - 19)  SpO2: 94% (06-02-19 @ 11:57) (94% - 97%)  Wt(kg): --  I&O's Summary    01 Jun 2019 07:01  -  02 Jun 2019 07:00  --------------------------------------------------------  IN: 490 mL / OUT: 0 mL / NET: 490 mL    02 Jun 2019 07:01  -  02 Jun 2019 13:23  --------------------------------------------------------  IN: 240 mL / OUT: 0 mL / NET: 240 mL          Appearance: Normal	  HEENT:   Normal oral mucosa, PERRL, EOMI	  Lymphatic: No lymphadenopathy , no edema  Cardiovascular: Normal S1 S2, No JVD, No murmurs , Peripheral pulses palpable 2+ bilaterally  Respiratory: Lungs clear to auscultation, normal effort 	  Gastrointestinal:  Soft, Non-tender, + BS	  Skin: No rashes, No ecchymoses, No cyanosis, warm to touch  Musculoskeletal: lower back pain   Psychiatry:  Mood & affect appropriate  Ext: No edema      All labs, Imaging and EKGs personally reviewed                           9.4    9.41  )-----------( 233      ( 02 Jun 2019 10:07 )             30.4               06-02    136  |  102  |  18  ----------------------------<  88  4.2   |  20<L>  |  1.01    Ca    9.0      02 Jun 2019 07:15    TPro  6.3  /  Alb  2.9<L>  /  TBili  0.4  /  DBili  x   /  AST  10  /  ALT  8<L>  /  AlkPhos  116  06-02    PT/INR - ( 01 Jun 2019 09:21 )   PT: 19.5 sec;   INR: 1.68 ratio

## 2019-06-02 NOTE — PROGRESS NOTE ADULT - SUBJECTIVE AND OBJECTIVE BOX
Patient is a 88y old  Male who presents with a chief complaint of bacteremia (02 Jun 2019 13:23)      INTERVAL HISTORY: feels ok       MEDICATIONS:  amiodarone    Tablet 200 milliGRAM(s) Oral daily  metoprolol tartrate 25 milliGRAM(s) Oral two times a day  spironolactone 25 milliGRAM(s) Oral daily  tamsulosin 0.4 milliGRAM(s) Oral at bedtime        PHYSICAL EXAM:  T(C): 36.3 (06-02-19 @ 11:57), Max: 36.7 (06-02-19 @ 04:28)  HR: 70 (06-02-19 @ 11:57) (70 - 76)  BP: 108/61 (06-02-19 @ 11:57) (102/63 - 138/70)  RR: 18 (06-02-19 @ 11:57) (18 - 18)  SpO2: 94% (06-02-19 @ 11:57) (94% - 97%)  Wt(kg): --  I&O's Summary    01 Jun 2019 07:01  -  02 Jun 2019 07:00  --------------------------------------------------------  IN: 490 mL / OUT: 0 mL / NET: 490 mL    02 Jun 2019 07:01  -  02 Jun 2019 16:22  --------------------------------------------------------  IN: 340 mL / OUT: 0 mL / NET: 340 mL          Appearance: In no distress	  HEENT:    PERRL, EOMI	  Cardiovascular:  S1 S2, No JVD  Respiratory: Lungs clear to auscultation	  Gastrointestinal:  Soft, Non-tender, + BS	  Extremities:  No edema of LE                                9.4    9.41  )-----------( 233      ( 02 Jun 2019 10:07 )             30.4     06-02    136  |  102  |  18  ----------------------------<  88  4.2   |  20<L>  |  1.01    Ca    9.0      02 Jun 2019 07:15    TPro  6.3  /  Alb  2.9<L>  /  TBili  0.4  /  DBili  x   /  AST  10  /  ALT  8<L>  /  AlkPhos  116  06-02        Labs personally reviewed      Assessment and Plan:   Assessment:  · Assessment		  Patient is a 87 y/o male with PMH SSS/a fib on eliquis, 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 Was admitted recently and dx with possible endocarditis, ppm removed, received dapto/ceftaroline through PICC, PICC was dc and patient has been off abx for 2 weeks, plan was to observe off abx for 3 weeks and repeat labs. Patient had labs/bcx done yesterday with primary care, bcx were positive, patient otherwise does not have new symptoms or feel ill.     Problem/Plan - 1:  ·  Problem: Bacteremia.  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  Plan: continue with aldactone 25mg, euvolemic at this time    Problem/Plan - 3:  ·  Problem: Aortic stenosis.  Plan: S/P TAVR   - ?bioprosthetic valve endocarditis  - high risk candidate for open valve replacement    Problem/Plan - 4:  Problem: Atrial fibrillation. Plan: On eliquis, Rate controlled on Metoprolol and Amio 100mg daily    Problem/Plan - 5:  ·  Problem: CAD (coronary artery disease).  Plan: On Plavix given TAVR, c/w statin.           Timothy Paredes DO [facility]  Cardiovascular Medicine  [phone]

## 2019-06-02 NOTE — PROGRESS NOTE ADULT - SUBJECTIVE AND OBJECTIVE BOX
RUMA BANEGAS:[id number],   88yMale followed for:  Keppra (Rash)  penicillin (Unknown)    PAST MEDICAL & SURGICAL HISTORY:  CKD (chronic kidney disease) stage 3, GFR 30-59 ml/min  Hepatitis B  PTSD (post-traumatic stress disorder)  Tachy-idris syndrome  TIA (transient ischemic attack): 2010  Seizure  DIEGO (obstructive sleep apnea)  Chronic diastolic HF (heart failure)  Aortic stenosis  CVA (cerebral vascular accident)  Gait instability  COPD (chronic obstructive pulmonary disease)  BPH (benign prostatic hyperplasia)  Asthma  CHF (congestive heart failure)  Atrial fibrillation  Pacemaker: 2015 Medtronic [id number]  [id number]  Hypertension  H/O thalassemia  CAD (coronary artery disease): s/p stent 2010  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  Artificial pacemaker  S/P primary angioplasty with coronary stent: 2012 AND 2017    FAMILY HISTORY:  No pertinent family history in first degree relatives    MEDICATIONS  (STANDING):  amiodarone    Tablet 200 milliGRAM(s) Oral daily  apixaban 5 milliGRAM(s) Oral every 12 hours  atorvastatin 40 milliGRAM(s) Oral at bedtime  buDESOnide  80 MICROgram(s)/formoterol 4.5 MICROgram(s) Inhaler 2 Puff(s) Inhalation two times a day  ceftaroline fosamil IVPB 600 milliGRAM(s) IV Intermittent every 8 hours  clopidogrel Tablet 75 milliGRAM(s) Oral daily  DAPTOmycin IVPB 850 milliGRAM(s) IV Intermittent every 24 hours  docusate sodium 100 milliGRAM(s) Oral three times a day  finasteride 5 milliGRAM(s) Oral daily  lidocaine   Patch 1 Patch Transdermal daily  lidocaine   Patch 1 Patch Transdermal daily  melatonin 3 milliGRAM(s) Oral at bedtime  metoprolol tartrate 25 milliGRAM(s) Oral two times a day  pantoprazole    Tablet 40 milliGRAM(s) Oral before breakfast  senna 2 Tablet(s) Oral at bedtime  spironolactone 25 milliGRAM(s) Oral daily  tamsulosin 0.4 milliGRAM(s) Oral at bedtime    MEDICATIONS  (PRN):  acetaminophen   Tablet .. 650 milliGRAM(s) Oral every 6 hours PRN Moderate Pain (4 - 6)  ALBUTerol    90 MICROgram(s) HFA Inhaler 2 Puff(s) Inhalation every 6 hours PRN Shortness of Breath and/or Wheezing  ondansetron    Tablet 4 milliGRAM(s) Oral every 8 hours PRN Nausea and/or Vomiting  oxyCODONE    IR 5 milliGRAM(s) Oral every 8 hours PRN Moderate Pain (4 - 6)      Vital Signs Last 24 Hrs  T(C): 36.7 (02 Jun 2019 04:28), Max: 36.7 (02 Jun 2019 04:28)  T(F): 98 (02 Jun 2019 04:28), Max: 98 (02 Jun 2019 04:28)  HR: 73 (02 Jun 2019 04:28) (63 - 87)  BP: 116/74 (02 Jun 2019 04:28) (98/61 - 138/70)  BP(mean): --  RR: 18 (02 Jun 2019 04:28) (17 - 19)  SpO2: 96% (02 Jun 2019 04:28) (95% - 97%)  nc/at  s1s2  cta  soft, nt, nd no guarding or rebound  no c/c/e    CBC Full  -  ( 01 Jun 2019 10:41 )  WBC Count : 9.28 K/uL  RBC Count : 3.56 M/uL  Hemoglobin : 9.6 g/dL  Hematocrit : 30.2 %  Platelet Count - Automated : 230 K/uL  Mean Cell Volume : 84.8 fl  Mean Cell Hemoglobin : 27.0 pg  Mean Cell Hemoglobin Concentration : 31.8 gm/dL  Auto Neutrophil # : 6.94 K/uL  Auto Lymphocyte # : 0.79 K/uL  Auto Monocyte # : 0.95 K/uL  Auto Eosinophil # : 0.53 K/uL  Auto Basophil # : 0.03 K/uL  Auto Neutrophil % : 74.9 %  Auto Lymphocyte % : 8.5 %  Auto Monocyte % : 10.2 %  Auto Eosinophil % : 5.7 %  Auto Basophil % : 0.3 %    06-02    136  |  102  |  18  ----------------------------<  88  4.2   |  20<L>  |  1.01    Ca    9.0      02 Jun 2019 07:15    TPro  6.3  /  Alb  2.9<L>  /  TBili  0.4  /  DBili  x   /  AST  10  /  ALT  8<L>  /  AlkPhos  116  06-02    PT/INR - ( 01 Jun 2019 09:21 )   PT: 19.5 sec;   INR: 1.68 ratio

## 2019-06-02 NOTE — PROVIDER CONTACT NOTE (CRITICAL VALUE NOTIFICATION) - SITUATION
SERAFIN Walker notified of + blood cultures preliminary growth in aerobic bottle gram + cocci in pairs and chains.

## 2019-06-03 ENCOUNTER — APPOINTMENT (OUTPATIENT)
Dept: GERIATRICS | Facility: CLINIC | Age: 84
End: 2019-06-03

## 2019-06-03 DIAGNOSIS — M25.519 PAIN IN UNSPECIFIED SHOULDER: ICD-10-CM

## 2019-06-03 LAB
-  AMPICILLIN: SIGNIFICANT CHANGE UP
-  DAPTOMYCIN: SIGNIFICANT CHANGE UP
-  GENTAMICIN SYNERGY: SIGNIFICANT CHANGE UP
-  LINEZOLID: SIGNIFICANT CHANGE UP
-  VANCOMYCIN: SIGNIFICANT CHANGE UP
ALBUMIN SERPL ELPH-MCNC: 3 G/DL — LOW (ref 3.3–5)
ALP SERPL-CCNC: 123 U/L — HIGH (ref 40–120)
ALT FLD-CCNC: 10 U/L — SIGNIFICANT CHANGE UP (ref 10–45)
ANION GAP SERPL CALC-SCNC: 14 MMOL/L — SIGNIFICANT CHANGE UP (ref 5–17)
AST SERPL-CCNC: 10 U/L — SIGNIFICANT CHANGE UP (ref 10–40)
BILIRUB SERPL-MCNC: 0.4 MG/DL — SIGNIFICANT CHANGE UP (ref 0.2–1.2)
BUN SERPL-MCNC: 18 MG/DL — SIGNIFICANT CHANGE UP (ref 7–23)
CALCIUM SERPL-MCNC: 9.1 MG/DL — SIGNIFICANT CHANGE UP (ref 8.4–10.5)
CHLORIDE SERPL-SCNC: 102 MMOL/L — SIGNIFICANT CHANGE UP (ref 96–108)
CO2 SERPL-SCNC: 20 MMOL/L — LOW (ref 22–31)
CREAT SERPL-MCNC: 0.99 MG/DL — SIGNIFICANT CHANGE UP (ref 0.5–1.3)
CULTURE RESULTS: SIGNIFICANT CHANGE UP
GLUCOSE SERPL-MCNC: 93 MG/DL — SIGNIFICANT CHANGE UP (ref 70–99)
GRAM STN FLD: SIGNIFICANT CHANGE UP
GRAM STN FLD: SIGNIFICANT CHANGE UP
HCT VFR BLD CALC: 30.1 % — LOW (ref 39–50)
HGB BLD-MCNC: 9.3 G/DL — LOW (ref 13–17)
MCHC RBC-ENTMCNC: 26.6 PG — LOW (ref 27–34)
MCHC RBC-ENTMCNC: 30.9 GM/DL — LOW (ref 32–36)
MCV RBC AUTO: 86 FL — SIGNIFICANT CHANGE UP (ref 80–100)
METHOD TYPE: SIGNIFICANT CHANGE UP
ORGANISM # SPEC MICROSCOPIC CNT: SIGNIFICANT CHANGE UP
ORGANISM # SPEC MICROSCOPIC CNT: SIGNIFICANT CHANGE UP
PLATELET # BLD AUTO: 247 K/UL — SIGNIFICANT CHANGE UP (ref 150–400)
POTASSIUM SERPL-MCNC: 4.4 MMOL/L — SIGNIFICANT CHANGE UP (ref 3.5–5.3)
POTASSIUM SERPL-SCNC: 4.4 MMOL/L — SIGNIFICANT CHANGE UP (ref 3.5–5.3)
PROT SERPL-MCNC: 6.3 G/DL — SIGNIFICANT CHANGE UP (ref 6–8.3)
RBC # BLD: 3.5 M/UL — LOW (ref 4.2–5.8)
RBC # FLD: 15.5 % — HIGH (ref 10.3–14.5)
SODIUM SERPL-SCNC: 136 MMOL/L — SIGNIFICANT CHANGE UP (ref 135–145)
SPECIMEN SOURCE: SIGNIFICANT CHANGE UP
WBC # BLD: 8.89 K/UL — SIGNIFICANT CHANGE UP (ref 3.8–10.5)
WBC # FLD AUTO: 8.89 K/UL — SIGNIFICANT CHANGE UP (ref 3.8–10.5)

## 2019-06-03 PROCEDURE — 99232 SBSQ HOSP IP/OBS MODERATE 35: CPT

## 2019-06-03 PROCEDURE — 73030 X-RAY EXAM OF SHOULDER: CPT | Mod: 26,LT

## 2019-06-03 PROCEDURE — 99233 SBSQ HOSP IP/OBS HIGH 50: CPT

## 2019-06-03 RX ORDER — ACETAMINOPHEN 500 MG
1000 TABLET ORAL ONCE
Refills: 0 | Status: COMPLETED | OUTPATIENT
Start: 2019-06-03 | End: 2019-06-04

## 2019-06-03 RX ORDER — OXYCODONE HYDROCHLORIDE 5 MG/1
2.5 TABLET ORAL ONCE
Refills: 0 | Status: DISCONTINUED | OUTPATIENT
Start: 2019-06-03 | End: 2019-06-03

## 2019-06-03 RX ORDER — KETOROLAC TROMETHAMINE 30 MG/ML
30 SYRINGE (ML) INJECTION ONCE
Refills: 0 | Status: DISCONTINUED | OUTPATIENT
Start: 2019-06-03 | End: 2019-06-03

## 2019-06-03 RX ADMIN — ATORVASTATIN CALCIUM 40 MILLIGRAM(S): 80 TABLET, FILM COATED ORAL at 22:02

## 2019-06-03 RX ADMIN — SPIRONOLACTONE 25 MILLIGRAM(S): 25 TABLET, FILM COATED ORAL at 05:16

## 2019-06-03 RX ADMIN — Medication 25 MILLIGRAM(S): at 18:25

## 2019-06-03 RX ADMIN — OXYCODONE HYDROCHLORIDE 2.5 MILLIGRAM(S): 5 TABLET ORAL at 05:30

## 2019-06-03 RX ADMIN — CLOPIDOGREL BISULFATE 75 MILLIGRAM(S): 75 TABLET, FILM COATED ORAL at 12:59

## 2019-06-03 RX ADMIN — LIDOCAINE 1 PATCH: 4 CREAM TOPICAL at 12:55

## 2019-06-03 RX ADMIN — OXYCODONE HYDROCHLORIDE 2.5 MILLIGRAM(S): 5 TABLET ORAL at 11:36

## 2019-06-03 RX ADMIN — LIDOCAINE 1 PATCH: 4 CREAM TOPICAL at 12:59

## 2019-06-03 RX ADMIN — OXYCODONE HYDROCHLORIDE 2.5 MILLIGRAM(S): 5 TABLET ORAL at 22:45

## 2019-06-03 RX ADMIN — Medication 100 MILLIGRAM(S): at 13:05

## 2019-06-03 RX ADMIN — APIXABAN 5 MILLIGRAM(S): 2.5 TABLET, FILM COATED ORAL at 05:16

## 2019-06-03 RX ADMIN — LIDOCAINE 1 PATCH: 4 CREAM TOPICAL at 00:10

## 2019-06-03 RX ADMIN — LIDOCAINE 1 PATCH: 4 CREAM TOPICAL at 22:08

## 2019-06-03 RX ADMIN — TAMSULOSIN HYDROCHLORIDE 0.4 MILLIGRAM(S): 0.4 CAPSULE ORAL at 22:02

## 2019-06-03 RX ADMIN — BUDESONIDE AND FORMOTEROL FUMARATE DIHYDRATE 2 PUFF(S): 160; 4.5 AEROSOL RESPIRATORY (INHALATION) at 18:25

## 2019-06-03 RX ADMIN — Medication 100 MILLIGRAM(S): at 05:16

## 2019-06-03 RX ADMIN — OXYCODONE HYDROCHLORIDE 2.5 MILLIGRAM(S): 5 TABLET ORAL at 10:36

## 2019-06-03 RX ADMIN — Medication 30 MILLIGRAM(S): at 17:15

## 2019-06-03 RX ADMIN — OXYCODONE HYDROCHLORIDE 2.5 MILLIGRAM(S): 5 TABLET ORAL at 06:00

## 2019-06-03 RX ADMIN — BUDESONIDE AND FORMOTEROL FUMARATE DIHYDRATE 2 PUFF(S): 160; 4.5 AEROSOL RESPIRATORY (INHALATION) at 05:16

## 2019-06-03 RX ADMIN — Medication 100 MILLIGRAM(S): at 22:02

## 2019-06-03 RX ADMIN — DAPTOMYCIN 134 MILLIGRAM(S): 500 INJECTION, POWDER, LYOPHILIZED, FOR SOLUTION INTRAVENOUS at 14:22

## 2019-06-03 RX ADMIN — LIDOCAINE 1 PATCH: 4 CREAM TOPICAL at 07:03

## 2019-06-03 RX ADMIN — CEFTAROLINE FOSAMIL 50 MILLIGRAM(S): 600 POWDER, FOR SOLUTION INTRAVENOUS at 05:29

## 2019-06-03 RX ADMIN — Medication 650 MILLIGRAM(S): at 09:27

## 2019-06-03 RX ADMIN — Medication 30 MILLIGRAM(S): at 17:45

## 2019-06-03 RX ADMIN — APIXABAN 5 MILLIGRAM(S): 2.5 TABLET, FILM COATED ORAL at 18:25

## 2019-06-03 RX ADMIN — LIDOCAINE 1 PATCH: 4 CREAM TOPICAL at 22:09

## 2019-06-03 RX ADMIN — FINASTERIDE 5 MILLIGRAM(S): 5 TABLET, FILM COATED ORAL at 12:59

## 2019-06-03 RX ADMIN — Medication 3 MILLIGRAM(S): at 22:02

## 2019-06-03 RX ADMIN — PANTOPRAZOLE SODIUM 40 MILLIGRAM(S): 20 TABLET, DELAYED RELEASE ORAL at 05:17

## 2019-06-03 RX ADMIN — OXYCODONE HYDROCHLORIDE 2.5 MILLIGRAM(S): 5 TABLET ORAL at 21:59

## 2019-06-03 RX ADMIN — Medication 650 MILLIGRAM(S): at 08:27

## 2019-06-03 RX ADMIN — CEFTAROLINE FOSAMIL 50 MILLIGRAM(S): 600 POWDER, FOR SOLUTION INTRAVENOUS at 22:03

## 2019-06-03 RX ADMIN — Medication 25 MILLIGRAM(S): at 05:17

## 2019-06-03 RX ADMIN — SENNA PLUS 2 TABLET(S): 8.6 TABLET ORAL at 22:02

## 2019-06-03 RX ADMIN — AMIODARONE HYDROCHLORIDE 200 MILLIGRAM(S): 400 TABLET ORAL at 05:16

## 2019-06-03 RX ADMIN — CEFTAROLINE FOSAMIL 50 MILLIGRAM(S): 600 POWDER, FOR SOLUTION INTRAVENOUS at 15:12

## 2019-06-03 NOTE — PROVIDER CONTACT NOTE (CRITICAL VALUE NOTIFICATION) - TEST AND RESULT REPORTED:
Blood culture from 6/1/ 2019 and 6/2/2019 aerobic and anaerobic bottles enterococcus faecium vancomycin resistant and gram positive cocci in pairs and chains

## 2019-06-03 NOTE — DIETITIAN INITIAL EVALUATION ADULT. - NS FNS WEIGHT CHANGE REASON
Pt reports 40 pounds weight loss x 3 months PTA due to fluid loss and decreased PO intake during this hospitalization, from 220 to 180 pounds; states being "happy" with weight loss. Weight as per previous RD notes (11/08/2018) 221.1 pounds with edema -> (01/09/2019) 211.1 pounds with edema -> (04/01/2019) 189.3 pounds. Weight as per flow sheets (05/26) 186.7 pounds -> (06/03) 182.7 pounds -?accuracy of weight fluctuations likely due to fluid shifts, will continue to monitor.

## 2019-06-03 NOTE — CONSULT NOTE ADULT - SUBJECTIVE AND OBJECTIVE BOX
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 Was admitted recently and dx with possible endocarditis, ppm removed, received dapto/ceftaroline through PICC, PICC was dc and patient has been off abx for 2 weeks, plan was to observe off abx for 3 weeks and repeat labs. Patient had labs/bcx done yesterday with primary care, bcx were positive, patient otherwise does not have new symptoms or feel ill. (26 May 2019 18:54)      Imaging:    Exam:  Awake, alert, conversant  KATLYN strongly AG      --Anticoagulation:  apixaban 5 milliGRAM(s) Oral every 12 hours  clopidogrel Tablet 75 milliGRAM(s) Oral daily    =====================  PAST MEDICAL HISTORY   CKD (chronic kidney disease) stage 3, GFR 30-59 ml/min  Hepatitis B  PTSD (post-traumatic stress disorder)  Tachy-idris syndrome  TIA (transient ischemic attack)  Seizure  DIEGO (obstructive sleep apnea)  Chronic diastolic HF (heart failure)  Aortic stenosis  CVA (cerebral vascular accident)  Gait instability  COPD (chronic obstructive pulmonary disease)  BPH (benign prostatic hyperplasia)  Asthma  CHF (congestive heart failure)  Atrial fibrillation  Pacemaker  Hypertension  H/O thalassemia  CAD (coronary artery disease)    PAST SURGICAL HISTORY   S/P TAVR (transcatheter aortic valve replacement)  Artificial pacemaker  S/P primary angioplasty with coronary stent    Keppra (Rash)  penicillin (Unknown)      MEDICATIONS:  Antibiotics:  ceftaroline fosamil IVPB 600 milliGRAM(s) IV Intermittent every 8 hours  DAPTOmycin IVPB 850 milliGRAM(s) IV Intermittent every 24 hours    Neuro:  acetaminophen   Tablet .. 650 milliGRAM(s) Oral every 6 hours PRN  melatonin 3 milliGRAM(s) Oral at bedtime  ondansetron    Tablet 4 milliGRAM(s) Oral every 8 hours PRN  oxyCODONE    IR 2.5 milliGRAM(s) Oral every 6 hours PRN    Other:  ALBUTerol    90 MICROgram(s) HFA Inhaler 2 Puff(s) Inhalation every 6 hours PRN  amiodarone    Tablet 200 milliGRAM(s) Oral daily  atorvastatin 40 milliGRAM(s) Oral at bedtime  buDESOnide  80 MICROgram(s)/formoterol 4.5 MICROgram(s) Inhaler 2 Puff(s) Inhalation two times a day  docusate sodium 100 milliGRAM(s) Oral three times a day  finasteride 5 milliGRAM(s) Oral daily  metoprolol tartrate 25 milliGRAM(s) Oral two times a day  pantoprazole    Tablet 40 milliGRAM(s) Oral before breakfast  senna 2 Tablet(s) Oral at bedtime  spironolactone 25 milliGRAM(s) Oral daily  tamsulosin 0.4 milliGRAM(s) Oral at bedtime      SOCIAL HISTORY:   Occupation:   Marital Status:     FAMILY HISTORY:  No pertinent family history in first degree relatives      ROS: Negative except per HPI    LABS:                          9.3    8.89  )-----------( 247      ( 03 Jun 2019 08:15 )             30.1     06-03    136  |  102  |  18  ----------------------------<  93  4.4   |  20<L>  |  0.99    Ca    9.1      03 Jun 2019 06:47    TPro  6.3  /  Alb  3.0<L>  /  TBili  0.4  /  DBili  x   /  AST  10  /  ALT  10  /  AlkPhos  123<H>  06-03

## 2019-06-03 NOTE — PROGRESS NOTE ADULT - PROBLEM SELECTOR PLAN 1
ID f/up-on dapto/ceftaroline--MARIA LUISA unrevealing--s/p MRI spine-abnormal, picc to placed  GI w/up if other studies non dx.

## 2019-06-03 NOTE — PROGRESS NOTE ADULT - SUBJECTIVE AND OBJECTIVE BOX
Patient is a 88y old  Male who presents with a chief complaint of bacteremia (03 Jun 2019 18:18)      INTERVAL HISTORY: feels well  	  MEDICATIONS:  amiodarone    Tablet 200 milliGRAM(s) Oral daily  metoprolol tartrate 25 milliGRAM(s) Oral two times a day  spironolactone 25 milliGRAM(s) Oral daily  tamsulosin 0.4 milliGRAM(s) Oral at bedtime        PHYSICAL EXAM:  T(C): 36.8 (06-03-19 @ 19:38), Max: 36.8 (06-03-19 @ 19:38)  HR: 77 (06-03-19 @ 19:38) (71 - 77)  BP: 103/61 (06-03-19 @ 19:38) (103/61 - 118/74)  RR: 18 (06-03-19 @ 19:38) (18 - 18)  SpO2: 94% (06-03-19 @ 19:38) (94% - 97%)  Wt(kg): --  I&O's Summary    02 Jun 2019 07:01  -  03 Jun 2019 07:00  --------------------------------------------------------  IN: 700 mL / OUT: 0 mL / NET: 700 mL    03 Jun 2019 07:01  -  03 Jun 2019 21:52  --------------------------------------------------------  IN: 715 mL / OUT: 200 mL / NET: 515 mL          Appearance: In no distress	  HEENT:    PERRL, EOMI	  Cardiovascular:  S1 S2, No JVD  Respiratory: Lungs clear to auscultation	  Gastrointestinal:  Soft, Non-tender, + BS	  Extremities:  No edema of LE                                9.3    8.89  )-----------( 247      ( 03 Jun 2019 08:15 )             30.1     06-03    136  |  102  |  18  ----------------------------<  93  4.4   |  20<L>  |  0.99    Ca    9.1      03 Jun 2019 06:47    TPro  6.3  /  Alb  3.0<L>  /  TBili  0.4  /  DBili  x   /  AST  10  /  ALT  10  /  AlkPhos  123<H>  06-03        Labs personally reviewed      Assessment and Plan:   Assessment:  · Assessment		  Patient is a 89 y/o male with PMH SSS/a fib on eliquis, 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 Was admitted recently and dx with possible endocarditis, ppm removed, received dapto/ceftaroline through PICC, PICC was dc and patient has been off abx for 2 weeks, plan was to observe off abx for 3 weeks and repeat labs. Patient had labs/bcx done yesterday with primary care, bcx were positive, patient otherwise does not have new symptoms or feel ill.     Problem/Plan - 1:  ·  Problem: Bacteremia.  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  Plan: continue with aldactone 25mg, euvolemic at this time    Problem/Plan - 3:  ·  Problem: Aortic stenosis.  Plan: S/P TAVR   - ?bioprosthetic valve endocarditis  - high risk candidate for open valve replacement    Problem/Plan - 4:  Problem: Atrial fibrillation. Plan: On eliquis, Rate controlled on Metoprolol and Amio 100mg daily    Problem/Plan - 5:  ·  Problem: CAD (coronary artery disease).  Plan: On Plavix given TAVR, c/w statin.           Timothy Paredes DO [facility]  Cardiovascular Medicine  [phone]

## 2019-06-03 NOTE — DIETITIAN INITIAL EVALUATION ADULT. - ENERGY NEEDS
Ht: 67 inches Wt: 182.7 pounds BMI: 28.6 kg/m2 IBW: 148 (+/-10%) 122.9 %IBW  Pertinent information: Pt 87 y/o M with PMH: SSS/A-fib, endocarditis S/P PPM removal, AS S/P TAVR, seizure, TIA, CVA, CHF, COPD, CAD S/P stent, BPH, CKD3, HTN, DIEGO, admitted with bacteremia, S/P MRI - abnormal with kahlil noted, awaiting PICC placement.   No noted edema as per flow sheets at this time (pt previously with edema). Skin: no noted pressure injuries as per documentation.

## 2019-06-03 NOTE — PROVIDER CONTACT NOTE (CRITICAL VALUE NOTIFICATION) - TEST AND RESULT REPORTED:
blood culture from 5/31/19 final aerobic and anaerobic  bottle juan alberto enterococcus faecium vancomycin resistant and  blood culture from 6/2 anaerobic bottle gram positive cocci in pairs and chains

## 2019-06-03 NOTE — PROGRESS NOTE ADULT - SUBJECTIVE AND OBJECTIVE BOX
RUMA BANEGAS:[id number],   88yMale followed for:  Keppra (Rash)  penicillin (Unknown)    PAST MEDICAL & SURGICAL HISTORY:  CKD (chronic kidney disease) stage 3, GFR 30-59 ml/min  Hepatitis B  PTSD (post-traumatic stress disorder)  Tachy-idris syndrome  TIA (transient ischemic attack): 2010  Seizure  DIEGO (obstructive sleep apnea)  Chronic diastolic HF (heart failure)  Aortic stenosis  CVA (cerebral vascular accident)  Gait instability  COPD (chronic obstructive pulmonary disease)  BPH (benign prostatic hyperplasia)  Asthma  CHF (congestive heart failure)  Atrial fibrillation  Pacemaker: 2015 Medtronic [id number]  [id number]  Hypertension  H/O thalassemia  CAD (coronary artery disease): s/p stent 2010  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  Artificial pacemaker  S/P primary angioplasty with coronary stent: 2012 AND 2017    FAMILY HISTORY:  No pertinent family history in first degree relatives    MEDICATIONS  (STANDING):  amiodarone    Tablet 200 milliGRAM(s) Oral daily  apixaban 5 milliGRAM(s) Oral every 12 hours  atorvastatin 40 milliGRAM(s) Oral at bedtime  buDESOnide  80 MICROgram(s)/formoterol 4.5 MICROgram(s) Inhaler 2 Puff(s) Inhalation two times a day  ceftaroline fosamil IVPB 600 milliGRAM(s) IV Intermittent every 8 hours  clopidogrel Tablet 75 milliGRAM(s) Oral daily  DAPTOmycin IVPB 850 milliGRAM(s) IV Intermittent every 24 hours  docusate sodium 100 milliGRAM(s) Oral three times a day  finasteride 5 milliGRAM(s) Oral daily  lidocaine   Patch 1 Patch Transdermal daily  lidocaine   Patch 1 Patch Transdermal daily  melatonin 3 milliGRAM(s) Oral at bedtime  metoprolol tartrate 25 milliGRAM(s) Oral two times a day  pantoprazole    Tablet 40 milliGRAM(s) Oral before breakfast  senna 2 Tablet(s) Oral at bedtime  spironolactone 25 milliGRAM(s) Oral daily  tamsulosin 0.4 milliGRAM(s) Oral at bedtime    MEDICATIONS  (PRN):  acetaminophen   Tablet .. 650 milliGRAM(s) Oral every 6 hours PRN mild pain  ALBUTerol    90 MICROgram(s) HFA Inhaler 2 Puff(s) Inhalation every 6 hours PRN Shortness of Breath and/or Wheezing  ondansetron    Tablet 4 milliGRAM(s) Oral every 8 hours PRN Nausea and/or Vomiting  oxyCODONE    IR 2.5 milliGRAM(s) Oral every 6 hours PRN Moderate Pain (4 - 6)      Vital Signs Last 24 Hrs  T(C): 36.4 (03 Jun 2019 04:02), Max: 36.6 (02 Jun 2019 20:43)  T(F): 97.5 (03 Jun 2019 04:02), Max: 97.9 (02 Jun 2019 20:43)  HR: 72 (03 Jun 2019 04:02) (70 - 75)  BP: 118/74 (03 Jun 2019 04:02) (108/61 - 132/70)  BP(mean): --  RR: 18 (03 Jun 2019 04:02) (18 - 18)  SpO2: 97% (03 Jun 2019 04:02) (94% - 98%)  nc/at  s1s2  cta  soft, nt, nd no guarding or rebound  no c/c/e    CBC Full  -  ( 03 Jun 2019 08:15 )  WBC Count : 8.89 K/uL  RBC Count : 3.50 M/uL  Hemoglobin : 9.3 g/dL  Hematocrit : 30.1 %  Platelet Count - Automated : 247 K/uL  Mean Cell Volume : 86.0 fl  Mean Cell Hemoglobin : 26.6 pg  Mean Cell Hemoglobin Concentration : 30.9 gm/dL  Auto Neutrophil # : x  Auto Lymphocyte # : x  Auto Monocyte # : x  Auto Eosinophil # : x  Auto Basophil # : x  Auto Neutrophil % : x  Auto Lymphocyte % : x  Auto Monocyte % : x  Auto Eosinophil % : x  Auto Basophil % : x    06-03    136  |  102  |  18  ----------------------------<  93  4.4   |  20<L>  |  0.99    Ca    9.1      03 Jun 2019 06:47    TPro  6.3  /  Alb  3.0<L>  /  TBili  0.4  /  DBili  x   /  AST  10  /  ALT  10  /  AlkPhos  123<H>  06-03    PT/INR - ( 01 Jun 2019 09:21 )   PT: 19.5 sec;   INR: 1.68 ratio

## 2019-06-03 NOTE — PROGRESS NOTE ADULT - SUBJECTIVE AND OBJECTIVE BOX
CHIEF COMPLAINT: f/up sob, copd, osas, abnormal ct chest-    Interval Events:    REVIEW OF SYSTEMS:  Constitutional: No fevers or chills. No weight loss. No fatigue or generalized malaise.  Eyes: No itching or discharge from the eyes  ENT: No ear pain. No ear discharge. No nasal congestion. No post nasal drip. No epistaxis. No throat pain. No sore throat. No difficulty swallowing.   CV: No chest pain. No palpitations. No lightheadedness or dizziness.   Resp: No dyspnea at rest. No dyspnea on exertion. No orthopnea. No wheezing. No cough. No stridor. No sputum production. No chest pain with respiration.  GI: No nausea. No vomiting. No diarrhea.  MSK: No joint pain or pain in any extremities  Integumentary: No skin lesions. No pedal edema.  Neurological: No gross motor weakness. No sensory changes.  [ ] All other systems negative  [ ] Unable to assess ROS because ________    OBJECTIVE:  ICU Vital Signs Last 24 Hrs  T(C): 36.4 (03 Jun 2019 04:02), Max: 36.6 (02 Jun 2019 20:43)  T(F): 97.5 (03 Jun 2019 04:02), Max: 97.9 (02 Jun 2019 20:43)  HR: 72 (03 Jun 2019 04:02) (70 - 75)  BP: 118/74 (03 Jun 2019 04:02) (108/61 - 132/70)  BP(mean): --  ABP: --  ABP(mean): --  RR: 18 (03 Jun 2019 04:02) (18 - 18)  SpO2: 97% (03 Jun 2019 04:02) (94% - 98%)        06-01 @ 07:01  -  06-02 @ 07:00  --------------------------------------------------------  IN: 490 mL / OUT: 0 mL / NET: 490 mL    06-02 @ 07:01  -  06-03 @ 04:57  --------------------------------------------------------  IN: 580 mL / OUT: 0 mL / NET: 580 mL      CAPILLARY BLOOD GLUCOSE          PHYSICAL EXAM:  General: Awake, alert, oriented X 3.   HEENT: Atraumatic, normocephalic.                 Mallampatti Grade                 No nasal congestion.                No tonsillar or pharyngeal exudates.  Lymph Nodes: No palpable lymphadenopathy  Neck: No JVD. No carotid bruit.   Respiratory: Normal chest expansion                         Normal percussion                         Normal and equal air entry                         No wheeze, rhonchi or rales.  Cardiovascular: S1 S2 normal. No murmurs, rubs or gallops.   Abdomen: Soft, non-tender, non-distended. No organomegaly. Normoactive bowel sounds.  Extremities: Warm to touch. Peripheral pulse palpable. No pedal edema.   Skin: No rashes or skin lesions  Neurological: Motor and sensory examination equal and normal in all four extremities.  Psychiatry: Appropriate mood and affect.    HOSPITAL MEDICATIONS:  MEDICATIONS  (STANDING):  amiodarone    Tablet 200 milliGRAM(s) Oral daily  apixaban 5 milliGRAM(s) Oral every 12 hours  atorvastatin 40 milliGRAM(s) Oral at bedtime  buDESOnide  80 MICROgram(s)/formoterol 4.5 MICROgram(s) Inhaler 2 Puff(s) Inhalation two times a day  ceftaroline fosamil IVPB 600 milliGRAM(s) IV Intermittent every 8 hours  clopidogrel Tablet 75 milliGRAM(s) Oral daily  DAPTOmycin IVPB 850 milliGRAM(s) IV Intermittent every 24 hours  docusate sodium 100 milliGRAM(s) Oral three times a day  finasteride 5 milliGRAM(s) Oral daily  lidocaine   Patch 1 Patch Transdermal daily  lidocaine   Patch 1 Patch Transdermal daily  melatonin 3 milliGRAM(s) Oral at bedtime  metoprolol tartrate 25 milliGRAM(s) Oral two times a day  pantoprazole    Tablet 40 milliGRAM(s) Oral before breakfast  senna 2 Tablet(s) Oral at bedtime  spironolactone 25 milliGRAM(s) Oral daily  tamsulosin 0.4 milliGRAM(s) Oral at bedtime    MEDICATIONS  (PRN):  acetaminophen   Tablet .. 650 milliGRAM(s) Oral every 6 hours PRN mild pain  ALBUTerol    90 MICROgram(s) HFA Inhaler 2 Puff(s) Inhalation every 6 hours PRN Shortness of Breath and/or Wheezing  ondansetron    Tablet 4 milliGRAM(s) Oral every 8 hours PRN Nausea and/or Vomiting  oxyCODONE    IR 2.5 milliGRAM(s) Oral every 6 hours PRN Moderate Pain (4 - 6)      LABS:                        9.4    9.41  )-----------( 233      ( 02 Jun 2019 10:07 )             30.4     06-02    136  |  102  |  18  ----------------------------<  88  4.2   |  20<L>  |  1.01    Ca    9.0      02 Jun 2019 07:15    TPro  6.3  /  Alb  2.9<L>  /  TBili  0.4  /  DBili  x   /  AST  10  /  ALT  8<L>  /  AlkPhos  116  06-02    PT/INR - ( 01 Jun 2019 09:21 )   PT: 19.5 sec;   INR: 1.68 ratio                   MICROBIOLOGY:     RADIOLOGY:  [ ] Reviewed and interpreted by me    Point of Care Ultrasound Findings:    PFT:    EKG: CHIEF COMPLAINT: f/up sob, copd, osas, abnormal ct chest-pain all over, no signif sob unless walking    Interval Events: daptomycin added    REVIEW OF SYSTEMS:  Constitutional: No fevers or chills. No weight loss. + fatigue or generalized malaise.  Eyes: No itching or discharge from the eyes  ENT: No ear pain. No ear discharge. No nasal congestion. No post nasal drip. No epistaxis. No throat pain. No sore throat. No difficulty swallowing.   CV: No chest pain. No palpitations. No lightheadedness or dizziness.   Resp: No dyspnea at rest. + dyspnea on exertion. No orthopnea. No wheezing. No cough. No stridor. No sputum production. No chest pain with respiration.  GI: No nausea. No vomiting. No diarrhea.  MSK: + joint pain or pain in any extremities  Integumentary: No skin lesions. No pedal edema.  Neurological: No gross motor weakness. No sensory changes.  [ +] All other systems negative  [ ] Unable to assess ROS because ________    OBJECTIVE:  ICU Vital Signs Last 24 Hrs  T(C): 36.4 (03 Jun 2019 04:02), Max: 36.6 (02 Jun 2019 20:43)  T(F): 97.5 (03 Jun 2019 04:02), Max: 97.9 (02 Jun 2019 20:43)  HR: 72 (03 Jun 2019 04:02) (70 - 75)  BP: 118/74 (03 Jun 2019 04:02) (108/61 - 132/70)  BP(mean): --  ABP: --  ABP(mean): --  RR: 18 (03 Jun 2019 04:02) (18 - 18)  SpO2: 97% (03 Jun 2019 04:02) (94% - 98%)        06-01 @ 07:01  -  06-02 @ 07:00  --------------------------------------------------------  IN: 490 mL / OUT: 0 mL / NET: 490 mL    06-02 @ 07:01  -  06-03 @ 04:57  --------------------------------------------------------  IN: 580 mL / OUT: 0 mL / NET: 580 mL      CAPILLARY BLOOD GLUCOSE          PHYSICAL EXAM: NAD in bed  General: Awake, alert, oriented X 3.   HEENT: Atraumatic, normocephalic.                 Mallampatti Grade 3                No nasal congestion.                No tonsillar or pharyngeal exudates.  Lymph Nodes: No palpable lymphadenopathy  Neck: No JVD. No carotid bruit.   Respiratory: Normal chest expansion                         Normal percussion                         Normal and equal air entry                         No wheeze, rhonchi or rales.  Cardiovascular: S1 S2 normal. + murmurs, rubs or gallops.   Abdomen: Soft, non-tender, non-distended. No organomegaly. Normoactive bowel sounds.  Extremities: Warm to touch. Peripheral pulse palpable. No pedal edema.   Skin: No rashes or skin lesions  Neurological: Motor and sensory examination equal and normal in all four extremities.  Psychiatry: Appropriate mood and affect.    HOSPITAL MEDICATIONS:  MEDICATIONS  (STANDING):  amiodarone    Tablet 200 milliGRAM(s) Oral daily  apixaban 5 milliGRAM(s) Oral every 12 hours  atorvastatin 40 milliGRAM(s) Oral at bedtime  buDESOnide  80 MICROgram(s)/formoterol 4.5 MICROgram(s) Inhaler 2 Puff(s) Inhalation two times a day  ceftaroline fosamil IVPB 600 milliGRAM(s) IV Intermittent every 8 hours  clopidogrel Tablet 75 milliGRAM(s) Oral daily  DAPTOmycin IVPB 850 milliGRAM(s) IV Intermittent every 24 hours  docusate sodium 100 milliGRAM(s) Oral three times a day  finasteride 5 milliGRAM(s) Oral daily  lidocaine   Patch 1 Patch Transdermal daily  lidocaine   Patch 1 Patch Transdermal daily  melatonin 3 milliGRAM(s) Oral at bedtime  metoprolol tartrate 25 milliGRAM(s) Oral two times a day  pantoprazole    Tablet 40 milliGRAM(s) Oral before breakfast  senna 2 Tablet(s) Oral at bedtime  spironolactone 25 milliGRAM(s) Oral daily  tamsulosin 0.4 milliGRAM(s) Oral at bedtime    MEDICATIONS  (PRN):  acetaminophen   Tablet .. 650 milliGRAM(s) Oral every 6 hours PRN mild pain  ALBUTerol    90 MICROgram(s) HFA Inhaler 2 Puff(s) Inhalation every 6 hours PRN Shortness of Breath and/or Wheezing  ondansetron    Tablet 4 milliGRAM(s) Oral every 8 hours PRN Nausea and/or Vomiting  oxyCODONE    IR 2.5 milliGRAM(s) Oral every 6 hours PRN Moderate Pain (4 - 6)      LABS:                        9.4    9.41  )-----------( 233      ( 02 Jun 2019 10:07 )             30.4     06-02    136  |  102  |  18  ----------------------------<  88  4.2   |  20<L>  |  1.01    Ca    9.0      02 Jun 2019 07:15    TPro  6.3  /  Alb  2.9<L>  /  TBili  0.4  /  DBili  x   /  AST  10  /  ALT  8<L>  /  AlkPhos  116  06-02    PT/INR - ( 01 Jun 2019 09:21 )   PT: 19.5 sec;   INR: 1.68 ratio                   MICROBIOLOGY:     RADIOLOGY:  [ ] Reviewed and interpreted by me    Point of Care Ultrasound Findings:    PFT:    EKG:

## 2019-06-03 NOTE — PROGRESS NOTE ADULT - SUBJECTIVE AND OBJECTIVE BOX
ID Coverage      Patient is a 88y old  Male who presents with a chief complaint of bacteremia (03 Jun 2019 08:40)    Being followed by ID for bacteremia     Interval history:Left shoulder and back [id number] chronic)  Shoulder pain worse today  No acute events      ROS:  No cough,SOB,CP  No N/V/D./abd pain  No other complaints      Antimicrobials:    ceftaroline fosamil IVPB 600 milliGRAM(s) IV Intermittent every 8 hours  DAPTOmycin IVPB 850 milliGRAM(s) IV Intermittent every 24 hours    Other medications reviewed    Vital Signs Last 24 Hrs  T(C): 36.4 (06-03-19 @ 11:41), Max: 36.6 (06-02-19 @ 20:43)  T(F): 97.5 (06-03-19 @ 11:41), Max: 97.9 (06-02-19 @ 20:43)  HR: 71 (06-03-19 @ 11:41) (71 - 75)  BP: 107/70 (06-03-19 @ 11:41) (107/70 - 132/70)  BP(mean): --  RR: 18 (06-03-19 @ 11:41) (18 - 18)  SpO2: 96% (06-03-19 @ 11:41) (96% - 98%)    Physical Exam:        HEENT PERRLA EOMI    No oral exudate or erythema    Chest Good AE,CTA    CVS RRR S1 S2 WNl +ESM    Abd soft BS normal No tenderness no masses    IV site no erythema tenderness or discharge    left shoulder painful ROM  No erythema or swelling  No back tenderness    CNS AAO X 3 no focal    Lab Data:                          9.3    8.89  )-----------( 247      ( 03 Jun 2019 08:15 )             30.1       06-03    136  |  102  |  18  ----------------------------<  93  4.4   |  20<L>  |  0.99    Ca    9.1      03 Jun 2019 06:47    TPro  6.3  /  Alb  3.0<L>  /  TBili  0.4  /  DBili  x   /  AST  10  /  ALT  10  /  AlkPhos  123<H>  06-03        Culture - Blood (collected 02 Jun 2019 09:46)  Source: .Blood  Gram Stain (03 Jun 2019 14:32):    Growth in anaerobic bottle: Gram Positive Cocci in Pairs and Chains    Growth in aerobic bottle: Gram Positive Cocci in Pairs and Chains  Preliminary Report (03 Jun 2019 14:32):    Growth in anaerobic bottle: Gram Positive Cocci in Pairs and Chains    Growth in aerobic bottle: Gram Positive Cocci in Pairs and Chains    Culture - Blood (collected 01 Jun 2019 10:22)  Source: .Blood  Gram Stain (02 Jun 2019 14:05):    Growth in aerobic bottle: Gram Positive Cocci in Pairs and Chains    Growth in anaerobic bottle: Gram Positive Cocci in Pairs and Chains  Final Report (03 Jun 2019 10:35):    Growth in aerobic and anaerobic bottles: Enterococcus faecium (vancomycin    resistant)    [id number]    Culture - Blood (collected 31 May 2019 09:44)  Source: .Blood  Gram Stain (01 Jun 2019 18:59):    Growth in anaerobic bottle: Gram Positive Cocci in Pairs and Chains    Growth in aerobic bottle: Gram Positive Cocci in Pairs and Chains  Final Report (03 Jun 2019 11:10):    Growth in aerobic and anaerobic bottles: Enterococcus faecium (vancomycin    resistant)    See previous culture [id number]    Culture - Blood (collected 31 May 2019 09:44)  Source: .Blood  Gram Stain (01 Jun 2019 15:27):    Growth in aerobic bottle: Gram Positive Cocci in Pairs and Chains    Growth in anaerobic bottle: Gram Positive Cocci in Pairs and Chains  Final Report (03 Jun 2019 08:16):    Growth in aerobic and anaerobic bottles: Enterococcus faecium (vancomycin    resistant)  Organism: Enterococcus faecium (vancomycin resistant) (03 Jun 2019 08:16)  Organism: Enterococcus faecium (vancomycin resistant) (03 Jun 2019 08:16)      -  Ampicillin: R >8 Predicts results to ampicillin/sulbactam, amoxacillin-clavulanate and  piperacillin-tazobactam.      -  Daptomycin: S 2      -  Gentamicin synergy: S      -  Linezolid: S <=0.5      -  Vancomycin: R >16      Method Type: NICK          < from: Transesophageal Echocardiogram (05.28.19 @ 10:10) >  Conclusions:  1. Transcatheter aortic valve replacement (TAVR). Leaflets  appear mildly thickened with normal opening. No vegetations  seen associated with the TAVR Mild-moderate paravalvular  aortic regurgitation.  2. Severe left atrial enlargement.  Very dense smoke/  Spontaneous echo contrast seen in LA and BERTA. Decreased  left atrial appendage velocities noted.  3.Overall preserved left ventricular ejection fraction.  Septal motion consistent with conduction defect.  4. Right ventricular enlargement with decreased right  ventricular systolic function.  5. Normal tricuspid valve. Moderate tricuspid  regurgitation.  6. Estimated pulmonary artery systolic pressure equals 52  mm Hg, assuming right atrial pressure equals 8 mm Hg,  consistent with moderate pulmonary pressures.  7. No evidence of valvular vegetation is seen.  *** Compared with echocardiogram of 4/3/2019, no  significant changes noted.    < end of copied text >        < from: MR Lumbar Spine w/wo IV Cont (05.30.19 @ 19:22) >    IMPRESSION:  Findings consistent with discitis and osteomyelitis at the   L1-2 level with evidence of edema involving the L1 vertebral body in its   entirety as well as a large portion of the L2 vertebral body with   hyperintensity at the level of the disc space and associated enhancement   on the postcontrast images. No epidural abscess or phlegmon compromising   thecanal noted at this time.               < end of copied text >      < from: MR Pelvis w/wo IV Cont (06.01.19 @ 16:06) >    IMPRESSION:     No pelvic abscess is identified.          < end of copied text >

## 2019-06-03 NOTE — CONSULT NOTE ADULT - ASSESSMENT
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

## 2019-06-03 NOTE — DIETITIAN INITIAL EVALUATION ADULT. - ADHERENCE
Pt reports not following any type of diet or restriction at home. Reports taking Vitamin D3 PTA. Denies obtaining daily weights at home.

## 2019-06-03 NOTE — PROGRESS NOTE ADULT - ATTENDING COMMENTS
as above-stable-await PICC placement (re-evaluation by CTS/ID f/up-[place]); may require GI evaln-no absolute contras to EGD/colonoscopy.  ID-bacteremia--on dapto/ceftaroline-f/up all cx-s/p bone studies (mri abnormal-unclear if enough to base source of infection)  COPD-symbiocrt/spiriva, incentive spirometry/acapella  PAH-no defin. rx  Cards-AF etc.--mult rx  DVT.GI proph--KATIANA Mack MD-Pulmonary   [phone] as above-stable-await PICC placement (re-evaluation by CTS/ID f/up-[place]); may require GI evaln-no absolute contras to EGD/colonoscopy-on hold  ID-bacteremia--on dapto/ceftaroline-f/up all cx-s/p bone studies (mri abnormal-unclear if enough to base source of infection)  COPD-symbiocrt/spiriva, incentive spirometry/acapella  PAH-no defin. rx  Cards-AF etc.--mult rx  DVT.GI proph--OOB                       GI evaln on hold based on notes  Arjun Mack MD-Pulmonary   [phone]

## 2019-06-03 NOTE — PROGRESS NOTE ADULT - ASSESSMENT
87 yr old with CKD, COPD, BPH, PPM, Y AVR due to AS, CHF. 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  MARIA LUISA negative for IE  However high grade bacteremia suggests endovascular source  Has back pain,discitis OM on MR but no abscess  shoulder pain-chronic but increased recently  On dapto-ceftaroline  No other obvious focus  patient stable   ? IE  ? back occult process  ? Shoulder or other joint seeding  Rec:  1) Shoulder imaging-? Ortho eval  2) If bacteremia persists would recommend Nuclear scan / repet MARIA LUISA (in 7-10 days)  MARIA LUISA to be reviewed by [facility]     3) Repeat blood Cx  4) Continue current antimicrobials  Options limited  Case d/w Med Np  Dr thrasher will resume care tomorrow

## 2019-06-03 NOTE — PROGRESS NOTE ADULT - ASSESSMENT
87 yr old with CKD, COPD, BPH, PPM, Y AVR due to AS, CHF. 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    1. COPD - stable  c/w Symbicort BID and Albuterol PRN, Spiriva  Out of bed to chair, incentive spirometry.  2. 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  Lower back and shoulder pain - patient states this is chronic but recently worsened. If continues would consider imaging to better evaluate  *************  5/29-await results of cx; re-scan of back likely  5/30-ID/CTS f/up-picc placed (Roman et al)  5/31-for GI evaln if other w/up neg to assess for source of bacteria (MRI abnormal) 87 yr old with CKD, COPD, BPH, PPM, Y AVR due to AS, CHF. 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    1. COPD - stable  c/w Symbicort BID and Albuterol PRN, Spiriva  Out of bed to chair, incentive spirometry.  2. 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  Lower back and shoulder pain - patient states this is chronic but recently worsened. If continues would consider imaging to better evaluate  *************  5/29-await results of cx; re-scan of back likely  5/30-ID/CTS f/up-picc placed (Roman et al)  5/31-for GI evaln if other w/up neg to assess for source of bacteria (MRI abnormal)  6/3- daptomycin/ceftarokine to continue--re-cx today

## 2019-06-03 NOTE — PROGRESS NOTE ADULT - SUBJECTIVE AND OBJECTIVE BOX
Subjective: Patient seen and examined. No new events except as noted.   L shoulder pain on ROM     REVIEW OF SYSTEMS:    CONSTITUTIONAL: No weakness, fevers or chills  EYES/ENT: No visual changes;  No vertigo or throat pain   NECK: No pain or stiffness  RESPIRATORY: No cough, wheezing, hemoptysis; No shortness of breath  CARDIOVASCULAR: No chest pain or palpitations  GASTROINTESTINAL: No abdominal or epigastric pain. No nausea, vomiting, or hematemesis; No diarrhea or constipation. No melena or hematochezia.  GENITOURINARY: No dysuria, frequency or hematuria  NEUROLOGICAL: L shoulder pain   SKIN: No itching, burning, rashes, or lesions   All other review of systems is negative unless indicated above.    MEDICATIONS:  MEDICATIONS  (STANDING):  amiodarone    Tablet 200 milliGRAM(s) Oral daily  apixaban 5 milliGRAM(s) Oral every 12 hours  atorvastatin 40 milliGRAM(s) Oral at bedtime  buDESOnide  80 MICROgram(s)/formoterol 4.5 MICROgram(s) Inhaler 2 Puff(s) Inhalation two times a day  ceftaroline fosamil IVPB 600 milliGRAM(s) IV Intermittent every 8 hours  clopidogrel Tablet 75 milliGRAM(s) Oral daily  DAPTOmycin IVPB 850 milliGRAM(s) IV Intermittent every 24 hours  docusate sodium 100 milliGRAM(s) Oral three times a day  finasteride 5 milliGRAM(s) Oral daily  lidocaine   Patch 1 Patch Transdermal daily  lidocaine   Patch 1 Patch Transdermal daily  melatonin 3 milliGRAM(s) Oral at bedtime  metoprolol tartrate 25 milliGRAM(s) Oral two times a day  pantoprazole    Tablet 40 milliGRAM(s) Oral before breakfast  senna 2 Tablet(s) Oral at bedtime  spironolactone 25 milliGRAM(s) Oral daily  tamsulosin 0.4 milliGRAM(s) Oral at bedtime      PHYSICAL EXAM:  T(C): 36.4 (06-03-19 @ 11:41), Max: 36.6 (06-02-19 @ 20:43)  HR: 76 (06-03-19 @ 18:16) (71 - 76)  BP: 108/63 (06-03-19 @ 18:16) (107/70 - 132/70)  RR: 18 (06-03-19 @ 18:16) (18 - 18)  SpO2: 97% (06-03-19 @ 18:16) (96% - 98%)  Wt(kg): --  I&O's Summary    02 Jun 2019 07:01  -  03 Jun 2019 07:00  --------------------------------------------------------  IN: 700 mL / OUT: 0 mL / NET: 700 mL    03 Jun 2019 07:01  -  03 Jun 2019 18:19  --------------------------------------------------------  IN: 600 mL / OUT: 0 mL / NET: 600 mL          Appearance: Normal	  HEENT:   Normal oral mucosa, PERRL, EOMI	  Lymphatic: No lymphadenopathy , no edema  Cardiovascular: Normal S1 S2, No JVD, No murmurs , Peripheral pulses palpable 2+ bilaterally  Respiratory: Lungs clear to auscultation, normal effort 	  Gastrointestinal:  Soft, Non-tender, + BS	  Skin: No rashes, No ecchymoses, No cyanosis, warm to touch  Musculoskeletal: lower back and L shoulder tenderness   Psychiatry:  Mood & affect appropriate  Ext: No edema      All labs, Imaging and EKGs personally reviewed                             9.3    8.89  )-----------( 247      ( 03 Jun 2019 08:15 )             30.1               06-03    136  |  102  |  18  ----------------------------<  93  4.4   |  20<L>  |  0.99    Ca    9.1      03 Jun 2019 06:47    TPro  6.3  /  Alb  3.0<L>  /  TBili  0.4  /  DBili  x   /  AST  10  /  ALT  10  /  AlkPhos  123<H>  06-03         < from: Xray Shoulder 2 Views, Left (06.03.19 @ 15:47) >  IMPRESSION:  No acute fracture or dislocation.  Nonspecific calcific densities project at the superior aspect of the   humeral head, likely corresponding with calcifications partially imaged   on prior CT, may be related to calcific tendinopathy. Correlate   clinically. Calcifications are also seen at the AC joint. No aggressive   cortical erosive changes or acute periosteal reaction are seen.

## 2019-06-03 NOTE — DIETITIAN INITIAL EVALUATION ADULT. - NS AS NUTRI INTERV ED CONTENT
1) Provided recommendations to optimize PO and protein intake in case of decreased appetite, recommended small frequent meals by ordering nutrient-dense snacks for later consumption during the day or between meals and to start with protein; reviewed foods with protein and menu order procedures in hospital. 2) Provided brief education on heart failure nutrition therapy. Recommended limited salt intake. Reviewed foods high in salt and amount of salt recommended per day. Discussed nutrition label reading. Stressed the importance of limited fried foods and saturated fat consumption. Reviewed recommended foods within therapeutic diet. Discussed the importance of daily weights at home and weight gain parameters for contacting MD. Pt amenable for education. Dietitian remains available.

## 2019-06-03 NOTE — DIETITIAN INITIAL EVALUATION ADULT. - OTHER INFO
Pt seen for length of stay initial assessment. Pt reports good appetite and PO intake, however reports decreased PO intake sometimes due to food dislike. Noted 50 - 100% PO intake as per flow sheets and 75% as per lunch tray at bedside. Pt consuming [employer]. Offered nutritional supplementation available in hospital - pt refused at this time. Denies difficulty chewing/swallowing. Pt denies nausea, vomiting, diarrhea, or constipation, last BM today (06/03) - pt on bowel regimen as per chart. Provided assistance filling out menu for meals tomorrow to show pt how to choose food preferences.

## 2019-06-03 NOTE — DIETITIAN INITIAL EVALUATION ADULT. - NS AS NUTRI INTERV MEALS SNACK
Recommend change to low salt diet to allow pt more food options and optimize PO intake. Spoke to NP. Will continue to monitor and adjust as needed. Encourage PO intake, obtain food preferences (obtained at this time), and provide assistance with menu as needed.

## 2019-06-04 LAB
ANION GAP SERPL CALC-SCNC: 12 MMOL/L — SIGNIFICANT CHANGE UP (ref 5–17)
BUN SERPL-MCNC: 23 MG/DL — SIGNIFICANT CHANGE UP (ref 7–23)
CALCIUM SERPL-MCNC: 9 MG/DL — SIGNIFICANT CHANGE UP (ref 8.4–10.5)
CHLORIDE SERPL-SCNC: 97 MMOL/L — SIGNIFICANT CHANGE UP (ref 96–108)
CK SERPL-CCNC: 27 U/L — LOW (ref 30–200)
CO2 SERPL-SCNC: 20 MMOL/L — LOW (ref 22–31)
CREAT SERPL-MCNC: 1.25 MG/DL — SIGNIFICANT CHANGE UP (ref 0.5–1.3)
ENTEROCOC DNA BLD POS QL NAA+NON-PROBE: SIGNIFICANT CHANGE UP
GLUCOSE SERPL-MCNC: 87 MG/DL — SIGNIFICANT CHANGE UP (ref 70–99)
GRAM STN FLD: SIGNIFICANT CHANGE UP
HCT VFR BLD CALC: 29.1 % — LOW (ref 39–50)
HGB BLD-MCNC: 9.2 G/DL — LOW (ref 13–17)
MCHC RBC-ENTMCNC: 26.8 PG — LOW (ref 27–34)
MCHC RBC-ENTMCNC: 31.6 GM/DL — LOW (ref 32–36)
MCV RBC AUTO: 84.8 FL — SIGNIFICANT CHANGE UP (ref 80–100)
METHOD TYPE: SIGNIFICANT CHANGE UP
PLATELET # BLD AUTO: 225 K/UL — SIGNIFICANT CHANGE UP (ref 150–400)
POTASSIUM SERPL-MCNC: 4.3 MMOL/L — SIGNIFICANT CHANGE UP (ref 3.5–5.3)
POTASSIUM SERPL-SCNC: 4.3 MMOL/L — SIGNIFICANT CHANGE UP (ref 3.5–5.3)
RBC # BLD: 3.43 M/UL — LOW (ref 4.2–5.8)
RBC # FLD: 15.6 % — HIGH (ref 10.3–14.5)
SODIUM SERPL-SCNC: 129 MMOL/L — LOW (ref 135–145)
WBC # BLD: 7.45 K/UL — SIGNIFICANT CHANGE UP (ref 3.8–10.5)
WBC # FLD AUTO: 7.45 K/UL — SIGNIFICANT CHANGE UP (ref 3.8–10.5)

## 2019-06-04 PROCEDURE — 99232 SBSQ HOSP IP/OBS MODERATE 35: CPT

## 2019-06-04 RX ORDER — KETOROLAC TROMETHAMINE 30 MG/ML
15 SYRINGE (ML) INJECTION EVERY 8 HOURS
Refills: 0 | Status: DISCONTINUED | OUTPATIENT
Start: 2019-06-04 | End: 2019-06-09

## 2019-06-04 RX ORDER — LINEZOLID 600 MG/300ML
600 INJECTION, SOLUTION INTRAVENOUS EVERY 12 HOURS
Refills: 0 | Status: DISCONTINUED | OUTPATIENT
Start: 2019-06-04 | End: 2019-06-17

## 2019-06-04 RX ORDER — POLYETHYLENE GLYCOL 3350 17 G/17G
17 POWDER, FOR SOLUTION ORAL DAILY
Refills: 0 | Status: DISCONTINUED | OUTPATIENT
Start: 2019-06-04 | End: 2019-06-17

## 2019-06-04 RX ADMIN — LIDOCAINE 1 PATCH: 4 CREAM TOPICAL at 19:39

## 2019-06-04 RX ADMIN — TAMSULOSIN HYDROCHLORIDE 0.4 MILLIGRAM(S): 0.4 CAPSULE ORAL at 22:30

## 2019-06-04 RX ADMIN — LINEZOLID 600 MILLIGRAM(S): 600 INJECTION, SOLUTION INTRAVENOUS at 19:50

## 2019-06-04 RX ADMIN — Medication 25 MILLIGRAM(S): at 05:59

## 2019-06-04 RX ADMIN — AMIODARONE HYDROCHLORIDE 200 MILLIGRAM(S): 400 TABLET ORAL at 05:59

## 2019-06-04 RX ADMIN — CLOPIDOGREL BISULFATE 75 MILLIGRAM(S): 75 TABLET, FILM COATED ORAL at 12:33

## 2019-06-04 RX ADMIN — Medication 15 MILLIGRAM(S): at 19:50

## 2019-06-04 RX ADMIN — Medication 3 MILLIGRAM(S): at 22:30

## 2019-06-04 RX ADMIN — PANTOPRAZOLE SODIUM 40 MILLIGRAM(S): 20 TABLET, DELAYED RELEASE ORAL at 05:59

## 2019-06-04 RX ADMIN — ATORVASTATIN CALCIUM 40 MILLIGRAM(S): 80 TABLET, FILM COATED ORAL at 22:30

## 2019-06-04 RX ADMIN — Medication 1000 MILLIGRAM(S): at 01:11

## 2019-06-04 RX ADMIN — Medication 100 MILLIGRAM(S): at 22:30

## 2019-06-04 RX ADMIN — CEFTAROLINE FOSAMIL 50 MILLIGRAM(S): 600 POWDER, FOR SOLUTION INTRAVENOUS at 22:30

## 2019-06-04 RX ADMIN — CEFTAROLINE FOSAMIL 50 MILLIGRAM(S): 600 POWDER, FOR SOLUTION INTRAVENOUS at 15:31

## 2019-06-04 RX ADMIN — LIDOCAINE 1 PATCH: 4 CREAM TOPICAL at 12:34

## 2019-06-04 RX ADMIN — Medication 400 MILLIGRAM(S): at 00:26

## 2019-06-04 RX ADMIN — DAPTOMYCIN 134 MILLIGRAM(S): 500 INJECTION, POWDER, LYOPHILIZED, FOR SOLUTION INTRAVENOUS at 14:48

## 2019-06-04 RX ADMIN — APIXABAN 5 MILLIGRAM(S): 2.5 TABLET, FILM COATED ORAL at 18:53

## 2019-06-04 RX ADMIN — LIDOCAINE 1 PATCH: 4 CREAM TOPICAL at 19:38

## 2019-06-04 RX ADMIN — SPIRONOLACTONE 25 MILLIGRAM(S): 25 TABLET, FILM COATED ORAL at 05:59

## 2019-06-04 RX ADMIN — BUDESONIDE AND FORMOTEROL FUMARATE DIHYDRATE 2 PUFF(S): 160; 4.5 AEROSOL RESPIRATORY (INHALATION) at 18:54

## 2019-06-04 RX ADMIN — Medication 25 MILLIGRAM(S): at 18:53

## 2019-06-04 RX ADMIN — APIXABAN 5 MILLIGRAM(S): 2.5 TABLET, FILM COATED ORAL at 05:59

## 2019-06-04 RX ADMIN — CEFTAROLINE FOSAMIL 50 MILLIGRAM(S): 600 POWDER, FOR SOLUTION INTRAVENOUS at 05:58

## 2019-06-04 RX ADMIN — LIDOCAINE 1 PATCH: 4 CREAM TOPICAL at 12:33

## 2019-06-04 RX ADMIN — Medication 650 MILLIGRAM(S): at 07:26

## 2019-06-04 RX ADMIN — Medication 100 MILLIGRAM(S): at 05:59

## 2019-06-04 RX ADMIN — SENNA PLUS 2 TABLET(S): 8.6 TABLET ORAL at 22:30

## 2019-06-04 RX ADMIN — Medication 100 MILLIGRAM(S): at 14:48

## 2019-06-04 RX ADMIN — Medication 650 MILLIGRAM(S): at 04:41

## 2019-06-04 RX ADMIN — BUDESONIDE AND FORMOTEROL FUMARATE DIHYDRATE 2 PUFF(S): 160; 4.5 AEROSOL RESPIRATORY (INHALATION) at 05:58

## 2019-06-04 RX ADMIN — Medication 15 MILLIGRAM(S): at 20:30

## 2019-06-04 RX ADMIN — FINASTERIDE 5 MILLIGRAM(S): 5 TABLET, FILM COATED ORAL at 12:33

## 2019-06-04 NOTE — PROGRESS NOTE ADULT - ASSESSMENT
87 yr old with CKD, COPD, BPH, PPM, Y AVR due to AS, CHF. 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    1. COPD - stable  c/w Symbicort BID and Albuterol PRN, Spiriva  Out of bed to chair, incentive spirometry.  2. 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  Lower back and shoulder pain - patient states this is chronic but recently worsened. If continues would consider imaging to better evaluate  *************  5/29-await results of cx; re-scan of back likely  5/30-ID/CTS f/up-picc placed (Roman et al)  5/31-for GI evaln if other w/up neg to assess for source of bacteria (MRI abnormal)  6/3- daptomycin/ceftarokine to continue--re-cx today 87 yr old with CKD, COPD, BPH, PPM, Y AVR due to AS, CHF. 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    1. COPD - stable  c/w Symbicort BID and Albuterol PRN, Spiriva  Out of bed to chair, incentive spirometry.  2. 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  Lower back and shoulder pain - patient states this is chronic but recently worsened. If continues would consider imaging to better evaluate  *************  5/29-await results of cx; re-scan of back likely  5/30-ID/CTS f/up-picc placed (Roman et al)  5/31-for GI evaln if other w/up neg to assess for source of bacteria (MRI abnormal)  6/3- daptomycin/ceftarokine to continue--re-cx today  6/4-re-MARIA LUISA in 1 week if remains bacteremic--consider valv replacement

## 2019-06-04 NOTE — PROGRESS NOTE ADULT - SUBJECTIVE AND OBJECTIVE BOX
infectious diseases progress note:    Patient is a 88y old  Male who presents with a chief complaint of bacteremia (04 Jun 2019 05:34)        Bacteremia        ROS:  CONSTITUTIONAL:  Negative fever or chills, feels well, good appetite  EYES:  Negative  blurry vision or double vision  CARDIOVASCULAR:  Negative for chest pain or palpitations  RESPIRATORY:  Negative for cough, wheezing, or SOB   GASTROINTESTINAL:  Negative for nausea, vomiting, diarrhea, constipation, or abdominal pain  GENITOURINARY:  Negative frequency, urgency or dysuria  NEUROLOGIC:  No headache, confusion, dizziness, lightheadedness    Allergies    Keppra (Rash)  penicillin (Unknown)    Intolerances        ANTIBIOTICS/RELEVANT:  antimicrobials  ceftaroline fosamil IVPB 600 milliGRAM(s) IV Intermittent every 8 hours  DAPTOmycin IVPB 850 milliGRAM(s) IV Intermittent every 24 hours    immunologic:    OTHER:  acetaminophen   Tablet .. 650 milliGRAM(s) Oral every 6 hours PRN  ALBUTerol    90 MICROgram(s) HFA Inhaler 2 Puff(s) Inhalation every 6 hours PRN  amiodarone    Tablet 200 milliGRAM(s) Oral daily  apixaban 5 milliGRAM(s) Oral every 12 hours  atorvastatin 40 milliGRAM(s) Oral at bedtime  buDESOnide  80 MICROgram(s)/formoterol 4.5 MICROgram(s) Inhaler 2 Puff(s) Inhalation two times a day  clopidogrel Tablet 75 milliGRAM(s) Oral daily  docusate sodium 100 milliGRAM(s) Oral three times a day  finasteride 5 milliGRAM(s) Oral daily  lidocaine   Patch 1 Patch Transdermal daily  lidocaine   Patch 1 Patch Transdermal daily  melatonin 3 milliGRAM(s) Oral at bedtime  metoprolol tartrate 25 milliGRAM(s) Oral two times a day  ondansetron    Tablet 4 milliGRAM(s) Oral every 8 hours PRN  oxyCODONE    IR 2.5 milliGRAM(s) Oral every 6 hours PRN  pantoprazole    Tablet 40 milliGRAM(s) Oral before breakfast  senna 2 Tablet(s) Oral at bedtime  spironolactone 25 milliGRAM(s) Oral daily  tamsulosin 0.4 milliGRAM(s) Oral at bedtime      Objective:  Vital Signs Last 24 Hrs  T(C): 36.3 (04 Jun 2019 04:43), Max: 36.8 (03 Jun 2019 19:38)  T(F): 97.3 (04 Jun 2019 04:43), Max: 98.2 (03 Jun 2019 19:38)  HR: 71 (04 Jun 2019 04:43) (71 - 77)  BP: 117/77 (04 Jun 2019 04:43) (103/61 - 117/77)  BP(mean): --  RR: 18 (04 Jun 2019 04:43) (18 - 18)  SpO2: 93% (04 Jun 2019 04:43) (93% - 97%)       Eyes:JESSEE, EOMI  Ear/Nose/Throat: no oral lesion, no sinus tenderness on percussion	  Neck:no JVD, no lymphadenopathy, supple  Respiratory: CTA marci  Cardiovascular: S1S2 RRR, no murmurs  Gastrointestinal:soft, (+) BS, no HSM  Extremities: no inflammation at shoulder         LABS:                        9.3    8.89  )-----------( 247      ( 03 Jun 2019 08:15 )             30.1     06-04    129<L>  |  97  |  23  ----------------------------<  87  4.3   |  20<L>  |  1.25    Ca    9.0      04 Jun 2019 06:50    TPro  6.3  /  Alb  3.0<L>  /  TBili  0.4  /  DBili  x   /  AST  10  /  ALT  10  /  AlkPhos  123<H>  06-03            MICROBIOLOGY:    RECENT CULTURES:  06-02 @ 09:46 .Blood       Growth in anaerobic bottle: Gram Positive Cocci in Pairs and Chains  Growth in aerobic bottle: Gram Positive Cocci in Pairs and Chains           Growth in anaerobic bottle: Gram Positive Cocci in Pairs and Chains  Growth in aerobic bottle: Gram Positive Cocci in Pairs and Chains    06-01 @ 10:22 .Blood       Growth in aerobic bottle: Gram Positive Cocci in Pairs and Chains  Growth in anaerobic bottle: Gram Positive Cocci in Pairs and Chains           Growth in aerobic and anaerobic bottles: Enterococcus faecium (vancomycin  resistant)  See previous culture [id number]    05-31 @ 09:44 .Blood   NICK    Growth in aerobic bottle: Gram Positive Cocci in Pairs and Chains  Growth in anaerobic bottle: Gram Positive Cocci in Pairs and Chains    Enterococcus faecium (vancomycin resistant)  Enterococcus faecium (vancomycin resistant)     Growth in aerobic and anaerobic bottles: Enterococcus faecium (vancomycin  resistant)    05-29 @ 13:25 .Blood       Growth in aerobic and anaerobic bottles: Gram Positive Cocci in Pairs and  Chains           Growth in aerobic and anaerobic bottles: Enterococcus faecium (vancomycin  resistant)  See previous culture [id number]          RESPIRATORY CULTURES:              RADIOLOGY & ADDITIONAL STUDIES:        Pager [phone]  After 5 pm/weekends or if no response :[phone]

## 2019-06-04 NOTE — PROGRESS NOTE ADULT - SUBJECTIVE AND OBJECTIVE BOX
CHIEF COMPLAINT: f/up sob, copd, osas, abnormal ct chest-    Interval Events:    REVIEW OF SYSTEMS:  Constitutional: No fevers or chills. No weight loss. No fatigue or generalized malaise.  Eyes: No itching or discharge from the eyes  ENT: No ear pain. No ear discharge. No nasal congestion. No post nasal drip. No epistaxis. No throat pain. No sore throat. No difficulty swallowing.   CV: No chest pain. No palpitations. No lightheadedness or dizziness.   Resp: No dyspnea at rest. No dyspnea on exertion. No orthopnea. No wheezing. No cough. No stridor. No sputum production. No chest pain with respiration.  GI: No nausea. No vomiting. No diarrhea.  MSK: No joint pain or pain in any extremities  Integumentary: No skin lesions. No pedal edema.  Neurological: No gross motor weakness. No sensory changes.  [ ] All other systems negative  [ ] Unable to assess ROS because ________    OBJECTIVE:  ICU Vital Signs Last 24 Hrs  T(C): 36.3 (04 Jun 2019 04:43), Max: 36.8 (03 Jun 2019 19:38)  T(F): 97.3 (04 Jun 2019 04:43), Max: 98.2 (03 Jun 2019 19:38)  HR: 71 (04 Jun 2019 04:43) (71 - 77)  BP: 117/77 (04 Jun 2019 04:43) (103/61 - 117/77)  BP(mean): --  ABP: --  ABP(mean): --  RR: 18 (04 Jun 2019 04:43) (18 - 18)  SpO2: 93% (04 Jun 2019 04:43) (93% - 97%)        06-02 @ 07:01  -  06-03 @ 07:00  --------------------------------------------------------  IN: 700 mL / OUT: 0 mL / NET: 700 mL    06-03 @ 07:01  -  06-04 @ 05:34  --------------------------------------------------------  IN: 715 mL / OUT: 200 mL / NET: 515 mL      CAPILLARY BLOOD GLUCOSE          PHYSICAL EXAM:  General: Awake, alert, oriented X 3.   HEENT: Atraumatic, normocephalic.                 Mallampatti Grade                 No nasal congestion.                No tonsillar or pharyngeal exudates.  Lymph Nodes: No palpable lymphadenopathy  Neck: No JVD. No carotid bruit.   Respiratory: Normal chest expansion                         Normal percussion                         Normal and equal air entry                         No wheeze, rhonchi or rales.  Cardiovascular: S1 S2 normal. No murmurs, rubs or gallops.   Abdomen: Soft, non-tender, non-distended. No organomegaly. Normoactive bowel sounds.  Extremities: Warm to touch. Peripheral pulse palpable. No pedal edema.   Skin: No rashes or skin lesions  Neurological: Motor and sensory examination equal and normal in all four extremities.  Psychiatry: Appropriate mood and affect.    HOSPITAL MEDICATIONS:  MEDICATIONS  (STANDING):  amiodarone    Tablet 200 milliGRAM(s) Oral daily  apixaban 5 milliGRAM(s) Oral every 12 hours  atorvastatin 40 milliGRAM(s) Oral at bedtime  buDESOnide  80 MICROgram(s)/formoterol 4.5 MICROgram(s) Inhaler 2 Puff(s) Inhalation two times a day  ceftaroline fosamil IVPB 600 milliGRAM(s) IV Intermittent every 8 hours  clopidogrel Tablet 75 milliGRAM(s) Oral daily  DAPTOmycin IVPB 850 milliGRAM(s) IV Intermittent every 24 hours  docusate sodium 100 milliGRAM(s) Oral three times a day  finasteride 5 milliGRAM(s) Oral daily  lidocaine   Patch 1 Patch Transdermal daily  lidocaine   Patch 1 Patch Transdermal daily  melatonin 3 milliGRAM(s) Oral at bedtime  metoprolol tartrate 25 milliGRAM(s) Oral two times a day  pantoprazole    Tablet 40 milliGRAM(s) Oral before breakfast  senna 2 Tablet(s) Oral at bedtime  spironolactone 25 milliGRAM(s) Oral daily  tamsulosin 0.4 milliGRAM(s) Oral at bedtime    MEDICATIONS  (PRN):  acetaminophen   Tablet .. 650 milliGRAM(s) Oral every 6 hours PRN mild pain  ALBUTerol    90 MICROgram(s) HFA Inhaler 2 Puff(s) Inhalation every 6 hours PRN Shortness of Breath and/or Wheezing  ondansetron    Tablet 4 milliGRAM(s) Oral every 8 hours PRN Nausea and/or Vomiting  oxyCODONE    IR 2.5 milliGRAM(s) Oral every 6 hours PRN Moderate Pain (4 - 6)      LABS:                        9.3    8.89  )-----------( 247      ( 03 Jun 2019 08:15 )             30.1     06-03    136  |  102  |  18  ----------------------------<  93  4.4   |  20<L>  |  0.99    Ca    9.1      03 Jun 2019 06:47    TPro  6.3  /  Alb  3.0<L>  /  TBili  0.4  /  DBili  x   /  AST  10  /  ALT  10  /  AlkPhos  123<H>  06-03              MICROBIOLOGY:     RADIOLOGY:  [ ] Reviewed and interpreted by me    Point of Care Ultrasound Findings:    PFT:    EKG: CHIEF COMPLAINT: f/up sob, copd, osas, abnormal ct chest-general body pain but no sob    Interval Events: BLood Cx    REVIEW OF SYSTEMS:  Constitutional: No fevers or chills. No weight loss. + fatigue or generalized malaise.  Eyes: No itching or discharge from the eyes  ENT: No ear pain. No ear discharge. No nasal congestion. No post nasal drip. No epistaxis. No throat pain. No sore throat. No difficulty swallowing.   CV: No chest pain. No palpitations. No lightheadedness or dizziness.   Resp: No dyspnea at rest. + dyspnea on exertion. No orthopnea. No wheezing. No cough. No stridor. No sputum production. No chest pain with respiration.  GI: No nausea. No vomiting. No diarrhea.  MSK: + joint pain or pain in any extremities  Integumentary: No skin lesions. No pedal edema.  Neurological: No gross motor weakness. No sensory changes.  [+ ] All other systems negative  [ ] Unable to assess ROS because ________    OBJECTIVE:  ICU Vital Signs Last 24 Hrs  T(C): 36.3 (04 Jun 2019 04:43), Max: 36.8 (03 Jun 2019 19:38)  T(F): 97.3 (04 Jun 2019 04:43), Max: 98.2 (03 Jun 2019 19:38)  HR: 71 (04 Jun 2019 04:43) (71 - 77)  BP: 117/77 (04 Jun 2019 04:43) (103/61 - 117/77)  BP(mean): --  ABP: --  ABP(mean): --  RR: 18 (04 Jun 2019 04:43) (18 - 18)  SpO2: 93% (04 Jun 2019 04:43) (93% - 97%)        06-02 @ 07:01  -  06-03 @ 07:00  --------------------------------------------------------  IN: 700 mL / OUT: 0 mL / NET: 700 mL    06-03 @ 07:01  -  06-04 @ 05:34  --------------------------------------------------------  IN: 715 mL / OUT: 200 mL / NET: 515 mL      CAPILLARY BLOOD GLUCOSE          PHYSICAL EXAM: NAD in bed on RA  General: Awake, alert, oriented X 3.   HEENT: Atraumatic, normocephalic.                 Mallampatti Grade 3                No nasal congestion.                No tonsillar or pharyngeal exudates.  Lymph Nodes: No palpable lymphadenopathy  Neck: No JVD. No carotid bruit.   Respiratory: Normal chest expansion                         Normal percussion                         Normal and equal air entry                         No wheeze, rhonchi or rales.  Cardiovascular: S1 S2 normal. + murmurs, rubs or gallops.   Abdomen: Soft, non-tender, non-distended. No organomegaly. Normoactive bowel sounds.  Extremities: Warm to touch. Peripheral pulse palpable. No pedal edema.   Skin: No rashes or skin lesions  Neurological: Motor and sensory examination equal and normal in all four extremities.  Psychiatry: Appropriate mood and affect.    HOSPITAL MEDICATIONS:  MEDICATIONS  (STANDING):  amiodarone    Tablet 200 milliGRAM(s) Oral daily  apixaban 5 milliGRAM(s) Oral every 12 hours  atorvastatin 40 milliGRAM(s) Oral at bedtime  buDESOnide  80 MICROgram(s)/formoterol 4.5 MICROgram(s) Inhaler 2 Puff(s) Inhalation two times a day  ceftaroline fosamil IVPB 600 milliGRAM(s) IV Intermittent every 8 hours  clopidogrel Tablet 75 milliGRAM(s) Oral daily  DAPTOmycin IVPB 850 milliGRAM(s) IV Intermittent every 24 hours  docusate sodium 100 milliGRAM(s) Oral three times a day  finasteride 5 milliGRAM(s) Oral daily  lidocaine   Patch 1 Patch Transdermal daily  lidocaine   Patch 1 Patch Transdermal daily  melatonin 3 milliGRAM(s) Oral at bedtime  metoprolol tartrate 25 milliGRAM(s) Oral two times a day  pantoprazole    Tablet 40 milliGRAM(s) Oral before breakfast  senna 2 Tablet(s) Oral at bedtime  spironolactone 25 milliGRAM(s) Oral daily  tamsulosin 0.4 milliGRAM(s) Oral at bedtime    MEDICATIONS  (PRN):  acetaminophen   Tablet .. 650 milliGRAM(s) Oral every 6 hours PRN mild pain  ALBUTerol    90 MICROgram(s) HFA Inhaler 2 Puff(s) Inhalation every 6 hours PRN Shortness of Breath and/or Wheezing  ondansetron    Tablet 4 milliGRAM(s) Oral every 8 hours PRN Nausea and/or Vomiting  oxyCODONE    IR 2.5 milliGRAM(s) Oral every 6 hours PRN Moderate Pain (4 - 6)      LABS:                        9.3    8.89  )-----------( 247      ( 03 Jun 2019 08:15 )             30.1     06-03    136  |  102  |  18  ----------------------------<  93  4.4   |  20<L>  |  0.99    Ca    9.1      03 Jun 2019 06:47    TPro  6.3  /  Alb  3.0<L>  /  TBili  0.4  /  DBili  x   /  AST  10  /  ALT  10  /  AlkPhos  123<H>  06-03              MICROBIOLOGY:     RADIOLOGY:  [ ] Reviewed and interpreted by me    Point of Care Ultrasound Findings:    PFT:    EKG:

## 2019-06-04 NOTE — PROGRESS NOTE ADULT - PROBLEM SELECTOR PLAN 1
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

## 2019-06-04 NOTE — PROGRESS NOTE ADULT - SUBJECTIVE AND OBJECTIVE BOX
Subjective: Patient seen and examined. No new events except as noted.   L shoulder pain     REVIEW OF SYSTEMS:    CONSTITUTIONAL: + pain   EYES/ENT: No visual changes;  No vertigo or throat pain   NECK: No pain or stiffness  RESPIRATORY: No cough, wheezing, hemoptysis; No shortness of breath  CARDIOVASCULAR: No chest pain or palpitations  GASTROINTESTINAL: No abdominal or epigastric pain. No nausea, vomiting, or hematemesis; No diarrhea or constipation. No melena or hematochezia.  GENITOURINARY: No dysuria, frequency or hematuria  NEUROLOGICAL: shoulder pain   SKIN: No itching, burning, rashes, or lesions   All other review of systems is negative unless indicated above.    MEDICATIONS:  MEDICATIONS  (STANDING):  amiodarone    Tablet 200 milliGRAM(s) Oral daily  apixaban 5 milliGRAM(s) Oral every 12 hours  atorvastatin 40 milliGRAM(s) Oral at bedtime  buDESOnide  80 MICROgram(s)/formoterol 4.5 MICROgram(s) Inhaler 2 Puff(s) Inhalation two times a day  ceftaroline fosamil IVPB 600 milliGRAM(s) IV Intermittent every 8 hours  clopidogrel Tablet 75 milliGRAM(s) Oral daily  DAPTOmycin IVPB 850 milliGRAM(s) IV Intermittent every 24 hours  docusate sodium 100 milliGRAM(s) Oral three times a day  finasteride 5 milliGRAM(s) Oral daily  lidocaine   Patch 1 Patch Transdermal daily  lidocaine   Patch 1 Patch Transdermal daily  linezolid    Tablet 600 milliGRAM(s) Oral every 12 hours  melatonin 3 milliGRAM(s) Oral at bedtime  metoprolol tartrate 25 milliGRAM(s) Oral two times a day  pantoprazole    Tablet 40 milliGRAM(s) Oral before breakfast  senna 2 Tablet(s) Oral at bedtime  spironolactone 25 milliGRAM(s) Oral daily  tamsulosin 0.4 milliGRAM(s) Oral at bedtime      PHYSICAL EXAM:  T(C): 36.9 (06-04-19 @ 11:48), Max: 36.9 (06-04-19 @ 11:48)  HR: 64 (06-04-19 @ 11:48) (64 - 77)  BP: 105/62 (06-04-19 @ 11:48) (103/61 - 117/77)  RR: 18 (06-04-19 @ 11:48) (18 - 18)  SpO2: 94% (06-04-19 @ 11:48) (93% - 97%)  Wt(kg): --  I&O's Summary    03 Jun 2019 07:01  -  04 Jun 2019 07:00  --------------------------------------------------------  IN: 815 mL / OUT: 200 mL / NET: 615 mL    04 Jun 2019 07:01  -  04 Jun 2019 14:44  --------------------------------------------------------  IN: 720 mL / OUT: 650 mL / NET: 70 mL          Appearance: Normal	  HEENT:   Normal oral mucosa, PERRL, EOMI	  Lymphatic: No lymphadenopathy , no edema  Cardiovascular: Normal S1 S2, No JVD, No murmurs , Peripheral pulses palpable 2+ bilaterally  Respiratory: Lungs clear to auscultation, normal effort 	  Gastrointestinal:  Soft, Non-tender, + BS	  Skin: No rashes, No ecchymoses, No cyanosis, warm to touch  Musculoskeletal: decrease ROM on L shoulder. lower back apin   Psychiatry:  Mood & affect appropriate  Ext: No edema      All labs, Imaging and EKGs personally reviewed                           9.2    7.45  )-----------( 225      ( 04 Jun 2019 09:27 )             29.1               06-04    129<L>  |  97  |  23  ----------------------------<  87  4.3   |  20<L>  |  1.25    Ca    9.0      04 Jun 2019 06:50    TPro  6.3  /  Alb  3.0<L>  /  TBili  0.4  /  DBili  x   /  AST  10  /  ALT  10  /  AlkPhos  123<H>  06-03           CARDIAC MARKERS ( 04 Jun 2019 06:50 )  x     / x     / 27 U/L / x     / x

## 2019-06-04 NOTE — PROGRESS NOTE ADULT - ATTENDING COMMENTS
as above-stable-await PICC placement (re-evaluation by CTS/ID f/up-Roy); may require GI evaln-no absolute contras to EGD/colonoscopy-on hold  ID-bacteremia--on dapto/ceftaroline-f/up all cx-s/p bone studies (mri abnormal-unclear if enough to base source of infection)  COPD-symbiocrt/spiriva, incentive spirometry/acapella  PAH-no defin. rx  Cards-AF etc.--mult rx  DVT.GI proph--OOB                       GI evaln on hold based on notes  Arjun Mack MD-Pulmonary   [phone] as above-stable-await PICC placement (re-evaluation by CTS/ID f/up-[place]); may require GI evaln-no absolute contras to EGD/colonoscopy-on hold; may require repeat MARIA LUISA if still bacteremic (consider [facility] if abnormal)  ID-bacteremia--on dapto/ceftaroline-f/up all cx-s/p bone studies (mri abnormal-unclear if enough to base source of infection)  COPD-symbiocrt/spiriva, incentive spirometry/acapella  PAH-no defin. rx  Cards-AF etc.--mult rx-consider MARIA LUISA over next week  DVT.GI proph--OOB                       GI evaln on hold based on notes  Arjun Mack MD-Pulmonary   [phone]

## 2019-06-04 NOTE — PROGRESS NOTE ADULT - SUBJECTIVE AND OBJECTIVE BOX
Patient is a 88y old  Male who presents with a chief complaint of bacteremia (04 Jun 2019 08:05)      INTERVAL HISTORY: feels ok       MEDICATIONS:  amiodarone    Tablet 200 milliGRAM(s) Oral daily  metoprolol tartrate 25 milliGRAM(s) Oral two times a day  spironolactone 25 milliGRAM(s) Oral daily  tamsulosin 0.4 milliGRAM(s) Oral at bedtime        PHYSICAL EXAM:  T(C): 36.3 (06-04-19 @ 04:43), Max: 36.8 (06-03-19 @ 19:38)  HR: 71 (06-04-19 @ 04:43) (71 - 77)  BP: 117/77 (06-04-19 @ 04:43) (103/61 - 117/77)  RR: 18 (06-04-19 @ 04:43) (18 - 18)  SpO2: 93% (06-04-19 @ 04:43) (93% - 97%)  Wt(kg): --  I&O's Summary    03 Jun 2019 07:01  -  04 Jun 2019 07:00  --------------------------------------------------------  IN: 815 mL / OUT: 200 mL / NET: 615 mL          Appearance: In no distress	  HEENT:    PERRL, EOMI	  Cardiovascular:  S1 S2, No JVD  Respiratory: Lungs clear to auscultation	  Gastrointestinal:  Soft, Non-tender, + BS	  Extremities:  No edema of LE                                9.3    8.89  )-----------( 247      ( 03 Jun 2019 08:15 )             30.1     06-04    129<L>  |  97  |  23  ----------------------------<  87  4.3   |  20<L>  |  1.25    Ca    9.0      04 Jun 2019 06:50    TPro  6.3  /  Alb  3.0<L>  /  TBili  0.4  /  DBili  x   /  AST  10  /  ALT  10  /  AlkPhos  123<H>  06-03        Labs personally reviewed      Assessment and Plan:   Assessment:  · Assessment		  Patient is a 89 y/o male with PMH SSS/a fib on eliquis, 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 Was admitted recently and dx with possible endocarditis, ppm removed, received dapto/ceftaroline through PICC, PICC was dc and patient has been off abx for 2 weeks, plan was to observe off abx for 3 weeks and repeat labs. Patient had labs/bcx done yesterday with primary care, bcx were positive, patient otherwise does not have new symptoms or feel ill.     Problem/Plan - 1:  ·  Problem: Bacteremia.  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  Plan: continue with aldactone 25mg, euvolemic at this time    Problem/Plan - 3:  ·  Problem: Aortic stenosis.  Plan: S/P TAVR   - ?bioprosthetic valve endocarditis, CTS fu for ?need for AVR    Problem/Plan - 4:  Problem: Atrial fibrillation. Plan: On eliquis, Rate controlled on Metoprolol and Amio 100mg daily    Problem/Plan - 5:  ·  Problem: CAD (coronary artery disease).  Plan: On Plavix given TAVR, c/w statin.       Timothy Paredes DO [facility]  Cardiovascular Medicine  [phone]

## 2019-06-04 NOTE — PROGRESS NOTE ADULT - ASSESSMENT
87 yr old with CKD, COPD, BPH, PPM, Y AVR due to AS, CHF. 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  MARIA LUISA negative for IE  However high grade bacteremia suggests endovascular source  Has back pain,discitis OM on MR but no abscess  shoulder pain-chronic but increased recently  On dapto-ceftaroline  No other obvious focus  patient stable   ? IE  ? back occult process  should xtrray more suggestive of calfic process  no sings of infection on exam   will add zyvox in view of poor response

## 2019-06-04 NOTE — PROGRESS NOTE ADULT - PROBLEM SELECTOR PLAN 1
ID f/up-on dapto/ceftaroline--MARIA LUISA unrevealing--s/p MRI spine-abnormal, picc to placed  GI w/up if other studies non dx. ID f/up-on dapto/ceftaroline--MARIA LUISA unrevealing--s/p MRI spine-abnormal, picc to placed  GI w/up if other studies non dx.; repeat MARIA LUISA w/in week if remains bacteremic

## 2019-06-05 LAB
ANION GAP SERPL CALC-SCNC: 14 MMOL/L — SIGNIFICANT CHANGE UP (ref 5–17)
BUN SERPL-MCNC: 24 MG/DL — HIGH (ref 7–23)
CALCIUM SERPL-MCNC: 9 MG/DL — SIGNIFICANT CHANGE UP (ref 8.4–10.5)
CHLORIDE SERPL-SCNC: 99 MMOL/L — SIGNIFICANT CHANGE UP (ref 96–108)
CO2 SERPL-SCNC: 18 MMOL/L — LOW (ref 22–31)
CREAT SERPL-MCNC: 1.23 MG/DL — SIGNIFICANT CHANGE UP (ref 0.5–1.3)
CULTURE RESULTS: SIGNIFICANT CHANGE UP
GLUCOSE SERPL-MCNC: 91 MG/DL — SIGNIFICANT CHANGE UP (ref 70–99)
GRAM STN FLD: SIGNIFICANT CHANGE UP
HCT VFR BLD CALC: 29.2 % — LOW (ref 39–50)
HGB BLD-MCNC: 9.1 G/DL — LOW (ref 13–17)
MCHC RBC-ENTMCNC: 26.9 PG — LOW (ref 27–34)
MCHC RBC-ENTMCNC: 31.2 GM/DL — LOW (ref 32–36)
MCV RBC AUTO: 86.4 FL — SIGNIFICANT CHANGE UP (ref 80–100)
PLATELET # BLD AUTO: 237 K/UL — SIGNIFICANT CHANGE UP (ref 150–400)
POTASSIUM SERPL-MCNC: 4.3 MMOL/L — SIGNIFICANT CHANGE UP (ref 3.5–5.3)
POTASSIUM SERPL-SCNC: 4.3 MMOL/L — SIGNIFICANT CHANGE UP (ref 3.5–5.3)
RBC # BLD: 3.38 M/UL — LOW (ref 4.2–5.8)
RBC # FLD: 15.6 % — HIGH (ref 10.3–14.5)
SODIUM SERPL-SCNC: 131 MMOL/L — LOW (ref 135–145)
SPECIMEN SOURCE: SIGNIFICANT CHANGE UP
WBC # BLD: 7.34 K/UL — SIGNIFICANT CHANGE UP (ref 3.8–10.5)
WBC # FLD AUTO: 7.34 K/UL — SIGNIFICANT CHANGE UP (ref 3.8–10.5)

## 2019-06-05 PROCEDURE — 99232 SBSQ HOSP IP/OBS MODERATE 35: CPT

## 2019-06-05 RX ORDER — MAGNESIUM HYDROXIDE 400 MG/1
30 TABLET, CHEWABLE ORAL ONCE
Refills: 0 | Status: COMPLETED | OUTPATIENT
Start: 2019-06-05 | End: 2019-06-05

## 2019-06-05 RX ADMIN — Medication 100 MILLIGRAM(S): at 06:16

## 2019-06-05 RX ADMIN — AMIODARONE HYDROCHLORIDE 200 MILLIGRAM(S): 400 TABLET ORAL at 06:16

## 2019-06-05 RX ADMIN — MAGNESIUM HYDROXIDE 30 MILLILITER(S): 400 TABLET, CHEWABLE ORAL at 17:30

## 2019-06-05 RX ADMIN — SPIRONOLACTONE 25 MILLIGRAM(S): 25 TABLET, FILM COATED ORAL at 06:16

## 2019-06-05 RX ADMIN — TAMSULOSIN HYDROCHLORIDE 0.4 MILLIGRAM(S): 0.4 CAPSULE ORAL at 21:46

## 2019-06-05 RX ADMIN — LINEZOLID 600 MILLIGRAM(S): 600 INJECTION, SOLUTION INTRAVENOUS at 06:16

## 2019-06-05 RX ADMIN — ONDANSETRON 4 MILLIGRAM(S): 8 TABLET, FILM COATED ORAL at 15:50

## 2019-06-05 RX ADMIN — Medication 25 MILLIGRAM(S): at 06:16

## 2019-06-05 RX ADMIN — DAPTOMYCIN 134 MILLIGRAM(S): 500 INJECTION, POWDER, LYOPHILIZED, FOR SOLUTION INTRAVENOUS at 14:13

## 2019-06-05 RX ADMIN — OXYCODONE HYDROCHLORIDE 2.5 MILLIGRAM(S): 5 TABLET ORAL at 00:00

## 2019-06-05 RX ADMIN — Medication 15 MILLIGRAM(S): at 06:17

## 2019-06-05 RX ADMIN — APIXABAN 5 MILLIGRAM(S): 2.5 TABLET, FILM COATED ORAL at 17:30

## 2019-06-05 RX ADMIN — Medication 100 MILLIGRAM(S): at 21:46

## 2019-06-05 RX ADMIN — CEFTAROLINE FOSAMIL 50 MILLIGRAM(S): 600 POWDER, FOR SOLUTION INTRAVENOUS at 13:12

## 2019-06-05 RX ADMIN — Medication 15 MILLIGRAM(S): at 17:28

## 2019-06-05 RX ADMIN — BUDESONIDE AND FORMOTEROL FUMARATE DIHYDRATE 2 PUFF(S): 160; 4.5 AEROSOL RESPIRATORY (INHALATION) at 06:16

## 2019-06-05 RX ADMIN — FINASTERIDE 5 MILLIGRAM(S): 5 TABLET, FILM COATED ORAL at 11:33

## 2019-06-05 RX ADMIN — Medication 100 MILLIGRAM(S): at 13:12

## 2019-06-05 RX ADMIN — APIXABAN 5 MILLIGRAM(S): 2.5 TABLET, FILM COATED ORAL at 06:16

## 2019-06-05 RX ADMIN — CEFTAROLINE FOSAMIL 50 MILLIGRAM(S): 600 POWDER, FOR SOLUTION INTRAVENOUS at 06:17

## 2019-06-05 RX ADMIN — LIDOCAINE 1 PATCH: 4 CREAM TOPICAL at 23:03

## 2019-06-05 RX ADMIN — SENNA PLUS 2 TABLET(S): 8.6 TABLET ORAL at 21:46

## 2019-06-05 RX ADMIN — Medication 25 MILLIGRAM(S): at 17:30

## 2019-06-05 RX ADMIN — PANTOPRAZOLE SODIUM 40 MILLIGRAM(S): 20 TABLET, DELAYED RELEASE ORAL at 06:16

## 2019-06-05 RX ADMIN — Medication 3 MILLIGRAM(S): at 21:46

## 2019-06-05 RX ADMIN — BUDESONIDE AND FORMOTEROL FUMARATE DIHYDRATE 2 PUFF(S): 160; 4.5 AEROSOL RESPIRATORY (INHALATION) at 17:30

## 2019-06-05 RX ADMIN — Medication 15 MILLIGRAM(S): at 17:58

## 2019-06-05 RX ADMIN — ATORVASTATIN CALCIUM 40 MILLIGRAM(S): 80 TABLET, FILM COATED ORAL at 21:46

## 2019-06-05 RX ADMIN — OXYCODONE HYDROCHLORIDE 2.5 MILLIGRAM(S): 5 TABLET ORAL at 21:46

## 2019-06-05 RX ADMIN — LIDOCAINE 1 PATCH: 4 CREAM TOPICAL at 11:32

## 2019-06-05 RX ADMIN — OXYCODONE HYDROCHLORIDE 2.5 MILLIGRAM(S): 5 TABLET ORAL at 01:00

## 2019-06-05 RX ADMIN — LINEZOLID 600 MILLIGRAM(S): 600 INJECTION, SOLUTION INTRAVENOUS at 17:30

## 2019-06-05 RX ADMIN — LIDOCAINE 1 PATCH: 4 CREAM TOPICAL at 19:37

## 2019-06-05 RX ADMIN — POLYETHYLENE GLYCOL 3350 17 GRAM(S): 17 POWDER, FOR SOLUTION ORAL at 11:38

## 2019-06-05 RX ADMIN — LIDOCAINE 1 PATCH: 4 CREAM TOPICAL at 00:03

## 2019-06-05 RX ADMIN — OXYCODONE HYDROCHLORIDE 2.5 MILLIGRAM(S): 5 TABLET ORAL at 22:16

## 2019-06-05 RX ADMIN — CEFTAROLINE FOSAMIL 50 MILLIGRAM(S): 600 POWDER, FOR SOLUTION INTRAVENOUS at 21:46

## 2019-06-05 RX ADMIN — CLOPIDOGREL BISULFATE 75 MILLIGRAM(S): 75 TABLET, FILM COATED ORAL at 11:33

## 2019-06-05 NOTE — PROGRESS NOTE ADULT - ASSESSMENT
87 yr old with CKD, COPD, BPH, PPM, Y AVR due to AS, CHF. 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    1. COPD - stable  c/w Symbicort BID and Albuterol PRN, Spiriva  Out of bed to chair, incentive spirometry.  2. 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  Lower back and shoulder pain - patient states this is chronic but recently worsened. If continues would consider imaging to better evaluate  *************  5/29-await results of cx; re-scan of back likely  5/30-ID/CTS f/up-picc placed (Roman et al)  5/31-for GI evaln if other w/up neg to assess for source of bacteria (MRI abnormal)  6/3- daptomycin/ceftarokine to continue--re-cx today  6/4-re-MARIA LUISA in 1 week if remains bacteremic--consider valv replacement  6/5-blood cx pending

## 2019-06-05 NOTE — PROGRESS NOTE ADULT - SUBJECTIVE AND OBJECTIVE BOX
Patient is a 88y old  Male who presents with a chief complaint of bacteremia (05 Jun 2019 11:39)      INTERVAL HISTORY: feels ok  	  MEDICATIONS:  amiodarone    Tablet 200 milliGRAM(s) Oral daily  metoprolol tartrate 25 milliGRAM(s) Oral two times a day  spironolactone 25 milliGRAM(s) Oral daily  tamsulosin 0.4 milliGRAM(s) Oral at bedtime        PHYSICAL EXAM:  T(C): 36.4 (06-05-19 @ 12:10), Max: 36.4 (06-05-19 @ 12:10)  HR: 86 (06-05-19 @ 17:34) (68 - 86)  BP: 129/66 (06-05-19 @ 17:34) (93/54 - 129/66)  RR: 18 (06-05-19 @ 12:10) (18 - 18)  SpO2: 94% (06-05-19 @ 12:10) (93% - 94%)  Wt(kg): --  I&O's Summary    04 Jun 2019 07:01  -  05 Jun 2019 07:00  --------------------------------------------------------  IN: 1075 mL / OUT: 650 mL / NET: 425 mL    05 Jun 2019 07:01  -  05 Jun 2019 18:18  --------------------------------------------------------  IN: 200 mL / OUT: 0 mL / NET: 200 mL          Appearance: In no distress	  HEENT:    PERRL, EOMI	  Cardiovascular:  S1 S2, No JVD  Respiratory: Lungs clear to auscultation	  Gastrointestinal:  Soft, Non-tender, + BS	  Extremities:  No edema of LE                                9.1    7.34  )-----------( 237      ( 05 Jun 2019 08:45 )             29.2     06-05    131<L>  |  99  |  24<H>  ----------------------------<  91  4.3   |  18<L>  |  1.23    Ca    9.0      05 Jun 2019 06:37          Labs personally reviewed      Assessment and Plan:   Assessment:  · Assessment		  Patient is a 87 y/o male with PMH SSS/a fib on eliquis, 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 Was admitted recently and dx with possible endocarditis, ppm removed, received dapto/ceftaroline through PICC, PICC was dc and patient has been off abx for 2 weeks, plan was to observe off abx for 3 weeks and repeat labs. Patient had labs/bcx done yesterday with primary care, bcx were positive, patient otherwise does not have new symptoms or feel ill.     Problem/Plan - 1:  ·  Problem: Bacteremia.  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  Plan: continue with aldactone 25mg, euvolemic at this time    Problem/Plan - 3:  ·  Problem: Aortic stenosis.  Plan: S/P TAVR   - ?bioprosthetic valve endocarditis, CTS fu for ?need for AVR    Problem/Plan - 4:  Problem: Atrial fibrillation. Plan: On eliquis, Rate controlled on Metoprolol and Amio 100mg daily    Problem/Plan - 5:  ·  Problem: CAD (coronary artery disease).  Plan: On Plavix given TAVR, c/w statin.       Timothy Paredes DO [facility]  Cardiovascular Medicine  [phone]

## 2019-06-05 NOTE — PROVIDER CONTACT NOTE (CRITICAL VALUE NOTIFICATION) - SITUATION
Blood cultures from 6/4 - growth in anaerobic and aerobic bottles gram pos cocci in pairs and chains

## 2019-06-05 NOTE — PROGRESS NOTE ADULT - SUBJECTIVE AND OBJECTIVE BOX
Subjective: Patient seen and examined. No new events except as noted.   doing well  L shoulder pain improved     REVIEW OF SYSTEMS:    CONSTITUTIONAL: No weakness, fevers or chills  EYES/ENT: No visual changes;  No vertigo or throat pain   NECK: No pain or stiffness  RESPIRATORY: No cough, wheezing, hemoptysis; No shortness of breath  CARDIOVASCULAR: No chest pain or palpitations  GASTROINTESTINAL: No abdominal or epigastric pain. No nausea, vomiting, or hematemesis; No diarrhea or constipation. No melena or hematochezia.  GENITOURINARY: No dysuria, frequency or hematuria  NEUROLOGICAL: L shoulder pain on ROM   SKIN: No itching, burning, rashes, or lesions   All other review of systems is negative unless indicated above.    MEDICATIONS:  MEDICATIONS  (STANDING):  amiodarone    Tablet 200 milliGRAM(s) Oral daily  apixaban 5 milliGRAM(s) Oral every 12 hours  atorvastatin 40 milliGRAM(s) Oral at bedtime  buDESOnide  80 MICROgram(s)/formoterol 4.5 MICROgram(s) Inhaler 2 Puff(s) Inhalation two times a day  ceftaroline fosamil IVPB 600 milliGRAM(s) IV Intermittent every 8 hours  clopidogrel Tablet 75 milliGRAM(s) Oral daily  DAPTOmycin IVPB 850 milliGRAM(s) IV Intermittent every 24 hours  docusate sodium 100 milliGRAM(s) Oral three times a day  finasteride 5 milliGRAM(s) Oral daily  lidocaine   Patch 1 Patch Transdermal daily  lidocaine   Patch 1 Patch Transdermal daily  linezolid    Tablet 600 milliGRAM(s) Oral every 12 hours  melatonin 3 milliGRAM(s) Oral at bedtime  metoprolol tartrate 25 milliGRAM(s) Oral two times a day  pantoprazole    Tablet 40 milliGRAM(s) Oral before breakfast  senna 2 Tablet(s) Oral at bedtime  spironolactone 25 milliGRAM(s) Oral daily  tamsulosin 0.4 milliGRAM(s) Oral at bedtime      PHYSICAL EXAM:  T(C): 36.3 (06-05-19 @ 04:54), Max: 36.9 (06-04-19 @ 11:48)  HR: 77 (06-05-19 @ 04:54) (64 - 80)  BP: 116/72 (06-05-19 @ 04:54) (105/62 - 119/61)  RR: 18 (06-05-19 @ 04:54) (18 - 18)  SpO2: 93% (06-05-19 @ 04:54) (93% - 94%)  Wt(kg): --  I&O's Summary    04 Jun 2019 07:01  -  05 Jun 2019 07:00  --------------------------------------------------------  IN: 1075 mL / OUT: 650 mL / NET: 425 mL          Appearance: Normal	  HEENT:   Normal oral mucosa, PERRL, EOMI	  Lymphatic: No lymphadenopathy , no edema  Cardiovascular: Normal S1 S2, No JVD  Respiratory: Lungs clear to auscultation, normal effort 	  Gastrointestinal:  Soft, Non-tender, + BS	  Skin: No rashes, No ecchymoses, No cyanosis, warm to touch  Musculoskeletal: L shoulder discomfort and pain on active and passive ROM   Psychiatry:  Mood & affect appropriate  Ext: No edema      All labs, Imaging and EKGs personally reviewed                           9.1    7.34  )-----------( 237      ( 05 Jun 2019 08:45 )             29.2               06-05    131<L>  |  99  |  24<H>  ----------------------------<  91  4.3   |  18<L>  |  1.23    Ca    9.0      05 Jun 2019 06:37             CARDIAC MARKERS ( 04 Jun 2019 06:50 )  x     / x     / 27 U/L / x     / x

## 2019-06-05 NOTE — PROGRESS NOTE ADULT - SUBJECTIVE AND OBJECTIVE BOX
CHIEF COMPLAINT: f/up sob, copd, osas, abnormal ct chest-general body pain -savana Left arm; no sob at rest;constipation    Interval Events: repeat bloods for cx    REVIEW OF SYSTEMS:  Constitutional: No fevers or chills. No weight loss. + fatigue or generalized malaise.  Eyes: No itching or discharge from the eyes  ENT: No ear pain. No ear discharge. No nasal congestion. No post nasal drip. No epistaxis. No throat pain. No sore throat. No difficulty swallowing.   CV: No chest pain. No palpitations. No lightheadedness or dizziness.   Resp: No dyspnea at rest. + dyspnea on exertion. No orthopnea. No wheezing. No cough. No stridor. No sputum production. No chest pain with respiration.  GI: No nausea. No vomiting. No diarrhea.  MSK: No joint pain or pain in any extremities  Integumentary: No skin lesions. No pedal edema.  Neurological: + gross motor weakness. No sensory changes.  [+ ] All other systems negative  [ ] Unable to assess ROS because ________    OBJECTIVE:  ICU Vital Signs Last 24 Hrs  T(C): 36.3 (05 Jun 2019 04:54), Max: 36.9 (04 Jun 2019 11:48)  T(F): 97.3 (05 Jun 2019 04:54), Max: 98.4 (04 Jun 2019 11:48)  HR: 77 (05 Jun 2019 04:54) (64 - 80)  BP: 116/72 (05 Jun 2019 04:54) (105/62 - 119/61)  BP(mean): --  ABP: --  ABP(mean): --  RR: 18 (05 Jun 2019 04:54) (18 - 18)  SpO2: 93% (05 Jun 2019 04:54) (93% - 94%)        06-03 @ 07:01  -  06-04 @ 07:00  --------------------------------------------------------  IN: 815 mL / OUT: 200 mL / NET: 615 mL    06-04 @ 07:01  -  06-05 @ 05:48  --------------------------------------------------------  IN: 1075 mL / OUT: 650 mL / NET: 425 mL      CAPILLARY BLOOD GLUCOSE          PHYSICAL EXAM: NAD in bed RA  General: Awake, alert, oriented X 3.   HEENT: Atraumatic, normocephalic.                 Mallampatti Grade 2                No nasal congestion.                No tonsillar or pharyngeal exudates.  Lymph Nodes: No palpable lymphadenopathy  Neck: No JVD. No carotid bruit.   Respiratory: Normal chest expansion                         Normal percussion                         Normal and equal air entry                         No wheeze, rhonchi or rales.  Cardiovascular: S1 S2 normal. + murmurs, rubs or gallops.   Abdomen: Soft, non-tender, non-distended. No organomegaly. Normoactive bowel sounds.  Extremities: Warm to touch. Peripheral pulse palpable. No pedal edema.   Skin: No rashes or skin lesions  Neurological: Motor and sensory examination equal and normal in all four extremities.  Psychiatry: Appropriate mood and affect.    HOSPITAL MEDICATIONS:  MEDICATIONS  (STANDING):  amiodarone    Tablet 200 milliGRAM(s) Oral daily  apixaban 5 milliGRAM(s) Oral every 12 hours  atorvastatin 40 milliGRAM(s) Oral at bedtime  buDESOnide  80 MICROgram(s)/formoterol 4.5 MICROgram(s) Inhaler 2 Puff(s) Inhalation two times a day  ceftaroline fosamil IVPB 600 milliGRAM(s) IV Intermittent every 8 hours  clopidogrel Tablet 75 milliGRAM(s) Oral daily  DAPTOmycin IVPB 850 milliGRAM(s) IV Intermittent every 24 hours  docusate sodium 100 milliGRAM(s) Oral three times a day  finasteride 5 milliGRAM(s) Oral daily  lidocaine   Patch 1 Patch Transdermal daily  lidocaine   Patch 1 Patch Transdermal daily  linezolid    Tablet 600 milliGRAM(s) Oral every 12 hours  melatonin 3 milliGRAM(s) Oral at bedtime  metoprolol tartrate 25 milliGRAM(s) Oral two times a day  pantoprazole    Tablet 40 milliGRAM(s) Oral before breakfast  senna 2 Tablet(s) Oral at bedtime  spironolactone 25 milliGRAM(s) Oral daily  tamsulosin 0.4 milliGRAM(s) Oral at bedtime    MEDICATIONS  (PRN):  ALBUTerol    90 MICROgram(s) HFA Inhaler 2 Puff(s) Inhalation every 6 hours PRN Shortness of Breath and/or Wheezing  ketorolac   Injectable 15 milliGRAM(s) IV Push every 8 hours PRN Mild Pain (1 - 3)  ondansetron    Tablet 4 milliGRAM(s) Oral every 8 hours PRN Nausea and/or Vomiting  oxyCODONE    IR 2.5 milliGRAM(s) Oral every 6 hours PRN Moderate Pain (4 - 6)  polyethylene glycol 3350 17 Gram(s) Oral daily PRN Constipation      LABS:                        9.2    7.45  )-----------( 225      ( 04 Jun 2019 09:27 )             29.1     06-04    129<L>  |  97  |  23  ----------------------------<  87  4.3   |  20<L>  |  1.25    Ca    9.0      04 Jun 2019 06:50    TPro  6.3  /  Alb  3.0<L>  /  TBili  0.4  /  DBili  x   /  AST  10  /  ALT  10  /  AlkPhos  123<H>  06-03              MICROBIOLOGY:     RADIOLOGY:  [ ] Reviewed and interpreted by me    Point of Care Ultrasound Findings:    PFT:    EKG:

## 2019-06-05 NOTE — PROGRESS NOTE ADULT - ATTENDING COMMENTS
as above-stable-await PICC placement (re-evaluation by CTS/ID f/up-[place]); ? require GI evaln-no absolute contras to EGD/colonoscopy-on hold; may require repeat MARIA LUISA if still bacteremic (consider [facility] if abnormal)  ID-bacteremia--on dapto/ceftaroline-f/up all cx-s/p bone studies (mri abnormal-unclear if enough to base source of infection/shoulder-unrevealing)  COPD-symbiocrt/spiriva, incentive spirometry/acapella  PAH-no defin. rx  Cards-AF etc.--mult rx-consider MARIA LUISA over next week  DVT.GI proph--OOB                       GI evaln on hold based on notes  Arjun Mack MD-Pulmonary   [phone]

## 2019-06-05 NOTE — PROGRESS NOTE ADULT - PROBLEM SELECTOR PLAN 1
ID f/up-on dapto/ceftaroline--MARIA LUISA unrevealing--s/p MRI spine-abnormal, picc to placed  GI w/up if other studies non dx.; repeat MARIA LUISA w/in week if remains bacteremic

## 2019-06-06 LAB
ANION GAP SERPL CALC-SCNC: 16 MMOL/L — SIGNIFICANT CHANGE UP (ref 5–17)
BUN SERPL-MCNC: 24 MG/DL — HIGH (ref 7–23)
CALCIUM SERPL-MCNC: 8.9 MG/DL — SIGNIFICANT CHANGE UP (ref 8.4–10.5)
CHLORIDE SERPL-SCNC: 101 MMOL/L — SIGNIFICANT CHANGE UP (ref 96–108)
CO2 SERPL-SCNC: 19 MMOL/L — LOW (ref 22–31)
CREAT SERPL-MCNC: 1.22 MG/DL — SIGNIFICANT CHANGE UP (ref 0.5–1.3)
CULTURE RESULTS: SIGNIFICANT CHANGE UP
CULTURE RESULTS: SIGNIFICANT CHANGE UP
GLUCOSE SERPL-MCNC: 89 MG/DL — SIGNIFICANT CHANGE UP (ref 70–99)
GRAM STN FLD: SIGNIFICANT CHANGE UP
HCT VFR BLD CALC: 28.4 % — LOW (ref 39–50)
HGB BLD-MCNC: 9 G/DL — LOW (ref 13–17)
MCHC RBC-ENTMCNC: 26.9 PG — LOW (ref 27–34)
MCHC RBC-ENTMCNC: 31.7 GM/DL — LOW (ref 32–36)
MCV RBC AUTO: 84.8 FL — SIGNIFICANT CHANGE UP (ref 80–100)
ORGANISM # SPEC MICROSCOPIC CNT: SIGNIFICANT CHANGE UP
PLATELET # BLD AUTO: 232 K/UL — SIGNIFICANT CHANGE UP (ref 150–400)
POTASSIUM SERPL-MCNC: 4.7 MMOL/L — SIGNIFICANT CHANGE UP (ref 3.5–5.3)
POTASSIUM SERPL-SCNC: 4.7 MMOL/L — SIGNIFICANT CHANGE UP (ref 3.5–5.3)
RBC # BLD: 3.35 M/UL — LOW (ref 4.2–5.8)
RBC # FLD: 15.6 % — HIGH (ref 10.3–14.5)
SODIUM SERPL-SCNC: 136 MMOL/L — SIGNIFICANT CHANGE UP (ref 135–145)
SPECIMEN SOURCE: SIGNIFICANT CHANGE UP
WBC # BLD: 7.88 K/UL — SIGNIFICANT CHANGE UP (ref 3.8–10.5)
WBC # FLD AUTO: 7.88 K/UL — SIGNIFICANT CHANGE UP (ref 3.8–10.5)

## 2019-06-06 PROCEDURE — 99232 SBSQ HOSP IP/OBS MODERATE 35: CPT

## 2019-06-06 RX ORDER — SODIUM CHLORIDE 9 MG/ML
500 INJECTION INTRAMUSCULAR; INTRAVENOUS; SUBCUTANEOUS ONCE
Refills: 0 | Status: COMPLETED | OUTPATIENT
Start: 2019-06-06 | End: 2019-06-06

## 2019-06-06 RX ORDER — ONDANSETRON 8 MG/1
4 TABLET, FILM COATED ORAL ONCE
Refills: 0 | Status: COMPLETED | OUTPATIENT
Start: 2019-06-06 | End: 2019-06-06

## 2019-06-06 RX ORDER — MAGNESIUM HYDROXIDE 400 MG/1
30 TABLET, CHEWABLE ORAL ONCE
Refills: 0 | Status: COMPLETED | OUTPATIENT
Start: 2019-06-06 | End: 2019-06-06

## 2019-06-06 RX ORDER — OXYCODONE HYDROCHLORIDE 5 MG/1
2.5 TABLET ORAL ONCE
Refills: 0 | Status: DISCONTINUED | OUTPATIENT
Start: 2019-06-06 | End: 2019-06-06

## 2019-06-06 RX ORDER — METOPROLOL TARTRATE 50 MG
25 TABLET ORAL
Refills: 0 | Status: DISCONTINUED | OUTPATIENT
Start: 2019-06-06 | End: 2019-06-17

## 2019-06-06 RX ADMIN — SPIRONOLACTONE 25 MILLIGRAM(S): 25 TABLET, FILM COATED ORAL at 06:02

## 2019-06-06 RX ADMIN — SENNA PLUS 2 TABLET(S): 8.6 TABLET ORAL at 22:06

## 2019-06-06 RX ADMIN — LINEZOLID 600 MILLIGRAM(S): 600 INJECTION, SOLUTION INTRAVENOUS at 22:07

## 2019-06-06 RX ADMIN — ONDANSETRON 4 MILLIGRAM(S): 8 TABLET, FILM COATED ORAL at 11:20

## 2019-06-06 RX ADMIN — ONDANSETRON 4 MILLIGRAM(S): 8 TABLET, FILM COATED ORAL at 18:46

## 2019-06-06 RX ADMIN — POLYETHYLENE GLYCOL 3350 17 GRAM(S): 17 POWDER, FOR SOLUTION ORAL at 08:51

## 2019-06-06 RX ADMIN — SODIUM CHLORIDE 2000 MILLILITER(S): 9 INJECTION INTRAMUSCULAR; INTRAVENOUS; SUBCUTANEOUS at 12:20

## 2019-06-06 RX ADMIN — MAGNESIUM HYDROXIDE 30 MILLILITER(S): 400 TABLET, CHEWABLE ORAL at 16:21

## 2019-06-06 RX ADMIN — PANTOPRAZOLE SODIUM 40 MILLIGRAM(S): 20 TABLET, DELAYED RELEASE ORAL at 06:02

## 2019-06-06 RX ADMIN — LIDOCAINE 1 PATCH: 4 CREAM TOPICAL at 19:20

## 2019-06-06 RX ADMIN — CEFTAROLINE FOSAMIL 50 MILLIGRAM(S): 600 POWDER, FOR SOLUTION INTRAVENOUS at 22:07

## 2019-06-06 RX ADMIN — APIXABAN 5 MILLIGRAM(S): 2.5 TABLET, FILM COATED ORAL at 06:02

## 2019-06-06 RX ADMIN — BUDESONIDE AND FORMOTEROL FUMARATE DIHYDRATE 2 PUFF(S): 160; 4.5 AEROSOL RESPIRATORY (INHALATION) at 06:02

## 2019-06-06 RX ADMIN — Medication 100 MILLIGRAM(S): at 13:18

## 2019-06-06 RX ADMIN — BUDESONIDE AND FORMOTEROL FUMARATE DIHYDRATE 2 PUFF(S): 160; 4.5 AEROSOL RESPIRATORY (INHALATION) at 18:47

## 2019-06-06 RX ADMIN — OXYCODONE HYDROCHLORIDE 2.5 MILLIGRAM(S): 5 TABLET ORAL at 01:15

## 2019-06-06 RX ADMIN — Medication 15 MILLIGRAM(S): at 08:51

## 2019-06-06 RX ADMIN — FINASTERIDE 5 MILLIGRAM(S): 5 TABLET, FILM COATED ORAL at 11:21

## 2019-06-06 RX ADMIN — ATORVASTATIN CALCIUM 40 MILLIGRAM(S): 80 TABLET, FILM COATED ORAL at 22:06

## 2019-06-06 RX ADMIN — Medication 15 MILLIGRAM(S): at 23:56

## 2019-06-06 RX ADMIN — OXYCODONE HYDROCHLORIDE 2.5 MILLIGRAM(S): 5 TABLET ORAL at 00:41

## 2019-06-06 RX ADMIN — Medication 100 MILLIGRAM(S): at 06:02

## 2019-06-06 RX ADMIN — DAPTOMYCIN 134 MILLIGRAM(S): 500 INJECTION, POWDER, LYOPHILIZED, FOR SOLUTION INTRAVENOUS at 15:15

## 2019-06-06 RX ADMIN — AMIODARONE HYDROCHLORIDE 200 MILLIGRAM(S): 400 TABLET ORAL at 06:02

## 2019-06-06 RX ADMIN — Medication 3 MILLIGRAM(S): at 22:06

## 2019-06-06 RX ADMIN — CLOPIDOGREL BISULFATE 75 MILLIGRAM(S): 75 TABLET, FILM COATED ORAL at 11:20

## 2019-06-06 RX ADMIN — Medication 100 MILLIGRAM(S): at 22:06

## 2019-06-06 RX ADMIN — TAMSULOSIN HYDROCHLORIDE 0.4 MILLIGRAM(S): 0.4 CAPSULE ORAL at 22:06

## 2019-06-06 RX ADMIN — Medication 15 MILLIGRAM(S): at 23:26

## 2019-06-06 RX ADMIN — SODIUM CHLORIDE 2000 MILLILITER(S): 9 INJECTION INTRAMUSCULAR; INTRAVENOUS; SUBCUTANEOUS at 16:22

## 2019-06-06 RX ADMIN — LIDOCAINE 1 PATCH: 4 CREAM TOPICAL at 11:20

## 2019-06-06 RX ADMIN — CEFTAROLINE FOSAMIL 50 MILLIGRAM(S): 600 POWDER, FOR SOLUTION INTRAVENOUS at 06:02

## 2019-06-06 RX ADMIN — LINEZOLID 600 MILLIGRAM(S): 600 INJECTION, SOLUTION INTRAVENOUS at 06:02

## 2019-06-06 RX ADMIN — APIXABAN 5 MILLIGRAM(S): 2.5 TABLET, FILM COATED ORAL at 22:07

## 2019-06-06 RX ADMIN — CEFTAROLINE FOSAMIL 50 MILLIGRAM(S): 600 POWDER, FOR SOLUTION INTRAVENOUS at 13:17

## 2019-06-06 RX ADMIN — Medication 15 MILLIGRAM(S): at 09:21

## 2019-06-06 RX ADMIN — LIDOCAINE 1 PATCH: 4 CREAM TOPICAL at 22:59

## 2019-06-06 RX ADMIN — Medication 25 MILLIGRAM(S): at 06:02

## 2019-06-06 NOTE — PHARMACOTHERAPY INTERVENTION NOTE - COMMENTS
Medication reconciliation completed per patient and records from [facility] pharmacy. Please refer to outpatient medication review for the updated list. Patient was on metoprolol succinate 25 mg by mouth daily at home, but currently on metoprolol tartrate 50mg by mouth Q12H. Recommendation made to switch to home dose. Of not patient also gets medication [facility].    Spencer Shane, PharmD  [phone]
Patient currently on humalog low dose correctional scale (A1C 5.3). BG controlled, <120. Recommendation made to discontinue fingersticks and correctional scale.    Spencer Shane, PharmD  [phone]
Patient is currently on daptomycin. Last CK level was 35 on 5/27. Ordered CK level.
Spoke to patient about indication, directions, and side effect profile of medications. Informed patient of anticoagulant use in hospital, formulary changes, and other changes made in hospital. Inpatient medication list provided. Patient without BM for 4 days despite 3 laxitive agents - recommendation made to add magnesium hydroxide x 1 dose.    Spencer Shane, PharmD  [phone]

## 2019-06-06 NOTE — PROGRESS NOTE ADULT - ATTENDING COMMENTS
as above-stable-await PICC placement (re-evaluation by CTS/ID f/up-[place]); ? require GI evaln-no absolute contras to EGD/colonoscopy-on hold; may require repeat MARIA LUISA if still bacteremic (consider [facility] if abnormal)  ID-bacteremia--on dapto/ceftaroline-f/up all cx-s/p bone studies (mri abnormal-unclear if enough to base source of infection/shoulder-unrevealing)  COPD-symbiocrt/spiriva, incentive spirometry/acapella  PAH-no defin. rx  Cards-AF etc.--mult rx-consider MARIA LUISA over next week  DVT.GI proph--OOB  PT needed                      GI evaln on hold based on notes  Arjun Mack MD-Pulmonary   [phone]

## 2019-06-06 NOTE — PROGRESS NOTE ADULT - SUBJECTIVE AND OBJECTIVE BOX
CHIEF COMPLAINT:  f/up sob, copd, osas, abnormal ct chest-general body pain -chronic but episodic light headed period w/ambulation    Interval Events: none/ambulated    REVIEW OF SYSTEMS:  Constitutional: No fevers or chills. No weight loss. No fatigue or generalized malaise.  Eyes: No itching or discharge from the eyes  ENT: No ear pain. No ear discharge. No nasal congestion. No post nasal drip. No epistaxis. No throat pain. No sore throat. No difficulty swallowing.   CV: No chest pain. No palpitations. No lightheadedness or dizziness.   Resp: No dyspnea at rest. + dyspnea on exertion. No orthopnea. No wheezing. No cough. No stridor. No sputum production. No chest pain with respiration.  GI: No nausea. No vomiting. No diarrhea.  MSK: + joint pain or pain in any extremities  Integumentary: No skin lesions. No pedal edema.  Neurological: + gross motor weakness. No sensory changes.  [+ ] All other systems negative  [ ] Unable to assess ROS because ________    OBJECTIVE:  ICU Vital Signs Last 24 Hrs  T(C): 36.4 (06 Jun 2019 04:52), Max: 36.4 (05 Jun 2019 12:10)  T(F): 97.5 (06 Jun 2019 04:52), Max: 97.5 (05 Jun 2019 12:10)  HR: 70 (06 Jun 2019 04:52) (68 - 86)  BP: 100/62 (06 Jun 2019 04:52) (93/54 - 129/66)  BP(mean): --  ABP: --  ABP(mean): --  RR: 18 (06 Jun 2019 04:52) (18 - 18)  SpO2: 93% (06 Jun 2019 04:52) (93% - 94%)        06-04 @ 07:01  -  06-05 @ 07:00  --------------------------------------------------------  IN: 1075 mL / OUT: 650 mL / NET: 425 mL    06-05 @ 07:01  -  06-06 @ 05:57  --------------------------------------------------------  IN: 200 mL / OUT: 0 mL / NET: 200 mL      CAPILLARY BLOOD GLUCOSE          PHYSICAL EXAM: NAD in bed RA  General: Awake, alert, oriented X 3.   HEENT: Atraumatic, normocephalic.                 Mallampatti Grade 3                No nasal congestion.                No tonsillar or pharyngeal exudates.  Lymph Nodes: No palpable lymphadenopathy  Neck: No JVD. No carotid bruit.   Respiratory: Normal chest expansion                         Normal percussion                         Normal and equal air entry                         No wheeze, rhonchi or rales.  Cardiovascular: S1 S2 normal. + murmurs, rubs or gallops.   Abdomen: Soft, non-tender, non-distended. No organomegaly. Normoactive bowel sounds.  Extremities: Warm to touch. Peripheral pulse palpable. No pedal edema.   Skin: No rashes or skin lesions  Neurological: Motor and sensory examination equal and normal in all four extremities.  Psychiatry: Appropriate mood and affect.    HOSPITAL MEDICATIONS:  MEDICATIONS  (STANDING):  amiodarone    Tablet 200 milliGRAM(s) Oral daily  apixaban 5 milliGRAM(s) Oral every 12 hours  atorvastatin 40 milliGRAM(s) Oral at bedtime  buDESOnide  80 MICROgram(s)/formoterol 4.5 MICROgram(s) Inhaler 2 Puff(s) Inhalation two times a day  ceftaroline fosamil IVPB 600 milliGRAM(s) IV Intermittent every 8 hours  clopidogrel Tablet 75 milliGRAM(s) Oral daily  DAPTOmycin IVPB 850 milliGRAM(s) IV Intermittent every 24 hours  docusate sodium 100 milliGRAM(s) Oral three times a day  finasteride 5 milliGRAM(s) Oral daily  lidocaine   Patch 1 Patch Transdermal daily  lidocaine   Patch 1 Patch Transdermal daily  linezolid    Tablet 600 milliGRAM(s) Oral every 12 hours  melatonin 3 milliGRAM(s) Oral at bedtime  metoprolol tartrate 25 milliGRAM(s) Oral two times a day  pantoprazole    Tablet 40 milliGRAM(s) Oral before breakfast  senna 2 Tablet(s) Oral at bedtime  spironolactone 25 milliGRAM(s) Oral daily  tamsulosin 0.4 milliGRAM(s) Oral at bedtime    MEDICATIONS  (PRN):  ALBUTerol    90 MICROgram(s) HFA Inhaler 2 Puff(s) Inhalation every 6 hours PRN Shortness of Breath and/or Wheezing  ketorolac   Injectable 15 milliGRAM(s) IV Push every 8 hours PRN Mild Pain (1 - 3)  ondansetron    Tablet 4 milliGRAM(s) Oral every 8 hours PRN Nausea and/or Vomiting  oxyCODONE    IR 2.5 milliGRAM(s) Oral every 6 hours PRN Moderate Pain (4 - 6)  polyethylene glycol 3350 17 Gram(s) Oral daily PRN Constipation      LABS:                        9.1    7.34  )-----------( 237      ( 05 Jun 2019 08:45 )             29.2     06-05    131<L>  |  99  |  24<H>  ----------------------------<  91  4.3   |  18<L>  |  1.23    Ca    9.0      05 Jun 2019 06:37                MICROBIOLOGY:     RADIOLOGY:  [ ] Reviewed and interpreted by me    Point of Care Ultrasound Findings:    PFT:    EKG:

## 2019-06-06 NOTE — PROGRESS NOTE ADULT - SUBJECTIVE AND OBJECTIVE BOX
infectious diseases progress note:    Patient is a 88y old  Male who presents with a chief complaint of bacteremia (06 Jun 2019 05:57)        Bacteremia             Allergies    Keppra (Rash)  penicillin (Unknown)    Intolerances        ANTIBIOTICS/RELEVANT:  antimicrobials  ceftaroline fosamil IVPB 600 milliGRAM(s) IV Intermittent every 8 hours  DAPTOmycin IVPB 850 milliGRAM(s) IV Intermittent every 24 hours  linezolid    Tablet 600 milliGRAM(s) Oral every 12 hours    immunologic:    OTHER:  ALBUTerol    90 MICROgram(s) HFA Inhaler 2 Puff(s) Inhalation every 6 hours PRN  amiodarone    Tablet 200 milliGRAM(s) Oral daily  apixaban 5 milliGRAM(s) Oral every 12 hours  atorvastatin 40 milliGRAM(s) Oral at bedtime  buDESOnide  80 MICROgram(s)/formoterol 4.5 MICROgram(s) Inhaler 2 Puff(s) Inhalation two times a day  clopidogrel Tablet 75 milliGRAM(s) Oral daily  docusate sodium 100 milliGRAM(s) Oral three times a day  finasteride 5 milliGRAM(s) Oral daily  ketorolac   Injectable 15 milliGRAM(s) IV Push every 8 hours PRN  lidocaine   Patch 1 Patch Transdermal daily  lidocaine   Patch 1 Patch Transdermal daily  melatonin 3 milliGRAM(s) Oral at bedtime  metoprolol tartrate 25 milliGRAM(s) Oral two times a day  ondansetron    Tablet 4 milliGRAM(s) Oral every 8 hours PRN  oxyCODONE    IR 2.5 milliGRAM(s) Oral every 6 hours PRN  pantoprazole    Tablet 40 milliGRAM(s) Oral before breakfast  polyethylene glycol 3350 17 Gram(s) Oral daily PRN  senna 2 Tablet(s) Oral at bedtime  spironolactone 25 milliGRAM(s) Oral daily  tamsulosin 0.4 milliGRAM(s) Oral at bedtime      Objective:  Vital Signs Last 24 Hrs  T(C): 36.4 (06 Jun 2019 04:52), Max: 36.4 (05 Jun 2019 12:10)  T(F): 97.5 (06 Jun 2019 04:52), Max: 97.5 (05 Jun 2019 12:10)  HR: 70 (06 Jun 2019 04:52) (68 - 86)  BP: 100/62 (06 Jun 2019 04:52) (93/54 - 129/66)  BP(mean): --  RR: 18 (06 Jun 2019 04:52) (18 - 18)  SpO2: 93% (06 Jun 2019 04:52) (93% - 94%)    PHYSICAL EXAM:     Eyes:JESSEE, EOMI  Ear/Nose/Throat: no oral lesion, no sinus tenderness on percussion	  Neck:no JVD, no lymphadenopathy, supple  Respiratory: CTA marci  Cardiovascular: S1S2 RRR, no murmurs  Gastrointestinal:soft, (+) BS, no HSM  Extremities:no e/e/c        LABS:                        9.1    7.34  )-----------( 237      ( 05 Jun 2019 08:45 )             29.2     06-06    136  |  101  |  24<H>  ----------------------------<  89  4.7   |  19<L>  |  1.22    Ca    8.9      06 Jun 2019 06:56              MICROBIOLOGY:    RECENT CULTURES:  06-05 @ 10:19 .Blood       Growth in anaerobic bottle: Gram Positive Cocci in Pairs and Chains           Growth in anaerobic bottle: Gram Positive Cocci in Pairs and Chains    06-04 @ 10:01 .Blood       Growth in aerobic and anaerobic bottles: Gram Positive Cocci in Pairs and  Chains           Growth in aerobic and anaerobic bottles: Gram Positive Cocci in Pairs and  Chains    06-03 @ 09:51 .Blood   PCR    Growth in aerobic bottle: Gram Positive Cocci in Pairs and Chains  Growth in anaerobic bottle: Gram Positive Cocci in Pairs and Chains    Blood Culture PCR  Blood Culture PCR     Growth in aerobic and anaerobic bottles: Gram Positive Cocci in Pairs and  Chains  See previous culture [id number]    06-02 @ 09:46 .Blood       Growth in anaerobic bottle: Gram Positive Cocci in Pairs and Chains  Growth in aerobic bottle: Gram Positive Cocci in Pairs and Chains           Growth in aerobic and anaerobic bottles: Enterococcus faecium (vancomycin  resistant)  See previous culture [id number]    06-01 @ 10:22 .Blood       Growth in aerobic bottle: Gram Positive Cocci in Pairs and Chains  Growth in anaerobic bottle: Gram Positive Cocci in Pairs and Chains           Growth in aerobic and anaerobic bottles: Enterococcus faecium (vancomycin  resistant)  See previous culture [id number]    05-31 @ 09:44 .Blood   NICK    Growth in aerobic bottle: Gram Positive Cocci in Pairs and Chains  Growth in anaerobic bottle: Gram Positive Cocci in Pairs and Chains    Enterococcus faecium (vancomycin resistant)  Enterococcus faecium (vancomycin resistant)     Growth in aerobic and anaerobic bottles: Enterococcus faecium (vancomycin  resistant)          RESPIRATORY CULTURES:              RADIOLOGY & ADDITIONAL STUDIES:        Pager [phone]  After 5 pm/weekends or if no response :[phone]

## 2019-06-06 NOTE — PROGRESS NOTE ADULT - PROBLEM SELECTOR PLAN 1
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

## 2019-06-06 NOTE — PROGRESS NOTE ADULT - ASSESSMENT
87 yr old with CKD, COPD, BPH, PPM, Y AVR due to AS, CHF. 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    1. COPD - stable  c/w Symbicort BID and Albuterol PRN, Spiriva  Out of bed to chair, incentive spirometry.  2. 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  Lower back and shoulder pain - patient states this is chronic but recently worsened. If continues would consider imaging to better evaluate  *************  5/29-await results of cx; re-scan of back likely  5/30-ID/CTS f/up-picc placed (Roman et al)  5/31-for GI evaln if other w/up neg to assess for source of bacteria (MRI abnormal)  6/3- daptomycin/ceftarokine to continue--re-cx today  6/4-re-MARIA LUISA in 1 week if remains bacteremic--consider valv replacement  6/5-blood cx pending; 6/6-dizzy upon ambulation

## 2019-06-06 NOTE — PROGRESS NOTE ADULT - ASSESSMENT
87 yr old with CKD, COPD, BPH, PPM, Y AVR due to AS, CHF. 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  there are no good options left

## 2019-06-06 NOTE — PROGRESS NOTE ADULT - SUBJECTIVE AND OBJECTIVE BOX
Patient is a 88y old  Male who presents with a chief complaint of bacteremia (06 Jun 2019 19:05)    MEDICATIONS:  amiodarone    Tablet 200 milliGRAM(s) Oral daily  metoprolol tartrate 25 milliGRAM(s) Oral two times a day  spironolactone 25 milliGRAM(s) Oral daily  tamsulosin 0.4 milliGRAM(s) Oral at bedtime        PHYSICAL EXAM:  T(C): 36.4 (06-06-19 @ 20:47), Max: 36.4 (06-06-19 @ 04:52)  HR: 67 (06-06-19 @ 20:47) (61 - 77)  BP: 109/66 (06-06-19 @ 20:47) (83/48 - 113/60)  RR: 18 (06-06-19 @ 20:47) (18 - 18)  SpO2: 93% (06-06-19 @ 20:47) (93% - 98%)  Wt(kg): --  I&O's Summary    05 Jun 2019 07:01  -  06 Jun 2019 07:00  --------------------------------------------------------  IN: 200 mL / OUT: 0 mL / NET: 200 mL    06 Jun 2019 07:01  -  06 Jun 2019 23:32  --------------------------------------------------------  IN: 220 mL / OUT: 0 mL / NET: 220 mL          Appearance: In no distress	  HEENT:    PERRL, EOMI	  Cardiovascular:  S1 S2, No JVD  Respiratory: Lungs clear to auscultation	  Gastrointestinal:  Soft, Non-tender, + BS	  Extremities:  No edema of LE                                9.0    7.88  )-----------( 232      ( 06 Jun 2019 10:26 )             28.4     06-06    136  |  101  |  24<H>  ----------------------------<  89  4.7   |  19<L>  |  1.22    Ca    8.9      06 Jun 2019 06:56          Labs personally reviewed      Assessment and Plan:   Assessment:  · Assessment		  Patient is a 89 y/o male with PMH SSS/a fib on eliquis, 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 Was admitted recently and dx with possible endocarditis, ppm removed, received dapto/ceftaroline through PICC, PICC was dc and patient has been off abx for 2 weeks, plan was to observe off abx for 3 weeks and repeat labs. Patient had labs/bcx done yesterday with primary care, bcx were positive, patient otherwise does not have new symptoms or feel ill.     Problem/Plan - 1:  ·  Problem: Bacteremia.  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  Plan: continue with aldactone 25mg, euvolemic at this time    Problem/Plan - 3:  ·  Problem: Aortic stenosis.  Plan: S/P TAVR   - ?bioprosthetic valve endocarditis, CTS fu for ?need for AVR    Problem/Plan - 4:  Problem: Atrial fibrillation. Plan: On eliquis, Rate controlled on Metoprolol and Amio 100mg daily    Problem/Plan - 5:  ·  Problem: CAD (coronary artery disease).  Plan: On Plavix given TAVR, c/w statin.       Timothy Paredes DO [facility]  Cardiovascular Medicine  [phone]

## 2019-06-07 LAB
-  AMPICILLIN: SIGNIFICANT CHANGE UP
-  DAPTOMYCIN: SIGNIFICANT CHANGE UP
-  GENTAMICIN SYNERGY: SIGNIFICANT CHANGE UP
-  LINEZOLID: SIGNIFICANT CHANGE UP
-  VANCOMYCIN: SIGNIFICANT CHANGE UP
ALBUMIN SERPL ELPH-MCNC: 2.6 G/DL — LOW (ref 3.3–5)
ALP SERPL-CCNC: 129 U/L — HIGH (ref 40–120)
ALT FLD-CCNC: 9 U/L — LOW (ref 10–45)
ANION GAP SERPL CALC-SCNC: 14 MMOL/L — SIGNIFICANT CHANGE UP (ref 5–17)
AST SERPL-CCNC: 10 U/L — SIGNIFICANT CHANGE UP (ref 10–40)
BASOPHILS # BLD AUTO: 0.03 K/UL — SIGNIFICANT CHANGE UP (ref 0–0.2)
BASOPHILS NFR BLD AUTO: 0.4 % — SIGNIFICANT CHANGE UP (ref 0–2)
BILIRUB SERPL-MCNC: 0.4 MG/DL — SIGNIFICANT CHANGE UP (ref 0.2–1.2)
BUN SERPL-MCNC: 22 MG/DL — SIGNIFICANT CHANGE UP (ref 7–23)
CALCIUM SERPL-MCNC: 8.9 MG/DL — SIGNIFICANT CHANGE UP (ref 8.4–10.5)
CHLORIDE SERPL-SCNC: 102 MMOL/L — SIGNIFICANT CHANGE UP (ref 96–108)
CO2 SERPL-SCNC: 18 MMOL/L — LOW (ref 22–31)
CREAT SERPL-MCNC: 1.26 MG/DL — SIGNIFICANT CHANGE UP (ref 0.5–1.3)
CULTURE RESULTS: SIGNIFICANT CHANGE UP
CULTURE RESULTS: SIGNIFICANT CHANGE UP
EOSINOPHIL # BLD AUTO: 0.23 K/UL — SIGNIFICANT CHANGE UP (ref 0–0.5)
EOSINOPHIL NFR BLD AUTO: 3.4 % — SIGNIFICANT CHANGE UP (ref 0–6)
GLUCOSE SERPL-MCNC: 85 MG/DL — SIGNIFICANT CHANGE UP (ref 70–99)
GRAM STN FLD: SIGNIFICANT CHANGE UP
HCT VFR BLD CALC: 28.8 % — LOW (ref 39–50)
HGB BLD-MCNC: 9 G/DL — LOW (ref 13–17)
IMM GRANULOCYTES NFR BLD AUTO: 0.4 % — SIGNIFICANT CHANGE UP (ref 0–1.5)
LYMPHOCYTES # BLD AUTO: 0.75 K/UL — LOW (ref 1–3.3)
LYMPHOCYTES # BLD AUTO: 11.1 % — LOW (ref 13–44)
MCHC RBC-ENTMCNC: 26.9 PG — LOW (ref 27–34)
MCHC RBC-ENTMCNC: 31.3 GM/DL — LOW (ref 32–36)
MCV RBC AUTO: 86 FL — SIGNIFICANT CHANGE UP (ref 80–100)
METHOD TYPE: SIGNIFICANT CHANGE UP
MONOCYTES # BLD AUTO: 0.85 K/UL — SIGNIFICANT CHANGE UP (ref 0–0.9)
MONOCYTES NFR BLD AUTO: 12.6 % — SIGNIFICANT CHANGE UP (ref 2–14)
NEUTROPHILS # BLD AUTO: 4.88 K/UL — SIGNIFICANT CHANGE UP (ref 1.8–7.4)
NEUTROPHILS NFR BLD AUTO: 72.1 % — SIGNIFICANT CHANGE UP (ref 43–77)
ORGANISM # SPEC MICROSCOPIC CNT: SIGNIFICANT CHANGE UP
ORGANISM # SPEC MICROSCOPIC CNT: SIGNIFICANT CHANGE UP
PLATELET # BLD AUTO: 215 K/UL — SIGNIFICANT CHANGE UP (ref 150–400)
POTASSIUM SERPL-MCNC: 4.8 MMOL/L — SIGNIFICANT CHANGE UP (ref 3.5–5.3)
POTASSIUM SERPL-SCNC: 4.8 MMOL/L — SIGNIFICANT CHANGE UP (ref 3.5–5.3)
PROT SERPL-MCNC: 6.2 G/DL — SIGNIFICANT CHANGE UP (ref 6–8.3)
RBC # BLD: 3.35 M/UL — LOW (ref 4.2–5.8)
RBC # FLD: 15.5 % — HIGH (ref 10.3–14.5)
SODIUM SERPL-SCNC: 134 MMOL/L — LOW (ref 135–145)
SPECIMEN SOURCE: SIGNIFICANT CHANGE UP
SPECIMEN SOURCE: SIGNIFICANT CHANGE UP
WBC # BLD: 6.77 K/UL — SIGNIFICANT CHANGE UP (ref 3.8–10.5)
WBC # FLD AUTO: 6.77 K/UL — SIGNIFICANT CHANGE UP (ref 3.8–10.5)

## 2019-06-07 PROCEDURE — 99232 SBSQ HOSP IP/OBS MODERATE 35: CPT

## 2019-06-07 RX ADMIN — APIXABAN 5 MILLIGRAM(S): 2.5 TABLET, FILM COATED ORAL at 18:09

## 2019-06-07 RX ADMIN — Medication 15 MILLIGRAM(S): at 09:17

## 2019-06-07 RX ADMIN — PANTOPRAZOLE SODIUM 40 MILLIGRAM(S): 20 TABLET, DELAYED RELEASE ORAL at 05:46

## 2019-06-07 RX ADMIN — Medication 100 MILLIGRAM(S): at 15:04

## 2019-06-07 RX ADMIN — OXYCODONE HYDROCHLORIDE 2.5 MILLIGRAM(S): 5 TABLET ORAL at 01:53

## 2019-06-07 RX ADMIN — CLOPIDOGREL BISULFATE 75 MILLIGRAM(S): 75 TABLET, FILM COATED ORAL at 12:29

## 2019-06-07 RX ADMIN — LIDOCAINE 1 PATCH: 4 CREAM TOPICAL at 20:15

## 2019-06-07 RX ADMIN — CEFTAROLINE FOSAMIL 50 MILLIGRAM(S): 600 POWDER, FOR SOLUTION INTRAVENOUS at 15:05

## 2019-06-07 RX ADMIN — Medication 15 MILLIGRAM(S): at 09:47

## 2019-06-07 RX ADMIN — Medication 100 MILLIGRAM(S): at 21:58

## 2019-06-07 RX ADMIN — SENNA PLUS 2 TABLET(S): 8.6 TABLET ORAL at 21:59

## 2019-06-07 RX ADMIN — APIXABAN 5 MILLIGRAM(S): 2.5 TABLET, FILM COATED ORAL at 05:45

## 2019-06-07 RX ADMIN — SPIRONOLACTONE 25 MILLIGRAM(S): 25 TABLET, FILM COATED ORAL at 05:45

## 2019-06-07 RX ADMIN — BUDESONIDE AND FORMOTEROL FUMARATE DIHYDRATE 2 PUFF(S): 160; 4.5 AEROSOL RESPIRATORY (INHALATION) at 05:46

## 2019-06-07 RX ADMIN — LIDOCAINE 1 PATCH: 4 CREAM TOPICAL at 19:14

## 2019-06-07 RX ADMIN — Medication 3 MILLIGRAM(S): at 21:58

## 2019-06-07 RX ADMIN — AMIODARONE HYDROCHLORIDE 200 MILLIGRAM(S): 400 TABLET ORAL at 05:45

## 2019-06-07 RX ADMIN — LINEZOLID 600 MILLIGRAM(S): 600 INJECTION, SOLUTION INTRAVENOUS at 05:45

## 2019-06-07 RX ADMIN — ONDANSETRON 4 MILLIGRAM(S): 8 TABLET, FILM COATED ORAL at 09:42

## 2019-06-07 RX ADMIN — Medication 100 MILLIGRAM(S): at 05:46

## 2019-06-07 RX ADMIN — FINASTERIDE 5 MILLIGRAM(S): 5 TABLET, FILM COATED ORAL at 12:29

## 2019-06-07 RX ADMIN — LIDOCAINE 1 PATCH: 4 CREAM TOPICAL at 12:28

## 2019-06-07 RX ADMIN — OXYCODONE HYDROCHLORIDE 2.5 MILLIGRAM(S): 5 TABLET ORAL at 20:47

## 2019-06-07 RX ADMIN — CEFTAROLINE FOSAMIL 50 MILLIGRAM(S): 600 POWDER, FOR SOLUTION INTRAVENOUS at 05:45

## 2019-06-07 RX ADMIN — Medication 25 MILLIGRAM(S): at 05:45

## 2019-06-07 RX ADMIN — ATORVASTATIN CALCIUM 40 MILLIGRAM(S): 80 TABLET, FILM COATED ORAL at 21:58

## 2019-06-07 RX ADMIN — BUDESONIDE AND FORMOTEROL FUMARATE DIHYDRATE 2 PUFF(S): 160; 4.5 AEROSOL RESPIRATORY (INHALATION) at 18:11

## 2019-06-07 RX ADMIN — OXYCODONE HYDROCHLORIDE 2.5 MILLIGRAM(S): 5 TABLET ORAL at 21:23

## 2019-06-07 RX ADMIN — TAMSULOSIN HYDROCHLORIDE 0.4 MILLIGRAM(S): 0.4 CAPSULE ORAL at 21:59

## 2019-06-07 RX ADMIN — DAPTOMYCIN 134 MILLIGRAM(S): 500 INJECTION, POWDER, LYOPHILIZED, FOR SOLUTION INTRAVENOUS at 14:59

## 2019-06-07 RX ADMIN — LINEZOLID 600 MILLIGRAM(S): 600 INJECTION, SOLUTION INTRAVENOUS at 18:09

## 2019-06-07 RX ADMIN — CEFTAROLINE FOSAMIL 50 MILLIGRAM(S): 600 POWDER, FOR SOLUTION INTRAVENOUS at 21:58

## 2019-06-07 RX ADMIN — OXYCODONE HYDROCHLORIDE 2.5 MILLIGRAM(S): 5 TABLET ORAL at 01:09

## 2019-06-07 NOTE — PROVIDER CONTACT NOTE (CRITICAL VALUE NOTIFICATION) - ASSESSMENT
No s&s noted. VSS.
Patient alert and orietned in no distress
pt afebrile
Alert and oriented X 4. Skin color good warm and dry to touch. Respirations regular and unlabored. Medicated for pain prn with relief.
Alert and oriented times 4.
Patient alert and oriented in no distress
Patient is alert and oriented x3, c/o left shoulder pain. Respirations regular and unlabored. afebrile. Vitals stable.
Patient is alert and oriented x3, forgetful at times. Vitals stable. No acute distress noted. Respirations regular and unlabored.
Pt A/Ox4, no s/s of infection present at current time. VSS, afebrile.
Pt is A/Ox4
Pt is A/Ox4
Pt is A/Ox4.
Pt is A/Ox4.
Pt is A/Ox4. Pt is afebrile.
patient alert and oriented, no new complaints, VSS
Alert and oriented times 4.

## 2019-06-07 NOTE — PROVIDER CONTACT NOTE (CRITICAL VALUE NOTIFICATION) - BACKGROUND
Patient admitted for bactermia history of TIA, pacemaker, CKD, hep B, PTSD, DIEGO, CHF, CVA
Pt Dx with Bacteremia. PMHx TIA, PPM, Hep B, CKD, CVA, AFib.
pt admitted for bacteremia
Patient admitted for bacteremia history of TIA, pacemaker, CKD, Hep B, PTSD, tachy-idris syndrome, seizure, DIEGO, CHF, CVA, gait instablitiy
Patient admitted with bacteremia. H/o CVA, hep B, PTSD, CKD.
Patient admitted with bacteremia. History of stroke.
Pt admitted for Bacteremia
Pt admitted for possible endocarditis, PPM removed. PMH a-fib, CKD stage 3, TIA, Hepatitis B, CHF, and COPD.
Pt admitted with bacteremia.
Pt has hx CHF, Afib ,COPD, CVA, HTN, asthma, Hep B
Pt has hx CHF, Afib, COPD, BPH, Asthma,, PPM ,seizure
Pt has hx CHF, Afib, seizure, PPM, HTN, BPH
Pt has hx CHF, CVA, COPD, BPH, Asthma, A fib, PPM
Pt has hx CHF, HTN, PPM, CVA, COPD, BPH
admit with bacteremia, ? endocarditis, pt on ceftaroline IV and daptomycin IV, pending MARIA LUISA
Admitted for Bacteremia

## 2019-06-07 NOTE — PROVIDER CONTACT NOTE (CRITICAL VALUE NOTIFICATION) - RECOMMENDATIONS
IV antibiotics
continue Abx.
none
Continue antibiotics ordered. Dapto and Ceftaroline
Continue current antibiotic treatment.
Continue current antibiotics, ID following. Cont to monitor vitals
Continue with IV antibiotics
IV antibiotics
Made PA aware, will continue to monitor. Continue IV ABX.
Pt already on VRE bacteremia antibiotics. FOllowed by ID. COntinue to monitor vs
Pt is on Daptomycin 850 mg IVSS  every 24 and Ceftaroline fosamil 600 every 8 hours
Pt is on IV antibiotics.
continue  current medication regimen, monitor vs
Continue Current antibiotics ordered and Infectious disease doctor consult

## 2019-06-07 NOTE — CHART NOTE - NSCHARTNOTEFT_GEN_A_CORE
Interval events    Culture - Blood (06.05.19 @ 10:19)    Gram Stain:   Growth in aerobic bottle: Gram Positive Cocci in Pairs and Chains  Growth in anaerobic bottle: Gram Positive Cocci in Pairs and Chains    Specimen Source: .Blood    Culture Results:   Growth in aerobic bottle: Gram Positive Cocci in Pairs and Chains  Growth in anaerobic bottle: Gram Positive Cocci in Pairs and Chains    87 yr old with CKD, COPD, BPH, PPM, Y AVR due to AS, CHF. 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  Patient on Zyvox and Ceftaroline  Previous Bcx with GPC bacteremia and VRE  C/W current ABX regimen  F/U with ID in am  Patient currently HD stable  Will follow    Elias Mooney [facility] [place]  [id number]

## 2019-06-07 NOTE — PROGRESS NOTE ADULT - SUBJECTIVE AND OBJECTIVE BOX
infectious diseases progress note:    Patient is a 88y old  Male who presents with a chief complaint of bacteremia (07 Jun 2019 05:01)        Bacteremia        ROS:  CONSTITUTIONAL:  Negative fever or chills, feels well, good appetite  EYES:  Negative  blurry vision or double vision  CARDIOVASCULAR:  Negative for chest pain or palpitations  RESPIRATORY:  Negative for cough, wheezing, or SOB   GASTROINTESTINAL:  Negative for nausea, vomiting, diarrhea, constipation, or abdominal pain  GENITOURINARY:  Negative frequency, urgency or dysuria  NEUROLOGIC:  No headache, confusion, dizziness, lightheadedness    Allergies    Keppra (Rash)  penicillin (Unknown)    Intolerances        ANTIBIOTICS/RELEVANT:  antimicrobials  ceftaroline fosamil IVPB 600 milliGRAM(s) IV Intermittent every 8 hours  DAPTOmycin IVPB 850 milliGRAM(s) IV Intermittent every 24 hours  linezolid    Tablet 600 milliGRAM(s) Oral every 12 hours    immunologic:    OTHER:  ALBUTerol    90 MICROgram(s) HFA Inhaler 2 Puff(s) Inhalation every 6 hours PRN  amiodarone    Tablet 200 milliGRAM(s) Oral daily  apixaban 5 milliGRAM(s) Oral every 12 hours  atorvastatin 40 milliGRAM(s) Oral at bedtime  buDESOnide  80 MICROgram(s)/formoterol 4.5 MICROgram(s) Inhaler 2 Puff(s) Inhalation two times a day  clopidogrel Tablet 75 milliGRAM(s) Oral daily  docusate sodium 100 milliGRAM(s) Oral three times a day  finasteride 5 milliGRAM(s) Oral daily  ketorolac   Injectable 15 milliGRAM(s) IV Push every 8 hours PRN  lidocaine   Patch 1 Patch Transdermal daily  lidocaine   Patch 1 Patch Transdermal daily  melatonin 3 milliGRAM(s) Oral at bedtime  metoprolol tartrate 25 milliGRAM(s) Oral two times a day  ondansetron    Tablet 4 milliGRAM(s) Oral every 8 hours PRN  oxyCODONE    IR 2.5 milliGRAM(s) Oral every 6 hours PRN  pantoprazole    Tablet 40 milliGRAM(s) Oral before breakfast  polyethylene glycol 3350 17 Gram(s) Oral daily PRN  senna 2 Tablet(s) Oral at bedtime  spironolactone 25 milliGRAM(s) Oral daily  tamsulosin 0.4 milliGRAM(s) Oral at bedtime      Objective:  Vital Signs Last 24 Hrs  T(C): 36.3 (07 Jun 2019 05:26), Max: 36.4 (06 Jun 2019 20:47)  T(F): 97.3 (07 Jun 2019 05:26), Max: 97.5 (06 Jun 2019 20:47)  HR: 68 (07 Jun 2019 05:26) (61 - 78)  BP: 116/73 (07 Jun 2019 05:26) (83/48 - 116/73)  BP(mean): --  RR: 18 (07 Jun 2019 05:26) (18 - 18)  SpO2: 93% (07 Jun 2019 05:26) (93% - 98%)    PHYSICAL EXAM:   well nourished--no acute distress  Eyes:JESSEE, EOMI  Ear/Nose/Throat: no oral lesion, no sinus tenderness on percussion	  Neck:no JVD, no lymphadenopathy, supple  Respiratory: CTA marci  Cardiovascular: S1S2 RRR,   murmurs  Gastrointestinal:soft, (+) BS, no HSM  Extremities:no e/e/c        LABS:                        9.0    7.88  )-----------( 232      ( 06 Jun 2019 10:26 )             28.4     06-07    134<L>  |  102  |  22  ----------------------------<  85  4.8   |  18<L>  |  1.26    Ca    8.9      07 Jun 2019 07:01    TPro  6.2  /  Alb  2.6<L>  /  TBili  0.4  /  DBili  x   /  AST  10  /  ALT  9<L>  /  AlkPhos  129<H>  06-07            MICROBIOLOGY:    RECENT CULTURES:  06-05 @ 10:19 .Blood       Growth in anaerobic bottle: Gram Positive Cocci in Pairs and Chains  Growth in aerobic bottle: Gram Positive Cocci in Pairs and Chains           Growth in anaerobic bottle: Gram Positive Cocci in Pairs and Chains  Growth in aerobic bottle: Gram Positive Cocci in Pairs and Chains    06-04 @ 10:01 .Blood       Growth in aerobic and anaerobic bottles: Gram Positive Cocci in Pairs and  Chains           Growth in aerobic and anaerobic bottles: Enterococcus species    06-03 @ 09:51 .Blood   PCR    Growth in aerobic bottle: Gram Positive Cocci in Pairs and Chains  Growth in anaerobic bottle: Gram Positive Cocci in Pairs and Chains    Blood Culture PCR  Blood Culture PCR     Growth in aerobic and anaerobic bottles: Enterococcus faecium (vancomycin  resistant)  See previous culture [id number]    06-02 @ 09:46 .Blood       Growth in anaerobic bottle: Gram Positive Cocci in Pairs and Chains  Growth in aerobic bottle: Gram Positive Cocci in Pairs and Chains           Growth in aerobic and anaerobic bottles: Enterococcus faecium (vancomycin  resistant)  See previous culture [id number]    06-01 @ 10:22 .Blood       Growth in aerobic bottle: Gram Positive Cocci in Pairs and Chains  Growth in anaerobic bottle: Gram Positive Cocci in Pairs and Chains           Growth in aerobic and anaerobic bottles: Enterococcus faecium (vancomycin  resistant)  See previous culture [id number]    05-31 @ 09:44 .Blood   NICK    Growth in aerobic bottle: Gram Positive Cocci in Pairs and Chains  Growth in anaerobic bottle: Gram Positive Cocci in Pairs and Chains    Enterococcus faecium (vancomycin resistant)  Enterococcus faecium (vancomycin resistant)     Growth in aerobic and anaerobic bottles: Enterococcus faecium (vancomycin  resistant)          RESPIRATORY CULTURES:              RADIOLOGY & ADDITIONAL STUDIES:        Pager [phone]  After 5 pm/weekends or if no response :[phone]

## 2019-06-07 NOTE — PROGRESS NOTE ADULT - SUBJECTIVE AND OBJECTIVE BOX
CHIEF COMPLAINT:  f/up sob, copd, osas, abnormal ct chest-general body pain --rough day -some vomiting    Interval Events: no ambulation    REVIEW OF SYSTEMS:  Constitutional: No fevers or chills. No weight loss. + fatigue or generalized malaise.  Eyes: No itching or discharge from the eyes  ENT: No ear pain. No ear discharge. No nasal congestion. No post nasal drip. No epistaxis. No throat pain. No sore throat. No difficulty swallowing.   CV: No chest pain. No palpitations. No lightheadedness or dizziness.   Resp: No dyspnea at rest. + dyspnea on exertion. No orthopnea. No wheezing. No cough. No stridor. No sputum production. No chest pain with respiration.  GI: No nausea. No vomiting. No diarrhea.  MSK: + joint pain or pain in any extremities  Integumentary: No skin lesions. No pedal edema.  Neurological: + gross motor weakness. No sensory changes.  [+ ] All other systems negative  [ ] Unable to assess ROS because ________    OBJECTIVE:  ICU Vital Signs Last 24 Hrs  T(C): 36.4 (06 Jun 2019 20:47), Max: 36.4 (06 Jun 2019 20:47)  T(F): 97.5 (06 Jun 2019 20:47), Max: 97.5 (06 Jun 2019 20:47)  HR: 78 (07 Jun 2019 01:05) (61 - 78)  BP: 114/62 (07 Jun 2019 01:05) (83/48 - 114/62)  BP(mean): --  ABP: --  ABP(mean): --  RR: 18 (06 Jun 2019 20:47) (18 - 18)  SpO2: 93% (06 Jun 2019 20:47) (93% - 98%)        06-05 @ 07:01  -  06-06 @ 07:00  --------------------------------------------------------  IN: 200 mL / OUT: 0 mL / NET: 200 mL    06-06 @ 07:01  -  06-07 @ 05:01  --------------------------------------------------------  IN: 220 mL / OUT: 0 mL / NET: 220 mL      CAPILLARY BLOOD GLUCOSE          PHYSICAL EXAM: NAD in bed on RA  General: Awake, alert, oriented X 3.   HEENT: Atraumatic, normocephalic.                 Mallampatti Grade 3                No nasal congestion.                No tonsillar or pharyngeal exudates.  Lymph Nodes: No palpable lymphadenopathy  Neck: No JVD. No carotid bruit.   Respiratory: Normal chest expansion                         Normal percussion                         Normal and equal air entry                         No wheeze, rhonchi or rales.  Cardiovascular: S1 S2 normal. + murmurs, rubs or gallops.   Abdomen: Soft, non-tender, non-distended. No organomegaly. Normoactive bowel sounds.  Extremities: Warm to touch. Peripheral pulse palpable. No pedal edema.   Skin: No rashes or skin lesions  Neurological: Motor and sensory examination equal and normal in all four extremities.  Psychiatry: Appropriate mood and affect.    HOSPITAL MEDICATIONS:  MEDICATIONS  (STANDING):  amiodarone    Tablet 200 milliGRAM(s) Oral daily  apixaban 5 milliGRAM(s) Oral every 12 hours  atorvastatin 40 milliGRAM(s) Oral at bedtime  buDESOnide  80 MICROgram(s)/formoterol 4.5 MICROgram(s) Inhaler 2 Puff(s) Inhalation two times a day  ceftaroline fosamil IVPB 600 milliGRAM(s) IV Intermittent every 8 hours  clopidogrel Tablet 75 milliGRAM(s) Oral daily  DAPTOmycin IVPB 850 milliGRAM(s) IV Intermittent every 24 hours  docusate sodium 100 milliGRAM(s) Oral three times a day  finasteride 5 milliGRAM(s) Oral daily  lidocaine   Patch 1 Patch Transdermal daily  lidocaine   Patch 1 Patch Transdermal daily  linezolid    Tablet 600 milliGRAM(s) Oral every 12 hours  melatonin 3 milliGRAM(s) Oral at bedtime  metoprolol tartrate 25 milliGRAM(s) Oral two times a day  pantoprazole    Tablet 40 milliGRAM(s) Oral before breakfast  senna 2 Tablet(s) Oral at bedtime  spironolactone 25 milliGRAM(s) Oral daily  tamsulosin 0.4 milliGRAM(s) Oral at bedtime    MEDICATIONS  (PRN):  ALBUTerol    90 MICROgram(s) HFA Inhaler 2 Puff(s) Inhalation every 6 hours PRN Shortness of Breath and/or Wheezing  ketorolac   Injectable 15 milliGRAM(s) IV Push every 8 hours PRN Mild Pain (1 - 3)  ondansetron    Tablet 4 milliGRAM(s) Oral every 8 hours PRN Nausea and/or Vomiting  oxyCODONE    IR 2.5 milliGRAM(s) Oral every 6 hours PRN Moderate Pain (4 - 6)  polyethylene glycol 3350 17 Gram(s) Oral daily PRN Constipation      LABS:                        9.0    7.88  )-----------( 232      ( 06 Jun 2019 10:26 )             28.4     06-06    136  |  101  |  24<H>  ----------------------------<  89  4.7   |  19<L>  |  1.22    Ca    8.9      06 Jun 2019 06:56                MICROBIOLOGY:     RADIOLOGY:  [ ] Reviewed and interpreted by me    Point of Care Ultrasound Findings:    PFT:    EKG:

## 2019-06-07 NOTE — PROGRESS NOTE ADULT - ASSESSMENT
87 yr old with CKD, COPD, BPH, PPM, Y AVR due to AS, CHF. 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  there are no good options left   if we cdanot clear his blood cultures by next week,  we can  proceed with the picc regardless

## 2019-06-07 NOTE — PROGRESS NOTE ADULT - ASSESSMENT
87 yr old with CKD, COPD, BPH, PPM, Y AVR due to AS, CHF. 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    1. COPD - stable  c/w Symbicort BID and Albuterol PRN, Spiriva  Out of bed to chair, incentive spirometry.  2. 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  Lower back and shoulder pain - patient states this is chronic but recently worsened.   *************  5/29-await results of cx; re-scan of back likely  5/30-ID/CTS f/up-picc placed (Roman et al)  5/31-for GI evaln if other w/up neg to assess for source of bacteria (MRI abnormal)  6/3- daptomycin/ceftarokine to continue--re-cx today  6/4-re-MARIA LUISA in 1 week if remains bacteremic--consider valv replacement  6/5-blood cx pending; 6/6-dizzy upon ambulation  6/7-ID/cards f/up

## 2019-06-07 NOTE — PROGRESS NOTE ADULT - SUBJECTIVE AND OBJECTIVE BOX
Subjective: Patient seen and examined. No new events except as noted.   improved nausea and L shoulder pain  had bowel movement yesterday     REVIEW OF SYSTEMS:    CONSTITUTIONAL: No weakness, fevers or chills  EYES/ENT: No visual changes;  No vertigo or throat pain   NECK: No pain or stiffness  RESPIRATORY: No cough, wheezing, hemoptysis; No shortness of breath  CARDIOVASCULAR: No chest pain or palpitations  GASTROINTESTINAL: No abdominal or epigastric pain. No nausea, vomiting, or hematemesis; No diarrhea or constipation. No melena or hematochezia.  GENITOURINARY: No dysuria, frequency or hematuria  NEUROLOGICAL: No numbness or weakness  SKIN: No itching, burning, rashes, or lesions   All other review of systems is negative unless indicated above.    MEDICATIONS:  MEDICATIONS  (STANDING):  amiodarone    Tablet 200 milliGRAM(s) Oral daily  apixaban 5 milliGRAM(s) Oral every 12 hours  atorvastatin 40 milliGRAM(s) Oral at bedtime  buDESOnide  80 MICROgram(s)/formoterol 4.5 MICROgram(s) Inhaler 2 Puff(s) Inhalation two times a day  ceftaroline fosamil IVPB 600 milliGRAM(s) IV Intermittent every 8 hours  clopidogrel Tablet 75 milliGRAM(s) Oral daily  DAPTOmycin IVPB 850 milliGRAM(s) IV Intermittent every 24 hours  docusate sodium 100 milliGRAM(s) Oral three times a day  finasteride 5 milliGRAM(s) Oral daily  lidocaine   Patch 1 Patch Transdermal daily  lidocaine   Patch 1 Patch Transdermal daily  linezolid    Tablet 600 milliGRAM(s) Oral every 12 hours  melatonin 3 milliGRAM(s) Oral at bedtime  metoprolol tartrate 25 milliGRAM(s) Oral two times a day  pantoprazole    Tablet 40 milliGRAM(s) Oral before breakfast  senna 2 Tablet(s) Oral at bedtime  spironolactone 25 milliGRAM(s) Oral daily  tamsulosin 0.4 milliGRAM(s) Oral at bedtime      PHYSICAL EXAM:  T(C): 36.3 (06-07-19 @ 12:21), Max: 36.5 (06-07-19 @ 09:37)  HR: 59 (06-07-19 @ 12:21) (59 - 78)  BP: 94/59 (06-07-19 @ 12:21) (90/48 - 116/73)  RR: 18 (06-07-19 @ 12:21) (18 - 18)  SpO2: 97% (06-07-19 @ 12:21) (93% - 98%)  Wt(kg): --  I&O's Summary    06 Jun 2019 07:01  -  07 Jun 2019 07:00  --------------------------------------------------------  IN: 220 mL / OUT: 350 mL / NET: -130 mL          Appearance: Normal	  HEENT:   Normal oral mucosa, PERRL, EOMI	  Lymphatic: No lymphadenopathy , no edema  Cardiovascular: Normal S1 S2, systolic murmur   Respiratory: Lungs clear to auscultation, normal effort 	  Gastrointestinal:  Soft, Non-tender, + BS	  Skin: No rashes, No ecchymoses, No cyanosis, warm to touch  Musculoskeletal: L shoulder tenderness   Psychiatry:  Mood & affect appropriate  Ext: No edema      All labs, Imaging and EKGs personally reviewed                           9.0    6.77  )-----------( 215      ( 07 Jun 2019 08:43 )             28.8               06-07    134<L>  |  102  |  22  ----------------------------<  85  4.8   |  18<L>  |  1.26    Ca    8.9      07 Jun 2019 07:01    TPro  6.2  /  Alb  2.6<L>  /  TBili  0.4  /  DBili  x   /  AST  10  /  ALT  9<L>  /  AlkPhos  129<H>  06-07

## 2019-06-07 NOTE — PROGRESS NOTE ADULT - PROBLEM SELECTOR PLAN 1
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

## 2019-06-07 NOTE — PROGRESS NOTE ADULT - SUBJECTIVE AND OBJECTIVE BOX
Patient is a 88y old  Male who presents with a chief complaint of bacteremia (07 Jun 2019 14:27)      INTERVAL HISTORY: feels ok    	  MEDICATIONS:  amiodarone    Tablet 200 milliGRAM(s) Oral daily  metoprolol tartrate 25 milliGRAM(s) Oral two times a day  spironolactone 25 milliGRAM(s) Oral daily  tamsulosin 0.4 milliGRAM(s) Oral at bedtime        PHYSICAL EXAM:  T(C): 36.3 (06-07-19 @ 12:21), Max: 36.5 (06-07-19 @ 09:37)  HR: 71 (06-07-19 @ 18:13) (59 - 78)  BP: 100/60 (06-07-19 @ 18:13) (94/59 - 116/73)  RR: 18 (06-07-19 @ 12:21) (18 - 18)  SpO2: 97% (06-07-19 @ 12:21) (93% - 97%)  Wt(kg): --  I&O's Summary    06 Jun 2019 07:01  -  07 Jun 2019 07:00  --------------------------------------------------------  IN: 220 mL / OUT: 350 mL / NET: -130 mL    07 Jun 2019 07:01  -  07 Jun 2019 19:42  --------------------------------------------------------  IN: 100 mL / OUT: 0 mL / NET: 100 mL          Appearance: In no distress	  HEENT:    PERRL, EOMI	  Cardiovascular:  S1 S2, No JVD  Respiratory: Lungs clear to auscultation	  Gastrointestinal:  Soft, Non-tender, + BS	  Extremities:  No edema of LE                                9.0    6.77  )-----------( 215      ( 07 Jun 2019 08:43 )             28.8     06-07    134<L>  |  102  |  22  ----------------------------<  85  4.8   |  18<L>  |  1.26    Ca    8.9      07 Jun 2019 07:01    TPro  6.2  /  Alb  2.6<L>  /  TBili  0.4  /  DBili  x   /  AST  10  /  ALT  9<L>  /  AlkPhos  129<H>  06-07        Labs personally reviewed      Assessment and Plan:   Assessment:  · Assessment		  Patient is a 87 y/o male with PMH SSS/a fib on eliquis, 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 Was admitted recently and dx with possible endocarditis, ppm removed, received dapto/ceftaroline through PICC, PICC was dc and patient has been off abx for 2 weeks, plan was to observe off abx for 3 weeks and repeat labs. Patient had labs/bcx done yesterday with primary care, bcx were positive, patient otherwise does not have new symptoms or feel ill.     Problem/Plan - 1:  ·  Problem: Bacteremia.  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  Plan: continue with aldactone 25mg, euvolemic at this time    Problem/Plan - 3:  ·  Problem: Aortic stenosis.  Plan: S/P TAVR   - ?bioprosthetic valve endocarditis, CTS fu for ?need for AVR    Problem/Plan - 4:  Problem: Atrial fibrillation. Plan: On eliquis, Rate controlled on Metoprolol and Amio 100mg daily    Problem/Plan - 5:  ·  Problem: CAD (coronary artery disease).  Plan: On Plavix given TAVR, c/w statin.               Timothy Paredes DO [facility]  Cardiovascular Medicine  [phone]

## 2019-06-07 NOTE — PROGRESS NOTE ADULT - ATTENDING COMMENTS
as above-stable-await PICC placement (re-evaluation by CTS/ID f/up-[place]); ? require GI evaln-no absolute contras to EGD/colonoscopy-on hold; may require repeat MARIA LUISA if still bacteremic (consider [facility] if abnormal)  ID-bacteremia--on dapto/ceftaroline-f/up all cx-s/p bone studies (mri abnormal-unclear if enough to base source of infection/shoulder-unrevealing)--considering addition of zyvox  COPD-symbiocrt/spiriva, incentive spirometry/acapella  PAH-no defin. rx  Cards-AF etc.--mult rx-consider MARIA LUISA over next week  DVT.GI proph--OOB  PT needed                      GI evaln on hold based on notes  Arjun Mack MD-Pulmonary   [phone]

## 2019-06-07 NOTE — PROVIDER CONTACT NOTE (CRITICAL VALUE NOTIFICATION) - TEST AND RESULT REPORTED:
blood culture collected 6/5 growth in anerboic and aerobic bottle gram positive cocci in pairs and chains

## 2019-06-08 DIAGNOSIS — N17.9 ACUTE KIDNEY FAILURE, UNSPECIFIED: ICD-10-CM

## 2019-06-08 LAB
ALBUMIN SERPL ELPH-MCNC: 2.8 G/DL — LOW (ref 3.3–5)
ALP SERPL-CCNC: 130 U/L — HIGH (ref 40–120)
ALT FLD-CCNC: 9 U/L — LOW (ref 10–45)
ANION GAP SERPL CALC-SCNC: 13 MMOL/L — SIGNIFICANT CHANGE UP (ref 5–17)
AST SERPL-CCNC: 10 U/L — SIGNIFICANT CHANGE UP (ref 10–40)
BASOPHILS # BLD AUTO: 0.04 K/UL — SIGNIFICANT CHANGE UP (ref 0–0.2)
BASOPHILS NFR BLD AUTO: 0.6 % — SIGNIFICANT CHANGE UP (ref 0–2)
BILIRUB SERPL-MCNC: 0.4 MG/DL — SIGNIFICANT CHANGE UP (ref 0.2–1.2)
BUN SERPL-MCNC: 21 MG/DL — SIGNIFICANT CHANGE UP (ref 7–23)
CALCIUM SERPL-MCNC: 8.9 MG/DL — SIGNIFICANT CHANGE UP (ref 8.4–10.5)
CHLORIDE SERPL-SCNC: 100 MMOL/L — SIGNIFICANT CHANGE UP (ref 96–108)
CO2 SERPL-SCNC: 20 MMOL/L — LOW (ref 22–31)
CREAT SERPL-MCNC: 1.45 MG/DL — HIGH (ref 0.5–1.3)
CULTURE RESULTS: SIGNIFICANT CHANGE UP
EOSINOPHIL # BLD AUTO: 0.3 K/UL — SIGNIFICANT CHANGE UP (ref 0–0.5)
EOSINOPHIL NFR BLD AUTO: 4.3 % — SIGNIFICANT CHANGE UP (ref 0–6)
GLUCOSE SERPL-MCNC: 83 MG/DL — SIGNIFICANT CHANGE UP (ref 70–99)
HCT VFR BLD CALC: 28.8 % — LOW (ref 39–50)
HGB BLD-MCNC: 9 G/DL — LOW (ref 13–17)
IMM GRANULOCYTES NFR BLD AUTO: 0.4 % — SIGNIFICANT CHANGE UP (ref 0–1.5)
LYMPHOCYTES # BLD AUTO: 0.63 K/UL — LOW (ref 1–3.3)
LYMPHOCYTES # BLD AUTO: 9 % — LOW (ref 13–44)
MAGNESIUM SERPL-MCNC: 2.7 MG/DL — HIGH (ref 1.6–2.6)
MCHC RBC-ENTMCNC: 26.7 PG — LOW (ref 27–34)
MCHC RBC-ENTMCNC: 31.3 GM/DL — LOW (ref 32–36)
MCV RBC AUTO: 85.5 FL — SIGNIFICANT CHANGE UP (ref 80–100)
MONOCYTES # BLD AUTO: 0.86 K/UL — SIGNIFICANT CHANGE UP (ref 0–0.9)
MONOCYTES NFR BLD AUTO: 12.2 % — SIGNIFICANT CHANGE UP (ref 2–14)
NEUTROPHILS # BLD AUTO: 5.17 K/UL — SIGNIFICANT CHANGE UP (ref 1.8–7.4)
NEUTROPHILS NFR BLD AUTO: 73.5 % — SIGNIFICANT CHANGE UP (ref 43–77)
PLATELET # BLD AUTO: 233 K/UL — SIGNIFICANT CHANGE UP (ref 150–400)
POTASSIUM SERPL-MCNC: 4.8 MMOL/L — SIGNIFICANT CHANGE UP (ref 3.5–5.3)
POTASSIUM SERPL-SCNC: 4.8 MMOL/L — SIGNIFICANT CHANGE UP (ref 3.5–5.3)
PROT SERPL-MCNC: 6 G/DL — SIGNIFICANT CHANGE UP (ref 6–8.3)
RBC # BLD: 3.37 M/UL — LOW (ref 4.2–5.8)
RBC # FLD: 15.6 % — HIGH (ref 10.3–14.5)
SODIUM SERPL-SCNC: 133 MMOL/L — LOW (ref 135–145)
SPECIMEN SOURCE: SIGNIFICANT CHANGE UP
WBC # BLD: 7.03 K/UL — SIGNIFICANT CHANGE UP (ref 3.8–10.5)
WBC # FLD AUTO: 7.03 K/UL — SIGNIFICANT CHANGE UP (ref 3.8–10.5)

## 2019-06-08 RX ORDER — SODIUM CHLORIDE 9 MG/ML
1000 INJECTION INTRAMUSCULAR; INTRAVENOUS; SUBCUTANEOUS
Refills: 0 | Status: DISCONTINUED | OUTPATIENT
Start: 2019-06-08 | End: 2019-06-17

## 2019-06-08 RX ADMIN — LIDOCAINE 1 PATCH: 4 CREAM TOPICAL at 00:28

## 2019-06-08 RX ADMIN — FINASTERIDE 5 MILLIGRAM(S): 5 TABLET, FILM COATED ORAL at 12:39

## 2019-06-08 RX ADMIN — TAMSULOSIN HYDROCHLORIDE 0.4 MILLIGRAM(S): 0.4 CAPSULE ORAL at 22:47

## 2019-06-08 RX ADMIN — Medication 25 MILLIGRAM(S): at 17:13

## 2019-06-08 RX ADMIN — Medication 15 MILLIGRAM(S): at 01:38

## 2019-06-08 RX ADMIN — CLOPIDOGREL BISULFATE 75 MILLIGRAM(S): 75 TABLET, FILM COATED ORAL at 12:39

## 2019-06-08 RX ADMIN — Medication 100 MILLIGRAM(S): at 12:39

## 2019-06-08 RX ADMIN — Medication 100 MILLIGRAM(S): at 22:47

## 2019-06-08 RX ADMIN — BUDESONIDE AND FORMOTEROL FUMARATE DIHYDRATE 2 PUFF(S): 160; 4.5 AEROSOL RESPIRATORY (INHALATION) at 17:13

## 2019-06-08 RX ADMIN — LIDOCAINE 1 PATCH: 4 CREAM TOPICAL at 07:44

## 2019-06-08 RX ADMIN — CEFTAROLINE FOSAMIL 50 MILLIGRAM(S): 600 POWDER, FOR SOLUTION INTRAVENOUS at 22:56

## 2019-06-08 RX ADMIN — Medication 25 MILLIGRAM(S): at 05:41

## 2019-06-08 RX ADMIN — PANTOPRAZOLE SODIUM 40 MILLIGRAM(S): 20 TABLET, DELAYED RELEASE ORAL at 06:14

## 2019-06-08 RX ADMIN — APIXABAN 5 MILLIGRAM(S): 2.5 TABLET, FILM COATED ORAL at 17:13

## 2019-06-08 RX ADMIN — Medication 100 MILLIGRAM(S): at 05:41

## 2019-06-08 RX ADMIN — OXYCODONE HYDROCHLORIDE 2.5 MILLIGRAM(S): 5 TABLET ORAL at 04:00

## 2019-06-08 RX ADMIN — OXYCODONE HYDROCHLORIDE 2.5 MILLIGRAM(S): 5 TABLET ORAL at 03:25

## 2019-06-08 RX ADMIN — CEFTAROLINE FOSAMIL 50 MILLIGRAM(S): 600 POWDER, FOR SOLUTION INTRAVENOUS at 05:40

## 2019-06-08 RX ADMIN — LINEZOLID 600 MILLIGRAM(S): 600 INJECTION, SOLUTION INTRAVENOUS at 17:13

## 2019-06-08 RX ADMIN — BUDESONIDE AND FORMOTEROL FUMARATE DIHYDRATE 2 PUFF(S): 160; 4.5 AEROSOL RESPIRATORY (INHALATION) at 05:41

## 2019-06-08 RX ADMIN — Medication 3 MILLIGRAM(S): at 22:47

## 2019-06-08 RX ADMIN — SENNA PLUS 2 TABLET(S): 8.6 TABLET ORAL at 22:47

## 2019-06-08 RX ADMIN — LINEZOLID 600 MILLIGRAM(S): 600 INJECTION, SOLUTION INTRAVENOUS at 05:40

## 2019-06-08 RX ADMIN — Medication 15 MILLIGRAM(S): at 00:30

## 2019-06-08 RX ADMIN — ATORVASTATIN CALCIUM 40 MILLIGRAM(S): 80 TABLET, FILM COATED ORAL at 22:47

## 2019-06-08 RX ADMIN — Medication 650 MILLIGRAM(S): at 07:44

## 2019-06-08 RX ADMIN — OXYCODONE HYDROCHLORIDE 5 MILLIGRAM(S): 5 TABLET ORAL at 07:44

## 2019-06-08 RX ADMIN — DAPTOMYCIN 134 MILLIGRAM(S): 500 INJECTION, POWDER, LYOPHILIZED, FOR SOLUTION INTRAVENOUS at 15:53

## 2019-06-08 RX ADMIN — CEFTAROLINE FOSAMIL 50 MILLIGRAM(S): 600 POWDER, FOR SOLUTION INTRAVENOUS at 12:40

## 2019-06-08 RX ADMIN — AMIODARONE HYDROCHLORIDE 200 MILLIGRAM(S): 400 TABLET ORAL at 05:40

## 2019-06-08 RX ADMIN — APIXABAN 5 MILLIGRAM(S): 2.5 TABLET, FILM COATED ORAL at 05:41

## 2019-06-08 RX ADMIN — Medication 15 MILLIGRAM(S): at 07:44

## 2019-06-08 NOTE — PROVIDER CONTACT NOTE (CRITICAL VALUE NOTIFICATION) - ACTION/TREATMENT ORDERED:
Dapto, Cefta, Zyvox continues.
Continue with IV antibiotics
continue Abx.
pt on teflaro and dapto
ABx as per order, monitor vs
Continue IV ABX and continue to monitor.
Continue current antibiotic treatment.
Continue current antibiotics, ID following. . Cont to monitor vitals
Continue with IV antibitocs
Daptomycin 850 mg IVSS daily , Ceftaroline fosamil 600 mg IVSS every 8 hours.
Daptomycin 850 mg IVSS, Teflaro 600 mg every 8 hours, Zyvox PO
Pt already on VRE bacteremia antibiotics. FOllowed by ID. COntinue to monitor vs
Pt is on Daptomycin 850 mg IVSS and ceftaroline fosamil 600 mg every 8 hours. Zyvox PO also ordered.
Pt is on Daptomycin 850 mg IVSS every 8 hours and ceftaroline fosamil 600 mg every 8 hours.
Pt is on Daptomycin and ceftaroline fosamil IV. ID follow the pt . Blood cx daily
We will continue to give the prescribed antibiotics. We will continue to monitor pt. Pt pending MRI.
Continue current antibiotics prescribed as per NP Poppy Gunn.

## 2019-06-08 NOTE — PROVIDER CONTACT NOTE (CRITICAL VALUE NOTIFICATION) - SITUATION
Patient admitted for VRE Bacteremia, MARIA LUISA on 5/28 = negative for Endo.  Currently on 3 different abts.  Possible PICC line

## 2019-06-08 NOTE — OCCUPATIONAL THERAPY INITIAL EVALUATION ADULT - RANGE OF MOTION EXAMINATION, UPPER EXTREMITY
Right UE Active ROM was WNL (within normal limits)/Left UE Active Assistive ROM was WNL (within normal limits)

## 2019-06-08 NOTE — OCCUPATIONAL THERAPY INITIAL EVALUATION ADULT - PERTINENT HX OF CURRENT PROBLEM, REHAB EVAL
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 Was admitted recently and dx with possible endocarditis, ppm removed, received dapto/ceftaroline through PICC, PICC was dc and patient has been off abx for 2 weeks, plan was to observe off abx for 3 weeks and repeat labs.

## 2019-06-08 NOTE — PROVIDER CONTACT NOTE (MEDICATION) - ACTION/TREATMENT ORDERED:
SERAFIN Lopez made aware.   Will continue to monitor.
Hold metropolol, give IV zofran, continue to monitor vs.

## 2019-06-08 NOTE — OCCUPATIONAL THERAPY INITIAL EVALUATION ADULT - BALANCE TRAINING, PT EVAL
GOAL: Pt will increase dynamic standing balance to good (-) to increase participation in ADLs in 4 weeks

## 2019-06-08 NOTE — PROVIDER CONTACT NOTE (MEDICATION) - ASSESSMENT
Asymptomatic
Patient vs stable now. bp 113/60/. C/o dizziness, lightheaded. Vomited x1. ZOfran given.

## 2019-06-08 NOTE — PROGRESS NOTE ADULT - SUBJECTIVE AND OBJECTIVE BOX
Subjective: Patient seen and examined. No new events except as noted.   doing okay     REVIEW OF SYSTEMS:    CONSTITUTIONAL: No weakness, fevers or chills  EYES/ENT: No visual changes;  No vertigo or throat pain   NECK: No pain or stiffness  RESPIRATORY: No cough, wheezing, hemoptysis; No shortness of breath  CARDIOVASCULAR: No chest pain or palpitations  GASTROINTESTINAL: No abdominal or epigastric pain. No nausea, vomiting, or hematemesis; No diarrhea or constipation. No melena or hematochezia.  GENITOURINARY: No dysuria, frequency or hematuria  NEUROLOGICAL: L shoulder pain   SKIN: No itching, burning, rashes, or lesions   All other review of systems is negative unless indicated above.    MEDICATIONS:  MEDICATIONS  (STANDING):  amiodarone    Tablet 200 milliGRAM(s) Oral daily  apixaban 5 milliGRAM(s) Oral every 12 hours  atorvastatin 40 milliGRAM(s) Oral at bedtime  buDESOnide  80 MICROgram(s)/formoterol 4.5 MICROgram(s) Inhaler 2 Puff(s) Inhalation two times a day  ceftaroline fosamil IVPB 600 milliGRAM(s) IV Intermittent every 8 hours  clopidogrel Tablet 75 milliGRAM(s) Oral daily  DAPTOmycin IVPB 850 milliGRAM(s) IV Intermittent every 24 hours  docusate sodium 100 milliGRAM(s) Oral three times a day  finasteride 5 milliGRAM(s) Oral daily  lidocaine   Patch 1 Patch Transdermal daily  lidocaine   Patch 1 Patch Transdermal daily  linezolid    Tablet 600 milliGRAM(s) Oral every 12 hours  melatonin 3 milliGRAM(s) Oral at bedtime  metoprolol tartrate 25 milliGRAM(s) Oral two times a day  pantoprazole    Tablet 40 milliGRAM(s) Oral before breakfast  senna 2 Tablet(s) Oral at bedtime  sodium chloride 0.9%. 1000 milliLiter(s) (75 mL/Hr) IV Continuous <Continuous>  spironolactone 25 milliGRAM(s) Oral daily  tamsulosin 0.4 milliGRAM(s) Oral at bedtime      PHYSICAL EXAM:  T(C): 36.4 (06-08-19 @ 12:49), Max: 36.4 (06-08-19 @ 12:49)  HR: 65 (06-08-19 @ 12:49) (65 - 71)  BP: 100/65 (06-08-19 @ 12:49) (100/60 - 114/67)  RR: 18 (06-08-19 @ 12:49) (18 - 19)  SpO2: 94% (06-08-19 @ 12:49) (94% - 98%)  Wt(kg): --  I&O's Summary    07 Jun 2019 07:01  -  08 Jun 2019 07:00  --------------------------------------------------------  IN: 2601 mL / OUT: 0 mL / NET: 2601 mL    08 Jun 2019 07:01  -  08 Jun 2019 15:02  --------------------------------------------------------  IN: 460 mL / OUT: 600 mL / NET: -140 mL                                9.0    7.03  )-----------( 233      ( 08 Jun 2019 09:25 )             28.8               06-08    133<L>  |  100  |  21  ----------------------------<  83  4.8   |  20<L>  |  1.45<H>    Ca    8.9      08 Jun 2019 06:52  Mg     2.7     06-08    TPro  6.0  /  Alb  2.8<L>  /  TBili  0.4  /  DBili  x   /  AST  10  /  ALT  9<L>  /  AlkPhos  130<H>  06-08                         Appearance: Normal	  HEENT:   Normal oral mucosa, PERRL, EOMI	  Lymphatic: No lymphadenopathy , no edema  Cardiovascular: Normal S1 S2, No JVD, No murmurs , Peripheral pulses palpable 2+ bilaterally  Respiratory: Lungs clear to auscultation, normal effort 	  Gastrointestinal:  Soft, Non-tender, + BS	  Skin: No rashes, No ecchymoses, No cyanosis, warm to touch  Musculoskeletal: L shoulder pain on ROM   Psychiatry:  Mood & affect appropriate  Ext: No edema      All labs, Imaging and EKGs personally reviewed

## 2019-06-08 NOTE — OCCUPATIONAL THERAPY INITIAL EVALUATION ADULT - ADDITIONAL COMMENTS
Patient had labs/bcx done yesterday with primary care, bcx were positive. MRI Lumbar (5/30):Findings consistent with discitis and osteomyelitis at the L1-2 level with evidence of edema involving the L1 vertebral body in its entirety as well as a large portion of the L2 vertebral body with hyperintensity at the level of the disc space and associated enhancement on the postcontrast images. Xray L coco (6/3):No acute fracture or dislocation.Nonspecific calcific densities project at the superior aspect of the humeral head, likely corresponding with calcifications partially imaged on prior CT, may be related to calcific tendinopathy. Correlate clinically. Calcifications are also seen at the AC joint. No aggressive cortical erosive changes or acute periosteal reaction are seen.

## 2019-06-09 DIAGNOSIS — R42 DIZZINESS AND GIDDINESS: ICD-10-CM

## 2019-06-09 RX ORDER — OXYCODONE HYDROCHLORIDE 5 MG/1
2.5 TABLET ORAL EVERY 6 HOURS
Refills: 0 | Status: DISCONTINUED | OUTPATIENT
Start: 2019-06-10 | End: 2019-06-16

## 2019-06-09 RX ORDER — ONDANSETRON 8 MG/1
4 TABLET, FILM COATED ORAL ONCE
Refills: 0 | Status: COMPLETED | OUTPATIENT
Start: 2019-06-09 | End: 2019-06-09

## 2019-06-09 RX ADMIN — CEFTAROLINE FOSAMIL 50 MILLIGRAM(S): 600 POWDER, FOR SOLUTION INTRAVENOUS at 05:45

## 2019-06-09 RX ADMIN — ONDANSETRON 4 MILLIGRAM(S): 8 TABLET, FILM COATED ORAL at 09:20

## 2019-06-09 RX ADMIN — LINEZOLID 600 MILLIGRAM(S): 600 INJECTION, SOLUTION INTRAVENOUS at 05:45

## 2019-06-09 RX ADMIN — AMIODARONE HYDROCHLORIDE 200 MILLIGRAM(S): 400 TABLET ORAL at 05:46

## 2019-06-09 RX ADMIN — Medication 15 MILLIGRAM(S): at 17:39

## 2019-06-09 RX ADMIN — LIDOCAINE 1 PATCH: 4 CREAM TOPICAL at 22:25

## 2019-06-09 RX ADMIN — BUDESONIDE AND FORMOTEROL FUMARATE DIHYDRATE 2 PUFF(S): 160; 4.5 AEROSOL RESPIRATORY (INHALATION) at 05:46

## 2019-06-09 RX ADMIN — Medication 100 MILLIGRAM(S): at 11:49

## 2019-06-09 RX ADMIN — APIXABAN 5 MILLIGRAM(S): 2.5 TABLET, FILM COATED ORAL at 05:46

## 2019-06-09 RX ADMIN — SENNA PLUS 2 TABLET(S): 8.6 TABLET ORAL at 21:29

## 2019-06-09 RX ADMIN — PANTOPRAZOLE SODIUM 40 MILLIGRAM(S): 20 TABLET, DELAYED RELEASE ORAL at 06:15

## 2019-06-09 RX ADMIN — LIDOCAINE 1 PATCH: 4 CREAM TOPICAL at 11:50

## 2019-06-09 RX ADMIN — ATORVASTATIN CALCIUM 40 MILLIGRAM(S): 80 TABLET, FILM COATED ORAL at 21:29

## 2019-06-09 RX ADMIN — OXYCODONE HYDROCHLORIDE 2.5 MILLIGRAM(S): 5 TABLET ORAL at 17:37

## 2019-06-09 RX ADMIN — Medication 25 MILLIGRAM(S): at 17:34

## 2019-06-09 RX ADMIN — CLOPIDOGREL BISULFATE 75 MILLIGRAM(S): 75 TABLET, FILM COATED ORAL at 11:49

## 2019-06-09 RX ADMIN — Medication 100 MILLIGRAM(S): at 05:46

## 2019-06-09 RX ADMIN — Medication 25 MILLIGRAM(S): at 05:46

## 2019-06-09 RX ADMIN — Medication 15 MILLIGRAM(S): at 19:00

## 2019-06-09 RX ADMIN — CEFTAROLINE FOSAMIL 50 MILLIGRAM(S): 600 POWDER, FOR SOLUTION INTRAVENOUS at 21:29

## 2019-06-09 RX ADMIN — BUDESONIDE AND FORMOTEROL FUMARATE DIHYDRATE 2 PUFF(S): 160; 4.5 AEROSOL RESPIRATORY (INHALATION) at 17:36

## 2019-06-09 RX ADMIN — DAPTOMYCIN 134 MILLIGRAM(S): 500 INJECTION, POWDER, LYOPHILIZED, FOR SOLUTION INTRAVENOUS at 11:51

## 2019-06-09 RX ADMIN — Medication 15 MILLIGRAM(S): at 07:32

## 2019-06-09 RX ADMIN — OXYCODONE HYDROCHLORIDE 2.5 MILLIGRAM(S): 5 TABLET ORAL at 02:00

## 2019-06-09 RX ADMIN — OXYCODONE HYDROCHLORIDE 2.5 MILLIGRAM(S): 5 TABLET ORAL at 07:32

## 2019-06-09 RX ADMIN — APIXABAN 5 MILLIGRAM(S): 2.5 TABLET, FILM COATED ORAL at 17:36

## 2019-06-09 RX ADMIN — Medication 3 MILLIGRAM(S): at 21:29

## 2019-06-09 RX ADMIN — Medication 15 MILLIGRAM(S): at 05:50

## 2019-06-09 RX ADMIN — FINASTERIDE 5 MILLIGRAM(S): 5 TABLET, FILM COATED ORAL at 11:49

## 2019-06-09 RX ADMIN — OXYCODONE HYDROCHLORIDE 2.5 MILLIGRAM(S): 5 TABLET ORAL at 12:40

## 2019-06-09 RX ADMIN — LIDOCAINE 1 PATCH: 4 CREAM TOPICAL at 17:37

## 2019-06-09 RX ADMIN — Medication 100 MILLIGRAM(S): at 21:29

## 2019-06-09 RX ADMIN — CEFTAROLINE FOSAMIL 50 MILLIGRAM(S): 600 POWDER, FOR SOLUTION INTRAVENOUS at 11:49

## 2019-06-09 RX ADMIN — LINEZOLID 600 MILLIGRAM(S): 600 INJECTION, SOLUTION INTRAVENOUS at 17:34

## 2019-06-09 RX ADMIN — TAMSULOSIN HYDROCHLORIDE 0.4 MILLIGRAM(S): 0.4 CAPSULE ORAL at 21:29

## 2019-06-09 RX ADMIN — LIDOCAINE 1 PATCH: 4 CREAM TOPICAL at 17:36

## 2019-06-09 NOTE — CONSULT NOTE ADULT - SUBJECTIVE AND OBJECTIVE BOX
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 Was admitted recently and dx with possible endocarditis, ppm removed, received dapto/ceftaroline through PICC, PICC was dc and patient has been off abx for 2 weeks, plan was to observe off abx for 3 weeks and repeat labs. Patient had labs/bcx done yesterday with primary care, bcx were positive, patient otherwise does not have new symptoms or feel ill.             *****PAST MEDICAL / Surgical  HISTORY:  PAST MEDICAL & SURGICAL HISTORY:  CKD (chronic kidney disease) stage 3, GFR 30-59 ml/min  Hepatitis B  PTSD (post-traumatic stress disorder)  Tachy-idris syndrome  TIA (transient ischemic attack):   Seizure  DIEGO (obstructive sleep apnea)  Chronic diastolic HF (heart failure)  Aortic stenosis  CVA (cerebral vascular accident)  Gait instability  COPD (chronic obstructive pulmonary disease)  BPH (benign prostatic hyperplasia)  Asthma  CHF (congestive heart failure)  Atrial fibrillation  Pacemaker:  Medtronic [id number]  [id number]  Hypertension  H/O thalassemia  CAD (coronary artery disease): s/p stent   S/P TAVR (transcatheter aortic valve replacement): 18  Artificial pacemaker  S/P primary angioplasty with coronary stent:  AND            *****FAMILY HISTORY:  FAMILY HISTORY:  No pertinent family history in first degree relatives           *****SOCIAL HISTORY:  Alcohol: None  Smoking: None         *****ALLERGIES:   Allergies    Keppra (Rash)  penicillin (Unknown)    Intolerances             *****MEDICATIONS: current medication reviewed and documented.   MEDICATIONS  (STANDING):  amiodarone    Tablet 200 milliGRAM(s) Oral daily  apixaban 5 milliGRAM(s) Oral every 12 hours  atorvastatin 40 milliGRAM(s) Oral at bedtime  buDESOnide  80 MICROgram(s)/formoterol 4.5 MICROgram(s) Inhaler 2 Puff(s) Inhalation two times a day  ceftaroline fosamil IVPB 600 milliGRAM(s) IV Intermittent every 8 hours  clopidogrel Tablet 75 milliGRAM(s) Oral daily  DAPTOmycin IVPB 850 milliGRAM(s) IV Intermittent every 24 hours  docusate sodium 100 milliGRAM(s) Oral three times a day  finasteride 5 milliGRAM(s) Oral daily  lidocaine   Patch 1 Patch Transdermal daily  lidocaine   Patch 1 Patch Transdermal daily  linezolid    Tablet 600 milliGRAM(s) Oral every 12 hours  melatonin 3 milliGRAM(s) Oral at bedtime  metoprolol tartrate 25 milliGRAM(s) Oral two times a day  pantoprazole    Tablet 40 milliGRAM(s) Oral before breakfast  senna 2 Tablet(s) Oral at bedtime  sodium chloride 0.9%. 1000 milliLiter(s) (75 mL/Hr) IV Continuous <Continuous>  tamsulosin 0.4 milliGRAM(s) Oral at bedtime    MEDICATIONS  (PRN):  ALBUTerol    90 MICROgram(s) HFA Inhaler 2 Puff(s) Inhalation every 6 hours PRN Shortness of Breath and/or Wheezing  ondansetron    Tablet 4 milliGRAM(s) Oral every 8 hours PRN Nausea and/or Vomiting  oxyCODONE    IR 2.5 milliGRAM(s) Oral every 6 hours PRN Moderate Pain (4 - 6)  polyethylene glycol 3350 17 Gram(s) Oral daily PRN Constipation           *****REVIEW OF SYSTEM:  GEN: no fever, no chills, no pain  RESP: no SOB, no cough, no sputum  CVS: no chest pain, no palpitations, no edema  GI: no abdominal pain, no nausea, no vomiting, no constipation, no diarrhea  : no dysurea, no frequency, no hematurea  Neuro: no headache, no dizziness  PSYCH: no anxiety, no depression  Derm : no itching, no rash         *****VITAL SIGNS:  T(C): 36.6 (06-10-19 @ 04:01), Max: 36.6 (06-10-19 @ 04:01)  HR: 64 (06-10-19 @ 04:01) (64 - 64)  BP: 109/70 (06-10-19 @ 04:01) (98/61 - 109/70)  RR: 18 (06-10-19 @ 04:01) (18 - 18)  SpO2: 95% (06-10-19 @ 04:01) (94% - 98%)  Wt(kg): --     @ 07:01  -  06-10 @ 07:00  --------------------------------------------------------  IN: 120 mL / OUT: 300 mL / NET: -180 mL             *****PHYSICAL EXAM:   Alert oriented x 3   Attention comprehension are fair. Able to name, repeat, read without any difficulty.   Able to follow 3 step commands.     EOMI fundi not visualized,  VFF to confrontration  No facial asymmetry   Tongue is midline   Palate elevates symmetrically   Moving all 4 ext symmetrically no pronator drift   Reflexes are symmetric throughout   sensation is grossly symmetric  Gait : not assessed.  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 Was admitted recently and dx with possible endocarditis, ppm removed, received dapto/ceftaroline through PICC, PICC was dc and patient has been off abx for 2 weeks, plan was to observe off abx for 3 weeks and repeat labs. Patient had labs/bcx done yesterday with primary care, bcx were positive, patient otherwise does not have new symptoms or feel ill.     Problem/Recommendations 1: ams likely related to prolonged hospitalization, poor nutritional intake, worsening underlying mild dementia   regulate sleep/wake cycle.   thiamine 100 daily.   mvi daily.   consider lactobacillus daily .       Problem/Recommendations 2:hypotension/hyponatremia   check am cortisol          ___________________________  Will follow with you.  Thank you,  Jesica Ramírez MD  Diplomate of the American Board of Neurology and Psychiatry.  Diplomate of the American Board of Vascular Neurology.   [facility] ([facility]   Ph: [phone]    Differential diagnosis and plan of care discussed with patient after the evaluation.   Advanced care planning options discussed.   Pain assessed and judicious use of narcotics when appropriate was discussed.  Importance of Fall prevention discussed.  Counseling on Smoking and Alcohol cessation was offered when appropriate.  Counseling on Diet, exercise, and medication compliance was done.     62 minutes spent on the total encounter;  more than 50 % of the visit was spent on counseling  and or coordinating care by the attending physician.    Thank you for allowing me to participate in the care of this caryn patient. Please do not hesitate to call me if you have any questions.     This and subsequent notes were partially created using voice recognition software and will  inherently be subject to errors including those of syntax and sound alike substitutions which may escape proofreading. In such instances original meaning may be extrapolated by contextual derivation.

## 2019-06-09 NOTE — PROGRESS NOTE ADULT - SUBJECTIVE AND OBJECTIVE BOX
Subjective: Patient seen and examined. No new events except as noted.   complaining of dizziness and nausea     REVIEW OF SYSTEMS:    CONSTITUTIONAL: No weakness, fevers or chills  EYES/ENT: No visual changes;  No vertigo or throat pain   NECK: No pain or stiffness  RESPIRATORY: No cough, wheezing, hemoptysis; No shortness of breath  CARDIOVASCULAR: No chest pain or palpitations  GASTROINTESTINAL: No abdominal or epigastric pain.  GENITOURINARY: No dysuria, frequency or hematuria  NEUROLOGICAL: No numbness or weakness  SKIN: No itching, burning, rashes, or lesions   All other review of systems is negative unless indicated above.    MEDICATIONS:  MEDICATIONS  (STANDING):  amiodarone    Tablet 200 milliGRAM(s) Oral daily  apixaban 5 milliGRAM(s) Oral every 12 hours  atorvastatin 40 milliGRAM(s) Oral at bedtime  buDESOnide  80 MICROgram(s)/formoterol 4.5 MICROgram(s) Inhaler 2 Puff(s) Inhalation two times a day  ceftaroline fosamil IVPB 600 milliGRAM(s) IV Intermittent every 8 hours  clopidogrel Tablet 75 milliGRAM(s) Oral daily  DAPTOmycin IVPB 850 milliGRAM(s) IV Intermittent every 24 hours  docusate sodium 100 milliGRAM(s) Oral three times a day  finasteride 5 milliGRAM(s) Oral daily  lidocaine   Patch 1 Patch Transdermal daily  lidocaine   Patch 1 Patch Transdermal daily  linezolid    Tablet 600 milliGRAM(s) Oral every 12 hours  melatonin 3 milliGRAM(s) Oral at bedtime  metoprolol tartrate 25 milliGRAM(s) Oral two times a day  pantoprazole    Tablet 40 milliGRAM(s) Oral before breakfast  senna 2 Tablet(s) Oral at bedtime  sodium chloride 0.9%. 1000 milliLiter(s) (75 mL/Hr) IV Continuous <Continuous>  spironolactone 25 milliGRAM(s) Oral daily  tamsulosin 0.4 milliGRAM(s) Oral at bedtime      PHYSICAL EXAM:  T(C): 36.3 (06-09-19 @ 12:00), Max: 36.3 (06-08-19 @ 19:36)  HR: 64 (06-09-19 @ 12:00) (60 - 64)  BP: 106/68 (06-09-19 @ 12:00) (105/59 - 114/70)  RR: 18 (06-09-19 @ 12:00) (18 - 18)  SpO2: 94% (06-09-19 @ 12:00) (94% - 97%)  Wt(kg): --  I&O's Summary    08 Jun 2019 07:01  -  09 Jun 2019 07:00  --------------------------------------------------------  IN: 700 mL / OUT: 600 mL / NET: 100 mL          Appearance: Normal	  HEENT:   Normal oral mucosa, PERRL, EOMI	  Lymphatic: No lymphadenopathy , no edema  Cardiovascular: Normal S1 S2, systolic murmur   Respiratory: Lungs clear to auscultation, normal effort 	  Gastrointestinal:  Soft, Non-tender, + BS	  Skin: No rashes, No ecchymoses, No cyanosis, warm to touch  Musculoskeletal: Normal range of motion, normal strength  Psychiatry:  Mood & affect appropriate  Ext: No edema      All labs, Imaging and EKGs personally reviewed                           9.0    7.03  )-----------( 233      ( 08 Jun 2019 09:25 )             28.8               06-08    133<L>  |  100  |  21  ----------------------------<  83  4.8   |  20<L>  |  1.45<H>    Ca    8.9      08 Jun 2019 06:52  Mg     2.7     06-08    TPro  6.0  /  Alb  2.8<L>  /  TBili  0.4  /  DBili  x   /  AST  10  /  ALT  9<L>  /  AlkPhos  130<H>  06-08       Culture - Blood in AM (06.08.19 @ 10:16)    Specimen Source: .Blood    Culture Results:   No growth to date.

## 2019-06-10 LAB
ANION GAP SERPL CALC-SCNC: 13 MMOL/L — SIGNIFICANT CHANGE UP (ref 5–17)
BUN SERPL-MCNC: 22 MG/DL — SIGNIFICANT CHANGE UP (ref 7–23)
CALCIUM SERPL-MCNC: 9 MG/DL — SIGNIFICANT CHANGE UP (ref 8.4–10.5)
CHLORIDE SERPL-SCNC: 102 MMOL/L — SIGNIFICANT CHANGE UP (ref 96–108)
CO2 SERPL-SCNC: 18 MMOL/L — LOW (ref 22–31)
CREAT SERPL-MCNC: 1.25 MG/DL — SIGNIFICANT CHANGE UP (ref 0.5–1.3)
GLUCOSE SERPL-MCNC: 80 MG/DL — SIGNIFICANT CHANGE UP (ref 70–99)
HCT VFR BLD CALC: 29.1 % — LOW (ref 39–50)
HGB BLD-MCNC: 9 G/DL — LOW (ref 13–17)
MCHC RBC-ENTMCNC: 26.9 PG — LOW (ref 27–34)
MCHC RBC-ENTMCNC: 30.9 GM/DL — LOW (ref 32–36)
MCV RBC AUTO: 86.9 FL — SIGNIFICANT CHANGE UP (ref 80–100)
PLATELET # BLD AUTO: 191 K/UL — SIGNIFICANT CHANGE UP (ref 150–400)
POTASSIUM SERPL-MCNC: 5 MMOL/L — SIGNIFICANT CHANGE UP (ref 3.5–5.3)
POTASSIUM SERPL-SCNC: 5 MMOL/L — SIGNIFICANT CHANGE UP (ref 3.5–5.3)
RBC # BLD: 3.35 M/UL — LOW (ref 4.2–5.8)
RBC # FLD: 15.8 % — HIGH (ref 10.3–14.5)
SODIUM SERPL-SCNC: 133 MMOL/L — LOW (ref 135–145)
WBC # BLD: 7.07 K/UL — SIGNIFICANT CHANGE UP (ref 3.8–10.5)
WBC # FLD AUTO: 7.07 K/UL — SIGNIFICANT CHANGE UP (ref 3.8–10.5)

## 2019-06-10 PROCEDURE — 99232 SBSQ HOSP IP/OBS MODERATE 35: CPT

## 2019-06-10 RX ORDER — THIAMINE MONONITRATE (VIT B1) 100 MG
100 TABLET ORAL DAILY
Refills: 0 | Status: DISCONTINUED | OUTPATIENT
Start: 2019-06-10 | End: 2019-06-17

## 2019-06-10 RX ORDER — LACTOBACILLUS ACIDOPHILUS 100MM CELL
1 CAPSULE ORAL
Refills: 0 | Status: DISCONTINUED | OUTPATIENT
Start: 2019-06-10 | End: 2019-06-17

## 2019-06-10 RX ADMIN — Medication 100 MILLIGRAM(S): at 21:24

## 2019-06-10 RX ADMIN — CLOPIDOGREL BISULFATE 75 MILLIGRAM(S): 75 TABLET, FILM COATED ORAL at 12:24

## 2019-06-10 RX ADMIN — PANTOPRAZOLE SODIUM 40 MILLIGRAM(S): 20 TABLET, DELAYED RELEASE ORAL at 05:18

## 2019-06-10 RX ADMIN — OXYCODONE HYDROCHLORIDE 2.5 MILLIGRAM(S): 5 TABLET ORAL at 19:03

## 2019-06-10 RX ADMIN — LIDOCAINE 1 PATCH: 4 CREAM TOPICAL at 12:31

## 2019-06-10 RX ADMIN — ONDANSETRON 4 MILLIGRAM(S): 8 TABLET, FILM COATED ORAL at 08:35

## 2019-06-10 RX ADMIN — Medication 25 MILLIGRAM(S): at 05:18

## 2019-06-10 RX ADMIN — Medication 100 MILLIGRAM(S): at 05:18

## 2019-06-10 RX ADMIN — BUDESONIDE AND FORMOTEROL FUMARATE DIHYDRATE 2 PUFF(S): 160; 4.5 AEROSOL RESPIRATORY (INHALATION) at 17:56

## 2019-06-10 RX ADMIN — OXYCODONE HYDROCHLORIDE 2.5 MILLIGRAM(S): 5 TABLET ORAL at 18:38

## 2019-06-10 RX ADMIN — ATORVASTATIN CALCIUM 40 MILLIGRAM(S): 80 TABLET, FILM COATED ORAL at 21:24

## 2019-06-10 RX ADMIN — BUDESONIDE AND FORMOTEROL FUMARATE DIHYDRATE 2 PUFF(S): 160; 4.5 AEROSOL RESPIRATORY (INHALATION) at 05:18

## 2019-06-10 RX ADMIN — OXYCODONE HYDROCHLORIDE 2.5 MILLIGRAM(S): 5 TABLET ORAL at 01:30

## 2019-06-10 RX ADMIN — LIDOCAINE 1 PATCH: 4 CREAM TOPICAL at 12:32

## 2019-06-10 RX ADMIN — TAMSULOSIN HYDROCHLORIDE 0.4 MILLIGRAM(S): 0.4 CAPSULE ORAL at 21:24

## 2019-06-10 RX ADMIN — DAPTOMYCIN 134 MILLIGRAM(S): 500 INJECTION, POWDER, LYOPHILIZED, FOR SOLUTION INTRAVENOUS at 14:31

## 2019-06-10 RX ADMIN — Medication 1 TABLET(S): at 18:08

## 2019-06-10 RX ADMIN — APIXABAN 5 MILLIGRAM(S): 2.5 TABLET, FILM COATED ORAL at 05:18

## 2019-06-10 RX ADMIN — Medication 100 MILLIGRAM(S): at 14:31

## 2019-06-10 RX ADMIN — LIDOCAINE 1 PATCH: 4 CREAM TOPICAL at 19:59

## 2019-06-10 RX ADMIN — CEFTAROLINE FOSAMIL 50 MILLIGRAM(S): 600 POWDER, FOR SOLUTION INTRAVENOUS at 05:18

## 2019-06-10 RX ADMIN — FINASTERIDE 5 MILLIGRAM(S): 5 TABLET, FILM COATED ORAL at 12:24

## 2019-06-10 RX ADMIN — OXYCODONE HYDROCHLORIDE 2.5 MILLIGRAM(S): 5 TABLET ORAL at 00:53

## 2019-06-10 RX ADMIN — SENNA PLUS 2 TABLET(S): 8.6 TABLET ORAL at 21:24

## 2019-06-10 RX ADMIN — LIDOCAINE 1 PATCH: 4 CREAM TOPICAL at 23:15

## 2019-06-10 RX ADMIN — Medication 3 MILLIGRAM(S): at 21:24

## 2019-06-10 RX ADMIN — LINEZOLID 600 MILLIGRAM(S): 600 INJECTION, SOLUTION INTRAVENOUS at 05:18

## 2019-06-10 RX ADMIN — CEFTAROLINE FOSAMIL 50 MILLIGRAM(S): 600 POWDER, FOR SOLUTION INTRAVENOUS at 21:24

## 2019-06-10 RX ADMIN — CEFTAROLINE FOSAMIL 50 MILLIGRAM(S): 600 POWDER, FOR SOLUTION INTRAVENOUS at 13:33

## 2019-06-10 RX ADMIN — AMIODARONE HYDROCHLORIDE 200 MILLIGRAM(S): 400 TABLET ORAL at 05:18

## 2019-06-10 RX ADMIN — LINEZOLID 600 MILLIGRAM(S): 600 INJECTION, SOLUTION INTRAVENOUS at 18:08

## 2019-06-10 NOTE — PROGRESS NOTE ADULT - SUBJECTIVE AND OBJECTIVE BOX
CHIEF COMPLAINT: f/up sob, copd, osas, abnormal ct chest-pain upon movements--mild sob    Interval Events: daily blood draws    REVIEW OF SYSTEMS:  Constitutional: No fevers or chills. No weight loss. + fatigue or generalized malaise.  Eyes: No itching or discharge from the eyes  ENT: No ear pain. No ear discharge. No nasal congestion. No post nasal drip. No epistaxis. No throat pain. No sore throat. No difficulty swallowing.   CV: No chest pain. No palpitations. No lightheadedness or dizziness.   Resp: No dyspnea at rest. + dyspnea on exertion. No orthopnea. No wheezing. No cough. No stridor. No sputum production. No chest pain with respiration.  GI: No nausea. No vomiting. No diarrhea.  MSK: + joint pain or pain in any extremities  Integumentary: No skin lesions. No pedal edema.  Neurological: No gross motor weakness. No sensory changes.  [+ ] All other systems negative  [ ] Unable to assess ROS because ________    OBJECTIVE:  ICU Vital Signs Last 24 Hrs  T(C): 36.6 (10 Rey 2019 04:01), Max: 36.6 (10 Rey 2019 04:01)  T(F): 97.8 (10 Rey 2019 04:01), Max: 97.8 (10 Rey 2019 04:01)  HR: 64 (10 Rey 2019 04:01) (64 - 64)  BP: 109/70 (10 Rey 2019 04:01) (98/61 - 109/70)  BP(mean): --  ABP: --  ABP(mean): --  RR: 18 (10 Rey 2019 04:01) (18 - 18)  SpO2: 95% (10 Rey 2019 04:01) (94% - 98%)        06-08 @ 07:01  -  06-09 @ 07:00  --------------------------------------------------------  IN: 700 mL / OUT: 600 mL / NET: 100 mL      CAPILLARY BLOOD GLUCOSE          PHYSICAL EXAM: NAD in bed on RA  General: Awake, alert, oriented X 3.   HEENT: Atraumatic, normocephalic.                 Mallampatti Grade 3                No nasal congestion.                No tonsillar or pharyngeal exudates.  Lymph Nodes: No palpable lymphadenopathy  Neck: No JVD. No carotid bruit.   Respiratory: Normal chest expansion                         Normal percussion                         Normal and equal air entry                         No wheeze, rhonchi or rales.  Cardiovascular: S1 S2 normal. + murmurs, rubs or gallops.   Abdomen: Soft, non-tender, non-distended. No organomegaly. Normoactive bowel sounds.  Extremities: Warm to touch. Peripheral pulse palpable. No pedal edema.   Skin: No rashes or skin lesions  Neurological: Motor and sensory examination equal and normal in all four extremities.  Psychiatry: Appropriate mood and affect.    HOSPITAL MEDICATIONS:  MEDICATIONS  (STANDING):  amiodarone    Tablet 200 milliGRAM(s) Oral daily  apixaban 5 milliGRAM(s) Oral every 12 hours  atorvastatin 40 milliGRAM(s) Oral at bedtime  buDESOnide  80 MICROgram(s)/formoterol 4.5 MICROgram(s) Inhaler 2 Puff(s) Inhalation two times a day  ceftaroline fosamil IVPB 600 milliGRAM(s) IV Intermittent every 8 hours  clopidogrel Tablet 75 milliGRAM(s) Oral daily  DAPTOmycin IVPB 850 milliGRAM(s) IV Intermittent every 24 hours  docusate sodium 100 milliGRAM(s) Oral three times a day  finasteride 5 milliGRAM(s) Oral daily  lidocaine   Patch 1 Patch Transdermal daily  lidocaine   Patch 1 Patch Transdermal daily  linezolid    Tablet 600 milliGRAM(s) Oral every 12 hours  melatonin 3 milliGRAM(s) Oral at bedtime  metoprolol tartrate 25 milliGRAM(s) Oral two times a day  pantoprazole    Tablet 40 milliGRAM(s) Oral before breakfast  senna 2 Tablet(s) Oral at bedtime  sodium chloride 0.9%. 1000 milliLiter(s) (75 mL/Hr) IV Continuous <Continuous>  tamsulosin 0.4 milliGRAM(s) Oral at bedtime    MEDICATIONS  (PRN):  ALBUTerol    90 MICROgram(s) HFA Inhaler 2 Puff(s) Inhalation every 6 hours PRN Shortness of Breath and/or Wheezing  ondansetron    Tablet 4 milliGRAM(s) Oral every 8 hours PRN Nausea and/or Vomiting  oxyCODONE    IR 2.5 milliGRAM(s) Oral every 6 hours PRN Moderate Pain (4 - 6)  polyethylene glycol 3350 17 Gram(s) Oral daily PRN Constipation      LABS:                        9.0    7.03  )-----------( 233      ( 08 Jun 2019 09:25 )             28.8     06-08    133<L>  |  100  |  21  ----------------------------<  83  4.8   |  20<L>  |  1.45<H>    Ca    8.9      08 Jun 2019 06:52  Mg     2.7     06-08    TPro  6.0  /  Alb  2.8<L>  /  TBili  0.4  /  DBili  x   /  AST  10  /  ALT  9<L>  /  AlkPhos  130<H>  06-08              MICROBIOLOGY:     RADIOLOGY:  [ ] Reviewed and interpreted by me    Point of Care Ultrasound Findings:    PFT:    EKG:

## 2019-06-10 NOTE — PROGRESS NOTE ADULT - ASSESSMENT
87 yr old with CKD, COPD, BPH, PPM, Y AVR due to AS, CHF. 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  there are no good options left    most recent bc are negative    same nausea from zyvox   will come up with a discharge plan  cleared for picc

## 2019-06-10 NOTE — PROGRESS NOTE ADULT - SUBJECTIVE AND OBJECTIVE BOX
infectious diseases progress note:    Patient is a 88y old  Male who presents with a chief complaint of bacteremia (10 Rey 2019 05:05)        Bacteremia        ROS:  CONSTITUTIONAL:  Negative fever or chills, feels well, good appetite  EYES:  Negative  blurry vision or double vision  CARDIOVASCULAR:  Negative for chest pain or palpitations  RESPIRATORY:  Negative for cough, wheezing, or SOB   GASTROINTESTINAL:  Negative for nausea, vomiting, diarrhea, constipation, or abdominal pain  GENITOURINARY:  Negative frequency, urgency or dysuria  NEUROLOGIC:  No headache, confusion, dizziness, lightheadedness    Allergies    Keppra (Rash)  penicillin (Unknown)    Intolerances        ANTIBIOTICS/RELEVANT:  antimicrobials  ceftaroline fosamil IVPB 600 milliGRAM(s) IV Intermittent every 8 hours  DAPTOmycin IVPB 850 milliGRAM(s) IV Intermittent every 24 hours  linezolid    Tablet 600 milliGRAM(s) Oral every 12 hours    immunologic:    OTHER:  ALBUTerol    90 MICROgram(s) HFA Inhaler 2 Puff(s) Inhalation every 6 hours PRN  amiodarone    Tablet 200 milliGRAM(s) Oral daily  apixaban 5 milliGRAM(s) Oral every 12 hours  atorvastatin 40 milliGRAM(s) Oral at bedtime  buDESOnide  80 MICROgram(s)/formoterol 4.5 MICROgram(s) Inhaler 2 Puff(s) Inhalation two times a day  clopidogrel Tablet 75 milliGRAM(s) Oral daily  docusate sodium 100 milliGRAM(s) Oral three times a day  finasteride 5 milliGRAM(s) Oral daily  lidocaine   Patch 1 Patch Transdermal daily  lidocaine   Patch 1 Patch Transdermal daily  melatonin 3 milliGRAM(s) Oral at bedtime  metoprolol tartrate 25 milliGRAM(s) Oral two times a day  ondansetron    Tablet 4 milliGRAM(s) Oral every 8 hours PRN  oxyCODONE    IR 2.5 milliGRAM(s) Oral every 6 hours PRN  pantoprazole    Tablet 40 milliGRAM(s) Oral before breakfast  polyethylene glycol 3350 17 Gram(s) Oral daily PRN  senna 2 Tablet(s) Oral at bedtime  sodium chloride 0.9%. 1000 milliLiter(s) IV Continuous <Continuous>  tamsulosin 0.4 milliGRAM(s) Oral at bedtime      Objective:  Vital Signs Last 24 Hrs  T(C): 36.6 (10 Rey 2019 04:01), Max: 36.6 (10 Rey 2019 04:01)  T(F): 97.8 (10 Rey 2019 04:01), Max: 97.8 (10 Rey 2019 04:01)  HR: 64 (10 Rey 2019 04:01) (64 - 64)  BP: 109/70 (10 Rey 2019 04:01) (98/61 - 109/70)  BP(mean): --  RR: 18 (10 Rey 2019 04:01) (18 - 18)  SpO2: 95% (10 Rey 2019 04:01) (94% - 98%)    PHYSICAL EXAM:  Constitutional:Well-developed, well nourished--no acute distress  Eyes:JESSEE, EOMI  Ear/Nose/Throat: no oral lesion, no sinus tenderness on percussion	  Neck:no JVD, no lymphadenopathy, supple  Respiratory: CTA marci  Cardiovascular: S1S2 RRR, no murmurs  Gastrointestinal:soft, (+) BS, no HSM  Extremities:no e/e/c        LABS:                        9.0    7.03  )-----------( 233      ( 08 Jun 2019 09:25 )             28.8     06-10    133<L>  |  102  |  22  ----------------------------<  80  5.0   |  18<L>  |  1.25    Ca    9.0      10 Rey 2019 06:53              MICROBIOLOGY:    RECENT CULTURES:  06-08 @ 10:16 .Blood                No growth to date.    06-05 @ 10:19 .Blood       Growth in anaerobic bottle: Gram Positive Cocci in Pairs and Chains  Growth in aerobic bottle: Gram Positive Cocci in Pairs and Chains           Growth in aerobic and anaerobic bottles: Enterococcus faecium (vancomycin  resistant)  See previous culture [id number]    06-04 @ 10:01 .Blood   NICK    Growth in aerobic and anaerobic bottles: Gram Positive Cocci in Pairs and  Chains    Enterococcus faecium (vancomycin resistant)  Enterococcus faecium (vancomycin resistant)     Growth in aerobic and anaerobic bottles: Enterococcus faecium (vancomycin  resistant)    06-03 @ 09:51 .Blood   PCR    Growth in aerobic bottle: Gram Positive Cocci in Pairs and Chains  Growth in anaerobic bottle: Gram Positive Cocci in Pairs and Chains    Blood Culture PCR  Blood Culture PCR     Growth in aerobic and anaerobic bottles: Enterococcus faecium (vancomycin  resistant)  See previous culture [id number]          RESPIRATORY CULTURES:              RADIOLOGY & ADDITIONAL STUDIES:        Pager [phone]  After 5 pm/weekends or if no response :[phone]

## 2019-06-10 NOTE — PROGRESS NOTE ADULT - ATTENDING COMMENTS
as above-stable-await PICC placement (re-evaluation by CTS/ID f/up-[place]); ? require GI evaln-no absolute contras to EGD/colonoscopy-on hold; may require repeat MARIA LUISA if still bacteremic (consider [facility] if abnormal)  ID-bacteremia--on dapto/ceftaroline-f/up all cx-s/p bone studies (mri abnormal-unclear if enough to base source of infection/shoulder-unrevealing)--considering addition of zyvox--daily BCx at this point  COPD-symbiocrt/spiriva, incentive spirometry/acapella  PAH-no defin. rx  Cards-AF etc.--mult rx-consider MARIA LUISA over next week  DVT.GI proph--OOB  PT needed                      GI evaln on hold based on notes  Arjun Mack MD-Pulmonary   [phone]

## 2019-06-10 NOTE — PROGRESS NOTE ADULT - ASSESSMENT
87 yr old with CKD, COPD, BPH, PPM, Y AVR due to AS, CHF. 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    1. COPD - stable  c/w Symbicort BID and Albuterol PRN, Spiriva  Out of bed to chair, incentive spirometry.  2. 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  Lower back and shoulder pain - patient states this is chronic but recently worsened.   *************  5/29-await results of cx; re-scan of back likely  5/30-ID/CTS f/up-picc placed (Roman et al)  5/31-for GI evaln if other w/up neg to assess for source of bacteria (MRI abnormal)  6/3- daptomycin/ceftarokine to continue--re-cx today  6/4-re-MARIA LUISA in 1 week if remains bacteremic--consider valv replacement  6/5-blood cx pending; 6/6-dizzy upon ambulation  6/7-ID/cards f/up  6/10-blood cx have remained +--ID f/up, ambulate

## 2019-06-10 NOTE — PROGRESS NOTE ADULT - PROBLEM SELECTOR PLAN 1
ID f/up-on dapto/ceftaroline--MARIA LUISA unrevealing--s/p MRI spine-abnormal, picc to placed; daily blood cx at this point  GI w/up if other studies non dx.; repeat MARIA LUISA w/in week if remains bacteremic

## 2019-06-10 NOTE — PROGRESS NOTE ADULT - PROBLEM SELECTOR PLAN 1
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

## 2019-06-10 NOTE — PROGRESS NOTE ADULT - SUBJECTIVE AND OBJECTIVE BOX
Subjective: Patient seen and examined. No new events except as noted.   doing okay however he is confused and sleepy     REVIEW OF SYSTEMS:    CONSTITUTIONAL: + weakness, confused   EYES/ENT: No visual changes;  No vertigo or throat pain   NECK: No pain or stiffness  RESPIRATORY: No cough, wheezing, hemoptysis; No shortness of breath  CARDIOVASCULAR: No chest pain or palpitations  GASTROINTESTINAL: No abdominal or epigastric pain. No nausea, vomiting, or hematemesis; No diarrhea or constipation. No melena or hematochezia.  GENITOURINARY: No dysuria, frequency or hematuria  NEUROLOGICAL: No numbness or weakness  SKIN: No itching, burning, rashes, or lesions   All other review of systems is negative unless indicated above.    MEDICATIONS:  MEDICATIONS  (STANDING):  amiodarone    Tablet 200 milliGRAM(s) Oral daily  atorvastatin 40 milliGRAM(s) Oral at bedtime  buDESOnide  80 MICROgram(s)/formoterol 4.5 MICROgram(s) Inhaler 2 Puff(s) Inhalation two times a day  ceftaroline fosamil IVPB 600 milliGRAM(s) IV Intermittent every 8 hours  clopidogrel Tablet 75 milliGRAM(s) Oral daily  DAPTOmycin IVPB 850 milliGRAM(s) IV Intermittent every 24 hours  docusate sodium 100 milliGRAM(s) Oral three times a day  finasteride 5 milliGRAM(s) Oral daily  lidocaine   Patch 1 Patch Transdermal daily  lidocaine   Patch 1 Patch Transdermal daily  linezolid    Tablet 600 milliGRAM(s) Oral every 12 hours  melatonin 3 milliGRAM(s) Oral at bedtime  metoprolol tartrate 25 milliGRAM(s) Oral two times a day  pantoprazole    Tablet 40 milliGRAM(s) Oral before breakfast  senna 2 Tablet(s) Oral at bedtime  sodium chloride 0.9%. 1000 milliLiter(s) (75 mL/Hr) IV Continuous <Continuous>  tamsulosin 0.4 milliGRAM(s) Oral at bedtime      PHYSICAL EXAM:  T(C): 36.6 (06-10-19 @ 04:01), Max: 36.6 (06-10-19 @ 04:01)  HR: 66 (06-10-19 @ 12:56) (64 - 66)  BP: 105/66 (06-10-19 @ 12:56) (98/61 - 109/70)  RR: 18 (06-10-19 @ 12:56) (18 - 18)  SpO2: 96% (06-10-19 @ 12:56) (95% - 98%)  Wt(kg): --  I&O's Summary    09 Jun 2019 07:01  -  10 Rey 2019 07:00  --------------------------------------------------------  IN: 120 mL / OUT: 300 mL / NET: -180 mL    10 Rey 2019 07:01  -  10 Rey 2019 16:34  --------------------------------------------------------  IN: 360 mL / OUT: 0 mL / NET: 360 mL          Appearance: sleepy, forgetful	  HEENT:   Normal oral mucosa, PERRL, EOMI	  Lymphatic: No lymphadenopathy , no edema  Cardiovascular: Normal S1 S2, No JVD, No murmurs , Peripheral pulses palpable 2+ bilaterally  Respiratory: Lungs clear to auscultation, normal effort 	  Gastrointestinal:  Soft, Non-tender, + BS	  Skin: No rashes, No ecchymoses, No cyanosis, warm to touch  Musculoskeletal: L shoulder pain on ROM  Psychiatry:  Mood & affect appropriate  Ext: No edema      All labs, Imaging and EKGs personally reviewed                             9.0    7.07  )-----------( 191      ( 10 Rey 2019 08:40 )             29.1               06-10    133<L>  |  102  |  22  ----------------------------<  80  5.0   |  18<L>  |  1.25    Ca    9.0      10 Rey 2019 06:53

## 2019-06-11 LAB
ANION GAP SERPL CALC-SCNC: 13 MMOL/L — SIGNIFICANT CHANGE UP (ref 5–17)
BUN SERPL-MCNC: 20 MG/DL — SIGNIFICANT CHANGE UP (ref 7–23)
CALCIUM SERPL-MCNC: 9.1 MG/DL — SIGNIFICANT CHANGE UP (ref 8.4–10.5)
CHLORIDE SERPL-SCNC: 101 MMOL/L — SIGNIFICANT CHANGE UP (ref 96–108)
CK SERPL-CCNC: 39 U/L — SIGNIFICANT CHANGE UP (ref 30–200)
CO2 SERPL-SCNC: 20 MMOL/L — LOW (ref 22–31)
CORTIS AM PEAK SERPL-MCNC: 14.6 UG/DL — SIGNIFICANT CHANGE UP (ref 6–18.4)
CREAT SERPL-MCNC: 1.26 MG/DL — SIGNIFICANT CHANGE UP (ref 0.5–1.3)
GLUCOSE SERPL-MCNC: 83 MG/DL — SIGNIFICANT CHANGE UP (ref 70–99)
POTASSIUM SERPL-MCNC: 4.9 MMOL/L — SIGNIFICANT CHANGE UP (ref 3.5–5.3)
POTASSIUM SERPL-SCNC: 4.9 MMOL/L — SIGNIFICANT CHANGE UP (ref 3.5–5.3)
SODIUM SERPL-SCNC: 134 MMOL/L — LOW (ref 135–145)

## 2019-06-11 PROCEDURE — 99232 SBSQ HOSP IP/OBS MODERATE 35: CPT

## 2019-06-11 RX ADMIN — CLOPIDOGREL BISULFATE 75 MILLIGRAM(S): 75 TABLET, FILM COATED ORAL at 12:32

## 2019-06-11 RX ADMIN — CEFTAROLINE FOSAMIL 50 MILLIGRAM(S): 600 POWDER, FOR SOLUTION INTRAVENOUS at 13:59

## 2019-06-11 RX ADMIN — AMIODARONE HYDROCHLORIDE 200 MILLIGRAM(S): 400 TABLET ORAL at 06:05

## 2019-06-11 RX ADMIN — Medication 100 MILLIGRAM(S): at 13:59

## 2019-06-11 RX ADMIN — LIDOCAINE 1 PATCH: 4 CREAM TOPICAL at 19:50

## 2019-06-11 RX ADMIN — Medication 1 TABLET(S): at 12:32

## 2019-06-11 RX ADMIN — Medication 1 TABLET(S): at 16:37

## 2019-06-11 RX ADMIN — LIDOCAINE 1 PATCH: 4 CREAM TOPICAL at 12:31

## 2019-06-11 RX ADMIN — CEFTAROLINE FOSAMIL 50 MILLIGRAM(S): 600 POWDER, FOR SOLUTION INTRAVENOUS at 06:05

## 2019-06-11 RX ADMIN — OXYCODONE HYDROCHLORIDE 2.5 MILLIGRAM(S): 5 TABLET ORAL at 15:07

## 2019-06-11 RX ADMIN — Medication 100 MILLIGRAM(S): at 12:32

## 2019-06-11 RX ADMIN — FINASTERIDE 5 MILLIGRAM(S): 5 TABLET, FILM COATED ORAL at 12:32

## 2019-06-11 RX ADMIN — LINEZOLID 600 MILLIGRAM(S): 600 INJECTION, SOLUTION INTRAVENOUS at 06:05

## 2019-06-11 RX ADMIN — SENNA PLUS 2 TABLET(S): 8.6 TABLET ORAL at 23:07

## 2019-06-11 RX ADMIN — OXYCODONE HYDROCHLORIDE 2.5 MILLIGRAM(S): 5 TABLET ORAL at 23:50

## 2019-06-11 RX ADMIN — Medication 100 MILLIGRAM(S): at 23:07

## 2019-06-11 RX ADMIN — BUDESONIDE AND FORMOTEROL FUMARATE DIHYDRATE 2 PUFF(S): 160; 4.5 AEROSOL RESPIRATORY (INHALATION) at 06:05

## 2019-06-11 RX ADMIN — OXYCODONE HYDROCHLORIDE 2.5 MILLIGRAM(S): 5 TABLET ORAL at 09:26

## 2019-06-11 RX ADMIN — ATORVASTATIN CALCIUM 40 MILLIGRAM(S): 80 TABLET, FILM COATED ORAL at 23:07

## 2019-06-11 RX ADMIN — TAMSULOSIN HYDROCHLORIDE 0.4 MILLIGRAM(S): 0.4 CAPSULE ORAL at 23:07

## 2019-06-11 RX ADMIN — Medication 1 TABLET(S): at 09:19

## 2019-06-11 RX ADMIN — LINEZOLID 600 MILLIGRAM(S): 600 INJECTION, SOLUTION INTRAVENOUS at 18:20

## 2019-06-11 RX ADMIN — Medication 100 MILLIGRAM(S): at 06:05

## 2019-06-11 RX ADMIN — OXYCODONE HYDROCHLORIDE 2.5 MILLIGRAM(S): 5 TABLET ORAL at 16:07

## 2019-06-11 RX ADMIN — DAPTOMYCIN 134 MILLIGRAM(S): 500 INJECTION, POWDER, LYOPHILIZED, FOR SOLUTION INTRAVENOUS at 16:37

## 2019-06-11 RX ADMIN — BUDESONIDE AND FORMOTEROL FUMARATE DIHYDRATE 2 PUFF(S): 160; 4.5 AEROSOL RESPIRATORY (INHALATION) at 18:20

## 2019-06-11 RX ADMIN — PANTOPRAZOLE SODIUM 40 MILLIGRAM(S): 20 TABLET, DELAYED RELEASE ORAL at 06:05

## 2019-06-11 RX ADMIN — OXYCODONE HYDROCHLORIDE 2.5 MILLIGRAM(S): 5 TABLET ORAL at 23:05

## 2019-06-11 RX ADMIN — OXYCODONE HYDROCHLORIDE 2.5 MILLIGRAM(S): 5 TABLET ORAL at 01:21

## 2019-06-11 RX ADMIN — Medication 3 MILLIGRAM(S): at 23:07

## 2019-06-11 RX ADMIN — CEFTAROLINE FOSAMIL 50 MILLIGRAM(S): 600 POWDER, FOR SOLUTION INTRAVENOUS at 23:20

## 2019-06-11 RX ADMIN — OXYCODONE HYDROCHLORIDE 2.5 MILLIGRAM(S): 5 TABLET ORAL at 02:00

## 2019-06-11 RX ADMIN — OXYCODONE HYDROCHLORIDE 2.5 MILLIGRAM(S): 5 TABLET ORAL at 10:26

## 2019-06-11 RX ADMIN — Medication 25 MILLIGRAM(S): at 06:05

## 2019-06-11 NOTE — PROGRESS NOTE ADULT - PROBLEM SELECTOR PLAN 1
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

## 2019-06-11 NOTE — PROGRESS NOTE ADULT - PROBLEM SELECTOR PLAN 8
no absolute pulm contras to EGD or colonoscopy 9though pt at risk) no absolute pulm contras to EGD or colonoscopy though pt at risk)

## 2019-06-11 NOTE — PROGRESS NOTE ADULT - SUBJECTIVE AND OBJECTIVE BOX
[facility]      Seen earlier today, and examined.  - Today, patient is without complaints.    son in law madyson at bedside.        *****MEDICATIONS: Current medication reviewed and documented.    MEDICATIONS  (STANDING):  amiodarone    Tablet 200 milliGRAM(s) Oral daily  atorvastatin 40 milliGRAM(s) Oral at bedtime  buDESOnide  80 MICROgram(s)/formoterol 4.5 MICROgram(s) Inhaler 2 Puff(s) Inhalation two times a day  ceftaroline fosamil IVPB 600 milliGRAM(s) IV Intermittent every 8 hours  clopidogrel Tablet 75 milliGRAM(s) Oral daily  DAPTOmycin IVPB 850 milliGRAM(s) IV Intermittent every 24 hours  docusate sodium 100 milliGRAM(s) Oral three times a day  finasteride 5 milliGRAM(s) Oral daily  lactobacillus acidophilus 1 Tablet(s) Oral three times a day with meals  lidocaine   Patch 1 Patch Transdermal daily  lidocaine   Patch 1 Patch Transdermal daily  linezolid    Tablet 600 milliGRAM(s) Oral every 12 hours  melatonin 3 milliGRAM(s) Oral at bedtime  metoprolol tartrate 25 milliGRAM(s) Oral two times a day  multivitamin 1 Tablet(s) Oral daily  pantoprazole    Tablet 40 milliGRAM(s) Oral before breakfast  senna 2 Tablet(s) Oral at bedtime  sodium chloride 0.9%. 1000 milliLiter(s) (75 mL/Hr) IV Continuous <Continuous>  tamsulosin 0.4 milliGRAM(s) Oral at bedtime  thiamine 100 milliGRAM(s) Oral daily    MEDICATIONS  (PRN):  ALBUTerol    90 MICROgram(s) HFA Inhaler 2 Puff(s) Inhalation every 6 hours PRN Shortness of Breath and/or Wheezing  ondansetron    Tablet 4 milliGRAM(s) Oral every 8 hours PRN Nausea and/or Vomiting  oxyCODONE    IR 2.5 milliGRAM(s) Oral every 6 hours PRN Moderate Pain (4 - 6)  polyethylene glycol 3350 17 Gram(s) Oral daily PRN Constipation          ***** VITAL SIGNS:  T(F): 98 (19 @ 20:51), Max: 98 (19 @ 20:51)  HR: 68 (19 @ 20:51) (64 - 73)  BP: 100/64 (19 @ 20:51) (95/58 - 123/73)  RR: 18 (19 @ 20:51) (18 - 18)  SpO2: 98% (19 @ 20:51) (92% - 98%)  Wt(kg): --  ,   I&O's Summary    10 Rey 2019 07:  -  2019 07:00  --------------------------------------------------------  IN: 660 mL / OUT: 880 mL / NET: -220 mL    2019 07:  -  2019 21:01  --------------------------------------------------------  IN: 640 mL / OUT: 0 mL / NET: 640 mL             *****PHYSICAL EXAM: Alert oriented x 3   Attention comprehension are fair. Able to name, repeat, read without any difficulty.   Able to follow 3 step commands.     EOMI fundi not visualized,  VFF to confrontration  No facial asymmetry   Tongue is midline   Palate elevates symmetrically   Moving all 4 ext symmetrically no pronator drift   Reflexes are symmetric throughout   sensation is grossly symmetric  Gait : not assessed.  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 Was admitted recently and dx with possible endocarditis, ppm removed, received dapto/ceftaroline through PICC, PICC was dc and patient has been off abx for 2 weeks, plan was to observe off abx for 3 weeks and repeat labs. Patient had labs/bcx done yesterday with primary care, bcx were positive, patient otherwise does not have new symptoms or feel ill.     Problem/Recommendations 1: ams likely related to prolonged hospitalization, poor nutritional intake, worsening underlying mild dementia   regulate sleep/wake cycle.   thiamine 100 daily.   mvi daily.   consider lactobacillus daily .       Problem/Recommendations 2:hypotension/hyponatremia    am cortisol wnl              Thank you for allowing me to participate in the care of this patient. Please do not hesitate to call me if you have any  questions.        ________________  Jesica Ramírez MD  [facility] ([facility]  [phone]      30 minutes spent on total encounter; more than 50 % of the visit was  spent counseling about plan of care, compliance to diet/exercise and medication regimen and or  coordinating care by the attending physician.      It is advised that stroke patients follow up with SERAFIN Arrieta @ [phone] in 1- 2 weeks.   Others please follow up with Dr. Michael Nissenbaum [phone]

## 2019-06-11 NOTE — PROGRESS NOTE ADULT - SUBJECTIVE AND OBJECTIVE BOX
CHIEF COMPLAINT:  f/up sob, copd, osas, abnormal ct chest-pain upon movements-    Interval Events:    REVIEW OF SYSTEMS:  Constitutional: No fevers or chills. No weight loss. No fatigue or generalized malaise.  Eyes: No itching or discharge from the eyes  ENT: No ear pain. No ear discharge. No nasal congestion. No post nasal drip. No epistaxis. No throat pain. No sore throat. No difficulty swallowing.   CV: No chest pain. No palpitations. No lightheadedness or dizziness.   Resp: No dyspnea at rest. No dyspnea on exertion. No orthopnea. No wheezing. No cough. No stridor. No sputum production. No chest pain with respiration.  GI: No nausea. No vomiting. No diarrhea.  MSK: No joint pain or pain in any extremities  Integumentary: No skin lesions. No pedal edema.  Neurological: No gross motor weakness. No sensory changes.  [ ] All other systems negative  [ ] Unable to assess ROS because ________    OBJECTIVE:  ICU Vital Signs Last 24 Hrs  T(C): 36.5 (11 Jun 2019 04:15), Max: 36.6 (10 Rey 2019 21:07)  T(F): 97.7 (11 Jun 2019 04:15), Max: 97.8 (10 Rey 2019 21:07)  HR: 71 (11 Jun 2019 04:15) (66 - 73)  BP: 123/73 (11 Jun 2019 04:15) (97/62 - 123/73)  BP(mean): --  ABP: --  ABP(mean): --  RR: 18 (11 Jun 2019 04:15) (18 - 18)  SpO2: 96% (11 Jun 2019 04:15) (94% - 96%)        06-09 @ 07:01  -  06-10 @ 07:00  --------------------------------------------------------  IN: 120 mL / OUT: 300 mL / NET: -180 mL    06-10 @ 07:01  -  06-11 @ 05:16  --------------------------------------------------------  IN: 540 mL / OUT: 280 mL / NET: 260 mL      CAPILLARY BLOOD GLUCOSE          PHYSICAL EXAM:  General: Awake, alert, oriented X 3.   HEENT: Atraumatic, normocephalic.                 Mallampatti Grade                 No nasal congestion.                No tonsillar or pharyngeal exudates.  Lymph Nodes: No palpable lymphadenopathy  Neck: No JVD. No carotid bruit.   Respiratory: Normal chest expansion                         Normal percussion                         Normal and equal air entry                         No wheeze, rhonchi or rales.  Cardiovascular: S1 S2 normal. No murmurs, rubs or gallops.   Abdomen: Soft, non-tender, non-distended. No organomegaly. Normoactive bowel sounds.  Extremities: Warm to touch. Peripheral pulse palpable. No pedal edema.   Skin: No rashes or skin lesions  Neurological: Motor and sensory examination equal and normal in all four extremities.  Psychiatry: Appropriate mood and affect.    HOSPITAL MEDICATIONS:  MEDICATIONS  (STANDING):  amiodarone    Tablet 200 milliGRAM(s) Oral daily  atorvastatin 40 milliGRAM(s) Oral at bedtime  buDESOnide  80 MICROgram(s)/formoterol 4.5 MICROgram(s) Inhaler 2 Puff(s) Inhalation two times a day  ceftaroline fosamil IVPB 600 milliGRAM(s) IV Intermittent every 8 hours  clopidogrel Tablet 75 milliGRAM(s) Oral daily  DAPTOmycin IVPB 850 milliGRAM(s) IV Intermittent every 24 hours  docusate sodium 100 milliGRAM(s) Oral three times a day  finasteride 5 milliGRAM(s) Oral daily  lactobacillus acidophilus 1 Tablet(s) Oral three times a day with meals  lidocaine   Patch 1 Patch Transdermal daily  lidocaine   Patch 1 Patch Transdermal daily  linezolid    Tablet 600 milliGRAM(s) Oral every 12 hours  melatonin 3 milliGRAM(s) Oral at bedtime  metoprolol tartrate 25 milliGRAM(s) Oral two times a day  multivitamin 1 Tablet(s) Oral daily  pantoprazole    Tablet 40 milliGRAM(s) Oral before breakfast  senna 2 Tablet(s) Oral at bedtime  sodium chloride 0.9%. 1000 milliLiter(s) (75 mL/Hr) IV Continuous <Continuous>  tamsulosin 0.4 milliGRAM(s) Oral at bedtime  thiamine 100 milliGRAM(s) Oral daily    MEDICATIONS  (PRN):  ALBUTerol    90 MICROgram(s) HFA Inhaler 2 Puff(s) Inhalation every 6 hours PRN Shortness of Breath and/or Wheezing  ondansetron    Tablet 4 milliGRAM(s) Oral every 8 hours PRN Nausea and/or Vomiting  oxyCODONE    IR 2.5 milliGRAM(s) Oral every 6 hours PRN Moderate Pain (4 - 6)  polyethylene glycol 3350 17 Gram(s) Oral daily PRN Constipation      LABS:                        9.0    7.07  )-----------( 191      ( 10 Rey 2019 08:40 )             29.1     06-10    133<L>  |  102  |  22  ----------------------------<  80  5.0   |  18<L>  |  1.25    Ca    9.0      10 Rey 2019 06:53                MICROBIOLOGY:     RADIOLOGY:  [ ] Reviewed and interpreted by me    Point of Care Ultrasound Findings:    PFT:    EKG: CHIEF COMPLAINT:  f/up sob, copd, osas, abnormal ct chest-pain upon movements-better--some ARMSTRONG and dry mouth    Interval Events: no ambulation-ID f/up    REVIEW OF SYSTEMS:  Constitutional: No fevers or chills. No weight loss. + fatigue or generalized malaise.  Eyes: No itching or discharge from the eyes  ENT: No ear pain. No ear discharge. No nasal congestion. No post nasal drip. No epistaxis. No throat pain. No sore throat. No difficulty swallowing.   CV: No chest pain. No palpitations. No lightheadedness or dizziness.   Resp: No dyspnea at rest. + dyspnea on exertion. No orthopnea. No wheezing. No cough. No stridor. No sputum production. No chest pain with respiration.  GI: No nausea. No vomiting. No diarrhea.  MSK: + joint pain or pain in any extremities  Integumentary: No skin lesions. No pedal edema.  Neurological: + gross motor weakness. No sensory changes.  [+ ] All other systems negative  [ ] Unable to assess ROS because ________    OBJECTIVE:  ICU Vital Signs Last 24 Hrs  T(C): 36.5 (11 Jun 2019 04:15), Max: 36.6 (10 Ery 2019 21:07)  T(F): 97.7 (11 Jun 2019 04:15), Max: 97.8 (10 Rey 2019 21:07)  HR: 71 (11 Jun 2019 04:15) (66 - 73)  BP: 123/73 (11 Jun 2019 04:15) (97/62 - 123/73)  BP(mean): --  ABP: --  ABP(mean): --  RR: 18 (11 Jun 2019 04:15) (18 - 18)  SpO2: 96% (11 Jun 2019 04:15) (94% - 96%)        06-09 @ 07:01  -  06-10 @ 07:00  --------------------------------------------------------  IN: 120 mL / OUT: 300 mL / NET: -180 mL    06-10 @ 07:01  -  06-11 @ 05:16  --------------------------------------------------------  IN: 540 mL / OUT: 280 mL / NET: 260 mL      CAPILLARY BLOOD GLUCOSE          PHYSICAL EXAM: NAD in bed on RA  General: Awake, alert, oriented X 3.   HEENT: Atraumatic, normocephalic.                 Mallampatti Grade 3                No nasal congestion.                No tonsillar or pharyngeal exudates.  Lymph Nodes: No palpable lymphadenopathy  Neck: No JVD. No carotid bruit.   Respiratory: Normal chest expansion                         Normal percussion                         Normal and equal air entry                         No wheeze, rhonchi or rales.  Cardiovascular: S1 S2 normal. + murmurs, rubs or gallops.   Abdomen: Soft, non-tender, non-distended. No organomegaly. Normoactive bowel sounds.  Extremities: Warm to touch. Peripheral pulse palpable. No pedal edema.   Skin: No rashes or skin lesions  Neurological: Motor and sensory examination equal and normal in all four extremities.  Psychiatry: Appropriate mood and affect.    HOSPITAL MEDICATIONS:  MEDICATIONS  (STANDING):  amiodarone    Tablet 200 milliGRAM(s) Oral daily  atorvastatin 40 milliGRAM(s) Oral at bedtime  buDESOnide  80 MICROgram(s)/formoterol 4.5 MICROgram(s) Inhaler 2 Puff(s) Inhalation two times a day  ceftaroline fosamil IVPB 600 milliGRAM(s) IV Intermittent every 8 hours  clopidogrel Tablet 75 milliGRAM(s) Oral daily  DAPTOmycin IVPB 850 milliGRAM(s) IV Intermittent every 24 hours  docusate sodium 100 milliGRAM(s) Oral three times a day  finasteride 5 milliGRAM(s) Oral daily  lactobacillus acidophilus 1 Tablet(s) Oral three times a day with meals  lidocaine   Patch 1 Patch Transdermal daily  lidocaine   Patch 1 Patch Transdermal daily  linezolid    Tablet 600 milliGRAM(s) Oral every 12 hours  melatonin 3 milliGRAM(s) Oral at bedtime  metoprolol tartrate 25 milliGRAM(s) Oral two times a day  multivitamin 1 Tablet(s) Oral daily  pantoprazole    Tablet 40 milliGRAM(s) Oral before breakfast  senna 2 Tablet(s) Oral at bedtime  sodium chloride 0.9%. 1000 milliLiter(s) (75 mL/Hr) IV Continuous <Continuous>  tamsulosin 0.4 milliGRAM(s) Oral at bedtime  thiamine 100 milliGRAM(s) Oral daily    MEDICATIONS  (PRN):  ALBUTerol    90 MICROgram(s) HFA Inhaler 2 Puff(s) Inhalation every 6 hours PRN Shortness of Breath and/or Wheezing  ondansetron    Tablet 4 milliGRAM(s) Oral every 8 hours PRN Nausea and/or Vomiting  oxyCODONE    IR 2.5 milliGRAM(s) Oral every 6 hours PRN Moderate Pain (4 - 6)  polyethylene glycol 3350 17 Gram(s) Oral daily PRN Constipation      LABS:                        9.0    7.07  )-----------( 191      ( 10 Rey 2019 08:40 )             29.1     06-10    133<L>  |  102  |  22  ----------------------------<  80  5.0   |  18<L>  |  1.25    Ca    9.0      10 Rey 2019 06:53                MICROBIOLOGY:     RADIOLOGY:  [ ] Reviewed and interpreted by me    Point of Care Ultrasound Findings:    PFT:    EKG:

## 2019-06-11 NOTE — PROGRESS NOTE ADULT - ASSESSMENT
87 yr old with CKD, COPD, BPH, PPM, Y AVR due to AS, CHF. 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  there are no good options left    most recent bc are negative    same nausea from zyvox   will come up with a discharge plan  cleared for picc   plan to complete combination [id number] and then Zyvox suppression as tolerated

## 2019-06-11 NOTE — PROGRESS NOTE ADULT - SUBJECTIVE AND OBJECTIVE BOX
infectious diseases progress note:    Patient is a 88y old  Male who presents with a chief complaint of bacteremia (11 Jun 2019 05:16)        Bacteremia             Allergies    Keppra (Rash)  penicillin (Unknown)    Intolerances        ANTIBIOTICS/RELEVANT:  antimicrobials  ceftaroline fosamil IVPB 600 milliGRAM(s) IV Intermittent every 8 hours  DAPTOmycin IVPB 850 milliGRAM(s) IV Intermittent every 24 hours  linezolid    Tablet 600 milliGRAM(s) Oral every 12 hours    immunologic:    OTHER:  ALBUTerol    90 MICROgram(s) HFA Inhaler 2 Puff(s) Inhalation every 6 hours PRN  amiodarone    Tablet 200 milliGRAM(s) Oral daily  atorvastatin 40 milliGRAM(s) Oral at bedtime  buDESOnide  80 MICROgram(s)/formoterol 4.5 MICROgram(s) Inhaler 2 Puff(s) Inhalation two times a day  clopidogrel Tablet 75 milliGRAM(s) Oral daily  docusate sodium 100 milliGRAM(s) Oral three times a day  finasteride 5 milliGRAM(s) Oral daily  lactobacillus acidophilus 1 Tablet(s) Oral three times a day with meals  lidocaine   Patch 1 Patch Transdermal daily  lidocaine   Patch 1 Patch Transdermal daily  melatonin 3 milliGRAM(s) Oral at bedtime  metoprolol tartrate 25 milliGRAM(s) Oral two times a day  multivitamin 1 Tablet(s) Oral daily  ondansetron    Tablet 4 milliGRAM(s) Oral every 8 hours PRN  oxyCODONE    IR 2.5 milliGRAM(s) Oral every 6 hours PRN  pantoprazole    Tablet 40 milliGRAM(s) Oral before breakfast  polyethylene glycol 3350 17 Gram(s) Oral daily PRN  senna 2 Tablet(s) Oral at bedtime  sodium chloride 0.9%. 1000 milliLiter(s) IV Continuous <Continuous>  tamsulosin 0.4 milliGRAM(s) Oral at bedtime  thiamine 100 milliGRAM(s) Oral daily      Objective:  Vital Signs Last 24 Hrs  T(C): 36.5 (11 Jun 2019 04:15), Max: 36.6 (10 Rey 2019 21:07)  T(F): 97.7 (11 Jun 2019 04:15), Max: 97.8 (10 Rey 2019 21:07)  HR: 71 (11 Jun 2019 04:15) (66 - 73)  BP: 123/73 (11 Jun 2019 04:15) (97/62 - 123/73)  BP(mean): --  RR: 18 (11 Jun 2019 04:15) (18 - 18)  SpO2: 96% (11 Jun 2019 04:15) (94% - 96%)    PHYSICAL EXAM:     Eyes:JESSEE, EOMI  Ear/Nose/Throat: no oral lesion, no sinus tenderness on percussion	  Neck:no JVD, no lymphadenopathy, supple  Respiratory: CTA marci     Gastrointestinal:soft, (+) BS, no HSM   Extremities:no e/e/c        LABS:                        9.0    7.07  )-----------( 191      ( 10 Rey 2019 08:40 )             29.1     06-10    133<L>  |  102  |  22  ----------------------------<  80  5.0   |  18<L>  |  1.25    Ca    9.0      10 Rey 2019 06:53              MICROBIOLOGY:    RECENT CULTURES:  06-08 @ 10:16 .Blood                No growth to date.    06-05 @ 10:19 .Blood       Growth in anaerobic bottle: Gram Positive Cocci in Pairs and Chains  Growth in aerobic bottle: Gram Positive Cocci in Pairs and Chains           Growth in aerobic and anaerobic bottles: Enterococcus faecium (vancomycin  resistant)  See previous culture [id number]    06-04 @ 10:01 .Blood   NICK    Growth in aerobic and anaerobic bottles: Gram Positive Cocci in Pairs and  Chains    Enterococcus faecium (vancomycin resistant)  Enterococcus faecium (vancomycin resistant)     Growth in aerobic and anaerobic bottles: Enterococcus faecium (vancomycin  resistant)          RESPIRATORY CULTURES:              RADIOLOGY & ADDITIONAL STUDIES:        Pager [phone]  After 5 pm/weekends or if no response :[phone]

## 2019-06-11 NOTE — PROGRESS NOTE ADULT - SUBJECTIVE AND OBJECTIVE BOX
Patient is a 88y old  Male who presents with a chief complaint of bacteremia (11 Jun 2019 17:00)      INTERVAL HISTORY: feels better  	  MEDICATIONS:  amiodarone    Tablet 200 milliGRAM(s) Oral daily  metoprolol tartrate 25 milliGRAM(s) Oral two times a day  tamsulosin 0.4 milliGRAM(s) Oral at bedtime        PHYSICAL EXAM:  T(C): 36.7 (06-11-19 @ 20:51), Max: 36.7 (06-11-19 @ 20:51)  HR: 68 (06-11-19 @ 20:51) (64 - 71)  BP: 100/64 (06-11-19 @ 20:51) (95/58 - 123/73)  RR: 18 (06-11-19 @ 20:51) (18 - 18)  SpO2: 98% (06-11-19 @ 20:51) (92% - 98%)  Wt(kg): --  I&O's Summary    10 Rey 2019 07:01  -  11 Jun 2019 07:00  --------------------------------------------------------  IN: 660 mL / OUT: 880 mL / NET: -220 mL    11 Jun 2019 07:01  -  11 Jun 2019 22:46  --------------------------------------------------------  IN: 640 mL / OUT: 0 mL / NET: 640 mL          Appearance: In no distress	  HEENT:    PERRL, EOMI	  Cardiovascular:  S1 S2, No JVD  Respiratory: Lungs clear to auscultation	  Gastrointestinal:  Soft, Non-tender, + BS	  Extremities:  No edema of LE                                9.0    7.07  )-----------( 191      ( 10 Rey 2019 08:40 )             29.1     06-11    134<L>  |  101  |  20  ----------------------------<  83  4.9   |  20<L>  |  1.26    Ca    9.1      11 Jun 2019 07:01          Labs personally reviewed      Assessment and Plan:   Assessment:  · Assessment		  Patient is a 87 y/o male with PMH SSS/a fib on eliquis, 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 Was admitted recently and dx with possible endocarditis, ppm removed, received dapto/ceftaroline through PICC, PICC was dc and patient has been off abx for 2 weeks, plan was to observe off abx for 3 weeks and repeat labs. Patient had labs/bcx done yesterday with primary care, bcx were positive, patient otherwise does not have new symptoms or feel ill.     Problem/Plan - 1:  ·  Problem: Bacteremia.  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  Plan: continue with aldactone 25mg, euvolemic at this time    Problem/Plan - 3:  ·  Problem: Aortic stenosis.  Plan: S/P TAVR   - ?bioprosthetic valve endocarditis  Problem/Plan - 4:  Problem: Atrial fibrillation. Plan: On eliquis, Rate controlled on Metoprolol and Amio 100mg daily    Problem/Plan - 5:  ·  Problem: CAD (coronary artery disease).  Plan: On Plavix given TAVR, c/w statin.         Timothy Paredes DO [facility]  Cardiovascular Medicine  [phone]

## 2019-06-11 NOTE — PROGRESS NOTE ADULT - SUBJECTIVE AND OBJECTIVE BOX
Subjective: Patient seen and examined. No new events except as noted.   doing well  L shoulder pain  mild nausea     REVIEW OF SYSTEMS:    CONSTITUTIONAL: No weakness, fevers or chills  EYES/ENT: No visual changes;  No vertigo or throat pain   NECK: No pain or stiffness  RESPIRATORY: No cough, wheezing, hemoptysis; No shortness of breath  CARDIOVASCULAR: No chest pain or palpitations  GASTROINTESTINAL: + nausea   GENITOURINARY: No dysuria, frequency or hematuria  NEUROLOGICAL:  shoulder pain   SKIN: No itching, burning, rashes, or lesions   All other review of systems is negative unless indicated above.    MEDICATIONS:  MEDICATIONS  (STANDING):  amiodarone    Tablet 200 milliGRAM(s) Oral daily  atorvastatin 40 milliGRAM(s) Oral at bedtime  buDESOnide  80 MICROgram(s)/formoterol 4.5 MICROgram(s) Inhaler 2 Puff(s) Inhalation two times a day  ceftaroline fosamil IVPB 600 milliGRAM(s) IV Intermittent every 8 hours  clopidogrel Tablet 75 milliGRAM(s) Oral daily  DAPTOmycin IVPB 850 milliGRAM(s) IV Intermittent every 24 hours  docusate sodium 100 milliGRAM(s) Oral three times a day  finasteride 5 milliGRAM(s) Oral daily  lactobacillus acidophilus 1 Tablet(s) Oral three times a day with meals  lidocaine   Patch 1 Patch Transdermal daily  lidocaine   Patch 1 Patch Transdermal daily  linezolid    Tablet 600 milliGRAM(s) Oral every 12 hours  melatonin 3 milliGRAM(s) Oral at bedtime  metoprolol tartrate 25 milliGRAM(s) Oral two times a day  multivitamin 1 Tablet(s) Oral daily  pantoprazole    Tablet 40 milliGRAM(s) Oral before breakfast  senna 2 Tablet(s) Oral at bedtime  sodium chloride 0.9%. 1000 milliLiter(s) (75 mL/Hr) IV Continuous <Continuous>  tamsulosin 0.4 milliGRAM(s) Oral at bedtime  thiamine 100 milliGRAM(s) Oral daily      PHYSICAL EXAM:  T(C): 36.3 (06-11-19 @ 11:39), Max: 36.6 (06-10-19 @ 21:07)  HR: 64 (06-11-19 @ 11:39) (64 - 73)  BP: 95/58 (06-11-19 @ 11:39) (95/58 - 123/73)  RR: 18 (06-11-19 @ 11:39) (18 - 18)  SpO2: 95% (06-11-19 @ 11:39) (94% - 96%)  Wt(kg): --  I&O's Summary    10 Rey 2019 07:01  -  11 Jun 2019 07:00  --------------------------------------------------------  IN: 660 mL / OUT: 880 mL / NET: -220 mL    11 Jun 2019 07:01  -  11 Jun 2019 16:22  --------------------------------------------------------  IN: 480 mL / OUT: 0 mL / NET: 480 mL          Appearance: Normal	  HEENT:   Normal oral mucosa, PERRL, EOMI	  Lymphatic: No lymphadenopathy , no edema  Cardiovascular: Normal S1 S2, No JVD, No murmurs , Peripheral pulses palpable 2+ bilaterally  Respiratory: Lungs clear to auscultation, normal effort 	  Gastrointestinal:  Soft, Non-tender, + BS	  Skin: No rashes, No ecchymoses, No cyanosis, warm to touch  Musculoskeletal: Normal range of motion, normal strength  Psychiatry:  Mood & affect appropriate  Ext: No edema      All labs, Imaging and EKGs personally reviewed                           9.0    7.07  )-----------( 191      ( 10 Rey 2019 08:40 )             29.1               06-11    134<L>  |  101  |  20  ----------------------------<  83  4.9   |  20<L>  |  1.26    Ca    9.1      11 Jun 2019 07:01             CARDIAC MARKERS ( 11 Jun 2019 14:55 )  x     / x     / 39 U/L / x     / x

## 2019-06-11 NOTE — PROGRESS NOTE ADULT - ASSESSMENT
87 yr old with CKD, COPD, BPH, PPM, Y AVR due to AS, CHF. 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    1. COPD - stable  c/w Symbicort BID and Albuterol PRN, Spiriva  Out of bed to chair, incentive spirometry.  2. 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  Lower back and shoulder pain - patient states this is chronic but recently worsened.   *************  5/29-await results of cx; re-scan of back likely  5/30-ID/CTS f/up-picc placed (Roman et al)  5/31-for GI evaln if other w/up neg to assess for source of bacteria (MRI abnormal)  6/3- daptomycin/ceftarokine to continue--re-cx today  6/4-re-MARIA LUISA in 1 week if remains bacteremic--consider valv replacement  6/5-blood cx pending; 6/6-dizzy upon ambulation  6/7-ID/cards f/up  6/10-blood cx have remained +--ID f/up, ambulate 87 yr old with CKD, COPD, BPH, PPM, Y AVR due to AS, CHF. 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    1. COPD - stable  c/w Symbicort BID and Albuterol PRN, Spiriva  Out of bed to chair, incentive spirometry.  2. 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  Lower back and shoulder pain - patient states this is chronic but recently worsened.   *************  5/29-await results of cx; re-scan of back likely  5/30-ID/CTS f/up-picc placed (Roman et al)  5/31-for GI evaln if other w/up neg to assess for source of bacteria (MRI abnormal)  6/3- daptomycin/ceftarokine to continue--re-cx today  6/4-re-MARIA LUISA in 1 week if remains bacteremic--consider valv replacement  6/5-blood cx pending; 6/6-dizzy upon ambulation  6/7-ID/cards f/up  6/10-blood cx have remained +--ID f/up, ambulate  6/11- ID f/up

## 2019-06-11 NOTE — PROGRESS NOTE ADULT - ATTENDING COMMENTS
as above-stable-await PICC placement (re-evaluation by CTS/ID f/up-[place]); ? require GI evaln-no absolute contras to EGD/colonoscopy-on hold; may require repeat MARIA LUISA if still bacteremic (consider [facility] if abnormal)  ID-bacteremia--on dapto/ceftaroline-f/up all cx-s/p bone studies (mri abnormal-unclear if enough to base source of infection/shoulder-unrevealing)--considering addition of zyvox--daily BCx at this point  COPD-symbiocrt/spiriva, incentive spirometry/acapella  PAH-no defin. rx  Cards-AF etc.--mult rx-consider MARIA LUISA over next week  DVT.GI proph--OOB  PT needed                      GI evaln on hold based on notes  Arjun Mack MD-Pulmonary   [phone] as above-stable-await PICC placement (re-evaluation by CTS/ID f/up-[place]); ? require GI evaln-no absolute contras to EGD/colonoscopy-on hold; may require repeat MARIA LUISA if still bacteremic (consider [facility] if abnormal)  ID-bacteremia--on dapto/ceftaroline-f/up all cx-s/p bone studies (mri abnormal-unclear if enough to base source of infection/shoulder-unrevealing)--s/p addition of zyvox--daily BCx at this point--cleared for picc line  COPD-symbiocrt/spiriva, incentive spirometry/acapella  PAH-no defin. rx  Cards-AF etc.--mult rx-consider MARIA LUISA over next week  DVT.GI proph--OOB  PT needed                      GI evaln on hold based on notes  Arjun Mack MD-Pulmonary   [phone]

## 2019-06-12 ENCOUNTER — TRANSCRIPTION ENCOUNTER (OUTPATIENT)
Age: 84
End: 2019-06-12

## 2019-06-12 PROCEDURE — 99232 SBSQ HOSP IP/OBS MODERATE 35: CPT

## 2019-06-12 RX ORDER — ALBUTEROL 90 UG/1
2 AEROSOL, METERED ORAL
Qty: 0 | Refills: 0 | DISCHARGE

## 2019-06-12 RX ORDER — SODIUM CHLORIDE 9 MG/ML
250 INJECTION INTRAMUSCULAR; INTRAVENOUS; SUBCUTANEOUS ONCE
Refills: 0 | Status: COMPLETED | OUTPATIENT
Start: 2019-06-12 | End: 2019-06-12

## 2019-06-12 RX ORDER — LACTOBACILLUS ACIDOPHILUS 100MM CELL
1 CAPSULE ORAL
Qty: 0 | Refills: 0 | DISCHARGE
Start: 2019-06-12

## 2019-06-12 RX ORDER — OXYCODONE HYDROCHLORIDE 5 MG/1
2.5 TABLET ORAL
Qty: 0 | Refills: 0 | DISCHARGE
Start: 2019-06-12

## 2019-06-12 RX ORDER — ALBUTEROL 90 UG/1
2 AEROSOL, METERED ORAL
Qty: 0 | Refills: 0 | DISCHARGE
Start: 2019-06-12

## 2019-06-12 RX ORDER — KETOROLAC TROMETHAMINE 30 MG/ML
15 SYRINGE (ML) INJECTION ONCE
Refills: 0 | Status: DISCONTINUED | OUTPATIENT
Start: 2019-06-12 | End: 2019-06-12

## 2019-06-12 RX ORDER — ONDANSETRON 8 MG/1
1 TABLET, FILM COATED ORAL
Qty: 0 | Refills: 0 | DISCHARGE
Start: 2019-06-12

## 2019-06-12 RX ORDER — ALBUTEROL 90 UG/1
3 AEROSOL, METERED ORAL
Qty: 0 | Refills: 0 | DISCHARGE

## 2019-06-12 RX ORDER — DOCUSATE SODIUM 100 MG
1 CAPSULE ORAL
Qty: 0 | Refills: 0 | DISCHARGE
Start: 2019-06-12

## 2019-06-12 RX ORDER — SENNA PLUS 8.6 MG/1
2 TABLET ORAL
Qty: 0 | Refills: 0 | DISCHARGE
Start: 2019-06-12

## 2019-06-12 RX ADMIN — Medication 1 TABLET(S): at 11:58

## 2019-06-12 RX ADMIN — Medication 100 MILLIGRAM(S): at 14:15

## 2019-06-12 RX ADMIN — CEFTAROLINE FOSAMIL 50 MILLIGRAM(S): 600 POWDER, FOR SOLUTION INTRAVENOUS at 07:23

## 2019-06-12 RX ADMIN — PANTOPRAZOLE SODIUM 40 MILLIGRAM(S): 20 TABLET, DELAYED RELEASE ORAL at 05:22

## 2019-06-12 RX ADMIN — FINASTERIDE 5 MILLIGRAM(S): 5 TABLET, FILM COATED ORAL at 11:59

## 2019-06-12 RX ADMIN — LINEZOLID 600 MILLIGRAM(S): 600 INJECTION, SOLUTION INTRAVENOUS at 05:22

## 2019-06-12 RX ADMIN — SENNA PLUS 2 TABLET(S): 8.6 TABLET ORAL at 21:51

## 2019-06-12 RX ADMIN — Medication 15 MILLIGRAM(S): at 16:16

## 2019-06-12 RX ADMIN — LIDOCAINE 1 PATCH: 4 CREAM TOPICAL at 06:00

## 2019-06-12 RX ADMIN — CEFTAROLINE FOSAMIL 50 MILLIGRAM(S): 600 POWDER, FOR SOLUTION INTRAVENOUS at 15:10

## 2019-06-12 RX ADMIN — Medication 1 TABLET(S): at 08:32

## 2019-06-12 RX ADMIN — LINEZOLID 600 MILLIGRAM(S): 600 INJECTION, SOLUTION INTRAVENOUS at 17:24

## 2019-06-12 RX ADMIN — ATORVASTATIN CALCIUM 40 MILLIGRAM(S): 80 TABLET, FILM COATED ORAL at 21:51

## 2019-06-12 RX ADMIN — BUDESONIDE AND FORMOTEROL FUMARATE DIHYDRATE 2 PUFF(S): 160; 4.5 AEROSOL RESPIRATORY (INHALATION) at 18:24

## 2019-06-12 RX ADMIN — Medication 100 MILLIGRAM(S): at 05:22

## 2019-06-12 RX ADMIN — DAPTOMYCIN 134 MILLIGRAM(S): 500 INJECTION, POWDER, LYOPHILIZED, FOR SOLUTION INTRAVENOUS at 14:15

## 2019-06-12 RX ADMIN — Medication 25 MILLIGRAM(S): at 05:22

## 2019-06-12 RX ADMIN — LIDOCAINE 1 PATCH: 4 CREAM TOPICAL at 11:15

## 2019-06-12 RX ADMIN — Medication 100 MILLIGRAM(S): at 11:58

## 2019-06-12 RX ADMIN — LIDOCAINE 1 PATCH: 4 CREAM TOPICAL at 19:24

## 2019-06-12 RX ADMIN — SODIUM CHLORIDE 1000 MILLILITER(S): 9 INJECTION INTRAMUSCULAR; INTRAVENOUS; SUBCUTANEOUS at 17:37

## 2019-06-12 RX ADMIN — CEFTAROLINE FOSAMIL 50 MILLIGRAM(S): 600 POWDER, FOR SOLUTION INTRAVENOUS at 22:22

## 2019-06-12 RX ADMIN — BUDESONIDE AND FORMOTEROL FUMARATE DIHYDRATE 2 PUFF(S): 160; 4.5 AEROSOL RESPIRATORY (INHALATION) at 05:24

## 2019-06-12 RX ADMIN — Medication 15 MILLIGRAM(S): at 16:37

## 2019-06-12 RX ADMIN — Medication 3 MILLIGRAM(S): at 21:51

## 2019-06-12 RX ADMIN — TAMSULOSIN HYDROCHLORIDE 0.4 MILLIGRAM(S): 0.4 CAPSULE ORAL at 21:51

## 2019-06-12 RX ADMIN — AMIODARONE HYDROCHLORIDE 200 MILLIGRAM(S): 400 TABLET ORAL at 05:22

## 2019-06-12 RX ADMIN — Medication 1 TABLET(S): at 17:24

## 2019-06-12 RX ADMIN — CLOPIDOGREL BISULFATE 75 MILLIGRAM(S): 75 TABLET, FILM COATED ORAL at 11:58

## 2019-06-12 RX ADMIN — Medication 100 MILLIGRAM(S): at 21:51

## 2019-06-12 NOTE — DISCHARGE NOTE PROVIDER - HOSPITAL COURSE
87 yr old with CKD, COPD, BPH, PPM, Y AVR due to AS, CHF. 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    MARIA LUISA negative for IE    However high grade bacteremia suggests endovascular source    Has back pain,discitis OM on MR but no abscess    shoulder pain-chronic but increased recently    On dapto-ceftaroline    No other obvious focus    patient stable      MRI of pelvis negative    should xtrray more suggestive of calfic process  no sings of infection on exam     added  zyvox in view of poor response  so far bc still positve but will give it a fewer more days.    there are no good options left     most recent bc are negative           cleared for picc     plan to complete combination therapy and then Zyvox suppression as tolerated

## 2019-06-12 NOTE — PROGRESS NOTE ADULT - PROBLEM SELECTOR PLAN 1
ID f/up-on dapto/ceftaroline--MARIA LUISA unrevealing--s/p MRI spine-abnormal, picc to placed; daily blood cx at this point -last neg.  GI w/up if other studies non dx.; repeat MARIA LUISA w/in week if remains bacteremic

## 2019-06-12 NOTE — PROGRESS NOTE ADULT - SUBJECTIVE AND OBJECTIVE BOX
CHIEF COMPLAINT: f/up sob, copd, osas, abnormal ct chest-pain upon movements-no signif changes- but no cough or sob at rest; constipated    Interval Events: picc today    REVIEW OF SYSTEMS:  Constitutional: No fevers or chills. No weight loss. + fatigue or generalized malaise.  Eyes: No itching or discharge from the eyes  ENT: No ear pain. No ear discharge. No nasal congestion. No post nasal drip. No epistaxis. No throat pain. No sore throat. No difficulty swallowing.   CV: No chest pain. No palpitations. No lightheadedness or dizziness.   Resp: No dyspnea at rest. + dyspnea on exertion. No orthopnea. No wheezing. No cough. No stridor. No sputum production. No chest pain with respiration.  GI: No nausea. No vomiting. No diarrhea.  MSK: + joint pain or pain in any extremities  Integumentary: No skin lesions. No pedal edema.  Neurological: No gross motor weakness. No sensory changes.  [+ ] All other systems negative  [ ] Unable to assess ROS because ________    OBJECTIVE:  ICU Vital Signs Last 24 Hrs  T(C): 36.5 (12 Jun 2019 04:45), Max: 36.7 (11 Jun 2019 20:51)  T(F): 97.7 (12 Jun 2019 04:45), Max: 98 (11 Jun 2019 20:51)  HR: 67 (12 Jun 2019 04:45) (64 - 68)  BP: 109/69 (12 Jun 2019 04:45) (95/58 - 113/60)  BP(mean): --  ABP: --  ABP(mean): --  RR: 17 (12 Jun 2019 04:45) (17 - 18)  SpO2: 94% (12 Jun 2019 04:45) (92% - 98%)        06-10 @ 07:01  -  06-11 @ 07:00  --------------------------------------------------------  IN: 660 mL / OUT: 880 mL / NET: -220 mL    06-11 @ 07:01  -  06-12 @ 05:08  --------------------------------------------------------  IN: 880 mL / OUT: 460 mL / NET: 420 mL      CAPILLARY BLOOD GLUCOSE          PHYSICAL EXAM: NAD in bed  General: Awake, alert, oriented X 3.   HEENT: Atraumatic, normocephalic.                 Mallampatti Grade 3                No nasal congestion.                No tonsillar or pharyngeal exudates.  Lymph Nodes: No palpable lymphadenopathy  Neck: No JVD. No carotid bruit.   Respiratory: Normal chest expansion                         Normal percussion                         Normal and equal air entry                         No wheeze, rhonchi or rales.  Cardiovascular: S1 S2 normal. + murmurs, rubs or gallops.   Abdomen: Soft, non-tender, non-distended. No organomegaly. Normoactive bowel sounds.  Extremities: Warm to touch. Peripheral pulse palpable. No pedal edema.   Skin: No rashes or skin lesions  Neurological: Motor and sensory examination equal and normal in all four extremities.  Psychiatry: Appropriate mood and affect.    HOSPITAL MEDICATIONS:  MEDICATIONS  (STANDING):  amiodarone    Tablet 200 milliGRAM(s) Oral daily  atorvastatin 40 milliGRAM(s) Oral at bedtime  buDESOnide  80 MICROgram(s)/formoterol 4.5 MICROgram(s) Inhaler 2 Puff(s) Inhalation two times a day  ceftaroline fosamil IVPB 600 milliGRAM(s) IV Intermittent every 8 hours  clopidogrel Tablet 75 milliGRAM(s) Oral daily  DAPTOmycin IVPB 850 milliGRAM(s) IV Intermittent every 24 hours  docusate sodium 100 milliGRAM(s) Oral three times a day  finasteride 5 milliGRAM(s) Oral daily  lactobacillus acidophilus 1 Tablet(s) Oral three times a day with meals  lidocaine   Patch 1 Patch Transdermal daily  lidocaine   Patch 1 Patch Transdermal daily  linezolid    Tablet 600 milliGRAM(s) Oral every 12 hours  melatonin 3 milliGRAM(s) Oral at bedtime  metoprolol tartrate 25 milliGRAM(s) Oral two times a day  multivitamin 1 Tablet(s) Oral daily  pantoprazole    Tablet 40 milliGRAM(s) Oral before breakfast  senna 2 Tablet(s) Oral at bedtime  sodium chloride 0.9%. 1000 milliLiter(s) (75 mL/Hr) IV Continuous <Continuous>  tamsulosin 0.4 milliGRAM(s) Oral at bedtime  thiamine 100 milliGRAM(s) Oral daily    MEDICATIONS  (PRN):  ALBUTerol    90 MICROgram(s) HFA Inhaler 2 Puff(s) Inhalation every 6 hours PRN Shortness of Breath and/or Wheezing  ondansetron    Tablet 4 milliGRAM(s) Oral every 8 hours PRN Nausea and/or Vomiting  oxyCODONE    IR 2.5 milliGRAM(s) Oral every 6 hours PRN Moderate Pain (4 - 6)  polyethylene glycol 3350 17 Gram(s) Oral daily PRN Constipation      LABS:                        9.0    7.07  )-----------( 191      ( 10 Rey 2019 08:40 )             29.1     06-11    134<L>  |  101  |  20  ----------------------------<  83  4.9   |  20<L>  |  1.26    Ca    9.1      11 Jun 2019 07:01                MICROBIOLOGY:     RADIOLOGY:  [ ] Reviewed and interpreted by me    Point of Care Ultrasound Findings:    PFT:    EKG:

## 2019-06-12 NOTE — PROGRESS NOTE ADULT - SUBJECTIVE AND OBJECTIVE BOX
Patient is a 88y old  Male who presents with a chief complaint of bacteremia (12 Jun 2019 18:35)      INTERVAL HISTORY:     TELEMETRY:  	  MEDICATIONS:  amiodarone    Tablet 200 milliGRAM(s) Oral daily  metoprolol tartrate 25 milliGRAM(s) Oral two times a day  tamsulosin 0.4 milliGRAM(s) Oral at bedtime        PHYSICAL EXAM:  T(C): 36.7 (06-12-19 @ 21:10), Max: 36.7 (06-12-19 @ 21:10)  HR: 78 (06-12-19 @ 21:10) (62 - 78)  BP: 117/75 (06-12-19 @ 21:10) (83/49 - 117/75)  RR: 18 (06-12-19 @ 21:10) (17 - 18)  SpO2: 95% (06-12-19 @ 21:10) (94% - 97%)  Wt(kg): --  I&O's Summary    11 Jun 2019 07:01  -  12 Jun 2019 07:00  --------------------------------------------------------  IN: 1000 mL / OUT: 460 mL / NET: 540 mL    12 Jun 2019 07:01  -  12 Jun 2019 23:12  --------------------------------------------------------  IN: 350 mL / OUT: 0 mL / NET: 350 mL          Appearance: In no distress	  HEENT:    PERRL, EOMI	  Cardiovascular:  S1 S2, No JVD  Respiratory: Lungs clear to auscultation	  Gastrointestinal:  Soft, Non-tender, + BS	  Extremities:  No edema of LE            06-11    134<L>  |  101  |  20  ----------------------------<  83  4.9   |  20<L>  |  1.26    Ca    9.1      11 Jun 2019 07:01          Labs personally reviewed      ASSESSMENT/PLAN: 	          Timothy Paredes DO [facility]  Cardiovascular Medicine  [phone]

## 2019-06-12 NOTE — PROGRESS NOTE ADULT - ASSESSMENT
87 yr old with CKD, COPD, BPH, PPM, Y AVR due to AS, CHF. 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    1. COPD - stable  c/w Symbicort BID and Albuterol PRN, Spiriva  Out of bed to chair, incentive spirometry.  2. 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  Lower back and shoulder pain - patient states this is chronic but recently worsened.   *************  5/29-await results of cx; re-scan of back likely  5/30-ID/CTS f/up-picc placed (Roman et al)  5/31-for GI evaln if other w/up neg to assess for source of bacteria (MRI abnormal)  6/3- daptomycin/ceftarokine to continue--re-cx today  6/4-re-MARIA LUISA in 1 week if remains bacteremic--consider valv replacement  6/5-blood cx pending; 6/6-dizzy upon ambulation  6/7-ID/cards f/up  6/10-blood cx have remained +--ID f/up, ambulate  6/11- ID f/up  6/12- for PICC today

## 2019-06-12 NOTE — DISCHARGE NOTE PROVIDER - NSDCCPCAREPLAN_GEN_ALL_CORE_FT
PRINCIPAL DISCHARGE DIAGNOSIS  Diagnosis: VRE bacteremia  Assessment and Plan of Treatment: PRINCIPAL DISCHARGE DIAGNOSIS  Diagnosis: VRE bacteremia  Assessment and Plan of Treatment: PICC line placed  Continue antibiotics as prescribed      SECONDARY DISCHARGE DIAGNOSES  Diagnosis: DEBI (acute kidney injury)  Assessment and Plan of Treatment: Resolved    Diagnosis: Atrial fibrillation  Assessment and Plan of Treatment: Atrial fibrillation is the most common heart rhythm problem.  The condition puts you at risk for has stroke and heart attack  It helps if you control your blood pressure, not drink more than 1-2 alcohol drinks per day, cut down on caffeine, getting treatment for over active thyroid gland, and get regular exercise  Call your doctor if you feel your heart racing or beating unusually, chest tightness or pain, lightheaded, faint, shortness of breath especially with exercise  It is important to take your heart medication as prescribed  You may be on anticoagulation which is very important to take as directed - you may need blood work to monitor drug levels    Diagnosis: Shoulder pain  Assessment and Plan of Treatment: Pain control as needed PRINCIPAL DISCHARGE DIAGNOSIS  Diagnosis: VRE bacteremia  Assessment and Plan of Treatment: PICC line placed  Continue antibiotics as prescribed  Weekly blood work while on antibiotics, CBC, CMP      SECONDARY DISCHARGE DIAGNOSES  Diagnosis: DEBI (acute kidney injury)  Assessment and Plan of Treatment: Resolved    Diagnosis: Atrial fibrillation  Assessment and Plan of Treatment: Atrial fibrillation is the most common heart rhythm problem.  The condition puts you at risk for has stroke and heart attack  It helps if you control your blood pressure, not drink more than 1-2 alcohol drinks per day, cut down on caffeine, getting treatment for over active thyroid gland, and get regular exercise  Call your doctor if you feel your heart racing or beating unusually, chest tightness or pain, lightheaded, faint, shortness of breath especially with exercise  It is important to take your heart medication as prescribed  You may be on anticoagulation which is very important to take as directed - you may need blood work to monitor drug levels    Diagnosis: Shoulder pain  Assessment and Plan of Treatment: Pain control as needed

## 2019-06-12 NOTE — PROGRESS NOTE ADULT - PROBLEM SELECTOR PLAN 1
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

## 2019-06-12 NOTE — PROGRESS NOTE ADULT - ATTENDING COMMENTS
as above-stable-await PICC placement/ID f/up-[place]--zyvox suppression rx to follow  ID-bacteremia--on dapto/ceftaroline-f/up all cx-s/p bone studies (mri abnormal-unclear if enough to base source of infection/shoulder-unrevealing)--s/p addition of zyvox--daily BCx (most recent neg)--cleared for picc line  COPD-symbiocrt/spiriva, incentive spirometry/acapella  PAH-no defin. rx  Cards-AF etc.--mult rx-consider MARIA LUISA if needed  DVT.GI proph--OOB  PT needed                      GI evaln on hold based on notes  Arjun Mack MD-Pulmonary   [phone]

## 2019-06-12 NOTE — DISCHARGE NOTE PROVIDER - CARE PROVIDER_API CALL
Diego Owens (MD)  Infectious Disease; Internal Medicine  [address] [place] [address]  Phone: [phone]  Fax: [phone]  Follow Up Time:     Rolando Coronado (DO)  Medicine  [address] [facility] [address]  [place] [address]  Phone: [phone]  Fax: [phone]  Follow Up Time:     Timothy Paredes (DO)  Cardiovascular Disease; Internal Medicine; Nuclear Cardiology  [address] [facility], [facility] [address]  [place] [address]  Phone: [phone]  Fax: [phone]  Follow Up Time:

## 2019-06-12 NOTE — DISCHARGE NOTE PROVIDER - CARE PROVIDERS DIRECT ADDRESSES
,[email or web address],DirectAddress_Unknown,DirectAddress_Unknown

## 2019-06-12 NOTE — PROGRESS NOTE ADULT - SUBJECTIVE AND OBJECTIVE BOX
Subjective: Patient seen and examined. No new events except as noted.   L shoulder pain     REVIEW OF SYSTEMS:    CONSTITUTIONAL: No weakness, fevers or chills  EYES/ENT: No visual changes;  No vertigo or throat pain   NECK: No pain or stiffness  RESPIRATORY: No cough, wheezing, hemoptysis; No shortness of breath  CARDIOVASCULAR: No chest pain or palpitations  GASTROINTESTINAL: No abdominal or epigastric pain. No nausea, vomiting, or hematemesis; No diarrhea or constipation. No melena or hematochezia.  GENITOURINARY: No dysuria, frequency or hematuria  NEUROLOGICAL: shoulder pain with movement   SKIN: No itching, burning, rashes, or lesions   All other review of systems is negative unless indicated above.    MEDICATIONS:  MEDICATIONS  (STANDING):  amiodarone    Tablet 200 milliGRAM(s) Oral daily  atorvastatin 40 milliGRAM(s) Oral at bedtime  buDESOnide  80 MICROgram(s)/formoterol 4.5 MICROgram(s) Inhaler 2 Puff(s) Inhalation two times a day  ceftaroline fosamil IVPB 600 milliGRAM(s) IV Intermittent every 8 hours  clopidogrel Tablet 75 milliGRAM(s) Oral daily  DAPTOmycin IVPB 850 milliGRAM(s) IV Intermittent every 24 hours  docusate sodium 100 milliGRAM(s) Oral three times a day  finasteride 5 milliGRAM(s) Oral daily  lactobacillus acidophilus 1 Tablet(s) Oral three times a day with meals  lidocaine   Patch 1 Patch Transdermal daily  lidocaine   Patch 1 Patch Transdermal daily  linezolid    Tablet 600 milliGRAM(s) Oral every 12 hours  melatonin 3 milliGRAM(s) Oral at bedtime  metoprolol tartrate 25 milliGRAM(s) Oral two times a day  multivitamin 1 Tablet(s) Oral daily  pantoprazole    Tablet 40 milliGRAM(s) Oral before breakfast  senna 2 Tablet(s) Oral at bedtime  sodium chloride 0.9%. 1000 milliLiter(s) (75 mL/Hr) IV Continuous <Continuous>  tamsulosin 0.4 milliGRAM(s) Oral at bedtime  thiamine 100 milliGRAM(s) Oral daily      PHYSICAL EXAM:  T(C): 36.2 (06-12-19 @ 12:12), Max: 36.7 (06-11-19 @ 20:51)  HR: 62 (06-12-19 @ 18:28) (62 - 70)  BP: 102/62 (06-12-19 @ 18:28) (83/49 - 110/69)  RR: 18 (06-12-19 @ 18:28) (17 - 18)  SpO2: 94% (06-12-19 @ 18:28) (94% - 98%)  Wt(kg): --  I&O's Summary    11 Jun 2019 07:01  -  12 Jun 2019 07:00  --------------------------------------------------------  IN: 1000 mL / OUT: 460 mL / NET: 540 mL    12 Jun 2019 07:01  -  12 Jun 2019 18:35  --------------------------------------------------------  IN: 350 mL / OUT: 0 mL / NET: 350 mL          Appearance: Normal	  HEENT:   Normal oral mucosa, PERRL, EOMI	  Lymphatic: No lymphadenopathy , no edema  Cardiovascular: Normal S1 S2, No JVD, No murmurs , Peripheral pulses palpable 2+ bilaterally  Respiratory: Lungs clear to auscultation, normal effort 	  Gastrointestinal:  Soft, Non-tender, + BS	  Skin: L forearm IV infiltrate and skin tear, mild bleeding  Musculoskeletal: L shoulder pain with movement   Psychiatry:  Mood & affect appropriate  Ext: No edema      All labs, Imaging and EKGs personally reviewed                 06-11    134<L>  |  101  |  20  ----------------------------<  83  4.9   |  20<L>  |  1.26    Ca    9.1      11 Jun 2019 07:01             CARDIAC MARKERS ( 11 Jun 2019 14:55 )  x     / x     / 39 U/L / x     / x

## 2019-06-12 NOTE — PROGRESS NOTE ADULT - SUBJECTIVE AND OBJECTIVE BOX
infectious diseases progress note:    Patient is a 88y old  Male who presents with a chief complaint of bacteremia (12 Jun 2019 05:08)        Bacteremia        ROS:  CONSTITUTIONAL:  Negative fever or chills, feels well, good appetite  EYES:  Negative  blurry vision or double vision  CARDIOVASCULAR:  Negative for chest pain or palpitations  RESPIRATORY:  Negative for cough, wheezing, or SOB   GASTROINTESTINAL:  Negative for nausea, vomiting, diarrhea, constipation, or abdominal pain  GENITOURINARY:  Negative frequency, urgency or dysuria  NEUROLOGIC:  No headache, confusion, dizziness, lightheadedness    Allergies    Keppra (Rash)  penicillin (Unknown)    Intolerances        ANTIBIOTICS/RELEVANT:  antimicrobials  ceftaroline fosamil IVPB 600 milliGRAM(s) IV Intermittent every 8 hours  DAPTOmycin IVPB 850 milliGRAM(s) IV Intermittent every 24 hours  linezolid    Tablet 600 milliGRAM(s) Oral every 12 hours    immunologic:    OTHER:  ALBUTerol    90 MICROgram(s) HFA Inhaler 2 Puff(s) Inhalation every 6 hours PRN  amiodarone    Tablet 200 milliGRAM(s) Oral daily  atorvastatin 40 milliGRAM(s) Oral at bedtime  buDESOnide  80 MICROgram(s)/formoterol 4.5 MICROgram(s) Inhaler 2 Puff(s) Inhalation two times a day  clopidogrel Tablet 75 milliGRAM(s) Oral daily  docusate sodium 100 milliGRAM(s) Oral three times a day  finasteride 5 milliGRAM(s) Oral daily  lactobacillus acidophilus 1 Tablet(s) Oral three times a day with meals  lidocaine   Patch 1 Patch Transdermal daily  lidocaine   Patch 1 Patch Transdermal daily  melatonin 3 milliGRAM(s) Oral at bedtime  metoprolol tartrate 25 milliGRAM(s) Oral two times a day  multivitamin 1 Tablet(s) Oral daily  ondansetron    Tablet 4 milliGRAM(s) Oral every 8 hours PRN  oxyCODONE    IR 2.5 milliGRAM(s) Oral every 6 hours PRN  pantoprazole    Tablet 40 milliGRAM(s) Oral before breakfast  polyethylene glycol 3350 17 Gram(s) Oral daily PRN  senna 2 Tablet(s) Oral at bedtime  sodium chloride 0.9%. 1000 milliLiter(s) IV Continuous <Continuous>  tamsulosin 0.4 milliGRAM(s) Oral at bedtime  thiamine 100 milliGRAM(s) Oral daily      Objective:  Vital Signs Last 24 Hrs  T(C): 36.5 (12 Jun 2019 04:45), Max: 36.7 (11 Jun 2019 20:51)  T(F): 97.7 (12 Jun 2019 04:45), Max: 98 (11 Jun 2019 20:51)  HR: 67 (12 Jun 2019 04:45) (64 - 68)  BP: 109/69 (12 Jun 2019 04:45) (95/58 - 113/60)  BP(mean): --  RR: 17 (12 Jun 2019 04:45) (17 - 18)  SpO2: 94% (12 Jun 2019 04:45) (92% - 98%)    PHYSICAL EXAM:      Eyes:JESSEE, EOMI  Ear/Nose/Throat: no oral lesion, no sinus tenderness on percussion	  Neck:no JVD, no lymphadenopathy, supple  Respiratory: CTA marci  Cardiovascular: S1S2 RRR, no murmurs  Gastrointestinal:soft, (+) BS, no HSM  Extremities:no e/e/c        LABS:                        9.0    7.07  )-----------( 191      ( 10 Rey 2019 08:40 )             29.1     06-11    134<L>  |  101  |  20  ----------------------------<  83  4.9   |  20<L>  |  1.26    Ca    9.1      11 Jun 2019 07:01              MICROBIOLOGY:    RECENT CULTURES:  06-10 @ 10:06 .Blood                No growth to date.    06-10 @ 10:05 .Blood                No growth to date.    06-08 @ 10:16 .Blood                No growth to date.    06-05 @ 10:19 .Blood       Growth in anaerobic bottle: Gram Positive Cocci in Pairs and Chains  Growth in aerobic bottle: Gram Positive Cocci in Pairs and Chains           Growth in aerobic and anaerobic bottles: Enterococcus faecium (vancomycin  resistant)  See previous culture [id number]          RESPIRATORY CULTURES:              RADIOLOGY & ADDITIONAL STUDIES:        Pager [phone]  After 5 pm/weekends or if no response :[phone]

## 2019-06-12 NOTE — PROGRESS NOTE ADULT - ASSESSMENT
87 yr old with CKD, COPD, BPH, PPM, Y AVR due to AS, CHF. 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  there are no good options left    most recent bc are negative       cleared for picc   plan to complete combination therapy and then Zyvox suppression as tolerated   awaiting picc     discharge plans

## 2019-06-13 PROCEDURE — 71045 X-RAY EXAM CHEST 1 VIEW: CPT | Mod: 26

## 2019-06-13 PROCEDURE — 99232 SBSQ HOSP IP/OBS MODERATE 35: CPT

## 2019-06-13 RX ORDER — CHLORHEXIDINE GLUCONATE 213 G/1000ML
1 SOLUTION TOPICAL
Refills: 0 | Status: DISCONTINUED | OUTPATIENT
Start: 2019-06-13 | End: 2019-06-17

## 2019-06-13 RX ORDER — SODIUM CHLORIDE 9 MG/ML
10 INJECTION INTRAMUSCULAR; INTRAVENOUS; SUBCUTANEOUS
Refills: 0 | Status: DISCONTINUED | OUTPATIENT
Start: 2019-06-13 | End: 2019-06-17

## 2019-06-13 RX ADMIN — AMIODARONE HYDROCHLORIDE 200 MILLIGRAM(S): 400 TABLET ORAL at 06:17

## 2019-06-13 RX ADMIN — Medication 1 TABLET(S): at 08:39

## 2019-06-13 RX ADMIN — Medication 100 MILLIGRAM(S): at 06:17

## 2019-06-13 RX ADMIN — LIDOCAINE 1 PATCH: 4 CREAM TOPICAL at 19:45

## 2019-06-13 RX ADMIN — SENNA PLUS 2 TABLET(S): 8.6 TABLET ORAL at 21:33

## 2019-06-13 RX ADMIN — Medication 1 TABLET(S): at 11:56

## 2019-06-13 RX ADMIN — LINEZOLID 600 MILLIGRAM(S): 600 INJECTION, SOLUTION INTRAVENOUS at 06:17

## 2019-06-13 RX ADMIN — DAPTOMYCIN 134 MILLIGRAM(S): 500 INJECTION, POWDER, LYOPHILIZED, FOR SOLUTION INTRAVENOUS at 13:47

## 2019-06-13 RX ADMIN — CEFTAROLINE FOSAMIL 50 MILLIGRAM(S): 600 POWDER, FOR SOLUTION INTRAVENOUS at 06:19

## 2019-06-13 RX ADMIN — LIDOCAINE 1 PATCH: 4 CREAM TOPICAL at 23:56

## 2019-06-13 RX ADMIN — LINEZOLID 600 MILLIGRAM(S): 600 INJECTION, SOLUTION INTRAVENOUS at 17:40

## 2019-06-13 RX ADMIN — BUDESONIDE AND FORMOTEROL FUMARATE DIHYDRATE 2 PUFF(S): 160; 4.5 AEROSOL RESPIRATORY (INHALATION) at 06:17

## 2019-06-13 RX ADMIN — LIDOCAINE 1 PATCH: 4 CREAM TOPICAL at 11:26

## 2019-06-13 RX ADMIN — Medication 100 MILLIGRAM(S): at 21:33

## 2019-06-13 RX ADMIN — Medication 25 MILLIGRAM(S): at 17:47

## 2019-06-13 RX ADMIN — BUDESONIDE AND FORMOTEROL FUMARATE DIHYDRATE 2 PUFF(S): 160; 4.5 AEROSOL RESPIRATORY (INHALATION) at 17:40

## 2019-06-13 RX ADMIN — Medication 3 MILLIGRAM(S): at 21:33

## 2019-06-13 RX ADMIN — ATORVASTATIN CALCIUM 40 MILLIGRAM(S): 80 TABLET, FILM COATED ORAL at 21:33

## 2019-06-13 RX ADMIN — CEFTAROLINE FOSAMIL 50 MILLIGRAM(S): 600 POWDER, FOR SOLUTION INTRAVENOUS at 21:33

## 2019-06-13 RX ADMIN — Medication 100 MILLIGRAM(S): at 14:45

## 2019-06-13 RX ADMIN — FINASTERIDE 5 MILLIGRAM(S): 5 TABLET, FILM COATED ORAL at 11:56

## 2019-06-13 RX ADMIN — Medication 1 TABLET(S): at 17:40

## 2019-06-13 RX ADMIN — TAMSULOSIN HYDROCHLORIDE 0.4 MILLIGRAM(S): 0.4 CAPSULE ORAL at 21:33

## 2019-06-13 RX ADMIN — PANTOPRAZOLE SODIUM 40 MILLIGRAM(S): 20 TABLET, DELAYED RELEASE ORAL at 06:17

## 2019-06-13 RX ADMIN — CHLORHEXIDINE GLUCONATE 1 APPLICATION(S): 213 SOLUTION TOPICAL at 13:54

## 2019-06-13 RX ADMIN — CLOPIDOGREL BISULFATE 75 MILLIGRAM(S): 75 TABLET, FILM COATED ORAL at 11:56

## 2019-06-13 RX ADMIN — CEFTAROLINE FOSAMIL 50 MILLIGRAM(S): 600 POWDER, FOR SOLUTION INTRAVENOUS at 14:39

## 2019-06-13 RX ADMIN — Medication 100 MILLIGRAM(S): at 11:56

## 2019-06-13 NOTE — PROGRESS NOTE ADULT - ATTENDING COMMENTS
as above-stable-await PICC placement/ID f/up-[place]--zyvox suppression rx to follow  ID-bacteremia--on dapto/ceftaroline-f/up all cx-s/p bone studies (mri abnormal-unclear if enough to base source of infection/shoulder-unrevealing)--s/p addition of zyvox--daily BCx (most recent neg)--cleared for picc line  COPD-symbiocrt/spiriva, incentive spirometry/acapella  PAH-no defin. rx  Cards-AF etc.--mult rx-consider MARIA LUISA if needed  DVT.GI proph--OOB  PT needed                      GI evaln on hold based on notes  Arjun Mack MD-Pulmonary   [phone] as above-stable-await PICC placement/ID f/up-[place]--zyvox suppression rx to follow  ID-bacteremia--on dapto/ceftaroline-f/up all cx-s/p bone studies (mri abnormal-unclear if enough to base source of infection/shoulder-unrevealing)--s/p addition of zyvox--daily BCx (most recent neg)--cleared for picc line  COPD-symbiocrt/spiriva, incentive spirometry/acapella  PAH-no defin. rx  Cards-AF etc.--mult rx-consider MARIA LUISA if needed  DVT.GI proph--OOB  PT needed                      GI evaln on hold based on notes   DC pending PICC etc.  Arjun Mack MD-Pulmonary   [phone]

## 2019-06-13 NOTE — PROGRESS NOTE ADULT - SUBJECTIVE AND OBJECTIVE BOX
Patient is a 88y old  Male who presents with a chief complaint of bacteremia (13 Jun 2019 16:24)    	  MEDICATIONS:  amiodarone    Tablet 200 milliGRAM(s) Oral daily  metoprolol tartrate 25 milliGRAM(s) Oral two times a day  tamsulosin 0.4 milliGRAM(s) Oral at bedtime        PHYSICAL EXAM:  T(C): 36.4 (06-13-19 @ 20:53), Max: 36.8 (06-13-19 @ 13:27)  HR: 72 (06-13-19 @ 20:53) (52 - 81)  BP: 115/62 (06-13-19 @ 20:53) (108/67 - 133/80)  RR: 18 (06-13-19 @ 20:53) (18 - 18)  SpO2: 96% (06-13-19 @ 20:53) (94% - 97%)  Wt(kg): --  I&O's Summary    12 Jun 2019 07:01  -  13 Jun 2019 07:00  --------------------------------------------------------  IN: 570 mL / OUT: 400 mL / NET: 170 mL    13 Jun 2019 07:01  -  13 Jun 2019 23:16  --------------------------------------------------------  IN: 770 mL / OUT: 0 mL / NET: 770 mL          Appearance: In no distress	  HEENT:    PERRL, EOMI	  Cardiovascular:  S1 S2, No JVD  Respiratory: Lungs clear to auscultation	  Gastrointestinal:  Soft, Non-tender, + BS	  Extremities:  No edema of LE      Labs personally reviewed      Assessment and Plan:   Assessment:  · Assessment		  Patient is a 89 y/o male with PMH SSS/a fib on eliquis, 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 Was admitted recently and dx with possible endocarditis, ppm removed, received dapto/ceftaroline through PICC, PICC was dc and patient has been off abx for 2 weeks, plan was to observe off abx for 3 weeks and repeat labs. Patient had labs/bcx done yesterday with primary care, bcx were positive, patient otherwise does not have new symptoms or feel ill.     Problem/Plan - 1:  ·  Problem: Bacteremia.  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  Plan: continue with aldactone 25mg, euvolemic at this time    Problem/Plan - 3:  ·  Problem: Aortic stenosis.  Plan: S/P TAVR   - ?bioprosthetic valve endocarditis  Problem/Plan - 4:  Problem: Atrial fibrillation. Plan: On eliquis, Rate controlled on Metoprolol and Amio 100mg daily    Problem/Plan - 5:  ·  Problem: CAD (coronary artery disease).  Plan: On Plavix given TAVR, c/w statin.         Timothy Paredes DO [facility]  Cardiovascular Medicine  [phone]

## 2019-06-13 NOTE — PROGRESS NOTE ADULT - SUBJECTIVE AND OBJECTIVE BOX
CHIEF COMPLAINT: f/up sob, copd, osas, abnormal ct chest-pain upon movements-    Interval Events:    REVIEW OF SYSTEMS:  Constitutional: No fevers or chills. No weight loss. No fatigue or generalized malaise.  Eyes: No itching or discharge from the eyes  ENT: No ear pain. No ear discharge. No nasal congestion. No post nasal drip. No epistaxis. No throat pain. No sore throat. No difficulty swallowing.   CV: No chest pain. No palpitations. No lightheadedness or dizziness.   Resp: No dyspnea at rest. No dyspnea on exertion. No orthopnea. No wheezing. No cough. No stridor. No sputum production. No chest pain with respiration.  GI: No nausea. No vomiting. No diarrhea.  MSK: No joint pain or pain in any extremities  Integumentary: No skin lesions. No pedal edema.  Neurological: No gross motor weakness. No sensory changes.  [ ] All other systems negative  [ ] Unable to assess ROS because ________    OBJECTIVE:  ICU Vital Signs Last 24 Hrs  T(C): 36.5 (13 Jun 2019 04:03), Max: 36.7 (12 Jun 2019 21:10)  T(F): 97.7 (13 Jun 2019 04:03), Max: 98.1 (12 Jun 2019 21:10)  HR: 69 (13 Jun 2019 04:03) (62 - 78)  BP: 113/67 (13 Jun 2019 04:03) (83/49 - 117/75)  BP(mean): --  ABP: --  ABP(mean): --  RR: 18 (13 Jun 2019 04:03) (17 - 18)  SpO2: 94% (13 Jun 2019 04:03) (94% - 97%)        06-11 @ 07:01  -  06-12 @ 07:00  --------------------------------------------------------  IN: 1000 mL / OUT: 460 mL / NET: 540 mL    06-12 @ 07:01 - 06-13 @ 05:53  --------------------------------------------------------  IN: 350 mL / OUT: 0 mL / NET: 350 mL      CAPILLARY BLOOD GLUCOSE          PHYSICAL EXAM:  General: Awake, alert, oriented X 3.   HEENT: Atraumatic, normocephalic.                 Mallampatti Grade                 No nasal congestion.                No tonsillar or pharyngeal exudates.  Lymph Nodes: No palpable lymphadenopathy  Neck: No JVD. No carotid bruit.   Respiratory: Normal chest expansion                         Normal percussion                         Normal and equal air entry                         No wheeze, rhonchi or rales.  Cardiovascular: S1 S2 normal. No murmurs, rubs or gallops.   Abdomen: Soft, non-tender, non-distended. No organomegaly. Normoactive bowel sounds.  Extremities: Warm to touch. Peripheral pulse palpable. No pedal edema.   Skin: No rashes or skin lesions  Neurological: Motor and sensory examination equal and normal in all four extremities.  Psychiatry: Appropriate mood and affect.    HOSPITAL MEDICATIONS:  MEDICATIONS  (STANDING):  amiodarone    Tablet 200 milliGRAM(s) Oral daily  atorvastatin 40 milliGRAM(s) Oral at bedtime  buDESOnide  80 MICROgram(s)/formoterol 4.5 MICROgram(s) Inhaler 2 Puff(s) Inhalation two times a day  ceftaroline fosamil IVPB 600 milliGRAM(s) IV Intermittent every 8 hours  clopidogrel Tablet 75 milliGRAM(s) Oral daily  DAPTOmycin IVPB 850 milliGRAM(s) IV Intermittent every 24 hours  docusate sodium 100 milliGRAM(s) Oral three times a day  finasteride 5 milliGRAM(s) Oral daily  lactobacillus acidophilus 1 Tablet(s) Oral three times a day with meals  lidocaine   Patch 1 Patch Transdermal daily  lidocaine   Patch 1 Patch Transdermal daily  linezolid    Tablet 600 milliGRAM(s) Oral every 12 hours  melatonin 3 milliGRAM(s) Oral at bedtime  metoprolol tartrate 25 milliGRAM(s) Oral two times a day  multivitamin 1 Tablet(s) Oral daily  pantoprazole    Tablet 40 milliGRAM(s) Oral before breakfast  senna 2 Tablet(s) Oral at bedtime  sodium chloride 0.9%. 1000 milliLiter(s) (75 mL/Hr) IV Continuous <Continuous>  tamsulosin 0.4 milliGRAM(s) Oral at bedtime  thiamine 100 milliGRAM(s) Oral daily    MEDICATIONS  (PRN):  ALBUTerol    90 MICROgram(s) HFA Inhaler 2 Puff(s) Inhalation every 6 hours PRN Shortness of Breath and/or Wheezing  ondansetron    Tablet 4 milliGRAM(s) Oral every 8 hours PRN Nausea and/or Vomiting  oxyCODONE    IR 2.5 milliGRAM(s) Oral every 6 hours PRN Moderate Pain (4 - 6)  polyethylene glycol 3350 17 Gram(s) Oral daily PRN Constipation      LABS:    06-11    134<L>  |  101  |  20  ----------------------------<  83  4.9   |  20<L>  |  1.26    Ca    9.1      11 Jun 2019 07:01                MICROBIOLOGY:     RADIOLOGY:  [ ] Reviewed and interpreted by me    Point of Care Ultrasound Findings:    PFT:    EKG: CHIEF COMPLAINT: f/up sob, copd, osas, abnormal ct chest-pain upon movements-better day over all-some sob but no cough, better sleep    Interval Events: PICC today    REVIEW OF SYSTEMS:  Constitutional: No fevers or chills. No weight loss. + fatigue or generalized malaise.  Eyes: No itching or discharge from the eyes  ENT: No ear pain. No ear discharge. No nasal congestion. No post nasal drip. No epistaxis. No throat pain. No sore throat. No difficulty swallowing.   CV: No chest pain. No palpitations. No lightheadedness or dizziness.   Resp: No dyspnea at rest. + dyspnea on exertion. No orthopnea. No wheezing. No cough. No stridor. No sputum production. No chest pain with respiration.  GI: No nausea. No vomiting. No diarrhea.  MSK: + joint pain or pain in any extremities  Integumentary: No skin lesions. No pedal edema.  Neurological: No gross motor weakness. No sensory changes.  [+ ] All other systems negative  [ ] Unable to assess ROS because ________    OBJECTIVE:  ICU Vital Signs Last 24 Hrs  T(C): 36.5 (13 Jun 2019 04:03), Max: 36.7 (12 Jun 2019 21:10)  T(F): 97.7 (13 Jun 2019 04:03), Max: 98.1 (12 Jun 2019 21:10)  HR: 69 (13 Jun 2019 04:03) (62 - 78)  BP: 113/67 (13 Jun 2019 04:03) (83/49 - 117/75)  BP(mean): --  ABP: --  ABP(mean): --  RR: 18 (13 Jun 2019 04:03) (17 - 18)  SpO2: 94% (13 Jun 2019 04:03) (94% - 97%)        06-11 @ 07:01  -  06-12 @ 07:00  --------------------------------------------------------  IN: 1000 mL / OUT: 460 mL / NET: 540 mL    06-12 @ 07:01  -  06-13 @ 05:53  --------------------------------------------------------  IN: 350 mL / OUT: 0 mL / NET: 350 mL      CAPILLARY BLOOD GLUCOSE          PHYSICAL EXAM: NAD in bed on RA  General: Awake, alert, oriented X 3.   HEENT: Atraumatic, normocephalic.                 Mallampatti Grade 3                No nasal congestion.                No tonsillar or pharyngeal exudates.  Lymph Nodes: No palpable lymphadenopathy  Neck: No JVD. No carotid bruit.   Respiratory: Normal chest expansion                         Normal percussion                         Normal and equal air entry                         No wheeze, rhonchi or rales.  Cardiovascular: S1 S2 normal. + murmurs, rubs or gallops.   Abdomen: Soft, non-tender, non-distended. No organomegaly. Normoactive bowel sounds.  Extremities: Warm to touch. Peripheral pulse palpable. No pedal edema.   Skin: No rashes or skin lesions  Neurological: Motor and sensory examination equal and normal in all four extremities.  Psychiatry: Appropriate mood and affect.    HOSPITAL MEDICATIONS:  MEDICATIONS  (STANDING):  amiodarone    Tablet 200 milliGRAM(s) Oral daily  atorvastatin 40 milliGRAM(s) Oral at bedtime  buDESOnide  80 MICROgram(s)/formoterol 4.5 MICROgram(s) Inhaler 2 Puff(s) Inhalation two times a day  ceftaroline fosamil IVPB 600 milliGRAM(s) IV Intermittent every 8 hours  clopidogrel Tablet 75 milliGRAM(s) Oral daily  DAPTOmycin IVPB 850 milliGRAM(s) IV Intermittent every 24 hours  docusate sodium 100 milliGRAM(s) Oral three times a day  finasteride 5 milliGRAM(s) Oral daily  lactobacillus acidophilus 1 Tablet(s) Oral three times a day with meals  lidocaine   Patch 1 Patch Transdermal daily  lidocaine   Patch 1 Patch Transdermal daily  linezolid    Tablet 600 milliGRAM(s) Oral every 12 hours  melatonin 3 milliGRAM(s) Oral at bedtime  metoprolol tartrate 25 milliGRAM(s) Oral two times a day  multivitamin 1 Tablet(s) Oral daily  pantoprazole    Tablet 40 milliGRAM(s) Oral before breakfast  senna 2 Tablet(s) Oral at bedtime  sodium chloride 0.9%. 1000 milliLiter(s) (75 mL/Hr) IV Continuous <Continuous>  tamsulosin 0.4 milliGRAM(s) Oral at bedtime  thiamine 100 milliGRAM(s) Oral daily    MEDICATIONS  (PRN):  ALBUTerol    90 MICROgram(s) HFA Inhaler 2 Puff(s) Inhalation every 6 hours PRN Shortness of Breath and/or Wheezing  ondansetron    Tablet 4 milliGRAM(s) Oral every 8 hours PRN Nausea and/or Vomiting  oxyCODONE    IR 2.5 milliGRAM(s) Oral every 6 hours PRN Moderate Pain (4 - 6)  polyethylene glycol 3350 17 Gram(s) Oral daily PRN Constipation      LABS:    06-11    134<L>  |  101  |  20  ----------------------------<  83  4.9   |  20<L>  |  1.26    Ca    9.1      11 Jun 2019 07:01                MICROBIOLOGY:     RADIOLOGY:  [ ] Reviewed and interpreted by me    Point of Care Ultrasound Findings:    PFT:    EKG:

## 2019-06-13 NOTE — PROGRESS NOTE ADULT - ASSESSMENT
87 yr old with CKD, COPD, BPH, PPM, Y AVR due to AS, CHF. 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    1. COPD - stable  c/w Symbicort BID and Albuterol PRN, Spiriva  Out of bed to chair, incentive spirometry.  2. 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  Lower back and shoulder pain - patient states this is chronic but recently worsened.   *************  5/29-await results of cx; re-scan of back likely  5/30-ID/CTS f/up-picc placed (Roman et al)  5/31-for GI evaln if other w/up neg to assess for source of bacteria (MRI abnormal)  6/3- daptomycin/ceftarokine to continue--re-cx today  6/4-re-MARIA LUISA in 1 week if remains bacteremic--consider valv replacement  6/5-blood cx pending; 6/6-dizzy upon ambulation  6/7-ID/cards f/up  6/10-blood cx have remained +--ID f/up, ambulate  6/11- ID f/up  6/12- for PICC today 87 yr old with CKD, COPD, BPH, PPM, Y AVR due to AS, CHF. 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    1. COPD - stable  c/w Symbicort BID and Albuterol PRN, Spiriva  Out of bed to chair, incentive spirometry.  2. 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  Lower back and shoulder pain - patient states this is chronic but recently worsened.   *************  5/29-await results of cx; re-scan of back likely  5/30-ID/CTS f/up-picc placed (Roman et al)  5/31-for GI evaln if other w/up neg to assess for source of bacteria (MRI abnormal)  6/3- daptomycin/ceftarokine to continue--re-cx today  6/4-re-MARIA LUISA in 1 week if remains bacteremic--consider valv replacement  6/5-blood cx pending; 6/6-dizzy upon ambulation  6/7-ID/cards f/up  6/10-blood cx have remained +--ID f/up, ambulate  6/11- ID f/up  6/12- for PICC today  6/13- await PICC-? today (better day)

## 2019-06-13 NOTE — PROGRESS NOTE ADULT - SUBJECTIVE AND OBJECTIVE BOX
Subjective: Patient seen and examined. No new events except as noted.   doing well  PICC line today  can restart eliquis after PICC     REVIEW OF SYSTEMS:    CONSTITUTIONAL: No weakness, fevers or chills  EYES/ENT: No visual changes;  No vertigo or throat pain   NECK: No pain or stiffness  RESPIRATORY: No cough, wheezing, hemoptysis; No shortness of breath  CARDIOVASCULAR: No chest pain or palpitations  GASTROINTESTINAL: No abdominal or epigastric pain. No nausea, vomiting, or hematemesis; No diarrhea or constipation. No melena or hematochezia.  GENITOURINARY: No dysuria, frequency or hematuria  NEUROLOGICAL: L shoulder pain   SKIN: No itching, burning, rashes, or lesions   All other review of systems is negative unless indicated above.    MEDICATIONS:  MEDICATIONS  (STANDING):  amiodarone    Tablet 200 milliGRAM(s) Oral daily  atorvastatin 40 milliGRAM(s) Oral at bedtime  buDESOnide  80 MICROgram(s)/formoterol 4.5 MICROgram(s) Inhaler 2 Puff(s) Inhalation two times a day  ceftaroline fosamil IVPB 600 milliGRAM(s) IV Intermittent every 8 hours  chlorhexidine 4% Liquid 1 Application(s) Topical <User Schedule>  clopidogrel Tablet 75 milliGRAM(s) Oral daily  DAPTOmycin IVPB 850 milliGRAM(s) IV Intermittent every 24 hours  docusate sodium 100 milliGRAM(s) Oral three times a day  finasteride 5 milliGRAM(s) Oral daily  lactobacillus acidophilus 1 Tablet(s) Oral three times a day with meals  lidocaine   Patch 1 Patch Transdermal daily  lidocaine   Patch 1 Patch Transdermal daily  linezolid    Tablet 600 milliGRAM(s) Oral every 12 hours  melatonin 3 milliGRAM(s) Oral at bedtime  metoprolol tartrate 25 milliGRAM(s) Oral two times a day  multivitamin 1 Tablet(s) Oral daily  pantoprazole    Tablet 40 milliGRAM(s) Oral before breakfast  senna 2 Tablet(s) Oral at bedtime  sodium chloride 0.9%. 1000 milliLiter(s) (75 mL/Hr) IV Continuous <Continuous>  tamsulosin 0.4 milliGRAM(s) Oral at bedtime  thiamine 100 milliGRAM(s) Oral daily      PHYSICAL EXAM:  T(C): 36.8 (06-13-19 @ 13:27), Max: 36.8 (06-13-19 @ 13:27)  HR: 52 (06-13-19 @ 13:27) (52 - 78)  BP: 108/67 (06-13-19 @ 13:27) (83/49 - 117/75)  RR: 18 (06-13-19 @ 13:27) (18 - 18)  SpO2: 97% (06-13-19 @ 13:27) (94% - 97%)  Wt(kg): --  I&O's Summary    12 Jun 2019 07:01  -  13 Jun 2019 07:00  --------------------------------------------------------  IN: 570 mL / OUT: 400 mL / NET: 170 mL    13 Jun 2019 07:01  -  13 Jun 2019 16:25  --------------------------------------------------------  IN: 530 mL / OUT: 0 mL / NET: 530 mL          Appearance: Normal	  HEENT:   Normal oral mucosa, PERRL, EOMI	  Lymphatic: No lymphadenopathy , no edema  Cardiovascular: Normal S1 S2, No JVD, No murmurs , Peripheral pulses palpable 2+ bilaterally  Respiratory: Lungs clear to auscultation, normal effort 	  Gastrointestinal:  Soft, Non-tender, + BS	  Skin: No rashes, No ecchymoses, No cyanosis, warm to touch  Musculoskeletal: L shoulder pain   Psychiatry:  Mood & affect appropriate  Ext: No edema      All labs, Imaging and EKGs personally reviewed

## 2019-06-13 NOTE — PROGRESS NOTE ADULT - ASSESSMENT
87 yr old with CKD, COPD, BPH, PPM, Y AVR due to AS, CHF. 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  there are no good options left    most recent bc are negative       cleared for picc   plan to complete combination therapy and then Zyvox suppression as tolerated   awaiting picc     discharge plans  discussed with care coordinator   we have some flexibility in when we go to Zyvox alone

## 2019-06-13 NOTE — PROGRESS NOTE ADULT - PROBLEM SELECTOR PLAN 1
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

## 2019-06-13 NOTE — PROGRESS NOTE ADULT - SUBJECTIVE AND OBJECTIVE BOX
infectious diseases progress note:    Patient is a 88y old  Male who presents with a chief complaint of bacteremia (13 Jun 2019 05:52)        Bacteremia           Allergies    Keppra (Rash)  penicillin (Unknown)    Intolerances        ANTIBIOTICS/RELEVANT:  antimicrobials  ceftaroline fosamil IVPB 600 milliGRAM(s) IV Intermittent every 8 hours  DAPTOmycin IVPB 850 milliGRAM(s) IV Intermittent every 24 hours  linezolid    Tablet 600 milliGRAM(s) Oral every 12 hours    immunologic:    OTHER:  ALBUTerol    90 MICROgram(s) HFA Inhaler 2 Puff(s) Inhalation every 6 hours PRN  amiodarone    Tablet 200 milliGRAM(s) Oral daily  atorvastatin 40 milliGRAM(s) Oral at bedtime  buDESOnide  80 MICROgram(s)/formoterol 4.5 MICROgram(s) Inhaler 2 Puff(s) Inhalation two times a day  clopidogrel Tablet 75 milliGRAM(s) Oral daily  docusate sodium 100 milliGRAM(s) Oral three times a day  finasteride 5 milliGRAM(s) Oral daily  lactobacillus acidophilus 1 Tablet(s) Oral three times a day with meals  lidocaine   Patch 1 Patch Transdermal daily  lidocaine   Patch 1 Patch Transdermal daily  melatonin 3 milliGRAM(s) Oral at bedtime  metoprolol tartrate 25 milliGRAM(s) Oral two times a day  multivitamin 1 Tablet(s) Oral daily  ondansetron    Tablet 4 milliGRAM(s) Oral every 8 hours PRN  oxyCODONE    IR 2.5 milliGRAM(s) Oral every 6 hours PRN  pantoprazole    Tablet 40 milliGRAM(s) Oral before breakfast  polyethylene glycol 3350 17 Gram(s) Oral daily PRN  senna 2 Tablet(s) Oral at bedtime  sodium chloride 0.9%. 1000 milliLiter(s) IV Continuous <Continuous>  tamsulosin 0.4 milliGRAM(s) Oral at bedtime  thiamine 100 milliGRAM(s) Oral daily      Objective:  Vital Signs Last 24 Hrs  T(C): 36.5 (13 Jun 2019 04:03), Max: 36.7 (12 Jun 2019 21:10)  T(F): 97.7 (13 Jun 2019 04:03), Max: 98.1 (12 Jun 2019 21:10)  HR: 69 (13 Jun 2019 04:03) (62 - 78)  BP: 113/67 (13 Jun 2019 04:03) (83/49 - 117/75)  BP(mean): --  RR: 18 (13 Jun 2019 04:03) (17 - 18)  SpO2: 94% (13 Jun 2019 04:03) (94% - 97%)    PHYSICAL EXAM:     Eyes:JESSEE, EOMI  Ear/Nose/Throat: no oral lesion, no sinus tenderness on percussion	  Neck:no JVD, no lymphadenopathy, supple  Respiratory: CTA marci  Cardiovascular: S1S2 RRR, no murmurs  Gastrointestinal:soft, (+) BS, no HSM  Extremities:no e/e/c        LABS:                  MICROBIOLOGY:    RECENT CULTURES:  06-11 @ 09:47 .Blood                No growth to date.    06-10 @ 10:06 .Blood                No growth to date.    06-10 @ 10:05 .Blood                No growth to date.    06-08 @ 10:16 .Blood                No growth to date.          RESPIRATORY CULTURES:              RADIOLOGY & ADDITIONAL STUDIES:        Pager [phone]  After 5 pm/weekends or if no response :[phone]

## 2019-06-14 LAB
ANION GAP SERPL CALC-SCNC: 10 MMOL/L — SIGNIFICANT CHANGE UP (ref 5–17)
BUN SERPL-MCNC: 20 MG/DL — SIGNIFICANT CHANGE UP (ref 7–23)
CALCIUM SERPL-MCNC: 9.2 MG/DL — SIGNIFICANT CHANGE UP (ref 8.4–10.5)
CHLORIDE SERPL-SCNC: 100 MMOL/L — SIGNIFICANT CHANGE UP (ref 96–108)
CO2 SERPL-SCNC: 22 MMOL/L — SIGNIFICANT CHANGE UP (ref 22–31)
CREAT SERPL-MCNC: 1.2 MG/DL — SIGNIFICANT CHANGE UP (ref 0.5–1.3)
GLUCOSE SERPL-MCNC: 78 MG/DL — SIGNIFICANT CHANGE UP (ref 70–99)
HCT VFR BLD CALC: 28.3 % — LOW (ref 39–50)
HGB BLD-MCNC: 8.7 G/DL — LOW (ref 13–17)
MCHC RBC-ENTMCNC: 26.2 PG — LOW (ref 27–34)
MCHC RBC-ENTMCNC: 30.7 GM/DL — LOW (ref 32–36)
MCV RBC AUTO: 85.2 FL — SIGNIFICANT CHANGE UP (ref 80–100)
PLATELET # BLD AUTO: 103 K/UL — LOW (ref 150–400)
POTASSIUM SERPL-MCNC: 4.6 MMOL/L — SIGNIFICANT CHANGE UP (ref 3.5–5.3)
POTASSIUM SERPL-SCNC: 4.6 MMOL/L — SIGNIFICANT CHANGE UP (ref 3.5–5.3)
RBC # BLD: 3.32 M/UL — LOW (ref 4.2–5.8)
RBC # FLD: 15.7 % — HIGH (ref 10.3–14.5)
SODIUM SERPL-SCNC: 132 MMOL/L — LOW (ref 135–145)
WBC # BLD: 5.46 K/UL — SIGNIFICANT CHANGE UP (ref 3.8–10.5)
WBC # FLD AUTO: 5.46 K/UL — SIGNIFICANT CHANGE UP (ref 3.8–10.5)

## 2019-06-14 PROCEDURE — 99232 SBSQ HOSP IP/OBS MODERATE 35: CPT

## 2019-06-14 RX ORDER — APIXABAN 2.5 MG/1
5 TABLET, FILM COATED ORAL EVERY 12 HOURS
Refills: 0 | Status: DISCONTINUED | OUTPATIENT
Start: 2019-06-14 | End: 2019-06-17

## 2019-06-14 RX ADMIN — Medication 25 MILLIGRAM(S): at 06:15

## 2019-06-14 RX ADMIN — AMIODARONE HYDROCHLORIDE 200 MILLIGRAM(S): 400 TABLET ORAL at 06:15

## 2019-06-14 RX ADMIN — PANTOPRAZOLE SODIUM 40 MILLIGRAM(S): 20 TABLET, DELAYED RELEASE ORAL at 06:14

## 2019-06-14 RX ADMIN — CEFTAROLINE FOSAMIL 50 MILLIGRAM(S): 600 POWDER, FOR SOLUTION INTRAVENOUS at 21:39

## 2019-06-14 RX ADMIN — LIDOCAINE 1 PATCH: 4 CREAM TOPICAL at 19:15

## 2019-06-14 RX ADMIN — LINEZOLID 600 MILLIGRAM(S): 600 INJECTION, SOLUTION INTRAVENOUS at 06:15

## 2019-06-14 RX ADMIN — SENNA PLUS 2 TABLET(S): 8.6 TABLET ORAL at 21:40

## 2019-06-14 RX ADMIN — LIDOCAINE 1 PATCH: 4 CREAM TOPICAL at 12:07

## 2019-06-14 RX ADMIN — Medication 100 MILLIGRAM(S): at 14:00

## 2019-06-14 RX ADMIN — CHLORHEXIDINE GLUCONATE 1 APPLICATION(S): 213 SOLUTION TOPICAL at 06:14

## 2019-06-14 RX ADMIN — Medication 100 MILLIGRAM(S): at 12:07

## 2019-06-14 RX ADMIN — BUDESONIDE AND FORMOTEROL FUMARATE DIHYDRATE 2 PUFF(S): 160; 4.5 AEROSOL RESPIRATORY (INHALATION) at 06:15

## 2019-06-14 RX ADMIN — ATORVASTATIN CALCIUM 40 MILLIGRAM(S): 80 TABLET, FILM COATED ORAL at 21:40

## 2019-06-14 RX ADMIN — CEFTAROLINE FOSAMIL 50 MILLIGRAM(S): 600 POWDER, FOR SOLUTION INTRAVENOUS at 06:15

## 2019-06-14 RX ADMIN — OXYCODONE HYDROCHLORIDE 2.5 MILLIGRAM(S): 5 TABLET ORAL at 02:40

## 2019-06-14 RX ADMIN — BUDESONIDE AND FORMOTEROL FUMARATE DIHYDRATE 2 PUFF(S): 160; 4.5 AEROSOL RESPIRATORY (INHALATION) at 17:32

## 2019-06-14 RX ADMIN — Medication 100 MILLIGRAM(S): at 06:15

## 2019-06-14 RX ADMIN — Medication 1 TABLET(S): at 17:32

## 2019-06-14 RX ADMIN — CEFTAROLINE FOSAMIL 50 MILLIGRAM(S): 600 POWDER, FOR SOLUTION INTRAVENOUS at 13:59

## 2019-06-14 RX ADMIN — FINASTERIDE 5 MILLIGRAM(S): 5 TABLET, FILM COATED ORAL at 12:07

## 2019-06-14 RX ADMIN — TAMSULOSIN HYDROCHLORIDE 0.4 MILLIGRAM(S): 0.4 CAPSULE ORAL at 21:40

## 2019-06-14 RX ADMIN — DAPTOMYCIN 134 MILLIGRAM(S): 500 INJECTION, POWDER, LYOPHILIZED, FOR SOLUTION INTRAVENOUS at 15:03

## 2019-06-14 RX ADMIN — OXYCODONE HYDROCHLORIDE 2.5 MILLIGRAM(S): 5 TABLET ORAL at 01:47

## 2019-06-14 RX ADMIN — LINEZOLID 600 MILLIGRAM(S): 600 INJECTION, SOLUTION INTRAVENOUS at 17:32

## 2019-06-14 RX ADMIN — Medication 100 MILLIGRAM(S): at 21:40

## 2019-06-14 RX ADMIN — Medication 3 MILLIGRAM(S): at 21:40

## 2019-06-14 RX ADMIN — APIXABAN 5 MILLIGRAM(S): 2.5 TABLET, FILM COATED ORAL at 17:34

## 2019-06-14 RX ADMIN — Medication 1 TABLET(S): at 12:06

## 2019-06-14 RX ADMIN — LIDOCAINE 1 PATCH: 4 CREAM TOPICAL at 21:32

## 2019-06-14 RX ADMIN — Medication 1 TABLET(S): at 12:08

## 2019-06-14 RX ADMIN — CLOPIDOGREL BISULFATE 75 MILLIGRAM(S): 75 TABLET, FILM COATED ORAL at 12:06

## 2019-06-14 RX ADMIN — Medication 25 MILLIGRAM(S): at 17:33

## 2019-06-14 NOTE — PROGRESS NOTE ADULT - ASSESSMENT
87 yr old with CKD, COPD, BPH, PPM, Y AVR due to AS, CHF. 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

## 2019-06-14 NOTE — PROGRESS NOTE ADULT - SUBJECTIVE AND OBJECTIVE BOX
CHIEF COMPLAINT:  f/up sob, copd, osas, abnormal ct chest-pain upon movements-    Interval Events:    REVIEW OF SYSTEMS:  Constitutional: No fevers or chills. No weight loss. No fatigue or generalized malaise.  Eyes: No itching or discharge from the eyes  ENT: No ear pain. No ear discharge. No nasal congestion. No post nasal drip. No epistaxis. No throat pain. No sore throat. No difficulty swallowing.   CV: No chest pain. No palpitations. No lightheadedness or dizziness.   Resp: No dyspnea at rest. No dyspnea on exertion. No orthopnea. No wheezing. No cough. No stridor. No sputum production. No chest pain with respiration.  GI: No nausea. No vomiting. No diarrhea.  MSK: No joint pain or pain in any extremities  Integumentary: No skin lesions. No pedal edema.  Neurological: No gross motor weakness. No sensory changes.  [ ] All other systems negative  [ ] Unable to assess ROS because ________    OBJECTIVE:  ICU Vital Signs Last 24 Hrs  T(C): 36.7 (14 Jun 2019 04:12), Max: 36.8 (13 Jun 2019 13:27)  T(F): 98 (14 Jun 2019 04:12), Max: 98.3 (13 Jun 2019 13:27)  HR: 70 (14 Jun 2019 04:12) (52 - 81)  BP: 117/68 (14 Jun 2019 04:12) (108/67 - 133/80)  BP(mean): --  ABP: --  ABP(mean): --  RR: 18 (14 Jun 2019 04:12) (18 - 18)  SpO2: 95% (14 Jun 2019 04:12) (95% - 97%)        06-12 @ 07:01 - 06-13 @ 07:00  --------------------------------------------------------  IN: 570 mL / OUT: 400 mL / NET: 170 mL    06-13 @ 07:01 - 06-14 @ 05:22  --------------------------------------------------------  IN: 770 mL / OUT: 0 mL / NET: 770 mL      CAPILLARY BLOOD GLUCOSE          PHYSICAL EXAM:  General: Awake, alert, oriented X 3.   HEENT: Atraumatic, normocephalic.                 Mallampatti Grade                 No nasal congestion.                No tonsillar or pharyngeal exudates.  Lymph Nodes: No palpable lymphadenopathy  Neck: No JVD. No carotid bruit.   Respiratory: Normal chest expansion                         Normal percussion                         Normal and equal air entry                         No wheeze, rhonchi or rales.  Cardiovascular: S1 S2 normal. No murmurs, rubs or gallops.   Abdomen: Soft, non-tender, non-distended. No organomegaly. Normoactive bowel sounds.  Extremities: Warm to touch. Peripheral pulse palpable. No pedal edema.   Skin: No rashes or skin lesions  Neurological: Motor and sensory examination equal and normal in all four extremities.  Psychiatry: Appropriate mood and affect.    HOSPITAL MEDICATIONS:  MEDICATIONS  (STANDING):  amiodarone    Tablet 200 milliGRAM(s) Oral daily  atorvastatin 40 milliGRAM(s) Oral at bedtime  buDESOnide  80 MICROgram(s)/formoterol 4.5 MICROgram(s) Inhaler 2 Puff(s) Inhalation two times a day  ceftaroline fosamil IVPB 600 milliGRAM(s) IV Intermittent every 8 hours  chlorhexidine 4% Liquid 1 Application(s) Topical <User Schedule>  clopidogrel Tablet 75 milliGRAM(s) Oral daily  DAPTOmycin IVPB 850 milliGRAM(s) IV Intermittent every 24 hours  docusate sodium 100 milliGRAM(s) Oral three times a day  finasteride 5 milliGRAM(s) Oral daily  lactobacillus acidophilus 1 Tablet(s) Oral three times a day with meals  lidocaine   Patch 1 Patch Transdermal daily  lidocaine   Patch 1 Patch Transdermal daily  linezolid    Tablet 600 milliGRAM(s) Oral every 12 hours  melatonin 3 milliGRAM(s) Oral at bedtime  metoprolol tartrate 25 milliGRAM(s) Oral two times a day  multivitamin 1 Tablet(s) Oral daily  pantoprazole    Tablet 40 milliGRAM(s) Oral before breakfast  senna 2 Tablet(s) Oral at bedtime  sodium chloride 0.9%. 1000 milliLiter(s) (75 mL/Hr) IV Continuous <Continuous>  tamsulosin 0.4 milliGRAM(s) Oral at bedtime  thiamine 100 milliGRAM(s) Oral daily    MEDICATIONS  (PRN):  ALBUTerol    90 MICROgram(s) HFA Inhaler 2 Puff(s) Inhalation every 6 hours PRN Shortness of Breath and/or Wheezing  ondansetron    Tablet 4 milliGRAM(s) Oral every 8 hours PRN Nausea and/or Vomiting  oxyCODONE    IR 2.5 milliGRAM(s) Oral every 6 hours PRN Moderate Pain (4 - 6)  polyethylene glycol 3350 17 Gram(s) Oral daily PRN Constipation  sodium chloride 0.9% lock flush 10 milliLiter(s) IV Push every 1 hour PRN Pre/post blood products, medications, blood draw, and to maintain line patency      LABS:                    MICROBIOLOGY:     RADIOLOGY:  [ ] Reviewed and interpreted by me    Point of Care Ultrasound Findings:    PFT:    EKG: CHIEF COMPLAINT:  f/up sob, copd, osas, abnormal ct chest-pain upon movements-good spirits over all--no signif sob or cough or pain; anxious to leave    Interval Events: picc placed    REVIEW OF SYSTEMS:  Constitutional: No fevers or chills. No weight loss. + fatigue or generalized malaise.  Eyes: No itching or discharge from the eyes  ENT: No ear pain. No ear discharge. No nasal congestion. No post nasal drip. No epistaxis. No throat pain. No sore throat. No difficulty swallowing.   CV: No chest pain. No palpitations. No lightheadedness or dizziness.   Resp: No dyspnea at rest. + dyspnea on exertion. No orthopnea. No wheezing. No cough. No stridor. No sputum production. No chest pain with respiration.  GI: No nausea. No vomiting. No diarrhea.  MSK: No joint pain or pain in any extremities  Integumentary: No skin lesions. No pedal edema.  Neurological: No gross motor weakness. No sensory changes.  [+ ] All other systems negative  [ ] Unable to assess ROS because ________    OBJECTIVE:  ICU Vital Signs Last 24 Hrs  T(C): 36.7 (14 Jun 2019 04:12), Max: 36.8 (13 Jun 2019 13:27)  T(F): 98 (14 Jun 2019 04:12), Max: 98.3 (13 Jun 2019 13:27)  HR: 70 (14 Jun 2019 04:12) (52 - 81)  BP: 117/68 (14 Jun 2019 04:12) (108/67 - 133/80)  BP(mean): --  ABP: --  ABP(mean): --  RR: 18 (14 Jun 2019 04:12) (18 - 18)  SpO2: 95% (14 Jun 2019 04:12) (95% - 97%)        06-12 @ 07:01  -  06-13 @ 07:00  --------------------------------------------------------  IN: 570 mL / OUT: 400 mL / NET: 170 mL    06-13 @ 07:01  -  06-14 @ 05:22  --------------------------------------------------------  IN: 770 mL / OUT: 0 mL / NET: 770 mL      CAPILLARY BLOOD GLUCOSE          PHYSICAL EXAM: NAD in bed  General: Awake, alert, oriented X 3.   HEENT: Atraumatic, normocephalic.                 Mallampatti Grade 3                No nasal congestion.                No tonsillar or pharyngeal exudates.  Lymph Nodes: No palpable lymphadenopathy  Neck: No JVD. No carotid bruit.   Respiratory: Normal chest expansion                         Normal percussion                         Normal and equal air entry                         No wheeze, rhonchi or rales.  Cardiovascular: S1 S2 normal. N+murmurs, rubs or gallops.   Abdomen: Soft, non-tender, non-distended. No organomegaly. Normoactive bowel sounds.  Extremities: Warm to touch. Peripheral pulse palpable. No pedal edema.   Skin: No rashes or skin lesions  Neurological: Motor and sensory examination equal and normal in all four extremities.  Psychiatry: Appropriate mood and affect.    HOSPITAL MEDICATIONS:  MEDICATIONS  (STANDING):  amiodarone    Tablet 200 milliGRAM(s) Oral daily  atorvastatin 40 milliGRAM(s) Oral at bedtime  buDESOnide  80 MICROgram(s)/formoterol 4.5 MICROgram(s) Inhaler 2 Puff(s) Inhalation two times a day  ceftaroline fosamil IVPB 600 milliGRAM(s) IV Intermittent every 8 hours  chlorhexidine 4% Liquid 1 Application(s) Topical <User Schedule>  clopidogrel Tablet 75 milliGRAM(s) Oral daily  DAPTOmycin IVPB 850 milliGRAM(s) IV Intermittent every 24 hours  docusate sodium 100 milliGRAM(s) Oral three times a day  finasteride 5 milliGRAM(s) Oral daily  lactobacillus acidophilus 1 Tablet(s) Oral three times a day with meals  lidocaine   Patch 1 Patch Transdermal daily  lidocaine   Patch 1 Patch Transdermal daily  linezolid    Tablet 600 milliGRAM(s) Oral every 12 hours  melatonin 3 milliGRAM(s) Oral at bedtime  metoprolol tartrate 25 milliGRAM(s) Oral two times a day  multivitamin 1 Tablet(s) Oral daily  pantoprazole    Tablet 40 milliGRAM(s) Oral before breakfast  senna 2 Tablet(s) Oral at bedtime  sodium chloride 0.9%. 1000 milliLiter(s) (75 mL/Hr) IV Continuous <Continuous>  tamsulosin 0.4 milliGRAM(s) Oral at bedtime  thiamine 100 milliGRAM(s) Oral daily    MEDICATIONS  (PRN):  ALBUTerol    90 MICROgram(s) HFA Inhaler 2 Puff(s) Inhalation every 6 hours PRN Shortness of Breath and/or Wheezing  ondansetron    Tablet 4 milliGRAM(s) Oral every 8 hours PRN Nausea and/or Vomiting  oxyCODONE    IR 2.5 milliGRAM(s) Oral every 6 hours PRN Moderate Pain (4 - 6)  polyethylene glycol 3350 17 Gram(s) Oral daily PRN Constipation  sodium chloride 0.9% lock flush 10 milliLiter(s) IV Push every 1 hour PRN Pre/post blood products, medications, blood draw, and to maintain line patency      LABS:                    MICROBIOLOGY:     RADIOLOGY:  [ ] Reviewed and interpreted by me    Point of Care Ultrasound Findings:    PFT:    EKG:

## 2019-06-14 NOTE — PROGRESS NOTE ADULT - PROBLEM SELECTOR PLAN 1
ID f/up-on dapto/ceftaroline--MARIA LUISA unrevealing--s/p MRI spine-abnormal, picc to placed; daily blood cx at this point -last neg.  GI w/up if other studies non dx.; repeat MARIA LUISA w/in week if remains bacteremic ID f/up-on dapto/ceftaroline--MARIA LUISA unrevealing--s/p MRI spine-abnormal, picc placed; daily blood cx at this point -last neg.  GI w/up if other studies non-dx (held)

## 2019-06-14 NOTE — PROGRESS NOTE ADULT - SUBJECTIVE AND OBJECTIVE BOX
Subjective: Patient seen and examined. No new events except as noted.   doing well  [place] and musculoskeletal pain   pain control  PT   L UE edema    REVIEW OF SYSTEMS:    CONSTITUTIONAL: No weakness, fevers or chills  EYES/ENT: No visual changes;  No vertigo or throat pain   NECK: No pain or stiffness  RESPIRATORY: No cough, wheezing, hemoptysis; No shortness of breath  CARDIOVASCULAR: No chest pain or palpitations  GASTROINTESTINAL: No abdominal or epigastric pain. No nausea, vomiting, or hematemesis; No diarrhea or constipation. No melena or hematochezia.  GENITOURINARY: No dysuria, frequency or hematuria  NEUROLOGICAL: L shoulder pain   SKIN: LUE swellign   All other review of systems is negative unless indicated above.    MEDICATIONS:  MEDICATIONS  (STANDING):  amiodarone    Tablet 200 milliGRAM(s) Oral daily  apixaban 5 milliGRAM(s) Oral every 12 hours  atorvastatin 40 milliGRAM(s) Oral at bedtime  buDESOnide  80 MICROgram(s)/formoterol 4.5 MICROgram(s) Inhaler 2 Puff(s) Inhalation two times a day  ceftaroline fosamil IVPB 600 milliGRAM(s) IV Intermittent every 8 hours  chlorhexidine 4% Liquid 1 Application(s) Topical <User Schedule>  clopidogrel Tablet 75 milliGRAM(s) Oral daily  DAPTOmycin IVPB 850 milliGRAM(s) IV Intermittent every 24 hours  docusate sodium 100 milliGRAM(s) Oral three times a day  finasteride 5 milliGRAM(s) Oral daily  lactobacillus acidophilus 1 Tablet(s) Oral three times a day with meals  lidocaine   Patch 1 Patch Transdermal daily  lidocaine   Patch 1 Patch Transdermal daily  linezolid    Tablet 600 milliGRAM(s) Oral every 12 hours  melatonin 3 milliGRAM(s) Oral at bedtime  metoprolol tartrate 25 milliGRAM(s) Oral two times a day  multivitamin 1 Tablet(s) Oral daily  pantoprazole    Tablet 40 milliGRAM(s) Oral before breakfast  senna 2 Tablet(s) Oral at bedtime  sodium chloride 0.9%. 1000 milliLiter(s) (75 mL/Hr) IV Continuous <Continuous>  tamsulosin 0.4 milliGRAM(s) Oral at bedtime  thiamine 100 milliGRAM(s) Oral daily      PHYSICAL EXAM:  T(C): 36.8 (06-14-19 @ 13:35), Max: 36.8 (06-14-19 @ 13:35)  HR: 70 (06-14-19 @ 13:35) (70 - 87)  BP: 136/73 (06-14-19 @ 13:35) (115/62 - 136/73)  RR: 18 (06-14-19 @ 13:35) (18 - 18)  SpO2: 95% (06-14-19 @ 13:35) (95% - 97%)  Wt(kg): --  I&O's Summary    13 Jun 2019 07:01  -  14 Jun 2019 07:00  --------------------------------------------------------  IN: 990 mL / OUT: 300 mL / NET: 690 mL    14 Jun 2019 07:01  -  14 Jun 2019 14:27  --------------------------------------------------------  IN: 360 mL / OUT: 0 mL / NET: 360 mL          Appearance: Normal	  HEENT:   Normal oral mucosa, PERRL, EOMI	  Lymphatic: No lymphadenopathy , no edema  Cardiovascular: Normal S1 S2, No JVD, No murmurs , Peripheral pulses palpable 2+ bilaterally  Respiratory: Lungs clear to auscultation, normal effort 	  Gastrointestinal:  Soft, Non-tender, + BS	  Skin: LUE swelling and pain   Musculoskeletal: L shoulder tenderness   Psychiatry:  Mood & affect appropriate  Ext: No edema      All labs, Imaging and EKGs personally reviewed                             8.7    5.46  )-----------( 103      ( 14 Jun 2019 09:16 )             28.3               06-14    132<L>  |  100  |  20  ----------------------------<  78  4.6   |  22  |  1.20    Ca    9.2      14 Jun 2019 06:49

## 2019-06-14 NOTE — PROGRESS NOTE ADULT - ASSESSMENT
87 yr old with CKD, COPD, BPH, PPM, Y AVR due to AS, CHF. 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    1. COPD - stable  c/w Symbicort BID and Albuterol PRN, Spiriva  Out of bed to chair, incentive spirometry.  2. 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  Lower back and shoulder pain - patient states this is chronic but recently worsened.   *************  5/29-await results of cx; re-scan of back likely  5/30-ID/CTS f/up-picc placed (Roman et al)  5/31-for GI evaln if other w/up neg to assess for source of bacteria (MRI abnormal)  6/3- daptomycin/ceftarokine to continue--re-cx today  6/4-re-MARIA LUISA in 1 week if remains bacteremic--consider valv replacement  6/5-blood cx pending; 6/6-dizzy upon ambulation  6/7-ID/cards f/up  6/10-blood cx have remained +--ID f/up, ambulate  6/11- ID f/up  6/12- for PICC today  6/13- await PICC-? today (better day) 87 yr old with CKD, COPD, BPH, PPM, Y AVR due to AS, CHF. 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    1. COPD - stable  c/w Symbicort BID and Albuterol PRN, Spiriva  Out of bed to chair, incentive spirometry.  2. 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  Lower back and shoulder pain - patient states this is chronic but recently worsened.   *************  5/29-await results of cx; re-scan of back likely  5/30-ID/CTS f/up-picc placed (Roman et al)  5/31-for GI evaln if other w/up neg to assess for source of bacteria (MRI abnormal)  6/3- daptomycin/ceftarokine to continue--re-cx today  6/4-re-MARIA LUISA in 1 week if remains bacteremic--consider valv replacement  6/5-blood cx pending; 6/6-dizzy upon ambulation  6/7-ID/cards f/up  6/10-blood cx have remained +--ID f/up, ambulate  6/11- ID f/up  6/12- for PICC today  6/13- await PICC-? today (better day)  6/14- picc placed and await rehab

## 2019-06-14 NOTE — PROGRESS NOTE ADULT - SUBJECTIVE AND OBJECTIVE BOX
infectious diseases progress note:    Patient is a 88y old  Male who presents with a chief complaint of bacteremia (14 Jun 2019 05:22)        Bacteremia        ROS:  CONSTITUTIONAL:  Negative fever or chills, feels well, good appetite  EYES:  Negative  blurry vision or double vision  CARDIOVASCULAR:  Negative for chest pain or palpitations  RESPIRATORY:  Negative for cough, wheezing, or SOB   GASTROINTESTINAL:  Negative for nausea, vomiting, diarrhea, constipation, or abdominal pain  GENITOURINARY:  Negative frequency, urgency or dysuria  NEUROLOGIC:  No headache, confusion, dizziness, lightheadedness    Allergies    Keppra (Rash)  penicillin (Unknown)    Intolerances        ANTIBIOTICS/RELEVANT:  antimicrobials  ceftaroline fosamil IVPB 600 milliGRAM(s) IV Intermittent every 8 hours  DAPTOmycin IVPB 850 milliGRAM(s) IV Intermittent every 24 hours  linezolid    Tablet 600 milliGRAM(s) Oral every 12 hours    immunologic:    OTHER:  ALBUTerol    90 MICROgram(s) HFA Inhaler 2 Puff(s) Inhalation every 6 hours PRN  amiodarone    Tablet 200 milliGRAM(s) Oral daily  atorvastatin 40 milliGRAM(s) Oral at bedtime  buDESOnide  80 MICROgram(s)/formoterol 4.5 MICROgram(s) Inhaler 2 Puff(s) Inhalation two times a day  chlorhexidine 4% Liquid 1 Application(s) Topical <User Schedule>  clopidogrel Tablet 75 milliGRAM(s) Oral daily  docusate sodium 100 milliGRAM(s) Oral three times a day  finasteride 5 milliGRAM(s) Oral daily  lactobacillus acidophilus 1 Tablet(s) Oral three times a day with meals  lidocaine   Patch 1 Patch Transdermal daily  lidocaine   Patch 1 Patch Transdermal daily  melatonin 3 milliGRAM(s) Oral at bedtime  metoprolol tartrate 25 milliGRAM(s) Oral two times a day  multivitamin 1 Tablet(s) Oral daily  ondansetron    Tablet 4 milliGRAM(s) Oral every 8 hours PRN  oxyCODONE    IR 2.5 milliGRAM(s) Oral every 6 hours PRN  pantoprazole    Tablet 40 milliGRAM(s) Oral before breakfast  polyethylene glycol 3350 17 Gram(s) Oral daily PRN  senna 2 Tablet(s) Oral at bedtime  sodium chloride 0.9% lock flush 10 milliLiter(s) IV Push every 1 hour PRN  sodium chloride 0.9%. 1000 milliLiter(s) IV Continuous <Continuous>  tamsulosin 0.4 milliGRAM(s) Oral at bedtime  thiamine 100 milliGRAM(s) Oral daily      Objective:  Vital Signs Last 24 Hrs  T(C): 36.7 (14 Jun 2019 04:12), Max: 36.8 (13 Jun 2019 13:27)  T(F): 98 (14 Jun 2019 04:12), Max: 98.3 (13 Jun 2019 13:27)  HR: 87 (14 Jun 2019 06:12) (52 - 87)  BP: 133/75 (14 Jun 2019 06:12) (108/67 - 133/80)  BP(mean): --  RR: 18 (14 Jun 2019 04:12) (18 - 18)  SpO2: 95% (14 Jun 2019 04:12) (95% - 97%)    PHYSICAL EXAM:  Constitutional:Well-developed, well nourished--no acute distress  Eyes:JESSEE, EOMI  Ear/Nose/Throat: no oral lesion, no sinus tenderness on percussion	  Neck:no JVD, no lymphadenopathy, supple  Respiratory: CTA marci  Cardiovascular: S1S2 RRR, no murmurs  Gastrointestinal:soft, (+) BS, no HSM  Extremities:no e/e/c        LABS:    06-14    132<L>  |  100  |  20  ----------------------------<  78  4.6   |  22  |  1.20    Ca    9.2      14 Jun 2019 06:49              MICROBIOLOGY:    RECENT CULTURES:  06-11 @ 09:47 .Blood                No growth to date.    06-10 @ 10:06 .Blood                No growth to date.    06-10 @ 10:05 .Blood                No growth to date.    06-08 @ 10:16 .Blood                No growth at 5 days.          RESPIRATORY CULTURES:              RADIOLOGY & ADDITIONAL STUDIES:        Pager [phone]  After 5 pm/weekends or if no response :[phone]

## 2019-06-14 NOTE — PROGRESS NOTE ADULT - SUBJECTIVE AND OBJECTIVE BOX
Patient is a 88y old  Male who presents with a chief complaint of bacteremia (14 Jun 2019 14:27)         MEDICATIONS:  amiodarone    Tablet 200 milliGRAM(s) Oral daily  metoprolol tartrate 25 milliGRAM(s) Oral two times a day  tamsulosin 0.4 milliGRAM(s) Oral at bedtime        PHYSICAL EXAM:  T(C): 36.8 (06-14-19 @ 13:35), Max: 36.8 (06-14-19 @ 13:35)  HR: 70 (06-14-19 @ 13:35) (70 - 87)  BP: 136/73 (06-14-19 @ 13:35) (115/62 - 136/73)  RR: 18 (06-14-19 @ 13:35) (18 - 18)  SpO2: 95% (06-14-19 @ 13:35) (95% - 97%)  Wt(kg): --  I&O's Summary    13 Jun 2019 07:01  -  14 Jun 2019 07:00  --------------------------------------------------------  IN: 990 mL / OUT: 300 mL / NET: 690 mL    14 Jun 2019 07:01  -  14 Jun 2019 19:11  --------------------------------------------------------  IN: 700 mL / OUT: 0 mL / NET: 700 mL          Appearance: In no distress	  HEENT:    PERRL, EOMI	  Cardiovascular:  S1 S2, No JVD  Respiratory: Lungs clear to auscultation	  Gastrointestinal:  Soft, Non-tender, + BS	  Extremities:  No edema of LE                                8.7    5.46  )-----------( 103      ( 14 Jun 2019 09:16 )             28.3     06-14    132<L>  |  100  |  20  ----------------------------<  78  4.6   |  22  |  1.20    Ca    9.2      14 Jun 2019 06:49          Labs personally reviewed      Assessment and Plan:   Assessment:  · Assessment		  Patient is a 89 y/o male with PMH SSS/a fib on eliquis, 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 Was admitted recently and dx with possible endocarditis, ppm removed, received dapto/ceftaroline through PICC, PICC was dc and patient has been off abx for 2 weeks, plan was to observe off abx for 3 weeks and repeat labs. Patient had labs/bcx done yesterday with primary care, bcx were positive, patient otherwise does not have new symptoms or feel ill.     Problem/Plan - 1:  ·  Problem: Bacteremia.  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  Plan: continue with aldactone 25mg, euvolemic at this time    Problem/Plan - 3:  ·  Problem: Aortic stenosis.  Plan: S/P TAVR   - ?bioprosthetic valve endocarditis  Problem/Plan - 4:  Problem: Atrial fibrillation. Plan: On eliquis, Rate controlled on Metoprolol and Amio 100mg daily    Problem/Plan - 5:  ·  Problem: CAD (coronary artery disease).  Plan: On Plavix given TAVR, c/w statin.             Timothy Paredes DO [facility]  Cardiovascular Medicine  [phone]

## 2019-06-14 NOTE — PROGRESS NOTE ADULT - PROBLEM SELECTOR PLAN 1
L shoulder pain on ROM  Xray noted  Arthritic changes  NSAIDs PRN  OT eval  L UE swelling  DVT Study ordered  restarted on eliquis

## 2019-06-14 NOTE — PROGRESS NOTE ADULT - ATTENDING COMMENTS
as above-stable-await PICC placement/ID f/up-[place]--zyvox suppression rx to follow  ID-bacteremia--on dapto/ceftaroline-f/up all cx-s/p bone studies (mri abnormal-unclear if enough to base source of infection/shoulder-unrevealing)--s/p addition of zyvox--daily BCx (most recent neg)--cleared for picc line  COPD-symbiocrt/spiriva, incentive spirometry/acapella  PAH-no defin. rx  Cards-AF etc.--mult rx-consider MARIA LUISA if needed  DVT.GI proph--OOB  PT needed                      GI evaln on hold based on notes   DC pending PICC etc.  Arjun Mack MD-Pulmonary   [phone] as above-stable-s/p PICC placement/ID f/up-[place]--zyvox suppression rx to follow  ID-bacteremia--on dapto/ceftaroline-f/up all cx-s/p bone studies (mri abnormal-unclear if enough to base source of infection/shoulder-unrevealing)--s/p addition of zyvox--daily BCx (most recent neg)--cleared for picc line  COPD-symbiocrt/spiriva, incentive spirometry/acapella  PAH-no defin. rx  Cards-AF etc.--mult rx-consider MARIA LUISA if needed in future  DVT.GI proph--OOB  PT needed                      GI evaln on hold based on notes   DC pending.  Arjun Mack MD-Pulmonary   [phone]

## 2019-06-15 PROCEDURE — 93971 EXTREMITY STUDY: CPT | Mod: 26

## 2019-06-15 RX ADMIN — DAPTOMYCIN 134 MILLIGRAM(S): 500 INJECTION, POWDER, LYOPHILIZED, FOR SOLUTION INTRAVENOUS at 15:15

## 2019-06-15 RX ADMIN — LIDOCAINE 1 PATCH: 4 CREAM TOPICAL at 00:10

## 2019-06-15 RX ADMIN — OXYCODONE HYDROCHLORIDE 2.5 MILLIGRAM(S): 5 TABLET ORAL at 09:00

## 2019-06-15 RX ADMIN — CLOPIDOGREL BISULFATE 75 MILLIGRAM(S): 75 TABLET, FILM COATED ORAL at 12:31

## 2019-06-15 RX ADMIN — CEFTAROLINE FOSAMIL 50 MILLIGRAM(S): 600 POWDER, FOR SOLUTION INTRAVENOUS at 05:30

## 2019-06-15 RX ADMIN — LINEZOLID 600 MILLIGRAM(S): 600 INJECTION, SOLUTION INTRAVENOUS at 05:29

## 2019-06-15 RX ADMIN — Medication 100 MILLIGRAM(S): at 12:29

## 2019-06-15 RX ADMIN — BUDESONIDE AND FORMOTEROL FUMARATE DIHYDRATE 2 PUFF(S): 160; 4.5 AEROSOL RESPIRATORY (INHALATION) at 05:29

## 2019-06-15 RX ADMIN — APIXABAN 5 MILLIGRAM(S): 2.5 TABLET, FILM COATED ORAL at 17:44

## 2019-06-15 RX ADMIN — Medication 1 TABLET(S): at 12:29

## 2019-06-15 RX ADMIN — OXYCODONE HYDROCHLORIDE 2.5 MILLIGRAM(S): 5 TABLET ORAL at 08:30

## 2019-06-15 RX ADMIN — LIDOCAINE 1 PATCH: 4 CREAM TOPICAL at 21:07

## 2019-06-15 RX ADMIN — FINASTERIDE 5 MILLIGRAM(S): 5 TABLET, FILM COATED ORAL at 12:31

## 2019-06-15 RX ADMIN — Medication 1 TABLET(S): at 17:45

## 2019-06-15 RX ADMIN — Medication 3 MILLIGRAM(S): at 21:29

## 2019-06-15 RX ADMIN — Medication 1 TABLET(S): at 08:28

## 2019-06-15 RX ADMIN — CEFTAROLINE FOSAMIL 50 MILLIGRAM(S): 600 POWDER, FOR SOLUTION INTRAVENOUS at 21:24

## 2019-06-15 RX ADMIN — Medication 1 TABLET(S): at 12:31

## 2019-06-15 RX ADMIN — TAMSULOSIN HYDROCHLORIDE 0.4 MILLIGRAM(S): 0.4 CAPSULE ORAL at 21:24

## 2019-06-15 RX ADMIN — Medication 25 MILLIGRAM(S): at 05:30

## 2019-06-15 RX ADMIN — Medication 100 MILLIGRAM(S): at 14:39

## 2019-06-15 RX ADMIN — LIDOCAINE 1 PATCH: 4 CREAM TOPICAL at 12:30

## 2019-06-15 RX ADMIN — ATORVASTATIN CALCIUM 40 MILLIGRAM(S): 80 TABLET, FILM COATED ORAL at 21:23

## 2019-06-15 RX ADMIN — OXYCODONE HYDROCHLORIDE 2.5 MILLIGRAM(S): 5 TABLET ORAL at 21:54

## 2019-06-15 RX ADMIN — CEFTAROLINE FOSAMIL 50 MILLIGRAM(S): 600 POWDER, FOR SOLUTION INTRAVENOUS at 14:38

## 2019-06-15 RX ADMIN — SENNA PLUS 2 TABLET(S): 8.6 TABLET ORAL at 21:24

## 2019-06-15 RX ADMIN — BUDESONIDE AND FORMOTEROL FUMARATE DIHYDRATE 2 PUFF(S): 160; 4.5 AEROSOL RESPIRATORY (INHALATION) at 17:45

## 2019-06-15 RX ADMIN — PANTOPRAZOLE SODIUM 40 MILLIGRAM(S): 20 TABLET, DELAYED RELEASE ORAL at 06:10

## 2019-06-15 RX ADMIN — LINEZOLID 600 MILLIGRAM(S): 600 INJECTION, SOLUTION INTRAVENOUS at 17:44

## 2019-06-15 RX ADMIN — Medication 25 MILLIGRAM(S): at 17:45

## 2019-06-15 RX ADMIN — APIXABAN 5 MILLIGRAM(S): 2.5 TABLET, FILM COATED ORAL at 05:30

## 2019-06-15 RX ADMIN — Medication 100 MILLIGRAM(S): at 21:24

## 2019-06-15 RX ADMIN — OXYCODONE HYDROCHLORIDE 2.5 MILLIGRAM(S): 5 TABLET ORAL at 21:24

## 2019-06-15 RX ADMIN — CHLORHEXIDINE GLUCONATE 1 APPLICATION(S): 213 SOLUTION TOPICAL at 05:31

## 2019-06-15 RX ADMIN — AMIODARONE HYDROCHLORIDE 200 MILLIGRAM(S): 400 TABLET ORAL at 05:30

## 2019-06-15 RX ADMIN — Medication 100 MILLIGRAM(S): at 05:30

## 2019-06-15 NOTE — PROGRESS NOTE ADULT - SUBJECTIVE AND OBJECTIVE BOX
Subjective: Patient seen and examined. No new events except as noted.   doing well     REVIEW OF SYSTEMS:    CONSTITUTIONAL: No weakness, fevers or chills  EYES/ENT: No visual changes;  No vertigo or throat pain   NECK: No pain or stiffness  RESPIRATORY: No cough, wheezing, hemoptysis; No shortness of breath  CARDIOVASCULAR: No chest pain or palpitations  GASTROINTESTINAL: No abdominal or epigastric pain. No nausea, vomiting, or hematemesis; No diarrhea or constipation. No melena or hematochezia.  GENITOURINARY: No dysuria, frequency or hematuria  NEUROLOGICAL: No numbness or weakness  SKIN: No itching, burning, rashes, or lesions   All other review of systems is negative unless indicated above.    MEDICATIONS:  MEDICATIONS  (STANDING):  amiodarone    Tablet 200 milliGRAM(s) Oral daily  apixaban 5 milliGRAM(s) Oral every 12 hours  atorvastatin 40 milliGRAM(s) Oral at bedtime  buDESOnide  80 MICROgram(s)/formoterol 4.5 MICROgram(s) Inhaler 2 Puff(s) Inhalation two times a day  ceftaroline fosamil IVPB 600 milliGRAM(s) IV Intermittent every 8 hours  chlorhexidine 4% Liquid 1 Application(s) Topical <User Schedule>  clopidogrel Tablet 75 milliGRAM(s) Oral daily  DAPTOmycin IVPB 850 milliGRAM(s) IV Intermittent every 24 hours  docusate sodium 100 milliGRAM(s) Oral three times a day  finasteride 5 milliGRAM(s) Oral daily  lactobacillus acidophilus 1 Tablet(s) Oral three times a day with meals  lidocaine   Patch 1 Patch Transdermal daily  lidocaine   Patch 1 Patch Transdermal daily  linezolid    Tablet 600 milliGRAM(s) Oral every 12 hours  melatonin 3 milliGRAM(s) Oral at bedtime  metoprolol tartrate 25 milliGRAM(s) Oral two times a day  multivitamin 1 Tablet(s) Oral daily  pantoprazole    Tablet 40 milliGRAM(s) Oral before breakfast  senna 2 Tablet(s) Oral at bedtime  sodium chloride 0.9%. 1000 milliLiter(s) (75 mL/Hr) IV Continuous <Continuous>  tamsulosin 0.4 milliGRAM(s) Oral at bedtime  thiamine 100 milliGRAM(s) Oral daily      PHYSICAL EXAM:  T(C): 36.2 (06-15-19 @ 12:14), Max: 36.9 (06-14-19 @ 20:43)  HR: 66 (06-15-19 @ 17:30) (63 - 69)  BP: 110/72 (06-15-19 @ 17:30) (104/70 - 115/72)  RR: 18 (06-15-19 @ 12:14) (18 - 18)  SpO2: 95% (06-15-19 @ 12:14) (93% - 98%)  Wt(kg): --  I&O's Summary    14 Jun 2019 07:01  -  15 Rey 2019 07:00  --------------------------------------------------------  IN: 800 mL / OUT: 250 mL / NET: 550 mL    15 Rey 2019 07:01  -  15 Rey 2019 19:33  --------------------------------------------------------  IN: 810 mL / OUT: 0 mL / NET: 810 mL          Appearance: Normal	  HEENT:   Normal oral mucosa, PERRL, EOMI	  Lymphatic: No lymphadenopathy , no edema  Cardiovascular: Normal S1 S2, No JVD, No murmurs , Peripheral pulses palpable 2+ bilaterally  Respiratory: Lungs clear to auscultation, normal effort 	  Gastrointestinal:  Soft, Non-tender, + BS	  Skin: No rashes, No ecchymoses, No cyanosis, warm to touch  Musculoskeletal: Normal range of motion, normal strength  Psychiatry:  Mood & affect appropriate  Ext: No edema      All labs, Imaging and EKGs personally reviewed

## 2019-06-16 RX ORDER — KETOROLAC TROMETHAMINE 30 MG/ML
15 SYRINGE (ML) INJECTION EVERY 8 HOURS
Refills: 0 | Status: DISCONTINUED | OUTPATIENT
Start: 2019-06-16 | End: 2019-06-17

## 2019-06-16 RX ORDER — OXYCODONE HYDROCHLORIDE 5 MG/1
2.5 TABLET ORAL EVERY 6 HOURS
Refills: 0 | Status: DISCONTINUED | OUTPATIENT
Start: 2019-06-16 | End: 2019-06-17

## 2019-06-16 RX ADMIN — Medication 15 MILLIGRAM(S): at 15:55

## 2019-06-16 RX ADMIN — APIXABAN 5 MILLIGRAM(S): 2.5 TABLET, FILM COATED ORAL at 17:27

## 2019-06-16 RX ADMIN — Medication 1 TABLET(S): at 12:05

## 2019-06-16 RX ADMIN — LIDOCAINE 1 PATCH: 4 CREAM TOPICAL at 12:07

## 2019-06-16 RX ADMIN — CHLORHEXIDINE GLUCONATE 1 APPLICATION(S): 213 SOLUTION TOPICAL at 06:24

## 2019-06-16 RX ADMIN — BUDESONIDE AND FORMOTEROL FUMARATE DIHYDRATE 2 PUFF(S): 160; 4.5 AEROSOL RESPIRATORY (INHALATION) at 06:23

## 2019-06-16 RX ADMIN — LINEZOLID 600 MILLIGRAM(S): 600 INJECTION, SOLUTION INTRAVENOUS at 06:24

## 2019-06-16 RX ADMIN — LIDOCAINE 1 PATCH: 4 CREAM TOPICAL at 22:12

## 2019-06-16 RX ADMIN — ATORVASTATIN CALCIUM 40 MILLIGRAM(S): 80 TABLET, FILM COATED ORAL at 22:08

## 2019-06-16 RX ADMIN — FINASTERIDE 5 MILLIGRAM(S): 5 TABLET, FILM COATED ORAL at 12:05

## 2019-06-16 RX ADMIN — CEFTAROLINE FOSAMIL 50 MILLIGRAM(S): 600 POWDER, FOR SOLUTION INTRAVENOUS at 06:24

## 2019-06-16 RX ADMIN — Medication 100 MILLIGRAM(S): at 13:04

## 2019-06-16 RX ADMIN — Medication 100 MILLIGRAM(S): at 12:05

## 2019-06-16 RX ADMIN — Medication 1 TABLET(S): at 17:26

## 2019-06-16 RX ADMIN — TAMSULOSIN HYDROCHLORIDE 0.4 MILLIGRAM(S): 0.4 CAPSULE ORAL at 22:08

## 2019-06-16 RX ADMIN — Medication 100 MILLIGRAM(S): at 06:24

## 2019-06-16 RX ADMIN — Medication 25 MILLIGRAM(S): at 17:26

## 2019-06-16 RX ADMIN — Medication 3 MILLIGRAM(S): at 22:08

## 2019-06-16 RX ADMIN — CEFTAROLINE FOSAMIL 50 MILLIGRAM(S): 600 POWDER, FOR SOLUTION INTRAVENOUS at 13:04

## 2019-06-16 RX ADMIN — Medication 1 TABLET(S): at 08:28

## 2019-06-16 RX ADMIN — LIDOCAINE 1 PATCH: 4 CREAM TOPICAL at 00:19

## 2019-06-16 RX ADMIN — SENNA PLUS 2 TABLET(S): 8.6 TABLET ORAL at 22:08

## 2019-06-16 RX ADMIN — Medication 25 MILLIGRAM(S): at 06:24

## 2019-06-16 RX ADMIN — CLOPIDOGREL BISULFATE 75 MILLIGRAM(S): 75 TABLET, FILM COATED ORAL at 12:05

## 2019-06-16 RX ADMIN — DAPTOMYCIN 134 MILLIGRAM(S): 500 INJECTION, POWDER, LYOPHILIZED, FOR SOLUTION INTRAVENOUS at 15:10

## 2019-06-16 RX ADMIN — Medication 100 MILLIGRAM(S): at 22:08

## 2019-06-16 RX ADMIN — LINEZOLID 600 MILLIGRAM(S): 600 INJECTION, SOLUTION INTRAVENOUS at 17:26

## 2019-06-16 RX ADMIN — BUDESONIDE AND FORMOTEROL FUMARATE DIHYDRATE 2 PUFF(S): 160; 4.5 AEROSOL RESPIRATORY (INHALATION) at 17:26

## 2019-06-16 RX ADMIN — PANTOPRAZOLE SODIUM 40 MILLIGRAM(S): 20 TABLET, DELAYED RELEASE ORAL at 06:24

## 2019-06-16 RX ADMIN — CEFTAROLINE FOSAMIL 50 MILLIGRAM(S): 600 POWDER, FOR SOLUTION INTRAVENOUS at 22:07

## 2019-06-16 RX ADMIN — Medication 15 MILLIGRAM(S): at 16:25

## 2019-06-16 RX ADMIN — APIXABAN 5 MILLIGRAM(S): 2.5 TABLET, FILM COATED ORAL at 06:24

## 2019-06-16 RX ADMIN — AMIODARONE HYDROCHLORIDE 200 MILLIGRAM(S): 400 TABLET ORAL at 06:24

## 2019-06-16 RX ADMIN — LIDOCAINE 1 PATCH: 4 CREAM TOPICAL at 12:05

## 2019-06-16 NOTE — PROGRESS NOTE ADULT - PROBLEM SELECTOR PLAN 1
L shoulder pain on ROM  Xray noted  Arthritic changes  NSAIDs PRN  OT eval  L UE swelling  DVT Study ordered  Cont eliquis

## 2019-06-17 VITALS
OXYGEN SATURATION: 95 % | SYSTOLIC BLOOD PRESSURE: 104 MMHG | RESPIRATION RATE: 18 BRPM | DIASTOLIC BLOOD PRESSURE: 64 MMHG | HEART RATE: 61 BPM | TEMPERATURE: 97 F

## 2019-06-17 PROCEDURE — 82550 ASSAY OF CK (CPK): CPT

## 2019-06-17 PROCEDURE — 93971 EXTREMITY STUDY: CPT

## 2019-06-17 PROCEDURE — 72197 MRI PELVIS W/O & W/DYE: CPT

## 2019-06-17 PROCEDURE — 85014 HEMATOCRIT: CPT

## 2019-06-17 PROCEDURE — 97162 PT EVAL MOD COMPLEX 30 MIN: CPT

## 2019-06-17 PROCEDURE — 82330 ASSAY OF CALCIUM: CPT

## 2019-06-17 PROCEDURE — 80061 LIPID PANEL: CPT

## 2019-06-17 PROCEDURE — 83605 ASSAY OF LACTIC ACID: CPT

## 2019-06-17 PROCEDURE — 93306 TTE W/DOPPLER COMPLETE: CPT

## 2019-06-17 PROCEDURE — 93312 ECHO TRANSESOPHAGEAL: CPT

## 2019-06-17 PROCEDURE — 73030 X-RAY EXAM OF SHOULDER: CPT

## 2019-06-17 PROCEDURE — A9585: CPT

## 2019-06-17 PROCEDURE — 97110 THERAPEUTIC EXERCISES: CPT

## 2019-06-17 PROCEDURE — 87186 SC STD MICRODIL/AGAR DIL: CPT

## 2019-06-17 PROCEDURE — 87150 DNA/RNA AMPLIFIED PROBE: CPT

## 2019-06-17 PROCEDURE — 83036 HEMOGLOBIN GLYCOSYLATED A1C: CPT

## 2019-06-17 PROCEDURE — 86140 C-REACTIVE PROTEIN: CPT

## 2019-06-17 PROCEDURE — 71045 X-RAY EXAM CHEST 1 VIEW: CPT

## 2019-06-17 PROCEDURE — 84443 ASSAY THYROID STIM HORMONE: CPT

## 2019-06-17 PROCEDURE — 82803 BLOOD GASES ANY COMBINATION: CPT

## 2019-06-17 PROCEDURE — 97166 OT EVAL MOD COMPLEX 45 MIN: CPT

## 2019-06-17 PROCEDURE — 82435 ASSAY OF BLOOD CHLORIDE: CPT

## 2019-06-17 PROCEDURE — 85730 THROMBOPLASTIN TIME PARTIAL: CPT

## 2019-06-17 PROCEDURE — 93005 ELECTROCARDIOGRAM TRACING: CPT

## 2019-06-17 PROCEDURE — 36569 INSJ PICC 5 YR+ W/O IMAGING: CPT

## 2019-06-17 PROCEDURE — 99285 EMERGENCY DEPT VISIT HI MDM: CPT

## 2019-06-17 PROCEDURE — 72158 MRI LUMBAR SPINE W/O & W/DYE: CPT

## 2019-06-17 PROCEDURE — 85610 PROTHROMBIN TIME: CPT

## 2019-06-17 PROCEDURE — 97535 SELF CARE MNGMENT TRAINING: CPT

## 2019-06-17 PROCEDURE — 85027 COMPLETE CBC AUTOMATED: CPT

## 2019-06-17 PROCEDURE — 97530 THERAPEUTIC ACTIVITIES: CPT

## 2019-06-17 PROCEDURE — 80053 COMPREHEN METABOLIC PANEL: CPT

## 2019-06-17 PROCEDURE — 94640 AIRWAY INHALATION TREATMENT: CPT

## 2019-06-17 PROCEDURE — 80048 BASIC METABOLIC PNL TOTAL CA: CPT

## 2019-06-17 PROCEDURE — 84295 ASSAY OF SERUM SODIUM: CPT

## 2019-06-17 PROCEDURE — 85652 RBC SED RATE AUTOMATED: CPT

## 2019-06-17 PROCEDURE — 82533 TOTAL CORTISOL: CPT

## 2019-06-17 PROCEDURE — 83735 ASSAY OF MAGNESIUM: CPT

## 2019-06-17 PROCEDURE — 97116 GAIT TRAINING THERAPY: CPT

## 2019-06-17 PROCEDURE — 82962 GLUCOSE BLOOD TEST: CPT

## 2019-06-17 PROCEDURE — 99232 SBSQ HOSP IP/OBS MODERATE 35: CPT

## 2019-06-17 PROCEDURE — 84132 ASSAY OF SERUM POTASSIUM: CPT

## 2019-06-17 PROCEDURE — C1751: CPT

## 2019-06-17 PROCEDURE — 87040 BLOOD CULTURE FOR BACTERIA: CPT

## 2019-06-17 PROCEDURE — 82947 ASSAY GLUCOSE BLOOD QUANT: CPT

## 2019-06-17 RX ORDER — DAPTOMYCIN 500 MG/10ML
850 INJECTION, POWDER, LYOPHILIZED, FOR SOLUTION INTRAVENOUS
Qty: 0 | Refills: 0 | DISCHARGE
Start: 2019-06-17 | End: 2019-07-19

## 2019-06-17 RX ORDER — CEFTAROLINE FOSAMIL 600 MG/20ML
600 POWDER, FOR SOLUTION INTRAVENOUS
Qty: 1 | Refills: 0
Start: 2019-06-17 | End: 2019-07-27

## 2019-06-17 RX ORDER — FUROSEMIDE 40 MG
1 TABLET ORAL
Qty: 0 | Refills: 0 | DISCHARGE

## 2019-06-17 RX ORDER — SPIRONOLACTONE 25 MG/1
1 TABLET, FILM COATED ORAL
Qty: 0 | Refills: 0 | DISCHARGE

## 2019-06-17 RX ORDER — LINEZOLID 600 MG/300ML
1 INJECTION, SOLUTION INTRAVENOUS
Qty: 0 | Refills: 0 | DISCHARGE
Start: 2019-06-17

## 2019-06-17 RX ORDER — TRAMADOL HYDROCHLORIDE 50 MG/1
1 TABLET ORAL
Qty: 0 | Refills: 0 | DISCHARGE

## 2019-06-17 RX ADMIN — CLOPIDOGREL BISULFATE 75 MILLIGRAM(S): 75 TABLET, FILM COATED ORAL at 13:45

## 2019-06-17 RX ADMIN — Medication 1 TABLET(S): at 13:45

## 2019-06-17 RX ADMIN — FINASTERIDE 5 MILLIGRAM(S): 5 TABLET, FILM COATED ORAL at 13:45

## 2019-06-17 RX ADMIN — Medication 100 MILLIGRAM(S): at 13:45

## 2019-06-17 RX ADMIN — Medication 25 MILLIGRAM(S): at 05:59

## 2019-06-17 RX ADMIN — PANTOPRAZOLE SODIUM 40 MILLIGRAM(S): 20 TABLET, DELAYED RELEASE ORAL at 06:01

## 2019-06-17 RX ADMIN — Medication 100 MILLIGRAM(S): at 05:58

## 2019-06-17 RX ADMIN — LINEZOLID 600 MILLIGRAM(S): 600 INJECTION, SOLUTION INTRAVENOUS at 05:58

## 2019-06-17 RX ADMIN — OXYCODONE HYDROCHLORIDE 2.5 MILLIGRAM(S): 5 TABLET ORAL at 00:37

## 2019-06-17 RX ADMIN — Medication 1 TABLET(S): at 13:44

## 2019-06-17 RX ADMIN — BUDESONIDE AND FORMOTEROL FUMARATE DIHYDRATE 2 PUFF(S): 160; 4.5 AEROSOL RESPIRATORY (INHALATION) at 05:58

## 2019-06-17 RX ADMIN — LIDOCAINE 1 PATCH: 4 CREAM TOPICAL at 00:38

## 2019-06-17 RX ADMIN — CEFTAROLINE FOSAMIL 50 MILLIGRAM(S): 600 POWDER, FOR SOLUTION INTRAVENOUS at 05:57

## 2019-06-17 RX ADMIN — OXYCODONE HYDROCHLORIDE 2.5 MILLIGRAM(S): 5 TABLET ORAL at 01:07

## 2019-06-17 RX ADMIN — AMIODARONE HYDROCHLORIDE 200 MILLIGRAM(S): 400 TABLET ORAL at 05:58

## 2019-06-17 RX ADMIN — CHLORHEXIDINE GLUCONATE 1 APPLICATION(S): 213 SOLUTION TOPICAL at 05:59

## 2019-06-17 RX ADMIN — CEFTAROLINE FOSAMIL 50 MILLIGRAM(S): 600 POWDER, FOR SOLUTION INTRAVENOUS at 13:50

## 2019-06-17 RX ADMIN — APIXABAN 5 MILLIGRAM(S): 2.5 TABLET, FILM COATED ORAL at 05:58

## 2019-06-17 RX ADMIN — LIDOCAINE 1 PATCH: 4 CREAM TOPICAL at 13:44

## 2019-06-17 RX ADMIN — DAPTOMYCIN 134 MILLIGRAM(S): 500 INJECTION, POWDER, LYOPHILIZED, FOR SOLUTION INTRAVENOUS at 15:18

## 2019-06-17 NOTE — PROGRESS NOTE ADULT - PROBLEM SELECTOR PLAN 6
back pain secondary to arthritis  MRI noted  spine eval appreciated   OMT lower L at the bedside  aggressive pain control
back pain secondary to arthritis  MRI noted  spine eval appreciated   OMT lower L at the bedside  aggressive pain control
O2 supplement  nebs and inhalers  Pulm eval appreciated
back pain secondary to arthritis  MRI noted  spine eval appreciated   OMT lower L at the bedside  aggressive pain control
O2 supplement  nebs and inhalers  Pulm eval appreciated
PPI/DVT prophylaxis
back pain secondary to arthritis  MRI noted  spine eval appreciated   OMT lower L at the bedside  aggressive pain control
eliquis  rate control
eliquis  rate control
O2 supplement  nebs and inhalers  Pulm eval appreciated
back pain secondary to arthritis  MRI noted  spine eval appreciated   OMT lower L at the bedside  aggressive pain control

## 2019-06-17 NOTE — PROGRESS NOTE ADULT - PROBLEM SELECTOR PLAN 2
L shoulder pain on ROM  Xray noted  Arthritic changes  NSAIDs PRN  OT eval
and forgetfulness   monitor electrolytes  fall precautions  check orthostatics   Neuro eval
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
monitor BUN/Cr  Avoid fluid overload
monitor BUN/Cr  Avoid fluid overload
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
L shoulder pain on ROM  Xray noted  Arthritic changes  NSAIDs PRN  OT eval
and forgetfulness   monitor electrolytes  fall precautions  check orthostatics   Neuro eval
mild DEBI  start IV NS for 10 hrs  trend BUN/Cr
monitor BUN/Cr  Avoid fluid overload
symbicort 160 2 bid, spiriva 1 q day, incentive spirometry, acapella
and forgetfulness   monitor electrolytes  fall precautions  check orthostatics   Neuro eval
monitor BUN/Cr  Avoid fluid overload
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
and forgetfulness   monitorelectrolytes  fall precautions  check orthostatics   Neuro eval
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
and forgetfulness   monitor electrolytes  fall precautions  check orthostatics   Neuro eval

## 2019-06-17 NOTE — PROGRESS NOTE ADULT - PROBLEM SELECTOR PROBLEM 8
Atrial fibrillation
CAD (coronary artery disease)
Atrial fibrillation
CAD (coronary artery disease)
Atrial fibrillation
CAD (coronary artery disease)
CAD (coronary artery disease)
Preop pulmonary/respiratory exam
Prophylactic measure
Prophylactic measure
CAD (coronary artery disease)
Atrial fibrillation
Preop pulmonary/respiratory exam

## 2019-06-17 NOTE — PROGRESS NOTE ADULT - PROBLEM SELECTOR PROBLEM 7
COPD (chronic obstructive pulmonary disease)
COPD (chronic obstructive pulmonary disease)
Atrial fibrillation
COPD (chronic obstructive pulmonary disease)
Atrial fibrillation
CAD (coronary artery disease)
CAD (coronary artery disease)
COPD (chronic obstructive pulmonary disease)
Gait instability
Atrial fibrillation
COPD (chronic obstructive pulmonary disease)

## 2019-06-17 NOTE — PROGRESS NOTE ADULT - PROBLEM SELECTOR PLAN 5
S/P valve replacement   Card eval  high risk candidate for any valve surgery
back pain secondary to arthritis  MRI noted  spine eval appreciated   OMT lower L at the bedside  aggressive pain control
S/P valve replacement   Card eval  high risk candidate for any valve surgery
back pain secondary to arthritis  MRI noted  spine eval appreciated   OMT lower L at the bedside
O2 supplement  nebs and inhalers
O2 supplement  nebs and inhalers  Pulm eval appreciated
S/P valve replacement   Card eval  high risk candidate for any valve surgery
back pain secondary to arthritis  MRI noted  spine eval appreciated   OMT lower L at the bedside
back pain secondary to arthritis  MRi done previosuly   will repeat to check for source of bacteremia  OMT lower L at the bedside
cards f/up-mult rx
back pain secondary to arthritis  MRi done previosuly   will repeat to check for source of bacteremia  OMT lower L at the bedside
S/P valve replacement   Card eval  high risk candidate for any valve surgery

## 2019-06-17 NOTE — PROGRESS NOTE ADULT - PROBLEM SELECTOR PROBLEM 2
Shoulder pain
Dizziness
Bacteremia
CKD (chronic kidney disease) stage 3, GFR 30-59 ml/min
CKD (chronic kidney disease) stage 3, GFR 30-59 ml/min
Bacteremia
CKD (chronic kidney disease) stage 3, GFR 30-59 ml/min
COPD (chronic obstructive pulmonary disease)
DEBI (acute kidney injury)
Dizziness
Shoulder pain
Dizziness
CKD (chronic kidney disease) stage 3, GFR 30-59 ml/min
Bacteremia
Dizziness
Bacteremia
Dizziness

## 2019-06-17 NOTE — PROGRESS NOTE ADULT - PROBLEM SELECTOR PLAN 3
resolved   trend BUN/Cr
monitor BUN/Cr  Avoid fluid overload
resolved   trend BUN/Cr
Card eval  echo  home bp medications   avoid fluid overlaod
and forgetfulness   monitor electrolytes  fall precautions  check orthostatics   Neuro eval
Card eval  echo  home bp medications   avoid fluid overlaod
Card eval  echo  home bp medications   avoid fluid overlaod
Card eval  echo  restart all home medications   avoid fluid overlaod
L shoulder pain on ROM  Xray noted  Arthritic changes  NSAIDs PRN  OT eval
and forgetfulness   monitor electrolytes  fall precautions  check orthostatics   Neuro eval
monitor BUN/Cr  Avoid fluid overload
on no definitive rx at present
resolved   trend BUN/Cr
resolved   trend BUN/Cr
Card eval  echo  home bp medications   avoid fluid overlaod
and forgetfulness   monitor electrolytes  fall precautions  check orthostatics   Neuro eval
mild DEBI  start IV NS for 10 hrs  trend BUN/Cr
and forgetfulness   monitor electrolytes  fall precautions  check orthostatics   Neuro eval

## 2019-06-17 NOTE — PROGRESS NOTE ADULT - PROBLEM SELECTOR PROBLEM 1
Bacteremia
Bacteremia
Shoulder pain
Bacteremia
Shoulder pain
Bacteremia
Bacteremia
Shoulder pain
Shoulder pain
Bacteremia
Bacteremia

## 2019-06-17 NOTE — PROGRESS NOTE ADULT - REASON FOR ADMISSION
bacteremia

## 2019-06-17 NOTE — PROGRESS NOTE ADULT - PROBLEM SELECTOR PLAN 7
O2 supplement  nebs and inhalers  Pulm eval appreciated
O2 supplement  nebs and inhalers  Pulm eval appreciated
Cont eliquis  rate control
O2 supplement  nebs and inhalers  Pulm eval appreciated
eliquis  rate control
O2 supplement  nebs and inhalers  Pulm eval appreciated
PT
eliquis  rate control
eliquis  rate control
resatrt all home medications
resatrt all home medications
eliquis  rate control
O2 supplement  nebs and inhalers  Pulm eval appreciated

## 2019-06-17 NOTE — PROGRESS NOTE ADULT - SUBJECTIVE AND OBJECTIVE BOX
infectious diseases progress note:    Patient is a 88y old  Male who presents with a chief complaint of bacteremia (17 Jun 2019 05:16)        Bacteremia        ROS:  CONSTITUTIONAL:  Negative fever or chills, feels well, good appetite  EYES:  Negative  blurry vision or double vision  CARDIOVASCULAR:  Negative for chest pain or palpitations  RESPIRATORY:  Negative for cough, wheezing, or SOB   GASTROINTESTINAL:  Negative for nausea, vomiting, diarrhea, constipation, or abdominal pain  GENITOURINARY:  Negative frequency, urgency or dysuria  NEUROLOGIC:  No headache, confusion, dizziness, lightheadedness    Allergies    Keppra (Rash)  penicillin (Unknown)    Intolerances        ANTIBIOTICS/RELEVANT:  antimicrobials  ceftaroline fosamil IVPB 600 milliGRAM(s) IV Intermittent every 8 hours  DAPTOmycin IVPB 850 milliGRAM(s) IV Intermittent every 24 hours  linezolid    Tablet 600 milliGRAM(s) Oral every 12 hours    immunologic:    OTHER:  ALBUTerol    90 MICROgram(s) HFA Inhaler 2 Puff(s) Inhalation every 6 hours PRN  amiodarone    Tablet 200 milliGRAM(s) Oral daily  apixaban 5 milliGRAM(s) Oral every 12 hours  atorvastatin 40 milliGRAM(s) Oral at bedtime  buDESOnide  80 MICROgram(s)/formoterol 4.5 MICROgram(s) Inhaler 2 Puff(s) Inhalation two times a day  chlorhexidine 4% Liquid 1 Application(s) Topical <User Schedule>  clopidogrel Tablet 75 milliGRAM(s) Oral daily  docusate sodium 100 milliGRAM(s) Oral three times a day  finasteride 5 milliGRAM(s) Oral daily  ketorolac   Injectable 15 milliGRAM(s) IV Push every 8 hours PRN  lactobacillus acidophilus 1 Tablet(s) Oral three times a day with meals  lidocaine   Patch 1 Patch Transdermal daily  lidocaine   Patch 1 Patch Transdermal daily  melatonin 3 milliGRAM(s) Oral at bedtime  metoprolol tartrate 25 milliGRAM(s) Oral two times a day  multivitamin 1 Tablet(s) Oral daily  ondansetron    Tablet 4 milliGRAM(s) Oral every 8 hours PRN  oxyCODONE    IR 2.5 milliGRAM(s) Oral every 6 hours PRN  pantoprazole    Tablet 40 milliGRAM(s) Oral before breakfast  polyethylene glycol 3350 17 Gram(s) Oral daily PRN  senna 2 Tablet(s) Oral at bedtime  sodium chloride 0.9% lock flush 10 milliLiter(s) IV Push every 1 hour PRN  sodium chloride 0.9%. 1000 milliLiter(s) IV Continuous <Continuous>  tamsulosin 0.4 milliGRAM(s) Oral at bedtime  thiamine 100 milliGRAM(s) Oral daily      Objective:  Vital Signs Last 24 Hrs  T(C): 36.5 (17 Jun 2019 04:01), Max: 36.5 (17 Jun 2019 04:01)  T(F): 97.7 (17 Jun 2019 04:01), Max: 97.7 (17 Jun 2019 04:01)  HR: 67 (17 Jun 2019 04:01) (67 - 79)  BP: 121/75 (17 Jun 2019 04:01) (107/58 - 121/75)  BP(mean): --  RR: 18 (17 Jun 2019 04:01) (17 - 18)  SpO2: 95% (17 Jun 2019 04:01) (95% - 95%)    PHYSICAL EXAM:     Eyes:JESSEE, EOMI  Ear/Nose/Throat: no oral lesion, no sinus tenderness on percussion	  Neck:no JVD, no lymphadenopathy, supple  Respiratory: CTA marci  Cardiovascular: S1S2 RRR, no murmurs  Gastrointestinal:soft, (+) BS, no HSM  Extremities:no e/e/c        LABS:                  MICROBIOLOGY:    RECENT CULTURES:  06-11 @ 09:47 .Blood                No growth at 5 days.    06-10 @ 10:06 .Blood                No growth at 5 days.    06-10 @ 10:05 .Blood                No growth at 5 days.          RESPIRATORY CULTURES:              RADIOLOGY & ADDITIONAL STUDIES:        Pager [phone]  After 5 pm/weekends or if no response :[phone]

## 2019-06-17 NOTE — PROGRESS NOTE ADULT - SUBJECTIVE AND OBJECTIVE BOX
CHIEF COMPLAINT:  f/up sob, copd, osas, abnormal ct chest-pain upon movements-    Interval Events:    REVIEW OF SYSTEMS:  Constitutional: No fevers or chills. No weight loss. No fatigue or generalized malaise.  Eyes: No itching or discharge from the eyes  ENT: No ear pain. No ear discharge. No nasal congestion. No post nasal drip. No epistaxis. No throat pain. No sore throat. No difficulty swallowing.   CV: No chest pain. No palpitations. No lightheadedness or dizziness.   Resp: No dyspnea at rest. No dyspnea on exertion. No orthopnea. No wheezing. No cough. No stridor. No sputum production. No chest pain with respiration.  GI: No nausea. No vomiting. No diarrhea.  MSK: No joint pain or pain in any extremities  Integumentary: No skin lesions. No pedal edema.  Neurological: No gross motor weakness. No sensory changes.  [ ] All other systems negative  [ ] Unable to assess ROS because ________    OBJECTIVE:  ICU Vital Signs Last 24 Hrs  T(C): 36.5 (17 Jun 2019 04:01), Max: 36.5 (17 Jun 2019 04:01)  T(F): 97.7 (17 Jun 2019 04:01), Max: 97.7 (17 Jun 2019 04:01)  HR: 67 (17 Jun 2019 04:01) (67 - 79)  BP: 121/75 (17 Jun 2019 04:01) (107/58 - 121/75)  BP(mean): --  ABP: --  ABP(mean): --  RR: 18 (17 Jun 2019 04:01) (17 - 18)  SpO2: 95% (17 Jun 2019 04:01) (95% - 95%)        06-15 @ 07:01  -  06-16 @ 07:00  --------------------------------------------------------  IN: 930 mL / OUT: 400 mL / NET: 530 mL    06-16 @ 07:01  -  06-17 @ 05:16  --------------------------------------------------------  IN: 860 mL / OUT: 0 mL / NET: 860 mL      CAPILLARY BLOOD GLUCOSE          PHYSICAL EXAM:  General: Awake, alert, oriented X 3.   HEENT: Atraumatic, normocephalic.                 Mallampatti Grade                 No nasal congestion.                No tonsillar or pharyngeal exudates.  Lymph Nodes: No palpable lymphadenopathy  Neck: No JVD. No carotid bruit.   Respiratory: Normal chest expansion                         Normal percussion                         Normal and equal air entry                         No wheeze, rhonchi or rales.  Cardiovascular: S1 S2 normal. No murmurs, rubs or gallops.   Abdomen: Soft, non-tender, non-distended. No organomegaly. Normoactive bowel sounds.  Extremities: Warm to touch. Peripheral pulse palpable. No pedal edema.   Skin: No rashes or skin lesions  Neurological: Motor and sensory examination equal and normal in all four extremities.  Psychiatry: Appropriate mood and affect.    HOSPITAL MEDICATIONS:  MEDICATIONS  (STANDING):  amiodarone    Tablet 200 milliGRAM(s) Oral daily  apixaban 5 milliGRAM(s) Oral every 12 hours  atorvastatin 40 milliGRAM(s) Oral at bedtime  buDESOnide  80 MICROgram(s)/formoterol 4.5 MICROgram(s) Inhaler 2 Puff(s) Inhalation two times a day  ceftaroline fosamil IVPB 600 milliGRAM(s) IV Intermittent every 8 hours  chlorhexidine 4% Liquid 1 Application(s) Topical <User Schedule>  clopidogrel Tablet 75 milliGRAM(s) Oral daily  DAPTOmycin IVPB 850 milliGRAM(s) IV Intermittent every 24 hours  docusate sodium 100 milliGRAM(s) Oral three times a day  finasteride 5 milliGRAM(s) Oral daily  lactobacillus acidophilus 1 Tablet(s) Oral three times a day with meals  lidocaine   Patch 1 Patch Transdermal daily  lidocaine   Patch 1 Patch Transdermal daily  linezolid    Tablet 600 milliGRAM(s) Oral every 12 hours  melatonin 3 milliGRAM(s) Oral at bedtime  metoprolol tartrate 25 milliGRAM(s) Oral two times a day  multivitamin 1 Tablet(s) Oral daily  pantoprazole    Tablet 40 milliGRAM(s) Oral before breakfast  senna 2 Tablet(s) Oral at bedtime  sodium chloride 0.9%. 1000 milliLiter(s) (75 mL/Hr) IV Continuous <Continuous>  tamsulosin 0.4 milliGRAM(s) Oral at bedtime  thiamine 100 milliGRAM(s) Oral daily    MEDICATIONS  (PRN):  ALBUTerol    90 MICROgram(s) HFA Inhaler 2 Puff(s) Inhalation every 6 hours PRN Shortness of Breath and/or Wheezing  ketorolac   Injectable 15 milliGRAM(s) IV Push every 8 hours PRN Moderate Pain (4 - 6)  ondansetron    Tablet 4 milliGRAM(s) Oral every 8 hours PRN Nausea and/or Vomiting  oxyCODONE    IR 2.5 milliGRAM(s) Oral every 6 hours PRN Severe Pain (7 - 10)  polyethylene glycol 3350 17 Gram(s) Oral daily PRN Constipation  sodium chloride 0.9% lock flush 10 milliLiter(s) IV Push every 1 hour PRN Pre/post blood products, medications, blood draw, and to maintain line patency      LABS:                    MICROBIOLOGY:     RADIOLOGY:  [ ] Reviewed and interpreted by me    Point of Care Ultrasound Findings:    PFT:    EKG: CHIEF COMPLAINT:  f/up sob, copd, osas, abnormal ct chest-pain upon movements-good spirits-no sob or chest pain    Interval Events: await rehab; no ambulation    REVIEW OF SYSTEMS:  Constitutional: No fevers or chills. No weight loss. No fatigue or generalized malaise.  Eyes: No itching or discharge from the eyes  ENT: No ear pain. No ear discharge. No nasal congestion. No post nasal drip. No epistaxis. No throat pain. No sore throat. No difficulty swallowing.   CV: No chest pain. No palpitations. No lightheadedness or dizziness.   Resp: No dyspnea at rest. + dyspnea on exertion. No orthopnea. No wheezing. No cough. No stridor. No sputum production. No chest pain with respiration.  GI: No nausea. No vomiting. No diarrhea.  MSK: + joint pain or pain in any extremities  Integumentary: No skin lesions. No pedal edema.  Neurological: + gross motor weakness. No sensory changes.  [+ ] All other systems negative  [ ] Unable to assess ROS because ________    OBJECTIVE:  ICU Vital Signs Last 24 Hrs  T(C): 36.5 (17 Jun 2019 04:01), Max: 36.5 (17 Jun 2019 04:01)  T(F): 97.7 (17 Jun 2019 04:01), Max: 97.7 (17 Jun 2019 04:01)  HR: 67 (17 Jun 2019 04:01) (67 - 79)  BP: 121/75 (17 Jun 2019 04:01) (107/58 - 121/75)  BP(mean): --  ABP: --  ABP(mean): --  RR: 18 (17 Jun 2019 04:01) (17 - 18)  SpO2: 95% (17 Jun 2019 04:01) (95% - 95%)        06-15 @ 07:01  -  06-16 @ 07:00  --------------------------------------------------------  IN: 930 mL / OUT: 400 mL / NET: 530 mL    06-16 @ 07:01  -  06-17 @ 05:16  --------------------------------------------------------  IN: 860 mL / OUT: 0 mL / NET: 860 mL      CAPILLARY BLOOD GLUCOSE          PHYSICAL EXAM:  General: Awake, alert, oriented X 3.   HEENT: Atraumatic, normocephalic.                 Mallampatti Grade 3                No nasal congestion.                No tonsillar or pharyngeal exudates.  Lymph Nodes: No palpable lymphadenopathy  Neck: No JVD. No carotid bruit.   Respiratory: Normal chest expansion                         Normal percussion                         Normal and equal air entry                         No wheeze, rhonchi or rales.  Cardiovascular: S1 S2 normal. + murmurs, rubs or gallops.   Abdomen: Soft, non-tender, non-distended. No organomegaly. Normoactive bowel sounds.  Extremities: Warm to touch. Peripheral pulse palpable. No pedal edema.   Skin: No rashes or skin lesions  Neurological: Motor and sensory examination equal and normal in all four extremities.  Psychiatry: Appropriate mood and affect.    HOSPITAL MEDICATIONS:  MEDICATIONS  (STANDING):  amiodarone    Tablet 200 milliGRAM(s) Oral daily  apixaban 5 milliGRAM(s) Oral every 12 hours  atorvastatin 40 milliGRAM(s) Oral at bedtime  buDESOnide  80 MICROgram(s)/formoterol 4.5 MICROgram(s) Inhaler 2 Puff(s) Inhalation two times a day  ceftaroline fosamil IVPB 600 milliGRAM(s) IV Intermittent every 8 hours  chlorhexidine 4% Liquid 1 Application(s) Topical <User Schedule>  clopidogrel Tablet 75 milliGRAM(s) Oral daily  DAPTOmycin IVPB 850 milliGRAM(s) IV Intermittent every 24 hours  docusate sodium 100 milliGRAM(s) Oral three times a day  finasteride 5 milliGRAM(s) Oral daily  lactobacillus acidophilus 1 Tablet(s) Oral three times a day with meals  lidocaine   Patch 1 Patch Transdermal daily  lidocaine   Patch 1 Patch Transdermal daily  linezolid    Tablet 600 milliGRAM(s) Oral every 12 hours  melatonin 3 milliGRAM(s) Oral at bedtime  metoprolol tartrate 25 milliGRAM(s) Oral two times a day  multivitamin 1 Tablet(s) Oral daily  pantoprazole    Tablet 40 milliGRAM(s) Oral before breakfast  senna 2 Tablet(s) Oral at bedtime  sodium chloride 0.9%. 1000 milliLiter(s) (75 mL/Hr) IV Continuous <Continuous>  tamsulosin 0.4 milliGRAM(s) Oral at bedtime  thiamine 100 milliGRAM(s) Oral daily    MEDICATIONS  (PRN):  ALBUTerol    90 MICROgram(s) HFA Inhaler 2 Puff(s) Inhalation every 6 hours PRN Shortness of Breath and/or Wheezing  ketorolac   Injectable 15 milliGRAM(s) IV Push every 8 hours PRN Moderate Pain (4 - 6)  ondansetron    Tablet 4 milliGRAM(s) Oral every 8 hours PRN Nausea and/or Vomiting  oxyCODONE    IR 2.5 milliGRAM(s) Oral every 6 hours PRN Severe Pain (7 - 10)  polyethylene glycol 3350 17 Gram(s) Oral daily PRN Constipation  sodium chloride 0.9% lock flush 10 milliLiter(s) IV Push every 1 hour PRN Pre/post blood products, medications, blood draw, and to maintain line patency      LABS:                    MICROBIOLOGY:     RADIOLOGY:  [ ] Reviewed and interpreted by me    Point of Care Ultrasound Findings:    PFT:    EKG:

## 2019-06-17 NOTE — PROGRESS NOTE ADULT - SUBJECTIVE AND OBJECTIVE BOX
Patient is a 88y old  Male who presents with a chief complaint of bacteremia (17 Jun 2019 14:48)    INTERVAL HISTORY:     TELEMETRY:  	  MEDICATIONS:  amiodarone    Tablet 200 milliGRAM(s) Oral daily  metoprolol tartrate 25 milliGRAM(s) Oral two times a day  tamsulosin 0.4 milliGRAM(s) Oral at bedtime        PHYSICAL EXAM:  T(C): 36.3 (06-17-19 @ 14:18), Max: 36.5 (06-17-19 @ 04:01)  HR: 61 (06-17-19 @ 14:18) (61 - 67)  BP: 104/64 (06-17-19 @ 14:18) (104/64 - 121/75)  RR: 18 (06-17-19 @ 14:18) (18 - 18)  SpO2: 95% (06-17-19 @ 14:18) (95% - 95%)  Wt(kg): --  I&O's Summary    16 Jun 2019 07:01  -  17 Jun 2019 07:00  --------------------------------------------------------  IN: 980 mL / OUT: 0 mL / NET: 980 mL    17 Jun 2019 07:01  -  17 Jun 2019 22:29  --------------------------------------------------------  IN: 400 mL / OUT: 0 mL / NET: 400 mL          Appearance: In no distress	  HEENT:    PERRL, EOMI	  Cardiovascular:  S1 S2, No JVD  Respiratory: Lungs clear to auscultation	  Gastrointestinal:  Soft, Non-tender, + BS	  Extremities:  No edema of LE        Labs personally reviewed      Assessment and Plan:   Assessment:  · Assessment		  Patient is a 89 y/o male with PMH SSS/a fib on eliquis, 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 Was admitted recently and dx with possible endocarditis, ppm removed, received dapto/ceftaroline through PICC, PICC was dc and patient has been off abx for 2 weeks, plan was to observe off abx for 3 weeks and repeat labs. Patient had labs/bcx done yesterday with primary care, bcx were positive, patient otherwise does not have new symptoms or feel ill.     Problem/Plan - 1:  ·  Problem: Bacteremia.  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  Plan: continue with aldactone 25mg, euvolemic at this time    Problem/Plan - 3:  ·  Problem: Aortic stenosis.  Plan: S/P TAVR   - ?bioprosthetic valve endocarditis  Problem/Plan - 4:  Problem: Atrial fibrillation. Plan: On eliquis, Rate controlled on Metoprolol and Amio 100mg daily    Problem/Plan - 5:  ·  Problem: CAD (coronary artery disease).  Plan: On Plavix given TAVR, c/w statin.     Oupt follow up within 1-2 weeks             Timothy Paredes DO [facility]  Cardiovascular Medicine  [phone]

## 2019-06-17 NOTE — PROGRESS NOTE ADULT - ASSESSMENT
87 yr old with CKD, COPD, BPH, PPM, Y AVR due to AS, CHF. 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    1. COPD - stable  c/w Symbicort BID and Albuterol PRN, Spiriva  Out of bed to chair, incentive spirometry.  2. 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  Lower back and shoulder pain - patient states this is chronic but recently worsened.   *************  5/29-await results of cx; re-scan of back likely  5/30-ID/CTS f/up-picc placed (Roman et al)  5/31-for GI evaln if other w/up neg to assess for source of bacteria (MRI abnormal)  6/3- daptomycin/ceftarokine to continue--re-cx today  6/4-re-MARIA LUISA in 1 week if remains bacteremic--consider valv replacement  6/5-blood cx pending; 6/6-dizzy upon ambulation  6/7-ID/cards f/up  6/10-blood cx have remained +--ID f/up, ambulate  6/11- ID f/up  6/12- for PICC today  6/13- await PICC-? today (better day)  6/14- picc placed and await rehab 87 yr old with CKD, COPD, BPH, PPM, Y AVR due to AS, CHF. 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    1. COPD - stable  c/w Symbicort BID and Albuterol PRN, Spiriva  Out of bed to chair, incentive spirometry.  2. 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  Lower back and shoulder pain - patient states this is chronic but recently worsened.   *************  5/29-await results of cx; re-scan of back likely  5/30-ID/CTS f/up-picc placed (Roman et al)  5/31-for GI evaln if other w/up neg to assess for source of bacteria (MRI abnormal)  6/3- daptomycin/ceftarokine to continue--re-cx today  6/4-re-MARIA LUISA in 1 week if remains bacteremic--consider valv replacement  6/5-blood cx pending; 6/6-dizzy upon ambulation  6/7-ID/cards f/up  6/10-blood cx have remained +--ID f/up, ambulate  6/11- ID f/up  6/12- for PICC today  6/13- await PICC-? today (better day)  6/14- picc placed and await rehab  6/17-rehab today

## 2019-06-17 NOTE — PROGRESS NOTE ADULT - ATTENDING COMMENTS
as above-stable-s/p PICC placement/ID f/up-[place]--zyvox suppression rx to follow  ID-bacteremia--on dapto/ceftaroline-f/up all cx-s/p bone studies (mri abnormal-unclear if enough to base source of infection/shoulder-unrevealing)--s/p addition of zyvox--daily BCx (most recent neg)--cleared for picc line  COPD-symbiocrt/spiriva, incentive spirometry/acapella  PAH-no defin. rx  Cards-AF etc.--mult rx-consider MARIA LUISA if needed in future  DVT.GI proph--OOB  PT needed                      GI evaln on hold based on notes   DC pending.  Arjun Mack MD-Pulmonary   [phone] as above-stable-s/p PICC placement/ID f/up-[place]--zyvox suppression rx to follow-for rehab  ID-bacteremia--on dapto/ceftaroline-f/up all cx-s/p bone studies (mri abnormal-unclear if enough to base source of infection/shoulder-unrevealing)--s/p addition of zyvox--daily BCx (most recent neg)--cleared for picc line  COPD-symbiocrt/spiriva, incentive spirometry/acapella  PAH-no defin. rx  Cards-AF etc.--mult rx-consider MARIA LUISA if needed in future  DVT.GI proph--OOB  PT needed                      GI evaln on hold based on notes   DC pending-hopefully today  Arjun Mack MD-Pulmonary   [phone]

## 2019-06-17 NOTE — PROGRESS NOTE ADULT - PROBLEM SELECTOR PLAN 10
DVT and GI PPX

## 2019-06-17 NOTE — PROGRESS NOTE ADULT - SUBJECTIVE AND OBJECTIVE BOX
Subjective: Patient seen and examined. No new events except as noted.   D/C home planing today     REVIEW OF SYSTEMS:    CONSTITUTIONAL: No weakness, fevers or chills  EYES/ENT: No visual changes;  No vertigo or throat pain   NECK: No pain or stiffness  RESPIRATORY: No cough, wheezing, hemoptysis; No shortness of breath  CARDIOVASCULAR: No chest pain or palpitations  GASTROINTESTINAL: No abdominal or epigastric pain. No nausea, vomiting, or hematemesis; No diarrhea or constipation. No melena or hematochezia.  GENITOURINARY: No dysuria, frequency or hematuria  NEUROLOGICAL: No numbness or weakness  SKIN: No itching, burning, rashes, or lesions   All other review of systems is negative unless indicated above.    MEDICATIONS:  MEDICATIONS  (STANDING):  amiodarone    Tablet 200 milliGRAM(s) Oral daily  apixaban 5 milliGRAM(s) Oral every 12 hours  atorvastatin 40 milliGRAM(s) Oral at bedtime  buDESOnide  80 MICROgram(s)/formoterol 4.5 MICROgram(s) Inhaler 2 Puff(s) Inhalation two times a day  ceftaroline fosamil IVPB 600 milliGRAM(s) IV Intermittent every 8 hours  chlorhexidine 4% Liquid 1 Application(s) Topical <User Schedule>  clopidogrel Tablet 75 milliGRAM(s) Oral daily  DAPTOmycin IVPB 850 milliGRAM(s) IV Intermittent every 24 hours  docusate sodium 100 milliGRAM(s) Oral three times a day  finasteride 5 milliGRAM(s) Oral daily  lactobacillus acidophilus 1 Tablet(s) Oral three times a day with meals  lidocaine   Patch 1 Patch Transdermal daily  lidocaine   Patch 1 Patch Transdermal daily  linezolid    Tablet 600 milliGRAM(s) Oral every 12 hours  melatonin 3 milliGRAM(s) Oral at bedtime  metoprolol tartrate 25 milliGRAM(s) Oral two times a day  multivitamin 1 Tablet(s) Oral daily  pantoprazole    Tablet 40 milliGRAM(s) Oral before breakfast  senna 2 Tablet(s) Oral at bedtime  sodium chloride 0.9%. 1000 milliLiter(s) (75 mL/Hr) IV Continuous <Continuous>  tamsulosin 0.4 milliGRAM(s) Oral at bedtime  thiamine 100 milliGRAM(s) Oral daily      PHYSICAL EXAM:  T(C): 36.3 (06-17-19 @ 14:18), Max: 36.5 (06-17-19 @ 04:01)  HR: 61 (06-17-19 @ 14:18) (61 - 79)  BP: 104/64 (06-17-19 @ 14:18) (104/64 - 121/75)  RR: 18 (06-17-19 @ 14:18) (17 - 18)  SpO2: 95% (06-17-19 @ 14:18) (95% - 95%)  Wt(kg): --  I&O's Summary    16 Jun 2019 07:01  -  17 Jun 2019 07:00  --------------------------------------------------------  IN: 980 mL / OUT: 0 mL / NET: 980 mL    17 Jun 2019 07:01  -  17 Jun 2019 16:48  --------------------------------------------------------  IN: 400 mL / OUT: 0 mL / NET: 400 mL          Appearance: Normal	  HEENT:   Normal oral mucosa, PERRL, EOMI	  Lymphatic: No lymphadenopathy , no edema  Cardiovascular: Normal S1 S2, No JVD, No murmurs , Peripheral pulses palpable 2+ bilaterally  Respiratory: Lungs clear to auscultation, normal effort 	  Gastrointestinal:  Soft, Non-tender, + BS	  Skin: No rashes, No ecchymoses, No cyanosis, warm to touch  Musculoskeletal: Normal range of motion, normal strength  Psychiatry:  Mood & affect appropriate  Ext: No edema      All labs, Imaging and EKGs personally reviewed

## 2019-06-17 NOTE — PROGRESS NOTE ADULT - PROVIDER SPECIALTY LIST ADULT
Cardiology
Gastroenterology
Infectious Disease
Internal Medicine
Pulmonology
Cardiology
Infectious Disease
Neurology
CT Surgery
Infectious Disease
Internal Medicine

## 2019-06-17 NOTE — PROGRESS NOTE ADULT - PROBLEM SELECTOR PLAN 1
ID f/up-on dapto/ceftaroline--MARIA LUISA unrevealing--s/p MRI spine-abnormal, picc placed; daily blood cx at this point -last neg.  GI w/up if other studies non-dx (held) 41 wk 4 day   female, admitted to nursery for routine  care

## 2019-06-17 NOTE — PROGRESS NOTE ADULT - PROBLEM SELECTOR PROBLEM 3
CKD (chronic kidney disease) stage 3, GFR 30-59 ml/min
DEBI (acute kidney injury)
Chronic diastolic HF (heart failure)
Dizziness
Chronic diastolic HF (heart failure)
CKD (chronic kidney disease) stage 3, GFR 30-59 ml/min
Chronic diastolic HF (heart failure)
DEBI (acute kidney injury)
Dizziness
PAH (pulmonary artery hypertension)
Shoulder pain
DEBI (acute kidney injury)
Chronic diastolic HF (heart failure)
DEBI (acute kidney injury)
Dizziness
Dizziness
DEBI (acute kidney injury)

## 2019-06-17 NOTE — PROGRESS NOTE ADULT - PROBLEM SELECTOR PROBLEM 4
Shoulder pain
Chronic diastolic HF (heart failure)
Shoulder pain
Aortic stenosis
DEBI (acute kidney injury)
Aortic stenosis
CKD (chronic kidney disease) stage 3, GFR 30-59 ml/min
Chronic diastolic HF (heart failure)
DEBI (acute kidney injury)
DIEGO (obstructive sleep apnea)
Shoulder pain
Shoulder pain
Aortic stenosis
DEBI (acute kidney injury)
Shoulder pain
DEBI (acute kidney injury)

## 2019-06-17 NOTE — PROGRESS NOTE ADULT - PROBLEM SELECTOR PROBLEM 5
Aortic stenosis
Back pain
Aortic stenosis
Back pain
Back pain
Aortic stenosis
Atrial fibrillation
Back pain
Back pain
COPD (chronic obstructive pulmonary disease)
COPD (chronic obstructive pulmonary disease)
Aortic stenosis

## 2019-06-17 NOTE — PROGRESS NOTE ADULT - PROBLEM SELECTOR PLAN 4
L shoulder pain on ROM  Xray noted  Arthritic changes  NSAIDs PRN  OT eval
resolved   trend BUN/Cr
S/P valve replacement   Card eval  high risk candidate for any valve surgery
resolved   trend BUN/Cr
L shoulder pain on ROM  Xray noted  Arthritic changes  NSAIDs PRN  OT eval
Card eval  echo  home bp medications   avoid fluid overlaod
L shoulder pain on ROM  Xray noted  Arthritic changes  NSAIDs PRN  OT eval
S/P valve replacement   Card eval  high risk candidate for any valve surgery
resolved   trend BUN/Cr
S/P valve replacement   Card eval  high risk candidate for any valve surgery
Card eval  echo  home bp medications   avoid fluid overlaod
L shoulder pain on ROM  Xray noted  Arthritic changes  NSAIDs PRN  OT eval
S/P valve replacement   Card eval  high risk candidate for any valve surgery
monitor BUN/Cr  Avoid fluid overload
out pt cpap o/n
resolved   trend BUN/Cr
L shoulder pain on ROM  Xray noted  Arthritic changes  NSAIDs PRN  OT eval

## 2019-06-17 NOTE — PROGRESS NOTE ADULT - PROBLEM SELECTOR PROBLEM 6
Back pain
COPD (chronic obstructive pulmonary disease)
Back pain
COPD (chronic obstructive pulmonary disease)
Atrial fibrillation
Atrial fibrillation
Back pain
COPD (chronic obstructive pulmonary disease)
COPD (chronic obstructive pulmonary disease)
Prophylactic measure
COPD (chronic obstructive pulmonary disease)
Back pain

## 2019-07-03 ENCOUNTER — EMERGENCY (EMERGENCY)
Facility: HOSPITAL | Age: 84
LOS: 1 days | Discharge: HOME | End: 2019-07-03
Attending: EMERGENCY MEDICINE
Payer: MEDICARE

## 2019-07-03 VITALS
TEMPERATURE: 98 F | RESPIRATION RATE: 16 BRPM | DIASTOLIC BLOOD PRESSURE: 67 MMHG | SYSTOLIC BLOOD PRESSURE: 106 MMHG | OXYGEN SATURATION: 95 % | HEART RATE: 73 BPM

## 2019-07-03 VITALS
OXYGEN SATURATION: 95 % | HEART RATE: 77 BPM | RESPIRATION RATE: 18 BRPM | SYSTOLIC BLOOD PRESSURE: 110 MMHG | DIASTOLIC BLOOD PRESSURE: 86 MMHG

## 2019-07-03 DIAGNOSIS — Z95.2 PRESENCE OF PROSTHETIC HEART VALVE: Chronic | ICD-10-CM

## 2019-07-03 DIAGNOSIS — Z95.0 PRESENCE OF CARDIAC PACEMAKER: Chronic | ICD-10-CM

## 2019-07-03 LAB
ALBUMIN SERPL ELPH-MCNC: 3.3 G/DL — SIGNIFICANT CHANGE UP (ref 3.3–5)
ALP SERPL-CCNC: 133 U/L — HIGH (ref 40–120)
ALT FLD-CCNC: 13 U/L — SIGNIFICANT CHANGE UP (ref 10–45)
ANION GAP SERPL CALC-SCNC: 18 MMOL/L — HIGH (ref 5–17)
APTT BLD: 40.2 SEC — HIGH (ref 27.5–36.3)
AST SERPL-CCNC: 19 U/L — SIGNIFICANT CHANGE UP (ref 10–40)
BASOPHILS # BLD AUTO: 0 K/UL — SIGNIFICANT CHANGE UP (ref 0–0.2)
BILIRUB SERPL-MCNC: 0.5 MG/DL — SIGNIFICANT CHANGE UP (ref 0.2–1.2)
BUN SERPL-MCNC: 24 MG/DL — HIGH (ref 7–23)
CALCIUM SERPL-MCNC: 9.1 MG/DL — SIGNIFICANT CHANGE UP (ref 8.4–10.5)
CHLORIDE SERPL-SCNC: 99 MMOL/L — SIGNIFICANT CHANGE UP (ref 96–108)
CO2 SERPL-SCNC: 19 MMOL/L — LOW (ref 22–31)
CREAT SERPL-MCNC: 1.12 MG/DL — SIGNIFICANT CHANGE UP (ref 0.5–1.3)
EOSINOPHIL # BLD AUTO: 0 K/UL — SIGNIFICANT CHANGE UP (ref 0–0.5)
GLUCOSE SERPL-MCNC: 103 MG/DL — HIGH (ref 70–99)
HCT VFR BLD CALC: 26.1 % — LOW (ref 39–50)
HGB BLD-MCNC: 8.9 G/DL — LOW (ref 13–17)
INR BLD: 2.02 RATIO — HIGH (ref 0.88–1.16)
LYMPHOCYTES # BLD AUTO: 0.9 K/UL — LOW (ref 1–3.3)
LYMPHOCYTES # BLD AUTO: 15 % — SIGNIFICANT CHANGE UP (ref 13–44)
MCHC RBC-ENTMCNC: 28.1 PG — SIGNIFICANT CHANGE UP (ref 27–34)
MCHC RBC-ENTMCNC: 34.1 GM/DL — SIGNIFICANT CHANGE UP (ref 32–36)
MCV RBC AUTO: 82.5 FL — SIGNIFICANT CHANGE UP (ref 80–100)
MONOCYTES # BLD AUTO: 0.9 K/UL — SIGNIFICANT CHANGE UP (ref 0–0.9)
MONOCYTES NFR BLD AUTO: 14 % — SIGNIFICANT CHANGE UP (ref 2–14)
NEUTROPHILS # BLD AUTO: 3.6 K/UL — SIGNIFICANT CHANGE UP (ref 1.8–7.4)
NEUTROPHILS NFR BLD AUTO: 71 % — SIGNIFICANT CHANGE UP (ref 43–77)
PLAT MORPH BLD: NORMAL — SIGNIFICANT CHANGE UP
PLATELET # BLD AUTO: 35 K/UL — LOW (ref 150–400)
POTASSIUM SERPL-MCNC: 4.1 MMOL/L — SIGNIFICANT CHANGE UP (ref 3.5–5.3)
POTASSIUM SERPL-SCNC: 4.1 MMOL/L — SIGNIFICANT CHANGE UP (ref 3.5–5.3)
PROT SERPL-MCNC: 5.8 G/DL — LOW (ref 6–8.3)
PROTHROM AB SERPL-ACNC: 23.7 SEC — HIGH (ref 10–12.9)
RBC # BLD: 3.16 M/UL — LOW (ref 4.2–5.8)
RBC # FLD: 14.9 % — HIGH (ref 10.3–14.5)
RBC BLD AUTO: SIGNIFICANT CHANGE UP
SODIUM SERPL-SCNC: 136 MMOL/L — SIGNIFICANT CHANGE UP (ref 135–145)
WBC # BLD: 5.4 K/UL — SIGNIFICANT CHANGE UP (ref 3.8–10.5)
WBC # FLD AUTO: 5.4 K/UL — SIGNIFICANT CHANGE UP (ref 3.8–10.5)

## 2019-07-03 PROCEDURE — 99284 EMERGENCY DEPT VISIT MOD MDM: CPT | Mod: GC

## 2019-07-03 PROCEDURE — 71045 X-RAY EXAM CHEST 1 VIEW: CPT | Mod: 26

## 2019-07-03 PROCEDURE — 70450 CT HEAD/BRAIN W/O DYE: CPT | Mod: 26

## 2019-07-03 PROCEDURE — 72170 X-RAY EXAM OF PELVIS: CPT | Mod: 26

## 2019-07-03 PROCEDURE — 73030 X-RAY EXAM OF SHOULDER: CPT | Mod: 26,LT

## 2019-07-03 PROCEDURE — 93010 ELECTROCARDIOGRAM REPORT: CPT | Mod: GC

## 2019-07-03 PROCEDURE — 72125 CT NECK SPINE W/O DYE: CPT | Mod: 26

## 2019-07-03 RX ORDER — ACETAMINOPHEN 500 MG
650 TABLET ORAL ONCE
Refills: 0 | Status: COMPLETED | OUTPATIENT
Start: 2019-07-03 | End: 2019-07-03

## 2019-07-03 RX ADMIN — Medication 650 MILLIGRAM(S): at 03:50

## 2019-07-03 NOTE — ED ADULT NURSE NOTE - PMH
Aortic stenosis    Asthma    Atrial fibrillation    BPH (benign prostatic hyperplasia)    CAD (coronary artery disease)  s/p stent 2010  CHF (congestive heart failure)    Chronic diastolic HF (heart failure)    CKD (chronic kidney disease) stage 3, GFR 30-59 ml/min    COPD (chronic obstructive pulmonary disease)    CVA (cerebral vascular accident)    Gait instability    H/O thalassemia    Hepatitis B    Hypertension    DIEGO (obstructive sleep apnea)    Pacemaker  2015 Medtronic [id number]  A2DR01  PTSD (post-traumatic stress disorder)    Seizure    Tachy-idris syndrome    TIA (transient ischemic attack)  2010

## 2019-07-03 NOTE — ED PROVIDER NOTE - CONSTITUTIONAL, MLM
normal... Well appearing, well nourished, awake, alert, oriented to person, place,situation and in no apparent distress.

## 2019-07-03 NOTE — ED ADULT TRIAGE NOTE - CHIEF COMPLAINT QUOTE
sp fall unwitnessed found on the floor on eliquis c/o neck head pain/baseline confused  recd w/ c collar

## 2019-07-03 NOTE — ED PROVIDER NOTE - PHYSICAL EXAMINATION
Attending Cheri Echeverria: Gen: chronically ill appearing, heent: atrauamtic, eomi, perrla, mmm, op pink, uvula midline, neck; nttp, no nuchal rigidity, chest: nttp, no crepitus, cv: rrr,+murmur, lungs: ctab, abd: soft, nontender, nondistended, no peritoneal signs, +BS, no guarding, ext:b/l LE pitting edema skin: no rash, neuro: awake and alert, following commands, speech clear, sensation and strength intact, no focal deficits

## 2019-07-03 NOTE — ED PROVIDER NOTE - ENMT, MLM
Airway patent, Nasal mucosa clear. Mouth with normal mucosa. C collar in place, no facial ttp, no midline c- spine ttp

## 2019-07-03 NOTE — ED PROVIDER NOTE - PROGRESS NOTE DETAILS
Brown PGY2: ambulated with minimal assistance, states normally ambulates with walker, feels improved, c-spine cleared, no midline spinal ttp, able to range neck without pain or new neuro deficits. Not orthostatic, BP sitting 107 systolic and standing 121 systolic, HR 80s. [facility] notified on pts return, awaiting non-emergent ambulette Attending Cheri Echeverria: pt ambulating at baseline, acting appropriately. pt denies any sob with movement.no chest wall ttp. pt states mechanical fall and can remember event. will d/c back to rehab

## 2019-07-03 NOTE — ED ADULT NURSE NOTE - NSIMPLEMENTINTERV_GEN_ALL_ED
Implemented All Fall with Harm Risk Interventions:  [place] to call system. Call bell, personal items and telephone within reach. Instruct patient to call for assistance. Room bathroom lighting operational. Non-slip footwear when patient is off stretcher. Physically safe environment: no spills, clutter or unnecessary equipment. Stretcher in lowest position, wheels locked, appropriate side rails in place. Provide visual cue, wrist band, yellow gown, etc. Monitor gait and stability. Monitor for mental status changes and reorient to person, place, and time. Review medications for side effects contributing to fall risk. Reinforce activity limits and safety measures with patient and family. Provide visual clues: red socks.

## 2019-07-03 NOTE — ED PROVIDER NOTE - NSFOLLOWUPINSTRUCTIONS_ED_ALL_ED_FT
You were seen today for a fall with head trauma.     You had a CT scan of your head and neck that did not show any bleeding or fractures.     You had xrays of the chest, pelvis and shoulder. These were negative.    You should stay well hydrated but you have chronic low leg swelling that may be contributing to your falls. Please walk with an assistive device and stand up slowly.     Please return for any changes in mental status, chest pain, numbness/tingling, fevers/chills or any new or worsening symptoms.

## 2019-07-03 NOTE — ED PROVIDER NOTE - OBJECTIVE STATEMENT
88M hx AS, afib, CAD, CHF, on eliquis, presenting from rehab w/ RUE picc, currently being tx'd for bacteremia, presenting w/ fall as he was ambulating out of bed to bathroom, states he fell onto his left side, possible head trauma, no LOC, + left shoulder pain, no abd pain or hip pain, states he felt too weak to get up at the time, has chronic LE edema, not worse from baseline, no preceding cp/ sob/ palpitations. 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

## 2019-07-03 NOTE — ED ADULT NURSE REASSESSMENT NOTE - NS ED NURSE REASSESS COMMENT FT1
patient test results negative. DO Brown to d/c patient. Patient currently awaiting nonemergent back to [facility]. Safety and comfort measures maintained.

## 2019-07-03 NOTE — ED PROVIDER NOTE - MUSCULOSKELETAL, MLM
Spine appears normal, range of motion is not limited, no muscle or joint tenderness, no pain on pelvic compression, able to range lower extremites, + left shoulder pain but AROM intact, + edema in all four extremities,

## 2019-07-03 NOTE — ED PROVIDER NOTE - PMH
Aortic stenosis    Asthma    Atrial fibrillation    BPH (benign prostatic hyperplasia)    CAD (coronary artery disease)  s/p stent 2010  CHF (congestive heart failure)    Chronic diastolic HF (heart failure)    CKD (chronic kidney disease) stage 3, GFR 30-59 ml/min    COPD (chronic obstructive pulmonary disease)    CVA (cerebral vascular accident)    Gait instability    H/O thalassemia    Hepatitis B    Hypertension    DIEGO (obstructive sleep apnea)    Pacemaker  2015 Medtronic [id number]  A2DR01  PTSD (post-traumatic stress disorder)    Seizure    Tachy-idris syndrome    TIA (transient ischemic attack)  2010

## 2019-07-03 NOTE — ED PROVIDER NOTE - CLINICAL SUMMARY MEDICAL DECISION MAKING FREE TEXT BOX
88M s/p mechanical fall, w/ possible head trauma, on eliquis, will eval w/ ct head and c spine. 88M s/p mechanical fall, w/ possible head trauma, on eliquis, will eval w/ ct head and c spine.  Attending Cheri Echeverria: 88 y/omale presenting after mechanical fall. pt with complex medical history including heart disease, recent bacteremia. pt states tripped and fell. upon arrival pt able to explain how he fell. no chest wall ttp or evidence of sob to suggest rib fractures. pt with LE edema. per pt is at his baseline. denies any current complaints. no fevers or chills. pt hemodynamcially stable. pt is on blood thinners. will check imaging, labs and re-eval

## 2019-07-03 NOTE — ED ADULT NURSE NOTE - OBJECTIVE STATEMENT
89 y/o male presenting to ED via EMS from [facility] s/p fall. Per EMS patient was being assisted to the bathroom by staff when he fell. Placed in c-collar by EMS. Patient A&O x 1 at baseline, unable to assist with history. Currently reporting pain to right sided knee pain and left elbow pain. Pt denies headache, dizziness, chest pain, palpitations, cough, SOB, abdominal pain, n/v/d, urinary symptoms, fevers, chills, weakness at this time. Safety and comfort measures maintained.

## 2019-07-04 ENCOUNTER — INPATIENT (INPATIENT)
Facility: HOSPITAL | Age: 84
LOS: 6 days | Discharge: HOME | DRG: 291 | End: 2019-07-11
Attending: INTERNAL MEDICINE | Admitting: INTERNAL MEDICINE
Payer: MEDICARE

## 2019-07-04 VITALS
WEIGHT: 179.9 LBS | OXYGEN SATURATION: 92 % | TEMPERATURE: 97 F | HEIGHT: 67 IN | HEART RATE: 92 BPM | SYSTOLIC BLOOD PRESSURE: 120 MMHG | RESPIRATION RATE: 16 BRPM | DIASTOLIC BLOOD PRESSURE: 76 MMHG

## 2019-07-04 DIAGNOSIS — J96.91 RESPIRATORY FAILURE, UNSPECIFIED WITH HYPOXIA: ICD-10-CM

## 2019-07-04 DIAGNOSIS — Z95.0 PRESENCE OF CARDIAC PACEMAKER: Chronic | ICD-10-CM

## 2019-07-04 DIAGNOSIS — Z95.2 PRESENCE OF PROSTHETIC HEART VALVE: Chronic | ICD-10-CM

## 2019-07-04 LAB
ALBUMIN SERPL ELPH-MCNC: 3.4 G/DL — SIGNIFICANT CHANGE UP (ref 3.3–5)
ALP SERPL-CCNC: 138 U/L — HIGH (ref 40–120)
ALT FLD-CCNC: 14 U/L — SIGNIFICANT CHANGE UP (ref 10–45)
ANION GAP SERPL CALC-SCNC: 15 MMOL/L — SIGNIFICANT CHANGE UP (ref 5–17)
AST SERPL-CCNC: 22 U/L — SIGNIFICANT CHANGE UP (ref 10–40)
BILIRUB SERPL-MCNC: 0.5 MG/DL — SIGNIFICANT CHANGE UP (ref 0.2–1.2)
BUN SERPL-MCNC: 22 MG/DL — SIGNIFICANT CHANGE UP (ref 7–23)
CALCIUM SERPL-MCNC: 8.6 MG/DL — SIGNIFICANT CHANGE UP (ref 8.4–10.5)
CHLORIDE SERPL-SCNC: 100 MMOL/L — SIGNIFICANT CHANGE UP (ref 96–108)
CK MB CFR SERPL CALC: 5.2 NG/ML — SIGNIFICANT CHANGE UP (ref 0–6.7)
CK SERPL-CCNC: 91 U/L — SIGNIFICANT CHANGE UP (ref 30–200)
CO2 SERPL-SCNC: 20 MMOL/L — LOW (ref 22–31)
CREAT SERPL-MCNC: 1.17 MG/DL — SIGNIFICANT CHANGE UP (ref 0.5–1.3)
GLUCOSE SERPL-MCNC: 121 MG/DL — HIGH (ref 70–99)
HCT VFR BLD CALC: 24 % — LOW (ref 39–50)
HGB BLD-MCNC: 8.2 G/DL — LOW (ref 13–17)
MCHC RBC-ENTMCNC: 28.2 PG — SIGNIFICANT CHANGE UP (ref 27–34)
MCHC RBC-ENTMCNC: 34.1 GM/DL — SIGNIFICANT CHANGE UP (ref 32–36)
MCV RBC AUTO: 82.5 FL — SIGNIFICANT CHANGE UP (ref 80–100)
PLATELET # BLD AUTO: 50 K/UL — LOW (ref 150–400)
POTASSIUM SERPL-MCNC: 3.7 MMOL/L — SIGNIFICANT CHANGE UP (ref 3.5–5.3)
POTASSIUM SERPL-SCNC: 3.7 MMOL/L — SIGNIFICANT CHANGE UP (ref 3.5–5.3)
PROT SERPL-MCNC: 5.9 G/DL — LOW (ref 6–8.3)
RBC # BLD: 2.91 M/UL — LOW (ref 4.2–5.8)
RBC # FLD: 14.9 % — HIGH (ref 10.3–14.5)
SODIUM SERPL-SCNC: 135 MMOL/L — SIGNIFICANT CHANGE UP (ref 135–145)
TROPONIN T, HIGH SENSITIVITY RESULT: 100 NG/L — HIGH (ref 0–51)
TROPONIN T, HIGH SENSITIVITY RESULT: 101 NG/L — HIGH (ref 0–51)
WBC # BLD: 10.2 K/UL — SIGNIFICANT CHANGE UP (ref 3.8–10.5)
WBC # FLD AUTO: 10.2 K/UL — SIGNIFICANT CHANGE UP (ref 3.8–10.5)

## 2019-07-04 PROCEDURE — 70450 CT HEAD/BRAIN W/O DYE: CPT | Mod: 26

## 2019-07-04 PROCEDURE — 71045 X-RAY EXAM CHEST 1 VIEW: CPT | Mod: 26

## 2019-07-04 PROCEDURE — 71275 CT ANGIOGRAPHY CHEST: CPT | Mod: 26

## 2019-07-04 PROCEDURE — 99285 EMERGENCY DEPT VISIT HI MDM: CPT

## 2019-07-04 PROCEDURE — 93010 ELECTROCARDIOGRAM REPORT: CPT

## 2019-07-04 RX ORDER — AZITHROMYCIN 500 MG/1
500 TABLET, FILM COATED ORAL ONCE
Refills: 0 | Status: COMPLETED | OUTPATIENT
Start: 2019-07-04 | End: 2019-07-04

## 2019-07-04 RX ORDER — CEFEPIME 1 G/1
2000 INJECTION, POWDER, FOR SOLUTION INTRAMUSCULAR; INTRAVENOUS ONCE
Refills: 0 | Status: COMPLETED | OUTPATIENT
Start: 2019-07-04 | End: 2019-07-04

## 2019-07-04 RX ORDER — FUROSEMIDE 40 MG
40 TABLET ORAL ONCE
Refills: 0 | Status: COMPLETED | OUTPATIENT
Start: 2019-07-04 | End: 2019-07-04

## 2019-07-04 RX ORDER — ACETAMINOPHEN 500 MG
975 TABLET ORAL ONCE
Refills: 0 | Status: COMPLETED | OUTPATIENT
Start: 2019-07-04 | End: 2019-07-04

## 2019-07-04 RX ORDER — LIDOCAINE 4 G/100G
1 CREAM TOPICAL ONCE
Refills: 0 | Status: COMPLETED | OUTPATIENT
Start: 2019-07-04 | End: 2019-07-04

## 2019-07-04 RX ADMIN — Medication 975 MILLIGRAM(S): at 20:04

## 2019-07-04 RX ADMIN — Medication 975 MILLIGRAM(S): at 21:10

## 2019-07-04 RX ADMIN — LIDOCAINE 1 PATCH: 4 CREAM TOPICAL at 20:07

## 2019-07-04 RX ADMIN — Medication 40 MILLIGRAM(S): at 21:08

## 2019-07-04 NOTE — ED PROVIDER NOTE - PMH
Aortic stenosis    Asthma    Atrial fibrillation    BPH (benign prostatic hyperplasia)    CAD (coronary artery disease)  s/p stent 2010  CHF (congestive heart failure)    Chronic diastolic HF (heart failure)    CKD (chronic kidney disease) stage 3, GFR 30-59 ml/min    COPD (chronic obstructive pulmonary disease)    CVA (cerebral vascular accident)    Gait instability    H/O thalassemia    Hepatitis B    Hypertension    DIEGO (obstructive sleep apnea)    Pacemaker  2015 Medtronic [id number]  A2DR01  PTSD (post-traumatic stress disorder)    Seizure    Tachy-idris syndrome    TIA (transient ischemic attack)  2010

## 2019-07-04 NOTE — ED ADULT NURSE REASSESSMENT NOTE - NS ED NURSE REASSESS COMMENT FT1
Lasix administered as per MD order. Educated pt on plan of care. Safety and comfort maintained. Urinal at bedside. Call bell within reach.

## 2019-07-04 NOTE — ED PROVIDER NOTE - PHYSICAL EXAMINATION
Attending MD Wray:    Gen: awake and alert, oriented x 3, NAD  Neck: supple, no swelling, trachea midline  CV: heart with reg rhythm, no obvious murmur appreciated   Resp: CTAB, breathing comfortably  Abd: soft, NT, ND  Extremities: extremities warm to the touch, +anasarca, scattered b/l UE ecchymosis  Msk: no extremity deformities or bony tenderness  Pysch: appropriate affect    Neuro: moves all extremities spontaneously, no gross motor or sensory deficits Attending MD Wray:    Gen: awake and alert, oriented x 3, NAD  Neck: supple, no swelling, trachea midline  CV: heart with reg rhythm, no obvious murmur appreciated   Resp: CTAB, breathing comfortably  Abd: soft, NT, ND  Extremities: extremities warm to the touch, +anasarca, scattered b/l UE ecchymosis  Msk: no extremity deformities or bony tenderness  Pysch: appropriate affect    Neuro: moves all extremities spontaneously, no gross motor or sensory deficits    **Baldo Kumari PA-C  GEN: Pt in NAD, A&O x2-3   //  EYES: Sclera white w/o injection. PERRLA, EOMI.  // ENT: Head NCAT. Nose w/o deformity. No auricular TTP. MMM. Neck supple FROM.   RESP: L anterior chest wall with ttp around the lower costal margin, no abnl chest wall movement, bibasilar crackles otherwise CTAB.  //   CARDIAC: RRR, clear distinct S1, S2, +ejection click.  ABD: Abdomen soft, non-tender. No CVAT b/l.  //  VASC: 2+ radial and dorsalis pedis pulses b/l. 2+ LE pitting edema.  MSK: Moving all extremities, no midline spinal ttp.  //  NEURO: No focal motor or sensory deficits.  SKIN: Scattered ecchymosis b/l UE, superficial abrasion b/l UE and skin tear L forearm no active bleeding. Chronic venous changes b/l lower legs. PICC in place RUE dressing c/d/i.

## 2019-07-04 NOTE — ED PROVIDER NOTE - OBJECTIVE STATEMENT
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 CP is L sided pleuritic, pt notes SOB since last night, long standing ARMSTRONG unsrue if worse since last night, denies orthopnea. Pain is over L lower costal margin, no radiation, no palpitations or syncope, no palliative factors, constant. Pain is non-exertional. Pt was sent with a note stating concern for slurred speech, unsure duration but EMS reported for 1 week, no appreciable dysarthria in ED. Pt has no other complaints at this time, is at baseline mental status (A&O x2-3 with difficulty with dates) as compared to prior progress notes in provided transport packet. Pt denies f/c, abd pain, urinary sxs, h/o DVT/PE. 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 CP is L sided pleuritic, pt notes SOB since last night, long standing ARMSTRONG unsure if worse since last night, denies orthopnea. Pain is over L lower costal margin, no radiation, no palpitations or syncope, no palliative factors, constant. Pain is non-exertional. Pt was sent with a note stating concern for slurred speech, unsure duration but EMS reported for 1 week, no appreciable dysarthria in ED. Pt has no other complaints at this time, is at baseline mental status (A&O x2-3 with difficulty with dates) as compared to prior progress notes in provided transport packet. Pt denies f/c, abd pain, urinary sxs, h/o DVT/PE.

## 2019-07-04 NOTE — ED PROVIDER NOTE - CLINICAL SUMMARY MEDICAL DECISION MAKING FREE TEXT BOX
Attending MD Wray: 88M with afib on eliquis, COPD, CHF, AS presenting from [facility] for report of pleuritic left anterior chest pain and ?AMS. Patient is appropriate here and not apparently altered to my assessment but does c/o left anterior chest wall pain that is reproducible. Plan for CT head to ro ICH, CTA to r/o PE, labs, EKG, biomarkers to r/o ACS but doubt it.

## 2019-07-04 NOTE — ED ADULT NURSE NOTE - OBJECTIVE STATEMENT
Pt is 88 Y A&O x3 M with hx of CKD, CHF, COPD, HTN, CAD, Afib on Plavix and Eliquis presenting to ED via EMS from [facility] with c/o L sided chest pain worse with movement Pt is 88 Y A&O x3 M with hx of CKD, CHF, COPD, HTN, CAD, Afib on Plavix and Eliquis presenting to ED via EMS from [facility] with c/o L sided chest pain worse with movement s/p fall on Tuesday. EMS states pt has been at [facility] "for months" s/p infected Tavern procedure. As per EMS, pt hit L chest on tray table during fall on Tuesday. Pt was brought to [facility] ED on Tuesday, head CT - and sent back to [facility]. As per EMS, pt has had CP with SOB since Tuesday. Pt  found 86% on RA, EMS placed pt on 3 L NC, pulse ox 92%. Pt arrives to ED breathing unlabored. Pt 88% on RA, placed on 3 L NC, pulse ox 93-94%. PERRL. No facial droop. Sensation intact. Pt able to move UE and LE b/l. Abd soft and non-tender. Skin is warm and dry. 2 + pitting edema to LE b/l. + peripheral pulses. POWER single lumen PICC line noted to R AC, flushes w/o difficulty w/ + blood return. Abrasions and ecchymosis noted to UE b/l. Safety and comfort maintained. Pt on continuous pulse ox and cardiac monitoring. Pt is 88 Y A&O x3 M with hx of CKD, CHF, COPD, HTN, CAD, Afib on Plavix and Eliquis presenting to ED via EMS from [facility] with c/o L sided chest pain worse with movement s/p fall on Tuesday. EMS states pt has been at [facility] "for months" s/p infected Tavern procedure. As per EMS, pt hit L chest on tray table during fall on Tuesday. Pt was brought to [facility] ED on Tuesday, head CT negative and sent back to [facility]. As per EMS, pt has had CP with SOB since Tuesday. EMS states pt found 86% on RA, EMS placed pt on 3 L NC, pulse ox 92%. Pt arrives to ED breathing unlabored. Pt 88% on RA, placed on 3 L NC, pulse ox 93-94%. PERRL. No facial droop. Sensation intact. Pt able to move UE and LE b/l. Abd soft and non-tender. Skin is warm and dry. 2 + pitting edema to LE b/l. + peripheral pulses. POWER single lumen PICC line noted to R AC, flushes w/o difficulty w/ + blood return. Abrasions and ecchymosis noted to UE b/l. Dressing dry and intact. Safety and comfort maintained. Pt on continuous pulse ox and cardiac monitoring. Call bell within reach.

## 2019-07-04 NOTE — ED PROVIDER NOTE - CARE PLAN
Principal Discharge DX:	Respiratory failure with hypoxia  Secondary Diagnosis:	Heart failure, systolic and diastolic, acute on chronic

## 2019-07-04 NOTE — ED ADULT NURSE NOTE - NSIMPLEMENTINTERV_GEN_ALL_ED
Implemented All Fall with Harm Risk Interventions:  [place] to call system. Call bell, personal items and telephone within reach. Instruct patient to call for assistance. Room bathroom lighting operational. Non-slip footwear when patient is off stretcher. Physically safe environment: no spills, clutter or unnecessary equipment. Stretcher in lowest position, wheels locked, appropriate side rails in place. Provide visual cue, wrist band, yellow gown, etc. Monitor gait and stability. Monitor for mental status changes and reorient to person, place, and time. Review medications for side effects contributing to fall risk. Reinforce activity limits and safety measures with patient and family. Provide visual clues: red socks.

## 2019-07-04 NOTE — ED PROVIDER NOTE - ATTENDING CONTRIBUTION TO CARE
Attending MD Wray:   I personally have seen and examined this patient.  Physician assistant note reviewed and agree on plan of care and except where noted.  See HPI, PE, and MDM for details.

## 2019-07-04 NOTE — ED PROVIDER NOTE - PROGRESS NOTE DETAILS
Attending MD Wray: discussed with radiology resident, no PE identified. Bibasilar opacities noted on CTA, question pneumonia vs edema. clinical presentation seems to not be consistent with PNA and more like pulm edema but given limited history will give abx

## 2019-07-05 DIAGNOSIS — I48.91 UNSPECIFIED ATRIAL FIBRILLATION: ICD-10-CM

## 2019-07-05 DIAGNOSIS — I10 ESSENTIAL (PRIMARY) HYPERTENSION: ICD-10-CM

## 2019-07-05 DIAGNOSIS — D75.89 OTHER SPECIFIED DISEASES OF BLOOD AND BLOOD-FORMING ORGANS: ICD-10-CM

## 2019-07-05 DIAGNOSIS — Z29.9 ENCOUNTER FOR PROPHYLACTIC MEASURES, UNSPECIFIED: ICD-10-CM

## 2019-07-05 DIAGNOSIS — I50.32 CHRONIC DIASTOLIC (CONGESTIVE) HEART FAILURE: ICD-10-CM

## 2019-07-05 DIAGNOSIS — R41.0 DISORIENTATION, UNSPECIFIED: ICD-10-CM

## 2019-07-05 DIAGNOSIS — N40.0 BENIGN PROSTATIC HYPERPLASIA WITHOUT LOWER URINARY TRACT SYMPTOMS: ICD-10-CM

## 2019-07-05 DIAGNOSIS — J96.91 RESPIRATORY FAILURE, UNSPECIFIED WITH HYPOXIA: ICD-10-CM

## 2019-07-05 DIAGNOSIS — I25.10 ATHEROSCLEROTIC HEART DISEASE OF NATIVE CORONARY ARTERY WITHOUT ANGINA PECTORIS: ICD-10-CM

## 2019-07-05 DIAGNOSIS — R78.81 BACTEREMIA: ICD-10-CM

## 2019-07-05 LAB
ALBUMIN SERPL ELPH-MCNC: 3.1 G/DL — LOW (ref 3.3–5)
ALP SERPL-CCNC: 128 U/L — HIGH (ref 40–120)
ALT FLD-CCNC: 18 U/L — SIGNIFICANT CHANGE UP (ref 10–45)
ANION GAP SERPL CALC-SCNC: 14 MMOL/L — SIGNIFICANT CHANGE UP (ref 5–17)
AST SERPL-CCNC: 20 U/L — SIGNIFICANT CHANGE UP (ref 10–40)
BILIRUB SERPL-MCNC: 0.5 MG/DL — SIGNIFICANT CHANGE UP (ref 0.2–1.2)
BUN SERPL-MCNC: 21 MG/DL — SIGNIFICANT CHANGE UP (ref 7–23)
CALCIUM SERPL-MCNC: 8.7 MG/DL — SIGNIFICANT CHANGE UP (ref 8.4–10.5)
CHLORIDE SERPL-SCNC: 102 MMOL/L — SIGNIFICANT CHANGE UP (ref 96–108)
CO2 SERPL-SCNC: 21 MMOL/L — LOW (ref 22–31)
CREAT SERPL-MCNC: 1.26 MG/DL — SIGNIFICANT CHANGE UP (ref 0.5–1.3)
GLUCOSE SERPL-MCNC: 95 MG/DL — SIGNIFICANT CHANGE UP (ref 70–99)
HCT VFR BLD CALC: 22.5 % — LOW (ref 39–50)
HGB BLD-MCNC: 7.8 G/DL — LOW (ref 13–17)
LACTATE SERPL-SCNC: 2.6 MMOL/L — HIGH (ref 0.7–2)
MCHC RBC-ENTMCNC: 28.3 PG — SIGNIFICANT CHANGE UP (ref 27–34)
MCHC RBC-ENTMCNC: 34.8 GM/DL — SIGNIFICANT CHANGE UP (ref 32–36)
MCV RBC AUTO: 81.5 FL — SIGNIFICANT CHANGE UP (ref 80–100)
PLATELET # BLD AUTO: 62 K/UL — LOW (ref 150–400)
POTASSIUM SERPL-MCNC: 3.7 MMOL/L — SIGNIFICANT CHANGE UP (ref 3.5–5.3)
POTASSIUM SERPL-SCNC: 3.7 MMOL/L — SIGNIFICANT CHANGE UP (ref 3.5–5.3)
PROCALCITONIN SERPL-MCNC: 0.14 NG/ML — HIGH (ref 0.02–0.1)
PROT SERPL-MCNC: 5.6 G/DL — LOW (ref 6–8.3)
RBC # BLD: 2.76 M/UL — LOW (ref 4.2–5.8)
RBC # FLD: 14.7 % — HIGH (ref 10.3–14.5)
SODIUM SERPL-SCNC: 137 MMOL/L — SIGNIFICANT CHANGE UP (ref 135–145)
WBC # BLD: 11.5 K/UL — HIGH (ref 3.8–10.5)
WBC # FLD AUTO: 11.5 K/UL — HIGH (ref 3.8–10.5)

## 2019-07-05 PROCEDURE — 70551 MRI BRAIN STEM W/O DYE: CPT | Mod: 26

## 2019-07-05 PROCEDURE — 99223 1ST HOSP IP/OBS HIGH 75: CPT

## 2019-07-05 RX ORDER — PANTOPRAZOLE SODIUM 20 MG/1
40 TABLET, DELAYED RELEASE ORAL
Refills: 0 | Status: DISCONTINUED | OUTPATIENT
Start: 2019-07-05 | End: 2019-07-11

## 2019-07-05 RX ORDER — AMIODARONE HYDROCHLORIDE 400 MG/1
200 TABLET ORAL DAILY
Refills: 0 | Status: DISCONTINUED | OUTPATIENT
Start: 2019-07-05 | End: 2019-07-11

## 2019-07-05 RX ORDER — DIAZEPAM 5 MG
5 TABLET ORAL ONCE
Refills: 0 | Status: DISCONTINUED | OUTPATIENT
Start: 2019-07-05 | End: 2019-07-05

## 2019-07-05 RX ORDER — CLOPIDOGREL BISULFATE 75 MG/1
75 TABLET, FILM COATED ORAL DAILY
Refills: 0 | Status: DISCONTINUED | OUTPATIENT
Start: 2019-07-05 | End: 2019-07-11

## 2019-07-05 RX ORDER — APIXABAN 2.5 MG/1
5 TABLET, FILM COATED ORAL
Refills: 0 | Status: DISCONTINUED | OUTPATIENT
Start: 2019-07-05 | End: 2019-07-11

## 2019-07-05 RX ORDER — POLYETHYLENE GLYCOL 3350 17 G/17G
17 POWDER, FOR SOLUTION ORAL DAILY
Refills: 0 | Status: DISCONTINUED | OUTPATIENT
Start: 2019-07-05 | End: 2019-07-11

## 2019-07-05 RX ORDER — LACTOBACILLUS ACIDOPHILUS 100MM CELL
1 CAPSULE ORAL
Refills: 0 | Status: DISCONTINUED | OUTPATIENT
Start: 2019-07-05 | End: 2019-07-11

## 2019-07-05 RX ORDER — ATORVASTATIN CALCIUM 80 MG/1
40 TABLET, FILM COATED ORAL AT BEDTIME
Refills: 0 | Status: DISCONTINUED | OUTPATIENT
Start: 2019-07-05 | End: 2019-07-11

## 2019-07-05 RX ORDER — ONDANSETRON 8 MG/1
4 TABLET, FILM COATED ORAL EVERY 8 HOURS
Refills: 0 | Status: DISCONTINUED | OUTPATIENT
Start: 2019-07-05 | End: 2019-07-11

## 2019-07-05 RX ORDER — METOPROLOL TARTRATE 50 MG
25 TABLET ORAL DAILY
Refills: 0 | Status: DISCONTINUED | OUTPATIENT
Start: 2019-07-05 | End: 2019-07-11

## 2019-07-05 RX ORDER — CEFTAROLINE FOSAMIL 600 MG/20ML
600 POWDER, FOR SOLUTION INTRAVENOUS EVERY 12 HOURS
Refills: 0 | Status: DISCONTINUED | OUTPATIENT
Start: 2019-07-05 | End: 2019-07-09

## 2019-07-05 RX ORDER — LANOLIN ALCOHOL/MO/W.PET/CERES
3 CREAM (GRAM) TOPICAL AT BEDTIME
Refills: 0 | Status: DISCONTINUED | OUTPATIENT
Start: 2019-07-05 | End: 2019-07-11

## 2019-07-05 RX ORDER — SENNA PLUS 8.6 MG/1
2 TABLET ORAL AT BEDTIME
Refills: 0 | Status: DISCONTINUED | OUTPATIENT
Start: 2019-07-05 | End: 2019-07-11

## 2019-07-05 RX ORDER — LIDOCAINE 4 G/100G
1 CREAM TOPICAL DAILY
Refills: 0 | Status: DISCONTINUED | OUTPATIENT
Start: 2019-07-05 | End: 2019-07-11

## 2019-07-05 RX ORDER — TAMSULOSIN HYDROCHLORIDE 0.4 MG/1
0.4 CAPSULE ORAL AT BEDTIME
Refills: 0 | Status: DISCONTINUED | OUTPATIENT
Start: 2019-07-05 | End: 2019-07-11

## 2019-07-05 RX ORDER — FINASTERIDE 5 MG/1
5 TABLET, FILM COATED ORAL DAILY
Refills: 0 | Status: DISCONTINUED | OUTPATIENT
Start: 2019-07-05 | End: 2019-07-11

## 2019-07-05 RX ORDER — BUDESONIDE AND FORMOTEROL FUMARATE DIHYDRATE 160; 4.5 UG/1; UG/1
2 AEROSOL RESPIRATORY (INHALATION)
Refills: 0 | Status: DISCONTINUED | OUTPATIENT
Start: 2019-07-05 | End: 2019-07-11

## 2019-07-05 RX ORDER — ALBUTEROL 90 UG/1
2 AEROSOL, METERED ORAL EVERY 6 HOURS
Refills: 0 | Status: DISCONTINUED | OUTPATIENT
Start: 2019-07-05 | End: 2019-07-11

## 2019-07-05 RX ORDER — DOCUSATE SODIUM 100 MG
100 CAPSULE ORAL THREE TIMES A DAY
Refills: 0 | Status: DISCONTINUED | OUTPATIENT
Start: 2019-07-05 | End: 2019-07-11

## 2019-07-05 RX ORDER — DAPTOMYCIN 500 MG/10ML
850 INJECTION, POWDER, LYOPHILIZED, FOR SOLUTION INTRAVENOUS EVERY 24 HOURS
Refills: 0 | Status: DISCONTINUED | OUTPATIENT
Start: 2019-07-05 | End: 2019-07-09

## 2019-07-05 RX ADMIN — DAPTOMYCIN 134 MILLIGRAM(S): 500 INJECTION, POWDER, LYOPHILIZED, FOR SOLUTION INTRAVENOUS at 14:16

## 2019-07-05 RX ADMIN — CEFEPIME 100 MILLIGRAM(S): 1 INJECTION, POWDER, FOR SOLUTION INTRAMUSCULAR; INTRAVENOUS at 00:36

## 2019-07-05 RX ADMIN — LIDOCAINE 1 PATCH: 4 CREAM TOPICAL at 14:16

## 2019-07-05 RX ADMIN — Medication 1 TABLET(S): at 18:17

## 2019-07-05 RX ADMIN — Medication 25 MILLIGRAM(S): at 10:31

## 2019-07-05 RX ADMIN — Medication 1 TABLET(S): at 11:53

## 2019-07-05 RX ADMIN — AZITHROMYCIN 500 MILLIGRAM(S): 500 TABLET, FILM COATED ORAL at 00:51

## 2019-07-05 RX ADMIN — Medication 100 MILLIGRAM(S): at 14:17

## 2019-07-05 RX ADMIN — ATORVASTATIN CALCIUM 40 MILLIGRAM(S): 80 TABLET, FILM COATED ORAL at 21:51

## 2019-07-05 RX ADMIN — BUDESONIDE AND FORMOTEROL FUMARATE DIHYDRATE 2 PUFF(S): 160; 4.5 AEROSOL RESPIRATORY (INHALATION) at 18:18

## 2019-07-05 RX ADMIN — APIXABAN 5 MILLIGRAM(S): 2.5 TABLET, FILM COATED ORAL at 18:17

## 2019-07-05 RX ADMIN — LIDOCAINE 1 PATCH: 4 CREAM TOPICAL at 23:44

## 2019-07-05 RX ADMIN — LIDOCAINE 1 PATCH: 4 CREAM TOPICAL at 08:00

## 2019-07-05 RX ADMIN — Medication 1 TABLET(S): at 10:31

## 2019-07-05 RX ADMIN — AMIODARONE HYDROCHLORIDE 200 MILLIGRAM(S): 400 TABLET ORAL at 10:30

## 2019-07-05 RX ADMIN — TAMSULOSIN HYDROCHLORIDE 0.4 MILLIGRAM(S): 0.4 CAPSULE ORAL at 21:51

## 2019-07-05 RX ADMIN — PANTOPRAZOLE SODIUM 40 MILLIGRAM(S): 20 TABLET, DELAYED RELEASE ORAL at 10:31

## 2019-07-05 RX ADMIN — Medication 5 MILLIGRAM(S): at 14:51

## 2019-07-05 RX ADMIN — LIDOCAINE 1 PATCH: 4 CREAM TOPICAL at 21:01

## 2019-07-05 RX ADMIN — CLOPIDOGREL BISULFATE 75 MILLIGRAM(S): 75 TABLET, FILM COATED ORAL at 11:53

## 2019-07-05 RX ADMIN — FINASTERIDE 5 MILLIGRAM(S): 5 TABLET, FILM COATED ORAL at 11:53

## 2019-07-05 RX ADMIN — LIDOCAINE 1 PATCH: 4 CREAM TOPICAL at 06:34

## 2019-07-05 RX ADMIN — CEFTAROLINE FOSAMIL 50 MILLIGRAM(S): 600 POWDER, FOR SOLUTION INTRAVENOUS at 18:17

## 2019-07-05 RX ADMIN — Medication 1 TABLET(S): at 11:52

## 2019-07-05 NOTE — CONSULT NOTE ADULT - SUBJECTIVE AND OBJECTIVE BOX
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 CP is L sided pleuritic, pt notes SOB since last night, long standing ARMSTRONG unsure if worse since last night, denies orthopnea. Pain is over L lower costal margin, no radiation, no palpitations or syncope, no palliative factors, constant. Pain is non-exertional. Pt was sent with a note stating concern for slurred speech, unsure duration but EMS reported for 1 week, no appreciable dysarthria in ED. Pt has no other complaints at this time, is at baseline mental status (A&O x2-3 with difficulty with dates) as compared to prior progress notes in provided transport packet. Pt denies f/c, abd pain, urinary sxs, h/o DVT/PE. (05 Jul 2019 07:21)      PAST MEDICAL & SURGICAL HISTORY:  CKD (chronic kidney disease) stage 3, GFR 30-59 ml/min  Hepatitis B  PTSD (post-traumatic stress disorder)  Tachy-idris syndrome  TIA (transient ischemic attack): 2010  Seizure  DIEGO (obstructive sleep apnea)  Chronic diastolic HF (heart failure)  Aortic stenosis  CVA (cerebral vascular accident)  Gait instability  COPD (chronic obstructive pulmonary disease)  BPH (benign prostatic hyperplasia)  Asthma  CHF (congestive heart failure)  Atrial fibrillation  Pacemaker: 2015 Medtronic [id number]  A2DR01  Hypertension  H/O thalassemia  CAD (coronary artery disease): s/p stent 2010  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  Artificial pacemaker  S/P primary angioplasty with coronary stent: 2012 AND 2017          MEDICATIONS:  amiodarone    Tablet 200 milliGRAM(s) Oral daily  apixaban 5 milliGRAM(s) Oral two times a day  clopidogrel Tablet 75 milliGRAM(s) Oral daily  metoprolol succinate ER 25 milliGRAM(s) Oral daily  tamsulosin 0.4 milliGRAM(s) Oral at bedtime    ceftaroline fosamil IVPB 600 milliGRAM(s) IV Intermittent every 12 hours  DAPTOmycin IVPB 850 milliGRAM(s) IV Intermittent every 24 hours    ALBUTerol    90 MICROgram(s) HFA Inhaler 2 Puff(s) Inhalation every 6 hours PRN  buDESOnide  80 MICROgram(s)/formoterol 4.5 MICROgram(s) Inhaler 2 Puff(s) Inhalation two times a day    melatonin 3 milliGRAM(s) Oral at bedtime  ondansetron    Tablet 4 milliGRAM(s) Oral every 8 hours PRN    docusate sodium 100 milliGRAM(s) Oral three times a day  pantoprazole    Tablet 40 milliGRAM(s) Oral before breakfast  polyethylene glycol 3350 17 Gram(s) Oral daily PRN  senna 2 Tablet(s) Oral at bedtime    atorvastatin 40 milliGRAM(s) Oral at bedtime  finasteride 5 milliGRAM(s) Oral daily    lidocaine   Patch 1 Patch Transdermal daily  multivitamin 1 Tablet(s) Oral daily      FAMILY HISTORY:  No pertinent family history in first degree relatives      Non-contributory    SOCIAL HISTORY:    [ ] Tobacco  [ ] Drugs  [ ] Alcohol    Allergies    Keppra (Rash)  penicillin (Unknown)    Intolerances    	    REVIEW OF SYSTEMS:  CONSTITUTIONAL: No fever  EYES: No eye pain, visual disturbances, or discharge  ENMT:  No difficulty hearing, tinnitus  NECK: No pain or stiffness  RESPIRATORY: No cough, wheezing,  CARDIOVASCULAR: No chest pain, palpitations, passing out, dizziness, or leg swelling  GASTROINTESTINAL:  No nausea, vomiting, diarrhea or constipation. No melena.  GENITOURINARY: No dysuria, hematuria  NEUROLOGICAL: No stroke like symptoms  SKIN: No burning or lesions   ENDOCRINE: No heat or cold intolerance  MUSCULOSKELETAL: No joint pain or swelling  PSYCHIATRIC: No  anxiety, mood swings  HEME/LYMPH: No bleeding gums  ALLERY AND IMMUNOLOGIC: No hives or eczema	    All other ROS negative    PHYSICAL EXAM:  T(C): 36.4 (07-05-19 @ 21:14), Max: 36.4 (07-05-19 @ 01:58)  HR: 70 (07-05-19 @ 21:14) (70 - 88)  BP: 100/65 (07-05-19 @ 21:14) (98/61 - 140/72)  RR: 17 (07-05-19 @ 21:14) (17 - 20)  SpO2: 94% (07-05-19 @ 21:14) (94% - 98%)  Wt(kg): --  I&O's Summary    04 Jul 2019 07:01  -  05 Jul 2019 07:00  --------------------------------------------------------  IN: 0 mL / OUT: 650 mL / NET: -650 mL    05 Jul 2019 07:01  -  05 Jul 2019 22:22  --------------------------------------------------------  IN: 480 mL / OUT: 0 mL / NET: 480 mL        Appearance: Normal	  HEENT:   Normal oral mucosa, EOMI	  Cardiovascular: Normal S1 S2, No JVD, No murmurs  Respiratory: Lungs clear to auscultation	  Psychiatry: Alert  Gastrointestinal:  Soft, Non-tender, + BS	  Skin: No rashes   Neurologic: Non-focal  Extremities:  No edema  Vascular: Peripheral pulses palpable    	    	  	  CARDIAC MARKERS:                                  7.8    11.5  )-----------( 62       ( 05 Jul 2019 10:43 )             22.5     07-05    137  |  102  |  21  ----------------------------<  95  3.7   |  21<L>  |  1.26    Ca    8.7      05 Jul 2019 10:43    TPro  5.6<L>  /  Alb  3.1<L>  /  TBili  0.5  /  DBili  x   /  AST  20  /  ALT  18  /  AlkPhos  128<H>  07-05          EKG:  Radiology:      Assessment /Plan:         Timothy Paredes DO [facility]  [facility]  [phone]

## 2019-07-05 NOTE — H&P ADULT - PROBLEM SELECTOR PLAN 1
acute on chronic diastolic HF  fluid restriction  I and Os  well diuresis   Card eval Dr ferraro  serial CE and EKG acute on chronic diastolic HF  fluid restriction  I and Os  well diuresis   Card eval Dr ferraro  serial CE and EKG  check procalcitonin level  CT showed ? Pneumonia VS atelectasis

## 2019-07-05 NOTE — H&P ADULT - NSHPPHYSICALEXAM_GEN_ALL_CORE
Vital Signs Last 24 Hrs  T(C): 36.3 (05 Jul 2019 05:30), Max: 36.4 (04 Jul 2019 19:40)  T(F): 97.4 (05 Jul 2019 05:30), Max: 97.5 (04 Jul 2019 19:40)  HR: 74 (05 Jul 2019 05:30) (74 - 92)  BP: 121/74 (05 Jul 2019 05:30) (106/63 - 127/62)  BP(mean): --  RR: 18 (05 Jul 2019 05:30) (16 - 20)  SpO2: 95% (05 Jul 2019 05:30) (92% - 98%) Vital Signs Last 24 Hrs  T(C): 36.3 (05 Jul 2019 05:30), Max: 36.4 (04 Jul 2019 19:40)  T(F): 97.4 (05 Jul 2019 05:30), Max: 97.5 (04 Jul 2019 19:40)  HR: 74 (05 Jul 2019 05:30) (74 - 92)  BP: 121/74 (05 Jul 2019 05:30) (106/63 - 127/62)  BP(mean): --  RR: 18 (05 Jul 2019 05:30) (16 - 20)  SpO2: 95% (05 Jul 2019 05:30) (92% - 98%)    Appearance: awake, calm, AOx1, confused 	  HEENT:   Normal oral mucosa, PERRL, EOMI	  Lymphatic: No lymphadenopathy , no edema  Cardiovascular:S1S2, irregularly irregular   Respiratory: Lungs clear to auscultation, normal effort 	  Gastrointestinal:  Soft, Non-tender, + BS	  Skin: No rashes, No ecchymoses, No cyanosis, warm to touch  Musculoskeletal: Normal range of motion, normal strength, L shoulder pain, patch intact   Psychiatry:  Mood & affect appropriate  Ext: No edema

## 2019-07-05 NOTE — H&P ADULT - ATTENDING COMMENTS
Advanced care planning was discussed with patient and family.  Advanced care planning forms were reviewed and discussed.  Differential diagnosis and plan of care discussed with patient after the evaluation.   Pain assessed and judicious use of narcotics when appropriate was discussed.  Importance of Fall prevention discussed.  Counseling on Smoking and Alcohol cessation was offered when appropriate.  Counseling on Diet, exercise, and medication compliance was done.    case discussed with patient's son in law who has been following patient in detail over the phone

## 2019-07-05 NOTE — H&P ADULT - PROBLEM SELECTOR PLAN 2
S/P recent fall  CT head noted   will get MRI of brain for worsening mental stat and confusion  R/O concussion   Neuro eval   fall precautions

## 2019-07-05 NOTE — H&P ADULT - HISTORY OF PRESENT ILLNESS
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 CP is L sided pleuritic, pt notes SOB since last night, long standing ARMSTRONG unsure if worse since last night, denies orthopnea. Pain is over L lower costal margin, no radiation, no palpitations or syncope, no palliative factors, constant. Pain is non-exertional. Pt was sent with a note stating concern for slurred speech, unsure duration but EMS reported for 1 week, no appreciable dysarthria in ED. Pt has no other complaints at this time, is at baseline mental status (A&O x2-3 with difficulty with dates) as compared to prior progress notes in provided transport packet. Pt denies f/c, abd pain, urinary sxs, h/o DVT/PE.

## 2019-07-05 NOTE — CONSULT NOTE ADULT - SUBJECTIVE AND OBJECTIVE BOX
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 Patient reportedly suffered fall on Tuesday (7/2) while at rehab. Unclear if head trauma at that time. Post fall, patient was noted to be conversing and behaving normally. On 7/4, patient was reportedly altered and disoriented while conversing with daughter on the phone. Patient sent to ED and while in the ED, patient was noted to be improving back to his baseline. However, on 7/5, patient again noted to be disoriented, confused, and not articulating words as clearly as his baseline. As per EMR, patient with reported slurred speech prior to ED arrival but not noted to have slurred speech in the ED.   At the time of my interview, patient's family reports patient "doesn't usually talk like this" and endorses poor articulation of words.   ROS limited due to patient's clinical condition.    (Stroke only)  NIHSS:   MRS:   ICH:     REVIEW OF SYSTEMS  A 10-system ROS was performed and is negative except for those items noted above and/or in the HPI.    PAST MEDICAL & SURGICAL HISTORY:  CKD (chronic kidney disease) stage 3, GFR 30-59 ml/min  Hepatitis B  PTSD (post-traumatic stress disorder)  Tachy-idris syndrome  TIA (transient ischemic attack): 2010  Seizure  DIEGO (obstructive sleep apnea)  Chronic diastolic HF (heart failure)  Aortic stenosis  CVA (cerebral vascular accident)  Gait instability  COPD (chronic obstructive pulmonary disease)  BPH (benign prostatic hyperplasia)  Asthma  CHF (congestive heart failure)  Atrial fibrillation  Pacemaker: 2015 Medtronic [id number]  A2DR01  Hypertension  H/O thalassemia  CAD (coronary artery disease): s/p stent 2010  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  Artificial pacemaker  S/P primary angioplasty with coronary stent: 2012 AND 2017    FAMILY HISTORY:  No pertinent family history in first degree relatives    SOCIAL HISTORY:   T/E/D:   Occupation:   Lives with:     MEDICATIONS (HOME):  Home Medications:  acetaminophen 325 mg oral tablet: 3 tab(s) orally every 8 hours, As Needed (28 May 2019 14:34)  albuterol 90 mcg/inh inhalation aerosol: 2 puff(s) inhaled every 6 hours, As needed, Shortness of Breath and/or Wheezing (12 Jun 2019 11:11)  amiodarone 200 mg oral tablet: 1 tab(s) orally once a day (28 May 2019 14:34)  apixaban 5 mg oral tablet: 1 tab(s) orally every 12 hours (28 May 2019 14:34)  clopidogrel 75 mg oral tablet: 1 tab(s) orally once a day (28 May 2019 14:34)  Crestor 10 mg oral tablet: 1 tab(s) orally once a day (at bedtime) (28 May 2019 14:34)  DAPTOmycin 500 mg intravenous injection: 850 milligram(s) intravenous every 24 hours (17 Jun 2019 12:02)  docusate sodium 100 mg oral capsule: 1 cap(s) orally 3 times a day (12 Jun 2019 11:11)  finasteride 5 mg oral tablet: 1 tab(s) orally once a day (28 May 2019 14:34)  lactobacillus acidophilus oral capsule: 1 tab(s) orally 3 times a day (with meals) (12 Jun 2019 11:11)  lidocaine 5% topical film: Apply topically to affected area once a day (28 May 2019 14:34)  linezolid 600 mg oral tablet: 1 tab(s) orally every 12 hours (17 Jun 2019 12:02)  melatonin 3 mg oral tablet: 1 tab(s) orally once a day (at bedtime) (28 May 2019 14:34)  metoprolol succinate 25 mg oral tablet, extended release: 1 tab(s) orally once a day (28 May 2019 14:34)  MiraLax oral powder for reconstitution: 17 gram(s) orally every other day, As Needed (28 May 2019 14:34)  Multiple Vitamins oral tablet: 1 tab(s) orally once a day (12 Jun 2019 11:11)  ondansetron 4 mg oral tablet: 1 tab(s) orally every 8 hours, As needed, Nausea and/or Vomiting (12 Jun 2019 11:11)  oxyCODONE: 2.5 milligram(s) orally every 6 hours, As Needed (12 Jun 2019 11:11)  pantoprazole 40 mg oral delayed release tablet: 1 tab(s) orally once a day (before a meal) (28 May 2019 14:34)  senna oral tablet: 2 tab(s) orally once a day (at bedtime) (12 Jun 2019 11:11)  Symbicort 80 mcg-4.5 mcg/inh inhalation aerosol: 2 puff(s) inhaled 2 times a day (28 May 2019 14:34)  tamsulosin 0.4 mg oral capsule: 1 cap(s) orally once a day (at bedtime) (28 May 2019 14:34)  Vitamin D3 5000 intl units oral tablet: 1 tab(s) orally once a day (28 May 2019 14:34)    MEDICATIONS  (STANDING):  amiodarone    Tablet 200 milliGRAM(s) Oral daily  apixaban 5 milliGRAM(s) Oral two times a day  atorvastatin 40 milliGRAM(s) Oral at bedtime  buDESOnide  80 MICROgram(s)/formoterol 4.5 MICROgram(s) Inhaler 2 Puff(s) Inhalation two times a day  ceftaroline fosamil IVPB 600 milliGRAM(s) IV Intermittent every 12 hours  clopidogrel Tablet 75 milliGRAM(s) Oral daily  DAPTOmycin IVPB 850 milliGRAM(s) IV Intermittent every 24 hours  docusate sodium 100 milliGRAM(s) Oral three times a day  finasteride 5 milliGRAM(s) Oral daily  lactobacillus acidophilus 1 Tablet(s) Oral three times a day with meals  lidocaine   Patch 1 Patch Transdermal daily  melatonin 3 milliGRAM(s) Oral at bedtime  metoprolol succinate ER 25 milliGRAM(s) Oral daily  multivitamin 1 Tablet(s) Oral daily  pantoprazole    Tablet 40 milliGRAM(s) Oral before breakfast  senna 2 Tablet(s) Oral at bedtime  tamsulosin 0.4 milliGRAM(s) Oral at bedtime    MEDICATIONS  (PRN):  ALBUTerol    90 MICROgram(s) HFA Inhaler 2 Puff(s) Inhalation every 6 hours PRN Shortness of Breath and/or Wheezing  ondansetron    Tablet 4 milliGRAM(s) Oral every 8 hours PRN Nausea and/or Vomiting  polyethylene glycol 3350 17 Gram(s) Oral daily PRN Constipation    ALLERGIES/INTOLERANCES:  Allergies  Keppra (Rash)  penicillin (Unknown)    Intolerances    VITALS & EXAMINATION:  Vital Signs Last 24 Hrs  T(C): 36.4 (05 Jul 2019 16:22), Max: 36.4 (05 Jul 2019 01:58)  T(F): 97.5 (05 Jul 2019 16:22), Max: 97.5 (05 Jul 2019 01:58)  HR: 78 (05 Jul 2019 16:22) (74 - 88)  BP: 100/65 (05 Jul 2019 16:22) (98/61 - 140/72)  BP(mean): --  RR: 18 (05 Jul 2019 16:22) (18 - 20)  SpO2: 96% (05 Jul 2019 16:22) (93% - 98%)    General:  Constitutional: Male, appears stated age, in no apparent distress including pain  Head: Normocephalic & atraumatic.  Extremities: +Significant dependant edema noted in both UE/LE  Skin: +Discoloration and bruising noted in both UE    Neurological (>12):  MS: Eyes closed. No eye opening to voice. Eye opening to sternal rub with maintained alertness. AAOx1 (thinks he is in his bedroom, states year is 2020, asked about president and says "who cares," unable to state age; states his name). Inconsistently following simple commands.    Language: Speech is clear at times with intermittent nonsensical phrases. Not naming objects. Able to name and identify family members present in the room.     CNs: PERRLA (R = 3mm, L = 3mm). +BTT bilaterally. EOMI no nystagmus, No facial asymmetry b/l, full eye closure strength b/l.  Symmetric palate elevation in midline. Gag reflex deferred.     Fundoscopic: Not visualized    Motor: Normal muscle bulk & tone. No noticeable tremor or seizure. No pronator drift.               R	Moves spontaneously; At least antigravity   L	Moves spontaneously; At least antigravity     	  R	Moves spontaneously; At least antigravity 	   L	Moves spontaneously; At least antigravity     Sensation: Intact to pinprick    Cortical: Extinction on DSS (neglect): Unable to assess    Reflexes:                  Plantar Resp  R		Down   L		Down     Coordination: Unable to assess.    Gait: Deferred    LABORATORY:  CBC                       7.8    11.5  )-----------( 62       ( 05 Jul 2019 10:43 )             22.5     Chem 07-05    137  |  102  |  21  ----------------------------<  95  3.7   |  21<L>  |  1.26    Ca    8.7      05 Jul 2019 10:43    TPro  5.6<L>  /  Alb  3.1<L>  /  TBili  0.5  /  DBili  x   /  AST  20  /  ALT  18  /  AlkPhos  128<H>  07-05    LFTs LIVER FUNCTIONS - ( 05 Jul 2019 10:43 )  Alb: 3.1 g/dL / Pro: 5.6 g/dL / ALK PHOS: 128 U/L / ALT: 18 U/L / AST: 20 U/L / GGT: x           Coagulopathy   Lipid Panel   A1c   Cardiac enzymes CARDIAC MARKERS ( 04 Jul 2019 20:03 )  x     / x     / 91 U/L / x     / 5.2 ng/mL  CARDIAC MARKERS ( 04 Jul 2019 09:48 )  x     / x     / 75 U/L / x     / x          U/A   CSF  Immunological  Other    STUDIES & IMAGING:  Studies (EKG, EEG, EMG, etc):     Radiology (XR, CT, MR, U/S, TTE/MARIA LUISA):  < from: CT Head No Cont (07.04.19 @ 21:48) >  IMPRESSION:     No acute intracranial bleeding, mass effect, or shift.     < end of copied text >

## 2019-07-05 NOTE — CONSULT NOTE ADULT - ASSESSMENT
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 LKN: 7/4 11pm. Patient reportedly, waxing and waning in terms of level of arousal, ability to converse, and clarity of speech as per family. Neurologic exam remarkable for mental status demonstrating eyes closed. No eye opening to voice. Eye opening to sternal rub with maintained alertness. AAOx1 (thinks he is in his bedroom, states year is 2020, asked about president and says "who cares," unable to state age; states his name). Inconsistently following simple commands. Speech is clear at times with intermittent nonsensical phrases. Not naming objects. Able to name and identify family members present in the room. Labs remarkable for WBC of 11.5 w/ platelets of 62. Lactate of 2.6. CXR demonstrating bibasilar pneumonia/atelectasis. CT head on admission demonstrating no acute pathology.       Impression: AMS a/w ?dysarthria likely 2/2 toxic/metabolic etiology; suspect encephalopathy in the setting of underlying infection; acute ischemia is less likely however, given history of a-fib and VRE endocarditis, would rule out acute ischemia; CNS infection is less likely without evidence of focal neurologic deficits; seizures are a possibility, though unclear history based on chart review      Plan:  Recommendations:  [] MRI Brain w/o contrast  [] Can consider rEEG if mental status does not improve and no ischemia noted on MRI; would also clarify seizure history with patient's Neurologist (Dr. Ramírez)  [] Trend Pro-calcitonin     Management & disposition to be discussed with neuro attending, Dr. Moran.

## 2019-07-05 NOTE — H&P ADULT - NSHPREVIEWOFSYSTEMS_GEN_ALL_CORE
REVIEW OF SYSTEMS:    CONSTITUTIONAL: + weakness  EYES/ENT: No visual changes;  No vertigo or throat pain   NECK: No pain or stiffness  RESPIRATORY: No cough, wheezing, hemoptysis; No shortness of breath  CARDIOVASCULAR: No chest pain or palpitations  GASTROINTESTINAL: No abdominal or epigastric pain. No nausea, vomiting, or hematemesis; No diarrhea or constipation. No melena or hematochezia.  GENITOURINARY: No dysuria, frequency or hematuria  NEUROLOGICAL: + weakness  SKIN: No itching, burning, rashes, or lesions   All other review of systems is negative unless indicated above.

## 2019-07-05 NOTE — H&P ADULT - NSHPLABSRESULTS_GEN_ALL_CORE
8.2    10.2  )-----------( 50       ( 04 Jul 2019 20:03 )             24.0               07-04    135  |  100  |  22  ----------------------------<  121<H>  3.7   |  20<L>  |  1.17    Ca    8.6      04 Jul 2019 20:03    TPro  5.9<L>  /  Alb  3.4  /  TBili  0.5  /  DBili  x   /  AST  22  /  ALT  14  /  AlkPhos  138<H>  07-04           CARDIAC MARKERS ( 04 Jul 2019 20:03 )  x     / x     / 91 U/L / x     / 5.2 ng/mL  CARDIAC MARKERS ( 04 Jul 2019 09:48 )  x     / x     / 75 U/L / x     / x 8.2    10.2  )-----------( 50       ( 04 Jul 2019 20:03 )             24.0               07-04    135  |  100  |  22  ----------------------------<  121<H>  3.7   |  20<L>  |  1.17    Ca    8.6      04 Jul 2019 20:03    TPro  5.9<L>  /  Alb  3.4  /  TBili  0.5  /  DBili  x   /  AST  22  /  ALT  14  /  AlkPhos  138<H>  07-04           CARDIAC MARKERS ( 04 Jul 2019 20:03 )  x     / x     / 91 U/L / x     / 5.2 ng/mL  CARDIAC MARKERS ( 04 Jul 2019 09:48 )  x     / x     / 75 U/L / x     / x    < from: CT Head No Cont (07.04.19 @ 21:48) >    IMPRESSION:     No acute intracranial bleeding, mass effect, or shift.     < from: CT Angio Chest w/ IV Cont (07.04.19 @ 21:48) >    IMPRESSION:     No pulmonary embolism.    Bilateral lower lobe consolidative opacities compatible with pneumonia in   the appropriate clinical context. Recommend follow-up to resolution.    Scattered areas of small ground glass opacity may reflect edema.    Erosive changes at L1-L2 compatible with known osteomyelitis/discitis.   Difficult to compare extent to prior MR due to differences in modality.

## 2019-07-05 NOTE — CONSULT NOTE ADULT - ATTENDING COMMENTS
MR. 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 rehab on 7/4 for altered mental status and reported slurred speech. LKN: 7/4 11pm. Hew was noted to have waxing and waning in terms of level of arousal, ability to converse, and clarity of speech as per family.     On exam today, Mr. Thompson is more alter and arousable than noted yesterday.  He remains lethargic and Ox1.5 (not date or location)   Inconsistently following simple commands.   Speech is intermittent and mild dysarthric. Not naming objects.  No other noted cranial nerve deficit.   Mild dysmetria but no gross tremor    Labs remarkable for WBC of 11.5 w/ platelets of 62. Lactate of 2.6. CXR demonstrating bibasilar pneumonia/atelectasis.   CT head on admission demonstrating no acute pathology.       Impression: Encephalopathy NOS:  r/o secondary to toxic metabolic insult, r/o secondary to vascular insult (given afib history) r/o post ictal.    Plan:  Recommendations:  [] MRI Brain w/o contrast  [] routine EEG    to f/u with outpt neurologist Dr. Ramírez

## 2019-07-05 NOTE — H&P ADULT - NSICDXPASTMEDICALHX_GEN_ALL_CORE_FT
PAST MEDICAL HISTORY:  Aortic stenosis     Asthma     Atrial fibrillation     BPH (benign prostatic hyperplasia)     CAD (coronary artery disease) s/p stent 2010    CHF (congestive heart failure)     Chronic diastolic HF (heart failure)     CKD (chronic kidney disease) stage 3, GFR 30-59 ml/min     COPD (chronic obstructive pulmonary disease)     CVA (cerebral vascular accident)     Gait instability     H/O thalassemia     Hepatitis B     Hypertension     DIEGO (obstructive sleep apnea)     Pacemaker 2015 Medtronic [id number]  A2DR01    PTSD (post-traumatic stress disorder)     Seizure     Tachy-idris syndrome     TIA (transient ischemic attack) 2010

## 2019-07-05 NOTE — H&P ADULT - PROBLEM SELECTOR PLAN 4
most likely due to recent linezolid use for VRE  hold ABx  trend Plt level   Heme eval for further recs   ID F/U

## 2019-07-05 NOTE — H&P ADULT - ASSESSMENT
Pt is a 87 y/o M known very well by me with PMH A fib/SSS on eliquis, VRE ?endocarditis s/p PPM removal receiving abx from PICC in [facility] ( two full course of IV ABx), AS s/p biomechanical TAVR, seizure not on meds, TIA, CHF on lasix 40 mg daily, COPD not typically on home O2, CAD with stent,  sent from [facility] s/p fall yesterday c/o chest pain. CP is L sided pleuritic, pt notes SOB since last night, long standing ARMSTRONG unsure if worse since last night, denies orthopnea. Pain is over L lower costal margin, no radiation, no palpitations or syncope, no palliative factors, constant. Pain is non-exertional. Pt was sent with a note stating concern for slurred speech, unsure duration but EMS reported for 1 week, no appreciable dysarthria in ED. Pt has no other complaints at this time, is at baseline mental status (A&O x2-3 with difficulty with dates) as compared to prior progress notes in provided transport packet. Pt denies f/c, abd pain, urinary sxs, h/o DVT/PE.

## 2019-07-05 NOTE — CONSULT NOTE ADULT - ASSESSMENT
87 yr old with CKD, COPD, BPH, PPM, TAVR due to AS, CHF, colonic polyps.   Previous VRE bacteremia, PPM removed and  treated with  6 week course of Daptomycin/Ceftaroline about 5/11/19   Hospitalized 5/26 --> 6/17/19 for recurrent VRE bacteremia.  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  Osteitis/ discitis L1-L2  VRE bacteremia was prolonged 5/24 --> 6/5/19  He developed thrombocytopenia since 6/20 but did not go on to have leukopenia    Currently admitted 7/4/19 after fall  confusion, malnutrition, mild leukocytosis, basilar consolidative lung opacities,  no fever, low ProCalcitonin suggests fluid overload,  Persistent VRE infection, pneumonia, cerebrovascular disease of concern    Antibiotics  Vanco 3/20 -->21  Daptomycin 3/21 -->  about 5/11; 5/26 -->  Ceftaroline 3/29  -->  about 5/11; 5/26-->  Linezolid 6/4--> 7/5  Cefepime 7/4  Azithro 7/4    Suggest  Resume Zyvox/Ceftaroline  For MRI brain later today  repeat Echocardiogram  will monitor blood culture results  speech/swallow evaluation    ID will follow over weekend 87 yr old with CKD, COPD, BPH, PPM, TAVR due to AS, CHF, colonic polyps.   Previous VRE bacteremia, PPM removed and  treated with  6 week course of Daptomycin/Ceftaroline about 5/11/19   Hospitalized 5/26 --> 6/17/19 for recurrent VRE bacteremia.  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  Osteitis/ discitis L1-L2  VRE bacteremia was prolonged 5/24 --> 6/5/19  He developed thrombocytopenia since 6/20 but did not go on to have leukopenia    Currently admitted 7/4/19 after fall  confusion, malnutrition, mild leukocytosis, basilar consolidative lung opacities,  no fever, low ProCalcitonin suggests fluid overload,  Persistent VRE infection, pneumonia, cerebrovascular disease of concern    Antibiotics  Vanco 3/20 -->21  Daptomycin 3/21 -->  about 5/11; 5/26 -->  Ceftaroline 3/29  -->  about 5/11; 5/26-->  Linezolid 6/4-->   stopped about 1 week  Cefepime 7/4  Azithro 7/4    Suggest  Resume Ceftaroline  For MRI brain later today  repeat Echocardiogram  will monitor blood culture results  speech/swallow evaluation    discussed with primary MD  ID will follow over weekend

## 2019-07-05 NOTE — CONSULT NOTE ADULT - SUBJECTIVE AND OBJECTIVE BOX
Patient is a 88y old  Male who presents with a chief complaint of Fall, chest pain (05 Jul 2019 07:21)    HPI:  Pt is a 87 y/o M with PMH A fib/SSS on eliquis, , prolonged VRE bacteremia, probable endovascular infection s/p PPM removal receiving abx from PICC in [facility], AS s/p biomechanical TAVR, seizure not on meds, TIA, CHF on lasix 40 mg daily, COPD not typically on home O2, CAD with stent,  sent from [facility] s/p fall yesterday c/o chest pain. CP is L sided pleuritic, pt notes SOB since last night, long standing ARMSTRONG unsure if worse since last night, denies orthopnea. Pain is over L lower costal margin, no radiation, no palpitations or syncope, no palliative factors, constant. Pain is non-exertional. Pt was sent with a note stating concern for slurred speech, unsure duration but EMS reported for 1 week, no appreciable dysarthria in ED. Pt has no other complaints at this time, is at baseline mental status (A&O x2-3 with difficulty with dates) as compared to prior progress notes in provided transport packet. Pt denies f/c, abd pain, urinary sxs, h/o DVT/PE. (05 Jul 2019 07:21)    At present, patient is confused, disoriented. Family at bedside reports he is not eating, coughs when fed. Patient refers to right sided chest and flank pain    PAST MEDICAL & SURGICAL HISTORY:  CKD (chronic kidney disease) stage 3, GFR 30-59 ml/min  Hepatitis B  PTSD (post-traumatic stress disorder)  Tachy-idris syndrome  TIA (transient ischemic attack): 2010  Seizure  DIEGO (obstructive sleep apnea)  Chronic diastolic HF (heart failure)  Aortic stenosis  CVA (cerebral vascular accident)  Gait instability  COPD (chronic obstructive pulmonary disease)  BPH (benign prostatic hyperplasia)  Asthma  CHF (congestive heart failure)  Atrial fibrillation  Pacemaker: 2015 Medtronic [id number]  A2DR01  Hypertension  H/O thalassemia  CAD (coronary artery disease): s/p stent 2010  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  Artificial pacemaker  S/P primary angioplasty with coronary stent: 2012 AND 2017    Social history: transferred from rehab, no tobacco    FAMILY HISTORY:  REVIEW OF SYSTEMS: unable to obtain due to mental status    Allergies  Keppra (Rash)  penicillin (Unknown)    Antimicrobials:  DAPTOmycin IVPB 850 milliGRAM(s) IV Intermittent every 24 hours    Vital Signs Last 24 Hrs  T(C): 36.4 (05 Jul 2019 11:53), Max: 36.4 (04 Jul 2019 19:40)  T(F): 97.5 (05 Jul 2019 11:53), Max: 97.5 (04 Jul 2019 19:40)  HR: 75 (05 Jul 2019 11:53) (74 - 92)  BP: 98/61 (05 Jul 2019 11:53) (98/61 - 140/72)  BP(mean): --  RR: 18 (05 Jul 2019 11:53) (16 - 20)  SpO2: 96% (05 Jul 2019 11:53) (92% - 98%)    PHYSICAL EXAM:  General: ill appearing NAD, Non-toxic  Neurology: soft voice, disoriented  Respiratory: Clear to auscultation bilaterally with crackles 1/2 up chest  CV: RRR, S1S2, irregular rate, holo systolic  early to mid systolic m 2-3/6  Abdominal: Soft, Non-tender, non-distended,  Extremities: No edema,   Line Sites: Clear  Skin: No rash                        7.8    11.5  )-----------( 62       ( 05 Jul 2019 10:43 )             22.5   WBC Count: 11.5 (07-05 @ 10:43)  WBC Count: 10.2 (07-04 @ 20:03)  WBC Count: 6.52 (07-04 @ 11:18)  WBC Count: 5.4 (07-03 @ 02:41)--     (thromboyctopenic since 6/20)          07-05    137  |  102  |  21  ----------------------------<  95  3.7   |  21<L>  |  1.26  Creatinine: 1.26 (07-05 @ 10:43)    Creatinine: 1.17 (07-04 @ 20:03)    Creatinine: 1.18 (07-04 @ 09:48)    Creatinine: 1.12 (07-03 @ 02:41)    Creatinine: 1.10 (07-01 @ 07:13)      Ca    8.7      05 Jul 2019 10:43    TPro  5.6<L>  /  Alb  3.1<L>  /  TBili  0.5  /  DBili  x   /  AST  20  /  ALT  18  /  AlkPhos  128<H>  07-05    Procalcitonin, Serum (07.05.19 @ 10:43)    Procalcitonin, Serum: 0.14 ng/mL        MICROBIOLOGY:  .Blood  06-11-19   No growth at 5 days.  --  --      .Blood  06-10-19   No growth at 5 days.  --  --      .Blood  06-10-19   No growth at 5 days.  --  --      .Blood  06-08-19   No growth at 5 days.  --  --    < from: Transesophageal Echocardiogram (05.28.19 @ 10:10) >  EF 50%  Mitral Valve: Mitral annular calcification. Mild-moderate  mitral regurgitation.  Aortic Valve/Aorta: Transcatheter aortic valve replacement  (TAVR). Leaflets appear mildly thickened with normal  opening. No vegetations seen associated with the TAVR Peak  transaortic valve gradient equals 12 mm Hg, mean  transaortic valve gradient equals 8 mm Hg, which is  probably normal in the presence of a transcatheter aortic  valve replacement. Mild-moderate paravalvular aortic  regurgitation. Peak left ventricular outflow tract gradient  equals 2 mm Hg, mean gradient is equal to 1 mm Hg, LVOT  velocity time integral equals 17 cm.  Aortic Root: 4.1 cm.  Left Atrium: Severe left atrial enlargement.  Very dense  smoke/ Spontaneous echo contrast seen in LA and BERTA.  Decreased left atrial appendage velocities noted.  concentric left ventricular hypertrophy. Moderate diastolic  dysfunction (Stage II).    Conclusions:  1. Transcatheter aortic valve replacement (TAVR). Leaflets  appear mildly thickened with normal opening. No vegetations  seen associated with the TAVR Mild-moderate paravalvular  aortic regurgitation.  2. Severe left atrial enlargement.  Very dense smoke/  Spontaneous echo contrast seen in LA and BERTA. Decreased  left atrial appendage velocities noted.  3.Overall preserved left ventricular ejection fraction.  Septal motion consistent with conduction defect.  4. Right ventricular enlargement with decreased right  ventricular systolic function.  5. Normal tricuspid valve. Moderate tricuspid  regurgitation.  6. Estimated pulmonary artery systolic pressure equals 52  mm Hg, assuming right atrial pressure equals 8 mm Hg,  consistent with moderate pulmonary pressures.  7. No evidence of valvular vegetation is seen.  *** Compared with echocardiogram of 4/3/2019, no  significant changes noted.    < end of copied text >      Radiology:  < from: CT Head No Cont (07.04.19 @ 21:48) >  No acute intracranial bleeding, mass effect, or shift.     < end of copied text >  < from: CT Angio Chest w/ IV Cont (07.04.19 @ 21:48) >  IMPRESSION:     No pulmonary embolism.    Bilateral lower lobe consolidative opacities compatible with pneumonia in   the appropriate clinical context. Recommend follow-up to resolution.    Scattered areas of small ground glass opacity may reflect edema.    Erosive changes at L1-L2 compatible with known osteomyelitis/discitis.   Difficult to compare extent to prior MR due to differences in modality.    < end of copied text >      Diego Hope MD; Division of Infectious Disease; Pager: [phone]; nights and weekends: [phone]

## 2019-07-06 LAB
ALBUMIN SERPL ELPH-MCNC: 3 G/DL — LOW (ref 3.3–5)
ALP SERPL-CCNC: 125 U/L — HIGH (ref 40–120)
ALT FLD-CCNC: 20 U/L — SIGNIFICANT CHANGE UP (ref 10–45)
ANION GAP SERPL CALC-SCNC: 15 MMOL/L — SIGNIFICANT CHANGE UP (ref 5–17)
AST SERPL-CCNC: 24 U/L — SIGNIFICANT CHANGE UP (ref 10–40)
BASE EXCESS BLDV CALC-SCNC: -1 MMOL/L — SIGNIFICANT CHANGE UP (ref -2–2)
BILIRUB SERPL-MCNC: 0.5 MG/DL — SIGNIFICANT CHANGE UP (ref 0.2–1.2)
BUN SERPL-MCNC: 21 MG/DL — SIGNIFICANT CHANGE UP (ref 7–23)
CA-I SERPL-SCNC: 1.23 MMOL/L — SIGNIFICANT CHANGE UP (ref 1.12–1.3)
CALCIUM SERPL-MCNC: 8.4 MG/DL — SIGNIFICANT CHANGE UP (ref 8.4–10.5)
CHLORIDE BLDV-SCNC: 106 MMOL/L — SIGNIFICANT CHANGE UP (ref 96–108)
CHLORIDE SERPL-SCNC: 101 MMOL/L — SIGNIFICANT CHANGE UP (ref 96–108)
CO2 BLDV-SCNC: 25 MMOL/L — SIGNIFICANT CHANGE UP (ref 22–30)
CO2 SERPL-SCNC: 22 MMOL/L — SIGNIFICANT CHANGE UP (ref 22–31)
CREAT SERPL-MCNC: 1.21 MG/DL — SIGNIFICANT CHANGE UP (ref 0.5–1.3)
GAS PNL BLDV: 135 MMOL/L — SIGNIFICANT CHANGE UP (ref 135–145)
GAS PNL BLDV: SIGNIFICANT CHANGE UP
GAS PNL BLDV: SIGNIFICANT CHANGE UP
GLUCOSE BLDV-MCNC: 99 MG/DL — SIGNIFICANT CHANGE UP (ref 70–99)
GLUCOSE SERPL-MCNC: 93 MG/DL — SIGNIFICANT CHANGE UP (ref 70–99)
HCO3 BLDV-SCNC: 24 MMOL/L — SIGNIFICANT CHANGE UP (ref 21–29)
HCT VFR BLD CALC: 22.3 % — LOW (ref 39–50)
HCT VFR BLDA CALC: 22 % — CRITICAL LOW (ref 39–50)
HGB BLD CALC-MCNC: 7.1 G/DL — LOW (ref 13–17)
HGB BLD-MCNC: 7.4 G/DL — LOW (ref 13–17)
LACTATE BLDV-MCNC: 1.5 MMOL/L — SIGNIFICANT CHANGE UP (ref 0.7–2)
MCHC RBC-ENTMCNC: 27.3 PG — SIGNIFICANT CHANGE UP (ref 27–34)
MCHC RBC-ENTMCNC: 33.2 GM/DL — SIGNIFICANT CHANGE UP (ref 32–36)
MCV RBC AUTO: 82.1 FL — SIGNIFICANT CHANGE UP (ref 80–100)
OTHER CELLS CSF MANUAL: 4 ML/DL — LOW (ref 18–22)
PCO2 BLDV: 42 MMHG — SIGNIFICANT CHANGE UP (ref 35–50)
PH BLDV: 7.36 — SIGNIFICANT CHANGE UP (ref 7.35–7.45)
PLATELET # BLD AUTO: 78 K/UL — LOW (ref 150–400)
PO2 BLDV: 27 MMHG — SIGNIFICANT CHANGE UP (ref 25–45)
POTASSIUM BLDV-SCNC: 3.4 MMOL/L — LOW (ref 3.5–5.3)
POTASSIUM SERPL-MCNC: 3.6 MMOL/L — SIGNIFICANT CHANGE UP (ref 3.5–5.3)
POTASSIUM SERPL-SCNC: 3.6 MMOL/L — SIGNIFICANT CHANGE UP (ref 3.5–5.3)
PROT SERPL-MCNC: 5.5 G/DL — LOW (ref 6–8.3)
RBC # BLD: 2.71 M/UL — LOW (ref 4.2–5.8)
RBC # FLD: 14.7 % — HIGH (ref 10.3–14.5)
SAO2 % BLDV: 39 % — LOW (ref 67–88)
SODIUM SERPL-SCNC: 138 MMOL/L — SIGNIFICANT CHANGE UP (ref 135–145)
WBC # BLD: 10.2 K/UL — SIGNIFICANT CHANGE UP (ref 3.8–10.5)
WBC # FLD AUTO: 10.2 K/UL — SIGNIFICANT CHANGE UP (ref 3.8–10.5)

## 2019-07-06 PROCEDURE — 99232 SBSQ HOSP IP/OBS MODERATE 35: CPT

## 2019-07-06 PROCEDURE — 99222 1ST HOSP IP/OBS MODERATE 55: CPT

## 2019-07-06 RX ORDER — CHLORHEXIDINE GLUCONATE 213 G/1000ML
1 SOLUTION TOPICAL DAILY
Refills: 0 | Status: DISCONTINUED | OUTPATIENT
Start: 2019-07-06 | End: 2019-07-11

## 2019-07-06 RX ORDER — POTASSIUM CHLORIDE 20 MEQ
40 PACKET (EA) ORAL ONCE
Refills: 0 | Status: DISCONTINUED | OUTPATIENT
Start: 2019-07-06 | End: 2019-07-06

## 2019-07-06 RX ORDER — POTASSIUM CHLORIDE 20 MEQ
10 PACKET (EA) ORAL
Refills: 0 | Status: COMPLETED | OUTPATIENT
Start: 2019-07-06 | End: 2019-07-06

## 2019-07-06 RX ADMIN — Medication 1 TABLET(S): at 18:36

## 2019-07-06 RX ADMIN — Medication 100 MILLIEQUIVALENT(S): at 18:29

## 2019-07-06 RX ADMIN — Medication 1 TABLET(S): at 08:41

## 2019-07-06 RX ADMIN — Medication 25 MILLIGRAM(S): at 06:26

## 2019-07-06 RX ADMIN — BUDESONIDE AND FORMOTEROL FUMARATE DIHYDRATE 2 PUFF(S): 160; 4.5 AEROSOL RESPIRATORY (INHALATION) at 06:27

## 2019-07-06 RX ADMIN — CLOPIDOGREL BISULFATE 75 MILLIGRAM(S): 75 TABLET, FILM COATED ORAL at 18:34

## 2019-07-06 RX ADMIN — Medication 3 MILLIGRAM(S): at 21:18

## 2019-07-06 RX ADMIN — DAPTOMYCIN 134 MILLIGRAM(S): 500 INJECTION, POWDER, LYOPHILIZED, FOR SOLUTION INTRAVENOUS at 13:26

## 2019-07-06 RX ADMIN — Medication 100 MILLIEQUIVALENT(S): at 17:22

## 2019-07-06 RX ADMIN — Medication 100 MILLIEQUIVALENT(S): at 15:59

## 2019-07-06 RX ADMIN — BUDESONIDE AND FORMOTEROL FUMARATE DIHYDRATE 2 PUFF(S): 160; 4.5 AEROSOL RESPIRATORY (INHALATION) at 17:24

## 2019-07-06 RX ADMIN — APIXABAN 5 MILLIGRAM(S): 2.5 TABLET, FILM COATED ORAL at 18:34

## 2019-07-06 RX ADMIN — LIDOCAINE 1 PATCH: 4 CREAM TOPICAL at 21:21

## 2019-07-06 RX ADMIN — CHLORHEXIDINE GLUCONATE 1 APPLICATION(S): 213 SOLUTION TOPICAL at 12:38

## 2019-07-06 RX ADMIN — CEFTAROLINE FOSAMIL 50 MILLIGRAM(S): 600 POWDER, FOR SOLUTION INTRAVENOUS at 06:27

## 2019-07-06 RX ADMIN — TAMSULOSIN HYDROCHLORIDE 0.4 MILLIGRAM(S): 0.4 CAPSULE ORAL at 21:18

## 2019-07-06 RX ADMIN — Medication 100 MILLIGRAM(S): at 06:37

## 2019-07-06 RX ADMIN — AMIODARONE HYDROCHLORIDE 200 MILLIGRAM(S): 400 TABLET ORAL at 06:26

## 2019-07-06 RX ADMIN — Medication 1 TABLET(S): at 18:37

## 2019-07-06 RX ADMIN — Medication 100 MILLIGRAM(S): at 21:18

## 2019-07-06 RX ADMIN — CEFTAROLINE FOSAMIL 50 MILLIGRAM(S): 600 POWDER, FOR SOLUTION INTRAVENOUS at 19:32

## 2019-07-06 RX ADMIN — SENNA PLUS 2 TABLET(S): 8.6 TABLET ORAL at 21:18

## 2019-07-06 RX ADMIN — FINASTERIDE 5 MILLIGRAM(S): 5 TABLET, FILM COATED ORAL at 18:45

## 2019-07-06 RX ADMIN — LIDOCAINE 1 PATCH: 4 CREAM TOPICAL at 12:39

## 2019-07-06 RX ADMIN — APIXABAN 5 MILLIGRAM(S): 2.5 TABLET, FILM COATED ORAL at 06:37

## 2019-07-06 RX ADMIN — ATORVASTATIN CALCIUM 40 MILLIGRAM(S): 80 TABLET, FILM COATED ORAL at 21:18

## 2019-07-06 RX ADMIN — PANTOPRAZOLE SODIUM 40 MILLIGRAM(S): 20 TABLET, DELAYED RELEASE ORAL at 06:28

## 2019-07-06 NOTE — PROVIDER CONTACT NOTE (OTHER) - SITUATION
Pt took one pill in jello as per pt request this AM &two sips of coffee, pt has wet cough s/p PO intake and states he is having difficulty swallowing Pt took lactobacillus acidophilus oral pill as ordered in andreas as per pt request this AM &two sips of coffee, pt has wet cough s/p PO intake and states he is having difficulty swallowing

## 2019-07-06 NOTE — PROGRESS NOTE ADULT - SUBJECTIVE AND OBJECTIVE BOX
88y old  Male who presents with a chief complaint of Fall, chest pain (05 Jul 2019 22:22)      Interval history:  Afebrile, cough and SOB going on for more than a week, intermittent abdominal pain.       Allergies:   Keppra (Rash)  penicillin (Unknown)      Antimicrobials:  ceftaroline fosamil IVPB 600 milliGRAM(s) IV Intermittent every 12 hours  DAPTOmycin IVPB 850 milliGRAM(s) IV Intermittent every 24 hours      REVIEW OF SYSTEMS:  No chest pain  No N/V  No rash.     Vital Signs Last 24 Hrs  T(C): 37 (07-06-19 @ 11:16), Max: 37 (07-06-19 @ 11:16)  T(F): 98.6 (07-06-19 @ 11:16), Max: 98.6 (07-06-19 @ 11:16)  HR: 60 (07-06-19 @ 11:16) (60 - 84)  BP: 131/76 (07-06-19 @ 11:16) (100/65 - 131/76)  RR: 18 (07-06-19 @ 11:16) (17 - 18)  SpO2: 94% (07-06-19 @ 11:16) (94% - 97%)      PHYSICAL EXAM:  Patient in no acute distress. Awake, communicative.  Rt arm PICC   Cardiovascular: S1S2 normal, + murmur  Lungs: + air entry B/L lung fields anteriorly with rales at bases.   Gastrointestinal: soft, nontender, nondistended.  Extremities: b/l upper extremity edema with ecchymosis and skin tear.                             7.4    10.2  )-----------( 78       ( 06 Jul 2019 07:26 )             22.3   07-06    138  |  101  |  21  ----------------------------<  93  3.6   |  22  |  1.21    Ca    8.4      06 Jul 2019 07:26    TPro  5.5<L>  /  Alb  3.0<L>  /  TBili  0.5  /  DBili  x   /  AST  24  /  ALT  20  /  AlkPhos  125<H>  07-06      LIVER FUNCTIONS - ( 06 Jul 2019 07:26 )  Alb: 3.0 g/dL / Pro: 5.5 g/dL / ALK PHOS: 125 U/L / ALT: 20 U/L / AST: 24 U/L / GGT: x             Radiology:    < from: CT Head No Cont (07.04.19 @ 21:48) >  IMPRESSION:     No acute intracranial bleeding, mass effect, or shift.       < from: CT Angio Chest w/ IV Cont (07.04.19 @ 21:48) >  IMPRESSION:     No pulmonary embolism.    Bilateral lower lobe consolidative opacities compatible with pneumonia in   the appropriate clinical context. Recommend follow-up to resolution.    Scattered areas of small ground glass opacity may reflect edema.    Erosive changes at L1-L2 compatible with known osteomyelitis/discitis.   Difficult to compare extent to prior MR due to differences in modality.

## 2019-07-06 NOTE — PROGRESS NOTE ADULT - SUBJECTIVE AND OBJECTIVE BOX
Patient is a 88y old  Male who presents with a chief complaint of Fall, chest pain (06 Jul 2019 22:56)      	  MEDICATIONS:  amiodarone    Tablet 200 milliGRAM(s) Oral daily  metoprolol succinate ER 25 milliGRAM(s) Oral daily  tamsulosin 0.4 milliGRAM(s) Oral at bedtime        PHYSICAL EXAM:  T(C): 36.2 (07-06-19 @ 18:58), Max: 37 (07-06-19 @ 11:16)  HR: 68 (07-06-19 @ 18:58) (60 - 72)  BP: 92/55 (07-06-19 @ 18:58) (92/55 - 131/76)  RR: 18 (07-06-19 @ 18:58) (17 - 18)  SpO2: 97% (07-06-19 @ 18:58) (94% - 97%)  Wt(kg): --  I&O's Summary    05 Jul 2019 07:01  -  06 Jul 2019 07:00  --------------------------------------------------------  IN: 480 mL / OUT: 375 mL / NET: 105 mL    06 Jul 2019 07:01  -  07 Jul 2019 00:50  --------------------------------------------------------  IN: 640 mL / OUT: 500 mL / NET: 140 mL          Appearance: In no distress	  HEENT:    PERRL, EOMI	  Cardiovascular:  S1 S2, No JVD  Respiratory: Lungs clear to auscultation	  Gastrointestinal:  Soft, Non-tender, + BS	  Extremities:  +edema of LE                                7.4    10.2  )-----------( 78       ( 06 Jul 2019 07:26 )             22.3     07-06    138  |  101  |  21  ----------------------------<  93  3.6   |  22  |  1.21    Ca    8.4      06 Jul 2019 07:26    TPro  5.5<L>  /  Alb  3.0<L>  /  TBili  0.5  /  DBili  x   /  AST  24  /  ALT  20  /  AlkPhos  125<H>  07-06        Labs personally reviewed      ASSESSMENT/PLAN: 	  Resume Lasix 20mg PO daily for chronic diastolic HF and LE mary Paredes,  [facility]  Cardiovascular Medicine  [phone]

## 2019-07-06 NOTE — PROVIDER CONTACT NOTE (CRITICAL VALUE NOTIFICATION) - BACKGROUND
Pt admitted for respiratory failure with hypoxia, AMS, Thrombocytopenia. Pt has hx of CKD Stage 3, CHF, COPD. CBC resulted from AM labs: hematocrit 22.3

## 2019-07-06 NOTE — PROGRESS NOTE ADULT - ATTENDING COMMENTS
Advanced care planning was discussed with patient and family.  Advanced care planning forms were reviewed and discussed.  Differential diagnosis and plan of care discussed with patient after the evaluation.   Pain assessed and judicious use of narcotics when appropriate was discussed.  Importance of Fall prevention discussed.  Counseling on Smoking and Alcohol cessation was offered when appropriate.  Counseling on Diet, exercise, and medication compliance was done.    case discussed with patient's son in law who has been following patient in detail over the phone Advanced care planning was discussed with patient and family.  Advanced care planning forms were reviewed and discussed.  Differential diagnosis and plan of care discussed with patient after the evaluation.   Pain assessed and judicious use of narcotics when appropriate was discussed.  Importance of Fall prevention discussed.  Counseling on Smoking and Alcohol cessation was offered when appropriate.  Counseling on Diet, exercise, and medication compliance was done.    case discussed with patient's son in law who has been following patient in detail over the phone  palliative eval

## 2019-07-06 NOTE — PROGRESS NOTE ADULT - SUBJECTIVE AND OBJECTIVE BOX
Subjective: Patient seen and examined. No new events except as noted.   daughter at the bedside   mental stat improved since yesterday     REVIEW OF SYSTEMS:    CONSTITUTIONAL: No weakness, fevers or chills  EYES/ENT: No visual changes;  No vertigo or throat pain   NECK: No pain or stiffness  RESPIRATORY: No cough, wheezing, hemoptysis; No shortness of breath  CARDIOVASCULAR: No chest pain or palpitations  GASTROINTESTINAL: No abdominal or epigastric pain. No nausea, vomiting, or hematemesis; No diarrhea or constipation. No melena or hematochezia.  GENITOURINARY: No dysuria, frequency or hematuria  NEUROLOGICAL: No numbness or weakness  SKIN: No itching, burning, rashes, or lesions   All other review of systems is negative unless indicated above.    MEDICATIONS:  MEDICATIONS  (STANDING):  amiodarone    Tablet 200 milliGRAM(s) Oral daily  apixaban 5 milliGRAM(s) Oral two times a day  atorvastatin 40 milliGRAM(s) Oral at bedtime  buDESOnide  80 MICROgram(s)/formoterol 4.5 MICROgram(s) Inhaler 2 Puff(s) Inhalation two times a day  ceftaroline fosamil IVPB 600 milliGRAM(s) IV Intermittent every 12 hours  chlorhexidine 2% Cloths 1 Application(s) Topical daily  clopidogrel Tablet 75 milliGRAM(s) Oral daily  DAPTOmycin IVPB 850 milliGRAM(s) IV Intermittent every 24 hours  docusate sodium 100 milliGRAM(s) Oral three times a day  finasteride 5 milliGRAM(s) Oral daily  lactobacillus acidophilus 1 Tablet(s) Oral three times a day with meals  lidocaine   Patch 1 Patch Transdermal daily  melatonin 3 milliGRAM(s) Oral at bedtime  metoprolol succinate ER 25 milliGRAM(s) Oral daily  multivitamin 1 Tablet(s) Oral daily  pantoprazole    Tablet 40 milliGRAM(s) Oral before breakfast  senna 2 Tablet(s) Oral at bedtime  tamsulosin 0.4 milliGRAM(s) Oral at bedtime      PHYSICAL EXAM:  T(C): 36.2 (07-06-19 @ 18:58), Max: 37 (07-06-19 @ 11:16)  HR: 68 (07-06-19 @ 18:58) (60 - 84)  BP: 92/55 (07-06-19 @ 18:58) (92/55 - 131/76)  RR: 18 (07-06-19 @ 18:58) (17 - 18)  SpO2: 97% (07-06-19 @ 18:58) (94% - 97%)  Wt(kg): --  I&O's Summary    05 Jul 2019 07:01  -  06 Jul 2019 07:00  --------------------------------------------------------  IN: 480 mL / OUT: 375 mL / NET: 105 mL    06 Jul 2019 07:01  -  06 Jul 2019 22:56  --------------------------------------------------------  IN: 640 mL / OUT: 500 mL / NET: 140 mL          Appearance: AOx2, following commands  HEENT:   Normal oral mucosa, PERRL, EOMI	  Lymphatic: No lymphadenopathy , no edema  Cardiovascular: Normal S1 S2, No JVD, systolic murmur   Respiratory: Lungs clear to auscultation, normal effort 	  Gastrointestinal:  Soft, Non-tender, + BS	  Skin: No rashes, No ecchymoses, No cyanosis, warm to touch  Musculoskeletal: Normal range of motion, normal strength  Psychiatry:  Mood & affect appropriate  Ext: No edema      All labs, Imaging and EKGs personally reviewed                           7.4    10.2  )-----------( 78       ( 06 Jul 2019 07:26 )             22.3               07-06    138  |  101  |  21  ----------------------------<  93  3.6   |  22  |  1.21    Ca    8.4      06 Jul 2019 07:26    TPro  5.5<L>  /  Alb  3.0<L>  /  TBili  0.5  /  DBili  x   /  AST  24  /  ALT  20  /  AlkPhos  125<H>  07-06           < from: MR Head No Cont (07.05.19 @ 23:49) >  IMPRESSION: Age-appropriate involutional and ischemic gliotic changes.   Old bilateral cerebellar infarcts. Limited as contrast was not injected   as the patient was unable to cooperate.

## 2019-07-07 DIAGNOSIS — R06.00 DYSPNEA, UNSPECIFIED: ICD-10-CM

## 2019-07-07 DIAGNOSIS — Z51.5 ENCOUNTER FOR PALLIATIVE CARE: ICD-10-CM

## 2019-07-07 LAB
ANION GAP SERPL CALC-SCNC: 11 MMOL/L — SIGNIFICANT CHANGE UP (ref 5–17)
BUN SERPL-MCNC: 21 MG/DL — SIGNIFICANT CHANGE UP (ref 7–23)
CALCIUM SERPL-MCNC: 8.8 MG/DL — SIGNIFICANT CHANGE UP (ref 8.4–10.5)
CHLORIDE SERPL-SCNC: 103 MMOL/L — SIGNIFICANT CHANGE UP (ref 96–108)
CO2 SERPL-SCNC: 23 MMOL/L — SIGNIFICANT CHANGE UP (ref 22–31)
CREAT SERPL-MCNC: 1.27 MG/DL — SIGNIFICANT CHANGE UP (ref 0.5–1.3)
GLUCOSE SERPL-MCNC: 90 MG/DL — SIGNIFICANT CHANGE UP (ref 70–99)
HCT VFR BLD CALC: 21.7 % — LOW (ref 39–50)
HGB BLD-MCNC: 7.3 G/DL — LOW (ref 13–17)
MCHC RBC-ENTMCNC: 27.9 PG — SIGNIFICANT CHANGE UP (ref 27–34)
MCHC RBC-ENTMCNC: 33.7 GM/DL — SIGNIFICANT CHANGE UP (ref 32–36)
MCV RBC AUTO: 82.6 FL — SIGNIFICANT CHANGE UP (ref 80–100)
PLATELET # BLD AUTO: 104 K/UL — LOW (ref 150–400)
POTASSIUM SERPL-MCNC: 3.8 MMOL/L — SIGNIFICANT CHANGE UP (ref 3.5–5.3)
POTASSIUM SERPL-SCNC: 3.8 MMOL/L — SIGNIFICANT CHANGE UP (ref 3.5–5.3)
RBC # BLD: 2.62 M/UL — LOW (ref 4.2–5.8)
RBC # FLD: 14.9 % — HIGH (ref 10.3–14.5)
SODIUM SERPL-SCNC: 137 MMOL/L — SIGNIFICANT CHANGE UP (ref 135–145)
WBC # BLD: 9.5 K/UL — SIGNIFICANT CHANGE UP (ref 3.8–10.5)
WBC # FLD AUTO: 9.5 K/UL — SIGNIFICANT CHANGE UP (ref 3.8–10.5)

## 2019-07-07 PROCEDURE — 99223 1ST HOSP IP/OBS HIGH 75: CPT

## 2019-07-07 RX ORDER — FUROSEMIDE 40 MG
20 TABLET ORAL DAILY
Refills: 0 | Status: DISCONTINUED | OUTPATIENT
Start: 2019-07-08 | End: 2019-07-11

## 2019-07-07 RX ADMIN — TAMSULOSIN HYDROCHLORIDE 0.4 MILLIGRAM(S): 0.4 CAPSULE ORAL at 21:57

## 2019-07-07 RX ADMIN — Medication 100 MILLIGRAM(S): at 21:57

## 2019-07-07 RX ADMIN — AMIODARONE HYDROCHLORIDE 200 MILLIGRAM(S): 400 TABLET ORAL at 05:06

## 2019-07-07 RX ADMIN — SENNA PLUS 2 TABLET(S): 8.6 TABLET ORAL at 21:57

## 2019-07-07 RX ADMIN — CHLORHEXIDINE GLUCONATE 1 APPLICATION(S): 213 SOLUTION TOPICAL at 12:29

## 2019-07-07 RX ADMIN — PANTOPRAZOLE SODIUM 40 MILLIGRAM(S): 20 TABLET, DELAYED RELEASE ORAL at 05:06

## 2019-07-07 RX ADMIN — Medication 1 TABLET(S): at 12:28

## 2019-07-07 RX ADMIN — FINASTERIDE 5 MILLIGRAM(S): 5 TABLET, FILM COATED ORAL at 12:28

## 2019-07-07 RX ADMIN — APIXABAN 5 MILLIGRAM(S): 2.5 TABLET, FILM COATED ORAL at 17:10

## 2019-07-07 RX ADMIN — ATORVASTATIN CALCIUM 40 MILLIGRAM(S): 80 TABLET, FILM COATED ORAL at 21:57

## 2019-07-07 RX ADMIN — CEFTAROLINE FOSAMIL 50 MILLIGRAM(S): 600 POWDER, FOR SOLUTION INTRAVENOUS at 06:32

## 2019-07-07 RX ADMIN — DAPTOMYCIN 134 MILLIGRAM(S): 500 INJECTION, POWDER, LYOPHILIZED, FOR SOLUTION INTRAVENOUS at 14:18

## 2019-07-07 RX ADMIN — APIXABAN 5 MILLIGRAM(S): 2.5 TABLET, FILM COATED ORAL at 05:06

## 2019-07-07 RX ADMIN — Medication 25 MILLIGRAM(S): at 05:06

## 2019-07-07 RX ADMIN — Medication 3 MILLIGRAM(S): at 21:57

## 2019-07-07 RX ADMIN — BUDESONIDE AND FORMOTEROL FUMARATE DIHYDRATE 2 PUFF(S): 160; 4.5 AEROSOL RESPIRATORY (INHALATION) at 17:10

## 2019-07-07 RX ADMIN — CEFTAROLINE FOSAMIL 50 MILLIGRAM(S): 600 POWDER, FOR SOLUTION INTRAVENOUS at 17:11

## 2019-07-07 RX ADMIN — Medication 1 TABLET(S): at 08:30

## 2019-07-07 RX ADMIN — LIDOCAINE 1 PATCH: 4 CREAM TOPICAL at 12:28

## 2019-07-07 RX ADMIN — Medication 100 MILLIGRAM(S): at 14:18

## 2019-07-07 RX ADMIN — Medication 1 TABLET(S): at 17:10

## 2019-07-07 RX ADMIN — LIDOCAINE 1 PATCH: 4 CREAM TOPICAL at 19:00

## 2019-07-07 RX ADMIN — CLOPIDOGREL BISULFATE 75 MILLIGRAM(S): 75 TABLET, FILM COATED ORAL at 12:28

## 2019-07-07 RX ADMIN — BUDESONIDE AND FORMOTEROL FUMARATE DIHYDRATE 2 PUFF(S): 160; 4.5 AEROSOL RESPIRATORY (INHALATION) at 05:07

## 2019-07-07 RX ADMIN — LIDOCAINE 1 PATCH: 4 CREAM TOPICAL at 00:29

## 2019-07-07 RX ADMIN — Medication 100 MILLIGRAM(S): at 05:07

## 2019-07-07 NOTE — PROGRESS NOTE ADULT - SUBJECTIVE AND OBJECTIVE BOX
Patient is a 88y old  Male who presents with a chief complaint of Fall, chest pain (07 Jul 2019 13:43)    	  MEDICATIONS:  amiodarone    Tablet 200 milliGRAM(s) Oral daily  metoprolol succinate ER 25 milliGRAM(s) Oral daily  tamsulosin 0.4 milliGRAM(s) Oral at bedtime        PHYSICAL EXAM:  T(C): 36.4 (07-07-19 @ 20:24), Max: 36.4 (07-07-19 @ 04:47)  HR: 71 (07-07-19 @ 20:24) (67 - 71)  BP: 115/67 (07-07-19 @ 20:24) (109/52 - 115/67)  RR: 18 (07-07-19 @ 20:24) (18 - 18)  SpO2: 95% (07-07-19 @ 20:24) (95% - 95%)  Wt(kg): --  I&O's Summary    06 Jul 2019 07:01  -  07 Jul 2019 07:00  --------------------------------------------------------  IN: 860 mL / OUT: 1100 mL / NET: -240 mL    07 Jul 2019 07:01  -  07 Jul 2019 23:49  --------------------------------------------------------  IN: 320 mL / OUT: 200 mL / NET: 120 mL          Appearance: In no distress	  HEENT:    PERRL, EOMI	  Cardiovascular:  S1 S2, No JVD  Respiratory: Lungs clear to auscultation	  Gastrointestinal:  Soft, Non-tender, + BS	  Extremities:  No edema of LE                                7.3    9.5   )-----------( 104      ( 07 Jul 2019 06:48 )             21.7     07-07    137  |  103  |  21  ----------------------------<  90  3.8   |  23  |  1.27    Ca    8.8      07 Jul 2019 06:48    TPro  5.5<L>  /  Alb  3.0<L>  /  TBili  0.5  /  DBili  x   /  AST  24  /  ALT  20  /  AlkPhos  125<H>  07-06        Labs personally reviewed      ASSESSMENT/PLAN: 	  Resume Lasix 20mg PO daily for chronic diastolic HF and LE edema      Timothy Paredes DO [facility]  Cardiovascular Medicine  [phone]

## 2019-07-07 NOTE — CONSULT NOTE ADULT - SUBJECTIVE AND OBJECTIVE BOX
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 Baseline mental status is AAO x 2-3 (difficulty with dates). Admitted for acute-on-chronic HFpEF, also with concern for ? aspiration with S&S evaluation pending. ID recommended continuing with ceftaroline and daptomycin for VRE bacteremia. Palliative care called for GOC.      PERTINENT PM/SXH:   CKD (chronic kidney disease) stage 3, GFR 30-59 ml/min  Hepatitis B  PTSD (post-traumatic stress disorder)  Tachy-kevon syndrome  TIA (transient ischemic attack)  Seizure  DIEGO (obstructive sleep apnea)  Chronic diastolic HF (heart failure)  Aortic stenosis  CVA (cerebral vascular accident)  Gait instability  COPD (chronic obstructive pulmonary disease)  BPH (benign prostatic hyperplasia)  Asthma  CHF (congestive heart failure)  Atrial fibrillation  Pacemaker  Hypertension  H/O thalassemia  CAD (coronary artery disease)    S/P TAVR (transcatheter aortic valve replacement)  Artificial pacemaker  S/P primary angioplasty with coronary stent    FAMILY HISTORY:  No pertinent family history in first degree relatives    ITEMS NOT CHECKED ARE NOT PRESENT    SOCIAL HISTORY:   Significant other/partner:  [ ]    Children:  [X ]    Hinduism/Spirituality: Adventism  Substance hx:  [ ]   Tobacco hx:  [ ]   Alcohol hx: [ ] Drug use hx: [ ]  Home Opioid hx:  [ ] (I-Stop Reference No: [id number])    Rx Written	Rx Dispensed	Drug	Quantity	Days Supply	  06/18/2019	06/18/2019	oxycodone hcl 5 mg tablet	10	5  05/13/2019	05/13/2019	tramadol hcl 50 mg tablet	21	7  04/25/2019	04/25/2019	tramadol hcl 50 mg tablet	15	5	  04/20/2019	04/20/2019	tramadol hcl 50 mg tablet	15	5	    Living Situation: [ ]Home  [ ]Long term care  [ ]Rehab [ ]Other  Occupation: [place] [employer] vet    ADVANCE DIRECTIVES:    DNR [ ] Full Code  MOLST  [ ]    Living Will  [ ]   DECISION MAKER(s):  [ ] Health Care Proxy(s)  [ ] Surrogate(s)  [ ] Guardian           Name(s) and Contact(s): daughter Carolina Thompson [phone]     BASELINE (I)ADL(s) (prior to admission):  [place]: [ ]Total  [ ] Moderate [ ]Dependent    Allergies    Keppra (Rash)  penicillin (Unknown)    Intolerances    MEDICATIONS  (STANDING):  amiodarone    Tablet 200 milliGRAM(s) Oral daily  apixaban 5 milliGRAM(s) Oral two times a day  atorvastatin 40 milliGRAM(s) Oral at bedtime  buDESOnide  80 MICROgram(s)/formoterol 4.5 MICROgram(s) Inhaler 2 Puff(s) Inhalation two times a day  ceftaroline fosamil IVPB 600 milliGRAM(s) IV Intermittent every 12 hours  chlorhexidine 2% Cloths 1 Application(s) Topical daily  clopidogrel Tablet 75 milliGRAM(s) Oral daily  DAPTOmycin IVPB 850 milliGRAM(s) IV Intermittent every 24 hours  docusate sodium 100 milliGRAM(s) Oral three times a day  finasteride 5 milliGRAM(s) Oral daily  lactobacillus acidophilus 1 Tablet(s) Oral three times a day with meals  lidocaine   Patch 1 Patch Transdermal daily  melatonin 3 milliGRAM(s) Oral at bedtime  metoprolol succinate ER 25 milliGRAM(s) Oral daily  multivitamin 1 Tablet(s) Oral daily  pantoprazole    Tablet 40 milliGRAM(s) Oral before breakfast  senna 2 Tablet(s) Oral at bedtime  tamsulosin 0.4 milliGRAM(s) Oral at bedtime    MEDICATIONS  (PRN):  ALBUTerol    90 MICROgram(s) HFA Inhaler 2 Puff(s) Inhalation every 6 hours PRN Shortness of Breath and/or Wheezing  ondansetron    Tablet 4 milliGRAM(s) Oral every 8 hours PRN Nausea and/or Vomiting  polyethylene glycol 3350 17 Gram(s) Oral daily PRN Constipation    PRESENT SYMPTOMS:   Source if other than patient:  [ ]Family   [ ]Team     Pain (Impact on QOL):    Location -         Minimal acceptable level (0-10 scale):                    Aggravating factors -  Quality -  Radiation -  Severity (0-10 scale) -    Timing -    PAIN AD Score:     [email or web address] (press ctrl +  left click to view)    Dyspnea:                           [ ]Mild [ ]Moderate [ ]Severe  Anxiety:                             [ ]Mild [ ]Moderate [ ]Severe  Fatigue:                             [ ]Mild [ ]Moderate [ ]Severe  Nausea:                             [ ]Mild [ ]Moderate [ ]Severe  Loss of appetite:              [ ]Mild [ ]Moderate [ ]Severe  Constipation:                    [ ]Mild [ ]Moderate [ ]Severe    Other Symptoms:  [ ]All other review of systems negative   [ ]Unable to obtain due to poor mentation     Karnofsky Performance Score/Palliative Performance Status Version 2:         %    PHYSICAL EXAM:  Vital Signs Last 24 Hrs  T(C): 36.4 (07 Jul 2019 04:47), Max: 37 (06 Jul 2019 11:16)  T(F): 97.5 (07 Jul 2019 04:47), Max: 98.6 (06 Jul 2019 11:16)  HR: 67 (07 Jul 2019 04:47) (60 - 72)  BP: 109/52 (07 Jul 2019 04:47) (92/55 - 131/76)  BP(mean): --  RR: 18 (07 Jul 2019 04:47) (17 - 18)  SpO2: 95% (07 Jul 2019 04:47) (94% - 97%) I&O's Summary    06 Jul 2019 07:01  -  07 Jul 2019 07:00  --------------------------------------------------------  IN: 860 mL / OUT: 1100 mL / NET: -240 mL    07 Jul 2019 07:01  -  07 Jul 2019 11:08  --------------------------------------------------------  IN: 100 mL / OUT: 0 mL / NET: 100 mL        GENERAL:  [ ]Alert  [ ]Oriented x   [ ]Lethargic  [ ]Cachexia  [ ]Unarousable  [ ]Verbal  [ ]Non-Verbal    Behavioral:   [ ] Anxiety  [ ] Delirium [ ] Agitation [ ] Other    HEENT:  [ ]Normal   [ ]Dry mouth   [ ]ET Tube/Trach  [ ]Oral lesions    PULMONARY:   [ ]Clear [ ]Tachypnea  [ ]Audible excessive secretions   [ ]Rhonchi        [ ]Right [ ]Left [ ]Bilateral  [ ]Crackles        [ ]Right [ ]Left [ ]Bilateral  [ ]Wheezing     [ ]Right [ ]Left [ ]Bilateral    CARDIOVASCULAR:    [ ]Regular [ ]Irregular [ ]Tachy  [ ]Kevon [ ]Murmur [ ]Other    GASTROINTESTINAL:  [ ]Soft  [ ]Distended   [ ]+BS  [ ]Non tender [ ]Tender  [ ]PEG [ ]OGT/ NGT  Last BM:     GENITOURINARY:  [ ]Normal [ ] Incontinent   [ ]Oliguria/Anuria   [ ]Lynch    MUSCULOSKELETAL:   [ ]Normal   [ ]Weakness  [ ]Bed/Wheelchair bound [ ]Edema    NEUROLOGIC:   [ ]No focal deficits  [ ] Cognitive impairment  [ ] Dysphagia [ ]Dysarthria [ ] Paresis [ ]Other     SKIN:   [ ]Normal   [ ]Pressure ulcer(s)  [ ]Rash    CRITICAL CARE:  [ ] Shock Present  [ ]Septic [ ]Cardiogenic [ ]Neurologic [ ]Hypovolemic  [ ]  Vasopressors [ ]  Inotropes   [ ] Respiratory failure present  [ ] Acute  [ ] Chronic [ ] Hypoxic  [ ] Hypercarbic [ ] Other  [ ] Other organ failure     LABS:                        7.3    9.5   )-----------( 104      ( 07 Jul 2019 06:48 )             21.7   07-07    137  |  103  |  21  ----------------------------<  90  3.8   |  23  |  1.27    Ca    8.8      07 Jul 2019 06:48    TPro  5.5<L>  /  Alb  3.0<L>  /  TBili  0.5  /  DBili  x   /  AST  24  /  ALT  20  /  AlkPhos  125<H>  07-06        RADIOLOGY & ADDITIONAL STUDIES:    CTA chest 7/4/19  No pulmonary embolism.    Bilateral lower lobe consolidative opacities compatible with pneumonia in   the appropriate clinical context. Recommend follow-up to resolution.    Scattered areas of small ground glass opacity may reflect edema.    Erosive changes at L1-L2 compatible with known osteomyelitis/discitis.   Difficult to compare extent to prior MR due to differences in modality.    PROTEIN CALORIE MALNUTRITION:   [ ] PPSV2 < or = to 30% [ ] significant weight loss  [ ] poor nutritional intake [ ] catabolic state [ ] anasarca     Albumin, Serum: 3.0 g/dL (07-06-19 @ 07:26)  Artificial Nutrition [ ]     REFERRALS:   [ ]Chaplaincy  [ ] Hospice  [ ]Child Life  [ ]Social Work  [ ]Case management [ ]Holistic Therapy     Goals of Care Discussion Document:     ........ ....... ...... ..... .... ... .. .  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 Baseline mental status is AAO x 2-3 (difficulty with dates). Admitted for acute-on-chronic HFpEF, also with concern for ? aspiration with S&S evaluation pending. ID recommended continuing with ceftaroline and daptomycin for VRE bacteremia. Palliative care called for GOC.    PERTINENT PM/SXH:   CKD (chronic kidney disease) stage 3, GFR 30-59 ml/min  Hepatitis B  PTSD (post-traumatic stress disorder)  Tachy-kevon syndrome  TIA (transient ischemic attack)  Seizure  DIEGO (obstructive sleep apnea)  Chronic diastolic HF (heart failure)  Aortic stenosis  CVA (cerebral vascular accident)  Gait instability  COPD (chronic obstructive pulmonary disease)  BPH (benign prostatic hyperplasia)  Asthma  CHF (congestive heart failure)  Atrial fibrillation  Pacemaker  Hypertension  H/O thalassemia  CAD (coronary artery disease)    S/P TAVR (transcatheter aortic valve replacement)  Artificial pacemaker  S/P primary angioplasty with coronary stent    FAMILY HISTORY:  No pertinent family history in first degree relatives    ITEMS NOT CHECKED ARE NOT PRESENT    SOCIAL HISTORY:   Significant other/partner:  [ ]    Children:  [X ]    Church/Spirituality: Latter day  Substance hx:  [ ]   Tobacco hx:  [ ]   Alcohol hx: [ ] Drug use hx: [ ]  Home Opioid hx:  [ ] (I-Stop Reference No: [id number])    Rx Written	Rx Dispensed	Drug	Quantity	Days Supply	  06/18/2019	06/18/2019	oxycodone hcl 5 mg tablet	10	5  05/13/2019	05/13/2019	tramadol hcl 50 mg tablet	21	7  04/25/2019	04/25/2019	tramadol hcl 50 mg tablet	15	5	  04/20/2019	04/20/2019	tramadol hcl 50 mg tablet	15	5	    Living Situation: [ ]Home  [ ]Long term care  [ ]Rehab [ ]Other  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    ADVANCE DIRECTIVES:    DNR [ ] Full Code  MOLST  [ ]    Living Will  [ ]   DECISION MAKER(s):  [ ] Health Care Proxy(s)  [ ] Surrogate(s)  [ ] Guardian           Name(s) and Contact(s): daughter Carolina Thompson [phone]     BASELINE (I)ADL(s) (prior to admission):  [place]: [ ]Total  [ ] Moderate [ ]Dependent    Allergies    Keppra (Rash)  penicillin (Unknown)    Intolerances    MEDICATIONS  (STANDING):  amiodarone    Tablet 200 milliGRAM(s) Oral daily  apixaban 5 milliGRAM(s) Oral two times a day  atorvastatin 40 milliGRAM(s) Oral at bedtime  buDESOnide  80 MICROgram(s)/formoterol 4.5 MICROgram(s) Inhaler 2 Puff(s) Inhalation two times a day  ceftaroline fosamil IVPB 600 milliGRAM(s) IV Intermittent every 12 hours  chlorhexidine 2% Cloths 1 Application(s) Topical daily  clopidogrel Tablet 75 milliGRAM(s) Oral daily  DAPTOmycin IVPB 850 milliGRAM(s) IV Intermittent every 24 hours  docusate sodium 100 milliGRAM(s) Oral three times a day  finasteride 5 milliGRAM(s) Oral daily  lactobacillus acidophilus 1 Tablet(s) Oral three times a day with meals  lidocaine   Patch 1 Patch Transdermal daily  melatonin 3 milliGRAM(s) Oral at bedtime  metoprolol succinate ER 25 milliGRAM(s) Oral daily  multivitamin 1 Tablet(s) Oral daily  pantoprazole    Tablet 40 milliGRAM(s) Oral before breakfast  senna 2 Tablet(s) Oral at bedtime  tamsulosin 0.4 milliGRAM(s) Oral at bedtime    MEDICATIONS  (PRN):  ALBUTerol    90 MICROgram(s) HFA Inhaler 2 Puff(s) Inhalation every 6 hours PRN Shortness of Breath and/or Wheezing  ondansetron    Tablet 4 milliGRAM(s) Oral every 8 hours PRN Nausea and/or Vomiting  polyethylene glycol 3350 17 Gram(s) Oral daily PRN Constipation    PRESENT SYMPTOMS:   Source if other than patient:  [ ]Family   [ ]Team     Pain (Impact on QOL):    Location -         Minimal acceptable level (0-10 scale):                    Aggravating factors -  Quality -  Radiation -  Severity (0-10 scale) -    Timing -    PAIN AD Score:     [email or web address] (press ctrl +  left click to view)    Dyspnea:                           [ ]Mild [ ]Moderate [ ]Severe  Anxiety:                             [ ]Mild [ ]Moderate [ ]Severe  Fatigue:                             [ ]Mild [ ]Moderate [ ]Severe  Nausea:                             [ ]Mild [ ]Moderate [ ]Severe  Loss of appetite:              [ ]Mild [ ]Moderate [ ]Severe  Constipation:                    [ ]Mild [ ]Moderate [ ]Severe    Other Symptoms:  [ ]All other review of systems negative   [X ]Unable to obtain due to poor mentation     Karnofsky Performance Score/Palliative Performance Status Version 2: 30%    PHYSICAL EXAM:  Vital Signs Last 24 Hrs  T(C): 36.4 (07 Jul 2019 04:47), Max: 37 (06 Jul 2019 11:16)  T(F): 97.5 (07 Jul 2019 04:47), Max: 98.6 (06 Jul 2019 11:16)  HR: 67 (07 Jul 2019 04:47) (60 - 72)  BP: 109/52 (07 Jul 2019 04:47) (92/55 - 131/76)  BP(mean): --  RR: 18 (07 Jul 2019 04:47) (17 - 18)  SpO2: 95% (07 Jul 2019 04:47) (94% - 97%) I&O's Summary    06 Jul 2019 07:01  -  07 Jul 2019 07:00  --------------------------------------------------------  IN: 860 mL / OUT: 1100 mL / NET: -240 mL    07 Jul 2019 07:01  -  07 Jul 2019 11:08  --------------------------------------------------------  IN: 100 mL / OUT: 0 mL / NET: 100 mL        GENERAL:  [ ]Alert  [ ]Oriented x   [X ]Lethargic  [ ]Cachexia  [ ]Unarousable  [ ]Verbal  [ ]Non-Verbal    Behavioral:   [ ] Anxiety  [ ] Delirium [ ] Agitation [ ] Other    HEENT:  [ X]Normal   [ ]Dry mouth   [ ]ET Tube/Trach  [ ]Oral lesions    PULMONARY:   [X ]Clear [ ]Tachypnea  [ ]Audible excessive secretions   [ ]Rhonchi        [ ]Right [ ]Left [ ]Bilateral  [ ]Crackles        [ ]Right [ ]Left [ ]Bilateral  [ ]Wheezing     [ ]Right [ ]Left [ ]Bilateral    CARDIOVASCULAR:    [X ]Regular [ ]Irregular [ ]Tachy  [ ]Kevon [ ]Murmur [ ]Other    GASTROINTESTINAL:  [X ]Soft  [ ]Distended   [X ]+BS  [ X]Non tender [ ]Tender  [ ]PEG [ ]OGT/ NGT  Last BM:     GENITOURINARY:  [ ]Normal [ ] Incontinent   [ ]Oliguria/Anuria   [ ]Lynch    MUSCULOSKELETAL:   [ ]Normal   [ ]Weakness  [ ]Bed/Wheelchair bound [ ]Edema    NEUROLOGIC:   [ ]No focal deficits  [ X] Cognitive impairment  [ ] Dysphagia [ ]Dysarthria [ ] Paresis [ ]Other     SKIN:   [ ]Normal   [ ]Pressure ulcer(s)  [ ]Rash    CRITICAL CARE:  [ ] Shock Present  [ ]Septic [ ]Cardiogenic [ ]Neurologic [ ]Hypovolemic  [ ]  Vasopressors [ ]  Inotropes   [ ] Respiratory failure present  [ ] Acute  [ ] Chronic [ ] Hypoxic  [ ] Hypercarbic [ ] Other  [ ] Other organ failure     LABS: reviewed                        7.3    9.5   )-----------( 104      ( 07 Jul 2019 06:48 )             21.7   07-07    137  |  103  |  21  ----------------------------<  90  3.8   |  23  |  1.27    Ca    8.8      07 Jul 2019 06:48    TPro  5.5<L>  /  Alb  3.0<L>  /  TBili  0.5  /  DBili  x   /  AST  24  /  ALT  20  /  AlkPhos  125<H>  07-06        RADIOLOGY & ADDITIONAL STUDIES:    CTA chest 7/4/19  No pulmonary embolism.    Bilateral lower lobe consolidative opacities compatible with pneumonia in   the appropriate clinical context. Recommend follow-up to resolution.    Scattered areas of small ground glass opacity may reflect edema.    Erosive changes at L1-L2 compatible with known osteomyelitis/discitis.   Difficult to compare extent to prior MR due to differences in modality.    PROTEIN CALORIE MALNUTRITION:   [ ] PPSV2 < or = to 30% [ ] significant weight loss  [ ] poor nutritional intake [ ] catabolic state [ ] anasarca     Albumin, Serum: 3.0 g/dL (07-06-19 @ 07:26)  Artificial Nutrition [ ]     REFERRALS:   [ ]Chaplaincy  [ ] Hospice  [ ]Child Life  [ ]Social Work  [ ]Case management [ ]Holistic Therapy     Goals of Care Discussion Document:     ........ ....... ...... ..... .... ... .. .  Noe Roth MD  Palliative Medicine  office: [phone] | [phone]  pager: [phone]

## 2019-07-07 NOTE — CONSULT NOTE ADULT - ASSESSMENT
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 Baseline mental status is AAO x 2-3 (difficulty with dates). Admitted for acute-on-chronic HFpEF, also with concern for ? aspiration with S&S evaluation pending. ID recommended continuing with ceftaroline and daptomycin for VRE bacteremia. Palliative care called for GOC.

## 2019-07-07 NOTE — CONSULT NOTE ADULT - PROBLEM SELECTOR RECOMMENDATION 9
Full consult to follow shortly. Please page [phone] with any acute concerns. - currently appears comfortable  - c/w HFpEF management with diuresis  - would hold on opioids for now; if needed, prefer dilaudid over morphine given age

## 2019-07-07 NOTE — PROGRESS NOTE ADULT - SUBJECTIVE AND OBJECTIVE BOX
Subjective: Patient seen and examined. No new events except as noted.       REVIEW OF SYSTEMS:    CONSTITUTIONAL: No weakness, fevers or chills  EYES/ENT: No visual changes;  No vertigo or throat pain   NECK: No pain or stiffness  RESPIRATORY: No cough, wheezing, hemoptysis; No shortness of breath  CARDIOVASCULAR: No chest pain or palpitations  GASTROINTESTINAL: No abdominal or epigastric pain. No nausea, vomiting, or hematemesis; No diarrhea or constipation. No melena or hematochezia.  GENITOURINARY: No dysuria, frequency or hematuria  NEUROLOGICAL: No numbness or weakness  SKIN: No itching, burning, rashes, or lesions   All other review of systems is negative unless indicated above.    MEDICATIONS:  MEDICATIONS  (STANDING):  amiodarone    Tablet 200 milliGRAM(s) Oral daily  apixaban 5 milliGRAM(s) Oral two times a day  atorvastatin 40 milliGRAM(s) Oral at bedtime  buDESOnide  80 MICROgram(s)/formoterol 4.5 MICROgram(s) Inhaler 2 Puff(s) Inhalation two times a day  ceftaroline fosamil IVPB 600 milliGRAM(s) IV Intermittent every 12 hours  chlorhexidine 2% Cloths 1 Application(s) Topical daily  clopidogrel Tablet 75 milliGRAM(s) Oral daily  DAPTOmycin IVPB 850 milliGRAM(s) IV Intermittent every 24 hours  docusate sodium 100 milliGRAM(s) Oral three times a day  finasteride 5 milliGRAM(s) Oral daily  lactobacillus acidophilus 1 Tablet(s) Oral three times a day with meals  lidocaine   Patch 1 Patch Transdermal daily  melatonin 3 milliGRAM(s) Oral at bedtime  metoprolol succinate ER 25 milliGRAM(s) Oral daily  multivitamin 1 Tablet(s) Oral daily  pantoprazole    Tablet 40 milliGRAM(s) Oral before breakfast  senna 2 Tablet(s) Oral at bedtime  tamsulosin 0.4 milliGRAM(s) Oral at bedtime      PHYSICAL EXAM:  T(C): 36.4 (07-07-19 @ 04:47), Max: 36.4 (07-07-19 @ 04:47)  HR: 67 (07-07-19 @ 04:47) (67 - 72)  BP: 109/52 (07-07-19 @ 04:47) (92/55 - 109/52)  RR: 18 (07-07-19 @ 04:47) (17 - 18)  SpO2: 95% (07-07-19 @ 04:47) (95% - 97%)  Wt(kg): --  I&O's Summary    06 Jul 2019 07:01  -  07 Jul 2019 07:00  --------------------------------------------------------  IN: 860 mL / OUT: 1100 mL / NET: -240 mL    07 Jul 2019 07:01  -  07 Jul 2019 13:43  --------------------------------------------------------  IN: 220 mL / OUT: 0 mL / NET: 220 mL          Appearance: Normal	  HEENT:   Normal oral mucosa, PERRL, EOMI	  Lymphatic: No lymphadenopathy , no edema  Cardiovascular: Normal S1 S2, No JVD, No murmurs , Peripheral pulses palpable 2+ bilaterally  Respiratory: Lungs clear to auscultation, normal effort 	  Gastrointestinal:  Soft, Non-tender, + BS	  Skin: No rashes, No ecchymoses, No cyanosis, warm to touch  Musculoskeletal: Normal range of motion, normal strength  Psychiatry:  Mood & affect appropriate  Ext: No edema      All labs, Imaging and EKGs personally reviewed                             7.3    9.5   )-----------( 104      ( 07 Jul 2019 06:48 )             21.7               07-07    137  |  103  |  21  ----------------------------<  90  3.8   |  23  |  1.27    Ca    8.8      07 Jul 2019 06:48    TPro  5.5<L>  /  Alb  3.0<L>  /  TBili  0.5  /  DBili  x   /  AST  24  /  ALT  20  /  AlkPhos  125<H>  07-06

## 2019-07-07 NOTE — EEG REPORT - NS EEG TEXT BOX
[facility]  Report of Routine EEG Study with Video      [facility]: [address], [address] [place] [address], Phone: [phone]  [facility]: [address] [place] [address], Phone: [phone]  Office: [address], [place] [address], [place] [address], Phone: [phone]    Patient Name: Vivek Thompson    Age: 88 year  : 1931  Patient ID: -, MRN #: MRN# [id number], Location: [address] [place] [address]  Referring Physician: -  EEG #: [id number]    Study Date: 2019		    Technical Information:					  On Instrument: -  Placement and Labeling of Electrodes:  The EEG was performed utilizing 20 channels referential EEG connections (coronal over temporal over parasagittal montage) using all standard 10-20 electrode placements with EKG.  Recording was at a sampling rate of 256 samples per second per channel.  Time synchronized digital video recording was done simultaneously with EEG recording.  A low light infrared camera was used for low light recording.  [place] and seizure detection algorithms were utilized.    History:  Rouitne study performed at bedside  COR: Awake, alert  No HV, No photic  87 Y/O Male  P/W: R/O Seizure, fall  H/O: Aortic stenosis, Asthma, AFib, BPH, CAD, CHF, CKD, COPD    Medication	  No Data.	    [Abbreviation Key:  PDR=alpha rhythm/posterior dominant rhythm. A-P=anterior posterior gradient.  Amplitude: ‘very low’:<20; ‘low’:20-50; ‘medium’:; ‘high’:>200uV.  Persistence for periodic/rhythmic patterns (% of epoch) ‘rare’:<1%; ‘occasional’:1-10%; ‘frequent’:10-50%; ‘abundant’:50-90%; ‘continuous’:>90%.  Persistence for sporadic discharges: ‘rare’:<1/hr; ‘occasional’:1/min-1/hr; ‘frequent’:>1/min; ‘abundant’:>1/10 sec.  GRDA=generalized rhythmic delta activity, LRDA=lateralized rhythmic delta activity, TIRDA=temporal intermittent rhythmic delta activity, FIRDA=frontal intermittent rhythmic activity. LPD=PLED=lateralized periodic discharges, GPD=generalized periodic discharges, BiPDs=BiPLEDs=bilateral independent periodic epileptiform discharges, SIRPID=stimulus induced rhythmic, periodic, or ictal appearing discharges.  Modifiers: +F=with fast component, +S=with spike component, +R=with rhythmic component.  S-B=burst suppression pattern.  Max=maximal. N1-drowsy, N2-stage II sleep, N3-slow wave sleep.  HV=hyperventilation, PS=photic stimulation]    Study Interpretation:    FINDINGS:      Background:   -The background was continuous, spontaneously variable and reactive. During wakefulness, the posterior dominant rhythm consisted of symmetric 8 Hz activity, with amplitude to 30 uV, that attenuated to eye opening. Low amplitude frontal beta was noted in wakefulness.        Background Slowing:  -Diffuse theta and polymorphic delta slowing.    Focal Slowing:   -None present.    Sleep Background:  -Drowsiness was characterized by fragmentation, attenuation, and slowing of the background activity.    -Stage II sleep transients were not recorded.    Other Non-Epileptiform Findings:  -None were present.    Interictal Epileptiform Activity:   -None were present.    Events:  -Clinical events: None recorded.  -Seizures: None recorded.    Activation Procedures:   -Hyperventilation was not performed.    -Photic stimulation was not performed.    Artifacts:  -Intermittent myogenic and movement artifacts were noted.    EEG Summary/Classification:  - Abnormal EEG: awake / drowsy   - Mild to moderate diffuse slowing    EEG Impression/Clinical Correlate:  -Mild to Moderate nonspecific diffuse or multifocal cerebral dysfunction.       ________________________	  Giovani Pierre M.D.			    Attending Physician, [facility]

## 2019-07-08 DIAGNOSIS — Z71.89 OTHER SPECIFIED COUNSELING: ICD-10-CM

## 2019-07-08 LAB
BLD GP AB SCN SERPL QL: NEGATIVE — SIGNIFICANT CHANGE UP
HCT VFR BLD CALC: 22.5 % — LOW (ref 39–50)
HCT VFR BLD CALC: 22.6 % — LOW (ref 39–50)
HGB BLD-MCNC: 6.9 G/DL — CRITICAL LOW (ref 13–17)
HGB BLD-MCNC: 7.7 G/DL — LOW (ref 13–17)
MCHC RBC-ENTMCNC: 25.5 PG — LOW (ref 27–34)
MCHC RBC-ENTMCNC: 28 PG — SIGNIFICANT CHANGE UP (ref 27–34)
MCHC RBC-ENTMCNC: 30.7 GM/DL — LOW (ref 32–36)
MCHC RBC-ENTMCNC: 34 GM/DL — SIGNIFICANT CHANGE UP (ref 32–36)
MCV RBC AUTO: 82.4 FL — SIGNIFICANT CHANGE UP (ref 80–100)
MCV RBC AUTO: 83 FL — SIGNIFICANT CHANGE UP (ref 80–100)
NRBC # BLD: 2 /100 WBCS — HIGH (ref 0–0)
PLATELET # BLD AUTO: 138 K/UL — LOW (ref 150–400)
PLATELET # BLD AUTO: 140 K/UL — LOW (ref 150–400)
RBC # BLD: 2.71 M/UL — LOW (ref 4.2–5.8)
RBC # BLD: 2.74 M/UL — LOW (ref 4.2–5.8)
RBC # FLD: 15.5 % — HIGH (ref 10.3–14.5)
RBC # FLD: 16.2 % — HIGH (ref 10.3–14.5)
RH IG SCN BLD-IMP: POSITIVE — SIGNIFICANT CHANGE UP
WBC # BLD: 8.9 K/UL — SIGNIFICANT CHANGE UP (ref 3.8–10.5)
WBC # BLD: 9.47 K/UL — SIGNIFICANT CHANGE UP (ref 3.8–10.5)
WBC # FLD AUTO: 8.9 K/UL — SIGNIFICANT CHANGE UP (ref 3.8–10.5)
WBC # FLD AUTO: 9.47 K/UL — SIGNIFICANT CHANGE UP (ref 3.8–10.5)

## 2019-07-08 PROCEDURE — 99497 ADVNCD CARE PLAN 30 MIN: CPT

## 2019-07-08 PROCEDURE — 99233 SBSQ HOSP IP/OBS HIGH 50: CPT

## 2019-07-08 PROCEDURE — 99498 ADVNCD CARE PLAN ADDL 30 MIN: CPT

## 2019-07-08 RX ORDER — ACETAMINOPHEN 500 MG
650 TABLET ORAL ONCE
Refills: 0 | Status: COMPLETED | OUTPATIENT
Start: 2019-07-08 | End: 2019-07-08

## 2019-07-08 RX ORDER — ACETAMINOPHEN 500 MG
650 TABLET ORAL EVERY 6 HOURS
Refills: 0 | Status: DISCONTINUED | OUTPATIENT
Start: 2019-07-08 | End: 2019-07-11

## 2019-07-08 RX ADMIN — DAPTOMYCIN 134 MILLIGRAM(S): 500 INJECTION, POWDER, LYOPHILIZED, FOR SOLUTION INTRAVENOUS at 16:14

## 2019-07-08 RX ADMIN — Medication 1 TABLET(S): at 09:26

## 2019-07-08 RX ADMIN — AMIODARONE HYDROCHLORIDE 200 MILLIGRAM(S): 400 TABLET ORAL at 05:02

## 2019-07-08 RX ADMIN — ATORVASTATIN CALCIUM 40 MILLIGRAM(S): 80 TABLET, FILM COATED ORAL at 21:38

## 2019-07-08 RX ADMIN — Medication 100 MILLIGRAM(S): at 21:38

## 2019-07-08 RX ADMIN — TAMSULOSIN HYDROCHLORIDE 0.4 MILLIGRAM(S): 0.4 CAPSULE ORAL at 21:39

## 2019-07-08 RX ADMIN — Medication 20 MILLIGRAM(S): at 05:05

## 2019-07-08 RX ADMIN — Medication 650 MILLIGRAM(S): at 05:03

## 2019-07-08 RX ADMIN — Medication 1 TABLET(S): at 11:08

## 2019-07-08 RX ADMIN — LIDOCAINE 1 PATCH: 4 CREAM TOPICAL at 23:00

## 2019-07-08 RX ADMIN — CEFTAROLINE FOSAMIL 50 MILLIGRAM(S): 600 POWDER, FOR SOLUTION INTRAVENOUS at 17:57

## 2019-07-08 RX ADMIN — PANTOPRAZOLE SODIUM 40 MILLIGRAM(S): 20 TABLET, DELAYED RELEASE ORAL at 05:02

## 2019-07-08 RX ADMIN — BUDESONIDE AND FORMOTEROL FUMARATE DIHYDRATE 2 PUFF(S): 160; 4.5 AEROSOL RESPIRATORY (INHALATION) at 05:01

## 2019-07-08 RX ADMIN — FINASTERIDE 5 MILLIGRAM(S): 5 TABLET, FILM COATED ORAL at 11:07

## 2019-07-08 RX ADMIN — Medication 25 MILLIGRAM(S): at 05:02

## 2019-07-08 RX ADMIN — CLOPIDOGREL BISULFATE 75 MILLIGRAM(S): 75 TABLET, FILM COATED ORAL at 11:08

## 2019-07-08 RX ADMIN — APIXABAN 5 MILLIGRAM(S): 2.5 TABLET, FILM COATED ORAL at 05:02

## 2019-07-08 RX ADMIN — CEFTAROLINE FOSAMIL 50 MILLIGRAM(S): 600 POWDER, FOR SOLUTION INTRAVENOUS at 05:02

## 2019-07-08 RX ADMIN — Medication 1 TABLET(S): at 17:56

## 2019-07-08 RX ADMIN — Medication 650 MILLIGRAM(S): at 16:15

## 2019-07-08 RX ADMIN — LIDOCAINE 1 PATCH: 4 CREAM TOPICAL at 11:07

## 2019-07-08 RX ADMIN — Medication 1 TABLET(S): at 11:22

## 2019-07-08 RX ADMIN — APIXABAN 5 MILLIGRAM(S): 2.5 TABLET, FILM COATED ORAL at 17:56

## 2019-07-08 RX ADMIN — Medication 3 MILLIGRAM(S): at 21:38

## 2019-07-08 RX ADMIN — LIDOCAINE 1 PATCH: 4 CREAM TOPICAL at 23:25

## 2019-07-08 RX ADMIN — BUDESONIDE AND FORMOTEROL FUMARATE DIHYDRATE 2 PUFF(S): 160; 4.5 AEROSOL RESPIRATORY (INHALATION) at 17:56

## 2019-07-08 RX ADMIN — CHLORHEXIDINE GLUCONATE 1 APPLICATION(S): 213 SOLUTION TOPICAL at 11:08

## 2019-07-08 RX ADMIN — Medication 100 MILLIGRAM(S): at 05:02

## 2019-07-08 RX ADMIN — LIDOCAINE 1 PATCH: 4 CREAM TOPICAL at 00:10

## 2019-07-08 RX ADMIN — Medication 100 MILLIGRAM(S): at 16:14

## 2019-07-08 RX ADMIN — SENNA PLUS 2 TABLET(S): 8.6 TABLET ORAL at 21:39

## 2019-07-08 RX ADMIN — Medication 650 MILLIGRAM(S): at 17:00

## 2019-07-08 NOTE — SWALLOW BEDSIDE ASSESSMENT ADULT - ASR SWALLOW ASPIRATION MONITOR
upper respiratory infection/cough/gurgly voice/change of breathing pattern/pneumonia/throat clearing/fever

## 2019-07-08 NOTE — SWALLOW BEDSIDE ASSESSMENT ADULT - PHARYNGEAL PHASE
Within functional limits Within functional limits/cleared with repeat swallow; thickened liquid wash/Complaints of pharyngeal stasis Delayed pharyngeal swallow Delayed pharyngeal swallow/Cough post oral intake

## 2019-07-08 NOTE — PHYSICAL THERAPY INITIAL EVALUATION ADULT - PLANNED THERAPY INTERVENTIONS, PT EVAL
transfer training/gait training/bed mobility training/balance training/stair training; GOAL: Pt will negotiate up & down 12 stairs independently with unilateral HR & least restrictive AD, in 2 weeks.

## 2019-07-08 NOTE — PROGRESS NOTE ADULT - SUBJECTIVE AND OBJECTIVE BOX
infectious diseases progress note:    Patient is a 88y old  Male who presents with a chief complaint of Fall, chest pain (07 Jul 2019 20:49)        Respiratory failure with hypoxia             Allergies    Keppra (Rash)  penicillin (Unknown)    Intolerances        ANTIBIOTICS/RELEVANT:  antimicrobials  ceftaroline fosamil IVPB 600 milliGRAM(s) IV Intermittent every 12 hours  DAPTOmycin IVPB 850 milliGRAM(s) IV Intermittent every 24 hours    immunologic:    OTHER:  ALBUTerol    90 MICROgram(s) HFA Inhaler 2 Puff(s) Inhalation every 6 hours PRN  amiodarone    Tablet 200 milliGRAM(s) Oral daily  apixaban 5 milliGRAM(s) Oral two times a day  atorvastatin 40 milliGRAM(s) Oral at bedtime  buDESOnide  80 MICROgram(s)/formoterol 4.5 MICROgram(s) Inhaler 2 Puff(s) Inhalation two times a day  chlorhexidine 2% Cloths 1 Application(s) Topical daily  clopidogrel Tablet 75 milliGRAM(s) Oral daily  docusate sodium 100 milliGRAM(s) Oral three times a day  finasteride 5 milliGRAM(s) Oral daily  furosemide    Tablet 20 milliGRAM(s) Oral daily  lactobacillus acidophilus 1 Tablet(s) Oral three times a day with meals  lidocaine   Patch 1 Patch Transdermal daily  melatonin 3 milliGRAM(s) Oral at bedtime  metoprolol succinate ER 25 milliGRAM(s) Oral daily  multivitamin 1 Tablet(s) Oral daily  ondansetron    Tablet 4 milliGRAM(s) Oral every 8 hours PRN  pantoprazole    Tablet 40 milliGRAM(s) Oral before breakfast  polyethylene glycol 3350 17 Gram(s) Oral daily PRN  senna 2 Tablet(s) Oral at bedtime  tamsulosin 0.4 milliGRAM(s) Oral at bedtime      Objective:  Vital Signs Last 24 Hrs  T(C): 36.3 (08 Jul 2019 11:28), Max: 36.4 (07 Jul 2019 20:24)  T(F): 97.4 (08 Jul 2019 11:28), Max: 97.5 (07 Jul 2019 20:24)  HR: 63 (08 Jul 2019 11:28) (62 - 78)  BP: 96/59 (08 Jul 2019 11:28) (92/61 - 115/67)  BP(mean): --  RR: 17 (08 Jul 2019 11:28) (17 - 18)  SpO2: 96% (08 Jul 2019 11:28) (95% - 96%)    PHYSICAL EXAM:     Eyes:JESSEE, EOMI  Ear/Nose/Throat: no oral lesion, no sinus tenderness on percussion	  Neck:no JVD, no lymphadenopathy, supple  Respiratory: CTA marci  Cardiovascular: S1S2 RRR, no murmurs  Gastrointestinal:soft, (+) BS, no HSM  Extremities:no e/e/c        LABS:                        7.7    8.9   )-----------( 140      ( 08 Jul 2019 09:46 )             22.6     07-07    137  |  103  |  21  ----------------------------<  90  3.8   |  23  |  1.27    Ca    8.8      07 Jul 2019 06:48              MICROBIOLOGY:    RECENT CULTURES:  07-05 @ 13:06 .Blood                No growth to date.          RESPIRATORY CULTURES:              RADIOLOGY & ADDITIONAL STUDIES:        Pager [phone]  After 5 pm/weekends or if no response :[phone]

## 2019-07-08 NOTE — SWALLOW BEDSIDE ASSESSMENT ADULT - SWALLOW EVAL: PATIENT/FAMILY GOALS STATEMENT
per d/w son-in-law, pt/family wishes are to proceed with comfort measures only; considering home hospice; do not want objective testing

## 2019-07-08 NOTE — PHYSICAL THERAPY INITIAL EVALUATION ADULT - PRECAUTIONS/LIMITATIONS, REHAB EVAL
aspiration precautions/cardiac precautions/continued from above:  MRI Head 7/5: Age-appropriate involutional and ischemic gliotic changes. 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

## 2019-07-08 NOTE — PROGRESS NOTE ADULT - SUBJECTIVE AND OBJECTIVE BOX
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 Baseline mental status is AAO x 2-3 (difficulty with dates). Admitted for acute-on-chronic HFpEF, also with concern for ? aspiration with S&S evaluation pending. ID recommended continuing with ceftaroline and daptomycin for VRE bacteremia. Palliative care called for GOC.    INTERVAL EVENTS:  7/8: no major overnight events    ADVANCE DIRECTIVES:    DNR [ ] Full Code  MOLST  [ ]    Living Will  [ ]   DECISION MAKER(s):  [ ] Health Care Proxy(s)  [ ] Surrogate(s)  [ ] Guardian           Name(s) and Contact(s): daughter Carolina Thompson [phone]     BASELINE (I)ADL(s) (prior to admission):  [place]: [ ]Total  [ ] Moderate [ ]Dependent    Allergies    Keppra (Rash)  penicillin (Unknown)    Intolerances    MEDICATIONS  (STANDING):  amiodarone    Tablet 200 milliGRAM(s) Oral daily  apixaban 5 milliGRAM(s) Oral two times a day  atorvastatin 40 milliGRAM(s) Oral at bedtime  buDESOnide  80 MICROgram(s)/formoterol 4.5 MICROgram(s) Inhaler 2 Puff(s) Inhalation two times a day  ceftaroline fosamil IVPB 600 milliGRAM(s) IV Intermittent every 12 hours  chlorhexidine 2% Cloths 1 Application(s) Topical daily  clopidogrel Tablet 75 milliGRAM(s) Oral daily  DAPTOmycin IVPB 850 milliGRAM(s) IV Intermittent every 24 hours  docusate sodium 100 milliGRAM(s) Oral three times a day  finasteride 5 milliGRAM(s) Oral daily  furosemide    Tablet 20 milliGRAM(s) Oral daily  lactobacillus acidophilus 1 Tablet(s) Oral three times a day with meals  lidocaine   Patch 1 Patch Transdermal daily  melatonin 3 milliGRAM(s) Oral at bedtime  metoprolol succinate ER 25 milliGRAM(s) Oral daily  multivitamin 1 Tablet(s) Oral daily  pantoprazole    Tablet 40 milliGRAM(s) Oral before breakfast  senna 2 Tablet(s) Oral at bedtime  tamsulosin 0.4 milliGRAM(s) Oral at bedtime    MEDICATIONS  (PRN):  ALBUTerol    90 MICROgram(s) HFA Inhaler 2 Puff(s) Inhalation every 6 hours PRN Shortness of Breath and/or Wheezing  ondansetron    Tablet 4 milliGRAM(s) Oral every 8 hours PRN Nausea and/or Vomiting  polyethylene glycol 3350 17 Gram(s) Oral daily PRN Constipation    PRESENT SYMPTOMS:   Source if other than patient:  [ ]Family   [ ]Team     Pain (Impact on QOL):    Location -         Minimal acceptable level (0-10 scale):                    Aggravating factors -  Quality -  Radiation -  Severity (0-10 scale) -    Timing -    PAIN AD Score:     [email or web address]/[email or web address] (press ctrl +  left click to view)    Dyspnea:                           [ ]Mild [ ]Moderate [ ]Severe  Anxiety:                             [ ]Mild [ ]Moderate [ ]Severe  Fatigue:                             [ ]Mild [ ]Moderate [ ]Severe  Nausea:                             [ ]Mild [ ]Moderate [ ]Severe  Loss of appetite:              [ ]Mild [ ]Moderate [ ]Severe  Constipation:                    [ ]Mild [ ]Moderate [ ]Severe    Other Symptoms:  [ ]All other review of systems negative   [X ]Unable to obtain due to poor mentation     Karnofsky Performance Score/Palliative Performance Status Version 2: 30%    PHYSICAL EXAM:  Vital Signs Last 24 Hrs  T(C): 36.3 (08 Jul 2019 11:28), Max: 36.4 (07 Jul 2019 20:24)  T(F): 97.4 (08 Jul 2019 11:28), Max: 97.5 (07 Jul 2019 20:24)  HR: 63 (08 Jul 2019 11:28) (62 - 78)  BP: 96/59 (08 Jul 2019 11:28) (92/61 - 115/67)  BP(mean): --  RR: 17 (08 Jul 2019 11:28) (17 - 18)  SpO2: 96% (08 Jul 2019 11:28) (95% - 96%)      GENERAL:  [ X]Alert  [ ]Oriented x   [X ]Lethargic  [ ]Cachexia  [ ]Unarousable  [ X]Verbal  [ ]Non-Verbal    Behavioral:   [ ] Anxiety  [ ] Delirium [ ] Agitation [ ] Other    HEENT:  [ X]Normal   [ ]Dry mouth   [ ]ET Tube/Trach  [ ]Oral lesions    PULMONARY:   [X ]Clear [ ]Tachypnea  [ ]Audible excessive secretions   [ ]Rhonchi        [ ]Right [ ]Left [ ]Bilateral  [ ]Crackles        [ ]Right [ ]Left [ ]Bilateral  [ ]Wheezing     [ ]Right [ ]Left [ ]Bilateral    CARDIOVASCULAR:    [X ]Regular [ ]Irregular [ ]Tachy  [ ]Kevon [ ]Murmur [ ]Other    GASTROINTESTINAL:  [X ]Soft  [ ]Distended   [X ]+BS  [ X]Non tender [ ]Tender  [ ]PEG [ ]OGT/ NGT  Last BM:     GENITOURINARY:  [ ]Normal [ ] Incontinent   [ ]Oliguria/Anuria   [ ]Lynch    MUSCULOSKELETAL:   [ ]Normal   [ ]Weakness  [ ]Bed/Wheelchair bound [ ]Edema    NEUROLOGIC:   [ ]No focal deficits  [ X] Cognitive impairment  [ ] Dysphagia [ ]Dysarthria [ ] Paresis [ ]Other     SKIN:   [ ]Normal   [ ]Pressure ulcer(s)  [ ]Rash    CRITICAL CARE:  [ ] Shock Present  [ ]Septic [ ]Cardiogenic [ ]Neurologic [ ]Hypovolemic  [ ]  Vasopressors [ ]  Inotropes   [ ] Respiratory failure present  [ ] Acute  [ ] Chronic [ ] Hypoxic  [ ] Hypercarbic [ ] Other  [ ] Other organ failure     LABS: reviewed                          7.7    8.9   )-----------( 140      ( 08 Jul 2019 09:46 )             22.6     07-07    137  |  103  |  21  ----------------------------<  90  3.8   |  23  |  1.27    Ca    8.8      07 Jul 2019 06:48      RADIOLOGY & ADDITIONAL STUDIES:    CTA chest 7/4/19  No pulmonary embolism.    Bilateral lower lobe consolidative opacities compatible with pneumonia in   the appropriate clinical context. Recommend follow-up to resolution.    Scattered areas of small ground glass opacity may reflect edema.    Erosive changes at L1-L2 compatible with known osteomyelitis/discitis.   Difficult to compare extent to prior MR due to differences in modality.    PROTEIN CALORIE MALNUTRITION:   [ ] PPSV2 < or = to 30% [ ] significant weight loss  [ ] poor nutritional intake [ ] catabolic state [ ] anasarca     Albumin, Serum: 3.0 g/dL (07-06-19 @ 07:26)  Artificial Nutrition [ ]     REFERRALS:   [ ]Chaplaincy  [ ] Hospice  [ ]Child Life  [ ]Social Work  [ ]Case management [ ]Holistic Therapy     Goals of Care Discussion Document:     ........ ....... ...... ..... .... ... .. .  Noe Roth MD  Palliative Medicine  office: [phone] | [phone]  pager: [phone]

## 2019-07-08 NOTE — SWALLOW BEDSIDE ASSESSMENT ADULT - SLP PRECAUTIONS/LIMITATIONS: HEARING
Mildly to Moderately Impaired: difficulty hearing in some environments or speaker may need to increase volume or speak distinctly; Has hearing aides at home

## 2019-07-08 NOTE — SWALLOW BEDSIDE ASSESSMENT ADULT - COMMENTS
Dyspnea, currently appears comfortable; - per palliative, c/w HFpEF management with diuresis. VRE bacteremia.  Recommendation: - c/w abx per ID  - cultures 7/5 NGTD. Per Palliative consult 7/7, d/w daughter- further ACP and GOC discussions based on clinical course. Dyspnea, currently appears comfortable; - per palliative, c/w HFpEF management with diuresis. VRE bacteremia.  Recommendation: - c/w abx per ID  - cultures 7/5 NGTD. Per Palliative consult 7/7, d/w daughter- further ACP and GOC discussions based on clinical course.  Per ID: Previous VRE bacteremia, PPM removed and  treated with  6 week course of Daptomycin/Ceftaroline about 5/11/19; Hospitalized 5/26 --> 6/17/19 for recurrent VRE bacteremia. 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 Osteitis/ discitis L1-L2. VRE bacteremia was prolonged 5/24 --> 6/5/19. He developed thrombocytopenia since 6/20 but did not go on to have leukopenia. Currently admitted 7/4/19 after fall  confusion, malnutrition, mild leukocytosis, basilar consolidative lung opacities, no fever, low ProCalcitonin suggests fluid overload. Persistent VRE infection, pneumonia, cerebrovascular disease of concern. Antibiotics: Vanco 3/20 -->21; Daptomycin 3/21 -->  about 5/11; 5/26 -->Ceftaroline 3/29  -->  about 5/11; 5/26-->Linezolid 6/4-->stopped about 1 week. Cefepime 7/4. Azithro 7/4. Overall VRE  infection, prosthetic valve endocarditis, leucocytosis, abnormal CT chest. Per ID, Suggest Continue with Ceftaroline and Daptomycin. Follow up blood culture results ae negative  last positive were june 6. Platelets better. Speech/swallow evaluation. dapto is not good for pna abut I do not think he has vrec pna; confusion persists  probably metabolic. no need for LP.  CT Chest 7/4: b/l lower lobe consolidative opacities; Mild bibasilar compressive atelectasis. Scattered areas of small groundglass opacity. Dyspnea, currently appears comfortable; - per palliative, c/w HFpEF management with diuresis. VRE bacteremia.  Recommendation: - c/w abx per ID  - cultures 7/5 NGTD. Per Palliative consult 7/7, d/w daughter- further ACP and GOC discussions based on clinical course.  Per ID: Previous VRE bacteremia, PPM removed and  treated with  6 week course of Daptomycin/Ceftaroline about 5/11/19; Hospitalized 5/26 --> 6/17/19 for recurrent VRE bacteremia. 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 Osteitis/ discitis L1-L2. VRE bacteremia was prolonged 5/24 --> 6/5/19. He developed thrombocytopenia since 6/20 but did not go on to have leukopenia. Currently admitted 7/4/19 after fall  confusion, malnutrition, mild leukocytosis, basilar consolidative lung opacities, no fever, low ProCalcitonin suggests fluid overload. Persistent VRE infection, pneumonia, cerebrovascular disease of concern. Antibiotics: Vanco 3/20 -->21; Daptomycin 3/21 -->  about 5/11; 5/26 -->Ceftaroline 3/29  -->  about 5/11; 5/26-->Linezolid 6/4-->stopped about 1 week. Cefepime 7/4. Azithro 7/4. Overall VRE  infection, prosthetic valve endocarditis, leucocytosis, abnormal CT chest. Per ID, Suggest Continue with Ceftaroline and Daptomycin. Follow up blood culture results ae negative  last positive were june 6. Platelets better. Speech/swallow evaluation. dapto is not good for pna abut I do not think he has vrec pna; confusion persists  probably metabolic. no need for LP.  CT Chest 7/4: b/l lower lobe consolidative opacities; Mild bibasilar compressive atelectasis. Scattered areas of small groundglass opacity.  7/6: Per Provider note, "Pt is having difficulty swallowing foods/ liquids pt has a wet cough after PO intake." diet per MD changed to Dysphagia 1, nectar. Swallow evaluation ordered.

## 2019-07-08 NOTE — PHYSICAL THERAPY INITIAL EVALUATION ADULT - CRITERIA FOR SKILLED THERAPEUTIC INTERVENTIONS
anticipated discharge recommendation/rehab potential/therapy frequency/functional limitations in following categories/risk reduction/prevention/impairments found

## 2019-07-08 NOTE — SWALLOW BEDSIDE ASSESSMENT ADULT - SWALLOW EVAL: DIAGNOSIS
Consult for bedside swallow evaluation received and appreciated. Attempted to see patient for evaluation; however, EEG technician in to set up pt for EEG. Case d/w RICHA Terrazas. As per PA, defer swallow evaluation as EEG needs to be completed. This was d/w pt's two daughters at the bedside. This service to f/u tomorrow for evaluation.
Patient presents with an oropharyngeal dysphagia. Prolonged oral bolus formation of solids. Delayed oral transit with min holding noted across consistencies trialed. Delayed pharyngeal trigger is suspected with cough post intake of thin liquids, suggestive of laryngeal penetration/aspiration. There are no overt signs of laryngeal penetration/aspiration on nectar thickened liquids, purees and soft solids. Per d/w family, wishes are for comfort measures only. MBS offered and declined by son-in-law during Palliative discussion at bedside. Son-in-law verbalized understanding of aspiration risks/consequences with po diet recommendations based on subjective bedside assessment. Education provided to son-in-law re: minimizing aspiration risks as noted below in feeding strategies.

## 2019-07-08 NOTE — PROGRESS NOTE ADULT - SUBJECTIVE AND OBJECTIVE BOX
Patient is a 88y old  Male who presents with a chief complaint of Fall, chest pain (08 Jul 2019 15:59)      MEDICATIONS:  amiodarone    Tablet 200 milliGRAM(s) Oral daily  furosemide    Tablet 20 milliGRAM(s) Oral daily  metoprolol succinate ER 25 milliGRAM(s) Oral daily  tamsulosin 0.4 milliGRAM(s) Oral at bedtime        PHYSICAL EXAM:  T(C): 36.3 (07-08-19 @ 11:28), Max: 36.3 (07-08-19 @ 11:28)  HR: 66 (07-08-19 @ 20:44) (62 - 78)  BP: 106/65 (07-08-19 @ 20:44) (92/61 - 114/67)  RR: 18 (07-08-19 @ 20:44) (17 - 18)  SpO2: 97% (07-08-19 @ 20:44) (96% - 97%)  Wt(kg): --  I&O's Summary    07 Jul 2019 07:01  -  08 Jul 2019 07:00  --------------------------------------------------------  IN: 660 mL / OUT: 550 mL / NET: 110 mL    08 Jul 2019 07:01  -  08 Jul 2019 23:40  --------------------------------------------------------  IN: 370 mL / OUT: 300 mL / NET: 70 mL          Appearance: In no distress	  HEENT:    PERRL, EOMI	  Cardiovascular:  S1 S2, No JVD  Respiratory: Lungs clear to auscultation	  Gastrointestinal:  Soft, Non-tender, + BS	  Extremities:  No edema of LE                                7.7    8.9   )-----------( 140      ( 08 Jul 2019 09:46 )             22.6     07-07    137  |  103  |  21  ----------------------------<  90  3.8   |  23  |  1.27    Ca    8.8      07 Jul 2019 06:48          Labs personally reviewed      ASSESSMENT/PLAN: 	  Resume Lasix 20mg PO daily for chronic diastolic HF and LE edema  Family deciding on home hospice        Timothy Paredes DO [facility]  Cardiovascular Medicine  [phone]

## 2019-07-08 NOTE — PROGRESS NOTE ADULT - SUBJECTIVE AND OBJECTIVE BOX
Subjective: Patient seen and examined in the morning . No new events except as noted.     REVIEW OF SYSTEMS:    CONSTITUTIONAL: No weakness, fevers or chills  EYES/ENT: No visual changes;  No vertigo or throat pain   NECK: No pain or stiffness  RESPIRATORY: No cough, wheezing, hemoptysis; No shortness of breath  CARDIOVASCULAR: No chest pain or palpitations  GASTROINTESTINAL: No abdominal or epigastric pain. No nausea, vomiting, or hematemesis; No diarrhea or constipation. No melena or hematochezia.  GENITOURINARY: No dysuria, frequency or hematuria  NEUROLOGICAL: No numbness or weakness  SKIN: No itching, burning, rashes, or lesions   All other review of systems is negative unless indicated above.    MEDICATIONS:  MEDICATIONS  (STANDING):  amiodarone    Tablet 200 milliGRAM(s) Oral daily  apixaban 5 milliGRAM(s) Oral two times a day  atorvastatin 40 milliGRAM(s) Oral at bedtime  buDESOnide  80 MICROgram(s)/formoterol 4.5 MICROgram(s) Inhaler 2 Puff(s) Inhalation two times a day  chlorhexidine 2% Cloths 1 Application(s) Topical daily  clopidogrel Tablet 75 milliGRAM(s) Oral daily  docusate sodium 100 milliGRAM(s) Oral three times a day  finasteride 5 milliGRAM(s) Oral daily  furosemide    Tablet 20 milliGRAM(s) Oral daily  lactobacillus acidophilus 1 Tablet(s) Oral three times a day with meals  lidocaine   Patch 1 Patch Transdermal daily  melatonin 3 milliGRAM(s) Oral at bedtime  metoprolol succinate ER 25 milliGRAM(s) Oral daily  multivitamin 1 Tablet(s) Oral daily  pantoprazole    Tablet 40 milliGRAM(s) Oral before breakfast  senna 2 Tablet(s) Oral at bedtime  tamsulosin 0.4 milliGRAM(s) Oral at bedtime      PHYSICAL EXAM:  T(C): 36.3 (07-09-19 @ 11:43), Max: 36.6 (07-09-19 @ 04:08)  HR: 63 (07-09-19 @ 11:43) (61 - 66)  BP: 94/59 (07-09-19 @ 11:43) (94/59 - 106/65)  RR: 18 (07-09-19 @ 11:43) (18 - 18)  SpO2: 95% (07-09-19 @ 11:43) (95% - 98%)  Wt(kg): --  I&O's Summary    08 Jul 2019 07:01  -  09 Jul 2019 07:00  --------------------------------------------------------  IN: 370 mL / OUT: 300 mL / NET: 70 mL    09 Jul 2019 07:01  -  09 Jul 2019 17:07  --------------------------------------------------------  IN: 250 mL / OUT: 400 mL / NET: -150 mL          Appearance: Normal	  HEENT:   Normal oral mucosa, PERRL, EOMI	  Lymphatic: No lymphadenopathy , no edema  Cardiovascular: Normal S1 S2, No JVD, No murmurs , Peripheral pulses palpable 2+ bilaterally  Respiratory: Lungs clear to auscultation, normal effort 	  Gastrointestinal:  Soft, Non-tender, + BS	  Skin: No rashes, No ecchymoses, No cyanosis, warm to touch  Musculoskeletal: L shoulder pain   Psychiatry:  Mood & affect appropriate  Ext: No edema      All labs, Imaging and EKGs personally reviewed

## 2019-07-08 NOTE — SWALLOW BEDSIDE ASSESSMENT ADULT - SWALLOW EVAL: RECOMMENDED FEEDING/EATING TECHNIQUES
maintain upright posture during/after eating for 30 mins/no straws/oral hygiene/small sips/bites/crush medication (when feasible)/position upright (90 degrees)

## 2019-07-08 NOTE — SWALLOW BEDSIDE ASSESSMENT ADULT - SLP PRECAUTIONS/LIMITATIONS: VISION
Partially impaired: cannot see medication labels or newsprint, but can see obstacles in path, and the surrounding layout; can count fingers at arm's length; Reading glasses

## 2019-07-08 NOTE — PHYSICAL THERAPY INITIAL EVALUATION ADULT - ADDITIONAL COMMENTS
Pt was admitted from [facility] rehab. PTA pt resided alone in private house although he reports since recent rehab has stayed at daughters home.  Prior to admission was independent ambulating with cane and reports independent with ADL's with difficulty.  Pt's daughters home with 2 stairs to entrance and 13 steps to negotiate to second floor bedroom.

## 2019-07-08 NOTE — PHYSICAL THERAPY INITIAL EVALUATION ADULT - PERTINENT HX OF CURRENT PROBLEM, REHAB EVAL
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 Pt was sent with a note stating concern for slurred speech, unsure duration but EMS reported for 1 week, no appreciable dysarthria in ED.

## 2019-07-08 NOTE — SWALLOW BEDSIDE ASSESSMENT ADULT - SLP GENERAL OBSERVATIONS
Pt found seated upright in chair. Son-in-law present. Palliative MD present. Patient AA+O to person, hospital and "70" self-corrected to "2019". Some tangential verbal output. Per s-i-l, intermittent confusion at baseline. Voice is hoarse, gravely, at times hypophonic. Per Son-in-law, current vocal quality is patient's baseline.

## 2019-07-09 LAB
ANION GAP SERPL CALC-SCNC: 12 MMOL/L — SIGNIFICANT CHANGE UP (ref 5–17)
BUN SERPL-MCNC: 21 MG/DL — SIGNIFICANT CHANGE UP (ref 7–23)
CALCIUM SERPL-MCNC: 8.3 MG/DL — LOW (ref 8.4–10.5)
CHLORIDE SERPL-SCNC: 103 MMOL/L — SIGNIFICANT CHANGE UP (ref 96–108)
CO2 SERPL-SCNC: 23 MMOL/L — SIGNIFICANT CHANGE UP (ref 22–31)
CREAT SERPL-MCNC: 1.15 MG/DL — SIGNIFICANT CHANGE UP (ref 0.5–1.3)
GLUCOSE SERPL-MCNC: 96 MG/DL — SIGNIFICANT CHANGE UP (ref 70–99)
HCT VFR BLD CALC: 21.9 % — LOW (ref 39–50)
HGB BLD-MCNC: 7.4 G/DL — LOW (ref 13–17)
MCHC RBC-ENTMCNC: 27.9 PG — SIGNIFICANT CHANGE UP (ref 27–34)
MCHC RBC-ENTMCNC: 33.8 GM/DL — SIGNIFICANT CHANGE UP (ref 32–36)
MCV RBC AUTO: 82.4 FL — SIGNIFICANT CHANGE UP (ref 80–100)
PLATELET # BLD AUTO: 159 K/UL — SIGNIFICANT CHANGE UP (ref 150–400)
POTASSIUM SERPL-MCNC: 3.7 MMOL/L — SIGNIFICANT CHANGE UP (ref 3.5–5.3)
POTASSIUM SERPL-SCNC: 3.7 MMOL/L — SIGNIFICANT CHANGE UP (ref 3.5–5.3)
RBC # BLD: 2.65 M/UL — LOW (ref 4.2–5.8)
RBC # FLD: 15.7 % — HIGH (ref 10.3–14.5)
SODIUM SERPL-SCNC: 138 MMOL/L — SIGNIFICANT CHANGE UP (ref 135–145)
WBC # BLD: 7.4 K/UL — SIGNIFICANT CHANGE UP (ref 3.8–10.5)
WBC # FLD AUTO: 7.4 K/UL — SIGNIFICANT CHANGE UP (ref 3.8–10.5)

## 2019-07-09 PROCEDURE — 99232 SBSQ HOSP IP/OBS MODERATE 35: CPT

## 2019-07-09 PROCEDURE — 99497 ADVNCD CARE PLAN 30 MIN: CPT

## 2019-07-09 PROCEDURE — 99233 SBSQ HOSP IP/OBS HIGH 50: CPT

## 2019-07-09 RX ORDER — AMIODARONE HYDROCHLORIDE 400 MG/1
1 TABLET ORAL
Qty: 0 | Refills: 0 | DISCHARGE
Start: 2019-07-09

## 2019-07-09 RX ORDER — CHOLECALCIFEROL (VITAMIN D3) 125 MCG
1 CAPSULE ORAL
Qty: 0 | Refills: 0 | DISCHARGE

## 2019-07-09 RX ORDER — METOPROLOL TARTRATE 50 MG
1 TABLET ORAL
Qty: 0 | Refills: 0 | DISCHARGE
Start: 2019-07-09

## 2019-07-09 RX ORDER — ROSUVASTATIN CALCIUM 5 MG/1
1 TABLET ORAL
Qty: 0 | Refills: 0 | DISCHARGE

## 2019-07-09 RX ORDER — FUROSEMIDE 40 MG
1 TABLET ORAL
Qty: 0 | Refills: 0 | DISCHARGE
Start: 2019-07-09

## 2019-07-09 RX ORDER — ROSUVASTATIN CALCIUM 5 MG/1
1 TABLET ORAL
Qty: 0 | Refills: 0 | DISCHARGE
Start: 2019-07-09

## 2019-07-09 RX ORDER — APIXABAN 2.5 MG/1
1 TABLET, FILM COATED ORAL
Qty: 0 | Refills: 0 | DISCHARGE
Start: 2019-07-09

## 2019-07-09 RX ADMIN — Medication 1 TABLET(S): at 17:26

## 2019-07-09 RX ADMIN — BUDESONIDE AND FORMOTEROL FUMARATE DIHYDRATE 2 PUFF(S): 160; 4.5 AEROSOL RESPIRATORY (INHALATION) at 06:17

## 2019-07-09 RX ADMIN — CEFTAROLINE FOSAMIL 50 MILLIGRAM(S): 600 POWDER, FOR SOLUTION INTRAVENOUS at 07:46

## 2019-07-09 RX ADMIN — Medication 1 TABLET(S): at 12:02

## 2019-07-09 RX ADMIN — LIDOCAINE 1 PATCH: 4 CREAM TOPICAL at 19:29

## 2019-07-09 RX ADMIN — CHLORHEXIDINE GLUCONATE 1 APPLICATION(S): 213 SOLUTION TOPICAL at 12:05

## 2019-07-09 RX ADMIN — BUDESONIDE AND FORMOTEROL FUMARATE DIHYDRATE 2 PUFF(S): 160; 4.5 AEROSOL RESPIRATORY (INHALATION) at 17:25

## 2019-07-09 RX ADMIN — TAMSULOSIN HYDROCHLORIDE 0.4 MILLIGRAM(S): 0.4 CAPSULE ORAL at 22:53

## 2019-07-09 RX ADMIN — ATORVASTATIN CALCIUM 40 MILLIGRAM(S): 80 TABLET, FILM COATED ORAL at 22:53

## 2019-07-09 RX ADMIN — AMIODARONE HYDROCHLORIDE 200 MILLIGRAM(S): 400 TABLET ORAL at 06:16

## 2019-07-09 RX ADMIN — Medication 1 TABLET(S): at 12:03

## 2019-07-09 RX ADMIN — SENNA PLUS 2 TABLET(S): 8.6 TABLET ORAL at 22:53

## 2019-07-09 RX ADMIN — LIDOCAINE 1 PATCH: 4 CREAM TOPICAL at 12:03

## 2019-07-09 RX ADMIN — APIXABAN 5 MILLIGRAM(S): 2.5 TABLET, FILM COATED ORAL at 06:16

## 2019-07-09 RX ADMIN — Medication 25 MILLIGRAM(S): at 06:19

## 2019-07-09 RX ADMIN — Medication 1 TABLET(S): at 07:47

## 2019-07-09 RX ADMIN — Medication 20 MILLIGRAM(S): at 06:18

## 2019-07-09 RX ADMIN — Medication 100 MILLIGRAM(S): at 06:18

## 2019-07-09 RX ADMIN — APIXABAN 5 MILLIGRAM(S): 2.5 TABLET, FILM COATED ORAL at 17:26

## 2019-07-09 RX ADMIN — CLOPIDOGREL BISULFATE 75 MILLIGRAM(S): 75 TABLET, FILM COATED ORAL at 12:03

## 2019-07-09 RX ADMIN — PANTOPRAZOLE SODIUM 40 MILLIGRAM(S): 20 TABLET, DELAYED RELEASE ORAL at 06:19

## 2019-07-09 RX ADMIN — Medication 3 MILLIGRAM(S): at 22:53

## 2019-07-09 RX ADMIN — FINASTERIDE 5 MILLIGRAM(S): 5 TABLET, FILM COATED ORAL at 12:03

## 2019-07-09 NOTE — PROGRESS NOTE ADULT - SUBJECTIVE AND OBJECTIVE BOX
Subjective: Patient seen and examined. No new events except as noted.   doing well  family and patient discussed home hospice     REVIEW OF SYSTEMS:    CONSTITUTIONAL: No weakness, fevers or chills  EYES/ENT: No visual changes;  No vertigo or throat pain   NECK: No pain or stiffness  RESPIRATORY: No cough, wheezing, hemoptysis; No shortness of breath  CARDIOVASCULAR: No chest pain or palpitations  GASTROINTESTINAL: No abdominal or epigastric pain. No nausea, vomiting, or hematemesis; No diarrhea or constipation. No melena or hematochezia.  GENITOURINARY: No dysuria, frequency or hematuria  NEUROLOGICAL: L hand pain   SKIN: No itching, burning, rashes, or lesions   All other review of systems is negative unless indicated above.    MEDICATIONS:  MEDICATIONS  (STANDING):  amiodarone    Tablet 200 milliGRAM(s) Oral daily  apixaban 5 milliGRAM(s) Oral two times a day  atorvastatin 40 milliGRAM(s) Oral at bedtime  buDESOnide  80 MICROgram(s)/formoterol 4.5 MICROgram(s) Inhaler 2 Puff(s) Inhalation two times a day  chlorhexidine 2% Cloths 1 Application(s) Topical daily  clopidogrel Tablet 75 milliGRAM(s) Oral daily  docusate sodium 100 milliGRAM(s) Oral three times a day  finasteride 5 milliGRAM(s) Oral daily  furosemide    Tablet 20 milliGRAM(s) Oral daily  lactobacillus acidophilus 1 Tablet(s) Oral three times a day with meals  lidocaine   Patch 1 Patch Transdermal daily  melatonin 3 milliGRAM(s) Oral at bedtime  metoprolol succinate ER 25 milliGRAM(s) Oral daily  multivitamin 1 Tablet(s) Oral daily  pantoprazole    Tablet 40 milliGRAM(s) Oral before breakfast  senna 2 Tablet(s) Oral at bedtime  tamsulosin 0.4 milliGRAM(s) Oral at bedtime      PHYSICAL EXAM:  T(C): 36.3 (07-09-19 @ 11:43), Max: 36.6 (07-09-19 @ 04:08)  HR: 63 (07-09-19 @ 11:43) (61 - 66)  BP: 94/59 (07-09-19 @ 11:43) (94/59 - 106/65)  RR: 18 (07-09-19 @ 11:43) (18 - 18)  SpO2: 95% (07-09-19 @ 11:43) (95% - 98%)  Wt(kg): --  I&O's Summary    08 Jul 2019 07:01  -  09 Jul 2019 07:00  --------------------------------------------------------  IN: 370 mL / OUT: 300 mL / NET: 70 mL    09 Jul 2019 07:01  -  09 Jul 2019 16:57  --------------------------------------------------------  IN: 250 mL / OUT: 400 mL / NET: -150 mL          Appearance: awake, NAD, AOx2  HEENT:   Normal oral mucosa	  Lymphatic: No lymphadenopathy , no edema  Cardiovascular: Normal S1 S2, No JVD, No murmurs , Peripheral pulses palpable 2+ bilaterally  Respiratory: Lungs clear to auscultation, normal effort 	  Gastrointestinal:  Soft, Non-tender, + BS	  Skin: No rashes, No ecchymoses, No cyanosis, warm to touch  Musculoskeletal: L shoulder pain   Psychiatry:  Mood & affect appropriate  Ext: No edema      All labs, Imaging and EKGs personally reviewed                             7.4    7.4   )-----------( 159      ( 09 Jul 2019 07:46 )             21.9               07-09    138  |  103  |  21  ----------------------------<  96  3.7   |  23  |  1.15    Ca    8.3<L>      09 Jul 2019 06:38

## 2019-07-09 NOTE — PROGRESS NOTE ADULT - SUBJECTIVE AND OBJECTIVE BOX
infectious diseases progress note:    Patient is a 88y old  Male who presents with a chief complaint of Fall, chest pain (08 Jul 2019 19:39)        Respiratory failure with hypoxia             Allergies    Keppra (Rash)  penicillin (Unknown)    Intolerances        ANTIBIOTICS/RELEVANT:  antimicrobials  ceftaroline fosamil IVPB 600 milliGRAM(s) IV Intermittent every 12 hours  DAPTOmycin IVPB 850 milliGRAM(s) IV Intermittent every 24 hours    immunologic:    OTHER:  acetaminophen   Tablet .. 650 milliGRAM(s) Oral every 6 hours PRN  ALBUTerol    90 MICROgram(s) HFA Inhaler 2 Puff(s) Inhalation every 6 hours PRN  amiodarone    Tablet 200 milliGRAM(s) Oral daily  apixaban 5 milliGRAM(s) Oral two times a day  atorvastatin 40 milliGRAM(s) Oral at bedtime  buDESOnide  80 MICROgram(s)/formoterol 4.5 MICROgram(s) Inhaler 2 Puff(s) Inhalation two times a day  chlorhexidine 2% Cloths 1 Application(s) Topical daily  clopidogrel Tablet 75 milliGRAM(s) Oral daily  docusate sodium 100 milliGRAM(s) Oral three times a day  finasteride 5 milliGRAM(s) Oral daily  furosemide    Tablet 20 milliGRAM(s) Oral daily  lactobacillus acidophilus 1 Tablet(s) Oral three times a day with meals  lidocaine   Patch 1 Patch Transdermal daily  melatonin 3 milliGRAM(s) Oral at bedtime  metoprolol succinate ER 25 milliGRAM(s) Oral daily  multivitamin 1 Tablet(s) Oral daily  ondansetron    Tablet 4 milliGRAM(s) Oral every 8 hours PRN  pantoprazole    Tablet 40 milliGRAM(s) Oral before breakfast  polyethylene glycol 3350 17 Gram(s) Oral daily PRN  senna 2 Tablet(s) Oral at bedtime  tamsulosin 0.4 milliGRAM(s) Oral at bedtime      Objective:  Vital Signs Last 24 Hrs  T(C): 36.3 (09 Jul 2019 11:43), Max: 36.6 (09 Jul 2019 04:08)  T(F): 97.3 (09 Jul 2019 11:43), Max: 97.8 (09 Jul 2019 04:08)  HR: 63 (09 Jul 2019 11:43) (61 - 66)  BP: 94/59 (09 Jul 2019 11:43) (94/59 - 106/65)  BP(mean): --  RR: 18 (09 Jul 2019 11:43) (18 - 18)  SpO2: 95% (09 Jul 2019 11:43) (95% - 98%)    PHYSICAL EXAM:    tenderness on percussion	  Neck:no JVD, no lymphadenopathy, supple  Respiratory: CTA marci  Cardiovascular: S1S2 RRR, no murmurs  Gastrointestinal:soft, (+) BS, no HSM  Extremities:no e/e/c        LABS:                        7.4    7.4   )-----------( 159      ( 09 Jul 2019 07:46 )             21.9     07-09    138  |  103  |  21  ----------------------------<  96  3.7   |  23  |  1.15    Ca    8.3<L>      09 Jul 2019 06:38              MICROBIOLOGY:    RECENT CULTURES:  07-05 @ 13:06 .Blood                No growth to date.          RESPIRATORY CULTURES:              RADIOLOGY & ADDITIONAL STUDIES:        Pager [phone]  After 5 pm/weekends or if no response :[phone]

## 2019-07-09 NOTE — PROGRESS NOTE ADULT - ATTENDING COMMENTS
D/C planing on home with hospice D/C planing on home with hospice  cont all oral meds. no further IV antibiotics and IV hydration

## 2019-07-09 NOTE — GOALS OF CARE CONVERSATION - PERSONAL ADVANCE DIRECTIVE - CONVERSATION DETAILS
Carolina Fralashaeleoraisabel, daughter [phone] 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 Baseline mental status is AAO x 2-3 (difficulty with dates). Admitted for acute-on-chronic HFpEF, also with concern for ? aspiration with S&S evaluation pending. ID recommended continuing with ceftaroline and daptomycin for VRE bacteremia. Diastolic HF, VRE bacteremia.  Palliative care called for GOC.    Dr. Raphael Roth, Palliative Attending and LCSW met with patient, dtr and son in law.  Patient with capacity for decision making and completed a HCP and MOLST.  Patient designated Giovani Crespo [phone] as his primary HCP and Delmer Morris (alternate) [phone].  Patient completed MOLST indicating NR/DNI, limited medical interventions. do not send to hospital (prefers transfer to inpt hospice when medically indicated), no feeding tube, no IV fluids, use antibiotics.  Original MOLST and HCP plus 2 copies returned to patient/family. Copy placed on chart and copy to be scanned into [employer].     Education provided on hospice - family meeting with hospice liaison after meeting with palliative. Anticipated to go home with [facility] "as soon as possible" per son in law.  Dtr and son in law are primary caregivers for patient in their home.      Supportive counseling provided for medical decline.  Contact card for LCSW provided to patient/family. Palliative will continue to follow.  Discharge as per primary team/CM.  See Dr. Roth's notes for additional information. 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 Baseline mental status is AAO x 2-3 (difficulty with dates). Admitted for acute-on-chronic HFpEF, also with concern for ? aspiration with S&S evaluation pending. ID recommended continuing with ceftaroline and daptomycin for VRE bacteremia. Diastolic HF, VRE bacteremia.  Palliative care called for GOC.    Dr. Raphael Roht, Palliative Attending and LCSW met with patient, dtr and son in law.  Patient with capacity for decision making and completed a HCP and MOLST.  Patient designated Giovani Crespo [phone] as his primary HCP and Delmer Morris (alternate) [phone].  Patient completed MOLST indicating DNR/DNI, limited medical interventions. do not send to hospital (prefers transfer to inpt hospice when medically indicated), no feeding tube, no IV fluids, use antibiotics.  Original MOLST and HCP plus 2 copies returned to patient/family. Copy placed on chart and copy to be scanned into [employer].     Education provided on hospice - family meeting with hospice liaison after meeting with palliative. Anticipated to go home with [facility] "as soon as possible" per son in law.  Dtr and son in law are primary caregivers for patient in their home.      Supportive counseling provided for medical decline.  Contact card for LCSW provided to patient/family. Palliative will continue to follow.  Discharge as per primary team/CM.  See Dr. Roth's notes for additional information.

## 2019-07-09 NOTE — PROGRESS NOTE ADULT - SUBJECTIVE AND OBJECTIVE BOX
Patient is a 88y old  Male who presents with a chief complaint of Fall, chest pain (10 Jul 2019 20:00)      MEDICATIONS:  amiodarone    Tablet 200 milliGRAM(s) Oral daily  furosemide    Tablet 20 milliGRAM(s) Oral daily  metoprolol succinate ER 25 milliGRAM(s) Oral daily  tamsulosin 0.4 milliGRAM(s) Oral at bedtime        PHYSICAL EXAM:  T(C): 36.3 (07-10-19 @ 20:44), Max: 36.5 (07-10-19 @ 04:32)  HR: 67 (07-10-19 @ 20:44) (67 - 75)  BP: 113/63 (07-10-19 @ 20:44) (100/67 - 116/76)  RR: 18 (07-10-19 @ 20:44) (18 - 18)  SpO2: 99% (07-10-19 @ 20:44) (97% - 99%)  Wt(kg): --  I&O's Summary    09 Jul 2019 07:01  -  10 Jul 2019 07:00  --------------------------------------------------------  IN: 490 mL / OUT: 1000 mL / NET: -510 mL    10 Jul 2019 07:01  -  10 Jul 2019 23:13  --------------------------------------------------------  IN: 280 mL / OUT: 490 mL / NET: -210 mL        PHYSICAL EXAM:  T(C): 36.3 (07-09-19 @ 11:43), Max: 36.6 (07-09-19 @ 04:08)  HR: 63 (07-09-19 @ 11:43) (61 - 66)  BP: 94/59 (07-09-19 @ 11:43) (94/59 - 106/65)  RR: 18 (07-09-19 @ 11:43) (18 - 18)  SpO2: 95% (07-09-19 @ 11:43) (95% - 98%)  Wt(kg): --  I&O's Summary    08 Jul 2019 07:01  -  09 Jul 2019 07:00  --------------------------------------------------------  IN: 370 mL / OUT: 300 mL / NET: 70 mL    09 Jul 2019 07:01  -  09 Jul 2019 16:57  --------------------------------------------------------  IN: 250 mL / OUT: 400 mL / NET: -150 mL      Labs personally reviewed      ASSESSMENT/PLAN: 	   Lasix 20mg PO daily for chronic diastolic HF and LE edema  continue other meds  Plan for home hospice        Timothy Paredes DO [facility]  Cardiovascular Medicine  [phone]

## 2019-07-09 NOTE — GOALS OF CARE CONVERSATION - PERSONAL ADVANCE DIRECTIVE - CONVERSATION DETAILS
7/9/19 Hospice care network: Met with patient and his daughter along with his son -in- law Giovani who is his HCP. Discussed Hospice benefits . Receptive to same. Consents signed. Plan is that patient will be discharge to his daughter's home. He can no longer live alone. Patient is refusing all medical interventions except for antibiotics if needed. He does not want to be brought back to the hospital after discharge. Lin Cody RN

## 2019-07-09 NOTE — PROGRESS NOTE ADULT - SUBJECTIVE AND OBJECTIVE BOX
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 Baseline mental status is AAO x 2-3 (difficulty with dates). Admitted for acute-on-chronic HFpEF, also with concern for ? aspiration with S&S evaluation pending. ID recommended continuing with ceftaroline and daptomycin for VRE bacteremia. Palliative care called for GOC.    INTERVAL EVENTS:  7/8: no major overnight events  7/9: no major overnight events, hospice to meet with family today    ADVANCE DIRECTIVES:    DNR [ ] Full Code  MOLST  [ ]    Living Will  [ ]   DECISION MAKER(s):  [ ] Health Care Proxy(s)  [ ] Surrogate(s)  [ ] Guardian           Name(s) and Contact(s): daughter Carolina Thompson [phone]     BASELINE (I)ADL(s) (prior to admission):  [place]: [ ]Total  [ ] Moderate [ ]Dependent    Allergies    Keppra (Rash)  penicillin (Unknown)    Intolerances    MEDICATIONS  (STANDING):  amiodarone    Tablet 200 milliGRAM(s) Oral daily  apixaban 5 milliGRAM(s) Oral two times a day  atorvastatin 40 milliGRAM(s) Oral at bedtime  buDESOnide  80 MICROgram(s)/formoterol 4.5 MICROgram(s) Inhaler 2 Puff(s) Inhalation two times a day  chlorhexidine 2% Cloths 1 Application(s) Topical daily  clopidogrel Tablet 75 milliGRAM(s) Oral daily  docusate sodium 100 milliGRAM(s) Oral three times a day  finasteride 5 milliGRAM(s) Oral daily  furosemide    Tablet 20 milliGRAM(s) Oral daily  lactobacillus acidophilus 1 Tablet(s) Oral three times a day with meals  lidocaine   Patch 1 Patch Transdermal daily  melatonin 3 milliGRAM(s) Oral at bedtime  metoprolol succinate ER 25 milliGRAM(s) Oral daily  multivitamin 1 Tablet(s) Oral daily  pantoprazole    Tablet 40 milliGRAM(s) Oral before breakfast  senna 2 Tablet(s) Oral at bedtime  tamsulosin 0.4 milliGRAM(s) Oral at bedtime    MEDICATIONS  (PRN):  acetaminophen   Tablet .. 650 milliGRAM(s) Oral every 6 hours PRN Mild Pain (1 - 3)  ALBUTerol    90 MICROgram(s) HFA Inhaler 2 Puff(s) Inhalation every 6 hours PRN Shortness of Breath and/or Wheezing  ondansetron    Tablet 4 milliGRAM(s) Oral every 8 hours PRN Nausea and/or Vomiting  polyethylene glycol 3350 17 Gram(s) Oral daily PRN Constipation      PRESENT SYMPTOMS:   Source if other than patient:  [ ]Family   [ ]Team     Pain (Impact on QOL):    Location -         Minimal acceptable level (0-10 scale):                    Aggravating factors -  Quality -  Radiation -  Severity (0-10 scale) -    Timing -    PAIN AD Score:     [email or web address] (press ctrl +  left click to view)    Dyspnea:                           [ ]Mild [ ]Moderate [ ]Severe  Anxiety:                             [ ]Mild [ ]Moderate [ ]Severe  Fatigue:                             [ ]Mild [ ]Moderate [ ]Severe  Nausea:                             [ ]Mild [ ]Moderate [ ]Severe  Loss of appetite:              [ ]Mild [ ]Moderate [ ]Severe  Constipation:                    [ ]Mild [ ]Moderate [ ]Severe    Other Symptoms:  [ ]All other review of systems negative   [X ]Unable to obtain due to poor mentation     Karnofsky Performance Score/Palliative Performance Status Version 2: 30%    PHYSICAL EXAM:  Vital Signs Last 24 Hrs  T(C): 36.3 (09 Jul 2019 11:43), Max: 36.6 (09 Jul 2019 04:08)  T(F): 97.3 (09 Jul 2019 11:43), Max: 97.8 (09 Jul 2019 04:08)  HR: 63 (09 Jul 2019 11:43) (61 - 66)  BP: 94/59 (09 Jul 2019 11:43) (94/59 - 106/65)  BP(mean): --  RR: 18 (09 Jul 2019 11:43) (18 - 18)  SpO2: 95% (09 Jul 2019 11:43) (95% - 98%)    GENERAL:  [ X]Alert  [ ]Oriented x   [X ]Lethargic  [ ]Cachexia  [ ]Unarousable  [ X]Verbal  [ ]Non-Verbal    Behavioral:   [ ] Anxiety  [ ] Delirium [ ] Agitation [ ] Other    HEENT:  [ X]Normal   [ ]Dry mouth   [ ]ET Tube/Trach  [ ]Oral lesions    PULMONARY:   [X ]Clear [ ]Tachypnea  [ ]Audible excessive secretions   [ ]Rhonchi        [ ]Right [ ]Left [ ]Bilateral  [ ]Crackles        [ ]Right [ ]Left [ ]Bilateral  [ ]Wheezing     [ ]Right [ ]Left [ ]Bilateral    CARDIOVASCULAR:    [X ]Regular [ ]Irregular [ ]Tachy  [ ]Kevon [ ]Murmur [ ]Other    GASTROINTESTINAL:  [X ]Soft  [ ]Distended   [X ]+BS  [ X]Non tender [ ]Tender  [ ]PEG [ ]OGT/ NGT  Last BM:     GENITOURINARY:  [ ]Normal [ ] Incontinent   [ ]Oliguria/Anuria   [ ]Lynch    MUSCULOSKELETAL:   [ ]Normal   [ ]Weakness  [ ]Bed/Wheelchair bound [ ]Edema    NEUROLOGIC:   [ ]No focal deficits  [ X] Cognitive impairment  [ ] Dysphagia [ ]Dysarthria [ ] Paresis [ ]Other     SKIN:   [ ]Normal   [ ]Pressure ulcer(s)  [ ]Rash    CRITICAL CARE:  [ ] Shock Present  [ ]Septic [ ]Cardiogenic [ ]Neurologic [ ]Hypovolemic  [ ]  Vasopressors [ ]  Inotropes   [ ] Respiratory failure present  [ ] Acute  [ ] Chronic [ ] Hypoxic  [ ] Hypercarbic [ ] Other  [ ] Other organ failure     LABS: reviewed                          7.4    7.4   )-----------( 159      ( 09 Jul 2019 07:46 )             21.9     07-09    138  |  103  |  21  ----------------------------<  96  3.7   |  23  |  1.15    Ca    8.3<L>      09 Jul 2019 06:38        RADIOLOGY & ADDITIONAL STUDIES:    CTA chest 7/4/19  No pulmonary embolism.    Bilateral lower lobe consolidative opacities compatible with pneumonia in   the appropriate clinical context. Recommend follow-up to resolution.    Scattered areas of small ground glass opacity may reflect edema.    Erosive changes at L1-L2 compatible with known osteomyelitis/discitis.   Difficult to compare extent to prior MR due to differences in modality.    PROTEIN CALORIE MALNUTRITION:   [ ] PPSV2 < or = to 30% [ ] significant weight loss  [ ] poor nutritional intake [ ] catabolic state [ ] anasarca     Albumin, Serum: 3.0 g/dL (07-06-19 @ 07:26)  Artificial Nutrition [ ]     REFERRALS:   [ ]Chaplaincy  [ ] Hospice  [ ]Child Life  [ ]Social Work  [ ]Case management [ ]Holistic Therapy     Goals of Care Discussion Document:     ........ ....... ...... ..... .... ... .. .  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 Baseline mental status is AAO x 2-3 (difficulty with dates). Admitted for acute-on-chronic HFpEF, also with concern for ? aspiration with S&S evaluation pending. ID recommended continuing with ceftaroline and daptomycin for VRE bacteremia. Palliative care called for GOC.    INTERVAL EVENTS:  7/8: no major overnight events  7/9: no major overnight events, hospice to meet with family today    ADVANCE DIRECTIVES:    DNR [ ] Full Code  MOLST  [ ]    Living Will  [ ]   DECISION MAKER(s):  [ ] Health Care Proxy(s)  [ ] Surrogate(s)  [ ] Guardian           Name(s) and Contact(s): daughter Carolina Thompson [phone]     BASELINE (I)ADL(s) (prior to admission):  [place]: [ ]Total  [ ] Moderate [ ]Dependent    Allergies    Keppra (Rash)  penicillin (Unknown)    Intolerances    MEDICATIONS  (STANDING):  amiodarone    Tablet 200 milliGRAM(s) Oral daily  apixaban 5 milliGRAM(s) Oral two times a day  atorvastatin 40 milliGRAM(s) Oral at bedtime  buDESOnide  80 MICROgram(s)/formoterol 4.5 MICROgram(s) Inhaler 2 Puff(s) Inhalation two times a day  chlorhexidine 2% Cloths 1 Application(s) Topical daily  clopidogrel Tablet 75 milliGRAM(s) Oral daily  docusate sodium 100 milliGRAM(s) Oral three times a day  finasteride 5 milliGRAM(s) Oral daily  furosemide    Tablet 20 milliGRAM(s) Oral daily  lactobacillus acidophilus 1 Tablet(s) Oral three times a day with meals  lidocaine   Patch 1 Patch Transdermal daily  melatonin 3 milliGRAM(s) Oral at bedtime  metoprolol succinate ER 25 milliGRAM(s) Oral daily  multivitamin 1 Tablet(s) Oral daily  pantoprazole    Tablet 40 milliGRAM(s) Oral before breakfast  senna 2 Tablet(s) Oral at bedtime  tamsulosin 0.4 milliGRAM(s) Oral at bedtime    MEDICATIONS  (PRN):  acetaminophen   Tablet .. 650 milliGRAM(s) Oral every 6 hours PRN Mild Pain (1 - 3)  ALBUTerol    90 MICROgram(s) HFA Inhaler 2 Puff(s) Inhalation every 6 hours PRN Shortness of Breath and/or Wheezing  ondansetron    Tablet 4 milliGRAM(s) Oral every 8 hours PRN Nausea and/or Vomiting  polyethylene glycol 3350 17 Gram(s) Oral daily PRN Constipation      PRESENT SYMPTOMS:   Source if other than patient:  [ ]Family   [ ]Team     Pain (Impact on QOL):    Location -         Minimal acceptable level (0-10 scale):                    Aggravating factors -  Quality -  Radiation -  Severity (0-10 scale) -    Timing -    PAIN AD Score:     [email or web address] (press ctrl +  left click to view)    Dyspnea:                           [ ]Mild [ ]Moderate [ ]Severe  Anxiety:                             [ ]Mild [ ]Moderate [ ]Severe  Fatigue:                             [ ]Mild [ ]Moderate [ ]Severe  Nausea:                             [ ]Mild [ ]Moderate [ ]Severe  Loss of appetite:              [ ]Mild [ ]Moderate [ ]Severe  Constipation:                    [ ]Mild [ ]Moderate [ ]Severe    Other Symptoms:  [ ]All other review of systems negative   [X ]Unable to obtain due to poor mentation     Karnofsky Performance Score/Palliative Performance Status Version 2: 30%    PHYSICAL EXAM:  Vital Signs Last 24 Hrs  T(C): 36.3 (09 Jul 2019 11:43), Max: 36.6 (09 Jul 2019 04:08)  T(F): 97.3 (09 Jul 2019 11:43), Max: 97.8 (09 Jul 2019 04:08)  HR: 63 (09 Jul 2019 11:43) (61 - 66)  BP: 94/59 (09 Jul 2019 11:43) (94/59 - 106/65)  BP(mean): --  RR: 18 (09 Jul 2019 11:43) (18 - 18)  SpO2: 95% (09 Jul 2019 11:43) (95% - 98%)    Limited exam for patient comfort  GENERAL:  [ X]Alert  [ ]Oriented x   [X ]Lethargic  [ ]Cachexia  [ ]Unarousable  [ X]Verbal  [ ]Non-Verbal    Behavioral:   [ ] Anxiety  [ ] Delirium [ ] Agitation [ ] Other    HEENT:  [ X]Normal   [ ]Dry mouth   [ ]ET Tube/Trach  [ ]Oral lesions    PULMONARY:   [ ]Clear [ ]Tachypnea  [ ]Audible excessive secretions   [ ]Rhonchi        [ ]Right [ ]Left [ ]Bilateral  [ ]Crackles        [ ]Right [ ]Left [ ]Bilateral  [ ]Wheezing     [ ]Right [ ]Left [ ]Bilateral    CARDIOVASCULAR:    [ ]Regular [ ]Irregular [ ]Tachy  [ ]Kevon [ ]Murmur [ ]Other    GASTROINTESTINAL:  [ ]Soft  [ ]Distended   [ 	]+BS  [ ]Non tender [ ]Tender  [ ]PEG [ ]OGT/ NGT  Last BM:     GENITOURINARY:  [ ]Normal [ ] Incontinent   [ ]Oliguria/Anuria   [ ]Lynch    MUSCULOSKELETAL:   [ ]Normal   [ ]Weakness  [ ]Bed/Wheelchair bound [ ]Edema    NEUROLOGIC:   [ ]No focal deficits  [ X] Cognitive impairment  [ ] Dysphagia [ ]Dysarthria [ ] Paresis [ ]Other     SKIN:   [ ]Normal   [ ]Pressure ulcer(s)  [ ]Rash    CRITICAL CARE:  [ ] Shock Present  [ ]Septic [ ]Cardiogenic [ ]Neurologic [ ]Hypovolemic  [ ]  Vasopressors [ ]  Inotropes   [ ] Respiratory failure present  [ ] Acute  [ ] Chronic [ ] Hypoxic  [ ] Hypercarbic [ ] Other  [ ] Other organ failure     LABS: reviewed                          7.4    7.4   )-----------( 159      ( 09 Jul 2019 07:46 )             21.9     07-09    138  |  103  |  21  ----------------------------<  96  3.7   |  23  |  1.15    Ca    8.3<L>      09 Jul 2019 06:38        RADIOLOGY & ADDITIONAL STUDIES:    CTA chest 7/4/19  No pulmonary embolism.    Bilateral lower lobe consolidative opacities compatible with pneumonia in   the appropriate clinical context. Recommend follow-up to resolution.    Scattered areas of small ground glass opacity may reflect edema.    Erosive changes at L1-L2 compatible with known osteomyelitis/discitis.   Difficult to compare extent to prior MR due to differences in modality.    PROTEIN CALORIE MALNUTRITION:   [ ] PPSV2 < or = to 30% [ ] significant weight loss  [ ] poor nutritional intake [ ] catabolic state [ ] anasarca     Albumin, Serum: 3.0 g/dL (07-06-19 @ 07:26)  Artificial Nutrition [ ]     REFERRALS:   [ ]Chaplaincy  [ ] Hospice  [ ]Child Life  [ ]Social Work  [ ]Case management [ ]Holistic Therapy     Goals of Care Discussion Document:     ........ ....... ...... ..... .... ... .. .  Noe Roth MD  Palliative Medicine  office: [phone] | [phone]  pager: [phone]

## 2019-07-09 NOTE — PROGRESS NOTE ADULT - PROBLEM SELECTOR PLAN 3
Crad eval  fluid management   BP control  DCed all Card meds except plavix Crad eval  fluid management   BP control  Cont all oral meds for A fib and HTN

## 2019-07-10 ENCOUNTER — TRANSCRIPTION ENCOUNTER (OUTPATIENT)
Age: 84
End: 2019-07-10

## 2019-07-10 RX ADMIN — LIDOCAINE 1 PATCH: 4 CREAM TOPICAL at 22:53

## 2019-07-10 RX ADMIN — BUDESONIDE AND FORMOTEROL FUMARATE DIHYDRATE 2 PUFF(S): 160; 4.5 AEROSOL RESPIRATORY (INHALATION) at 22:52

## 2019-07-10 RX ADMIN — PANTOPRAZOLE SODIUM 40 MILLIGRAM(S): 20 TABLET, DELAYED RELEASE ORAL at 06:04

## 2019-07-10 RX ADMIN — ATORVASTATIN CALCIUM 40 MILLIGRAM(S): 80 TABLET, FILM COATED ORAL at 22:49

## 2019-07-10 RX ADMIN — FINASTERIDE 5 MILLIGRAM(S): 5 TABLET, FILM COATED ORAL at 11:30

## 2019-07-10 RX ADMIN — Medication 3 MILLIGRAM(S): at 22:49

## 2019-07-10 RX ADMIN — Medication 100 MILLIGRAM(S): at 22:50

## 2019-07-10 RX ADMIN — Medication 650 MILLIGRAM(S): at 22:53

## 2019-07-10 RX ADMIN — Medication 25 MILLIGRAM(S): at 06:03

## 2019-07-10 RX ADMIN — AMIODARONE HYDROCHLORIDE 200 MILLIGRAM(S): 400 TABLET ORAL at 06:03

## 2019-07-10 RX ADMIN — Medication 20 MILLIGRAM(S): at 06:03

## 2019-07-10 RX ADMIN — APIXABAN 5 MILLIGRAM(S): 2.5 TABLET, FILM COATED ORAL at 06:04

## 2019-07-10 RX ADMIN — TAMSULOSIN HYDROCHLORIDE 0.4 MILLIGRAM(S): 0.4 CAPSULE ORAL at 22:51

## 2019-07-10 RX ADMIN — APIXABAN 5 MILLIGRAM(S): 2.5 TABLET, FILM COATED ORAL at 17:14

## 2019-07-10 RX ADMIN — LIDOCAINE 1 PATCH: 4 CREAM TOPICAL at 23:00

## 2019-07-10 RX ADMIN — Medication 1 TABLET(S): at 17:14

## 2019-07-10 RX ADMIN — Medication 1 TABLET(S): at 08:39

## 2019-07-10 RX ADMIN — SENNA PLUS 2 TABLET(S): 8.6 TABLET ORAL at 22:50

## 2019-07-10 RX ADMIN — CLOPIDOGREL BISULFATE 75 MILLIGRAM(S): 75 TABLET, FILM COATED ORAL at 11:31

## 2019-07-10 RX ADMIN — LIDOCAINE 1 PATCH: 4 CREAM TOPICAL at 00:00

## 2019-07-10 RX ADMIN — LIDOCAINE 1 PATCH: 4 CREAM TOPICAL at 11:30

## 2019-07-10 RX ADMIN — Medication 1 TABLET(S): at 11:30

## 2019-07-10 RX ADMIN — BUDESONIDE AND FORMOTEROL FUMARATE DIHYDRATE 2 PUFF(S): 160; 4.5 AEROSOL RESPIRATORY (INHALATION) at 06:04

## 2019-07-10 RX ADMIN — CHLORHEXIDINE GLUCONATE 1 APPLICATION(S): 213 SOLUTION TOPICAL at 11:30

## 2019-07-10 NOTE — PROGRESS NOTE ADULT - PROBLEM SELECTOR PLAN 2
- no further abx per ID given goal of hospice care moving forwards
S/P recent fall  CT head noted   MRI noted   R/O concussion   Neuro eval   fall precautions  ? aspiration  dysphagia diet  speech and swallow eval  palliative eval appreciated
- plan to stop further IV abx on discharge with goal of symptom-directed/comfort care  - defer to ID as to any potential PO agents for d/c (had thrombocytopenia with linezolid)
S/P recent fall  CT head noted   MRI noted   R/O concussion   Neuro eval   fall precautions  ? aspiration  dysphagia diet  speech and swallow eval
S/P recent fall  CT head noted   MRI noted   R/O concussion   Neuro eval   fall precautions  ? aspiration  dysphagia diet  speech and swallow eval  palliative eval appreciated  home with hospice
S/P recent fall  CT head noted   MRI noted   R/O concussion   Neuro eval   fall precautions  ? aspiration  dysphagia diet  speech and swallow eval  palliative eval appreciated  home with hospice
S/P recent fall  CT head noted   MRI noted   R/O concussion   Neuro eval   fall precautions  ? aspiration  dysphagia diet  speech and swallow eval  palliative eval appreciated

## 2019-07-10 NOTE — DISCHARGE NOTE PROVIDER - HOSPITAL COURSE
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 CP is L sided pleuritic, pt notes SOB since last night, long standing ARMSTRONG unsure if worse since last night, denies orthopnea. Pain is over L lower costal margin, no radiation, no palpitations or syncope, no palliative factors, constant. Pain is non-exertional. Pt was sent with a note stating concern for slurred speech, unsure duration but EMS reported for 1 week, no appreciable dysarthria in ED. Pt has no other complaints at this time, is at baseline mental status (A&O x2-3 with difficulty with dates) as compared to prior progress notes in provided transport packet. Pt denies f/c, abd pain, urinary sxs, h/o DVT/PE.    Respiratory failure with hypoxia from  acute on chronic diastolic HF, seen by cardiology. positive trop. unremarkable EKG. CTA chest no PE.     s/p fall in rehab. CT head and MRI brain negative. seen by neuro. fall precautions. seen by palliative, now pt will be d/angi home with home hospice.     found low plt count likely from previous abx for VRE bacteremia. d/angi all abx and comfort measurements.

## 2019-07-10 NOTE — PROGRESS NOTE ADULT - PROBLEM SELECTOR PLAN 6
D/Vince all meds
rate control  ELiquis BID  fall precautions  trend CBC and Hg level
rate control  ELiquis BID  fall precautions  trend CBC and Hg level
D/Vince all meds
rate control  ELiquis BID  fall precautions  trend CBC and Hg level

## 2019-07-10 NOTE — PROGRESS NOTE ADULT - PROBLEM SELECTOR PLAN 8
Plavix and statin  BP control  Serial CE and EKG

## 2019-07-10 NOTE — PROGRESS NOTE ADULT - SUBJECTIVE AND OBJECTIVE BOX
Subjective: Patient seen and examined. No new events except as noted.     REVIEW OF SYSTEMS:  L shoulder pain     MEDICATIONS:  MEDICATIONS  (STANDING):  amiodarone    Tablet 200 milliGRAM(s) Oral daily  apixaban 5 milliGRAM(s) Oral two times a day  atorvastatin 40 milliGRAM(s) Oral at bedtime  buDESOnide  80 MICROgram(s)/formoterol 4.5 MICROgram(s) Inhaler 2 Puff(s) Inhalation two times a day  chlorhexidine 2% Cloths 1 Application(s) Topical daily  clopidogrel Tablet 75 milliGRAM(s) Oral daily  docusate sodium 100 milliGRAM(s) Oral three times a day  finasteride 5 milliGRAM(s) Oral daily  furosemide    Tablet 20 milliGRAM(s) Oral daily  lactobacillus acidophilus 1 Tablet(s) Oral three times a day with meals  lidocaine   Patch 1 Patch Transdermal daily  melatonin 3 milliGRAM(s) Oral at bedtime  metoprolol succinate ER 25 milliGRAM(s) Oral daily  multivitamin 1 Tablet(s) Oral daily  pantoprazole    Tablet 40 milliGRAM(s) Oral before breakfast  senna 2 Tablet(s) Oral at bedtime  tamsulosin 0.4 milliGRAM(s) Oral at bedtime      PHYSICAL EXAM:  T(C): 36.4 (07-10-19 @ 11:31), Max: 36.5 (07-10-19 @ 04:32)  HR: 68 (07-10-19 @ 11:31) (68 - 76)  BP: 100/67 (07-10-19 @ 11:31) (95/56 - 116/76)  RR: 18 (07-10-19 @ 11:31) (18 - 18)  SpO2: 97% (07-10-19 @ 11:31) (95% - 97%)  Wt(kg): --  I&O's Summary    09 Jul 2019 07:01  -  10 Jul 2019 07:00  --------------------------------------------------------  IN: 490 mL / OUT: 1000 mL / NET: -510 mL    10 Jul 2019 07:01  -  10 Jul 2019 15:01  --------------------------------------------------------  IN: 120 mL / OUT: 240 mL / NET: -120 mL          Appearance: Normal	  HEENT:   Normal oral mucosa, PERRL, EOMI	  Lymphatic: No lymphadenopathy , no edema  Cardiovascular: Normal S1 S2, No JVD, No murmurs , Peripheral pulses palpable 2+ bilaterally  Respiratory: Lungs clear to auscultation, normal effort 	  Gastrointestinal:  Soft, Non-tender, + BS	  Skin: No rashes, No ecchymoses, No cyanosis, warm to touch  Musculoskeletal: L shoulder pain on ROM   Psychiatry:  Mood & affect appropriate  Ext: No edema      All labs, Imaging and EKGs personally reviewed                             7.4    7.4   )-----------( 159      ( 09 Jul 2019 07:46 )             21.9               07-09    138  |  103  |  21  ----------------------------<  96  3.7   |  23  |  1.15    Ca    8.3<L>      09 Jul 2019 06:38

## 2019-07-10 NOTE — PROGRESS NOTE ADULT - PROBLEM SELECTOR PLAN 4
most likely due to recent linezolid use for VRE  hold ABx  trend Plt level
most likely due to recent linezolid use for VRE  hold ABx  trend Plt level   Heme eval for further recs   ID F/U
- family meeting held with patient, daughter and son today  - new HCP completed naming son-in-law Giovani Crespo at primary and grandson Dr. Delmer Crespo as secondary  - MOLST completed by patient: DNR, DNI, no IVF trial, no feeding tube, + trial of abx  - hospice to be set up at home, at daughter's house in [place]    > 30 mins spent on above
most likely due to recent linezolid use for VRE  hold ABx  trend Plt level   Heme eval for further recs   ID F/U
- extensive discussion held with son-in-law, patient also present  - discussed current care, and that family is on board for patient having a more comfort-directed approach, as his goal is to return home  - currently living in [place] with daughter   - discussed ACP, patient has a living will and HCP (both daughters), but no MOLST  - will plan to complete MOLST tomorrow, but goals are DNR/DNI per Giovani    > 46 mins spent on above (1502 to 1550)
most likely due to recent linezolid use for VRE  hold ABx  trend Plt level
most likely due to recent linezolid use for VRE  hold ABx  trend Plt level   Heme eval for further recs   ID F/U

## 2019-07-10 NOTE — DISCHARGE NOTE PROVIDER - NSDCCPCAREPLAN_GEN_ALL_CORE_FT
PRINCIPAL DISCHARGE DIAGNOSIS  Diagnosis: Respiratory failure with hypoxia  Assessment and Plan of Treatment: likely from acute on chronic systolic and diastolic CHF.   continue with lasix as directed.   Weigh yourself daily.  If you gain 3lbs in 3 days, or 5lbs in a week call your Health Care Provider.  Do not eat or drink foods containing more than 2000mg of salt (sodium) in your diet every day.  Call your Health Care Provider if you have any swelling or increased swelling in your feet, ankles, and/or stomach.  Take all of your medication as directed.  If you become dizzy call your Health Care Provider.        SECONDARY DISCHARGE DIAGNOSES  Diagnosis: Acute on chronic diastolic HF (heart failure)  Assessment and Plan of Treatment: same as above.    Diagnosis: Confusion and disorientation  Assessment and Plan of Treatment: S/P recent fall in rehab. CT head negative and partial MRI head negative.   seen by Neurology.   s/p EEG showing Mild to Moderate nonspecific diffuse or multifocal cerebral dysfunction.  continue wtih fall precautions and safety precautions.  dysphagia diet.  palliative eval appreciated. pt will discharge to home with home hospice.       Diagnosis: Bone marrow suppression  Assessment and Plan of Treatment: Likely due to recent linezolid use for VRE. no more antibiotic. platelet  count back to normal.       Diagnosis: VRE bacteremia  Assessment and Plan of Treatment: discontinued antibiotics as family wishes.   home with home hospice.    Diagnosis: Atrial fibrillation  Assessment and Plan of Treatment: continue amiodarone, lipitor, and lopressor as directed.      Diagnosis: Hypertension  Assessment and Plan of Treatment: Low salt diet  Activity as tolerated.  Take all medication as prescribed.  Follow up with your medical doctor for routine blood pressure monitoring at your next visit.  Notify your doctor if you have any of the following symptoms:   Dizziness, Lightheadedness, Blurry vision, Headache, Chest pain, Shortness of breath      Diagnosis: CAD (coronary artery disease)  Assessment and Plan of Treatment: Coronary artery disease is a condition where the arteries the supply the heart muscle get clogges with fatty deposits & puts you at risk for a heart attack  Call your doctor if you have any new pain, pressure, or discomfort in the center of your chest, pain, tingling or discomfort in arms, back, neck, jaw, or stomach, shortness of breath, nausea, vomiting, burping or heartburn, sweating, cold and clammy skin, racing or abnormal heartbeat for more than 10 minutes or if they keep coming & going.  Call 911 and do not tr to get to hospital by care  You can help yourself with lefestyle changes (quitting smoking if you smoke), eat lots of fruits & vegetables & low fat dairy products, not a lot of meat & fatty foods, walk or some form of physical activity most days of the week, lose weight if you are overweight  Take your cardiac medication as prescribed to lower cholesterol, to lower blood pressure, aspirin to prevent blood clots, and diabetes control  Make sure to keep appointments with doctor for cardiac follow up care      Diagnosis: BPH (benign prostatic hyperplasia)  Assessment and Plan of Treatment: continue with proscar.

## 2019-07-10 NOTE — PROGRESS NOTE ADULT - PROBLEM SELECTOR PLAN 5
ID F/U  Blood CX  Hold all abx as per families wishes
ID F/U  Blood CX  hold Abx till ID recs
- goals clear  - will sign off  - please reconsult PRN
ID F/U  Blood CX  hold Abx till ID recs
- d/w son-in-law Giovani Crespo  - plan to meet at 1000 on 7/9 (tomorrow) to complete MOLST  - hospice referral made to [facility]
ID F/U  Blood CX  Hold all abx as per families wishes
ID F/U  Blood CX  hold Abx till ID recs

## 2019-07-10 NOTE — PROGRESS NOTE ADULT - PROBLEM SELECTOR PROBLEM 1
Respiratory failure with hypoxia
Dyspnea, unspecified type
Respiratory failure with hypoxia
Respiratory failure with hypoxia
Dyspnea, unspecified type
Respiratory failure with hypoxia
Respiratory failure with hypoxia

## 2019-07-10 NOTE — PROGRESS NOTE ADULT - PROBLEM SELECTOR PROBLEM 3
Chronic diastolic HF (heart failure)

## 2019-07-10 NOTE — PROVIDER CONTACT NOTE (OTHER) - ASSESSMENT
Pt is A&Ox1. Pt denies pain, discomfort, dizziness, SOB. Pt is having difficulty swallowing foods/ liquids pt has a wet cough after PO intake.
Pt is A&Ox1 denies chest pain, dizziness, palpitations, SOB. VSS see flow sheet for reference.
Pt is A&Ox1 denies chest pain, dizziness, palpitations, SOB. VSS see flow sheet for reference.

## 2019-07-10 NOTE — PROGRESS NOTE ADULT - PROVIDER SPECIALTY LIST ADULT
Cardiology
Infectious Disease
Internal Medicine
Internal Medicine
Palliative Care
Palliative Care
Internal Medicine

## 2019-07-10 NOTE — PROGRESS NOTE ADULT - SUBJECTIVE AND OBJECTIVE BOX
Patient is a 88y old  Male who presents with a chief complaint of Fall, chest pain (10 Jul 2019 18:35)      INTERVAL HISTORY: feels ok  	  MEDICATIONS:  amiodarone    Tablet 200 milliGRAM(s) Oral daily  furosemide    Tablet 20 milliGRAM(s) Oral daily  metoprolol succinate ER 25 milliGRAM(s) Oral daily  tamsulosin 0.4 milliGRAM(s) Oral at bedtime        PHYSICAL EXAM:  T(C): 36.3 (07-10-19 @ 20:44), Max: 36.5 (07-10-19 @ 04:32)  HR: 67 (07-10-19 @ 20:44) (67 - 75)  BP: 113/63 (07-10-19 @ 20:44) (100/67 - 116/76)  RR: 18 (07-10-19 @ 20:44) (18 - 18)  SpO2: 99% (07-10-19 @ 20:44) (97% - 99%)  Wt(kg): --  I&O's Summary    09 Jul 2019 07:01  -  10 Jul 2019 07:00  --------------------------------------------------------  IN: 490 mL / OUT: 1000 mL / NET: -510 mL    10 Jul 2019 07:01  -  10 Jul 2019 23:01  --------------------------------------------------------  IN: 280 mL / OUT: 490 mL / NET: -210 mL          Appearance: In no distress	  HEENT:    PERRL, EOMI	  Cardiovascular:  S1 S2, No JVD  Respiratory: Lungs clear to auscultation	  Gastrointestinal:  Soft, Non-tender, + BS	  Extremities:  1+ edema of LE                                7.4    7.4   )-----------( 159      ( 09 Jul 2019 07:46 )             21.9     07-09    138  |  103  |  21  ----------------------------<  96  3.7   |  23  |  1.15    Ca    8.3<L>      09 Jul 2019 06:38          Labs personally reviewed      ASSESSMENT/PLAN: 	   Lasix 20mg PO daily for chronic diastolic HF and LE edema  continue other meds  Plan for home hospice        Timothy Paredes DO [facility]  Cardiovascular Medicine  [phone]

## 2019-07-10 NOTE — PROGRESS NOTE ADULT - PROBLEM SELECTOR PLAN 1
acute on chronic diastolic HF  fluid restriction  I and Os  well diuresis   Card eval Dr ferraro  serial CE and EKG  check procalcitonin level  CT showed ? Pneumonia VS atelectasis
- currently appears comfortable
acute on chronic diastolic HF  fluid restriction  I and Os  well diuresis   Card eval Dr ferraro  serial CE and EKG  CT showed ? Pneumonia VS atelectasis  requesting home Hospice,   no further ABx therapy
acute on chronic diastolic HF  fluid restriction  I and Os  well diuresis   Card eval Dr ferraro  serial CE and EKG  check procalcitonin level  CT showed ? Pneumonia VS atelectasis
- currently appears comfortable
acute on chronic diastolic HF  fluid restriction  I and Os  well diuresis   Card eval Dr ferraro  serial CE and EKG  CT showed ? Pneumonia VS atelectasis  requesting home Hospice,   no further ABx therapy
acute on chronic diastolic HF  fluid restriction  I and Os  well diuresis   Card eval Dr ferraro  serial CE and EKG  check procalcitonin level  CT showed ? Pneumonia VS atelectasis

## 2019-07-10 NOTE — PROVIDER CONTACT NOTE (OTHER) - BACKGROUND
Pt admitted for respiratory failure with hypoxia, fall, AMS, VRE bacteremia.
Pt admitted for respiratory failure with hypoxia, AMS, thrombocytopenia. Pt has hx of CHF, A Fib, COPD
Pt admitted for respiratory failure with hypoxia, AMS, thrombocytopenia. Pt has hx of CHF, A Fib, COPD

## 2019-07-10 NOTE — PROGRESS NOTE ADULT - PROBLEM SELECTOR PROBLEM 2
VRE bacteremia
Confusion and disorientation
VRE bacteremia
Confusion and disorientation
Confusion and disorientation

## 2019-07-10 NOTE — PROGRESS NOTE ADULT - PROBLEM SELECTOR PROBLEM 5
VRE bacteremia
VRE bacteremia
Encounter for palliative care
VRE bacteremia
Encounter for palliative care
VRE bacteremia
VRE bacteremia

## 2019-07-10 NOTE — PROVIDER CONTACT NOTE (OTHER) - ACTION/TREATMENT ORDERED:
PA aware, pt pending speech &swallow bedside evaluation as per ID. Will keep pt strictly NPO with aspiration precautions until evaluation is completed. Will continue to monitor pt &maintain safety.
PA aware, will continue to monitor pt on telemetry &maintain safety.
PA aware, will continue to monitor pt on telemetry &maintain safety.

## 2019-07-10 NOTE — PROGRESS NOTE ADULT - PROBLEM SELECTOR PLAN 10
DVT and GI PPX   fall precautions  PT   OMT at the bedside

## 2019-07-10 NOTE — PROGRESS NOTE ADULT - REASON FOR ADMISSION
Fall, chest pain

## 2019-07-10 NOTE — PROGRESS NOTE ADULT - PROBLEM SELECTOR PROBLEM 4
Bone marrow suppression
Bone marrow suppression
Advanced care planning/counseling discussion
Bone marrow suppression
Advanced care planning/counseling discussion
Bone marrow suppression
Bone marrow suppression

## 2019-07-10 NOTE — PROGRESS NOTE ADULT - PROBLEM SELECTOR PLAN 7
restart all home meds

## 2019-07-10 NOTE — PROGRESS NOTE ADULT - ASSESSMENT
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 CP is L sided pleuritic, pt notes SOB since last night, long standing ARMSTRONG unsure if worse since last night, denies orthopnea. Pain is over L lower costal margin, no radiation, no palpitations or syncope, no palliative factors, constant. Pain is non-exertional. Pt was sent with a note stating concern for slurred speech, unsure duration but EMS reported for 1 week, no appreciable dysarthria in ED. Pt has no other complaints at this time, is at baseline mental status (A&O x2-3 with difficulty with dates) as compared to prior progress notes in provided transport packet. Pt denies f/c, abd pain, urinary sxs, h/o DVT/PE.
87 yr old with CKD, COPD, BPH, PPM, TAVR due to AS, CHF, colonic polyps.   Previous VRE bacteremia, PPM removed and  treated with  6 week course of Daptomycin/Ceftaroline about 5/11/19   Hospitalized 5/26 --> 6/17/19 for recurrent VRE bacteremia.  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  Osteitis/ discitis L1-L2  VRE bacteremia was prolonged 5/24 --> 6/5/19  He developed thrombocytopenia since 6/20 but did not go on to have leukopenia    Currently admitted 7/4/19 after fall  confusion, malnutrition, mild leukocytosis, basilar consolidative lung opacities,  no fever, low ProCalcitonin suggests fluid overload,  Persistent VRE infection, pneumonia, cerebrovascular disease of concern    Antibiotics  Vanco 3/20 -->21  Daptomycin 3/21 -->  about 5/11; 5/26 -->  Ceftaroline 3/29  -->  about 5/11; 5/26-->  Linezolid 6/4-->   stopped about 1 week  Cefepime 7/4  Azithro 7/4    Overall VRE  infection, prosthetic valve endocarditis, leucocytosis, abnormal CT chest.        above discussions noted  pt to be place in hospice so will dc antibiotics
87 yr old with CKD, COPD, BPH, PPM, TAVR due to AS, CHF, colonic polyps.   Previous VRE bacteremia, PPM removed and  treated with  6 week course of Daptomycin/Ceftaroline about 5/11/19   Hospitalized 5/26 --> 6/17/19 for recurrent VRE bacteremia.  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  Osteitis/ discitis L1-L2  VRE bacteremia was prolonged 5/24 --> 6/5/19  He developed thrombocytopenia since 6/20 but did not go on to have leukopenia    Currently admitted 7/4/19 after fall  confusion, malnutrition, mild leukocytosis, basilar consolidative lung opacities,  no fever, low ProCalcitonin suggests fluid overload,  Persistent VRE infection, pneumonia, cerebrovascular disease of concern    Antibiotics  Vanco 3/20 -->21  Daptomycin 3/21 -->  about 5/11; 5/26 -->  Ceftaroline 3/29  -->  about 5/11; 5/26-->  Linezolid 6/4-->   stopped about 1 week  Cefepime 7/4  Azithro 7/4    Overall VRE  infection, prosthetic valve endocarditis, leucocytosis, abnormal CT chest.       Suggest  Continue with Ceftaroline and Daptomycin.      Follow up blood culture results ae negative  last positive were june 6   speech/swallow evaluation   platelets better.  dapto is not good for pna abut I do nto think he has vrec pna  confusion persists  probably metabolic.  no need for LP.
87 yr old with CKD, COPD, BPH, PPM, TAVR due to AS, CHF, colonic polyps.   Previous VRE bacteremia, PPM removed and  treated with  6 week course of Daptomycin/Ceftaroline about 5/11/19   Hospitalized 5/26 --> 6/17/19 for recurrent VRE bacteremia.  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  Osteitis/ discitis L1-L2  VRE bacteremia was prolonged 5/24 --> 6/5/19  He developed thrombocytopenia since 6/20 but did not go on to have leukopenia    Currently admitted 7/4/19 after fall  confusion, malnutrition, mild leukocytosis, basilar consolidative lung opacities,  no fever, low ProCalcitonin suggests fluid overload,  Persistent VRE infection, pneumonia, cerebrovascular disease of concern    Antibiotics  Vanco 3/20 -->21  Daptomycin 3/21 -->  about 5/11; 5/26 -->  Ceftaroline 3/29  -->  about 5/11; 5/26-->  Linezolid 6/4-->   stopped about 1 week  Cefepime 7/4  Azithro 7/4    Overall VRE  infection, prosthetic valve endocarditis, leucocytosis, abnormal CT chest.       Suggest  Continue with Ceftaroline and Daptomycin.   MRI brain performed, pending read.   repeat Echocardiogram  Follow up blood culture results  speech/swallow evaluation  Leucocytosis resolved.   Doubt pneumonia clinically  Thrombocytopenia improving.
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 Baseline mental status is AAO x 2-3 (difficulty with dates). Admitted for acute-on-chronic HFpEF, also with concern for ? aspiration with S&S evaluation pending. ID recommended continuing with ceftaroline and daptomycin for VRE bacteremia. Palliative care called for GOC.
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 CP is L sided pleuritic, pt notes SOB since last night, long standing ARMSTRONG unsure if worse since last night, denies orthopnea. Pain is over L lower costal margin, no radiation, no palpitations or syncope, no palliative factors, constant. Pain is non-exertional. Pt was sent with a note stating concern for slurred speech, unsure duration but EMS reported for 1 week, no appreciable dysarthria in ED. Pt has no other complaints at this time, is at baseline mental status (A&O x2-3 with difficulty with dates) as compared to prior progress notes in provided transport packet. Pt denies f/c, abd pain, urinary sxs, h/o DVT/PE.
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 CP is L sided pleuritic, pt notes SOB since last night, long standing ARMSTRONG unsure if worse since last night, denies orthopnea. Pain is over L lower costal margin, no radiation, no palpitations or syncope, no palliative factors, constant. Pain is non-exertional. Pt was sent with a note stating concern for slurred speech, unsure duration but EMS reported for 1 week, no appreciable dysarthria in ED. Pt has no other complaints at this time, is at baseline mental status (A&O x2-3 with difficulty with dates) as compared to prior progress notes in provided transport packet. Pt denies f/c, abd pain, urinary sxs, h/o DVT/PE.
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 Baseline mental status is AAO x 2-3 (difficulty with dates). Admitted for acute-on-chronic HFpEF, also with concern for ? aspiration with S&S evaluation pending. ID recommended continuing with ceftaroline and daptomycin for VRE bacteremia. Palliative care called for GOC.
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 CP is L sided pleuritic, pt notes SOB since last night, long standing ARMSTRONG unsure if worse since last night, denies orthopnea. Pain is over L lower costal margin, no radiation, no palpitations or syncope, no palliative factors, constant. Pain is non-exertional. Pt was sent with a note stating concern for slurred speech, unsure duration but EMS reported for 1 week, no appreciable dysarthria in ED. Pt has no other complaints at this time, is at baseline mental status (A&O x2-3 with difficulty with dates) as compared to prior progress notes in provided transport packet. Pt denies f/c, abd pain, urinary sxs, h/o DVT/PE.
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 CP is L sided pleuritic, pt notes SOB since last night, long standing ARMSTRONG unsure if worse since last night, denies orthopnea. Pain is over L lower costal margin, no radiation, no palpitations or syncope, no palliative factors, constant. Pain is non-exertional. Pt was sent with a note stating concern for slurred speech, unsure duration but EMS reported for 1 week, no appreciable dysarthria in ED. Pt has no other complaints at this time, is at baseline mental status (A&O x2-3 with difficulty with dates) as compared to prior progress notes in provided transport packet. Pt denies f/c, abd pain, urinary sxs, h/o DVT/PE.

## 2019-07-11 ENCOUNTER — TRANSCRIPTION ENCOUNTER (OUTPATIENT)
Age: 84
End: 2019-07-11

## 2019-07-11 VITALS
HEART RATE: 65 BPM | DIASTOLIC BLOOD PRESSURE: 64 MMHG | OXYGEN SATURATION: 99 % | RESPIRATION RATE: 19 BRPM | SYSTOLIC BLOOD PRESSURE: 107 MMHG | TEMPERATURE: 97 F

## 2019-07-11 PROCEDURE — 84132 ASSAY OF SERUM POTASSIUM: CPT

## 2019-07-11 PROCEDURE — 80048 BASIC METABOLIC PNL TOTAL CA: CPT

## 2019-07-11 PROCEDURE — 86900 BLOOD TYPING SEROLOGIC ABO: CPT

## 2019-07-11 PROCEDURE — 82550 ASSAY OF CK (CPK): CPT

## 2019-07-11 PROCEDURE — 93005 ELECTROCARDIOGRAM TRACING: CPT

## 2019-07-11 PROCEDURE — 84145 PROCALCITONIN (PCT): CPT

## 2019-07-11 PROCEDURE — 94640 AIRWAY INHALATION TREATMENT: CPT

## 2019-07-11 PROCEDURE — 97161 PT EVAL LOW COMPLEX 20 MIN: CPT

## 2019-07-11 PROCEDURE — 72125 CT NECK SPINE W/O DYE: CPT

## 2019-07-11 PROCEDURE — 71275 CT ANGIOGRAPHY CHEST: CPT

## 2019-07-11 PROCEDURE — 96374 THER/PROPH/DIAG INJ IV PUSH: CPT | Mod: XU

## 2019-07-11 PROCEDURE — 82553 CREATINE MB FRACTION: CPT

## 2019-07-11 PROCEDURE — 92610 EVALUATE SWALLOWING FUNCTION: CPT

## 2019-07-11 PROCEDURE — 87040 BLOOD CULTURE FOR BACTERIA: CPT

## 2019-07-11 PROCEDURE — 84295 ASSAY OF SERUM SODIUM: CPT

## 2019-07-11 PROCEDURE — 82947 ASSAY GLUCOSE BLOOD QUANT: CPT

## 2019-07-11 PROCEDURE — 86901 BLOOD TYPING SEROLOGIC RH(D): CPT

## 2019-07-11 PROCEDURE — 86850 RBC ANTIBODY SCREEN: CPT

## 2019-07-11 PROCEDURE — 82330 ASSAY OF CALCIUM: CPT

## 2019-07-11 PROCEDURE — 80053 COMPREHEN METABOLIC PANEL: CPT

## 2019-07-11 PROCEDURE — 85027 COMPLETE CBC AUTOMATED: CPT

## 2019-07-11 PROCEDURE — 71045 X-RAY EXAM CHEST 1 VIEW: CPT

## 2019-07-11 PROCEDURE — 70551 MRI BRAIN STEM W/O DYE: CPT

## 2019-07-11 PROCEDURE — 99285 EMERGENCY DEPT VISIT HI MDM: CPT | Mod: 25

## 2019-07-11 PROCEDURE — 70450 CT HEAD/BRAIN W/O DYE: CPT

## 2019-07-11 PROCEDURE — 73030 X-RAY EXAM OF SHOULDER: CPT

## 2019-07-11 PROCEDURE — 99284 EMERGENCY DEPT VISIT MOD MDM: CPT

## 2019-07-11 PROCEDURE — 85014 HEMATOCRIT: CPT

## 2019-07-11 PROCEDURE — 83605 ASSAY OF LACTIC ACID: CPT

## 2019-07-11 PROCEDURE — 82435 ASSAY OF BLOOD CHLORIDE: CPT

## 2019-07-11 PROCEDURE — 72170 X-RAY EXAM OF PELVIS: CPT

## 2019-07-11 PROCEDURE — 82803 BLOOD GASES ANY COMBINATION: CPT

## 2019-07-11 PROCEDURE — 85610 PROTHROMBIN TIME: CPT

## 2019-07-11 PROCEDURE — 85730 THROMBOPLASTIN TIME PARTIAL: CPT

## 2019-07-11 PROCEDURE — 84484 ASSAY OF TROPONIN QUANT: CPT

## 2019-07-11 RX ORDER — CHOLECALCIFEROL (VITAMIN D3) 125 MCG
1 CAPSULE ORAL
Qty: 30 | Refills: 0
Start: 2019-07-11 | End: 2019-08-09

## 2019-07-11 RX ORDER — CLOPIDOGREL BISULFATE 75 MG/1
1 TABLET, FILM COATED ORAL
Qty: 30 | Refills: 0
Start: 2019-07-11 | End: 2019-08-09

## 2019-07-11 RX ORDER — TAMSULOSIN HYDROCHLORIDE 0.4 MG/1
1 CAPSULE ORAL
Qty: 30 | Refills: 0
Start: 2019-07-11 | End: 2019-08-09

## 2019-07-11 RX ORDER — LIDOCAINE 4 G/100G
1 CREAM TOPICAL
Qty: 90 | Refills: 0
Start: 2019-07-11 | End: 2019-08-09

## 2019-07-11 RX ORDER — SENNA PLUS 8.6 MG/1
2 TABLET ORAL
Qty: 60 | Refills: 0
Start: 2019-07-11 | End: 2019-08-09

## 2019-07-11 RX ORDER — LACTOBACILLUS ACIDOPHILUS 100MM CELL
1 CAPSULE ORAL
Qty: 90 | Refills: 0
Start: 2019-07-11 | End: 2019-08-09

## 2019-07-11 RX ORDER — PANTOPRAZOLE SODIUM 20 MG/1
1 TABLET, DELAYED RELEASE ORAL
Qty: 30 | Refills: 0
Start: 2019-07-11 | End: 2019-08-09

## 2019-07-11 RX ORDER — BUDESONIDE AND FORMOTEROL FUMARATE DIHYDRATE 160; 4.5 UG/1; UG/1
2 AEROSOL RESPIRATORY (INHALATION)
Qty: 1 | Refills: 0
Start: 2019-07-11 | End: 2019-08-09

## 2019-07-11 RX ORDER — BUDESONIDE AND FORMOTEROL FUMARATE DIHYDRATE 160; 4.5 UG/1; UG/1
2 AEROSOL RESPIRATORY (INHALATION)
Qty: 0 | Refills: 0 | DISCHARGE

## 2019-07-11 RX ORDER — POLYETHYLENE GLYCOL 3350 17 G/17G
17 POWDER, FOR SOLUTION ORAL
Qty: 0 | Refills: 0 | DISCHARGE

## 2019-07-11 RX ORDER — LANOLIN ALCOHOL/MO/W.PET/CERES
1 CREAM (GRAM) TOPICAL
Qty: 30 | Refills: 0
Start: 2019-07-11 | End: 2019-08-09

## 2019-07-11 RX ORDER — ACETAMINOPHEN 500 MG
3 TABLET ORAL
Qty: 270 | Refills: 0
Start: 2019-07-11 | End: 2019-08-09

## 2019-07-11 RX ORDER — DOCUSATE SODIUM 100 MG
1 CAPSULE ORAL
Qty: 90 | Refills: 0
Start: 2019-07-11 | End: 2019-08-09

## 2019-07-11 RX ORDER — FINASTERIDE 5 MG/1
1 TABLET, FILM COATED ORAL
Qty: 30 | Refills: 0
Start: 2019-07-11 | End: 2019-08-09

## 2019-07-11 RX ORDER — METOPROLOL TARTRATE 50 MG
1 TABLET ORAL
Qty: 30 | Refills: 0
Start: 2019-07-11 | End: 2019-08-09

## 2019-07-11 RX ORDER — ROSUVASTATIN CALCIUM 5 MG/1
1 TABLET ORAL
Qty: 30 | Refills: 0
Start: 2019-07-11 | End: 2019-08-09

## 2019-07-11 RX ORDER — APIXABAN 2.5 MG/1
1 TABLET, FILM COATED ORAL
Qty: 60 | Refills: 0
Start: 2019-07-11 | End: 2019-08-09

## 2019-07-11 RX ORDER — AMIODARONE HYDROCHLORIDE 400 MG/1
1 TABLET ORAL
Qty: 30 | Refills: 0
Start: 2019-07-11 | End: 2019-08-09

## 2019-07-11 RX ORDER — POLYETHYLENE GLYCOL 3350 17 G/17G
17 POWDER, FOR SOLUTION ORAL
Qty: 30 | Refills: 0
Start: 2019-07-11 | End: 2019-08-09

## 2019-07-11 RX ORDER — FUROSEMIDE 40 MG
1 TABLET ORAL
Qty: 30 | Refills: 0
Start: 2019-07-11 | End: 2019-08-09

## 2019-07-11 RX ORDER — ALBUTEROL 90 UG/1
2 AEROSOL, METERED ORAL
Qty: 1 | Refills: 0
Start: 2019-07-11 | End: 2019-08-09

## 2019-07-11 RX ORDER — ONDANSETRON 8 MG/1
1 TABLET, FILM COATED ORAL
Qty: 90 | Refills: 0
Start: 2019-07-11 | End: 2019-08-09

## 2019-07-11 RX ADMIN — Medication 25 MILLIGRAM(S): at 05:41

## 2019-07-11 RX ADMIN — CLOPIDOGREL BISULFATE 75 MILLIGRAM(S): 75 TABLET, FILM COATED ORAL at 11:51

## 2019-07-11 RX ADMIN — Medication 1 TABLET(S): at 11:50

## 2019-07-11 RX ADMIN — AMIODARONE HYDROCHLORIDE 200 MILLIGRAM(S): 400 TABLET ORAL at 05:42

## 2019-07-11 RX ADMIN — Medication 100 MILLIGRAM(S): at 05:40

## 2019-07-11 RX ADMIN — Medication 20 MILLIGRAM(S): at 05:46

## 2019-07-11 RX ADMIN — Medication 1 TABLET(S): at 05:42

## 2019-07-11 RX ADMIN — LIDOCAINE 1 PATCH: 4 CREAM TOPICAL at 11:50

## 2019-07-11 RX ADMIN — BUDESONIDE AND FORMOTEROL FUMARATE DIHYDRATE 2 PUFF(S): 160; 4.5 AEROSOL RESPIRATORY (INHALATION) at 05:41

## 2019-07-11 RX ADMIN — APIXABAN 5 MILLIGRAM(S): 2.5 TABLET, FILM COATED ORAL at 05:39

## 2019-07-11 RX ADMIN — FINASTERIDE 5 MILLIGRAM(S): 5 TABLET, FILM COATED ORAL at 11:50

## 2019-07-11 RX ADMIN — PANTOPRAZOLE SODIUM 40 MILLIGRAM(S): 20 TABLET, DELAYED RELEASE ORAL at 05:39

## 2019-07-11 NOTE — DISCHARGE NOTE NURSING/CASE MANAGEMENT/SOCIAL WORK - NSDCDPATPORTLINK_GEN_ALL_CORE
You can access the [facility] Portal, offered by [facility], by registering with the following website: [email or web address]

## 2019-07-11 NOTE — CHART NOTE - NSCHARTNOTEFT_GEN_A_CORE
NP note -  Rt brachial picc removal    s/p Rt brachial picc line removal. pt tolerated well. pending d/c to home w/ home hospice.     NP. Rojelio Escalera  [id number]

## 2019-07-12 RX ORDER — SENNA PLUS 8.6 MG/1
2 TABLET ORAL
Qty: 60 | Refills: 0
Start: 2019-07-12 | End: 2019-08-10

## 2019-07-12 RX ORDER — TAMSULOSIN HYDROCHLORIDE 0.4 MG/1
1 CAPSULE ORAL
Qty: 30 | Refills: 0
Start: 2019-07-12 | End: 2019-08-10

## 2019-07-12 RX ORDER — APIXABAN 2.5 MG/1
1 TABLET, FILM COATED ORAL
Qty: 60 | Refills: 0
Start: 2019-07-12 | End: 2019-08-10

## 2019-07-12 RX ORDER — FINASTERIDE 5 MG/1
1 TABLET, FILM COATED ORAL
Qty: 30 | Refills: 0
Start: 2019-07-12 | End: 2019-08-10

## 2019-07-12 RX ORDER — AMIODARONE HYDROCHLORIDE 400 MG/1
1 TABLET ORAL
Qty: 30 | Refills: 0
Start: 2019-07-12 | End: 2019-08-10

## 2019-07-12 RX ORDER — CLOPIDOGREL BISULFATE 75 MG/1
1 TABLET, FILM COATED ORAL
Qty: 30 | Refills: 0
Start: 2019-07-12 | End: 2019-08-10

## 2019-07-12 RX ORDER — FUROSEMIDE 40 MG
1 TABLET ORAL
Qty: 30 | Refills: 0
Start: 2019-07-12 | End: 2019-08-10

## 2019-07-12 RX ORDER — LANOLIN ALCOHOL/MO/W.PET/CERES
1 CREAM (GRAM) TOPICAL
Qty: 30 | Refills: 0
Start: 2019-07-12 | End: 2019-08-10

## 2019-07-12 RX ORDER — LIDOCAINE 4 G/100G
1 CREAM TOPICAL
Qty: 90 | Refills: 0
Start: 2019-07-12 | End: 2019-08-10

## 2019-07-12 RX ORDER — ONDANSETRON 8 MG/1
1 TABLET, FILM COATED ORAL
Qty: 90 | Refills: 0
Start: 2019-07-12 | End: 2019-08-10

## 2019-07-12 RX ORDER — ACETAMINOPHEN 500 MG
3 TABLET ORAL
Qty: 270 | Refills: 0
Start: 2019-07-12 | End: 2019-08-10

## 2019-07-12 RX ORDER — METOPROLOL TARTRATE 50 MG
1 TABLET ORAL
Qty: 30 | Refills: 0
Start: 2019-07-12 | End: 2019-08-10

## 2019-07-12 RX ORDER — PANTOPRAZOLE SODIUM 20 MG/1
1 TABLET, DELAYED RELEASE ORAL
Qty: 30 | Refills: 0
Start: 2019-07-12 | End: 2019-08-10

## 2019-07-12 RX ORDER — DOCUSATE SODIUM 100 MG
1 CAPSULE ORAL
Qty: 90 | Refills: 0
Start: 2019-07-12 | End: 2019-08-10

## 2019-08-10 NOTE — PROCEDURE NOTE - NSTIMEOUT_GEN_A_CORE
Patient's first and last name, , procedure, and correct site confirmed prior to the start of procedure.
Warm

## 2019-08-17 NOTE — ED PROVIDER NOTE - SECONDARY DIAGNOSIS.
4/16/2019  [facility]    mesalamine (LIALDA) 1.2 g EC tablet  Last Written Prescription Date:  2/4/19  Last Fill Quantity: 180,   # refills: 3  Last Office Visit :4/16/19  Future Office visit: none    Creatinine   Date Value Ref Range Status   02/04/2019 0.60 0.52 - 1.04 mg/dL Final       Routing refill request to provider for review/approval because: per protocol no rf after 12 mths?  
Heart failure, systolic and diastolic, acute on chronic

## 2019-09-25 NOTE — PATIENT PROFILE ADULT. - FUNCTIONAL SCREEN CURRENT LEVEL: AMBULATION, MLM
CHECK ONBASE FOR SCAN         Electronically Signed On 12/12/2018 11:05  __________________________________________________   CHRIS SHRESTHA     (0) independent

## 2020-01-16 NOTE — PROVIDER CONTACT NOTE (CRITICAL VALUE NOTIFICATION) - RECOMMENDATIONS
Letter complete and placed at  for .
no new orders made at this time
no changes in abx treatment at this time. will continue to send blood cultures daily
no new orders made
no new orders made at this time. will continue same abx treatment
Continue plan of care?
Pt on IVABX, ID on board, MARIA LUISA today
Pt on IVBABX per ID, ID on board, MARIA LUISA today to r/o endocarditis, MRI pending to r/o osteomyelitis
no new orders at this time will continue to abx treatment
IV ABX
IV ABX.
No new orders.
Notified the provider
Notified the provider
Pt is on dapto IVPB.
Pt on daptomycin 750mg IVPB
SERAFIN Reid notified.
continue current tx?
make NP aware.

## 2020-06-30 NOTE — ED PROVIDER NOTE - GASTROINTESTINAL, MLM
How Severe Is Your Skin Lesion?: moderate
Has Your Skin Lesion Been Treated?: not been treated
Is This A New Presentation, Or A Follow-Up?: Skin Lesion
Abdomen soft, non-tender, no guarding.

## 2020-08-14 NOTE — DISCHARGE NOTE ADULT - NSFTFHOMEHTHYNRD_GEN_ALL_CORE
Detail Level: Zone
Plan: Avoid beach water!\\nDiscussed that chlorinated water should be fine, but diligent wound care is imperative
Yes

## 2021-01-01 NOTE — ED STATDOCS - NSTIMEPROVIDERCAREINITIATE_GEN_ER
19-Mar-2019 20:36 follow up with MD as instructed [facility] car seat law  nurse [phone]

## 2021-04-02 NOTE — PHYSICAL THERAPY INITIAL EVALUATION ADULT - LEVEL OF INDEPENDENCE: SIT/STAND, REHAB EVAL
[Straight Catheterization] : insertion of a straight catheter [Stress Incontinence] : stress incontinence [Urgent Incontinence] : urgent incontinence [Urinary Frequency] : urinary frequency [Patient] : the patient [___ Fr Straight Tip] : a [unfilled] in [place] straight tip catheter [None] : there were no complications with the catheter insertion [Clear] : clear [No Complications] : no complications [Tolerated Well] : the patient tolerated the procedure well [Post procedure instructions and information given] : Post procedure instructions and information were given and reviewed with patient. [1] : 1 [FreeTextEntry1] : cathed to obtain pvr and uncontam specimen contact guard

## 2021-05-07 NOTE — H&P ADULT - NSHPOUTPATIENTPROVIDERS_GEN_ALL_CORE
Start gabapentin  Take one capsule at bed time for 7 days and after that take 2 capsules at bed time as maintenance.  Please call [facility] to schedule your test  [phone] brain MRI and carotid ultrasound   Follow up in 4 weeks  Seek sooner medical attention if there is any worsening of symptoms or problems.      
Samm Paredes

## 2021-05-11 NOTE — H&P ADULT - PROBLEM/PLAN-6
PROCEDURE NOTE: Punctal Plugs, Vicci Cr ([id number], [id number]) OU. Diagnosis: Dry Eye Syndrome. Prior to treatment, the risks/benefits/alternatives were discussed. The patient wished to proceed with procedure. Temporary collagen plugs were inserted. Patient tolerated procedure well. There were no complications. Post procedure instructions given. Lila Bustillos DISPLAY PLAN FREE TEXT

## 2021-07-09 NOTE — PHYSICAL THERAPY INITIAL EVALUATION ADULT - PLANNED THERAPY INTERVENTIONS, PT EVAL
Alert-The patient is alert, awake and responds to voice. The patient is oriented to time, place, and person. The triage nurse is able to obtain subjective information. transfer training/gait training/stair training; GOAL: Pt will negotiate up & down 12 stairs independently with unilateral HR & least restrictive AD, in 2 weeks./bed mobility training/balance training

## 2021-10-16 NOTE — PRE-OP CHECKLIST - IDENTIFICATION BAND VERIFIED
"-- DO NOT REPLY / DO NOT REPLY ALL --  -- Message is from the [employer]--    Patient only has enough for this week, pharmacy needs Pre Authorization  Patient is requesting a medication refill - medication is on active list    Was Medication Pended? Yes. Rx Name and Dose:  meclizine (ANTIVERT) 25 MG tablet    Duration: 90 days    [address]    Patient confirmed the above pharmacy as correct? Yes    Caller Information       Type Contact Phone    10/16/2021 08:59 AM CDT Phone (Incoming) [facility] (Emergency Contact) [phone] (H)          Alternative phone number:     Turnaround time given to caller: ""This message will be sent to [facility] Provider's name]. The clinical team will fulfill your request as soon as they review your message when the office opens on Monday (or after the holiday). \""  " done

## 2021-10-18 NOTE — PROVIDER CONTACT NOTE (OTHER) - ASSESSMENT
Pt states she contacted the doctor's office and was informed it Is ok for her to reschedule.  Please call [phone] cc of lightheadness and dizziness, On tele without events. No sob, no palpitations, denies cp. VS- 86/47  Hr 56  % afebrile.

## 2021-12-14 NOTE — PROVIDER CONTACT NOTE (CRITICAL VALUE NOTIFICATION) - NAME OF MD/NP/PA/DO NOTIFIED:
ROBERTA- Margaret CHAUDHRY [FreeTextEntry1] : Routine blood work and urine today. PPSV-23 today. Pt advised to sign up for [facility] portal to review labs and communicate any questions or concerns directly. Yearly physical and return as needed for illness, medication refills, and new or existing complaints.

## 2022-02-01 NOTE — PROGRESS NOTE ADULT - ASSESSMENT
no acute gi complaints. conservative gi managment, will sign off, reconsult if needed. Banner Transposition Flap Text: The defect edges were debeveled with a #15 scalpel blade.  Given the location of the defect and the proximity to free margins a Banner transposition flap was deemed most appropriate.  Using a sterile surgical marker, an appropriate flap drawn around the defect. The area thus outlined was incised deep to adipose tissue with a #15 scalpel blade.  The skin margins were undermined to an appropriate distance in all directions utilizing iris scissors.

## 2022-02-12 NOTE — ED CDU PROVIDER DISPOSITION NOTE - NS ED MD DISPO SPECIAL CONSIDERATION1
None 64 yo male hx of lumbar stenosis s/p injection for pain management ~1.5 weeks ago now presents with worsening low back pain, paresthesias, needed to strain to go to the bathroom.  grossly 5/5 against resistance b/l LE.  urgent MRI eval for compression and reassess.

## 2022-05-31 NOTE — ED ADULT NURSE NOTE - NSFALLRSKASSESSTYPE_ED_ALL_ED
If an incision was performed as part of your procedure, contact your doctor with any pain, swelling, redness, or other concerns you may have about that area.
Initial (On Arrival)

## 2022-07-01 NOTE — ED ADULT NURSE NOTE - NS ED NOTE ABUSE SUSPICION NEGLECT YN
No PAST MEDICAL HISTORY:  ADHD     Allergy to environmental factors     Bipolar disorder     History of seizure

## 2022-09-01 NOTE — ED ADULT NURSE NOTE - INTEGUMENTARY WDL
Color consistent with ethnicity/race, warm, dry intact, resilient. Enbrel Counseling:  I discussed with the patient the risks of etanercept including but not limited to myelosuppression, immunosuppression, autoimmune hepatitis, demyelinating diseases, lymphoma, and infections.  The patient understands that monitoring is required including a PPD at baseline and must alert us or the primary physician if symptoms of infection or other concerning signs are noted.

## 2022-09-26 NOTE — PATIENT PROFILE ADULT - SURGICAL SITE INCISION
Subjective:       Patient ID: Magdaleno Hirsch is a 24 y.o. male.    Chief Complaint: Boredom    Diagnosis:  1. CML, chronic phase  2. Anemia secondary to 1.  3. Conjuctival hemorrhage secondary to 1.    Current Treatment:       Treatment History:  1. Hydroxyurea 4 g every 12 hours    HPI:  24-year-old male with no real medical history who went in for a routine eye exam on 08/18/2022 to update his eye glasses prescription.  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  He had bilateral Valdez spots with vessel tortuosity.  The optometrist recommended that the patient have blood work including testing for sickle cell disease.  The patient presented to the emergency department.  CBC in the emergency department revealed a white blood cell count of 411,900. Hemoglobin was 8.3, platelets were normal at 375,000 hundred and seventy five thousand.  Differential was suggestive of CML.  Coagulation studies revealed an INR of 1.38, PTT of 36.4 and a fibrinogen of 292.  Patient was going to present to [place], however they were on diversion.  He was transferred from [facility] [place]  Hematology consulted.  Patient underwent bone marrow biopsy on 08/22/2022, this revealed CML, chronic phase, BCR/ABL1 positive.    Interval History:   24-year-old patient known to me from previous hospitalization.  Was discharged home on 08/22/2022, he was informed about twice weekly labs until follow up appointment with me and continued on hydroxyurea.  Unfortunately, the patient did not keep his lab appointments.  He presented to the emergency department on 09/05/2022 with febrile neutropenia, was admitted to the hospitalist service and transferred to main campus [facility].  Hematology consulted as patient known to me.    Today (9/26/2022):  Patient awake and alert this morning, in bed with his child.  Remains afebrile.   He denies any recurrent bleeding.  He states that he is ready to go home.  He is not had any issues.    No past medical history on file.   Past Surgical History:   Procedure Laterality Date    BONE MARROW BIOPSY N/A 8/22/2022    Procedure: Biopsy-bone marrow;  Surgeon: Getachew Chen MD;  Location: [facility];  Service: General;  Laterality: N/A;    KNEE SURGERY Left      Social History     Socioeconomic History    Marital status: Single   Tobacco Use    Smoking status: Never    Smokeless tobacco: Never   Substance and Sexual Activity    Alcohol use: Never    Drug use: Never      History reviewed. No pertinent family history.        Review of Systems   Constitutional:  Negative for chills, diaphoresis, fatigue and unexpected weight change.   HENT:  Negative for nasal congestion and sore throat.    Eyes:  Negative for pain.   Respiratory:  Negative for cough, chest tightness and shortness of breath.    Cardiovascular:  Negative for chest pain, palpitations and leg swelling.   Gastrointestinal:  Negative for abdominal distention, abdominal pain, blood in stool, constipation and diarrhea.   Genitourinary:  Negative for dysuria, frequency and hematuria.   Musculoskeletal:  Negative for arthralgias and back pain.   Integumentary:  Negative for rash.   Neurological:  Negative for dizziness, weakness, numbness and headaches.   Hematological:  Negative for adenopathy. Bruises/bleeds easily.   Psychiatric/Behavioral:  Negative for confusion.        Objective:      Physical Exam  Vitals reviewed.   Constitutional:       General: He is awake.      Appearance: Normal appearance.   HENT:      Head: Normocephalic and atraumatic.      Comments: + gingival hyperplasia     Right Ear: Hearing normal.      Left Ear: Hearing normal.      Nose: Nose normal.      Mouth/Throat:      Comments:     Eyes:      General: Lids are normal. Vision grossly intact.      Extraocular Movements: Extraocular movements intact.      Conjunctiva/sclera:  Conjunctivae normal.   Cardiovascular:      Rate and Rhythm: Normal rate and regular rhythm.      Pulses: Normal pulses.      Heart sounds: Normal heart sounds.   Pulmonary:      Effort: Pulmonary effort is normal.      Breath sounds: Normal breath sounds. No wheezing, rhonchi or rales.   Chest:      Comments: Soft mass in area of the sternum  Abdominal:      General: Bowel sounds are normal.      Palpations: Abdomen is soft. There is splenomegaly (Improved).      Tenderness: There is no abdominal tenderness.   Musculoskeletal:      Cervical back: Full passive range of motion without pain.      Right lower leg: No edema.      Left lower leg: No edema.   Lymphadenopathy:      Cervical: No cervical adenopathy.      Upper Body:      Right upper body: No supraclavicular or axillary adenopathy.      Left upper body: No supraclavicular or axillary adenopathy.   Skin:     General: Skin is warm.   Neurological:      General: No focal deficit present.      Mental Status: He is alert and oriented to person, place, and time.   Psychiatric:         Attention and Perception: Attention normal.         Mood and Affect: Mood and affect normal.         Behavior: Behavior is cooperative.       LABS AND IMAGING REVIEWED IN EPIC  Lab Review:  CBC:   Recent Labs     09/26/22  0502 09/25/22  0434 09/24/22  0358   WBC 1.6* 1.5* 1.4*   RBC 2.62* 2.73* 2.90*   HGB 6.7* 7.1* 7.7*   HCT 20.9* 22.3* 23.7*   PLT 78* 55* 33*       CMP:   Recent Labs     09/23/22  0416 09/20/22  0331 09/19/22  0617 09/18/22  0950 09/17/22  0346    135* 136 137 138   K 3.8 4.0 4.1 3.9 4.0   CO2 26 25 26 28 27   BUN 17.3 12.8 11.9 9.7 10.8   CREATININE 0.64* 0.75 0.86 0.73 0.74   CALCIUM 8.8 9.0 8.7 9.1 9.2   ALBUMIN 3.0*  --   --  2.9* 3.0*   BILITOT 0.6  --   --  1.1 1.2   ALKPHOS 79  --   --  59 61   AST 32  --   --  12 12   ALT 57*  --   --  13 14              Assessment:   1. CML, chronic phase  2. Pancytopenia secondary to Hydrea  3. Refractory  thrombocytopenia - improving        Plan:     He remains afebrile off of IV antibiotics.    He remains neutropenic, however white blood cell count and ANC are increasing.    Patient completed 5 days of steroids, platelet counts have improved daily and continued to do so.      We will transfuse 2 units of packed red blood cells today.    Discharge hopefully tomorrow.    All blood products to be irradiated.     Paul Hogan II, MD  Hematology/Oncology       no

## 2023-01-07 NOTE — ED PROVIDER NOTE - CCCP TRG CHIEF CMPLNT
fall
You can access the FollowMyHealth Patient Portal offered by [facility] by registering at the following website: [email or web address]. By joining [facility]’s FollowMyHealth portal, you will also be able to view your health information using other applications (apps) compatible with our system.

## 2023-02-24 NOTE — PHYSICAL THERAPY INITIAL EVALUATION ADULT - ACTIVE RANGE OF MOTION EXAMINATION, REHAB EVAL
Health Maintenance Due   Topic Date Due   • COVID-19 Vaccine (1) Never done   • Influenza Vaccine (1 of 2) Never done       Patient is due for topics as listed above but is not proceeding with Immunization(s) COVID-19 and Influenza at this time.    bilateral  lower extremity Active ROM was WFL (within functional limits)/bilateral upper extremity Active ROM was WFL (within functional limits)

## 2023-03-03 NOTE — H&P PST ADULT - NEUROLOGICAL
Thank you for letting us take care of you today. We hope all your questions were addressed. If a question was overlooked or something else comes to mind after you return home, please contact a member of your Care Team listed below. Your Care Team at [place] [address] is Team #5  Madi Chand MD (Faculty)  Merlin Carnes MD (Resident)  Keith Gutiérrez MD (Resident)  Genette Lanes, MD (Resident)  Richard Bocanegra MD, (Resident)  Marlyn Iverson., [facility]  Dimple Kwan., RMA  Vivienne Mcmullen.,  LPN  Marielos Ly., Leonard Madden., Thir[facility] office)  Gemma Thomas, [address] (Clinical Practice Manager)  Crista Marin [facility] (Clinical Pharmacist)       Office phone number: [phone]    If you need to get in right away due to illness, please be advised we have \"Same Day\" appointments available Monday-Friday. Please call us at [phone] option #3 to schedule your \"Same Day\" appointment. Learning About Living [place]  What is a living will? A living will, also called a declaration, is a legal form. It tells your family and your doctor your wishes when you can't speak for yourself. It's used by the health professionals who will treat you as you near the end of your life or if you get seriously hurt or ill. If you put your wishes in writing, your loved ones and others will know what kind of care you want. They won't need to guess. This can ease your mind and be helpful to others. And you can change or cancel your living will at any time. A living will is not the same as an estate or property will. An estate will explains what you want to happen with your money and property after you die. How do you use it? Keep these facts in mind about how a living will is used. Your living will is used only if you can't speak or make decisions for yourself. Most often, one or more doctors must certify that you can't speak or decide for yourself before your living will takes effect.   If you get better and can speak for yourself again, you can accept or refuse any treatment. It doesn't matter what you said in your living will. Some states may limit your right to refuse treatment in certain cases. For example, you may need to clearly state in your living will that you don't want artificial hydration and nutrition, such as being fed through a tube. Is a living will a legal document? A living will is a legal document. Each state has its own laws about living leary. And a living will may be called something else in your state. Here are some things to know about living leary. You don't need an  to complete a living will. But legal advice can be helpful if your state's laws are unclear. It can also help if your health history is complicated or your family can't agree on what should be in your living will. You can change your living will at any time. Some people find that their wishes about end-of-life care change as their health changes. If you make big changes to your living will, complete a new form. If you move to another state, make sure that your living will is legal in the state where you now live. In most cases, doctors will respect your wishes even if you have a form from a different state. You might use a universal form that has been approved by many states. This kind of form can sometimes be filled out and stored online. Your digital copy will then be available wherever you have a connection to the internet. The doctors and nurses who need to treat you can find it right away. Your state may offer an online registry. This is another place where you can store your living will online. It's a good idea to get your living will notarized. This means using a person called a  to watch two people sign, or witness, your living will. What should you know when you create a living will? Here are some questions to ask yourself as you make your living will.   Do you know enough about life support methods that might be used? If not, talk to your doctor so you know what might be done if you can't breathe on your own, your heart stops, or you can't swallow. What things would you still want to be able to do after you receive life-support methods? Would you want to be able to walk? To speak? To eat on your own? To live without the help of machines? Do you want certain Holiness practices performed if you become very ill? If you have a choice, where do you want to be cared for? In your home? At a hospital or nursing home? If you have a choice at the end of your life, where would you prefer to die? At home? In a hospital or nursing home? Somewhere else? Would you prefer to be buried or cremated? Do you want your organs to be donated after you die? What should you do with your living will? Make sure that your family members and your health care agent have copies of your living will (also called a declaration). Give your doctor a copy of your living will. Ask to have it kept as part of your medical record. If you have more than one doctor, make sure that each one has a copy. Put a copy of your living will where it can be easily found. For example, some people may put a copy on their refrigerator door. If you are using a digital copy, be sure your doctor, family members, and health care agent know how to find and access it. Where can you learn more? Go to http://www.delacruz.com/ and enter K356 to learn more about \"Learning About Living [place]. \"  Current as of: June 16, 2022               Content Version: 13.5  © 0488-5063 Healthwise, Incorporated. Care instructions adapted under license by [facility] ([facility]). If you have questions about a medical condition or this instruction, always ask your healthcare professional. [place] [address] any warranty or liability for your use of this information. Well Visit, Ages 25 to 72: Care Instructions  Well visits can help you stay healthy.  Your doctor has checked your overall health and may have suggested ways to take good care of yourself. Your doctor also may have recommended tests. You can help prevent illness with healthy eating, good sleep, vaccinations, regular exercise, and other steps. Get the tests that you and your doctor decide on. Depending on your age and risks, examples might include screening for diabetes; hepatitis C; HIV; and cervical, breast, lung, and colon cancer. Screening helps find diseases before any symptoms appear. Eat healthy foods. Choose fruits, vegetables, whole grains, lean protein, and low-fat dairy foods. Limit saturated fat and reduce salt. Limit alcohol. Men should have no more than 2 drinks a day. Women should have no more than 1. For some people, no alcohol is the best choice. Exercise. Get at least 30 minutes of exercise on most days of the week. Walking can be a good choice. Reach and stay at your healthy weight. This will lower your risk for many health problems. Take care of your mental health. Try to stay connected with friends, family, and community, and find ways to manage stress. If you're feeling depressed or hopeless, talk to someone. A counselor can help. If you don't have a counselor, talk to your doctor. Talk to your doctor if you think you may have a problem with alcohol or drug use. This includes prescription medicines and illegal drugs. Avoid tobacco and nicotine: Don't smoke, vape, or chew. If you need help quitting, talk to your doctor. Practice safer sex. Getting tested, using condoms or dental dams, and limiting sex partners can help prevent STIs. Use birth control if it's important to you to prevent pregnancy. Talk with your doctor about your choices and what might be best for you. Prevent problems where you can. Protect your skin from too much sun, wash your hands, brush your teeth twice a day, and wear a seat belt in the car. Where can you learn more?   Go to http://www.woods.com/ and enter P072 to learn more about \"Well Visit, Ages 25 to 72: Care Instructions. \"  Current as of: March 9, 2022               Content Version: 13.5  © 5019-0787 Healthwise, Incorporated. Care instructions adapted under license by [facility] ([facility]). If you have questions about a medical condition or this instruction, always ask your healthcare professional. [place] [address] any warranty or liability for your use of this information. negative Alert & oriented; no sensory, motor or coordination deficits, normal reflexes

## 2023-03-23 NOTE — PATIENT PROFILE ADULT - NSPROPASSIVESMOKEEXPOSURE_GEN_A_NUR
no rashes, no suspicious lesions, no areas of discoloration, no jaundice present, good turgor, no masses, no tenderness on palpation Yes

## 2023-04-09 NOTE — OCCUPATIONAL THERAPY INITIAL EVALUATION ADULT - LEVEL OF INDEPENDENCE: DRESS LOWER BODY, OT EVAL
PAST MEDICAL HISTORY:  Alcohol abuse     Alcoholic gastritis     Chronic pain of right knee      minimum assist (75% patients effort)

## 2023-05-26 NOTE — H&P PST ADULT - NS PRO ABUSE SCREEN AFRAID ANYONE YN
[FreeTextEntry1] : I, Rosaline Shannon, acted solely as a scribe for Dr. Stevenson on this date on 05/12/2023.  no

## 2023-08-24 NOTE — ED ADULT NURSE NOTE - PMH
Detail Level: Zone CAD (coronary artery disease)  s/p stent 2010  H/O thalassemia    Hypertension    Pacemaker

## 2023-08-25 NOTE — PROGRESS NOTE ADULT - ASSESSMENT
Pt is a 88 y/o male w/ PMH of HTN, HLD, remote CVA, COPD, CAD with Stents, CHF, chronic afib on eliquis, chronic systolic heart failure s/p TAVR 11/27/18.presenting to [facility] ED today d/t weakness/SOB/cough. patient reports some chills and productive cough with yellow sputum. patient was seen by his cardiologist    # Bacteremia   # HX of HFpEF,   # CAD S/P PCI   # Afib on AC  # S/P TAVR  # HTN  # CVA  #BPH  # HLD     Plan:   Bacteremia: ID on board , TTE with no vegetations, for PPM removal today , will need MARIA LUISA to assess TAVR valve , ABX as per ID     Afib on eliquis at home, currently on hold for PPM removal, c/w amiodarone     HFpEF: compensated No lymphadedenopathy

## 2023-09-05 NOTE — ED ADULT NURSE NOTE - GENITOURINARY WDL
For Medical Surgical Hx obtained at Admission, Please see Provider H&P
Bladder non-tender and non-distended. Urine clear yellow.

## 2024-02-23 NOTE — DISCHARGE NOTE ADULT - ADDITIONAL INSTRUCTIONS
Will verbalize 2 areas that serve as motivation for change as it pertains to addiction
Will verbalize 2 areas that serve as motivation for change as it pertains to addiction
follow up with primary care physician and cardiology dr. Vivas within one week after discharge

## 2024-03-31 NOTE — ED ADULT NURSE REASSESSMENT NOTE - FACIAL SYMMETRY
Pediatric Well Child Exam: 7- 8 Years of age    Chief Complaint   Patient presents with   • Well Child     Noted with slight rash to upper lip. Aveno seems to help resolve it.   • Well Child       SUBJECTIVE:  Traci Jennings is a 8 year old female who presents for a  well child exam.  Patient presents  with Mother.    Preferred method of results communication:     Cell Phone:   Telephone Information:   Mobile [phone]     Okay to leave a message containing results? Yes    CONCERNS RAISED TODAY:  None.    DIET/GI:  Appetite: Good.  Milk: 1 Percent, 3 cups/day.  Food:   · Eats fruits/vegetables: [x]  YES     []  NO   · Eats meat: [x]  YES     []  NO   Problems with bowel movements: []  YES     [x]  NO     PHYSICAL ACTIVITY        Playtime (60 min/day): [x]  YES     []  NO , dancing        Electronic Screen time 2 hours/day:     SLEEP PATTERN:   8 pm -6 am hours/night.    SCHOOL HISTORY:  Facility Type: Attending Public School - name: [employer] .  Grade in school: Second Grade    VISION SCREENING:   Vision Screening    Right eye Left eye Both eyes   Without correction 20/25/ 20/25/ 20/25   With correction          DEVELOPMENT:  [x]  YES    []  NO      []  UNKNOWN    Has success in school?  [x]  YES    []  NO      []  UNKNOWN    Has friends/does well socially?  [x]  YES    []  NO      []  UNKNOWN    Participates in after school/outside          activities?  [x]  YES    []  NO      []  UNKNOWN    Gets along with family?  [x]  YES    []  NO      []  UNKNOWN    Does chores when asked?  [x]  YES    []  NO      []  UNKNOWN    Given chances to make own decisions?  [x]  YES    []  NO      []  UNKNOWN    Feels good about self/generally happy?    OBJECTIVE:  PAST HISTORIES:  Allergies, Medications, Medical history, Surgical history, Family history reviewed and updated.  Toxic Exposure:   Tobacco Use: Passive            Comment: mother does not smoke around patient,                 smokes outside the 
house    IMMUNIZATION STATUS: Up to date per review of the electronic health records.    IMMUNIZATION REACTIONS: None  VARICELLA STATUS: confirmed by vaccine administration    RECENT HEALTH EVENTS:  Illnesses: None.  Hospitalizations: None.  Injuries or Accidents: None.    REVIEW OF SYSTEMS:    All systems reviewed and negative except as documented in \"Concerns raised\".    PHYSICAL EXAM:      VITAL SIGNS:   Visit Vitals  BP 98/62 (BP Location: RUE - Right upper extremity, Patient Position: Sitting, Cuff Size: Small Adult)   Pulse 112   Ht 4' 3.75\" (1.314 m)   Wt 38.2 kg (84 lb 3.2 oz)   SpO2 99%   BMI 22.11 kg/m²    96 %ile (Z= 1.77) based on CDC (Girls, 2-20 Years) weight-for-age data using vitals from 2/23/2023.  GENERAL:  Well appearing female child.  Alert and active.  SKIN:  Warm, normal turgor.  No cyanosis.  No rash.  HEAD:  Normocephalic, atraumatic.    EYES:  Conjunctivae appear normal, noninjected, nonicteric, positive red reflex.  NOSE:  Appears normal without drainage.  EARS:  Normal external auditory canals. Tympanic membranes are transparent with normal landmarks.  THROAT:  Moist mucous membranes without lesions.  NECK:  Supple, no lymphadenopathy or masses.  HEART:  Regular rate and rhythm.  Normal S1, S2.  No murmurs, rubs, gallops.   LUNGS:  Clear to auscultation.  No wheezes, rales, rhonchi.  Normal work effort with breathing.  ABDOMEN:  Bowel sounds present. Soft, nontender.  No hepatomegaly.  No splenomegaly.  No masses.  GENITOURINARY: Daniele stage 1. Normal female genitalia.  Rectum/anus patent.  EXTREMITIES: Symmetrical muscle mass, strength all extremities.  No effusions.   BACK: Spine straight.  No costovertebral angle tenderness.      NEUROLOGIC:  Oriented x4. No gross lateralizing signs or focal deficits.  Normal gait. Normal deep tendon reflexes.       Assessment:  8 year old female well child.    Plan:  1. All parental concerns and questions discussed.  2. Anticipatory guidance provided, 
handout/s given Safety: Car, bicycle, fire, sharp objects, falls, water  3. Development  4. Diet  5. Discipline  6. Television  7. Analgesics/antipyretics  8. Sun exposure  9. Tobacco-free home  10. Dental care  11. Lead exposure risk: None.  12. Immunizations per orders.  Risks, benefits, and side effects discussed. VIS for Immunizations given.  13. Orders for immunizations given today per the recommended schedule.    Follow up: Return in about 1 year (around 2/23/2024) for Well Child Visit.  
symmetrical
symmetrical
Attending with

## 2024-05-01 NOTE — PROGRESS NOTE ADULT - PROBLEM SELECTOR PLAN 9
See MyChart encounter regarding surgery questions.     Jessica Shelton, RN, BSN  RN Care Coordinator, ENT Clinic     restart all home medications

## 2024-06-05 NOTE — DISCHARGE NOTE ADULT - NS AS ACTIVITY OBS
- On admission WBC count 233  - transferred to [facility] for possible leukophoresis  - as per heme/onc - underlying CLL (Oct 2023) with reactive lymphocytosis 2/2 sepsis  - heme/onc deferred leukophoresis due to mature lymphocytes; IVIG given 4/26/24  - CLL under control and plan for outpatient follow up at [facility] when patient closer to d/c Do not drive or operate machinery/No Heavy lifting/straining

## 2024-06-14 NOTE — CHART NOTE - NSCHARTNOTESELECT_GEN_ALL_CORE
Follow-up with psychiatrist stable at this time advised to continue medications she follows up with [facility]   Event Note/Testicular Pain

## 2024-07-27 NOTE — DISCHARGE NOTE ADULT - PATIENT PORTAL LINK FT
You can access the [facility] Patient Portal, offered by [facility], by registering with the following website: [email or web address] 07-27 Na 139 mmol/L Glu 131 mg/dL<H> K+ 3.4 mmol/L<L> Cr 0.65 mg/dL BUN 8 mg/dL Phos 2.6 mg/dL

## 2025-05-21 NOTE — PROGRESS NOTE ADULT - SUBJECTIVE AND OBJECTIVE BOX
Subjective: Patient seen and examined. No new events except as noted.   nausea   cont to have positive Bcx     REVIEW OF SYSTEMS:    CONSTITUTIONAL: No weakness, fevers or chills  EYES/ENT: No visual changes;  No vertigo or throat pain   NECK: No pain or stiffness  RESPIRATORY: No cough, wheezing, hemoptysis; No shortness of breath  CARDIOVASCULAR: No chest pain or palpitations  GASTROINTESTINAL: No abdominal or epigastric pain. No nausea, vomiting, or hematemesis; No diarrhea or constipation. No melena or hematochezia.  GENITOURINARY: No dysuria, frequency or hematuria  NEUROLOGICAL: L shoulder pain   SKIN: No itching, burning, rashes, or lesions   All other review of systems is negative unless indicated above.    MEDICATIONS:  MEDICATIONS  (STANDING):  amiodarone    Tablet 200 milliGRAM(s) Oral daily  apixaban 5 milliGRAM(s) Oral every 12 hours  atorvastatin 40 milliGRAM(s) Oral at bedtime  buDESOnide  80 MICROgram(s)/formoterol 4.5 MICROgram(s) Inhaler 2 Puff(s) Inhalation two times a day  ceftaroline fosamil IVPB 600 milliGRAM(s) IV Intermittent every 8 hours  clopidogrel Tablet 75 milliGRAM(s) Oral daily  DAPTOmycin IVPB 850 milliGRAM(s) IV Intermittent every 24 hours  docusate sodium 100 milliGRAM(s) Oral three times a day  finasteride 5 milliGRAM(s) Oral daily  lidocaine   Patch 1 Patch Transdermal daily  lidocaine   Patch 1 Patch Transdermal daily  linezolid    Tablet 600 milliGRAM(s) Oral every 12 hours  melatonin 3 milliGRAM(s) Oral at bedtime  metoprolol tartrate 25 milliGRAM(s) Oral two times a day  pantoprazole    Tablet 40 milliGRAM(s) Oral before breakfast  senna 2 Tablet(s) Oral at bedtime  spironolactone 25 milliGRAM(s) Oral daily  tamsulosin 0.4 milliGRAM(s) Oral at bedtime      PHYSICAL EXAM:  T(C): 36.3 (06-06-19 @ 18:19), Max: 36.4 (06-06-19 @ 04:52)  HR: 70 (06-06-19 @ 18:19) (61 - 77)  BP: 113/60 (06-06-19 @ 18:19) (83/48 - 113/60)  RR: 18 (06-06-19 @ 18:19) (18 - 18)  SpO2: 98% (06-06-19 @ 18:19) (93% - 98%)  Wt(kg): --  I&O's Summary    05 Jun 2019 07:01  -  06 Jun 2019 07:00  --------------------------------------------------------  IN: 200 mL / OUT: 0 mL / NET: 200 mL    06 Jun 2019 07:01  -  06 Jun 2019 19:06  --------------------------------------------------------  IN: 220 mL / OUT: 0 mL / NET: 220 mL          Appearance: Normal	  HEENT:   Normal oral mucosa, PERRL, EOMI	  Lymphatic: No lymphadenopathy , no edema  Cardiovascular: Normal S1 S2, No JVD, No murmurs , Peripheral pulses palpable 2+ bilaterally  Respiratory: Lungs clear to auscultation, normal effort 	  Gastrointestinal:  Soft, Non-tender, + BS	  Skin: No rashes, No ecchymoses, No cyanosis, warm to touch  Musculoskeletal: shoulder pain on ROM   Psychiatry:  Mood & affect appropriate  Ext: No edema      All labs, Imaging and EKGs personally reviewed                             9.0    7.88  )-----------( 232      ( 06 Jun 2019 10:26 )             28.4               06-06    136  |  101  |  24<H>  ----------------------------<  89  4.7   |  19<L>  |  1.22    Ca    8.9      06 Jun 2019 06:56 18

## 2025-06-13 NOTE — PATIENT PROFILE ADULT - HAS THE PATIENT BEEN ADMITTED FROM SHORT TERM REHAB?
[address] [address]          [facility]- Gastroduodenoscopy (EGD) Procedure Note    Keenan Vasquez  1947  [id number]      Procedure: Endoscopic Gastroduodenoscopy with biopsy    Indication: dysphagia, GERD    Pre-operative Diagnosis: see indication above    Post-operative Diagnosis: see findings below    : Danial Almeida MD    Staff: Circulator: Esthela Taylor RN  Endoscopy Technician: Vandana Ricardo     Referring Provider:  Graham Morejon MD      Anesthesia/Sedation: MAC        Procedure Details     After informed consent was obtained for the procedure, with all risks and benefits of procedure explained the patient was taken to the endoscopy suite and placed in the left lateral decubitus position.  Following sequential administration of sedation as per above, the endoscope was inserted into the mouth and advanced under direct vision to second portion of the duodenum. A careful inspection was made as the gastroscope was withdrawn, including a retroflexed view of the proximal stomach; findings and interventions are described below.      Findings:   Esophagus: LA Grade C erosive esophagitis seen at GE junction- cold biopsies taken  Stomach: 3 cm hiatal hernia, otherwise normal appearing stomach  Duodenum: normal to second portion    Specimens: 1. GE junction         EBL: None      Complications:   None; patient tolerated the procedure well.           Impression:    As above    Recommendations:  -Await pathology.  - Script for pantoprazole 40 mg twice daily provided  - Proceed to colonoscopy    Signed By: Danial Almeida MD     6/13/2025  9:50 AM        no

## 2025-06-13 NOTE — PATIENT PROFILE ADULT - AD AGENT PHONE NUMBER
[phone]